# Patient Record
Sex: MALE | Race: WHITE | Employment: FULL TIME | URBAN - METROPOLITAN AREA
[De-identification: names, ages, dates, MRNs, and addresses within clinical notes are randomized per-mention and may not be internally consistent; named-entity substitution may affect disease eponyms.]

---

## 2018-02-26 ENCOUNTER — TRANSFERRED RECORDS (OUTPATIENT)
Dept: HEALTH INFORMATION MANAGEMENT | Facility: CLINIC | Age: 58
End: 2018-02-26

## 2018-02-27 ENCOUNTER — MEDICAL CORRESPONDENCE (OUTPATIENT)
Dept: HEALTH INFORMATION MANAGEMENT | Facility: CLINIC | Age: 58
End: 2018-02-27

## 2018-02-27 NOTE — TELEPHONE ENCOUNTER
Phone Call:    Who did you talk to? (or) Who did you call? [place] at [facility]   Call Detail/Action: CD put in the mail today - should deliver by Wed 2/28/18.    Tracking # [id number]

## 2018-02-27 NOTE — TELEPHONE ENCOUNTER
ACTION    What did you do? Faxed request to [facility] and [facility] for imaging

## 2018-02-27 NOTE — TELEPHONE ENCOUNTER
APPT INFO    Date /Time: 3/2/18 3PM   Reason for Appt: Suspicion of Neoplasm of Rt Femur   Ref Provider/Clinic: Dr. Isaiah Paulson    Are there internal records? Yes/No?  IF YES, list clinic names: no   Are there outside records? Yes/No? Yes - see below   Patient Contact (Y/N) & Call Details: No - pt is referred   Action: Faxed request for records     OUTSIDE RECORDS CHECKLIST     CLINIC NAME COMMENTS REC (x) IMG (x)   [facility]  x x

## 2018-02-28 NOTE — TELEPHONE ENCOUNTER
Received Imaging From: [facility]    Image Type (x): Disc:_x__  Pacs:___      Exam Date/Name: MR Rt Femur Ext 2/26/18 Comments: sent disc to [facility]

## 2018-02-28 NOTE — TELEPHONE ENCOUNTER
Received Imaging From: [facility]    Image Type (x): Disc:___  Pacs:__x_      Exam Date/Name: XR R Femur 2/26/18    US R Extremities 2/26/18 Comments: imaging in pacs

## 2018-03-02 ENCOUNTER — OFFICE VISIT (OUTPATIENT)
Dept: ORTHOPEDICS | Facility: CLINIC | Age: 58
End: 2018-03-02
Payer: COMMERCIAL

## 2018-03-02 ENCOUNTER — PRE VISIT (OUTPATIENT)
Dept: ORTHOPEDICS | Facility: CLINIC | Age: 58
End: 2018-03-02

## 2018-03-02 VITALS — BODY MASS INDEX: 34.2 KG/M2 | WEIGHT: 238.9 LBS | HEIGHT: 70 IN

## 2018-03-02 DIAGNOSIS — D21.9 BENIGN NEOPLASM OF SOFT TISSUES: Primary | ICD-10-CM

## 2018-03-02 NOTE — PROGRESS NOTES
Dear ,    Thank you for asking me to see Mr. Tapia.  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  I recommended an open biopsy with frozen section at the time of the surgery.  He understands this and would like to proceed.    I saw him today with .  I agree with his assessment and plan.  Risk and benefits of the surgery were reviewed with the patient.    Sincerely,

## 2018-03-02 NOTE — PROGRESS NOTES
Orthopaedic Surgery Clinic Consult  Date of Service: 03/02/18    Chief Complaint: R thigh mass    History of Present Illness:  Pt is a 57 year old male, healthy, presenting with R thigh pain for the past 5 months and swelling for the past 3 weeks.  This began when he strongly struck his lateral R thigh against a solid truck door.  Immediate pain but essentially no bruising/swelling at that time, was able to ambulate without major issue.  Continued to be stiff, however, only started swelling in past few weeks, without additional cause.  The mass itself is not painful to touch except at its distal lateral aspect.  It mainly is painful when he tries to flex the knee.  Weight bearing does not independently cause pain.  The entire leg does swell diffusely, which seems to improve with icing.  Also uses a heating pack which has caused some skin blisters.    Has undergone US, XR, and MRI.    No other systemic symptoms, never had previous malignancy/mass, no prior major issues with the R thigh.  Does not take any blood thinners.        Past Medical History:  No past medical history on file.    Past Surgical History:  No past surgical history on file.    Medications:  Current Outpatient Prescriptions   Medication Sig Dispense Refill     IBUPROFEN PO Take 800 mg by mouth as needed for moderate pain         Allergies:   No Known Allergies    Social History:  Nonsmoker.  Works at [employer].  Social History     Social History     Marital status:      Spouse name: N/A     Number of children: N/A     Years of education: N/A     Occupational History     Not on file.     Social History Main Topics     Smoking status: Former Smoker     Packs/day: 1.00     Years: 10.00     Types: Cigarettes     Start date: 1/1/1975     Quit date: 1/1/1990     Smokeless tobacco: Never Used     Alcohol use Yes     Drug use: No     Sexual activity: Not Currently     Partners: Female     Other Topics Concern     Not on file     Social History  "Narrative     No narrative on file         Family History:  No family history on file.  Negative for blood clots, bleeding disorders or anesthesia related complications.    Review of Systems:  10 point review of systems is reviewed and is available below.    Physical Exam:  Ht 1.778 m (5' 10\")  Wt 108.4 kg (238 lb 14.4 oz)  BMI 34.28 kg/m2  A&O, NAD  HEENT: NCAT, EOMI  Neuro: CN II-XII grossly intact  Neck: Full AROM without difficulty  Resp: equal and nonlabored  CV: RRR  Extremities:  RLE:   Large obvious mass along the proximal anterolateral thigh, skin with resolving 2nd degree burn marks (reports this is from hating pack overuse).  No full thickness skin compromise.  Nontender along most of mass.  At distal anterolateral end there is an area of tenderness.   Knee flexion limited to 0-75 r/t pain with flexion.  SLR intact but somewhat weak.   SILT dp/sp/t/s/s nerve distributions   DP 2+   Fires EHL/FHL/TA/Gsc 5/5 strength   No calf tenderness      Imaging:  MRI with large cystic mass, fluid filled, suspicious for sarcoma.    Assessment:  Pt is a 57 year old male, with R thigh mass.  Unclear if linked to previous trauma - could be some hematoma component but timecourse of injury doesn't totally fit.  Also not anti-coagulated.  Concern for sarcoma.    Plan:  -Schedule for incisional open biopsy, hopefully next week.  May need multiple biopsies given cystic nature - will schedule for extra time.  -No restrictions  -Preop per usual    Discussed with staff, Dr. Betts.    Mp Benson MD  PGY-4, Orthopaedic Surgery  [phone]          "

## 2018-03-02 NOTE — MR AVS SNAPSHOT
After Visit Summary   3/2/2018    Hiram Tapia    MRN: [id number]           Patient Information     Date Of Birth          1960        Visit Information        Provider Department      3/2/2018 3:00 PM Brad Betts MD [facility]        Today's Diagnoses     Benign neoplasm of soft tissues    -  1       Follow-ups after your visit        Your next 10 appointments already scheduled     Mar 08, 2018   Procedure with Brad Betts MD   [facility] ([facility])    [address] [place] [address]  5th [facility] [id number] [phone]           Located in the Clinics and Surgery Center at [address] [place] [address].   parking is very convenient and highly recommended.  is a $6 flat rate fee.  Both  and self parkers should enter the main arrival plaza from [facility]; parking attendants will direct you based on your parking preference.            Mar 23, 2018  1:45 PM CDT   (Arrive by 1:30 PM)   Return Visit with Brad Betts MD   [facility] ([facility])    [address] [place] [address]  4th [facility] [id number] [phone]              Who to contact     Please call your clinic at [phone] to:    Ask questions about your health    Make or cancel appointments    Discuss your medicines    Learn about your test results    Speak to your doctor            Additional Information About Your Visit        MyChart Information     [employer] is an electronic gateway that provides easy, online access to your medical records. With [employer], you can request a clinic appointment, read your test results, renew a prescription or communicate with your care team.     To sign up for [employer] visit the website at [email or web address]   You will be asked to enter the access code listed below, as well as some personal information. Please follow  "the directions to create your username and password.     Your access code is: [email or web address]  Expires: 2018  6:30 AM     Your access code will  in 90 days. If you need help or a new code, please contact your [facility] or call [phone] for assistance.        Care EveryWhere ID     This is your Care EveryWhere ID. This could be used by other organizations to access your [facility] medical records  [id number]        Your Vitals Were     Height BMI (Body Mass Index)                1.778 m (5' 10\") 34.28 kg/m2           Blood Pressure from Last 3 Encounters:   No data found for BP    Weight from Last 3 Encounters:   18 108.4 kg (238 lb 14.4 oz)              We Performed the Following     [facility]ela-Operative Worksheet        Primary Care Provider Office Phone # Fax #    Louisa Fry [phone]       [facility] [address]  [facility] [address]        Equal Access to Services     [facility] Hospital[facility] : Hadii aad ku hadasho Soomaali, waaxda luqadaha, qaybta kaalmada adeegyada, waxay idiin hayaan nanetteeg kharash mich . So [facility] [phone].    ATENCIÓN: Si habla español, tiene a sheridan disposición servicios gratuitos de asistencia lingüística. Llame al [phone].    We comply with applicable federal civil rights laws and [place] laws. We do not discriminate on the basis of race, color, national origin, age, disability, sex, sexual orientation, or gender identity.            Thank you!     Thank you for choosing [facility]TH[facility]  for your care. Our goal is always to provide you with excellent care. Hearing back from our patients is one way we can continue to improve our services. Please take a few minutes to complete the written survey that you may receive in the mail after your visit with us. Thank you!             Your Updated Medication List - Protect others around you: Learn how to safely use, store and throw away your medicines at " www.disposemymeds.org.          This list is accurate as of 3/2/18 11:59 PM.  Always use your most recent med list.                   Brand Name Dispense Instructions for use Diagnosis    ACETAMINOPHEN PO      Take 325 mg by mouth        IBUPROFEN PO      Take 800 mg by mouth as needed for moderate pain

## 2018-03-02 NOTE — LETTER
3/2/2018       RE: Hiram Tapia  [address]  [facility] [address]     Dear Colleague,    Thank you for referring your patient, Hiram Tapia, to the [facility] at [facility]. Please see a copy of my visit note below.    Orthopaedic Surgery Clinic Consult  Date of Service: 03/02/18    Chief Complaint: R thigh mass    History of Present Illness:  Pt is a 57 year old male, healthy, presenting with R thigh pain for the past 5 months and swelling for the past 3 weeks.  This began when he strongly struck his lateral R thigh against a solid truck door.  Immediate pain but essentially no bruising/swelling at that time, was able to ambulate without major issue.  Continued to be stiff, however, only started swelling in past few weeks, without additional cause.  The mass itself is not painful to touch except at its distal lateral aspect.  It mainly is painful when he tries to flex the knee.  Weight bearing does not independently cause pain.  The entire leg does swell diffusely, which seems to improve with icing.  Also uses a heating pack which has caused some skin blisters.    Has undergone US, XR, and MRI.    No other systemic symptoms, never had previous malignancy/mass, no prior major issues with the R thigh.  Does not take any blood thinners.        Past Medical History:  No past medical history on file.    Past Surgical History:  No past surgical history on file.    Medications:  Current Outpatient Prescriptions   Medication Sig Dispense Refill     IBUPROFEN PO Take 800 mg by mouth as needed for moderate pain         Allergies:   No Known Allergies    Social History:  Nonsmoker.  Works at [employer].  Social History     Social History     Marital status:      Spouse name: N/A     Number of children: N/A     Years of education: N/A     Occupational History     Not on file.     Social History Main Topics     Smoking status: Former Smoker      "Packs/day: 1.00     Years: 10.00     Types: Cigarettes     Start date: 1/1/1975     Quit date: 1/1/1990     Smokeless tobacco: Never Used     Alcohol use Yes     Drug use: No     Sexual activity: Not Currently     Partners: Female     Other Topics Concern     Not on file     Social History Narrative     No narrative on file         Family History:  No family history on file.  Negative for blood clots, bleeding disorders or anesthesia related complications.    Review of Systems:  10 point review of systems is reviewed and is available below.    Physical Exam:  Ht 1.778 m (5' 10\")  Wt 108.4 kg (238 lb 14.4 oz)  BMI 34.28 kg/m2  A&O, NAD  HEENT: NCAT, EOMI  Neuro: CN II-XII grossly intact  Neck: Full AROM without difficulty  Resp: equal and nonlabored  CV: RRR  Extremities:  RLE:   Large obvious mass along the proximal anterolateral thigh, skin with resolving 2nd degree burn marks (reports this is from hating pack overuse).  No full thickness skin compromise.  Nontender along most of mass.  At distal anterolateral end there is an area of tenderness.   Knee flexion limited to 0-75 r/t pain with flexion.  SLR intact but somewhat weak.   SILT dp/sp/t/s/s nerve distributions   DP 2+   Fires EHL/FHL/TA/Gsc 5/5 strength   No calf tenderness      Imaging:  MRI with large cystic mass, fluid filled, suspicious for sarcoma.    Assessment:  Pt is a 57 year old male, with R thigh mass.  Unclear if linked to previous trauma - could be some hematoma component but timecourse of injury doesn't totally fit.  Also not anti-coagulated.  Concern for sarcoma.    Plan:  -Schedule for incisional open biopsy, hopefully next week.  May need multiple biopsies given cystic nature - will schedule for extra time.  -No restrictions  -Preop per usual    Discussed with staff, Dr. Betts.    Mp Benson MD  PGY-4, Orthopaedic Surgery  [phone]  Dear ,    Thank you for asking me to see Mr. Tapia.  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  I recommended an open biopsy with frozen section at the time of the surgery.  He understands this and would like to proceed.    I saw him today with .  I agree with his assessment and plan.  Risk and benefits of the surgery were reviewed with the patient.    Again, thank you for allowing me to participate in the care of your patient.      Sincerely,    Brad Betts MD

## 2018-03-02 NOTE — NURSING NOTE
"Chief Complaint   Patient presents with     Consult     Pt states that he is here today for a Biopsy of his Right Femur. He states that he Bumped his Leg Oct. 2015 and has been experiencing increased pain and swelling since. Referring:  MYRA PETERSEN       57 year old  1960    Ht 1.778 m (5' 10\")  Wt 108.4 kg (238 lb 14.4 oz)  BMI 34.28 kg/m2              [facility] [address] [place] [address] - [facility] [address] AT [facility] OF  [address]    No Known Allergies  Current Outpatient Prescriptions   Medication     IBUPROFEN PO     No current facility-administered medications for this visit.              "

## 2018-03-08 ENCOUNTER — HOSPITAL ENCOUNTER (OUTPATIENT)
Facility: AMBULATORY SURGERY CENTER | Age: 58
End: 2018-03-08
Attending: ORTHOPAEDIC SURGERY
Payer: COMMERCIAL

## 2018-03-08 ENCOUNTER — SURGERY (OUTPATIENT)
Age: 58
End: 2018-03-08

## 2018-03-08 ENCOUNTER — ANESTHESIA EVENT (OUTPATIENT)
Dept: SURGERY | Facility: AMBULATORY SURGERY CENTER | Age: 58
End: 2018-03-08

## 2018-03-08 ENCOUNTER — TELEPHONE (OUTPATIENT)
Dept: ORTHOPEDICS | Facility: CLINIC | Age: 58
End: 2018-03-08

## 2018-03-08 ENCOUNTER — ANESTHESIA (OUTPATIENT)
Dept: SURGERY | Facility: AMBULATORY SURGERY CENTER | Age: 58
End: 2018-03-08

## 2018-03-08 VITALS
BODY MASS INDEX: 33.64 KG/M2 | TEMPERATURE: 98 F | DIASTOLIC BLOOD PRESSURE: 104 MMHG | OXYGEN SATURATION: 98 % | RESPIRATION RATE: 16 BRPM | HEIGHT: 70 IN | HEART RATE: 87 BPM | SYSTOLIC BLOOD PRESSURE: 131 MMHG | WEIGHT: 235 LBS

## 2018-03-08 DIAGNOSIS — R22.41 MASS OF RIGHT THIGH: Primary | ICD-10-CM

## 2018-03-08 RX ORDER — HYDROMORPHONE HYDROCHLORIDE 1 MG/ML
.3-.5 INJECTION, SOLUTION INTRAMUSCULAR; INTRAVENOUS; SUBCUTANEOUS EVERY 10 MIN PRN
Status: DISCONTINUED | OUTPATIENT
Start: 2018-03-08 | End: 2018-03-09 | Stop reason: HOSPADM

## 2018-03-08 RX ORDER — ONDANSETRON 4 MG/1
4 TABLET, ORALLY DISINTEGRATING ORAL EVERY 30 MIN PRN
Status: DISCONTINUED | OUTPATIENT
Start: 2018-03-08 | End: 2018-03-09 | Stop reason: HOSPADM

## 2018-03-08 RX ORDER — BUPIVACAINE HYDROCHLORIDE AND EPINEPHRINE 2.5; 5 MG/ML; UG/ML
INJECTION, SOLUTION INFILTRATION; PERINEURAL PRN
Status: DISCONTINUED | OUTPATIENT
Start: 2018-03-08 | End: 2018-03-08 | Stop reason: HOSPADM

## 2018-03-08 RX ORDER — OXYCODONE HYDROCHLORIDE 5 MG/1
5-10 TABLET ORAL EVERY 4 HOURS PRN
Qty: 20 TABLET | Refills: 0 | Status: SHIPPED | OUTPATIENT
Start: 2018-03-08 | End: 2018-03-27

## 2018-03-08 RX ORDER — OXYCODONE HYDROCHLORIDE 5 MG/1
5 TABLET ORAL
Status: COMPLETED | OUTPATIENT
Start: 2018-03-08 | End: 2018-03-08

## 2018-03-08 RX ORDER — SODIUM CHLORIDE, SODIUM LACTATE, POTASSIUM CHLORIDE, CALCIUM CHLORIDE 600; 310; 30; 20 MG/100ML; MG/100ML; MG/100ML; MG/100ML
INJECTION, SOLUTION INTRAVENOUS CONTINUOUS
Status: DISCONTINUED | OUTPATIENT
Start: 2018-03-08 | End: 2018-03-09 | Stop reason: HOSPADM

## 2018-03-08 RX ORDER — ONDANSETRON 2 MG/ML
INJECTION INTRAMUSCULAR; INTRAVENOUS PRN
Status: DISCONTINUED | OUTPATIENT
Start: 2018-03-08 | End: 2018-03-08

## 2018-03-08 RX ORDER — SODIUM CHLORIDE, SODIUM LACTATE, POTASSIUM CHLORIDE, CALCIUM CHLORIDE 600; 310; 30; 20 MG/100ML; MG/100ML; MG/100ML; MG/100ML
INJECTION, SOLUTION INTRAVENOUS CONTINUOUS
Status: DISCONTINUED | OUTPATIENT
Start: 2018-03-08 | End: 2018-03-08 | Stop reason: HOSPADM

## 2018-03-08 RX ORDER — ONDANSETRON 4 MG/1
4 TABLET, ORALLY DISINTEGRATING ORAL
Status: DISCONTINUED | OUTPATIENT
Start: 2018-03-08 | End: 2018-03-09 | Stop reason: HOSPADM

## 2018-03-08 RX ORDER — NALOXONE HYDROCHLORIDE 0.4 MG/ML
.1-.4 INJECTION, SOLUTION INTRAMUSCULAR; INTRAVENOUS; SUBCUTANEOUS
Status: DISCONTINUED | OUTPATIENT
Start: 2018-03-08 | End: 2018-03-09 | Stop reason: HOSPADM

## 2018-03-08 RX ORDER — MEPERIDINE HYDROCHLORIDE 25 MG/ML
12.5 INJECTION INTRAMUSCULAR; INTRAVENOUS; SUBCUTANEOUS
Status: DISCONTINUED | OUTPATIENT
Start: 2018-03-08 | End: 2018-03-09 | Stop reason: HOSPADM

## 2018-03-08 RX ORDER — ONDANSETRON 2 MG/ML
4 INJECTION INTRAMUSCULAR; INTRAVENOUS EVERY 30 MIN PRN
Status: DISCONTINUED | OUTPATIENT
Start: 2018-03-08 | End: 2018-03-09 | Stop reason: HOSPADM

## 2018-03-08 RX ORDER — DEXAMETHASONE SODIUM PHOSPHATE 4 MG/ML
INJECTION, SOLUTION INTRA-ARTICULAR; INTRALESIONAL; INTRAMUSCULAR; INTRAVENOUS; SOFT TISSUE PRN
Status: DISCONTINUED | OUTPATIENT
Start: 2018-03-08 | End: 2018-03-08

## 2018-03-08 RX ORDER — PROPOFOL 10 MG/ML
INJECTION, EMULSION INTRAVENOUS CONTINUOUS PRN
Status: DISCONTINUED | OUTPATIENT
Start: 2018-03-08 | End: 2018-03-08

## 2018-03-08 RX ORDER — GABAPENTIN 300 MG/1
300 CAPSULE ORAL ONCE
Status: COMPLETED | OUTPATIENT
Start: 2018-03-08 | End: 2018-03-08

## 2018-03-08 RX ORDER — FENTANYL CITRATE 50 UG/ML
INJECTION, SOLUTION INTRAMUSCULAR; INTRAVENOUS PRN
Status: DISCONTINUED | OUTPATIENT
Start: 2018-03-08 | End: 2018-03-08

## 2018-03-08 RX ORDER — PROPOFOL 10 MG/ML
INJECTION, EMULSION INTRAVENOUS PRN
Status: DISCONTINUED | OUTPATIENT
Start: 2018-03-08 | End: 2018-03-08

## 2018-03-08 RX ORDER — ACETAMINOPHEN 325 MG/1
650 TABLET ORAL
Status: DISCONTINUED | OUTPATIENT
Start: 2018-03-08 | End: 2018-03-09 | Stop reason: HOSPADM

## 2018-03-08 RX ORDER — ACETAMINOPHEN 325 MG/1
975 TABLET ORAL ONCE
Status: COMPLETED | OUTPATIENT
Start: 2018-03-08 | End: 2018-03-08

## 2018-03-08 RX ORDER — FENTANYL CITRATE 50 UG/ML
25-50 INJECTION, SOLUTION INTRAMUSCULAR; INTRAVENOUS
Status: DISCONTINUED | OUTPATIENT
Start: 2018-03-08 | End: 2018-03-08 | Stop reason: HOSPADM

## 2018-03-08 RX ORDER — KETOROLAC TROMETHAMINE 30 MG/ML
INJECTION, SOLUTION INTRAMUSCULAR; INTRAVENOUS PRN
Status: DISCONTINUED | OUTPATIENT
Start: 2018-03-08 | End: 2018-03-08

## 2018-03-08 RX ADMIN — PROPOFOL 270 MG: 10 INJECTION, EMULSION INTRAVENOUS at 12:22

## 2018-03-08 RX ADMIN — ONDANSETRON 4 MG: 2 INJECTION INTRAMUSCULAR; INTRAVENOUS at 13:10

## 2018-03-08 RX ADMIN — FENTANYL CITRATE 50 MCG: 50 INJECTION, SOLUTION INTRAMUSCULAR; INTRAVENOUS at 14:03

## 2018-03-08 RX ADMIN — FENTANYL CITRATE 50 MCG: 50 INJECTION, SOLUTION INTRAMUSCULAR; INTRAVENOUS at 12:22

## 2018-03-08 RX ADMIN — GABAPENTIN 300 MG: 300 CAPSULE ORAL at 10:23

## 2018-03-08 RX ADMIN — FENTANYL CITRATE 50 MCG: 50 INJECTION, SOLUTION INTRAMUSCULAR; INTRAVENOUS at 14:07

## 2018-03-08 RX ADMIN — FENTANYL CITRATE 50 MCG: 50 INJECTION, SOLUTION INTRAMUSCULAR; INTRAVENOUS at 12:31

## 2018-03-08 RX ADMIN — FENTANYL CITRATE 50 MCG: 50 INJECTION, SOLUTION INTRAMUSCULAR; INTRAVENOUS at 13:59

## 2018-03-08 RX ADMIN — DEXAMETHASONE SODIUM PHOSPHATE 4 MG: 4 INJECTION, SOLUTION INTRA-ARTICULAR; INTRALESIONAL; INTRAMUSCULAR; INTRAVENOUS; SOFT TISSUE at 12:26

## 2018-03-08 RX ADMIN — BUPIVACAINE HYDROCHLORIDE AND EPINEPHRINE 10 ML: 2.5; 5 INJECTION, SOLUTION INFILTRATION; PERINEURAL at 13:38

## 2018-03-08 RX ADMIN — KETOROLAC TROMETHAMINE 30 MG: 30 INJECTION, SOLUTION INTRAMUSCULAR; INTRAVENOUS at 13:15

## 2018-03-08 RX ADMIN — HYDROMORPHONE HYDROCHLORIDE 0.5 MG: 1 INJECTION, SOLUTION INTRAMUSCULAR; INTRAVENOUS; SUBCUTANEOUS at 14:08

## 2018-03-08 RX ADMIN — FENTANYL CITRATE 50 MCG: 50 INJECTION, SOLUTION INTRAMUSCULAR; INTRAVENOUS at 13:54

## 2018-03-08 RX ADMIN — ACETAMINOPHEN 975 MG: 325 TABLET ORAL at 10:23

## 2018-03-08 RX ADMIN — PROPOFOL 200 MCG/KG/MIN: 10 INJECTION, EMULSION INTRAVENOUS at 12:27

## 2018-03-08 RX ADMIN — OXYCODONE HYDROCHLORIDE 5 MG: 5 TABLET ORAL at 14:10

## 2018-03-08 RX ADMIN — SODIUM CHLORIDE, SODIUM LACTATE, POTASSIUM CHLORIDE, CALCIUM CHLORIDE: 600; 310; 30; 20 INJECTION, SOLUTION INTRAVENOUS at 10:25

## 2018-03-08 NOTE — OP NOTE
Preop diagnosis: Tumor right thigh, 30 cm, deep    Postoperative diagnosis: High-grade sarcoma right thigh.    Procedure performed: Biopsy of malignant tumor right thigh.    Surgeon: Brittany Atkinson.  Ms. Santos's assistance was required throughout the surgery for assistance in hemostasis and retraction.    Estimated blood loss: 5 cc    Pathology submitted: Tumor right thigh frozen section and final.    Patient was interviewed in the preoperative area risk and benefits have been reviewed the surgical site was marked with my initials and line of intended incision.  Consent was signed preoperative brief had been performed.  The patient was taken the operating room received a general anesthetic was placed in the lateral position with the right side up in the right thigh and hip were prepped and draped sterilely.  Surgical timeout was performed.    A 2 inch incision was made directly over the proximal lateral portion of the mass.  Sharp dissection was taken down through skin and subcutaneous tissue and tensor fascia fascia through the superficial fascia of the lateralis muscles.  Approximately thousand cc of old fluid was extracted from the wound and solid tumor was expressed from inside the cyst cavity.  Frozen section of the cell tumor showed evidence of a high-grade sarcoma.    Additional tissue was taken from the tumor region.  This was submitted for final histopathology.  The wound was closed with deep fascial subcutaneous and skin layers over a drain.    Postoperative plan: 1.  Return to clinic next Tuesday for removal of the drain.  2.  He will need a PET CT scan appointments with Dr. Solo or Dr. Gaines and Dr. Johnson.

## 2018-03-08 NOTE — DISCHARGE INSTRUCTIONS
[facility]  Home Care Following Anesthesia  For 24 hours after surgery:  1. Get plenty of rest.  A responsible adult must stay with you for at least 24 hours after you leave the surgery center.  2. Do not drive or use heavy equipment.  If you have weakness or tingling, don't drive or use heavy equipment until this feeling goes away.   3. Do not drink alcohol.   4. Avoid strenuous or risky activities.  Ask for help when climbing stairs.  5. You may feel lightheaded.  IF so, sit for a few minutes before standing.  Have someone help you get up.   6. If you have nausea (feel sick to your stomach): Drink only clear liquids such as apple juice, ginger ale, broth or 7-Up.  Rest may also help.  Be sure to drink enough fluids.  Move to a regular diet as you feel able.   7. You may have a slight fever.  Call the doctor if your fever is over 100 F (37.7 C) (taken under the tongue) or lasts longer than 24 hours.  8. You may have a dry mouth, a sore throat, muscle aches or trouble sleeping. These should go away after 24 hours.  9. Do not make important or legal decisions.               Tips for taking pain medications  To get the best pain relief possible, remember these points:    Take pain medications as directed, before pain becomes severe.    Pain medication can upset your stomach: taking it with food may help.    Constipation is a common side effect of pain medication. Drink plenty of  fluids.    Eat foods high in fiber. Take a stool softener if recommended by your doctor or pharmacist.    Do not drink alcohol, drive or operate machinery while taking pain medications.    Ask about other ways to control pain, such as with heat, ice or relaxation.    Tylenol/Acetaminophen Consumption  To help encourage the safe use of acetaminophen, the makers of TYLENOL  have lowered the maximum daily dose for single-ingredient Extra Strength TYLENOL  (acetaminophen) products sold in the [place] from 8  pills per day (4,000 mg) to 6 pills per day (3,000 mg). The dosing interval has also changed from 2 pills every 4-6 hours to 2 pills every 6 hours.    If you feel your pain relief is insufficient, you may take Tylenol/Acetaminophen in addition to your narcotic pain medication.     Be careful not to exceed 3,000 mg of Tylenol/Acetaminophen in a 24 hour period from all sources.    If you are taking extra strength Tylenol/acetaminophen (500 mg), the maximum dose is 6 tablets in 24 hours.    If you are taking regular strength acetaminophen (325 mg), the maximum dose is 9 tablets in 24 hours.    Call a doctor for any of the followin. Signs of infection (fever, growing tenderness at the surgery site, a large amount of drainage or bleeding, severe pain, foul-smelling drainage, redness, swelling).  2. It has been over 8 to 10 hours since surgery and you are still not able to urinate (pass water).  3. Headache for over 24 hours.  4. Numbness, tingling or weakness the day after surgery (if you had spinal anesthesia).  Your doctor is:  Dr. Brad Betts, Orthopaedics: [phone]                    Or dial [phone] and ask for the resident on call for:  Orthopaedics  For emergency care, call the:  [facility]:  [phone] (TTY for hearing impaired: [phone])      Discharge Instructions: Caring for Your Hemovac Drainage Tube  You have been discharged with a Hemovac drainage tube. The tube was placed in your incision to remove fluid and is attached to a drain or collection device. It will help healing and reduce the risk of infection. Expect to see fluid and blood in the drain. You may also feel some burning and pulling from the stitch that holds the tube in place. Your drain will be removed when the fluid leaking from it is less than 2 tablespoons each day. There is a bandage at the site where the tube is placed. This is to protect the open area from infection. Your stitches will be taken out  7 to 14 days after surgery. Here's what you need to do to care for your Hemovac drainage tube.  General guidelines    Don t sleep on the same side as the tube.    Secure the tube and bag inside your clothing. This will prevent the tube from being pulled out.    Take a sponge bath to avoid getting your bandage and tube site wet, unless your healthcare provider tells you otherwise.    Ask your provider when can you take a shower or bathe.    Ask your provider about the best way to keep the site dry when bathing or showering.  Empty the drain  Empty your drain at least twice a day. Empty it more often if needed.    Lift the stopper. The drain will expand.    Turn the drain upside down.    Drain the fluid into a measuring cup.    Record the amount of fluid each time you empty the drain. Total the amount daily. Share this information with your doctor on your next visit.    Place the empty drain on a hard surface and press down until it is flat.    Close the cork stopper device.  Change the dressing  Change the dressing around the tube every day.    Wash your hands.    Remove the old bandage.    Wash your hands again.    Wet a cotton swab and clean around the incision and tube site. Use normal saline solution (salt and water).    Put a new bandage on the incision and tube site. Make the bandage large enough to cover the whole incision area.    Tape the bandage in place.  Follow-up  Make a follow-up appointment as directed by our staff.     When to call your doctor  Call your doctor right away if you have any of the following:    Pain, swelling, or fluid around the tube    Redness or warmth around the incision or fluid draining from the incision    Nausea and vomiting    Fever above 100.4  F (38  C) or chills    An incision that does not heal; stitches that become infected or loose    A tube that falls out    A foul smell from the incision site    Drainage that changes from light pink to dark red    An increase in the  amount of drainage after an initial decrease   Date Last Reviewed: 8/16/2015 2000-2017 [employer]. [address] [place] [address], [place] [address]. All rights reserved. This information is not intended as a substitute for professional medical care. Always follow your healthcare professional's instructions.

## 2018-03-08 NOTE — IP AVS SNAPSHOT
[facility]    [address] [place] [address]    Phone:  [phone]    Fax:  [phone]                                       After Visit Summary   3/8/2018    Hiram Tapia    MRN: [id number]           After Visit Summary Signature Page     I have received my discharge instructions, and my questions have been answered. I have discussed any challenges I see with this plan with the nurse or doctor.    ..........................................................................................................................................  Patient/Patient Representative Signature      ..........................................................................................................................................  Patient Representative Print Name and Relationship to Patient    ..................................................               ................................................  Date                                            Time    ..........................................................................................................................................  Reviewed by Signature/Title    ...................................................              ..............................................  Date                                                            Time

## 2018-03-08 NOTE — ANESTHESIA POSTPROCEDURE EVALUATION
Patient: Hiram Tapia    Procedure(s):  Biopsy Right Thigh Tumor - Wound Class: I-Clean    Diagnosis:Tumor  Diagnosis Additional Information: No value filed.    Anesthesia Type:  General, LMA    Note:  Anesthesia Post Evaluation    Patient location during evaluation: Phase 2  Patient participation: Able to fully participate in evaluation  Level of consciousness: awake  Pain management: adequate  Airway patency: patent  Cardiovascular status: acceptable  Respiratory status: acceptable  Hydration status: acceptable  PONV: none     Anesthetic complications: None          Last vitals:  Vitals:    03/08/18 1337 03/08/18 1345 03/08/18 1400   BP: (!) 129/91 (!) 135/93 (!) 136/102   Pulse:      Resp: 17 9 12   Temp: 36  C (96.8  F) 36.7  C (98  F) 36.7  C (98  F)   SpO2: 98% 100% 95%         Electronically Signed By: Jayden Martinez MD  March 8, 2018  2:12 PM

## 2018-03-08 NOTE — BRIEF OP NOTE
[facility]    Orthopaedic Surgery  Brief Operative Note    Pre-operative diagnosis: Tumor   Post-operative diagnosis Tumor right thigh   Procedure: Procedure(s):  Biopsy Right Thigh Tumor - Wound Class: I-Clean   Surgeon: Brad Betts MD   Assistants(s): Brittany Santos PA-C   Anesthesia: General    Estimated blood loss: Less than 10 ml   Total IV fluids: (See anesthesia record)   Blood transfusion: (See anesthesia record)   Total urine output: (See anesthesia record)   Drains: Hemovac   Specimens:   ID Type Source Tests Collected by Time Destination   1 : culture right thigh mass Fluid Leg, Right ANAEROBIC BACTERIAL CULTURE Brad Betts MD 3/8/2018  1:14 PM    2 : culture right thigh mass Fluid Leg, Right FLUID CULTURE AEROBIC BACTERIAL Brad Betts MD 3/8/2018  1:16 PM    A : Tumor right thigh Tissue Leg, Right SURGICAL PATHOLOGY EXAM rBad Betts MD 3/8/2018 12:58 PM    B : Tumor right thigh Tissue Leg, Right SURGICAL PATHOLOGY EXAM Brad Betts MD 3/8/2018  1:11 PM       Findings: 1000mL of brown fluid with right thigh mass   Complications: None   Implants: None    Post-op Plan:  WB status:   FWB  Device: Drain, pt to return on Tuesday for possible drain removal  DVT Prophylaxis:  Not needed   Follow-up:  1 weeks with Dr. Betts/WANG for wound check

## 2018-03-08 NOTE — ANESTHESIA CARE TRANSFER NOTE
Patient: Hiram Tapia    Procedure(s):  Biopsy Right Thigh Tumor - Wound Class: I-Clean    Diagnosis: Tumor  Diagnosis Additional Information: No value filed.    Anesthesia Type:   General, LMA     Note:  Airway :Face Mask  Patient transferred to:PACU  Comments: 139/85 97% 36.1-77-16  Handoff Report: Identifed the Patient, Identified the Reponsible Provider, Reviewed the pertinent medical history, Discussed the surgical course, Reviewed Intra-OP anesthesia mangement and issues during anesthesia, Set expectations for post-procedure period and Allowed opportunity for questions and acknowledgement of understanding      Vitals: (Last set prior to Anesthesia Care Transfer)    CRNA VITALS  3/8/2018 1301 - 3/8/2018 1339      3/8/2018             Pulse: 83    SpO2: 98 %    Resp Rate (observed): 17    Resp Rate (set): 10                Electronically Signed By: WILLAM Echeverria CRNA  March 8, 2018  1:39 PM

## 2018-03-08 NOTE — TELEPHONE ENCOUNTER
Lea, patients wife called in concerned about 200 ml red drainage that has collected since being d/c today. Patient s/p Biopsy of malignant tumor right thigh DOS 3/8/18 with Dr. Betts. Discussed with Mariann CONNER and Dr. Coello who said to discard 200ml fluid and continue to monitor to see if it fills up again as fast and to call the on-call physicians, #'s given. They are agreeable with plan.

## 2018-03-08 NOTE — ANESTHESIA PREPROCEDURE EVALUATION
Anesthesia Evaluation     .             ROS/MED HX    ENT/Pulmonary:  - neg pulmonary ROS     Neurologic:  - neg neurologic ROS     Cardiovascular:  - neg cardiovascular ROS       METS/Exercise Tolerance:  >4 METS   Hematologic:  - neg hematologic  ROS       Musculoskeletal:  - neg musculoskeletal ROS       GI/Hepatic:  - neg GI/hepatic ROS       Renal/Genitourinary:  - ROS Renal section negative       Endo:  - neg endo ROS       Psychiatric:  - neg psychiatric ROS       Infectious Disease:  - neg infectious disease ROS       Malignancy:         Other:                     Physical Exam  Normal systems: dental    Airway   Mallampati: II  TM distance: >3 FB  Neck ROM: full    Dental     Cardiovascular   Rhythm and rate: regular      Pulmonary                     Anesthesia Plan      History & Physical Review  History and physical reviewed and following examination; no interval change.    ASA Status:  1 .    NPO Status:  > 8 hours    Plan for General and LMA with Intravenous induction. Maintenance will be TIVA.    PONV prophylaxis:  Ondansetron (or other 5HT-3) and Dexamethasone or Solumedrol       Postoperative Care  Postoperative pain management:  IV analgesics and Oral pain medications.      Consents  Anesthetic plan, risks, benefits and alternatives discussed with:  Patient..                          .

## 2018-03-08 NOTE — IP AVS SNAPSHOT
MRN:[id number]                      After Visit Summary   3/8/2018    Hiram Tapia    MRN: [id number]           Thank you!     Thank you for choosing [facility] for your care. Our goal is always to provide you with excellent care. Hearing back from our patients is one way we can continue to improve our services. Please take a few minutes to complete the written survey that you may receive in the mail after you visit with us. Thank you!        Patient Information     Date Of Birth          1960        About your hospital stay     You were admitted on:  March 8, 2018 [facility]    You were discharged on:  March 8, 2018       Who to Call     For medical emergencies, please call 911.  For non-urgent questions about your medical care, please call your primary care provider or clinic, [phone]  For questions related to your surgery, please call your surgery clinic        Attending Provider     Provider Brad Luque MD Orthopedics       Primary Care Provider Office Phone # Fax #    Louisa Fry [phone]      After Care Instructions      Diet as Tolerated       Return to diet before surgery, unless instructed otherwise.            Discharge Instructions       Review outpatient procedure discharge instructions with patient as directed by Provider            Dressing Change        IF leaking, remove and redress. Wash hands before and after and use gloves.            Ice to affected area       Ice pack to surgical site every 15 minutes per hour for 24 hours            No Alcohol       For 24 hours post procedure            No Dressing Change       No dressing change until follow up appointment.            No driving or operating machinery        until the day after procedure            Notify Provider       CALL YOUR PHYSICIAN IF:  1.  Your pain begins to worsen and is unable to be controlled  with your medications.  2.  Excessive redness or drainage of cloudy or bloody material from the wounds (Clear red tinted fluid and some mild drainage should be expected). Drainage of any kind 5 days after surgery should be reported to the doctor.  3.  You have a temperature elevation greater than 101.5    4.  You have pain, swelling or redness in your calf. You have numbness or weakness in your leg or foot.    You many call your physician at [phone] during business hours.    For after hours or on weekends, you may call the hospital at [phone] and ask for the orthopedic resident on call.            Return to clinic       Return to clinic in 5 days for drain removal            Shower        Cover dressing completely when showering.  Do not get drain site wet.            Weight bearing - As tolerated                 Your next 10 appointments already scheduled     Mar 23, 2018  1:45 PM CDT   (Arrive by 1:30 PM)   Return Visit with Brad Betts MD   [facility] ([facility])    [address] [place] [address]  [address] [facility] [address] [phone]              Further instructions from your care team       [facility]  Home Care Following Anesthesia  For 24 hours after surgery:  1. Get plenty of rest.  A responsible adult must stay with you for at least 24 hours after you leave the surgery center.  2. Do not drive or use heavy equipment.  If you have weakness or tingling, don't drive or use heavy equipment until this feeling goes away.   3. Do not drink alcohol.   4. Avoid strenuous or risky activities.  Ask for help when climbing stairs.  5. You may feel lightheaded.  IF so, sit for a few minutes before standing.  Have someone help you get up.   6. If you have nausea (feel sick to your stomach): Drink only clear liquids such as apple juice, ginger ale, broth or 7-Up.  Rest may also help.  Be sure to drink enough fluids.   Move to a regular diet as you feel able.   7. You may have a slight fever.  Call the doctor if your fever is over 100 F (37.7 C) (taken under the tongue) or lasts longer than 24 hours.  8. You may have a dry mouth, a sore throat, muscle aches or trouble sleeping. These should go away after 24 hours.  9. Do not make important or legal decisions.               Tips for taking pain medications  To get the best pain relief possible, remember these points:    Take pain medications as directed, before pain becomes severe.    Pain medication can upset your stomach: taking it with food may help.    Constipation is a common side effect of pain medication. Drink plenty of  fluids.    Eat foods high in fiber. Take a stool softener if recommended by your doctor or pharmacist.    Do not drink alcohol, drive or operate machinery while taking pain medications.    Ask about other ways to control pain, such as with heat, ice or relaxation.    Tylenol/Acetaminophen Consumption  To help encourage the safe use of acetaminophen, the makers of TYLENOL  have lowered the maximum daily dose for single-ingredient Extra Strength TYLENOL  (acetaminophen) products sold in the [place] from 8 pills per day (4,000 mg) to 6 pills per day (3,000 mg). The dosing interval has also changed from 2 pills every 4-6 hours to 2 pills every 6 hours.    If you feel your pain relief is insufficient, you may take Tylenol/Acetaminophen in addition to your narcotic pain medication.     Be careful not to exceed 3,000 mg of Tylenol/Acetaminophen in a 24 hour period from all sources.    If you are taking extra strength Tylenol/acetaminophen (500 mg), the maximum dose is 6 tablets in 24 hours.    If you are taking regular strength acetaminophen (325 mg), the maximum dose is 9 tablets in 24 hours.    Call a doctor for any of the followin. Signs of infection (fever, growing tenderness at the surgery site, a large amount of drainage or bleeding, severe pain,  foul-smelling drainage, redness, swelling).  2. It has been over 8 to 10 hours since surgery and you are still not able to urinate (pass water).  3. Headache for over 24 hours.  4. Numbness, tingling or weakness the day after surgery (if you had spinal anesthesia).  Your doctor is:  Dr. Brad Betts, Orthopaedics: [phone]                    Or dial [phone] and ask for the resident on call for:  Orthopaedics  For emergency care, call the:  [facility]:  [phone] (TTY for hearing impaired: [phone])      Discharge Instructions: Caring for Your Hemovac Drainage Tube  You have been discharged with a Hemovac drainage tube. The tube was placed in your incision to remove fluid and is attached to a drain or collection device. It will help healing and reduce the risk of infection. Expect to see fluid and blood in the drain. You may also feel some burning and pulling from the stitch that holds the tube in place. Your drain will be removed when the fluid leaking from it is less than 2 tablespoons each day. There is a bandage at the site where the tube is placed. This is to protect the open area from infection. Your stitches will be taken out 7 to 14 days after surgery. Here's what you need to do to care for your Hemovac drainage tube.  General guidelines    Don t sleep on the same side as the tube.    Secure the tube and bag inside your clothing. This will prevent the tube from being pulled out.    Take a sponge bath to avoid getting your bandage and tube site wet, unless your healthcare provider tells you otherwise.    Ask your provider when can you take a shower or bathe.    Ask your provider about the best way to keep the site dry when bathing or showering.  Empty the drain  Empty your drain at least twice a day. Empty it more often if needed.    Lift the stopper. The drain will expand.    Turn the drain upside down.    Drain the fluid into a measuring cup.    Record the amount of fluid  each time you empty the drain. Total the amount daily. Share this information with your doctor on your next visit.    Place the empty drain on a hard surface and press down until it is flat.    Close the cork stopper device.  Change the dressing  Change the dressing around the tube every day.    Wash your hands.    Remove the old bandage.    Wash your hands again.    Wet a cotton swab and clean around the incision and tube site. Use normal saline solution (salt and water).    Put a new bandage on the incision and tube site. Make the bandage large enough to cover the whole incision area.    Tape the bandage in place.  Follow-up  Make a follow-up appointment as directed by our staff.     When to call your doctor  Call your doctor right away if you have any of the following:    Pain, swelling, or fluid around the tube    Redness or warmth around the incision or fluid draining from the incision    Nausea and vomiting    Fever above 100.4  F (38  C) or chills    An incision that does not heal; stitches that become infected or loose    A tube that falls out    A foul smell from the incision site    Drainage that changes from light pink to dark red    An increase in the amount of drainage after an initial decrease   Date Last Reviewed: 8/16/2015 2000-2017 [employer]. [address] [place] [address]. All rights reserved. This information is not intended as a substitute for professional medical care. Always follow your healthcare professional's instructions.                      Pending Results     Date and Time Order Name Status Description    3/8/2018 1259 Surgical pathology exam Preliminary             Admission Information     Date & Time Provider Department Dept. Phone    3/8/2018 Brad Betts MD [facility] [phone]      Your Vitals Were     Blood Pressure Pulse Temperature Respirations Height Weight    135/72 87 98  F (36.7  C) (Temporal) 12 1.778 m  "(5' 10\") 106.6 kg (235 lb)    Pulse Oximetry BMI (Body Mass Index)                95% 33.72 kg/m2          MyChart Information     [employer] is an electronic gateway that provides easy, online access to your medical records. With [employer], you can request a clinic appointment, read your test results, renew a prescription or communicate with your care team.     To sign up for [employer] visit the website at [email or web address]   You will be asked to enter the access code listed below, as well as some personal information. Please follow the directions to create your username and password.     Your access code is: [email or web address]  Expires: 2018  6:30 AM     Your access code will  in 90 days. If you need help or a new code, please contact your [facility] or call [phone] for assistance.        Care EveryWhere ID     This is your Care EveryWhere ID. This could be used by other organizations to access your [facility] medical records  [id number]        Equal Access to Services     DORIAN TOLENTINO : Hadii troy hamilton Sonikita, waaxda luqadaha, qaybta kaalmashaye adelaurita, mendez epps . So [facility] [phone].    ATENCIÓN: Si habla español, tiene a sheridan disposición servicios gratuitos de asistencia lingüística. Llame al [phone].    We comply with applicable federal civil rights laws and [place] laws. We do not discriminate on the basis of race, color, national origin, age, disability, sex, sexual orientation, or gender identity.               Review of your medicines      START taking        Dose / Directions    oxyCODONE IR 5 MG tablet   Commonly known as:  ROXICODONE   Used for:  Mass of right thigh        Dose:  5-10 mg   Take 1-2 tablets (5-10 mg) by mouth every 4 hours as needed for pain or other (Moderate to Severe)   Quantity:  20 tablet   Refills:  0         CONTINUE these medicines which have NOT CHANGED        Dose / Directions    " ACETAMINOPHEN PO        Dose:  325 mg   Take 325 mg by mouth   Refills:  0       IBUPROFEN PO        Dose:  800 mg   Take 800 mg by mouth as needed for moderate pain   Refills:  0            Where to get your medicines      Some of these will need a paper prescription and others can be bought over the counter. Ask your nurse if you have questions.     Bring a paper prescription for each of these medications     oxyCODONE IR 5 MG tablet                Protect others around you: Learn how to safely use, store and throw away your medicines at www.disposemymeds.org.        Information about OPIOIDS     PRESCRIPTION OPIOIDS: WHAT YOU NEED TO KNOW    Prescription opioids can be used to help relieve moderate to severe pain and are often prescribed following a surgery or injury, or for certain health conditions. These medications can be an important part of treatment but also come with serious risks. It is important to work with your health care provider to make sure you are getting the safest, most effective care.    WHAT ARE THE RISKS AND SIDE EFFECTS OF OPIOID USE?  Prescription opioids carry serious risks of addiction and overdose, especially with prolonged use. An opioid overdose, often marked by slowed breathing can cause sudden death. The use of prescription opioids can have a number of side effects as well, even when taken as directed:      Tolerance - meaning you might need to take more of a medication for the same pain relief    Physical dependence - meaning you have symptoms of withdrawal when a medication is stopped    Increased sensitivity to pain    Constipation    Nausea, vomiting, and dry mouth    Sleepiness and dizziness    Confusion    Depression    Low levels of testosterone that can result in lower sex drive, energy, and strength    Itching and sweating    RISKS ARE GREATER WITH:    History of drug misuse, substance use disorder, or overdose    Mental health conditions (such as depression or  anxiety)    Sleep apnea    Older age (65 years or older)    Pregnancy    Avoid alcohol while taking prescription opioids.   Also, unless specifically advised by your health care provider, medications to avoid include:    Benzodiazepines (such as Xanax or Valium)    Muscle relaxants (such as Soma or Flexeril)    Hypnotics (such as Ambien or Lunesta)    Other prescription opioids    KNOW YOUR OPTIONS:  Talk to your health care provider about ways to manage your pain that do not involve prescription opioids. Some of these options may actually work better and have fewer risks and side effects:    Pain relievers such as acetaminophen, ibuprofen, and naproxen    Some medications that are also used for depression or seizures    Physical therapy and exercise    Cognitive behavioral therapy, a psychological, goal-directed approach, in which patients learn how to modify physical, behavioral, and emotional triggers of pain and stress    IF YOU ARE PRESCRIBED OPIOIDS FOR PAIN:    Never take opioids in greater amounts or more often than prescribed    Follow up with your primary health care provider and work together to create a plan on how to manage your pain.    Talk about ways to help manage your pain that do not involve prescription opioids    Talk about all concerns and side effects    Help prevent misuse and abuse    Never sell or share prescription opioids    Never use another person's prescription opioids    Store prescription opioids in a secure place and out of reach of others (this may include visitors, children, friends, and family)    Visit www.cdc.gov/drugoverdose to learn about risks of opioid abuse and overdose    If you believe you may be struggling with addiction, tell your health care provider and ask for guidance or call [facility]'s National Helpline at 4-562-280-HELP    LEARN MORE / www.cdc.gov/drugoverdose/prescribing/guideline.html    Safely dispose of unused prescription opioids: Find your local drug  take-back programs and more information about the importance of safe disposal at www.doseofreality.mn.gov             Medication List: This is a list of all your medications and when to take them. Check marks below indicate your daily home schedule. Keep this list as a reference.      Medications           Morning Afternoon Evening Bedtime As Needed    ACETAMINOPHEN PO   Take 325 mg by mouth   Last time this was given:  975 mg on 3/8/2018 10:23 AM                                IBUPROFEN PO   Take 800 mg by mouth as needed for moderate pain                                oxyCODONE IR 5 MG tablet   Commonly known as:  ROXICODONE   Take 1-2 tablets (5-10 mg) by mouth every 4 hours as needed for pain or other (Moderate to Severe)   Last time this was given:  5 mg on 3/8/2018  2:10 PM

## 2018-03-09 ENCOUNTER — TELEPHONE (OUTPATIENT)
Dept: ORTHOPEDICS | Facility: CLINIC | Age: 58
End: 2018-03-09

## 2018-03-09 NOTE — TELEPHONE ENCOUNTER
Called to check on pt and his drain, as he had called on-call staff yesterday due to high output drainage.  Pt states it is less today and they are managing the drain and recording output.  Pain is controlled on pain meds and he is otherwise doing well.  All questions answered. Pt has appt Tues for possible drain removal.

## 2018-03-13 ENCOUNTER — OFFICE VISIT (OUTPATIENT)
Dept: ORTHOPEDICS | Facility: CLINIC | Age: 58
End: 2018-03-13
Payer: COMMERCIAL

## 2018-03-13 DIAGNOSIS — C49.9 SARCOMA (H): Primary | ICD-10-CM

## 2018-03-13 DIAGNOSIS — C49.9 SARCOMA OF SOFT TISSUE (H): Primary | ICD-10-CM

## 2018-03-13 LAB
BACTERIA SPEC CULT: NO GROWTH
COPATH REPORT: NORMAL
Lab: NORMAL
SPECIMEN SOURCE: NORMAL

## 2018-03-13 NOTE — LETTER
3/13/2018       RE: Hiram Tapia  [address]  [facility] [address]     Dear Colleague,    Thank you for referring your patient, Hiram Tapia, to the [facility] at [facility]. Please see a copy of my visit note below.    Diagnosis: High-grade soft tissue sarcoma right thigh    Treatment: Evaluation in progress.    Mr. Tapia is here today for review of his drain output.  He and  his wife reported minimum of 75 cc per day.  They do describe leaking through the bandage at one point.  They have changed the bandage.  I inspected the drain is in excellent condition it has already drained 150 today.    Impression: Overall doing as well as can be expected.    Plan: 1.  Continue with drain  2.  Body PET CT, medical oncology and radiation therapy appointments within the next week.    3.  Presented multidisciplinary conference when imaging and consultations complete.      Sincerely,    Brad Betts MD    CC: Patient

## 2018-03-13 NOTE — PROGRESS NOTES
Diagnosis: High-grade soft tissue sarcoma right thigh    Treatment: Evaluation in progress.    Mr. Tapia is here today for review of his drain output.  He and  his wife reported minimum of 75 cc per day.  They do describe leaking through the bandage at one point.  They have changed the bandage.  I inspected the drain is in excellent condition it has already drained 150 today.    Impression: Overall doing as well as can be expected.    Plan: 1.  Continue with drain  2.  Body PET CT, medical oncology and radiation therapy appointments within the next week.    3.  Presented multidisciplinary conference when imaging and consultations complete.

## 2018-03-13 NOTE — NURSING NOTE
Chief Complaint   Patient presents with     Surgical Followup     1wk POP Wound Check w/poss. Drain removal s/p Biopsy Right Thigh Tumor DOS: 3/8/18. Pt states that he has emptied his drain about every other day. 270mL Output the night of Surgery. 150mL Output today.        57 year old  1960          [facility] [address] [place], [place] - [facility] [address] AT [facility] OF  [address]    No Known Allergies  Current Outpatient Prescriptions   Medication     oxyCODONE IR (ROXICODONE) 5 MG tablet     ACETAMINOPHEN PO     IBUPROFEN PO     No current facility-administered medications for this visit.

## 2018-03-13 NOTE — MR AVS SNAPSHOT
After Visit Summary   3/13/2018    Hiram Tapia    MRN: [id number]           Patient Information     Date Of Birth          1960        Visit Information        Provider Department      3/13/2018 3:45 PM Brad Betts MD [facility]        Today's Diagnoses     Sarcoma of soft tissue (H)    -  1       Follow-ups after your visit        Your next 10 appointments already scheduled     Mar 23, 2018  1:45 PM CDT   (Arrive by 1:30 PM)   Return Visit with Brad Betts MD   [facility] ([facility])    [address] [place] [id number] [phone]              Who to contact     Please call your clinic at [phone] to:    Ask questions about your health    Make or cancel appointments    Discuss your medicines    Learn about your test results    Speak to your doctor            Additional Information About Your Visit        MyChart Information     [employer] is an electronic gateway that provides easy, online access to your medical records. With [employer], you can request a clinic appointment, read your test results, renew a prescription or communicate with your care team.     To sign up for [employer] visit the website at [email or web address]   You will be asked to enter the access code listed below, as well as some personal information. Please follow the directions to create your username and password.     Your access code is: [email or web address]  Expires: 2018  7:30 AM     Your access code will  in 90 days. If you need help or a new code, please contact your [facility] or call [phone] for assistance.        Care EveryWhere ID     This is your Care EveryWhere ID. This could be used by other organizations to access your [place] [facility] medical records  [id number]         Blood Pressure from Last 3 Encounters:   18 (!) 131/104    Weight from Last 3 Encounters:    03/08/18 106.6 kg (235 lb)   03/02/18 108.4 kg (238 lb 14.4 oz)              Today, you had the following     No orders found for display       Primary Care Provider Office Phone # Fax #    Louisa Fry [phone]       [facility] [address]  [place] [address]        Equal Access to Services     [facility]: Hadii aad ku hadasho Soomaali, waaxda luqadaha, qaybta kaalmada adeegyada, waxay idiin hayaan adeeg kharash la'aan . So [facility] [phone].    ATENCIÓN: Si etiennela carmelo, tiene a sheridan disposición servicios gratuitos de asistencia lingüística. Markos al [phone].    We comply with applicable federal civil rights laws and [place] laws. We do not discriminate on the basis of race, color, national origin, age, disability, sex, sexual orientation, or gender identity.            Thank you!     Thank you for choosing [facility] [place]  for your care. Our goal is always to provide you with excellent care. Hearing back from our patients is one way we can continue to improve our services. Please take a few minutes to complete the written survey that you may receive in the mail after your visit with us. Thank you!             Your Updated Medication List - Protect others around you: Learn how to safely use, store and throw away your medicines at www.disposemymeds.org.          This list is accurate as of 3/13/18  4:07 PM.  Always use your most recent med list.                   Brand Name Dispense Instructions for use Diagnosis    ACETAMINOPHEN PO      Take 325 mg by mouth        IBUPROFEN PO      Take 800 mg by mouth as needed for moderate pain        oxyCODONE IR 5 MG tablet    ROXICODONE    20 tablet    Take 1-2 tablets (5-10 mg) by mouth every 4 hours as needed for pain or other (Moderate to Severe)    Mass of right thigh

## 2018-03-15 LAB
BACTERIA SPEC CULT: NORMAL
Lab: NORMAL
SPECIMEN SOURCE: NORMAL

## 2018-03-17 ENCOUNTER — HOSPITAL ENCOUNTER (OUTPATIENT)
Dept: PET IMAGING | Facility: CLINIC | Age: 58
Discharge: HOME | End: 2018-03-17
Attending: ORTHOPAEDIC SURGERY | Admitting: ORTHOPAEDIC SURGERY
Payer: COMMERCIAL

## 2018-03-17 DIAGNOSIS — C49.9 SARCOMA (H): ICD-10-CM

## 2018-03-17 LAB — GLUCOSE BLDC GLUCOMTR-MCNC: 111 MG/DL (ref 70–99)

## 2018-03-17 PROCEDURE — 34300033 ZZH RX 343: Performed by: ORTHOPAEDIC SURGERY

## 2018-03-17 PROCEDURE — 82962 GLUCOSE BLOOD TEST: CPT

## 2018-03-17 PROCEDURE — 25000128 H RX IP 250 OP 636: Performed by: ORTHOPAEDIC SURGERY

## 2018-03-17 PROCEDURE — A9552 F18 FDG: HCPCS | Performed by: ORTHOPAEDIC SURGERY

## 2018-03-17 PROCEDURE — 71260 CT THORAX DX C+: CPT

## 2018-03-17 RX ORDER — IOPAMIDOL 755 MG/ML
10-135 INJECTION, SOLUTION INTRAVASCULAR ONCE
Status: COMPLETED | OUTPATIENT
Start: 2018-03-17 | End: 2018-03-17

## 2018-03-17 RX ADMIN — IOPAMIDOL 135 ML: 755 INJECTION, SOLUTION INTRAVENOUS at 09:26

## 2018-03-17 RX ADMIN — FLUDEOXYGLUCOSE F-18 14.95 MCI.: 500 INJECTION, SOLUTION INTRAVENOUS at 08:24

## 2018-03-19 ENCOUNTER — PRE VISIT (OUTPATIENT)
Dept: ORTHOPEDICS | Facility: CLINIC | Age: 58
End: 2018-03-19

## 2018-03-19 NOTE — TELEPHONE ENCOUNTER
Patient is s/p biopsy right thigh tumor on 3/8/2018.    Patient was last seen on 3/13/2018 by Dr. Betts.    Patient is coming to clinic for follow-up after Body PET CT (completed on 3/17/2018), medical oncology and radiation therapy appointments.      No additional orders are needed this visit.     Patient visit type and questionnaires have been reviewed and are correct for this appointment? Yes    La Ching LPN

## 2018-03-23 ENCOUNTER — HOME INFUSION (PRE-WILLOW HOME INFUSION) (OUTPATIENT)
Dept: PHARMACY | Facility: CLINIC | Age: 58
End: 2018-03-23

## 2018-03-23 ENCOUNTER — TELEPHONE (OUTPATIENT)
Dept: SURGERY | Facility: CLINIC | Age: 58
End: 2018-03-23

## 2018-03-23 ENCOUNTER — OFFICE VISIT (OUTPATIENT)
Dept: ORTHOPEDICS | Facility: CLINIC | Age: 58
End: 2018-03-23
Payer: COMMERCIAL

## 2018-03-23 ENCOUNTER — OFFICE VISIT (OUTPATIENT)
Dept: RADIATION ONCOLOGY | Facility: CLINIC | Age: 58
End: 2018-03-23
Attending: RADIOLOGY
Payer: COMMERCIAL

## 2018-03-23 ENCOUNTER — RADIANT APPOINTMENT (OUTPATIENT)
Dept: CARDIOLOGY | Facility: CLINIC | Age: 58
End: 2018-03-23
Attending: INTERNAL MEDICINE
Payer: COMMERCIAL

## 2018-03-23 ENCOUNTER — ONCOLOGY VISIT (OUTPATIENT)
Dept: ONCOLOGY | Facility: CLINIC | Age: 58
End: 2018-03-23
Attending: INTERNAL MEDICINE
Payer: COMMERCIAL

## 2018-03-23 VITALS
BODY MASS INDEX: 30.56 KG/M2 | HEART RATE: 93 BPM | SYSTOLIC BLOOD PRESSURE: 130 MMHG | DIASTOLIC BLOOD PRESSURE: 94 MMHG | WEIGHT: 213 LBS

## 2018-03-23 VITALS
DIASTOLIC BLOOD PRESSURE: 87 MMHG | WEIGHT: 219.4 LBS | HEART RATE: 99 BPM | SYSTOLIC BLOOD PRESSURE: 140 MMHG | BODY MASS INDEX: 31.41 KG/M2 | RESPIRATION RATE: 16 BRPM | HEIGHT: 70 IN | TEMPERATURE: 98.3 F | OXYGEN SATURATION: 99 %

## 2018-03-23 VITALS — HEIGHT: 70 IN | BODY MASS INDEX: 31.35 KG/M2 | WEIGHT: 219 LBS

## 2018-03-23 DIAGNOSIS — M79.604 PAIN OF RIGHT LOWER EXTREMITY: ICD-10-CM

## 2018-03-23 DIAGNOSIS — C49.21 SOFT TISSUE SARCOMA OF RIGHT THIGH (H): ICD-10-CM

## 2018-03-23 DIAGNOSIS — T81.328D: ICD-10-CM

## 2018-03-23 DIAGNOSIS — C49.9 SARCOMA OF SOFT TISSUE (H): ICD-10-CM

## 2018-03-23 DIAGNOSIS — Z87.891 PERSONAL HISTORY OF TOBACCO USE, PRESENTING HAZARDS TO HEALTH: ICD-10-CM

## 2018-03-23 DIAGNOSIS — C49.9 SARCOMA OF SOFT TISSUE (H): Primary | ICD-10-CM

## 2018-03-23 DIAGNOSIS — C49.21 SOFT TISSUE SARCOMA OF RIGHT THIGH (H): Primary | ICD-10-CM

## 2018-03-23 LAB
ALBUMIN SERPL-MCNC: 3.4 G/DL (ref 3.4–5)
ALP SERPL-CCNC: 112 U/L (ref 40–150)
ALT SERPL W P-5'-P-CCNC: 19 U/L (ref 0–70)
ANION GAP SERPL CALCULATED.3IONS-SCNC: 7 MMOL/L (ref 3–14)
AST SERPL W P-5'-P-CCNC: 13 U/L (ref 0–45)
BASOPHILS # BLD AUTO: 0 10E9/L (ref 0–0.2)
BASOPHILS NFR BLD AUTO: 0.3 %
BILIRUB SERPL-MCNC: 0.3 MG/DL (ref 0.2–1.3)
BUN SERPL-MCNC: 13 MG/DL (ref 7–30)
CALCIUM SERPL-MCNC: 9.6 MG/DL (ref 8.5–10.1)
CHLORIDE SERPL-SCNC: 103 MMOL/L (ref 94–109)
CO2 SERPL-SCNC: 27 MMOL/L (ref 20–32)
CREAT SERPL-MCNC: 0.79 MG/DL (ref 0.66–1.25)
DIFFERENTIAL METHOD BLD: ABNORMAL
EOSINOPHIL # BLD AUTO: 0 10E9/L (ref 0–0.7)
EOSINOPHIL NFR BLD AUTO: 0.2 %
ERYTHROCYTE [DISTWIDTH] IN BLOOD BY AUTOMATED COUNT: 12.8 % (ref 10–15)
GFR SERPL CREATININE-BSD FRML MDRD: >90 ML/MIN/1.7M2
GLUCOSE SERPL-MCNC: 106 MG/DL (ref 70–99)
HCT VFR BLD AUTO: 40.3 % (ref 40–53)
HGB BLD-MCNC: 13.1 G/DL (ref 13.3–17.7)
IMM GRANULOCYTES # BLD: 0 10E9/L (ref 0–0.4)
IMM GRANULOCYTES NFR BLD: 0.3 %
LYMPHOCYTES # BLD AUTO: 1.7 10E9/L (ref 0.8–5.3)
LYMPHOCYTES NFR BLD AUTO: 19.5 %
MAGNESIUM SERPL-MCNC: 2.4 MG/DL (ref 1.6–2.3)
MCH RBC QN AUTO: 27.5 PG (ref 26.5–33)
MCHC RBC AUTO-ENTMCNC: 32.5 G/DL (ref 31.5–36.5)
MCV RBC AUTO: 85 FL (ref 78–100)
MONOCYTES # BLD AUTO: 0.6 10E9/L (ref 0–1.3)
MONOCYTES NFR BLD AUTO: 6.8 %
NEUTROPHILS # BLD AUTO: 6.5 10E9/L (ref 1.6–8.3)
NEUTROPHILS NFR BLD AUTO: 72.9 %
NRBC # BLD AUTO: 0 10*3/UL
NRBC BLD AUTO-RTO: 0 /100
PHOSPHATE SERPL-MCNC: 3.7 MG/DL (ref 2.5–4.5)
PLATELET # BLD AUTO: 388 10E9/L (ref 150–450)
POTASSIUM SERPL-SCNC: 4.5 MMOL/L (ref 3.4–5.3)
PROT SERPL-MCNC: 7.3 G/DL (ref 6.8–8.8)
RBC # BLD AUTO: 4.76 10E12/L (ref 4.4–5.9)
SODIUM SERPL-SCNC: 138 MMOL/L (ref 133–144)
WBC # BLD AUTO: 8.9 10E9/L (ref 4–11)

## 2018-03-23 PROCEDURE — 84100 ASSAY OF PHOSPHORUS: CPT | Performed by: INTERNAL MEDICINE

## 2018-03-23 PROCEDURE — 36415 COLL VENOUS BLD VENIPUNCTURE: CPT | Performed by: INTERNAL MEDICINE

## 2018-03-23 PROCEDURE — G0463 HOSPITAL OUTPT CLINIC VISIT: HCPCS | Performed by: RADIOLOGY

## 2018-03-23 PROCEDURE — 85025 COMPLETE CBC W/AUTO DIFF WBC: CPT | Performed by: INTERNAL MEDICINE

## 2018-03-23 PROCEDURE — G0463 HOSPITAL OUTPT CLINIC VISIT: HCPCS | Mod: ZF

## 2018-03-23 PROCEDURE — G0463 HOSPITAL OUTPT CLINIC VISIT: HCPCS | Mod: 25 | Performed by: RADIOLOGY

## 2018-03-23 PROCEDURE — 83735 ASSAY OF MAGNESIUM: CPT | Performed by: INTERNAL MEDICINE

## 2018-03-23 PROCEDURE — 99203 OFFICE O/P NEW LOW 30 MIN: CPT | Mod: ZP | Performed by: INTERNAL MEDICINE

## 2018-03-23 PROCEDURE — 80053 COMPREHEN METABOLIC PANEL: CPT | Performed by: INTERNAL MEDICINE

## 2018-03-23 RX ORDER — SODIUM CHLORIDE 9 MG/ML
1000 INJECTION, SOLUTION INTRAVENOUS CONTINUOUS PRN
Status: CANCELLED
Start: 2018-03-26

## 2018-03-23 RX ORDER — LORAZEPAM 2 MG/ML
0.5 INJECTION INTRAMUSCULAR EVERY 4 HOURS PRN
Status: CANCELLED
Start: 2018-03-26

## 2018-03-23 RX ORDER — EPINEPHRINE 0.3 MG/.3ML
0.3 INJECTION SUBCUTANEOUS EVERY 5 MIN PRN
Status: CANCELLED | OUTPATIENT
Start: 2018-03-26

## 2018-03-23 RX ORDER — METHYLPREDNISOLONE SODIUM SUCCINATE 125 MG/2ML
125 INJECTION, POWDER, LYOPHILIZED, FOR SOLUTION INTRAMUSCULAR; INTRAVENOUS
Status: CANCELLED
Start: 2018-03-26

## 2018-03-23 RX ORDER — PROCHLORPERAZINE MALEATE 10 MG
10 TABLET ORAL EVERY 6 HOURS PRN
Qty: 30 TABLET | Refills: 3 | Status: SHIPPED | OUTPATIENT
Start: 2018-03-23 | End: 2018-03-27

## 2018-03-23 RX ORDER — MEPERIDINE HYDROCHLORIDE 25 MG/ML
25 INJECTION INTRAMUSCULAR; INTRAVENOUS; SUBCUTANEOUS EVERY 30 MIN PRN
Status: CANCELLED | OUTPATIENT
Start: 2018-03-26

## 2018-03-23 RX ORDER — PALONOSETRON 0.05 MG/ML
0.25 INJECTION, SOLUTION INTRAVENOUS ONCE
Status: CANCELLED
Start: 2018-03-26

## 2018-03-23 RX ORDER — DIPHENHYDRAMINE HYDROCHLORIDE 50 MG/ML
50 INJECTION INTRAMUSCULAR; INTRAVENOUS
Status: CANCELLED
Start: 2018-03-26

## 2018-03-23 RX ORDER — ALBUTEROL SULFATE 0.83 MG/ML
2.5 SOLUTION RESPIRATORY (INHALATION)
Status: CANCELLED | OUTPATIENT
Start: 2018-03-26

## 2018-03-23 RX ORDER — GRANISETRON HYDROCHLORIDE 1 MG/1
TABLET, FILM COATED ORAL
Qty: 12 TABLET | Refills: 3 | Status: SHIPPED | OUTPATIENT
Start: 2018-03-25 | End: 2018-03-27

## 2018-03-23 RX ORDER — ALBUTEROL SULFATE 90 UG/1
1-2 AEROSOL, METERED RESPIRATORY (INHALATION)
Status: CANCELLED
Start: 2018-03-26

## 2018-03-23 RX ORDER — EPINEPHRINE 1 MG/ML
0.3 INJECTION, SOLUTION, CONCENTRATE INTRAVENOUS EVERY 5 MIN PRN
Status: CANCELLED | OUTPATIENT
Start: 2018-03-26

## 2018-03-23 RX ADMIN — Medication 6 ML: at 14:00

## 2018-03-23 ASSESSMENT — ENCOUNTER SYMPTOMS
DEPRESSION: 0
SENSORY CHANGE: 0
SHORTNESS OF BREATH: 0
BLURRED VISION: 0
DYSURIA: 0
COUGH: 0
HEADACHES: 0
FEVER: 0
BRUISES/BLEEDS EASILY: 0
NERVOUS/ANXIOUS: 0
BACK PAIN: 1
HEARTBURN: 0
DIARRHEA: 0
WEIGHT LOSS: 1
CHILLS: 0
DIZZINESS: 0

## 2018-03-23 ASSESSMENT — PAIN SCALES - GENERAL: PAINLEVEL: NO PAIN (0)

## 2018-03-23 NOTE — NURSING NOTE
"Oncology Rooming Note    March 23, 2018 9:59 AM   Hiram Tapia is a 57 year old male who presents for:    Chief Complaint   Patient presents with     Oncology Clinic Visit     new patient visit for consultation related to high grade sarcoma      Initial Vitals: /87  Pulse 99  Temp 98.3  F (36.8  C) (Oral)  Resp 16  Ht 1.778 m (5' 10\")  Wt 99.5 kg (219 lb 6.4 oz)  SpO2 99%  BMI 31.48 kg/m2 Estimated body mass index is 31.48 kg/(m^2) as calculated from the following:    Height as of this encounter: 1.778 m (5' 10\").    Weight as of this encounter: 99.5 kg (219 lb 6.4 oz). Body surface area is 2.22 meters squared.  No Pain (0) Comment: Data Unavailable   No LMP for male patient.  Allergies reviewed: Yes  Medications reviewed: Yes    Medications: Medication refills not needed today.  Pharmacy name entered into [employer]: [facility] [address] [place] [address] [address] AT [facility] OF  [address]    Clinical concerns: no concerns dr. scherer was notified.    6 minutes for nursing intake (face to face time)     Tete Lopez CMA              "

## 2018-03-23 NOTE — MR AVS SNAPSHOT
After Visit Summary   3/23/2018    Hiram Tapia    MRN: [id number]           Patient Information     Date Of Birth          1960        Visit Information        Provider Department      3/23/2018 1:45 PM Brad Betts MD [facility]        Today's Diagnoses     Sarcoma of soft tissue (H)    -  1       Follow-ups after your visit        Your next 10 appointments already scheduled     Mar 23, 2018 12:45 PM CDT   LAB with  LAB   [facility] ([facility])    [address] [facility] [address]  1st Floor  [facility] [id number] [phone]           Please do not eat 10-12 hours before your appointment if you are coming in fasting for labs on lipids, cholesterol, or glucose (sugar). This does not apply to pregnant women. Water, hot tea and black coffee (with nothing added) are okay. Do not drink other fluids, diet soda or chew gum.            Mar 23, 2018  1:00 PM CDT   Ech Complete with UCECHCR4   [facility] Echo ([facility])    [address]  3rd Floor  [facility] [id number] [phone]           1. Please bring or wear a comfortable two-piece outfit. 2. You may eat, drink and take your normal medicines. 3. For any questions that cannot be answered, please contact the ordering physician            Mar 23, 2018  1:45 PM CDT   (Arrive by 1:30 PM)   Return Visit with Brad Betts MD   [facility] ([facility])    [address]  4th Floor  [facility] [id number] [phone]            Mar 23, 2018  2:30 PM CDT   CONSULT with Stewart Eckert MD   Radiation Oncology Clinic ([facility])    [facility]  1st Floor  [address] [facility] [address] [phone]            Mar 26, 2018 10:30 AM CDT   Level 4 with  INFUSION CHAIR 4   [facility] ([facility])    [facility]  Ctr [facility]  [address]  [place] [address]   [phone]            Mar 28, 2018   Procedure with Sarah Bowie MD   [facility] Same Day Surgery (--)    [address] [facility] [address]   [phone]            Apr 21, 2018  8:45 AM CDT   (Arrive by 8:30 AM)   PET ONCOLOGY WHOLE BODY with UUPET1   [facility] PET CT ([facility])    [address] [facility] [address]   [phone]           Tell your doctor:   If there is any chance you may be pregnant or if you are breastfeeding.   If you have problems lying in small spaces (claustrophobia). If you do, your doctor may give you medicine to help you relax. If you have diabetes:   Have your exam early in the morning. Your blood glucose will go up as the day goes by.   Your glucose level must be 180 or less at the start of the exam. Please take any medicines you need to ensure this blood glucose level.   If you are taking insulin in the morning take with breakfast by 6 am and schedule procedure between 12 and 2:15 pm.   If you are taking insulin at night take nightly dose, fast overnight, schedule procedure before 10 am.   If you take insulin both morning and night take morning dose by 6am and schedule procedure between 12 and 2:15 pm.   24 hours before your scan: Don t do any heavy exercise. (No jogging, aerobics or other workouts.) Exercise will make your pictures less accurate.  At least 7 hours before your scan, or the evening before if you have an early appointment: Eat a low carb, high protein meal (Lean meats, seafood, beans, soy, low-fat dairy, eggs, nuts & seeds). 6 hours before your scan:   Stop all food and liquids (except water).   Do not chew gum or suck on mints.   If you need to take medicine with food, you may take it with a few crackers.  Please call your Imaging Department at your exam site with any questions.            Apr 24, 2018  8:30 AM CDT   [facility]  Lab Draw with  Knowrom LAB DRAW   [facility] Lab Draw ([facility])    [address]  [address]  [facility] [address] [phone]            Apr 24, 2018  9:00 AM CDT   (Arrive by 8:45 AM)   Return Visit with Gaurang Johnson MD   [facility] ([facility])    [address]  [address]  [facility] [address]   [phone]            Apr 24, 2018  9:30 AM CDT   Infusion 360 with  ONCOLOGY INFUSION   [facility] ([facility])    [address]  [address]  [facility] [address] [phone]              Future tests that were ordered for you today     Open Standing Orders        Priority Remaining Interval Expires Ordered    CBC with platelets differential Routine 99/99 when ordered by Dr Johnson 2/23/2019 3/23/2018    Comprehensive metabolic panel Routine 99/99 when ordered by Dr Johnson 2/23/2019 3/23/2018    Phosphorus Routine 99/99 when ordered by Dr Johnson 2/23/2019 3/23/2018    Magnesium Routine 99/99 when ordered by Dr Johnson 2/23/2019 3/23/2018          Open Future Orders        Priority Expected Expires Ordered    Echocardiogram Complete Routine 3/23/2018 3/23/2019 3/23/2018    PET Oncology Whole Body Routine 4/21/2018 12/23/2018 3/23/2018    CBC with platelets differential Routine 4/21/2018 12/23/2018 3/23/2018    Comprehensive metabolic panel Routine 4/21/2018 12/23/2018 3/23/2018    Magnesium Routine 4/21/2018 12/23/2018 3/23/2018    Phosphorus Routine 4/21/2018 12/23/2018 3/23/2018            Who to contact     Please call your clinic at [phone] to:    Ask questions about your health    Make or cancel appointments    Discuss your medicines    Learn about your test results    Speak to your doctor            Additional Information About Your Visit        [email or web address] Information     [employer] is an electronic gateway that provides easy, online  "access to your medical records. With [employer], you can request a clinic appointment, read your test results, renew a prescription or communicate with your care team.     To sign up for [employer] visit the website at [email or web address]   You will be asked to enter the access code listed below, as well as some personal information. Please follow the directions to create your username and password.     Your access code is: [email or web address]  Expires: 2018  7:30 AM     Your access code will  in 90 days. If you need help or a new code, please contact your [facility] or call [phone] for assistance.        Care EveryWhere ID     This is your Care EveryWhere ID. This could be used by other organizations to access your [place] medical records  [id number]        Your Vitals Were     Height BMI (Body Mass Index)                1.778 m (5' 10\") 31.42 kg/m2           Blood Pressure from Last 3 Encounters:   18 140/87   18 (!) 131/104    Weight from Last 3 Encounters:   18 99.3 kg (219 lb)   18 99.5 kg (219 lb 6.4 oz)   18 106.6 kg (235 lb)              Today, you had the following     No orders found for display         Today's Medication Changes          These changes are accurate as of 3/23/18 12:41 PM.  If you have any questions, ask your nurse or doctor.               Start taking these medicines.        Dose/Directions    granisetron 1 MG tablet   Commonly known as:  KYTRIL   Used for:  Sarcoma of soft tissue (H)   Started by:  Sarah Bowie MD        Start taking on:  3/25/2018   Take 2 tablets (2 mg) every morning on Days 3 through 8.   Quantity:  12 tablet   Refills:  3       prochlorperazine 10 MG tablet   Commonly known as:  COMPAZINE   Used for:  Sarcoma of soft tissue (H)   Started by:  Sarah Bowie MD        Dose:  10 mg   Take 1 tablet (10 mg) by mouth every 6 hours as needed (Nausea/Vomiting)   Quantity:  30 tablet   Refills:  3 "            Where to get your medicines      These medications were sent to [facility] [place], [place] - [address]  [address], [place] [address]     Phone:  [phone]     granisetron 1 MG tablet    prochlorperazine 10 MG tablet                Primary Care Provider Office Phone # Fax #    Louisa Fry [phone]       [facility] [address]  [place] [address]        Equal Access to Services     [facility] : Hadii aad ku hadasho Soomaali, waaxda luqadaha, qaybta kaalmada adeegyada, waxay idiin hayaan adeeg kharamela laashley . So [facility] [phone].    ATENCIÓN: Si habla español, tiene a sheridan disposición servicios gratuitos de asistencia lingüística. [facility] [phone].    We comply with applicable federal civil rights laws and [place] laws. We do not discriminate on the basis of race, color, national origin, age, disability, sex, sexual orientation, or gender identity.            Thank you!     Thank you for choosing [facility]  for your care. Our goal is always to provide you with excellent care. Hearing back from our patients is one way we can continue to improve our services. Please take a few minutes to complete the written survey that you may receive in the mail after your visit with us. Thank you!             Your Updated Medication List - Protect others around you: Learn how to safely use, store and throw away your medicines at www.disposemymeds.org.          This list is accurate as of 3/23/18 12:41 PM.  Always use your most recent med list.                   Brand Name Dispense Instructions for use Diagnosis    ACETAMINOPHEN PO      Take 325 mg by mouth        granisetron 1 MG tablet   Start taking on:  3/25/2018    KYTRIL    12 tablet    Take 2 tablets (2 mg) every morning on Days 3 through 8.    Sarcoma of soft tissue (H)       IBUPROFEN PO      Take 800 mg by mouth as needed for moderate pain        oxyCODONE IR 5 MG tablet     ROXICODONE    20 tablet    Take 1-2 tablets (5-10 mg) by mouth every 4 hours as needed for pain or other (Moderate to Severe)    Mass of right thigh       prochlorperazine 10 MG tablet    COMPAZINE    30 tablet    Take 1 tablet (10 mg) by mouth every 6 hours as needed (Nausea/Vomiting)    Sarcoma of soft tissue (H)

## 2018-03-23 NOTE — TELEPHONE ENCOUNTER
Left a message for the patient to return my call at [phone] to schedule surgery with Dr Bowie. David called me back and we got him scheduled on 3/28/18 at 12pm. Emailing him surgery info packet, was not able to get that email to go through, mailed letter but it likely won't get to patient before surgery. Called and left a voicemail to let patient know and see if I could get another email address from him. Patient called back and let me know he'd given me the wrong email, we corrected and email was sent.

## 2018-03-23 NOTE — LETTER
3/23/2018       RE: Hiram Tapia  [address] [facility] [address]     Dear Colleague,    Thank you for referring your patient, Hiram Tapia, to the [facility] at [facility]. Please see a copy of my visit note below.    Diagnosis: High-grade soft tissue sarcoma right thigh     Treatment: Neoadjuvant chemotherapy planned.  We will begin the March 26, 2018.  Sequencing of radiation and surgery to be determined after neoadjuvant treatment completed staging studies evaluated.    Hiram was seen today for assessment of his wound and wound drain management.  He reports his pain is better than it was preop.  He describes up to 50 cc of drainage per day.    I reviewed the notes from Dr. Johnson.  He and Mr. Tapia are in agreement to proceed with chemotherapy.    Impression: The drain was removed the wound is healed.  He can commence chemotherapy.    Again, thank you for allowing me to participate in the care of your patient.      Sincerely,    Brad Betts MD

## 2018-03-23 NOTE — LETTER
3/23/2018       RE: Hiram Tapia  [address]  [facility] [address]     Dear Colleague,    Thank you for referring your patient, Hiram Tapia, to the [facility]COL[facility]Y CLINIC. Please see a copy of my visit note below.    RADIATION ONCOLOGY CONSULT NOTE  Date of Visit: Mar 23, 2018    Hiram Tapia  MRN: [id number]  : 1960    Hiram Tapia is being seen today for initial consultation at the request of Dr. 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 All pertinent labs, imaging, and pathology findings have been reviewed.     HISTORY OF PRESENT ILLNESS: Mr. 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 In brief, the patient first presented to care on 18 with the CCs of swelling and discomfort in his right thigh. These symptoms were reported to have been persistent for 4 months, ever since 10/2018 when the patient accidentally slid into the tailgate of his truck and injured the lateral aspect of his right leg. After this incident, the patient initially noted some tenderness over the impacted area, but progressive swelling ensued and he  developed decreased active range of motion in his right leg. He underwent further workup with MRI of the RLE on 2018, which revealed a 17.5cm complex intramuscular fluid collection along the vastus intermedius suggestive of a cystic neoplasm. An element of intramuscular hematoma was also noted. Lifting of the periosteum on the ventral surface of the mid-femur over a distance of approximately 3cm was seen.  The patient was then referred to Dr. Betts here at [facility], who recommended that his lesion be further evaluated by open biopsy. This biopsy was completed on 3/8/2018, with an intraoperative frozen section suggesting high grade sarcoma. Final pathology confirmed the tumor to be sarcoma, likely UPS. The patient then  "underwent a staging PET/CT on 3/17/2018, which showed a hypermetabolic 12x9cm necrotic lesion in the patient s vastus lateralis, but no evidence of distant metastasis.  He presents today for consideration of pre-operative RT. The patient met with medical oncology services, Dr. Johnson, this morning and was seen in follow-up with Dr. Betts this afternoon. Dr. Johnson's office suggested that the patient may benefit from neoadjuvant chemotherapy with Doxil, and has scheduled the patient to begin chemotherapy this coming Monday (3/26/2018). Dr. Betts's office has also re-evaluated the patient, and have removed the patient's drain.  Today the patient states that he is generally well and feels \"much better since the drain was removed.\" He denies fever/chills, limitations in ROM, leg weakness or fatigue, but affirms to a feeling of \"fullness\" in his right thigh.  CHEMOTHERAPY HISTORY: None  PAST RADIATION THERAPY HISTORY: None    PAST MEDICAL HISTORY: Denies    PAST SURGICAL HISTORY: Tonsillectomy and strabismus surgery    ALLERGIES: NKDA    MEDICATIONS: Ibuprofen 800mg PRN for pain     FAMILY HISTORY: Significant for a father who  of leukemia at age 42.    SOCIAL HISTORY:  with one son. Former smoker of approximately 15 pack years. Denies EtOH or illicit drug use    REVIEW OF SYSTEMS: A 10 point review of systems was obtained. Pertinent findings are noted in the HPI and are otherwise unremarkable.     PHYSICAL EXAM:  Wt 96.6 kg (213 lb)  BMI 30.56 kg/m2  GEN: Appears well, alert, oriented, and in NAD  HEENT: EOMI, normal conjunctiva, MMM, no thrush  NECK: Supple, full ROM, no cervical or clavicular lymphadenopathy  CV: RRR, no murmurs/rubs/gallops, warm and well-perfused  RESP: CTAB, no wheezes/rales/rhonchi, breathing comfortably on room air  ABDOMEN: Soft, NT, ND, bowel sounds present  SKIN: Normal color and turgor  EXTREMITIES: Palpation of the patient's right thigh reveals a non-tender firmness. The " circumference of the right thigh is approximately 10% larger than the left thigh. A large bandage is present on the posterolateral surface of the patient's leg. No distal LE edema is appreciated, bilaterally.  NEURO: No focal deficits, CN 3-12 grossly intact, normal and symmetric motor and sensory exam in bilateral upper and lower extremities, normal gait  PSYCH: Appropriate mood and affect    ECOG PERFORMANCE STATUS: 1    PACEMAKER: None    PREGNANCY STATUS: N/A    IMPRESSION: Mr. Tapia is a 57 year old gentleman with high grade UPS of the lateral right thigh. He has recently been seen in consultation with Dr. Johnson, who has recommended that he be trailed on a course of neoadjuvant chemotherapy.    RECOMMENDATION: We agree with Dr. Johnson's recommendation and will plan to see the patient after the completion of neoadjuvant Doxil to re-discuss the role of pre-operative RT for the management of his sarcoma. We will defer ordering any further PET/CT imaging to Dr. Johnson's office, but would request that the patient be scheduled for interval MRI of the RLE (in radiation treatment position) after the completion of his chemotherapy course. This will allow our team to better define our radiation treatment volumes. We explained our rationale, as well as the logistics, risks, benefits, and alternatives to neoadjuvant chemotherapy, followed by pre-operative radiation therapy. We encouraged Mr. Tapia to ask questions, which we answered to the best of our ability. The verbalized understanding and agreed with our plan or care. We have provided the patient with our contact information, and look forward to seeing him again in anticipated follow-up in several month's time.    The patient was seen and discussed with staff, Dr. Eckert. Thank you for involving us in the care of this patient.  Please feel free to contact us with questions or concerns at any time.    Xander Mejia MD-PhD PGY-2  Radiation Oncology Resident,  [facility]    I saw and examined the patient with the resident.  I have reviewed and agree with the resident's note and plan of care.      Stewart Eckert MD    CC  Patient Care Team:  Louisa Fry MD as PCP - General (Emergency Medicine)  Isaiah Paulson MD as MD (Family Practice)  Brad Betts MD as MD (Orthopedics)

## 2018-03-23 NOTE — LETTER
"3/23/2018       RE: Hiram Tapia  [address]  [facility] [address]     Dear Colleague,    Thank you for referring your patient, Hiram Tapia, to the [facility]. Please see a copy of my visit note below.        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  In brief he has had a lot of problems with his right leg for many years including sciatica for 20 years.  More recently he has noted some more discomfort in the right thigh and in 2017 hit his lateral thigh against a truck tailgate causing a lot of pain.  Subsequently there was a fair amount of swelling and stiffness.  T his eventually led to some imaging showing a cystic mass.  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  He is referred here now for further treatment.  Other than the knee he is really doing pretty well and has been quite active.  Now however the leg is intermittently quite painful and limits his activity.  He is sometimes sleeping in a recliner.  At the time of the biopsy more than a liter of fluid was removed and that markedly improved his symptoms of stiffness and pain.  Aside from this problem his 10 point ROS unremarkable.  Past history is not particular remarkable other than for tonsillectomy and surgery for lazy eye.  His left eye is the dominant eye.  The family history is not particularly remarkable but his father  of leukemia at age 42.  He denies any drug allergies and is not on any medications other than as needed ibuprofen.  He is  and ha s a 25-year-old somewhat autistic boy who lives with them.  He smoked a pack a day for about 10 years, stopping about , and does not abuse alcohol or other drugs.  He works as a  at [employer].    On exam he appeared comfortable with normal affect.    /87  Pulse 99  Temp 98.3  F (36.8  C) (Oral)  Resp 16  Ht 1.778 m (5' 10\")  Wt 99.5 kg (219 lb 6.4 oz)  SpO2 99%  BMI 31.48 " kg/m2  There is no icterus, no thyromegaly or neck mass, the chest is clear, there is RRR with no significant murmur, no HSMT, no lymphadenopathy, 1+ edema of the right lower leg, there is a large mass in the right thigh with an indwelling drain, Romberg heel and toe walking were normal.    I reviewed his pathology showing the UPS.    I reviewed his imaging revealing a large mass in the right thigh on the MRI.  I also reviewed his PET/CT which shows marked PET activity in the periphery of this cystic thigh mass.  There is no definite metastatic disease but there is a calcified nodule in the left lung and a probable hemangioma in the liver and a probable small adenoma in the left adrenal gland.  There is there is also a small right pleural effusion with a bit of right lower lobe consolidation mild FDG uptake.    We discussed the nature of cancer in general and his sarcoma in particular.  We discussed the possibility of preoperative chemotherapy which would be reasonable in his case.  We discussed the fact that while logical there is not definitive evidence it is helpful.  We discussed how Doxil ifosfamide works and the typical toxicities and his wife will do the neuro checks.  He would like to proceed with preoperative chemotherapy and we will try to start that Monday.  He will be seeing radiation therapy later today and also Dr. Betts today in follow-up.  We would like Dr. Betts 's okay to start chemotherapy with the indwelling drain.  We will plan a repeat PET/CT on 4-21 to assess response before proceeding.  We discussed the approach to neutropenic fever and will use Neulasta.  All of his questions were addressed and he will call if he has others.  t>60 min most C&C    Gaurang Johnson M.D.  Professor  Hematology, Oncology and Transplantation

## 2018-03-23 NOTE — PROGRESS NOTES
Therapy: 5fu  Insurance: [employer]  Ded: $0  Co-Insurance:90%  Max Out of Pocket: $2000  Met: $599    In reference to referral made on 3/23/18 to check 5fu and home disconnect coverage    Please contact Intake with any questions, [phone] or In Basket pool, [facility] ([phone]).

## 2018-03-23 NOTE — MR AVS SNAPSHOT
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)    -  1       Follow-ups after your visit        Your next 10 appointments already scheduled     Mar 27, 2018  8:30 AM CDT   (Arrive by 8:15 AM)   PAC EVALUATION with  Pac Qamar 6   [facility] Preoperative Assessment [place] ([facility])    [address] [place] [address]  4th [facility] [address]   [phone]            Mar 27, 2018  9:30 AM CDT   (Arrive by 9:15 AM)   PAC RN ASSESSMENT with  Pac Rn   [facility] ([facility])    [address] [place] [address]  4th [facility] [address]   [phone]            Mar 27, 2018 10:50 AM CDT   (Arrive by 10:35 AM)   PAC Anesthesia Consult with  Pac Anesthesiologist   [facility] ([facility])    [address] [place] [address]  4th [facility] [address] [phone]            Mar 28, 2018   Procedure with Sarah Bowie MD   [facility], [place], Same Day Surgery (--)    [address] [facility] [address] [phone]            Mar 29, 2018  7:00 AM CDT   Infusion 180 with [facility],  23 ATC   [facility] ([facility])    [address] [place] [address]  [address]  [facility] [address]   [phone]            Mar 30, 2018  8:10 AM CDT   LAB with EA LAB   [facility] ([facility])    [address] [facility]  [address]  [facility] [id number] [phone]           Please do not eat 10-12 hours before your appointment if you are coming in fasting for labs on lipids, cholesterol, or glucose (sugar). This does not apply to  pregnant women. Water, hot tea and black coffee (with nothing added) are okay. Do not drink other fluids, diet soda or chew gum.            Mar 31, 2018  9:20 AM CDT   LAB with EA LAB   [facility] ([facility])    [address] [place] [address]  [address]  [facility] [address]   [phone]           Please do not eat 10-12 hours before your appointment if you are coming in fasting for labs on lipids, cholesterol, or glucose (sugar). This does not apply to pregnant women. Water, hot tea and black coffee (with nothing added) are okay. Do not drink other fluids, diet soda or chew gum.            Apr 01, 2018  8:30 AM CDT   [facility] Lab Draw with  [facility] LAB DRAW   [facility] Lab Draw ([facility])    [address] [place] [address]  [address]  [facility] [address]   [phone]            Apr 02, 2018  8:10 AM CDT   LAB with EA LAB   [facility] ([facility])    [address] [place] [address]  [address]  [facility] [address]   [phone]           Please do not eat 10-12 hours before your appointment if you are coming in fasting for labs on lipids, cholesterol, or glucose (sugar). This does not apply to pregnant women. Water, hot tea and black coffee (with nothing added) are okay. Do not drink other fluids, diet soda or chew gum.              Future tests that were ordered for you today     Open Standing Orders        Priority Remaining Interval Expires Ordered    CBC with platelets differential Routine 98/99 when ordered by Dr Johnson 2/23/2019 3/23/2018    Comprehensive metabolic panel Routine 98/99 when ordered by Dr Johnson 2/23/2019 3/23/2018    Phosphorus Routine 98/99 when ordered by Dr Johnson 2/23/2019 3/23/2018    Magnesium Routine 98/99 when ordered by Dr Johnson 2/23/2019 3/23/2018          Open Future Orders        Priority Expected Expires Ordered    PET Oncology Whole Body Routine 4/21/2018 12/23/2018 3/23/2018    CBC with  platelets differential Routine 2018 2018 3/23/2018    Comprehensive metabolic panel Routine 2018 2018 3/23/2018    Magnesium Routine 2018 2018 3/23/2018    Phosphorus Routine 2018 2018 3/23/2018            Who to contact     Please call your clinic at [phone] to:    Ask questions about your health    Make or cancel appointments    Discuss your medicines    Learn about your test results    Speak to your doctor            Additional Information About Your Visit        [employer] Information     [employer] is an electronic gateway that provides easy, online access to your medical records. With [employer], you can request a clinic appointment, read your test results, renew a prescription or communicate with your care team.     To sign up for Cabara visit the website at [email or web address]   You will be asked to enter the access code listed below, as well as some personal information. Please follow the directions to create your username and password.     Your access code is: [email or web address]  Expires: 2018  7:30 AM     Your access code will  in 90 days. If you need help or a new code, please contact your [facility] or call [phone] for assistance.        Care EveryWhere ID     This is your Care EveryWhere ID. This could be used by other organizations to access your [facility] medical records  [id number]        Your Vitals Were     Pulse BMI (Body Mass Index)                93 30.56 kg/m2           Blood Pressure from Last 3 Encounters:   18 (!) 130/94   18 140/87   18 (!) 131/104    Weight from Last 3 Encounters:   18 96.6 kg (213 lb)   18 99.3 kg (219 lb)   18 99.5 kg (219 lb 6.4 oz)              Today, you had the following     No orders found for display         Today's Medication Changes          These changes are accurate as of 3/23/18  8:42 PM.  If you have any questions, ask your nurse or  doctor.               Start taking these medicines.        Dose/Directions    granisetron 1 MG tablet   Commonly known as:  KYTRIL   Used for:  Sarcoma of soft tissue (H)   Started by:  Sarah Bowie MD        Start taking on:  3/25/2018   Take 2 tablets (2 mg) every morning on Days 3 through 8.   Quantity:  12 tablet   Refills:  3       prochlorperazine 10 MG tablet   Commonly known as:  COMPAZINE   Used for:  Sarcoma of soft tissue (H)   Started by:  Sarah Bowie MD        Dose:  10 mg   Take 1 tablet (10 mg) by mouth every 6 hours as needed (Nausea/Vomiting)   Quantity:  30 tablet   Refills:  3            Where to get your medicines      These medications were sent to [facility] - [address]  [address] [place], [place] [address]     Phone:  [phone]     granisetron 1 MG tablet    prochlorperazine 10 MG tablet                Primary Care Provider Office Phone # Fax #    Louisa Fry [phone]       [facility] [address]  [facility] [address]        Equal Access to Services     [facility]: Hadii troy ku hadasho Soomaali, waaxda luqadaha, qaybta kaalmada adeegyada, mendez epps . So [facility] [phone].    ATENCIÓN: Si habla español, tiene a sheridan disposición servicios gratuitos de asistencia lingüística. [facility] [phone].    We comply with applicable federal civil rights laws and [place] laws. We do not discriminate on the basis of race, color, national origin, age, disability, sex, sexual orientation, or gender identity.            Thank you!     Thank you for choosing [facility]COL[facility]Y CLINIC  for your care. Our goal is always to provide you with excellent care. Hearing back from our patients is one way we can continue to improve our services. Please take a few minutes to complete the written survey that you may receive in the mail after your visit with us. Thank you!             Your Updated Medication  List - Protect others around you: Learn how to safely use, store and throw away your medicines at www.disposemymeds.org.          This list is accurate as of 3/23/18  8:42 PM.  Always use your most recent med list.                   Brand Name Dispense Instructions for use Diagnosis    ACETAMINOPHEN PO      Take 325 mg by mouth        granisetron 1 MG tablet   Start taking on:  3/25/2018    KYTRIL    12 tablet    Take 2 tablets (2 mg) every morning on Days 3 through 8.    Sarcoma of soft tissue (H)       IBUPROFEN PO      Take 800 mg by mouth as needed for moderate pain        oxyCODONE IR 5 MG tablet    ROXICODONE    20 tablet    Take 1-2 tablets (5-10 mg) by mouth every 4 hours as needed for pain or other (Moderate to Severe)    Mass of right thigh       prochlorperazine 10 MG tablet    COMPAZINE    30 tablet    Take 1 tablet (10 mg) by mouth every 6 hours as needed (Nausea/Vomiting)    Sarcoma of soft tissue (H)

## 2018-03-23 NOTE — PROGRESS NOTES
Diagnosis: High-grade soft tissue sarcoma right thigh     Treatment: Neoadjuvant chemotherapy planned.  We will begin the March 26, 2018.  Sequencing of radiation and surgery to be determined after neoadjuvant treatment completed staging studies evaluated.    Hiram was seen today for assessment of his wound and wound drain management.  He reports his pain is better than it was preop.  He describes up to 50 cc of drainage per day.    I reviewed the notes from Dr. Johnson.  He and Mr. Tapia are in agreement to proceed with chemotherapy.    Impression: The drain was removed the wound is healed.  He can commence chemotherapy.

## 2018-03-23 NOTE — MR AVS SNAPSHOT
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)    -  1    Pain of right lower extremity        Disruption of tissue around surgical drain, subsequent encounter        Personal history of tobacco use, presenting hazards to health          Care Instructions          March 2018 Sunday Monday Tuesday Wednesday Thursday Friday Saturday                       1     2     UMP NEW TUMOR    2:45 PM   (30 min.)   Brad Betts MD   [facility] Orthopaedic [facility] 3       4     5     6     7     8     Outpatient Visit    9:30 AM   [facility] Surgery and Procedure Center     EXCISE SOFT TISSUE TUMOR THIGH   11:35 AM   Brad Betts MD    OR 9     10       11     12     13     UMP RETURN    3:30 PM   (15 min.)   Brad Betts MD   [facility] 14     15     16     17     PET ONCOLOGY WHOLE BODY    8:30 AM   (45 min.)   [id number]   [facility] [place] PET CT   18     19     20     21     22     23     UMP NEW    9:45 AM   (60 min.)   Gaurang Johnson MD   [facility]     LAB WITH  CLINIC   12:45 PM   (15 min.)    LAB   [facility] Lab     ECH COMPLETE    1:00 PM   (60 min.)   [id number]   [facility] Echo     UMP RETURN    1:30 PM   (15 min.)   Brad Betts MD   [facility]     UMP CONSULT    2:30 PM   (90 min.)   Stewart Eckert MD   [facility] 24       25     26     27     PAC EVAL    8:15 AM   (60 min.)   6,  Pac Qamar   [facility] Preoperative Assessment Center     PAC RN ASSESSMENT    9:15 AM   (30 min.)   Rn,  Pac   [facility] Preoperative Assessment Center     PAC ANESTHESIA CONSULT   10:35 AM   (10 min.)   Anesthesiologist,  Pac    [facility] Preoperative Assessment Center     LAB   11:00 AM   (15 min.)   UC LAB   [facility] Lab 28     INSERT PORT VASCULAR ACCESS   12:20 PM   Sarah Bowie MD   UU OR 29     [facility] LAB DRAW    6:30 AM   (15 min.)   [facility] DRAW   [facility]     [facility] ONC INFUSION 180    7:00 AM   (180 min.)    ONCOLOGY INFUSION   [facility] 30     LAB    8:10 AM   (10 min.)   EA LAB   [facility] 31     LAB    9:20 AM   (10 min.)   EA LAB   [facility]            April 2018 Sunday Monday Tuesday Wednesday Thursday Friday Saturday   1     [facility] LAB DRAW    8:30 AM   (15 min.)    [facility] LAB DRAW   [facility] Lab Draw 2     LAB    8:10 AM   (10 min.)   EA LAB   [facility] 3     LAB    8:10 AM   (10 min.)   EA LAB   [facility] [place] 4     5     6     7       8     9     10     11     12     13     14       15     16     17     18     19     20     21     PET ONCOLOGY WHOLE BODY    8:30 AM   (45 min.)   UUPET1   [facility] PET CT   22     23     24     [facility] LAB DRAW    8:30 AM   (15 min.)    [facility] LAB DRAW   [facility] Lab Draw     UMP RETURN    8:45 AM   (30 min.)   Gaurang Johnson MD   [facility] ONC INFUSION 180    9:30 AM   (180 min.)    ONCOLOGY INFUSION   [facility] 25     26     27     28       29     30                                         May 2018   [place]yler Monday Tuesday Wednesday Thursday Friday Saturday             1     2     3     4     5       6     7     8     9     10     11     12       13     14     15     16     17     18     19       20     21     22     [facility] LAB DRAW   10:30 AM   (15 min.)   [facility] LAB DRAW   [facility] Lab Draw     UMP RETURN   10:45 AM   (30 min.)   Gaurang Johnson MD   [facility] ONC INFUSION 180   12:30 PM   (180 min.)    ONCOLOGY INFUSION   [facility]  [facility] 23     24     25     26       27     28     29     30     31                              Follow-ups after your visit        Your next 10 appointments already scheduled     Mar 27, 2018 10:50 AM CDT   (Arrive by 10:35 AM)   PAC Anesthesia Consult with  Pac Anesthesiologist   [facility] Preoperative Assessment Center ([facility])    [address]  4th Floor  [place] [address] [phone]            Mar 27, 2018 11:00 AM CDT   LAB with UC LAB   [facility] Lab ([facility])    [address]  1st Floor  [place] [address] [phone]           Please do not eat 10-12 hours before your appointment if you are coming in fasting for labs on lipids, cholesterol, or glucose (sugar). This does not apply to pregnant women. Water, hot tea and black coffee (with nothing added) are okay. Do not drink other fluids, diet soda or chew gum.            Mar 28, 2018   Procedure with Sarah Bowie MD   [facility] [place], Same Day Surgery (--)    [address] [address] [phone]            Mar 29, 2018  6:30 AM CDT   [facility] Lab Draw with  [facility] LAB DRAW   [facility] ([facility])    [address]  [address]  [place] [id number] [phone]            Mar 29, 2018  7:00 AM CDT   Infusion 180 with  ONCOLOGY INFUSION, UC 23 ATC   [facility] ([facility])    [address]  [address]  [place] [id number] [phone]            Mar 30, 2018  8:10 AM CDT   LAB with EA LAB   [facility] ([facility] [place])    [address]  [address]  [place] [id number] [phone]           Please do not eat 10-12 hours before your appointment if you are coming in fasting for labs on lipids, cholesterol, or glucose (sugar). This does not apply to pregnant women. Water, hot tea and black  coffee (with nothing added) are okay. Do not drink other fluids, diet soda or chew gum.            Mar 31, 2018  9:20 AM CDT   LAB with EA LAB   [facility] [place] ([facility])    [address] [place] [address]  [address]  [place] [address]   [phone]           Please do not eat 10-12 hours before your appointment if you are coming in fasting for labs on lipids, cholesterol, or glucose (sugar). This does not apply to pregnant women. Water, hot tea and black coffee (with nothing added) are okay. Do not drink other fluids, diet soda or chew gum.            Apr 01, 2018  8:30 AM CDT   [facility] Lab Draw with  [facility] LAB DRAW   [facility])    [address]  [address]  [facility] [address]   [phone]            Apr 02, 2018  8:10 AM CDT   LAB with EA LAB   [facility] [place] ([facility])    [address] [place] [address]  [address]  [facility] [address] [phone]           Please do not eat 10-12 hours before your appointment if you are coming in fasting for labs on lipids, cholesterol, or glucose (sugar). This does not apply to pregnant women. Water, hot tea and black coffee (with nothing added) are okay. Do not drink other fluids, diet soda or chew gum.              Who to contact     If you have questions or need follow up information about today's clinic visit or your schedule please contact [facility] directly at [phone].  Normal or non-critical lab and imaging results will be communicated to you by ENEFprohart, letter or phone within 4 business days after the clinic has received the results. If you do not hear from us within 7 days, please contact the clinic through ENEFprohart or phone. If you have a critical or abnormal lab result, we will notify you by phone as soon as possible.  Submit refill requests through [email or web address] or call your pharmacy and they will forward the refill request to  "us. Please allow 3 business days for your refill to be completed.          Additional Information About Your Visit        MyChart Information     [employer] lets you send messages to your doctor, view your test results, renew your prescriptions, schedule appointments and more. To sign up, go to [email or web address] . Click on \"Log in\" on the left side of the screen, which will take you to the Welcome page. Then click on \"Sign up Now\" on the right side of the page.     You will be asked to enter the access code listed below, as well as some personal information. Please follow the directions to create your username and password.     Your access code is: [email or web address]  Expires: 2018  7:30 AM     Your access code will  in 90 days. If you need help or a new code, please call your [facility] clinic or [phone].        Care EveryWhere ID     This is your Care EveryWhere ID. This could be used by other organizations to access your [facility] medical records  [id number]        Your Vitals Were     Pulse Temperature Respirations Height Pulse Oximetry BMI (Body Mass Index)    99 98.3  F (36.8  C) (Oral) 16 1.778 m (5' 10\") 99% 31.48 kg/m2       Blood Pressure from Last 3 Encounters:   18 (!) 139/97   18 (!) 130/94   18 140/87    Weight from Last 3 Encounters:   18 96.8 kg (213 lb 4.8 oz)   18 96.6 kg (213 lb)   18 99.3 kg (219 lb)                 Today's Medication Changes          These changes are accurate as of 3/23/18 11:59 PM.  If you have any questions, ask your nurse or doctor.               Start taking these medicines.        Dose/Directions    granisetron 1 MG tablet   Commonly known as:  KYTRIL   Used for:  Sarcoma of soft tissue (H)   Started by:  Sarah Bowie MD        Take 2 tablets (2 mg) every morning on Days 3 through 8.   Quantity:  12 tablet   Refills:  3       prochlorperazine 10 MG tablet   Commonly known as:  COMPAZINE   Used for:  Sarcoma of soft tissue " (H)   Started by:  Sarah Bowie MD        Dose:  10 mg   Take 1 tablet (10 mg) by mouth every 6 hours as needed (Nausea/Vomiting)   Quantity:  30 tablet   Refills:  3            Where to get your medicines      These medications were sent to [facility] [place], [place] - [address]  [address] [place] [address] [address]     Phone:  [phone]     granisetron 1 MG tablet    prochlorperazine 10 MG tablet                Primary Care Provider Office Phone # Fax #    Louisa Fry [phone]       [facility] [address]  [place] [address]        Equal Access to Services     [facility]: Hadii troy martinez hadasho Sonikita, waaxda luqadaha, qaybta kaalmada adeegyada, mendez mcnair. [facility] [phone].    ATENCIÓN: Si habla español, tiene a sheridan disposición servicios gratuitos de asistencia lingüística. [facility] [phone].    We comply with applicable federal civil rights laws and [place] laws. We do not discriminate on the basis of race, color, national origin, age, disability, sex, sexual orientation, or gender identity.            Thank you!     Thank you for choosing [facility]  for your care. Our goal is always to provide you with excellent care. Hearing back from our patients is one way we can continue to improve our services. Please take a few minutes to complete the written survey that you may receive in the mail after your visit with us. Thank you!             Your Updated Medication List - Protect others around you: Learn how to safely use, store and throw away your medicines at www.disposemymeds.org.          This list is accurate as of 3/23/18 11:59 PM.  Always use your most recent med list.                   Brand Name Dispense Instructions for use Diagnosis    granisetron 1 MG tablet    KYTRIL    12 tablet    Take 2 tablets (2 mg) every morning on Days 3 through 8.    Sarcoma of soft tissue (H)        prochlorperazine 10 MG tablet    COMPAZINE    30 tablet    Take 1 tablet (10 mg) by mouth every 6 hours as needed (Nausea/Vomiting)    Sarcoma of soft tissue (H)

## 2018-03-23 NOTE — PROGRESS NOTES
"    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  In brief he has had a lot of problems with his right leg for many years including sciatica for 20 years.  More recently he has noted some more discomfort in the right thigh and in 2017 hit his lateral thigh against a truck tailgate causing a lot of pain.  Subsequently there was a fair amount of swelling and stiffness.  T his eventually led to some imaging showing a cystic mass.  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  He is referred here now for further treatment.  Other than the knee he is really doing pretty well and has been quite active.  Now however the leg is intermittently quite painful and limits his activity.  He is sometimes sleeping in a recliner.  At the time of the biopsy more than a liter of fluid was removed and that markedly improved his symptoms of stiffness and pain.  Aside from this problem his 10 point ROS unremarkable.  Past history is not particular remarkable other than for tonsillectomy and surgery for lazy eye.  His left eye is the dominant eye.  The family history is not particularly remarkable but his father  of leukemia at age 42.  He denies any drug allergies and is not on any medications other than as needed ibuprofen.  He is  and ha s a 25-year-old somewhat autistic boy who lives with them.  He smoked a pack a day for about 10 years, stopping about , and does not abuse alcohol or other drugs.  He works as a  at [employer].    On exam he appeared comfortable with normal affect.    /87  Pulse 99  Temp 98.3  F (36.8  C) (Oral)  Resp 16  Ht 1.778 m (5' 10\")  Wt 99.5 kg (219 lb 6.4 oz)  SpO2 99%  BMI 31.48 kg/m2  There is no icterus, no thyromegaly or neck mass, the chest is clear, there is RRR with no significant murmur, no HSMT, no lymphadenopathy, 1+ edema of the right lower leg, there is a large mass in the right thigh with an indwelling " drain, Romberg heel and toe walking were normal.    I reviewed his pathology showing the UPS.    I reviewed his imaging revealing a large mass in the right thigh on the MRI.  I also reviewed his PET/CT which shows marked PET activity in the periphery of this cystic thigh mass.  There is no definite metastatic disease but there is a calcified nodule in the left lung and a probable hemangioma in the liver and a probable small adenoma in the left adrenal gland.  There is there is also a small right pleural effusion with a bit of right lower lobe consolidation mild FDG uptake.    We discussed the nature of cancer in general and his sarcoma in particular.  We discussed the possibility of preoperative chemotherapy which would be reasonable in his case.  We discussed the fact that while logical there is not definitive evidence it is helpful.  We discussed how Doxil ifosfamide works and the typical toxicities and his wife will do the neuro checks.  He would like to proceed with preoperative chemotherapy and we will try to start that Monday.  He will be seeing radiation therapy later today and also Dr. Betts today in follow-up.  We would like Dr. Betts 's okay to start chemotherapy with the indwelling drain.  We will plan a repeat PET/CT on 4-21 to assess response before proceeding.  We discussed the approach to neutropenic fever and will use Neulasta.  All of his questions were addressed and he will call if he has others.  t>60 min most NINO&NINO Johnson M.D.  Professor  Hematology, Oncology and Transplantation

## 2018-03-23 NOTE — PROGRESS NOTES
RADIATION ONCOLOGY CONSULT NOTE  Date of Visit: Mar 23, 2018    Hiram Tapia  MRN: [id number]  : 1960    Hiram Tapia is being seen today for initial consultation at the request of Dr. 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 All pertinent labs, imaging, and pathology findings have been reviewed.     HISTORY OF PRESENT ILLNESS: Mr. 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 In brief, the patient first presented to care on 18 with the CCs of swelling and discomfort in his right thigh. These symptoms were reported to have been persistent for 4 months, ever since 10/2018 when the patient accidentally slid into the tailgate of his truck and injured the lateral aspect of his right leg. After this incident, the patient initially noted some tenderness over the impacted area, but progressive swelling ensued and he  developed decreased active range of motion in his right leg. He underwent further workup with MRI of the RLE on 2018, which revealed a 17.5cm complex intramuscular fluid collection along the vastus intermedius suggestive of a cystic neoplasm. An element of intramuscular hematoma was also noted. Lifting of the periosteum on the ventral surface of the mid-femur over a distance of approximately 3cm was seen.  The patient was then referred to Dr. Betts here at [facility], who recommended that his lesion be further evaluated by open biopsy. This biopsy was completed on 3/8/2018, with an intraoperative frozen section suggesting high grade sarcoma. Final pathology confirmed the tumor to be sarcoma, likely UPS. The patient then underwent a staging PET/CT on 3/17/2018, which showed a hypermetabolic 12x9cm necrotic lesion in the patient s vastus lateralis, but no evidence of distant metastasis.  He presents today for consideration of pre-operative RT. The  "patient met with medical oncology services, Dr. Johnson, this morning and was seen in follow-up with Dr. Betts this afternoon. Dr. Johnson's office suggested that the patient may benefit from neoadjuvant chemotherapy with Doxil, and has scheduled the patient to begin chemotherapy this coming Monday (3/26/2018). Dr. Betts's office has also re-evaluated the patient, and have removed the patient's drain.  Today the patient states that he is generally well and feels \"much better since the drain was removed.\" He denies fever/chills, limitations in ROM, leg weakness or fatigue, but affirms to a feeling of \"fullness\" in his right thigh.  CHEMOTHERAPY HISTORY: None  PAST RADIATION THERAPY HISTORY: None    PAST MEDICAL HISTORY: Denies    PAST SURGICAL HISTORY: Tonsillectomy and strabismus surgery    ALLERGIES: NKDA    MEDICATIONS: Ibuprofen 800mg PRN for pain     FAMILY HISTORY: Significant for a father who  of leukemia at age 42.    SOCIAL HISTORY:  with one son. Former smoker of approximately 15 pack years. Denies EtOH or illicit drug use    REVIEW OF SYSTEMS: A 10 point review of systems was obtained. Pertinent findings are noted in the HPI and are otherwise unremarkable.     PHYSICAL EXAM:  Wt 96.6 kg (213 lb)  BMI 30.56 kg/m2  GEN: Appears well, alert, oriented, and in NAD  HEENT: EOMI, normal conjunctiva, MMM, no thrush  NECK: Supple, full ROM, no cervical or clavicular lymphadenopathy  CV: RRR, no murmurs/rubs/gallops, warm and well-perfused  RESP: CTAB, no wheezes/rales/rhonchi, breathing comfortably on room air  ABDOMEN: Soft, NT, ND, bowel sounds present  SKIN: Normal color and turgor  EXTREMITIES: Palpation of the patient's right thigh reveals a non-tender firmness. The circumference of the right thigh is approximately 10% larger than the left thigh. A large bandage is present on the posterolateral surface of the patient's leg. No distal LE edema is appreciated, bilaterally.  NEURO: No focal " deficits, CN 3-12 grossly intact, normal and symmetric motor and sensory exam in bilateral upper and lower extremities, normal gait  PSYCH: Appropriate mood and affect    ECOG PERFORMANCE STATUS: 1    PACEMAKER: None    PREGNANCY STATUS: N/A    IMPRESSION: Mr. Tapia is a 57 year old gentleman with high grade UPS of the lateral right thigh. He has recently been seen in consultation with Dr. Johnson, who has recommended that he be trailed on a course of neoadjuvant chemotherapy.    RECOMMENDATION: We agree with Dr. Johnson's recommendation and will plan to see the patient after the completion of neoadjuvant Doxil to re-discuss the role of pre-operative RT for the management of his sarcoma. We will defer ordering any further PET/CT imaging to Dr. Johnson's office, but would request that the patient be scheduled for interval MRI of the RLE (in radiation treatment position) after the completion of his chemotherapy course. This will allow our team to better define our radiation treatment volumes. We explained our rationale, as well as the logistics, risks, benefits, and alternatives to neoadjuvant chemotherapy, followed by pre-operative radiation therapy. We encouraged Mr. Tapia to ask questions, which we answered to the best of our ability. The verbalized understanding and agreed with our plan or care. We have provided the patient with our contact information, and look forward to seeing him again in anticipated follow-up in several month's time.    The patient was seen and discussed with staff, Dr. Eckert. Thank you for involving us in the care of this patient.  Please feel free to contact us with questions or concerns at any time.    Xander Mejia MD-PhD PGY-2  Radiation Oncology Resident, [facility]    I saw and examined the patient with the resident.  I have reviewed and agree with the resident's note and plan of care.      Stewart Eckert MD    CC  Patient Care Team:  Louisa Fry MD as PCP -  General (Emergency Medicine)  Isaiah Paulson MD as MD (Family Practice)  Britton Betts MD as MD (Orthopedics)  BRITTON BETTS

## 2018-03-23 NOTE — NURSING NOTE
"Reason For Visit:   Chief Complaint   Patient presents with     Surgical Followup     DOS 3/8/18 S/P Biopsy right thigh tumor.          Ht 1.778 m (5' 10\")  Wt 99.3 kg (219 lb)  BMI 31.42 kg/m2        Pain Assessment  Patient Currently in Pain: Yes  0-10 Pain Scale: 2  Primary Pain Location: Leg  Pain Orientation: Right  Pain Descriptors: Discomfort          Lina Marrero LPN    "

## 2018-03-23 NOTE — PROGRESS NOTES
HPI    INITIAL PATIENT ASSESSMENT    Diagnosis: sarcoma right thigh    Prior radiation therapy: None    Prior chemotherapy: None    Prior hormonal therapy:No    Pain Eval:  Denies    Psychosocial  Living arrangements: wife and son,  for [employer],   Fall Risk: independent   referral needs: Not needed    Advanced Directive: No  Implantable Cardiac Device? No      Nurse face-to-face time: Level 3:  10 min face to face time  Review of Systems   Constitutional: Positive for weight loss (20 pounds past 3 weeks). Negative for chills and fever.   HENT: Negative for hearing loss.    Eyes: Negative for blurred vision.   Respiratory: Negative for cough and shortness of breath.    Cardiovascular: Negative for chest pain.   Gastrointestinal: Negative for diarrhea and heartburn.   Genitourinary: Negative for dysuria and urgency.   Musculoskeletal: Positive for back pain and joint pain (right sided pain h ip to shin- has had in the past, ).   Skin: Negative for rash.   Neurological: Negative for dizziness, sensory change and headaches.   Endo/Heme/Allergies: Does not bruise/bleed easily.   Psychiatric/Behavioral: Negative for depression. The patient is not nervous/anxious.

## 2018-03-26 ENCOUNTER — TELEPHONE (OUTPATIENT)
Dept: SURGERY | Facility: CLINIC | Age: 58
End: 2018-03-26

## 2018-03-26 NOTE — TELEPHONE ENCOUNTER
Patient called to verify when his surgery is b/c he'd gotten some conflicting information. Confirmed he's scheduled for surgery w/ Dr Bowie on Weds, 3/28/18 at 12pm, arrive at [facility] at 10am.

## 2018-03-26 NOTE — PROGRESS NOTES
This is a recent snapshot of the patient's [facility] medical record.  For current drug dose and complete information and questions, call [phone] or In Basket pool, fv home infusion ([id number])  CSN Number:  [id number]

## 2018-03-27 ENCOUNTER — OFFICE VISIT (OUTPATIENT)
Dept: SURGERY | Facility: CLINIC | Age: 58
End: 2018-03-27
Payer: COMMERCIAL

## 2018-03-27 ENCOUNTER — ALLIED HEALTH/NURSE VISIT (OUTPATIENT)
Dept: SURGERY | Facility: CLINIC | Age: 58
End: 2018-03-27
Payer: COMMERCIAL

## 2018-03-27 ENCOUNTER — APPOINTMENT (OUTPATIENT)
Dept: SURGERY | Facility: CLINIC | Age: 58
End: 2018-03-27
Payer: COMMERCIAL

## 2018-03-27 ENCOUNTER — ANESTHESIA EVENT (OUTPATIENT)
Dept: SURGERY | Facility: CLINIC | Age: 58
End: 2018-03-27
Payer: COMMERCIAL

## 2018-03-27 VITALS
DIASTOLIC BLOOD PRESSURE: 97 MMHG | SYSTOLIC BLOOD PRESSURE: 139 MMHG | RESPIRATION RATE: 18 BRPM | BODY MASS INDEX: 30.54 KG/M2 | WEIGHT: 213.3 LBS | OXYGEN SATURATION: 99 % | TEMPERATURE: 98.4 F | HEIGHT: 70 IN | HEART RATE: 115 BPM

## 2018-03-27 DIAGNOSIS — Z01.818 PREOPERATIVE GENERAL PHYSICAL EXAMINATION: Primary | ICD-10-CM

## 2018-03-27 DIAGNOSIS — C49.9 SARCOMA OF SOFT TISSUE (H): ICD-10-CM

## 2018-03-27 RX ORDER — FENTANYL CITRATE 50 UG/ML
25-50 INJECTION, SOLUTION INTRAMUSCULAR; INTRAVENOUS
Status: CANCELLED | OUTPATIENT
Start: 2018-03-27

## 2018-03-27 ASSESSMENT — LIFESTYLE VARIABLES: TOBACCO_USE: 1

## 2018-03-27 ASSESSMENT — PAIN SCALES - GENERAL: PAINLEVEL: MODERATE PAIN (4)

## 2018-03-27 NOTE — PATIENT INSTRUCTIONS
March 2018 Sunday Monday Tuesday Wednesday Thursday Friday Saturday                       1     2     UMP NEW TUMOR    2:45 PM   (30 min.)   Brad Betts MD   [facility] 3       4     5     6     7     8     Outpatient Visit    9:30 AM   [facility]     EXCISE SOFT TISSUE TUMOR THIGH   11:35 AM   Brad Betts MD    OR 9     10       11     12     13     UMP RETURN    3:30 PM   (15 min.)   Brad Betts MD   [facility] 14     15     16     17     PET ONCOLOGY WHOLE BODY    8:30 AM   (45 min.)   [id number]   [facility] [place] PET CT   18     19     20     21     22     23     UMP NEW    9:45 AM   (60 min.)   Gaurang Johnson MD   [facility]     LAB WITH  CLINIC   12:45 PM   (15 min.)   [facility] Lab     ECH COMPLETE    1:00 PM   (60 min.)   [id number]   [facility] Echo     UMP RETURN    1:30 PM   (15 min.)   Brad Betts MD   [facility]     UMP CONSULT    2:30 PM   (90 min.)   Stewart Eckert MD   [facility] 24       25     26     27     PAC EVAL    8:15 AM   (60 min.)   6,  Pac Qamar   [facility]operative [facility]     PAC RN ASSESSMENT    9:15 AM   (30 min.)   Rn,  Pac   [facility]     PAC ANESTHESIA CONSULT   10:35 AM   (10 min.)   Anesthesiologist,  Pac   [facility]     LAB   11:00 AM   (15 min.)    LAB   [facility] Lab 28     INSERT PORT VASCULAR ACCESS   12:20 PM   Sarah Bowie MD   [facility] OR 29     [facility] LAB DRAW    6:30 AM   (15 min.)    [facility] LAB DRAW   [facility] Lab Draw     [facility] ONC INFUSION 180    7:00 AM   (180 min.)   UC ONCOLOGY INFUSION   [facility] 30     LAB    8:10 AM   (10 min.)   EA LAB   [facility] 31     LAB    9:20 AM   (10 min.)   EA LAB   [facility] [place]            April 2018 Sunday Monday Tuesday  Wednesday Thursday Friday Saturday   1     [facility] LAB DRAW    8:30 AM   (15 min.)    [facility] LAB DRAW   [facility] Lab Draw 2     LAB    8:10 AM   (10 min.)   EA LAB   [facility] 3     LAB    8:10 AM   (10 min.)   EA LAB   [facility] 4     5     6     7       8     9     10     11     12     13     14       15     16     17     18     19     20     21     PET ONCOLOGY WHOLE BODY    8:30 AM   (45 min.)   UUPET1   [facility] [place] PET CT   22     23     24     [facility] LAB DRAW    8:30 AM   (15 min.)    [facility]   [facility]     [facility] RETURN    8:45 AM   (30 min.)   Gaurang Johnson MD   [facility] ONC INFUSION 180    9:30 AM   (180 min.)   UC ONCOLOGY INFUSION   [facility] 25     26 27     28       29     30                                         May 2018   [place] Monday Tuesday Wednesday Thursday Friday Saturday             1     2     3     4     5       6     7     8     9     10     11     12       13     14     15     16     17     18     19       20     21     22     [facility] LAB DRAW   10:30 AM   (15 min.)    [facility] LAB DRAW   [facility]     [facility] RETURN   10:45 AM   (30 min.)   Gaurang Johnson MD   [facility] ONC INFUSION 180   12:30 PM   (180 min.)   [facility] ONCOLOGY INFUSION   [facility] 23     24     25     26       27     28     29     30     31

## 2018-03-27 NOTE — MR AVS SNAPSHOT
After Visit Summary   3/27/2018    Hiram Tapia    MRN: [id number]           Patient Information     Date Of Birth          1960        Visit Information        Provider Department      3/27/2018 9:30 AM Rn, [facility] Preoperative Assessment Center        Care Instructions    Preparing for Your Surgery      Name:  Hiram Tapia   MRN:  [id number]   :  1960   Today's Date:  3/27/2018     Arriving for surgery:  Surgery date:  3/28/18  Surgery time: 12:20 pm  Arrival time:  10:20 am  Please come to:        [facility] [address]  [address] [place]  [address]    -   parking is available in front of the hospital from 5:15 am to 8:00 pm    -  Stop at the Information Desk in the lobby    -   Inform the information person that you are here for surgery. An escort to 3c will be provided. If you would not like an escort, please proceed to 3C on the 3rd floor. [phone]     What can I eat or drink?  -  You may have solid food or milk products until 8 hours prior to your surgery.  -  You may have water, apple juice or 7up/Sprite until 2 hours prior to your surgery.    Which medicines can I take? No Aspirin, anti-inflammatories like Ibuprofen, vitamins or supplements before surgery. Tylenol is okay.    How do I prepare myself?  -  Take two showers: one the night before surgery; and one the morning of surgery.         Use 1/2 bottle of  Hibiclens  to wash from neck down for each shower.  You may use your own shampoo and conditioner. No other hair products like gel.   -  Do NOT use lotion, powder, deodorant, or antiperspirant the day of your surgery.  -  Do NOT wear any makeup, fingernail polish or jewelry.  - -Do not bring your own medications to the hospital.  -  Bring your ID and insurance card.    Questions or Concerns:  If you have questions or concerns, please call the  Preoperative Assessment Center, Monday-Friday 7AM-7PM:   [phone]    AFTER YOUR SURGERY  Breathing exercises   Breathing exercises help you recover faster. Take deep breaths and let the air out slowly. This will:     Help you wake up after surgery.    Help prevent complications like pneumonia.  Preventing complications will help you go home sooner.   We may give you a breathing device (incentive spirometer) to encourage you to breathe deeply.   Nausea and vomiting   You may feel sick to your stomach after surgery; if so, let your nurse know.    Pain control:  After surgery, you may have pain. Our goal is to help you manage your pain. Pain medicine will help you feel comfortable enough to do activities that will help you heal.  These activities may include breathing exercises, walking and physical therapy.   To help your health care team treat your pain we will ask: 1) If you have pain  2) where it is located 3) describe your pain in your words  Methods of pain control include medications given by mouth, vein or by nerve block for some surgeries.  We may give you a pain control pump that will:  1) Deliver the medicine through a tube placed in your vein  2) Control the amount of medicine you receive  3) Allow you to push a button to deliver a dose of pain medicine  Sequential Compression Device (SCD) or Pneumo Boots:  You may need to wear SCD S on your legs or feet. These are wraps connected to a machine that pumps in air and releases it. The repeated pumping helps prevent blood clots from forming.                     Follow-ups after your visit        Your next 10 appointments already scheduled     Mar 27, 2018  9:30 AM CDT   (Arrive by 9:15 AM)   PAC RN ASSESSMENT with Gigi Pac Rn   [facility] Preoperative [facility] Center ([facility])    [address]  [address]th [facility] [id number] [phone]            Mar 27, 2018 10:50 AM CDT   (Arrive by 10:35 AM)   PAC Anesthesia Consult with Gigi Pac Anesthesiologist   [facility] Preoperative  Assessment Center ([facility])    [address]  4th Floor  [facility] [address]   [phone]            Mar 27, 2018 11:00 AM CDT   LAB with UC LAB   [facility] Lab ([facility])    [address]  1st Floor  [facility] [address]   [phone]           Please do not eat 10-12 hours before your appointment if you are coming in fasting for labs on lipids, cholesterol, or glucose (sugar). This does not apply to pregnant women. Water, hot tea and black coffee (with nothing added) are okay. Do not drink other fluids, diet soda or chew gum.            Mar 28, 2018   Procedure with Sarah Bowie MD   [facility] [place], Same Day Surgery (--)    [address] [address]   [phone]            Mar 29, 2018  6:30 AM CDT   [facility] Lab Draw with  [facility] LAB DRAW   [facility] Lab Draw ([facility])    [address]  [address]  [facility] [address]   [phone]            Mar 29, 2018  7:00 AM CDT   Infusion 180 with UC ONCOLOGY INFUSION, UC 23 ATC   [facility] ([facility])    [address]  [address]  [facility] [address] [phone]            Mar 30, 2018  8:10 AM CDT   LAB with EA LAB   [facility] [place] ([facility])    [address] [place] [address]  [address]  [place] [address] [phone]           Please do not eat 10-12 hours before your appointment if you are coming in fasting for labs on lipids, cholesterol, or glucose (sugar). This does not apply to pregnant women. Water, hot tea and black coffee (with nothing added) are okay. Do not drink other fluids, diet soda or chew gum.            Mar 31, 2018  9:20 AM CDT   LAB with EA LAB   [facility] [place] ([facility])    [address] [place] [address]  [address]  [facility] [id number] [phone]           Please do not eat 10-12 hours before  your appointment if you are coming in fasting for labs on lipids, cholesterol, or glucose (sugar). This does not apply to pregnant women. Water, hot tea and black coffee (with nothing added) are okay. Do not drink other fluids, diet soda or chew gum.            2018  8:30 AM CDT   [facility] Lab Draw with  MASONIC LAB DRAW   [facility] Lab Draw ([facility])    [address]  [address]  [facility] [address] [phone]              Who to contact     Please call your clinic at [phone] to:    Ask questions about your health    Make or cancel appointments    Discuss your medicines    Learn about your test results    Speak to your doctor            Additional Information About Your Visit        [employer] Information     [employer] is an electronic gateway that provides easy, online access to your medical records. With [employer], you can request a clinic appointment, read your test results, renew a prescription or communicate with your care team.     To sign up for [employer] visit the website at [email or web address]   You will be asked to enter the access code listed below, as well as some personal information. Please follow the directions to create your username and password.     Your access code is: [email or web address]  Expires: 2018  7:30 AM     Your access code will  in 90 days. If you need help or a new code, please contact your [facility] or call [phone] for assistance.        Care EveryWhere ID     This is your Care EveryWhere ID. This could be used by other organizations to access your [facility] medical records  [id number]         Blood Pressure from Last 3 Encounters:   18 (!) 139/97   18 (!) 130/94   18 140/87    Weight from Last 3 Encounters:   18 96.8 kg (213 lb 4.8 oz)   18 96.6 kg (213 lb)   18 99.3 kg (219 lb)              Today, you had the following     No orders found for  display       Primary Care Provider Office Phone # Fax #    Louisa Fry [phone]       [facility] [address]  [facility] [address]        Equal Access to Services     DORIAN TOLENTINO : Hadii aad ku hadirmao Sokvngali, waaxda luqadaha, qaybta kaalmada adeegyada, mendez komalin hayaan jaz shannonmela mcnair. So [facility] [phone].    ATENCIÓN: Si habla español, tiene a sheridan disposición servicios gratuitos de asistencia lingüística. Llame al [phone].    We comply with applicable federal civil rights laws and [place] laws. We do not discriminate on the basis of race, color, national origin, age, disability, sex, sexual orientation, or gender identity.            Thank you!     Thank you for choosing [facility] PREOPERATIVE ASSESSMENT [place]  for your care. Our goal is always to provide you with excellent care. Hearing back from our patients is one way we can continue to improve our services. Please take a few minutes to complete the written survey that you may receive in the mail after your visit with us. Thank you!             Your Updated Medication List - Protect others around you: Learn how to safely use, store and throw away your medicines at www.disposemymeds.org.      Notice  As of 3/27/2018  9:07 AM    You have not been prescribed any medications.

## 2018-03-27 NOTE — H&P
"  Pre-Operative H & P     CC:  Preoperative exam to assess for increased cardiopulmonary risk while undergoing surgery and anesthesia.    Date of Encounter: 3/27/2018  Primary Care Physician:  Louisa Fry Willie is a 57 year old male who presents for pre-operative H & P in preparation for port insertion, with Dr. Sarah Bowie, on 3/28/18, for chemotherapy, at [facility]. 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   He has had longstanding right LE pain and sciatica x years and a bump to the right thigh caused a significant increase in pain and led to the imaging. MRI showed 17 cm complex fluid collection in vastus intermedius and biopsy confirmed sarcoma. He had an H & P 3/5/18 for that procedure. This is a limited update of that note which is in chart. He states there has been no change in health since then.    He is still in pain, no pain meds, \"they don't help\". Drain removed from leg 2 days ago and bandage still in place.  History is obtained from the patient.     Past Medical History  Past Medical History:   Diagnosis Date    Heavy alcohol use     Ex-smoker      Sarcoma (H)        Past Surgical History  Past Surgical History:   Procedure Laterality Date     EXCISE SOFT TISSUE TUMOR THIGH Right 3/8/2018    Procedure: EXCISE SOFT TISSUE TUMOR THIGH;  Biopsy Right Thigh Tumor;  Surgeon: Brad Betts MD;  [facility]     STRABISMUS SURGERY      as a child       Hx of Blood transfusions/reactions: no     Hx of abnormal bleeding or anti-platelet use: no    Menstrual history: No LMP for male patient.    Steroid use in the last year: no    Personal or FH with difficulty with Anesthesia:  no    Prior to Admission Medications  None       Allergies  No Known Allergies    Occupational History     Works for [employer] as      Social History Main Topics     Smoking status: Former Smoker     Packs/day: 1.00     Years: 10.00     Types: " "Cigarettes     Start date: 1/1/1975     Quit date: 1/1/1990     Smokeless tobacco: Never Used     Alcohol use 8.4 oz/week     21 Cans of beer per week     Drug use: No     Sexual activity: Not Currently     Partners: Female       ROS/MED HX    ENT/Pulmonary:     (+)tobacco use, Past use 15 pack years   JAMES risks: male > age 50   Neurologic:  - neg neurologic ROS     Cardiovascular:     (+)revious cardiac testing  EKG 3/5/18 NSR  ECHO 3/23/18 Normal     METS/Exercise Tolerance:  1 - Eating, dressing   Hematologic:  - neg hematologic  ROS       Musculoskeletal:   (+) , , other musculoskeletal- sciatica.       GI/Hepatic:  - neg GI/hepatic ROS       Renal/Genitourinary:  - ROS Renal section negative       Endo:  - neg endo ROS       Psychiatric:  - neg psychiatric ROS       Infectious Disease:  - neg infectious disease ROS       Malignancy:   (+) Malignancy History of Other  Other CA sarcoma Active status post         Other:    (+) No chance of pregnancy H/O Chronic Pain,no other significant disability        The complete review of systems is negative other than noted in the HPI or here.   Temp: 98.4  F (36.9  C) Temp src: Oral BP: (!) 139/97 Pulse: 115   Resp: 18 SpO2: 99 %         213 lbs 4.8 oz  5' 10\"   Body mass index is 30.61 kg/(m^2).       Physical Exam  Constitutional: Awake, alert, cooperative, no apparent distress, and appears stated age.  Eyes: Pupils equal, round and reactive to light, sclera clear, conjunctiva normal.  HENT: Normocephalic, oral pharynx with moist mucus membranes, good dentition. No goiter appreciated. Spider angioma on nose and face  Respiratory: Clear to auscultation bilaterally, no crackles or wheezing.  Cardiovascular: Regular rhythm, normal S1 and S2, rate increased 120.  no murmur noted.  Carotids +2, no bruits. No LE edema. Palpable pulses to radial arteries.   GI: Normal bowel sounds, soft, non-distended, non-tender, no masses palpated.  Lymph/Hematologic: No cervical " lymphadenopathy and no supraclavicular lymphadenopathy.  Genitourinary:  deferred  Skin: Warm and dry.  No rashes at anticipated surgical site.   Musculoskeletal: Full ROM of neck. Gross motor strength is normal.  Bandage on right thigh  Neurologic: Awake, alert, oriented to name, place and time. Cranial nerves II-XII are grossly intact. Gait is not normal, staggered with protection of RLE by swinging around rather than full weight.   Neuropsychiatric: Calm, cooperative. Normal affect.     Labs: updated labs  CBC RESULTS:   Recent Labs   Lab Test  03/23/18   1250   WBC  8.9   RBC  4.76   HGB  13.1*   HCT  40.3   MCV  85   MCH  27.5   MCHC  32.5   RDW  12.8   PLT  388     Last Basic Metabolic Panel:  Lab Results   Component Value Date     03/23/2018      Lab Results   Component Value Date    POTASSIUM 4.5 03/23/2018     Lab Results   Component Value Date    CHLORIDE 103 03/23/2018     Lab Results   Component Value Date    JAYESH 9.6 03/23/2018     Lab Results   Component Value Date    CO2 27 03/23/2018     Lab Results   Component Value Date    BUN 13 03/23/2018     Lab Results   Component Value Date    CR 0.79 03/23/2018     Lab Results   Component Value Date     03/23/2018         EKG:  normal ECG reported on 3/5/18 H & P (in EPIC) from [facility]. Image not in this record.  Cardiac echo: reviewed written report in this EMR:  3/23/18 normal LV function, EF 60-65%. No significant valvar dysfunction.      ASSESSMENT and PLAN  Hiram Tapia is a 57 year old male scheduled to undergo insertion of port, with Dr. Sarah Bowie, on 3/28/18,  for chemotherapy. 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     Pre-operative considerations include:  1.) Cardiac: Functional status independent. Exercise tolerance < 4 METS due to chronic low back pain x years. 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 Cardiac risks: Ex-smoker. Heavy ETOH use 21 - 30 beers /week-liver enzymes and platelets  normal at [facility] 3/5/18.   ECHO 3/23/18 EF 60-65% and no valvar dysfunction. Exam with tachycardia @ 120. Reg rhythm. EKG normal at pre-op 3/8/18. Asymptomatic. Significant caffeine this AM, anxious, likely etiology.  RCRI = 0.4% risk of major adverse cardiac event.  No further cardiac evaluation indicated    2.) Pulmonary:  Smoking hx x 15 years.  PET- no metastases. Obesity BMI 31.  JAMES risks: male > age 50 = 2/8 -low risk    3.) Heme: HGB 13/1. Platelets 388,000.   VTE risk elevated due to cancer.    4.) GI: PONV score = 1 ( 2 or > antiemetic prophylaxis recommended).       Patient was discussed with Dr Ramachandran. Pt on no meds and no changes since last H & P 3/5/18. Patient is optimized and is acceptable candidate for the proposed procedure.  No further diagnostic evaluation is needed.     SENAIT Weeks  Preoperative Assessment Center  [facility]  Clinic and Surgery Center  Phone: [phone]  Fax: [phone]

## 2018-03-27 NOTE — PATIENT INSTRUCTIONS
Preparing for Your Surgery      Name:  Hiram Tapia   MRN:  [id number]   :  1960   Today's Date:  3/27/2018     Arriving for surgery:  Surgery date:  3/28/18  Surgery time: 12:20 pm  Arrival time:  10:20 am  Please come to:        [facility] [address]  [address] [place] [address]  [id number]    -   parking is available in front of the hospital from 5:15 am to 8:00 pm    -  Stop at the Information Desk in the lobby    -   Inform the information person that you are here for surgery. An escort to 3c will be provided. If you would not like an escort, please proceed to 3C on the 3rd floor. [phone]     What can I eat or drink?  -  You may have solid food or milk products until 8 hours prior to your surgery.  -  You may have water, apple juice or 7up/Sprite until 2 hours prior to your surgery.    Which medicines can I take? No Aspirin, anti-inflammatories like Ibuprofen, vitamins or supplements before surgery. Tylenol is okay.    How do I prepare myself?  -  Take two showers: one the night before surgery; and one the morning of surgery.         Use 1/2 bottle of  Hibiclens  to wash from neck down for each shower.  You may use your own shampoo and conditioner. No other hair products like gel.   -  Do NOT use lotion, powder, deodorant, or antiperspirant the day of your surgery.  -  Do NOT wear any makeup, fingernail polish or jewelry.  - -Do not bring your own medications to the hospital.  -  Bring your ID and insurance card.    Questions or Concerns:  If you have questions or concerns, please call the  Preoperative Assessment Center, Monday-Friday 7AM-7PM:  [phone]    AFTER YOUR SURGERY  Breathing exercises   Breathing exercises help you recover faster. Take deep breaths and let the air out slowly. This will:     Help you wake up after surgery.    Help prevent complications like pneumonia.  Preventing complications will help you go home sooner.   We may give you a  breathing device (incentive spirometer) to encourage you to breathe deeply.   Nausea and vomiting   You may feel sick to your stomach after surgery; if so, let your nurse know.    Pain control:  After surgery, you may have pain. Our goal is to help you manage your pain. Pain medicine will help you feel comfortable enough to do activities that will help you heal.  These activities may include breathing exercises, walking and physical therapy.   To help your health care team treat your pain we will ask: 1) If you have pain  2) where it is located 3) describe your pain in your words  Methods of pain control include medications given by mouth, vein or by nerve block for some surgeries.  We may give you a pain control pump that will:  1) Deliver the medicine through a tube placed in your vein  2) Control the amount of medicine you receive  3) Allow you to push a button to deliver a dose of pain medicine  Sequential Compression Device (SCD) or Pneumo Boots:  You may need to wear SCD S on your legs or feet. These are wraps connected to a machine that pumps in air and releases it. The repeated pumping helps prevent blood clots from forming.

## 2018-03-27 NOTE — ANESTHESIA PREPROCEDURE EVALUATION
Anesthesia Evaluation     . Pt has had prior anesthetic. Type: General and MAC           ROS/MED HX    ENT/Pulmonary:     (+)tobacco use, Past use 15 pack years packs/day  , . .    Neurologic:  - neg neurologic ROS     Cardiovascular:     (+) ----. : . . . :. . Previous cardiac testing       METS/Exercise Tolerance:  1 - Eating, dressing   Hematologic:  - neg hematologic  ROS       Musculoskeletal:   (+) , , other musculoskeletal- sciatica.       GI/Hepatic:  - neg GI/hepatic ROS       Renal/Genitourinary:  - ROS Renal section negative       Endo:  - neg endo ROS       Psychiatric:  - neg psychiatric ROS       Infectious Disease:  - neg infectious disease ROS       Malignancy:   (+) Malignancy History of Other  Other CA sarcoma Active status post         Other:    (+) No chance of pregnancy H/O Chronic Pain,no other significant disability                    Physical Exam  Normal systems: dental    Airway   Mallampati: II  TM distance: >3 FB  Neck ROM: full    Dental     Cardiovascular   Rhythm and rate: regular and abnormal    PE comment: Increased rate to 119    Pulmonary    breath sounds clear to auscultation    Other findings: LABS:  CBC RESULTS: Lab Test        03/23/18                       1250          WBC          8.9           RBC          4.76          HGB          13.1*         HCT          40.3          MCV          85            MCH          27.5          MCHC         32.5          RDW          12.8          PLT          388           Last Basic Metabolic Panel:  Lab Results      Component                Value               Date                      NA                       138                 03/23/2018             Lab Results      Component                Value               Date                      POTASSIUM                4.5                 03/23/2018            Lab Results      Component                Value               Date                      CHLORIDE                 103                  03/23/2018            Lab Results      Component                Value               Date                      JAYESH                      9.6                 03/23/2018            Lab Results      Component                Value               Date                      CO2                      27                  03/23/2018            Lab Results      Component                Value               Date                      BUN                      13                  03/23/2018            Lab Results      Component                Value               Date                      CR                       0.79                03/23/2018            Lab Results      Component                Value               Date                      GLC                      106                 03/23/2018                     PAC Discussion and Assessment    ASA Classification: 3  Case is suitable for:   Anesthetic techniques and relevant risks discussed: MAC with GA as backup  Invasive monitoring and risk discussed: No  Types:   Possibility and Risk of blood transfusion discussed: No  NPO instructions given:   Additional anesthetic preparation and risks discussed:   Needs early admission to pre-op area:   Other:     PAC Resident/NP Anesthesia Assessment:  57 year old male for insertion of port access, with Dr. Sarah Bowie, on 3/28/18,  for chemotherapy. PAC referral for risk assessment and optimization for anesthesia with comorbid conditions of: sarcoma, smoking history.   Pre-operative considerations include:  1.) Cardiac: Functional status independent. Exercise tolerance < 4 METS due to chronic low back pain x years. 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 Cardiac risks: Ex-smoker. Heavy ETOH use 21 - 30 beers /week.   ECHO 3/23/18 EF 60-65% and no valvar dysfunction. Exam with tachycardia @ 120. Reg rhythm. EKG normal at pre-op 3/8/18. Asymptomatic. Significant caffeine this AM, anxious, likely etiology.  RCRI = 0.4% risk  of major adverse cardiac event.  No further cardiac evaluation indicated  2.) Pulmonary:  Smoking hx x 15 years.  PET- no metastases. Obesity BMI 31.  JAMES risks: male > age 50 = 2/8 -low risk  3.) Heme: HGB 13/1. Platelets 388,000.   VTE risk elevated due to cancer.  4.) GI: PONV score = 1 ( 2 or > antiemetic prophylaxis recommended).   Anesthesia: 3/8/18 LMA TIVA, no problems. Record in EPIC.   Pt discussed with Dr. Ramachandran.      Reviewed and Signed by PAC Mid-Level Provider/Resident  Mid-Level Provider/Resident: Louisa Ruff PA-C  Date: 3/27/18  Time: 9:28 AM    Attending Anesthesiologist Anesthesia Assessment:  Mr. Tapia is 57 year old male who will have port insertion with Dr. Bowie on 3/28/18.   *Patient had extreme pain in R. knee after last surgery, requests careful positioning with pillow under both legs.  Dicussed what monitored anesthesia care entails. Mallampati II, good neck extension, >3cm thyromental distance   I saw the patient and discussed with the KIKE as above.  Patient medically optimized for above procedure.   Final anesthetic plan and recommendations to be made by the attending anesthesiologist on the day of surgery.   Discussed with Dr. Ramachandran.     Kati Kuhn MD CA-1      Anesthesiologist:   Date:   Time:   Pass/Fail:   Disposition:     PAC Pharmacist Assessment:        Pharmacist:   Date:   Time:      Anesthesia Plan      History & Physical Review  History and physical reviewed and following examination; no interval change.    ASA Status:  3 .    NPO Status:  > 8 hours    Plan for MAC with Intravenous and Propofol induction. Maintenance will be TIVA.  Reason for MAC:  Deep or markedly invasive procedure (G8)  PONV prophylaxis:  Ondansetron (or other 5HT-3) and Dexamethasone or Solumedrol        56 yo M presenting for port placement in preparation for chemo for R thigh sarcoma.  METS 1 2/2 chronic pain.  Drinks 20-30 beers/week, anticipate high anesthetic requirement therefore will do  GA with ETT.  TTE WNL however EKG in PAC notable for sinus tachycardia.  Will repeat prior to procedure.        Postoperative Care  Postoperative pain management:  Multi-modal analgesia.      Consents  Anesthetic plan, risks, benefits and alternatives discussed with:  Patient.  Use of blood products discussed: Yes.   Use of blood products discussed with Patient.  Consented to blood products.  .                          .

## 2018-03-28 ENCOUNTER — HOSPITAL ENCOUNTER (OUTPATIENT)
Facility: CLINIC | Age: 58
Discharge: HOME | End: 2018-03-28
Attending: STUDENT IN AN ORGANIZED HEALTH CARE EDUCATION/TRAINING PROGRAM | Admitting: STUDENT IN AN ORGANIZED HEALTH CARE EDUCATION/TRAINING PROGRAM
Payer: COMMERCIAL

## 2018-03-28 ENCOUNTER — SURGERY (OUTPATIENT)
Age: 58
End: 2018-03-28

## 2018-03-28 ENCOUNTER — APPOINTMENT (OUTPATIENT)
Dept: GENERAL RADIOLOGY | Facility: CLINIC | Age: 58
End: 2018-03-28
Attending: STUDENT IN AN ORGANIZED HEALTH CARE EDUCATION/TRAINING PROGRAM
Payer: COMMERCIAL

## 2018-03-28 ENCOUNTER — ANESTHESIA (OUTPATIENT)
Dept: SURGERY | Facility: CLINIC | Age: 58
End: 2018-03-28
Payer: COMMERCIAL

## 2018-03-28 VITALS
RESPIRATION RATE: 16 BRPM | DIASTOLIC BLOOD PRESSURE: 81 MMHG | WEIGHT: 210.76 LBS | OXYGEN SATURATION: 97 % | TEMPERATURE: 98.6 F | BODY MASS INDEX: 30.17 KG/M2 | SYSTOLIC BLOOD PRESSURE: 112 MMHG | HEIGHT: 70 IN

## 2018-03-28 LAB
ABO + RH BLD: NORMAL
ABO + RH BLD: NORMAL
BLD GP AB SCN SERPL QL: NORMAL
BLOOD BANK CMNT PATIENT-IMP: NORMAL
GLUCOSE BLDC GLUCOMTR-MCNC: 105 MG/DL (ref 70–99)
SPECIMEN EXP DATE BLD: NORMAL

## 2018-03-28 PROCEDURE — 82962 GLUCOSE BLOOD TEST: CPT

## 2018-03-28 PROCEDURE — 37000009 ZZH ANESTHESIA TECHNICAL FEE, EACH ADDTL 15 MIN: Performed by: STUDENT IN AN ORGANIZED HEALTH CARE EDUCATION/TRAINING PROGRAM

## 2018-03-28 PROCEDURE — 93010 ELECTROCARDIOGRAM REPORT: CPT | Performed by: INTERNAL MEDICINE

## 2018-03-28 PROCEDURE — 25000128 H RX IP 250 OP 636: Performed by: CLINICAL NURSE SPECIALIST

## 2018-03-28 PROCEDURE — 86850 RBC ANTIBODY SCREEN: CPT | Performed by: CLINICAL NURSE SPECIALIST

## 2018-03-28 PROCEDURE — 37000008 ZZH ANESTHESIA TECHNICAL FEE, 1ST 30 MIN: Performed by: STUDENT IN AN ORGANIZED HEALTH CARE EDUCATION/TRAINING PROGRAM

## 2018-03-28 PROCEDURE — 40000065 ZZH STATISTIC EKG NON-CHARGEABLE

## 2018-03-28 PROCEDURE — 36000053 ZZH SURGERY LEVEL 2 EA 15 ADDTL MIN - UMMC: Performed by: STUDENT IN AN ORGANIZED HEALTH CARE EDUCATION/TRAINING PROGRAM

## 2018-03-28 PROCEDURE — 86901 BLOOD TYPING SEROLOGIC RH(D): CPT | Performed by: CLINICAL NURSE SPECIALIST

## 2018-03-28 PROCEDURE — 27210794 ZZH OR GENERAL SUPPLY STERILE: Performed by: STUDENT IN AN ORGANIZED HEALTH CARE EDUCATION/TRAINING PROGRAM

## 2018-03-28 PROCEDURE — 25000128 H RX IP 250 OP 636: Performed by: STUDENT IN AN ORGANIZED HEALTH CARE EDUCATION/TRAINING PROGRAM

## 2018-03-28 PROCEDURE — C1788 PORT, INDWELLING, IMP: HCPCS | Performed by: STUDENT IN AN ORGANIZED HEALTH CARE EDUCATION/TRAINING PROGRAM

## 2018-03-28 PROCEDURE — 40000277 XR SURGERY CARM FLUORO LESS THAN 5 MIN W STILLS: Mod: TC

## 2018-03-28 PROCEDURE — 71000027 ZZH RECOVERY PHASE 2 EACH 15 MINS: Performed by: STUDENT IN AN ORGANIZED HEALTH CARE EDUCATION/TRAINING PROGRAM

## 2018-03-28 PROCEDURE — 27210995 ZZH RX 272: Performed by: STUDENT IN AN ORGANIZED HEALTH CARE EDUCATION/TRAINING PROGRAM

## 2018-03-28 PROCEDURE — 86900 BLOOD TYPING SEROLOGIC ABO: CPT | Performed by: CLINICAL NURSE SPECIALIST

## 2018-03-28 PROCEDURE — 93005 ELECTROCARDIOGRAM TRACING: CPT

## 2018-03-28 PROCEDURE — 40000170 ZZH STATISTIC PRE-PROCEDURE ASSESSMENT II: Performed by: STUDENT IN AN ORGANIZED HEALTH CARE EDUCATION/TRAINING PROGRAM

## 2018-03-28 PROCEDURE — 36000055 ZZH SURGERY LEVEL 2 W FLUORO 1ST 30 MIN - UMMC: Performed by: STUDENT IN AN ORGANIZED HEALTH CARE EDUCATION/TRAINING PROGRAM

## 2018-03-28 PROCEDURE — 25000125 ZZHC RX 250: Performed by: STUDENT IN AN ORGANIZED HEALTH CARE EDUCATION/TRAINING PROGRAM

## 2018-03-28 PROCEDURE — 36415 COLL VENOUS BLD VENIPUNCTURE: CPT | Performed by: CLINICAL NURSE SPECIALIST

## 2018-03-28 DEVICE — CATH PORT MRI POWERPORT W/MICRO INTRO 8FR SL 1808070
Type: IMPLANTABLE DEVICE | Site: CHEST  WALL | Status: NON-FUNCTIONAL
Removed: 2019-07-17

## 2018-03-28 RX ORDER — PROPOFOL 10 MG/ML
INJECTION, EMULSION INTRAVENOUS CONTINUOUS PRN
Status: DISCONTINUED | OUTPATIENT
Start: 2018-03-28 | End: 2018-03-28

## 2018-03-28 RX ORDER — HYDROCODONE BITARTRATE AND ACETAMINOPHEN 5; 325 MG/1; MG/1
1 TABLET ORAL
Status: DISCONTINUED | OUTPATIENT
Start: 2018-03-28 | End: 2018-03-28 | Stop reason: HOSPADM

## 2018-03-28 RX ORDER — FENTANYL CITRATE 50 UG/ML
25-50 INJECTION, SOLUTION INTRAMUSCULAR; INTRAVENOUS
Status: DISCONTINUED | OUTPATIENT
Start: 2018-03-28 | End: 2018-03-28 | Stop reason: HOSPADM

## 2018-03-28 RX ORDER — MEPERIDINE HYDROCHLORIDE 25 MG/ML
12.5 INJECTION INTRAMUSCULAR; INTRAVENOUS; SUBCUTANEOUS
Status: DISCONTINUED | OUTPATIENT
Start: 2018-03-28 | End: 2018-03-28 | Stop reason: HOSPADM

## 2018-03-28 RX ORDER — ONDANSETRON 4 MG/1
4 TABLET, ORALLY DISINTEGRATING ORAL EVERY 30 MIN PRN
Status: DISCONTINUED | OUTPATIENT
Start: 2018-03-28 | End: 2018-03-28 | Stop reason: HOSPADM

## 2018-03-28 RX ORDER — HYDROMORPHONE HYDROCHLORIDE 1 MG/ML
.3-.5 INJECTION, SOLUTION INTRAMUSCULAR; INTRAVENOUS; SUBCUTANEOUS EVERY 10 MIN PRN
Status: DISCONTINUED | OUTPATIENT
Start: 2018-03-28 | End: 2018-03-28 | Stop reason: HOSPADM

## 2018-03-28 RX ORDER — SODIUM CHLORIDE, SODIUM LACTATE, POTASSIUM CHLORIDE, CALCIUM CHLORIDE 600; 310; 30; 20 MG/100ML; MG/100ML; MG/100ML; MG/100ML
INJECTION, SOLUTION INTRAVENOUS CONTINUOUS
Status: DISCONTINUED | OUTPATIENT
Start: 2018-03-28 | End: 2018-03-28 | Stop reason: HOSPADM

## 2018-03-28 RX ORDER — SODIUM CHLORIDE, SODIUM LACTATE, POTASSIUM CHLORIDE, CALCIUM CHLORIDE 600; 310; 30; 20 MG/100ML; MG/100ML; MG/100ML; MG/100ML
INJECTION, SOLUTION INTRAVENOUS CONTINUOUS PRN
Status: DISCONTINUED | OUTPATIENT
Start: 2018-03-28 | End: 2018-03-28

## 2018-03-28 RX ORDER — HEPARIN SODIUM (PORCINE) LOCK FLUSH IV SOLN 100 UNIT/ML 100 UNIT/ML
SOLUTION INTRAVENOUS PRN
Status: DISCONTINUED | OUTPATIENT
Start: 2018-03-28 | End: 2018-03-28 | Stop reason: HOSPADM

## 2018-03-28 RX ORDER — FENTANYL CITRATE 50 UG/ML
INJECTION, SOLUTION INTRAMUSCULAR; INTRAVENOUS PRN
Status: DISCONTINUED | OUTPATIENT
Start: 2018-03-28 | End: 2018-03-28

## 2018-03-28 RX ORDER — NALOXONE HYDROCHLORIDE 0.4 MG/ML
.1-.4 INJECTION, SOLUTION INTRAMUSCULAR; INTRAVENOUS; SUBCUTANEOUS
Status: DISCONTINUED | OUTPATIENT
Start: 2018-03-28 | End: 2018-03-28 | Stop reason: HOSPADM

## 2018-03-28 RX ORDER — ONDANSETRON 2 MG/ML
4 INJECTION INTRAMUSCULAR; INTRAVENOUS EVERY 30 MIN PRN
Status: DISCONTINUED | OUTPATIENT
Start: 2018-03-28 | End: 2018-03-28 | Stop reason: HOSPADM

## 2018-03-28 RX ORDER — CEFAZOLIN SODIUM 1 G/3ML
1 INJECTION, POWDER, FOR SOLUTION INTRAMUSCULAR; INTRAVENOUS SEE ADMIN INSTRUCTIONS
Status: DISCONTINUED | OUTPATIENT
Start: 2018-03-28 | End: 2018-03-28 | Stop reason: HOSPADM

## 2018-03-28 RX ORDER — CEFAZOLIN SODIUM 2 G/100ML
2 INJECTION, SOLUTION INTRAVENOUS
Status: COMPLETED | OUTPATIENT
Start: 2018-03-28 | End: 2018-03-28

## 2018-03-28 RX ORDER — ONDANSETRON 2 MG/ML
INJECTION INTRAMUSCULAR; INTRAVENOUS PRN
Status: DISCONTINUED | OUTPATIENT
Start: 2018-03-28 | End: 2018-03-28

## 2018-03-28 RX ADMIN — HEPARIN SODIUM (PORCINE) LOCK FLUSH IV SOLN 100 UNIT/ML 5 ML: 100 SOLUTION at 12:27

## 2018-03-28 RX ADMIN — SODIUM CHLORIDE, POTASSIUM CHLORIDE, SODIUM LACTATE AND CALCIUM CHLORIDE: 600; 310; 30; 20 INJECTION, SOLUTION INTRAVENOUS at 11:25

## 2018-03-28 RX ADMIN — MIDAZOLAM 2 MG: 1 INJECTION INTRAMUSCULAR; INTRAVENOUS at 11:30

## 2018-03-28 RX ADMIN — CEFAZOLIN 40 ML GIVEN: 1 INJECTION, POWDER, FOR SOLUTION INTRAMUSCULAR; INTRAVENOUS at 12:27

## 2018-03-28 RX ADMIN — ONDANSETRON 4 MG: 2 INJECTION INTRAMUSCULAR; INTRAVENOUS at 12:22

## 2018-03-28 RX ADMIN — FENTANYL CITRATE 25 MCG: 50 INJECTION, SOLUTION INTRAMUSCULAR; INTRAVENOUS at 12:37

## 2018-03-28 RX ADMIN — FENTANYL CITRATE 25 MCG: 50 INJECTION, SOLUTION INTRAMUSCULAR; INTRAVENOUS at 12:34

## 2018-03-28 RX ADMIN — PROPOFOL 100 MCG/KG/MIN: 10 INJECTION, EMULSION INTRAVENOUS at 11:32

## 2018-03-28 RX ADMIN — ENOXAPARIN SODIUM 40 MG: 40 INJECTION SUBCUTANEOUS at 11:19

## 2018-03-28 RX ADMIN — FENTANYL CITRATE 25 MCG: 50 INJECTION, SOLUTION INTRAMUSCULAR; INTRAVENOUS at 12:45

## 2018-03-28 RX ADMIN — BUPIVACAINE HYDROCHLORIDE AND EPINEPHRINE BITARTRATE 14 ML: 5; .005 INJECTION, SOLUTION EPIDURAL; INTRACAUDAL; PERINEURAL at 12:26

## 2018-03-28 RX ADMIN — FENTANYL CITRATE 25 MCG: 50 INJECTION, SOLUTION INTRAMUSCULAR; INTRAVENOUS at 11:52

## 2018-03-28 RX ADMIN — CEFAZOLIN SODIUM 2 G: 2 INJECTION, SOLUTION INTRAVENOUS at 11:30

## 2018-03-28 NOTE — ANESTHESIA CARE TRANSFER NOTE
Patient: Hiram Tapia    Procedure(s):  Vascular Access Port Insertion with C-arm - Wound Class: I-Clean    Diagnosis: Soft Tissue Sarcoma Right Thigh   Diagnosis Additional Information: No value filed.    Anesthesia Type:   General, ETT     Note:  Airway :Face Mask  Patient transferred to:Phase II  Comments: Transferred to 3C, report given to RN.  VSS.  Complains of sciatic pain with R leg.  Otherwise doing well.  Handoff Report: Identifed the Patient, Identified the Reponsible Provider, Reviewed the pertinent medical history, Discussed the surgical course, Reviewed Intra-OP anesthesia mangement and issues during anesthesia, Set expectations for post-procedure period and Allowed opportunity for questions and acknowledgement of understanding      Vitals: (Last set prior to Anesthesia Care Transfer)    CRNA VITALS  3/28/2018 1219 - 3/28/2018 1249      3/28/2018             Resp Rate (observed): (!)  1                Electronically Signed By: Chris Mccoy MD  March 28, 2018  12:49 PM

## 2018-03-28 NOTE — IP AVS SNAPSHOT
Same Day Surgery [address] [address]    Phone:  [phone]                                       After Visit Summary   3/28/2018    Hiram Tapia    MRN: [id number]           After Visit Summary Signature Page     I have received my discharge instructions, and my questions have been answered. I have discussed any challenges I see with this plan with the nurse or doctor.    ..........................................................................................................................................  Patient/Patient Representative Signature      ..........................................................................................................................................  Patient Representative Print Name and Relationship to Patient    ..................................................               ................................................  Date                                            Time    ..........................................................................................................................................  Reviewed by Signature/Title    ...................................................              ..............................................  Date                                                            Time

## 2018-03-28 NOTE — IP AVS SNAPSHOT
MRN:[id number]                      After Visit Summary   3/28/2018    Hiram Tapia    MRN: [id number]           Thank you!     Thank you for choosing [facility] for your care. Our goal is always to provide you with excellent care. Hearing back from our patients is one way we can continue to improve our services. Please take a few minutes to complete the written survey that you may receive in the mail after you visit with us. Thank you!        Patient Information     Date Of Birth          1960        About your hospital stay     You were admitted on:  March 28, 2018 You last received care in the:  Same Day Surgery [facility]    You were discharged on:  March 28, 2018       Who to Call     For medical emergencies, please call 911.  For non-urgent questions about your medical care, please call your primary care provider or clinic, [phone]  For questions related to your surgery, please call your surgery clinic        Attending Provider     Provider Specialty    Sarah Bowie MD Thoracic Diseases       Primary Care Provider Office Phone # Fax #    Louisa Fry [phone]      After Care Instructions     Diet Instructions       Resume pre-procedure diet            Discharge Instructions       Patient to follow up with oncology as previously scheduled            No driving or operating machinery        until the day after procedure            Shower       No shower for48 hours post procedure. May shower Postoperative Day (POD)  2                  Your next 10 appointments already scheduled     Mar 29, 2018  6:30 AM CDT   Masonic Lab Draw with  MASONIC LAB DRAW   [facility] Lab Draw ([facility])    [address]  [address]  [facility] [place] [facility] [address] [phone]            Mar 29, 2018  7:00 AM CDT   Infusion 180 with  ONCOLOGY INFUSION,  23 ATC   [facility] ([facility])     [address]  [address]  [place] [address]   [phone]            Apr 01, 2018  8:30 AM CDT   Masonic Lab Draw with  MASONIC LAB DRAW   [facility] Masonic Lab Draw ([facility])    [address]  [address]  [place] [address]   [phone]            Apr 21, 2018  8:45 AM CDT   (Arrive by 8:30 AM)   PET ONCOLOGY WHOLE BODY with UUPET1   [facility] [place] PET CT ([facility])    [address] [facility] [address]   [phone]           Tell your doctor:   If there is any chance you may be pregnant or if you are breastfeeding.   If you have problems lying in small spaces (claustrophobia). If you do, your doctor may give you medicine to help you relax. If you have diabetes:   Have your exam early in the morning. Your blood glucose will go up as the day goes by.   Your glucose level must be 180 or less at the start of the exam. Please take any medicines you need to ensure this blood glucose level.   If you are taking insulin in the morning take with breakfast by 6 am and schedule procedure between 12 and 2:15 pm.   If you are taking insulin at night take nightly dose, fast overnight, schedule procedure before 10 am.   If you take insulin both morning and night take morning dose by 6am and schedule procedure between 12 and 2:15 pm.   24 hours before your scan: Don t do any heavy exercise. (No jogging, aerobics or other workouts.) Exercise will make your pictures less accurate.  At least 7 hours before your scan, or the evening before if you have an early appointment: Eat a low carb, high protein meal (Lean meats, seafood, beans, soy, low-fat dairy, eggs, nuts & seeds). 6 hours before your scan:   Stop all food and liquids (except water).   Do not chew gum or suck on mints.   If you need to take medicine with food, you may take it with a few crackers.  Please call your Imaging Department at your exam site with  any questions.            2018  8:30 AM CDT   Masonic Lab Draw with UC MASONIC LAB DRAW   [facility] Masonic Lab Draw ([facility])    [address]  [address]  [place] [address] [phone]            2018  9:00 AM CDT   (Arrive by 8:45 AM)   Return Visit with Gaurang Johnson MD   [facility] ([facility])    [address]  [address]  [place] [facility] [address]   [phone]            2018  9:30 AM CDT   Infusion 180 with UC ONCOLOGY INFUSION, UC 16 ATC   [facility] ([facility])    [address] [place] [address]  [address]  [facility] [address]   [phone]            May 22, 2018 10:30 AM CDT   Masonic Lab Draw with UC MASONIC LAB DRAW   [facility] Masonic Lab Draw ([facility])    [address]  [address]  [place] [facility] [address] [phone]              Further instructions from your care team       [facility]  Same-Day Surgery   Adult Discharge Orders & Instructions     Call your doctor for any of the followin.  Signs of infection (fever, growing tenderness at the surgery site, a large amount of drainage or bleeding, severe pain, foul-smelling drainage, redness, swelling).    2. It has been over 8 to 10 hours since surgery and you are still not able to urinate (pass water).    3.  Headache for over 24 hours.    4.  Numbness, tingling or weakness the day after surgery (if you had spinal anesthesia).  To contact a doctor, call Dr. Sarah Bowie @ [phone] (clinic) or [phone] (office) or:        [phone] and ask for the resident on call for THORACIC SURGERY (answered 24 hours a day)      Emergency Department:    [facility]: [phone]       (TTY for hearing impaired: [phone])    [facility]: [phone]       (TTY for hearing impaired:  "[phone])    Pending Results     Date and Time Order Name Status Description    3/28/2018 1043 EKG 12-lead, tracing only Preliminary             Admission Information     Date & Time Provider Department Dept. Phone    3/28/2018 Sarah Bowie MD Same Day Surgery [facility] [phone]      Your Vitals Were     Blood Pressure Temperature Respirations Height Weight Pulse Oximetry    125/79 98.6  F (37  C) (Oral) 16 1.778 m (5' 10\") 95.6 kg (210 lb 12.2 oz) 99%    BMI (Body Mass Index)                   30.24 kg/m2           MyChart Information     [employer] lets you send messages to your doctor, view your test results, renew your prescriptions, schedule appointments and more. To sign up, go to [email or web address] . Click on \"Log in\" on the left side of the screen, which will take you to the Welcome page. Then click on \"Sign up Now\" on the right side of the page.     You will be asked to enter the access code listed below, as well as some personal information. Please follow the directions to create your username and password.     Your access code is: [email or web address]  Expires: 2018  7:30 AM     Your access code will  in 90 days. If you need help or a new code, please call your [facility] clinic or [phone].        Care EveryWhere ID     This is your Care EveryWhere ID. This could be used by other organizations to access your [facility] medical records  [id number]        Equal Access to Services     [facility] USA[facility] CenterSAWYER : Hadii troy martinez hadasho Sokvngali, waaxda luqadaha, qaybta kaalmada adeegyada, mendez mcnair. So [facility] [phone].    ATENCIÓN: Si habla español, tiene a sheridan disposición servicios gratuitos de asistencia lingüística. Llame al [phone].    We comply with applicable federal civil rights laws and [place] laws. We do not discriminate on the basis of race, color, national origin, age, disability, sex, sexual orientation, or gender identity.               Review of " your medicines      Notice     You have not been prescribed any medications.             Protect others around you: Learn how to safely use, store and throw away your medicines at www.disposemymeds.org.             Medication List: This is a list of all your medications and when to take them. Check marks below indicate your daily home schedule. Keep this list as a reference.      Notice     You have not been prescribed any medications.

## 2018-03-28 NOTE — DISCHARGE INSTRUCTIONS
[facility]  Same-Day Surgery   Adult Discharge Orders & Instructions     Call your doctor for any of the followin.  Signs of infection (fever, growing tenderness at the surgery site, a large amount of drainage or bleeding, severe pain, foul-smelling drainage, redness, swelling).    2. It has been over 8 to 10 hours since surgery and you are still not able to urinate (pass water).    3.  Headache for over 24 hours.    4.  Numbness, tingling or weakness the day after surgery (if you had spinal anesthesia).  To contact a doctor, call Dr. Sarah Bowie @ [phone] (clinic) or [phone] (office) or:        [phone] and ask for the resident on call for THORACIC SURGERY (answered 24 hours a day)      Emergency Department:    [facility]: [phone]       (TTY for hearing impaired: [phone])    [facility]: [phone]       (TTY for hearing impaired: [phone])

## 2018-03-28 NOTE — ANESTHESIA POSTPROCEDURE EVALUATION
Patient: Hiram Tapia    Procedure(s):  Vascular Access Port Insertion with C-arm - Wound Class: I-Clean    Diagnosis:Soft Tissue Sarcoma Right Thigh   Diagnosis Additional Information: No value filed.    Anesthesia Type:  General, ETT    Note:  Anesthesia Post Evaluation    Patient location during evaluation: Phase 2  Patient participation: Able to fully participate in evaluation  Level of consciousness: awake and alert  Pain management: satisfactory to patient  Airway patency: patent  Cardiovascular status: blood pressure returned to baseline and hemodynamically stable  Respiratory status: room air  Hydration status: euvolemic  PONV: none     Anesthetic complications: None          Last vitals:  Vitals:    03/28/18 1021 03/28/18 1245   BP: (!) 129/96 125/79   Resp: 18 16   Temp: 36.8  C (98.2  F) 37  C (98.6  F)   SpO2: 99% 99%         Electronically Signed By: Adin Rubio MD  March 28, 2018  1:07 PM

## 2018-03-28 NOTE — OP NOTE
DATE OF PROCEDURE:   3/28/2018      PREOPERATIVE DIAGNOSIS:   need for vascular access for chemotherapy    POSTOPERATIVE DIAGNOSIS: need for vascular access for chemotherapy and enteral access for nutrition     PROCEDURE PERFORMED:    1. Vascular access port insertion with ultrasound guidance  2. Fluoroscopic imaging and interpretation.   3. Esophagogastroscopy  4. Percutaneous Endoscopic Gastrostomy tube insertion    SURGEON:  Sarah Bowie MD          ANESTHESIA:  General      ESTIMATED BLOOD LOSS:  Minimal.       DESCRIPTION OF PROCEDURE:  After obtaining informed consent, the patient was brought to the operating room and laid supine on the operating table.  After induction of anesthesia and intubation, the neck and chest were prepped and draped in a sterile manner. We covered the entire operative field with ioban.  With ultrasound guidance, a right IJ puncture was made and a wire was passed through the IJ into the SVC.  Its position was confirmed with fluoroscopic imaging.  After this, using a dilator it was changed over to a thicker wire.  We then proceeded to make a subcutaneous pocket in the rightpectoral region and the port that had been flushed with heparinized antibiotic solution was placed in this subcutaneous pocket.  The catheter was tunneled in the subcutaneous plane to emerge at the right IJ puncture site.  An adequate length of it was trimmed and was then tunneled into the SVC using a peel-away sheath.  The final position of the catheter in the SVC/RA junction was confirmed using fluoroscopy.  Easy pull back and flush through was confirmed with heparinized antibiotic solution, and the port was finally flushed with hep-lock.  The port was secured with a few 2-0 Prolene sutures.  The port site was irrigated copiously and closed in layers.  Dry dressing was applied.

## 2018-03-29 ENCOUNTER — HOME INFUSION (PRE-WILLOW HOME INFUSION) (OUTPATIENT)
Dept: PHARMACY | Facility: CLINIC | Age: 58
End: 2018-03-29

## 2018-03-29 ENCOUNTER — APPOINTMENT (OUTPATIENT)
Dept: LAB | Facility: CLINIC | Age: 58
End: 2018-03-29
Attending: INTERNAL MEDICINE
Payer: COMMERCIAL

## 2018-03-29 ENCOUNTER — INFUSION THERAPY VISIT (OUTPATIENT)
Dept: ONCOLOGY | Facility: CLINIC | Age: 58
End: 2018-03-29
Attending: INTERNAL MEDICINE
Payer: COMMERCIAL

## 2018-03-29 VITALS
OXYGEN SATURATION: 98 % | SYSTOLIC BLOOD PRESSURE: 129 MMHG | HEART RATE: 106 BPM | TEMPERATURE: 99.2 F | WEIGHT: 214.6 LBS | RESPIRATION RATE: 16 BRPM | DIASTOLIC BLOOD PRESSURE: 78 MMHG | BODY MASS INDEX: 30.79 KG/M2

## 2018-03-29 DIAGNOSIS — C49.9 SARCOMA OF SOFT TISSUE (H): Primary | ICD-10-CM

## 2018-03-29 LAB
ALBUMIN SERPL-MCNC: 2.9 G/DL (ref 3.4–5)
ALP SERPL-CCNC: 105 U/L (ref 40–150)
ALT SERPL W P-5'-P-CCNC: 14 U/L (ref 0–70)
ANION GAP SERPL CALCULATED.3IONS-SCNC: 9 MMOL/L (ref 3–14)
AST SERPL W P-5'-P-CCNC: 10 U/L (ref 0–45)
BASOPHILS # BLD AUTO: 0 10E9/L (ref 0–0.2)
BASOPHILS NFR BLD AUTO: 0.3 %
BILIRUB SERPL-MCNC: 0.4 MG/DL (ref 0.2–1.3)
BUN SERPL-MCNC: 11 MG/DL (ref 7–30)
CALCIUM SERPL-MCNC: 9.3 MG/DL (ref 8.5–10.1)
CHLORIDE SERPL-SCNC: 101 MMOL/L (ref 94–109)
CO2 SERPL-SCNC: 25 MMOL/L (ref 20–32)
CREAT SERPL-MCNC: 0.79 MG/DL (ref 0.66–1.25)
DIFFERENTIAL METHOD BLD: ABNORMAL
EOSINOPHIL # BLD AUTO: 0 10E9/L (ref 0–0.7)
EOSINOPHIL NFR BLD AUTO: 0.3 %
ERYTHROCYTE [DISTWIDTH] IN BLOOD BY AUTOMATED COUNT: 13.2 % (ref 10–15)
GFR SERPL CREATININE-BSD FRML MDRD: >90 ML/MIN/1.7M2
GLUCOSE SERPL-MCNC: 116 MG/DL (ref 70–99)
HCT VFR BLD AUTO: 38.8 % (ref 40–53)
HGB BLD-MCNC: 12.3 G/DL (ref 13.3–17.7)
IMM GRANULOCYTES # BLD: 0 10E9/L (ref 0–0.4)
IMM GRANULOCYTES NFR BLD: 0.2 %
INTERPRETATION ECG - MUSE: NORMAL
LDH SERPL L TO P-CCNC: 146 U/L (ref 85–227)
LYMPHOCYTES # BLD AUTO: 1.2 10E9/L (ref 0.8–5.3)
LYMPHOCYTES NFR BLD AUTO: 12.7 %
MAGNESIUM SERPL-MCNC: 2.1 MG/DL (ref 1.6–2.3)
MCH RBC QN AUTO: 26.8 PG (ref 26.5–33)
MCHC RBC AUTO-ENTMCNC: 31.7 G/DL (ref 31.5–36.5)
MCV RBC AUTO: 85 FL (ref 78–100)
MONOCYTES # BLD AUTO: 0.8 10E9/L (ref 0–1.3)
MONOCYTES NFR BLD AUTO: 8.1 %
NEUTROPHILS # BLD AUTO: 7.5 10E9/L (ref 1.6–8.3)
NEUTROPHILS NFR BLD AUTO: 78.4 %
NRBC # BLD AUTO: 0 10*3/UL
NRBC BLD AUTO-RTO: 0 /100
PHOSPHATE SERPL-MCNC: 2.6 MG/DL (ref 2.5–4.5)
PLATELET # BLD AUTO: 361 10E9/L (ref 150–450)
POTASSIUM SERPL-SCNC: 4.2 MMOL/L (ref 3.4–5.3)
PROT SERPL-MCNC: 7 G/DL (ref 6.8–8.8)
RBC # BLD AUTO: 4.59 10E12/L (ref 4.4–5.9)
SODIUM SERPL-SCNC: 134 MMOL/L (ref 133–144)
WBC # BLD AUTO: 9.6 10E9/L (ref 4–11)

## 2018-03-29 PROCEDURE — 83615 LACTATE (LD) (LDH) ENZYME: CPT | Performed by: INTERNAL MEDICINE

## 2018-03-29 PROCEDURE — 96375 TX/PRO/DX INJ NEW DRUG ADDON: CPT

## 2018-03-29 PROCEDURE — 25000128 H RX IP 250 OP 636: Mod: ZF | Performed by: INTERNAL MEDICINE

## 2018-03-29 PROCEDURE — 25000128 H RX IP 250 OP 636: Mod: JW,ZF

## 2018-03-29 PROCEDURE — 96413 CHEMO IV INFUSION 1 HR: CPT

## 2018-03-29 PROCEDURE — 84100 ASSAY OF PHOSPHORUS: CPT | Performed by: INTERNAL MEDICINE

## 2018-03-29 PROCEDURE — 85025 COMPLETE CBC W/AUTO DIFF WBC: CPT | Performed by: INTERNAL MEDICINE

## 2018-03-29 PROCEDURE — G0498 CHEMO EXTEND IV INFUS W/PUMP: HCPCS

## 2018-03-29 PROCEDURE — 83735 ASSAY OF MAGNESIUM: CPT | Performed by: INTERNAL MEDICINE

## 2018-03-29 PROCEDURE — 96415 CHEMO IV INFUSION ADDL HR: CPT

## 2018-03-29 PROCEDURE — 80053 COMPREHEN METABOLIC PANEL: CPT | Performed by: INTERNAL MEDICINE

## 2018-03-29 RX ORDER — EPINEPHRINE 0.3 MG/.3ML
INJECTION SUBCUTANEOUS
Status: DISCONTINUED
Start: 2018-03-29 | End: 2018-03-29 | Stop reason: WASHOUT

## 2018-03-29 RX ORDER — PALONOSETRON 0.05 MG/ML
0.25 INJECTION, SOLUTION INTRAVENOUS ONCE
Status: COMPLETED | OUTPATIENT
Start: 2018-03-29 | End: 2018-03-29

## 2018-03-29 RX ORDER — HEPARIN SODIUM (PORCINE) LOCK FLUSH IV SOLN 100 UNIT/ML 100 UNIT/ML
5 SOLUTION INTRAVENOUS ONCE
Status: COMPLETED | OUTPATIENT
Start: 2018-03-29 | End: 2018-03-29

## 2018-03-29 RX ADMIN — PALONOSETRON HYDROCHLORIDE 0.25 MG: 0.25 INJECTION INTRAVENOUS at 07:46

## 2018-03-29 RX ADMIN — DOXORUBICIN HYDROCHLORIDE 100 MG: 2 INJECTABLE, LIPOSOMAL INTRAVENOUS at 08:08

## 2018-03-29 RX ADMIN — SODIUM CHLORIDE, PRESERVATIVE FREE 5 ML: 5 INJECTION INTRAVENOUS at 06:39

## 2018-03-29 RX ADMIN — DEXTROSE 250 ML: 5 SOLUTION INTRAVENOUS at 07:44

## 2018-03-29 ASSESSMENT — PAIN SCALES - GENERAL: PAINLEVEL: WORST PAIN (10)

## 2018-03-29 NOTE — NURSING NOTE
"Chief Complaint   Patient presents with     Port Draw     Labs drawn from port by RN. Line flushed with saline and heparin. Vs taken and pt checked in for appt     Port accessed with 20g 3/4\" flat needle by RN, labs collected, line flushed with saline and heparin.  Vitals taken. Pt checked in for appointment(s).    Aylin Ojeda RN  "

## 2018-03-29 NOTE — MR AVS SNAPSHOT
After Visit Summary   3/29/2018    Hiram Tapia    MRN: [id number]           Patient Information     Date Of Birth          1960        Visit Information        Provider Department      3/29/2018 7:00 AM  23 ATC;  ONCOLOGY INFUSION [facility]        Today's Diagnoses     Sarcoma of soft tissue (H)    -  1      Care Instructions    Contact Numbers  [facility] Nurse Triage: [phone]  After Hours Nurse Line:  [phone]    Please call the [facility] Nurse Triage line or after hours number if you experience a temperature greater than or equal to 100.5, shaking chills, have uncontrolled nausea, vomiting and/or diarrhea, dizziness, shortness of breath, chest pain, bleeding, unexplained bruising, or if you have any other new/concerning symptoms, questions or concerns.     If you are having any concerning symptoms or wish to speak to a provider before your next infusion visit, please call your care coordinator or triage to notify them so we can adequately serve you.     If you need a refill on a narcotic prescription or other medication, please call triage before your infusion appointment.           March 2018 Sunday Monday Tuesday Wednesday Thursday Friday Saturday                       1     2     [facility] NEW TUMOR    2:45 PM   (30 min.)   Brad Betts MD   [facility] 3       4     5     6     7     8     Outpatient Visit    9:30 AM   [facility]     EXCISE SOFT TISSUE TUMOR THIGH   11:35 AM   Brad Betts MD    OR 9     10       11     12     13     UMP RETURN    3:30 PM   (15 min.)   Brad Betts MD   [facility] 14     15     16     17     PET ONCOLOGY WHOLE BODY    8:30 AM   (45 min.)   UUPET1   [facility] [place] PET CT   18     19     20     21     22     23     UMP NEW    9:45 AM   (60 min.)   Gaurang Johnson MD   [facility]     LAB WITH JERMAINE  CLINIC   12:45 PM   (15 min.)   [facility] Lab     ECH COMPLETE    1:00 PM   (60 min.)   UCECHCR4   [facility] Echo     UMP RETURN    1:30 PM   (15 min.)   Brad Betts MD   [facility] Orthopaedic [facility]     UMP CONSULT    2:30 PM   (90 min.)   Stewart Eckert MD   [facility] 24       25     26     27     PAC EVAL    8:15 AM   (60 min.)   6,  Pac Qamar   [facility] Preoperative Assessment Center     PAC RN ASSESSMENT    9:15 AM   (30 min.)   Rn,  Pac   [facility]     PAC ANESTHESIA CONSULT   10:35 AM   (10 min.)   Anesthesiologist, [facility] Preoperative Assessment Center     LAB   11:00 AM   (15 min.)    LAB   [facility] Lab 28     Admission   10:02 AM   Sarah Bowie MD   Same Day Surgery [facility]   (Discharge: 3/28/2018)     XR SURG RAPHAEL <5 MIN FL W STILL   11:50 AM   (30 min.)   [id number]   [facility],  Radiology     INSERT PORT VASCULAR ACCESS   12:20 PM   Sarah Bowie MD   [facility] OR 29     [facility] LAB DRAW    6:30 AM   (15 min.)    [facility] LAB DRAW   [facility] Lab Draw     P ONC INFUSION 180    7:00 AM   (180 min.)   UC ONCOLOGY INFUSION   [facility] 30 31 April 2018 Sunday Monday Tuesday Wednesday Thursday Friday Saturday   1     2     3     4     UMP ONC INFUSION 60   10:30 AM   (60 min.)    ONCOLOGY INFUSION   [facility] 5     UMP ONC INFUSION 60   10:30 AM   (60 min.)   UC ONCOLOGY INFUSION   [facility] 6     7       8     9     10     11     12     13     14       15     16     17     18     19     20     21     PET ONCOLOGY WHOLE BODY    8:30 AM   (45 min.)   UUPET1   [facility] PET CT   22     23     24     P [facility] LAB DRAW    8:30 AM   (15 min.)    [facility] LAB DRAW   [facility] Lab Draw     UMP RETURN    8:45 AM   (30 min.)   Gaurang Johnson MD   [facility] 180     9:30 AM   (180 min.)    ONCOLOGY [facility] 25     26     27     28       29     30                                          Recent Results (from the past 24 hour(s))   Glucose by meter    Collection Time: 03/28/18 10:26 AM   Result Value Ref Range    Glucose 105 (H) 70 - 99 mg/dL   EKG 12-lead, tracing only    Collection Time: 03/28/18 11:03 AM   Result Value Ref Range    Interpretation ECG Click View Image link to view waveform and result    ABO/Rh type and screen    Collection Time: 03/28/18 11:23 AM   Result Value Ref Range    ABO A     RH(D) Neg     Antibody Screen Neg     Test Valid Only At          [facility]    Specimen Expires 03/31/2018    CBC with platelets differential    Collection Time: 03/29/18  6:50 AM   Result Value Ref Range    WBC 9.6 4.0 - 11.0 10e9/L    RBC Count 4.59 4.4 - 5.9 10e12/L    Hemoglobin 12.3 (L) 13.3 - 17.7 g/dL    Hematocrit 38.8 (L) 40.0 - 53.0 %    MCV 85 78 - 100 fl    MCH 26.8 26.5 - 33.0 pg    MCHC 31.7 31.5 - 36.5 g/dL    RDW 13.2 10.0 - 15.0 %    Platelet Count 361 150 - 450 10e9/L    Diff Method Automated Method     % Neutrophils 78.4 %    % Lymphocytes 12.7 %    % Monocytes 8.1 %    % Eosinophils 0.3 %    % Basophils 0.3 %    % Immature Granulocytes 0.2 %    Nucleated RBCs 0 0 /100    Absolute Neutrophil 7.5 1.6 - 8.3 10e9/L    Absolute Lymphocytes 1.2 0.8 - 5.3 10e9/L    Absolute Monocytes 0.8 0.0 - 1.3 10e9/L    Absolute Eosinophils 0.0 0.0 - 0.7 10e9/L    Absolute Basophils 0.0 0.0 - 0.2 10e9/L    Abs Immature Granulocytes 0.0 0 - 0.4 10e9/L    Absolute Nucleated RBC 0.0    Comprehensive metabolic panel    Collection Time: 03/29/18  6:50 AM   Result Value Ref Range    Sodium 134 133 - 144 mmol/L    Potassium 4.2 3.4 - 5.3 mmol/L    Chloride 101 94 - 109 mmol/L    Carbon Dioxide 25 20 - 32 mmol/L    Anion Gap 9 3 - 14 mmol/L    Glucose 116 (H) 70 - 99 mg/dL    Urea Nitrogen 11 7 - 30 mg/dL     Creatinine 0.79 0.66 - 1.25 mg/dL    GFR Estimate >90 >60 mL/min/1.7m2    GFR Estimate If Black >90 >60 mL/min/1.7m2    Calcium 9.3 8.5 - 10.1 mg/dL    Bilirubin Total 0.4 0.2 - 1.3 mg/dL    Albumin 2.9 (L) 3.4 - 5.0 g/dL    Protein Total 7.0 6.8 - 8.8 g/dL    Alkaline Phosphatase 105 40 - 150 U/L    ALT 14 0 - 70 U/L    AST 10 0 - 45 U/L   Phosphorus    Collection Time: 03/29/18  6:50 AM   Result Value Ref Range    Phosphorus 2.6 2.5 - 4.5 mg/dL   Magnesium    Collection Time: 03/29/18  6:50 AM   Result Value Ref Range    Magnesium 2.1 1.6 - 2.3 mg/dL   Lactate Dehydrogenase    Collection Time: 03/29/18  6:50 AM   Result Value Ref Range    Lactate Dehydrogenase 146 85 - 227 U/L                 Follow-ups after your visit        Your next 10 appointments already scheduled     Apr 04, 2018 10:30 AM CDT   Infusion 60 with UC ONCOLOGY INFUSION, UC 31 ATC   [facility] Cancer [facility] ([facility])    [address]  [address]  [place] [place] [address] [phone]            Apr 05, 2018 10:30 AM CDT   Infusion 60 with UC ONCOLOGY INFUSION, UC 21 ATC   [facility] ([facility])    [address]  [address]  [place] [address]   [phone]            Apr 21, 2018  8:45 AM CDT   (Arrive by 8:30 AM)   PET ONCOLOGY WHOLE BODY with UUPET1   [facility] [place] PET CT ([facility])    [address] [address] [phone]           Tell your doctor:   If there is any chance you may be pregnant or if you are breastfeeding.   If you have problems lying in small spaces (claustrophobia). If you do, your doctor may give you medicine to help you relax. If you have diabetes:   Have your exam early in the morning. Your blood glucose will go up as the day goes by.   Your glucose level must be 180 or less at the start of the exam. Please take any medicines you need to ensure this  blood glucose level.   If you are taking insulin in the morning take with breakfast by 6 am and schedule procedure between 12 and 2:15 pm.   If you are taking insulin at night take nightly dose, fast overnight, schedule procedure before 10 am.   If you take insulin both morning and night take morning dose by 6am and schedule procedure between 12 and 2:15 pm.   24 hours before your scan: Don t do any heavy exercise. (No jogging, aerobics or other workouts.) Exercise will make your pictures less accurate.  At least 7 hours before your scan, or the evening before if you have an early appointment: Eat a low carb, high protein meal (Lean meats, seafood, beans, soy, low-fat dairy, eggs, nuts & seeds). 6 hours before your scan:   Stop all food and liquids (except water).   Do not chew gum or suck on mints.   If you need to take medicine with food, you may take it with a few crackers.  Please call your Imaging Department at your exam site with any questions.            Apr 24, 2018  8:30 AM CDT   [facility] Lab Draw with  [facility] LAB DRAW   [facility] Draw ([facility])    [address] [place] [address]  [address]  [facility] [address]   [phone]            Apr 24, 2018  9:00 AM CDT   (Arrive by 8:45 AM)   Return Visit with Gaurang Johnson MD   [facility])    [address] [place] [address]  [address]  [facility] [address]   [phone]            Apr 24, 2018  9:30 AM CDT   Infusion 180 with  ONCOLOGY INFUSION, UC 16 ATC   [facility] ([facility])    [address] [place] [address]  [address]  [facility] [address]   [phone]            May 22, 2018 10:30 AM CDT   [facility] Lab Draw with  [facility]IC LAB DRAW   [facility] Lab Draw ([facility])    [address] [place] [address]  [address]  [facility] [address]   [phone]              Who to contact     If you have  "questions or need follow up information about today's clinic visit or your schedule please contact [facility] directly at [phone].  Normal or non-critical lab and imaging results will be communicated to you by MyWebGrocerhart, letter or phone within 4 business days after the clinic has received the results. If you do not hear from us within 7 days, please contact the clinic through MyWebGrocerhart or phone. If you have a critical or abnormal lab result, we will notify you by phone as soon as possible.  Submit refill requests through AdECN or call your pharmacy and they will forward the refill request to us. Please allow 3 business days for your refill to be completed.          Additional Information About Your Visit        MyWebGrocerharHot Potato Information     AdECN lets you send messages to your doctor, view your test results, renew your prescriptions, schedule appointments and more. To sign up, go to [email or web address] . Click on \"Log in\" on the left side of the screen, which will take you to the Welcome page. Then click on \"Sign up Now\" on the right side of the page.     You will be asked to enter the access code listed below, as well as some personal information. Please follow the directions to create your username and password.     Your access code is: [email or web address]  Expires: 2018  7:30 AM     Your access code will  in 90 days. If you need help or a new code, please call your [facility] clinic or [phone].        Care EveryWhere ID     This is your Care EveryWhere ID. This could be used by other organizations to access your [facility] medical records  [id number]        Your Vitals Were     Pulse Temperature Respirations Pulse Oximetry BMI (Body Mass Index)       106 99.2  F (37.3  C) (Oral) 16 98% 30.79 kg/m2        Blood Pressure from Last 3 Encounters:   18 129/78   18 112/81   18 (!) 139/97    Weight from Last 3 Encounters:   18 97.3 kg (214 lb 9.6 oz)   18 95.6 " kg (210 lb 12.2 oz)   03/27/18 96.8 kg (213 lb 4.8 oz)              We Performed the Following     CBC with platelets differential     Comprehensive metabolic panel     Lactate Dehydrogenase     Magnesium     Phosphorus        Primary Care Provider Office Phone # Fax #    Louisa Fry [phone]       [facility] [address]  [place] [address]        Equal Access to Services     [facility]: Hadii aad ku hadasho Soomaali, waaxda luqadaha, qaybta kaalmada adeegyada, waxay idiin hayaan adeeg kharash la'aan . So [facility] [phone].    ATENCIÓN: Si habla español, tiene a sheridan disposición servicios gratuitos de asistencia lingüística. Llame al [phone].    We comply with applicable federal civil rights laws and [place] laws. We do not discriminate on the basis of race, color, national origin, age, disability, sex, sexual orientation, or gender identity.            Thank you!     Thank you for choosing [facility] CANCER [facility]  for your care. Our goal is always to provide you with excellent care. Hearing back from our patients is one way we can continue to improve our services. Please take a few minutes to complete the written survey that you may receive in the mail after your visit with us. Thank you!             Your Updated Medication List - Protect others around you: Learn how to safely use, store and throw away your medicines at www.disposemymeds.org.      Notice  As of 3/29/2018 10:25 AM    You have not been prescribed any medications.

## 2018-03-29 NOTE — PATIENT INSTRUCTIONS
Contact Numbers  [facility] Nurse Triage: [phone]  After Hours Nurse Line:  [phone]    Please call the [facility] Nurse Triage line or after hours number if you experience a temperature greater than or equal to 100.5, shaking chills, have uncontrolled nausea, vomiting and/or diarrhea, dizziness, shortness of breath, chest pain, bleeding, unexplained bruising, or if you have any other new/concerning symptoms, questions or concerns.     If you are having any concerning symptoms or wish to speak to a provider before your next infusion visit, please call your care coordinator or triage to notify them so we can adequately serve you.     If you need a refill on a narcotic prescription or other medication, please call triage before your infusion appointment.           March 2018 Sunday Monday Tuesday Wednesday Thursday Friday Saturday                       1     2     P NEW TUMOR    2:45 PM   (30 min.)   Brad Betts MD   [facility] Orthopaedic [facility] 3       4     5     6     7     8     Outpatient Visit    9:30 AM   [facility] Surgery and Procedure Center     EXCISE SOFT TISSUE TUMOR THIGH   11:35 AM   Brad Betts MD    OR 9     10       11     12     13     UMP RETURN    3:30 PM   (15 min.)   Brad Betts MD   [facility] 14     15     16     17     PET ONCOLOGY WHOLE BODY    8:30 AM   (45 min.)   UUPET1   [facility] [place] PET CT   18     19     20     21     22     23     P NEW    9:45 AM   (60 min.)   Gaurang Johnson MD   [facility]     LAB WITH  CLINIC   12:45 PM   (15 min.)    LAB   [facility] Lab     ECH COMPLETE    1:00 PM   (60 min.)   [id number]   [facility] Echo     UMP RETURN    1:30 PM   (15 min.)   [facility]    2:30 PM   (90 min.)   Stewart Eckert MD   [facility] 24       25     26     27     PAC EVAL    8:15 AM   (60 min.)   6,  Pac Qamar     [facility] Preoperative Assessment [place]     PAC RN ASSESSMENT    9:15 AM   (30 min.)   Rn,  Pac   [facility] [place]     PAC ANESTHESIA CONSULT   10:35 AM   (10 min.)   Anesthesiologist,  Pac   [facility] Preoperative Assessment Center     LAB   11:00 AM   (15 min.)    LAB   [facility] Lab 28     Admission   10:02 AM   Sarah Bowie MD   Same Day Surgery [facility]   (Discharge: 3/28/2018)     XR SURG RAPHAEL <5 MIN FL W STILL   11:50 AM   (30 min.)   [id number]   [facility],  Radiology     INSERT PORT VASCULAR ACCESS   12:20 PM   Sarah Bowie MD   UU OR 29     [facility] LAB DRAW    6:30 AM   (15 min.)   UC [facility] LAB DRAW   [facility] Lab Draw     [facility] ONC INFUSION 180    7:00 AM   (180 min.)    ONCOLOGY INFUSION   [facility] 30 31 April 2018 Sunday Monday Tuesday Wednesday Thursday Friday Saturday   1     2     3     4     UMP ONC INFUSION 60   10:30 AM   (60 min.)    ONCOLOGY INFUSION   [facility] 5     UMP ONC INFUSION 60   10:30 AM   (60 min.)    ONCOLOGY INFUSION   [facility] 6     7       8     9     10     11     12     13     14       15     16     17     18     19     20     21     PET ONCOLOGY WHOLE BODY    8:30 AM   (45 min.)   UUPET1   [facility] PET CT   22     23     24     [facility] LAB DRAW    8:30 AM   (15 min.)    [facility] LAB DRAW   [facility] Lab Draw     UMP RETURN    8:45 AM   (30 min.)   Gaurang Johnson MD   [facility] ONC INFUSION 180    9:30 AM   (180 min.)    ONCOLOGY INFUSION   [facility] 25     26     27     28       29     30                                          Recent Results (from the past 24 hour(s))   Glucose by meter    Collection Time: 03/28/18 10:26 AM   Result Value Ref Range    Glucose 105 (H) 70 - 99 mg/dL   EKG 12-lead, tracing only    Collection Time: 03/28/18 11:03 AM   Result  Value Ref Range    Interpretation ECG Click View Image link to view waveform and result    ABO/Rh type and screen    Collection Time: 03/28/18 11:23 AM   Result Value Ref Range    ABO A     RH(D) Neg     Antibody Screen Neg     Test Valid Only At          [facility]    Specimen Expires 03/31/2018    CBC with platelets differential    Collection Time: 03/29/18  6:50 AM   Result Value Ref Range    WBC 9.6 4.0 - 11.0 10e9/L    RBC Count 4.59 4.4 - 5.9 10e12/L    Hemoglobin 12.3 (L) 13.3 - 17.7 g/dL    Hematocrit 38.8 (L) 40.0 - 53.0 %    MCV 85 78 - 100 fl    MCH 26.8 26.5 - 33.0 pg    MCHC 31.7 31.5 - 36.5 g/dL    RDW 13.2 10.0 - 15.0 %    Platelet Count 361 150 - 450 10e9/L    Diff Method Automated Method     % Neutrophils 78.4 %    % Lymphocytes 12.7 %    % Monocytes 8.1 %    % Eosinophils 0.3 %    % Basophils 0.3 %    % Immature Granulocytes 0.2 %    Nucleated RBCs 0 0 /100    Absolute Neutrophil 7.5 1.6 - 8.3 10e9/L    Absolute Lymphocytes 1.2 0.8 - 5.3 10e9/L    Absolute Monocytes 0.8 0.0 - 1.3 10e9/L    Absolute Eosinophils 0.0 0.0 - 0.7 10e9/L    Absolute Basophils 0.0 0.0 - 0.2 10e9/L    Abs Immature Granulocytes 0.0 0 - 0.4 10e9/L    Absolute Nucleated RBC 0.0    Comprehensive metabolic panel    Collection Time: 03/29/18  6:50 AM   Result Value Ref Range    Sodium 134 133 - 144 mmol/L    Potassium 4.2 3.4 - 5.3 mmol/L    Chloride 101 94 - 109 mmol/L    Carbon Dioxide 25 20 - 32 mmol/L    Anion Gap 9 3 - 14 mmol/L    Glucose 116 (H) 70 - 99 mg/dL    Urea Nitrogen 11 7 - 30 mg/dL    Creatinine 0.79 0.66 - 1.25 mg/dL    GFR Estimate >90 >60 mL/min/1.7m2    GFR Estimate If Black >90 >60 mL/min/1.7m2    Calcium 9.3 8.5 - 10.1 mg/dL    Bilirubin Total 0.4 0.2 - 1.3 mg/dL    Albumin 2.9 (L) 3.4 - 5.0 g/dL    Protein Total 7.0 6.8 - 8.8 g/dL    Alkaline Phosphatase 105 40 - 150 U/L    ALT 14 0 - 70 U/L    AST 10 0 - 45 U/L   Phosphorus    Collection Time: 03/29/18  6:50  AM   Result Value Ref Range    Phosphorus 2.6 2.5 - 4.5 mg/dL   Magnesium    Collection Time: 03/29/18  6:50 AM   Result Value Ref Range    Magnesium 2.1 1.6 - 2.3 mg/dL   Lactate Dehydrogenase    Collection Time: 03/29/18  6:50 AM   Result Value Ref Range    Lactate Dehydrogenase 146 85 - 227 U/L

## 2018-03-29 NOTE — PROGRESS NOTES
Infusion Nursing Note:  Hiram Tapia presents today for Cycle 1 Day 1 Doxil and Ifosfamide/mesna pump connect..    Patient seen by provider today: No    Intravenous Access:  Implanted Port.    Treatment Conditions:  Lab Results   Component Value Date    HGB 12.3 03/29/2018     Lab Results   Component Value Date    WBC 9.6 03/29/2018      Lab Results   Component Value Date    ANEU 7.5 03/29/2018     Lab Results   Component Value Date     03/29/2018      Lab Results   Component Value Date     03/29/2018                   Lab Results   Component Value Date    POTASSIUM 4.2 03/29/2018           Lab Results   Component Value Date    MAG 2.1 03/29/2018            Lab Results   Component Value Date    CR 0.79 03/29/2018                   Lab Results   Component Value Date    JAYESH 9.3 03/29/2018                Lab Results   Component Value Date    BILITOTAL 0.4 03/29/2018           Lab Results   Component Value Date    ALBUMIN 2.9 03/29/2018                    Lab Results   Component Value Date    ALT 14 03/29/2018           Lab Results   Component Value Date    AST 10 03/29/2018       Results reviewed, labs MET treatment parameters, ok to proceed with treatment.  ECHO/MUGA completed 3/23/18  EF 60-65%.    Note:   Patient is new to the infusion room today and is receiving Doxil and Ifosfamide/Mesna for the first time.  Patient oriented to infusion room, location of bathrooms and nutrition stations, and call light.  Verified that patient recieved written chemotherapy information previously.  Verbally reviewed chemotherapy teaching, side effects, take-home medications, and follow-up schedule with patient. Patient instructed to call triage with any questions or if he experiences a temperature >100.5, shaking chills, uncontrolled nausea/vomiting/diarrhea/constipation, dizziness, disorientation, confusion, changes in balance, shortness of breath, bleeding not relieved with pressure, or with any other concerns.   Instructed patient to call triage or the after hours nurse line on weekends/holidays/nights.    Post Infusion Assessment:  Patient tolerated infusion without incident.  Blood return noted pre and post infusion.  Site patent and intact, free from redness, edema or discomfort.  No evidence of extravasations.    Ifosfamide/Mesna CADD pump connected at 1030.  Positive blood return from port at time of pump hook up.  Y-site connector connected to port for lab draws days 4-8.  Ifosfamide/Mesna to infuse over 6 days at 5 cc/hour.  [facility] will return to [facility] on Wednesday, 4/4/18 for Mesna bag change and labs.  Patient will again return to [facility] on Thursday, 4/5/18, for Mesna pump disconnect, labs, and Neulasta injection.  Patient aware of appointment dates and times.  Patient also needs labs drawn on days 4-6.  Patient prefers to have these labs drawn at [facility].  Per Rubens Mckeon RN Care Coordinator, she will set up these appointments for patient and call him to notify him of the appointment times.      Discharge Plan:   Prescription refills given for Compazine and Kytril.  Discharge instructions reviewed with: Patient.  Patient and/or family verbalized understanding of discharge instructions and all questions answered.  Copy of AVS reviewed with patient and/or family.  Patient will return 4/24/18 for next appointment.  Patient discharged in stable condition accompanied by: self.  Departure Mode: Ambulatory.    GENEVA PINEDO RN

## 2018-04-01 ENCOUNTER — TRANSFERRED RECORDS (OUTPATIENT)
Dept: HEALTH INFORMATION MANAGEMENT | Facility: CLINIC | Age: 58
End: 2018-04-01

## 2018-04-01 LAB
ALBUMIN SERPL-MCNC: 2.5 G/DL
ALP SERPL-CCNC: 110 U/L
ALT SERPL-CCNC: 20 U/L
ANION GAP SERPL CALCULATED.3IONS-SCNC: ABNORMAL MMOL/L
AST SERPL-CCNC: 15 U/L
BILIRUB SERPL-MCNC: 0.4 MG/DL
BUN SERPL-MCNC: 15 MG/DL
CALCIUM SERPL-MCNC: 9.6 MG/DL
CHLORIDE SERPLBLD-SCNC: 99 MMOL/L
CO2 SERPL-SCNC: ABNORMAL MMOL/L
CREAT SERPL-MCNC: 0.74 MG/DL
GFR SERPL CREATININE-BSD FRML MDRD: >60 ML/MIN/1.73M2
GLOBULIN: 4.6 G/DL
GLUCOSE SERPL-MCNC: 133 MG/DL (ref 70–99)
HCO3 BLDV-SCNC: 26 MMOL/L
MAGNESIUM SERPL-MCNC: 2.3 MG/DL
PHOSPHATE SERPL-MCNC: 2.9 MG/DL
POTASSIUM SERPL-SCNC: 4.2 MMOL/L
PROT SERPL-MCNC: 7.1 G/DL
SODIUM SERPL-SCNC: 135 MMOL/L

## 2018-04-02 ENCOUNTER — APPOINTMENT (OUTPATIENT)
Dept: LAB | Facility: CLINIC | Age: 58
End: 2018-04-02
Attending: INTERNAL MEDICINE
Payer: COMMERCIAL

## 2018-04-02 ENCOUNTER — CARE COORDINATION (OUTPATIENT)
Dept: ONCOLOGY | Facility: CLINIC | Age: 58
End: 2018-04-02

## 2018-04-02 ENCOUNTER — TRANSFERRED RECORDS (OUTPATIENT)
Dept: HEALTH INFORMATION MANAGEMENT | Facility: CLINIC | Age: 58
End: 2018-04-02

## 2018-04-02 LAB
ALBUMIN SERPL-MCNC: 2.4 G/DL
ALP SERPL-CCNC: 102 U/L
ALT SERPL-CCNC: 28 U/L
ANION GAP SERPL CALCULATED.3IONS-SCNC: ABNORMAL MMOL/L
AST SERPL-CCNC: 14 U/L
BILIRUB SERPL-MCNC: 0.2 MG/DL
BUN SERPL-MCNC: 15 MG/DL
CALCIUM SERPL-MCNC: 9.3 MG/DL
CHLORIDE SERPLBLD-SCNC: 99 MMOL/L
CO2 SERPL-SCNC: ABNORMAL MMOL/L
CREAT SERPL-MCNC: 0.7 MG/DL
GFR SERPL CREATININE-BSD FRML MDRD: >60 ML/MIN/1.73M2
GLOBULIN: 4.4 G/DL
GLUCOSE SERPL-MCNC: 106 MG/DL (ref 70–99)
HCO3 BLDV-SCNC: 29 MMOL/L
MAGNESIUM SERPL-MCNC: 2.6 MG/DL
PHOSPHATE SERPL-MCNC: 2.7 MG/DL
POTASSIUM SERPL-SCNC: 4.1 MMOL/L
PROT SERPL-MCNC: 6.6 G/DL
SODIUM SERPL-SCNC: 135 MMOL/L

## 2018-04-04 ENCOUNTER — INFUSION THERAPY VISIT (OUTPATIENT)
Dept: ONCOLOGY | Facility: CLINIC | Age: 58
End: 2018-04-04
Attending: INTERNAL MEDICINE
Payer: COMMERCIAL

## 2018-04-04 ENCOUNTER — TELEPHONE (OUTPATIENT)
Dept: ORTHOPEDICS | Facility: CLINIC | Age: 58
End: 2018-04-04

## 2018-04-04 VITALS
TEMPERATURE: 98.1 F | OXYGEN SATURATION: 98 % | DIASTOLIC BLOOD PRESSURE: 83 MMHG | HEART RATE: 98 BPM | SYSTOLIC BLOOD PRESSURE: 127 MMHG | RESPIRATION RATE: 18 BRPM

## 2018-04-04 DIAGNOSIS — T81.328D: ICD-10-CM

## 2018-04-04 DIAGNOSIS — C49.21 SOFT TISSUE SARCOMA OF RIGHT THIGH (H): ICD-10-CM

## 2018-04-04 DIAGNOSIS — C49.9 SARCOMA OF SOFT TISSUE (H): Primary | ICD-10-CM

## 2018-04-04 DIAGNOSIS — M79.604 PAIN OF RIGHT LOWER EXTREMITY: ICD-10-CM

## 2018-04-04 DIAGNOSIS — Z87.891 PERSONAL HISTORY OF TOBACCO USE, PRESENTING HAZARDS TO HEALTH: ICD-10-CM

## 2018-04-04 LAB
ALBUMIN SERPL-MCNC: 2.5 G/DL (ref 3.4–5)
ALP SERPL-CCNC: 116 U/L (ref 40–150)
ALT SERPL W P-5'-P-CCNC: 22 U/L (ref 0–70)
ANION GAP SERPL CALCULATED.3IONS-SCNC: 6 MMOL/L (ref 3–14)
ANISOCYTOSIS BLD QL SMEAR: SLIGHT
AST SERPL W P-5'-P-CCNC: 18 U/L (ref 0–45)
BASOPHILS # BLD AUTO: 0 10E9/L (ref 0–0.2)
BASOPHILS NFR BLD AUTO: 0.9 %
BILIRUB SERPL-MCNC: 0.3 MG/DL (ref 0.2–1.3)
BUN SERPL-MCNC: 18 MG/DL (ref 7–30)
BURR CELLS BLD QL SMEAR: SLIGHT
CALCIUM SERPL-MCNC: 9.2 MG/DL (ref 8.5–10.1)
CHLORIDE SERPL-SCNC: 100 MMOL/L (ref 94–109)
CO2 SERPL-SCNC: 25 MMOL/L (ref 20–32)
CREAT SERPL-MCNC: 0.67 MG/DL (ref 0.66–1.25)
DIFFERENTIAL METHOD BLD: ABNORMAL
EOSINOPHIL # BLD AUTO: 0 10E9/L (ref 0–0.7)
EOSINOPHIL NFR BLD AUTO: 0 %
ERYTHROCYTE [DISTWIDTH] IN BLOOD BY AUTOMATED COUNT: 13.4 % (ref 10–15)
GFR SERPL CREATININE-BSD FRML MDRD: >90 ML/MIN/1.7M2
GLUCOSE SERPL-MCNC: 115 MG/DL (ref 70–99)
HCT VFR BLD AUTO: 34.6 % (ref 40–53)
HGB BLD-MCNC: 11.5 G/DL (ref 13.3–17.7)
LYMPHOCYTES # BLD AUTO: 0.8 10E9/L (ref 0.8–5.3)
LYMPHOCYTES NFR BLD AUTO: 16.7 %
MAGNESIUM SERPL-MCNC: 2.7 MG/DL (ref 1.6–2.3)
MCH RBC QN AUTO: 26.7 PG (ref 26.5–33)
MCHC RBC AUTO-ENTMCNC: 33.2 G/DL (ref 31.5–36.5)
MCV RBC AUTO: 80 FL (ref 78–100)
MONOCYTES # BLD AUTO: 0 10E9/L (ref 0–1.3)
MONOCYTES NFR BLD AUTO: 0 %
NEUTROPHILS # BLD AUTO: 4 10E9/L (ref 1.6–8.3)
NEUTROPHILS NFR BLD AUTO: 82.4 %
PHOSPHATE SERPL-MCNC: 2.6 MG/DL (ref 2.5–4.5)
PLATELET # BLD AUTO: 331 10E9/L (ref 150–450)
PLATELET # BLD EST: ABNORMAL 10*3/UL
POIKILOCYTOSIS BLD QL SMEAR: SLIGHT
POTASSIUM SERPL-SCNC: 3.7 MMOL/L (ref 3.4–5.3)
PROT SERPL-MCNC: 6.6 G/DL (ref 6.8–8.8)
RBC # BLD AUTO: 4.31 10E12/L (ref 4.4–5.9)
SODIUM SERPL-SCNC: 131 MMOL/L (ref 133–144)
WBC # BLD AUTO: 4.8 10E9/L (ref 4–11)

## 2018-04-04 PROCEDURE — 96521 REFILL/MAINT PORTABLE PUMP: CPT

## 2018-04-04 PROCEDURE — 85025 COMPLETE CBC W/AUTO DIFF WBC: CPT | Performed by: INTERNAL MEDICINE

## 2018-04-04 PROCEDURE — 84100 ASSAY OF PHOSPHORUS: CPT | Performed by: INTERNAL MEDICINE

## 2018-04-04 PROCEDURE — 80053 COMPREHEN METABOLIC PANEL: CPT | Performed by: INTERNAL MEDICINE

## 2018-04-04 PROCEDURE — 83735 ASSAY OF MAGNESIUM: CPT | Performed by: INTERNAL MEDICINE

## 2018-04-04 RX ORDER — GRANISETRON HYDROCHLORIDE 1 MG/1
1 TABLET, FILM COATED ORAL EVERY 12 HOURS PRN
Qty: 30 TABLET | Refills: 3 | Status: SHIPPED | OUTPATIENT
Start: 2018-04-04 | End: 2018-07-10

## 2018-04-04 NOTE — PATIENT INSTRUCTIONS
Contact Numbers    [facility] Main Line: [phone]  [facility] Triage:  [phone]    Call triage with chills and/or temperature greater than or equal to 100.5, uncontrolled nausea/vomiting, diarrhea, constipation, dizziness, shortness of breath, chest pain, bleeding, unexplained bruising, or any new/concerning symptoms, questions/concerns.     If you are having any concerning symptoms or wish to speak to a provider before your next infusion visit, please call your care coordinator or triage to notify them so we can adequately serve you.       After Hours: [phone]    If after hours, weekends, or holidays, call main hospital  and ask for Oncology doctor on call.           April 2018 Sunday Monday Tuesday Wednesday Thursday Friday Saturday   1     2     LAB   10:45 AM   (15 min.)    LAB HOME INFUSION   [facility] Lab 3     4     [facility] ONC INFUSION 60   10:30 AM   (60 min.)    ONCOLOGY INFUSION   [facility] 5     P ONC INFUSION 60   10:30 AM   (60 min.)    ONCOLOGY INFUSION   [facility] 6     7       8     9     10     11     12     13     14       15     16     17     18     19     20     21     PET ONCOLOGY WHOLE BODY    8:30 AM   (45 min.)   UUPET1   [facility] PET CT   22     23     24     [facility]    8:30 AM   (15 min.)   [facility]   [facility] Lab Draw     [facility] RETURN    8:45 AM   (30 min.)   [facility] Johnson [facility]   [facility]    9:30 AM   (180 min.)    ONCOLOGY INFUSION   [facility] 25     26     27     28       29     30                                         May 2018   [place] Monday Tuesday Wednesday Thursday Friday Saturday             1     2     3     4     5       6     7     8     9     10     11     12       13     14     15     16     17     18     19       20     21     22     [facility]   10:30 AM   (15 min.)   [facility] LAB  DRAW   [facility] Lab Draw     [facility] RETURN   10:45 AM   (30 min.)   Gaurang Johnson MD   [facility] ONC INFUSION 180   12:30 PM   (180 min.)    ONCOLOGY INFUSION   [facility] 23     24     25     26       27     28     29     30     31                            Lab Results:  No results found for this or any previous visit (from the past 12 hour(s)).

## 2018-04-04 NOTE — TELEPHONE ENCOUNTER
[facility] Call Center    Phone Message    May a detailed message be left on voicemail: yes    Reason for Call: Question regarding specialist protocol  Please follow protocols- only utilize this documentation for questions or concerns that are not clear in the protocol.  Contact clinic directly to clarify question(s) via phone or Better Weekdays message.   Was Clinic Available: yes  Question regarding protocol:  Ann calling from [facility] Infusion clinic would like to know if pt can be seen sooner with another provider in regards to his drainage site.  Pt is scheduled to see Dr. Betts on 04/06/18.  Drain removed a week ago, pt has I having a lot of pain, every 4 days the site fills up with with reddish tinge and drains for 2/3 days.  Is there a referral for the requested specialist/specialty? no  Name of referring provider: n/a  Location of referring provider: n/a      Action Taken: Message routed to:  [facility] Orthopedics       Patient added on to Dr. Schaefer's clinic today for wound check.

## 2018-04-04 NOTE — MR AVS SNAPSHOT
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)        Pain of right lower extremity        Disruption of tissue around surgical drain, subsequent encounter        Personal history of tobacco use, presenting hazards to health          Care Instructions    Contact Numbers    [facility] Main Line: [phone]  [facility] Triage:  [phone]    Call triage with chills and/or temperature greater than or equal to 100.5, uncontrolled nausea/vomiting, diarrhea, constipation, dizziness, shortness of breath, chest pain, bleeding, unexplained bruising, or any new/concerning symptoms, questions/concerns.     If you are having any concerning symptoms or wish to speak to a provider before your next infusion visit, please call your care coordinator or triage to notify them so we can adequately serve you.       After Hours: [phone]    If after hours, weekends, or holidays, call main hospital  and ask for Oncology doctor on call.           April 2018 Sunday Monday Tuesday Wednesday Thursday Friday Saturday   1     2     LAB   10:45 AM   (15 min.)    LAB HOME INFUSION   [facility] Lab 3     4     P ONC INFUSION 60   10:30 AM   (60 min.)    ONCOLOGY INFUSION   [facility] 5     [facility] ONC INFUSION 60   10:30 AM   (60 min.)    ONCOLOGY INFUSION   [facility] 6     7       8     9     10     11     12     13     14       15     16     17     18     19     20     21     PET ONCOLOGY WHOLE BODY    8:30 AM   (45 min.)   UUPET1   [facility] PET CT   22     23     24     [facility] LAB DRAW    8:30 AM   (15 min.)   [facility] LAB DRAW   M  [facility] Lab Draw     [facility] RETURN    8:45 AM   (30 min.)   Gaurang Johnson MD   [facility] ONC INFUSION 180    9:30 AM   (180 min.)   UC ONCOLOGY INFUSION   [facility] 25     26     27     28       29     30                                         May 2018   [place] Monday Tuesday Wednesday Thursday Friday Saturday             1     2     3     4     5       6     7     8     9     10     11     12       13     14     15     16     17     18     19       20     21     22     [facility]   10:30 AM   (15 min.)    [facility] DRAW   [facility] Lab Draw     [facility] RETURN   10:45 AM   (30 min.)   Gaurang Johnson MD   [facility] ONC INFUSION 180   12:30 PM   (180 min.)    ONCOLOGY INFUSION   [facility] 23     24     25     26       27     28     29     30     31                            Lab Results:  No results found for this or any previous visit (from the past 12 hour(s)).            Follow-ups after your visit        Your next 10 appointments already scheduled     Apr 05, 2018 10:30 AM CDT   Infusion 60 with UC ONCOLOGY INFUSION, UC 21 ATC   [facility] ([facility])    [address] [facility]  [address] [place] [address] [phone]            Apr 21, 2018  8:45 AM CDT   (Arrive by 8:30 AM)   PET ONCOLOGY WHOLE BODY with UUPET1   [facility] PET CT ([facility])    [address] [facility] [id number] [phone]           Tell your doctor:   If there is any chance you may be pregnant or if you are breastfeeding.   If you have problems lying in small spaces (claustrophobia). If you do, your doctor may give you medicine to help you relax. If you have diabetes:   Have your exam early in the morning. Your blood glucose will go up as the day goes by.   Your glucose level must  be 180 or less at the start of the exam. Please take any medicines you need to ensure this blood glucose level.   If you are taking insulin in the morning take with breakfast by 6 am and schedule procedure between 12 and 2:15 pm.   If you are taking insulin at night take nightly dose, fast overnight, schedule procedure before 10 am.   If you take insulin both morning and night take morning dose by 6am and schedule procedure between 12 and 2:15 pm.   24 hours before your scan: Don t do any heavy exercise. (No jogging, aerobics or other workouts.) Exercise will make your pictures less accurate.  At least 7 hours before your scan, or the evening before if you have an early appointment: Eat a low carb, high protein meal (Lean meats, seafood, beans, soy, low-fat dairy, eggs, nuts & seeds). 6 hours before your scan:   Stop all food and liquids (except water).   Do not chew gum or suck on mints.   If you need to take medicine with food, you may take it with a few crackers.  Please call your Imaging Department at your exam site with any questions.            Apr 24, 2018  8:30 AM CDT   [facility] with  [facility] LAB DRAW   [facility] ([facility])    [address] [place] [address]  [address]  [facility] [address]   [phone]            Apr 24, 2018  9:00 AM CDT   (Arrive by 8:45 AM)   Return Visit with Gaurang Johnson MD   [facility] ([facility])    [address] [place] [address]  [address]  [facility] [address]   [phone]            Apr 24, 2018  9:30 AM CDT   Infusion 180 with UC ONCOLOGY INFUSION, UC 16 ATC   [facility] ([facility])    [address] [place] [address]  [address]  [facility] [address]   [phone]            May 22, 2018 10:30 AM CDT   [facility] with [facility] LAB DRAW   [facility] ([facility])    [address] [place] [address]  [address]  [facility] [address]   [phone]            May 22, 2018 11:00 AM CDT   (Arrive by 10:45 AM)   Return Visit with Gaurang Johnson MD   [facility] ([facility])    [address] [place] [address] Se  [address]  [facility] [address]   [phone]            May 22, 2018 12:30 PM CDT   Infusion 180 with UC ONCOLOGY INFUSION   [facility] ([facility])    [address] [place] [address]  [address]  [facility] [address] [phone]              Who to contact     If you have questions or need follow up information about today's clinic visit or your schedule please contact [facility] directly at [phone].  Normal or non-critical lab and imaging results will be communicated to you by [employer], letter or phone within 4 business days after the clinic has received the results. If you do not hear from us within 7 days, please contact the clinic through [employer] or phone. If you have a critical or abnormal lab result, we will notify you by phone as soon as possible.  Submit refill requests through [employer] or call your pharmacy and they will forward the refill request to us. Please allow 3 business days for your refill to be completed.          Additional Information About Your Visit        [employer] Information     Wish lets you send messages to your doctor, view your test results, renew your prescriptions, schedule appointments and more. To sign up, go to [email or web address] . Click on \"Log in\" on the left side of the screen, which will take you to the Welcome page. Then click on \"Sign up Now\" on the right side of the page.     You will be asked to enter the access code listed below, as well as some personal information. Please follow the directions to create your username and password.     Your access code is: [email or web address]  Expires: 2018  7:30 AM     Your access code will  in 90 days. If you " need help or a new code, please call your [facility] or [phone].        Care EveryWhere ID     This is your Care EveryWhere ID. This could be used by other organizations to access your [facility] medical records  [id number]         Blood Pressure from Last 3 Encounters:   03/29/18 129/78   03/28/18 112/81   03/27/18 (!) 139/97    Weight from Last 3 Encounters:   03/29/18 97.3 kg (214 lb 9.6 oz)   03/28/18 95.6 kg (210 lb 12.2 oz)   03/27/18 96.8 kg (213 lb 4.8 oz)              We Performed the Following     CBC with platelets differential     Comprehensive metabolic panel     Magnesium     Phosphorus        Primary Care Provider Office Phone # Fax #    Louisa Fry [phone]       [facility] [address]  [facility] [address]        Equal Access to Services     YVONNE TOLENTINO : Hadii aad ku hadasho Sokvngali, waaxda luqadaha, qaybta kaalmada adeegyada, waxay komalin hayoren epps . So [facility] [phone].    ATENCIÓN: Si habla español, tiene a sheridan disposición servicios gratuitos de asistencia lingüística. Markos al [phone].    We comply with applicable federal civil rights laws and [place] laws. We do not discriminate on the basis of race, color, national origin, age, disability, sex, sexual orientation, or gender identity.            Thank you!     Thank you for choosing [facility] [place]  for your care. Our goal is always to provide you with excellent care. Hearing back from our patients is one way we can continue to improve our services. Please take a few minutes to complete the written survey that you may receive in the mail after your visit with us. Thank you!             Your Updated Medication List - Protect others around you: Learn how to safely use, store and throw away your medicines at www.disposemymeds.org.      Notice  As of 4/4/2018 11:51 AM    You have not been prescribed any medications.

## 2018-04-04 NOTE — PROGRESS NOTES
"Infusion Nursing Note:  Hiram Tapia presents today for (Ifosfamide pump disconnect) Mesna bag change and labs    Patient seen by provider today: No   present during visit today: Not Applicable.    Note: Patient reports having a lot of nausea today and reports being very sensitive to the smell of the Ifosfamide. The patient also reports that since his right hip drain removal, his hip has been \"filling with fluid\" to the point where it is firm and painful, and then it oozes a large amount of what sounds like serosanguinous fluid (per the patient's description). The patient states that it soaks through large towels and that usually leaks for about 4 days. The patient states that when the pocket of fluid is at its largest point, his pain is at a 10/10.  Ignacio SAPP notified of the above. The patient feels that the drain may need to be put back in. The patient also reports that compazine usually provides him with relief for about 4 hours, but then he is nauseated afterwards. The patient is out of his Kytril after tomorrow and he currently takes 2mg po every am.    TORB per Ignacio SAPP/Ann Albrecht RN 4/4/2018 @ 1130:  - Kytril 1mg po BID PRN  -Please have patient scheduled with Dr. Betts's office to evaluate R hip.    Intravenous Access:  Implanted Port.    Treatment Conditions:  Lab Results   Component Value Date    HGB 11.5 04/04/2018     Lab Results   Component Value Date    WBC 4.8 04/04/2018      Lab Results   Component Value Date    ANEU 4.0 04/04/2018     Lab Results   Component Value Date     04/04/2018      Lab Results   Component Value Date     04/04/2018                   Lab Results   Component Value Date    POTASSIUM 3.7 04/04/2018           Lab Results   Component Value Date    MAG 2.7 04/04/2018            Lab Results   Component Value Date    CR 0.67 04/04/2018                   Lab Results   Component Value Date    JAYESH 9.2 04/04/2018                Lab " Results   Component Value Date    BILITOTAL 0.3 04/04/2018           Lab Results   Component Value Date    ALBUMIN 2.5 04/04/2018                    Lab Results   Component Value Date    ALT 22 04/04/2018           Lab Results   Component Value Date    AST 18 04/04/2018       Not Applicable.    TORB per Ignacio Ruiz/Ann Albrecht RN 4/4/2018 @ 1300:  -Okay for patient to be d/c home and have labs rechecked tomorrow with Sodium of 131. No further intervention at this time.    Post Infusion Assessment:  Patient Ifosfamide pump disconnect and Mesna bag connect without incident.  Blood return noted pre and post infusion.  Site patent and intact, free from redness, edema or discomfort.  No evidence of extravasations.    Discharge Plan:   Prescription refills given for Kytril.  Discharge instructions reviewed with: Patient and Family.   Patient and/or family verbalized understanding of discharge instructions and all questions answered.  Copy of AVS reviewed with patient and/or family.  Patient will return tomorrow 4/5/18 for lab, mesna bag disconnect and neulasta.  Patient discharged in stable condition accompanied by: self and wife.  Departure Mode: Wheelchair.  Face to Face time: 20 min.    Note: The patient was scheduled per writer for Dr. Betts on Friday (soonest appointment available), however Rubens Mckeon RN is to contact Dr. Betts's RN to see if the patient can be seen sooner. Rubens Mckeon RN also to contact the patient on Monday 4/9/18 to check in.    Ann Albrecht RN

## 2018-04-05 ENCOUNTER — TELEPHONE (OUTPATIENT)
Dept: ORTHOPEDICS | Facility: CLINIC | Age: 58
End: 2018-04-05

## 2018-04-05 ENCOUNTER — INFUSION THERAPY VISIT (OUTPATIENT)
Dept: ONCOLOGY | Facility: CLINIC | Age: 58
DRG: 857 | End: 2018-04-05
Attending: INTERNAL MEDICINE
Payer: COMMERCIAL

## 2018-04-05 ENCOUNTER — ANESTHESIA EVENT (OUTPATIENT)
Dept: SURGERY | Facility: CLINIC | Age: 58
DRG: 857 | End: 2018-04-05
Payer: COMMERCIAL

## 2018-04-05 ENCOUNTER — HOSPITAL ENCOUNTER (INPATIENT)
Facility: CLINIC | Age: 58
LOS: 5 days | Discharge: HOME HEALTH | DRG: 857 | End: 2018-04-10
Attending: ORTHOPAEDIC SURGERY | Admitting: ORTHOPAEDIC SURGERY
Payer: COMMERCIAL

## 2018-04-05 ENCOUNTER — OFFICE VISIT (OUTPATIENT)
Dept: ORTHOPEDICS | Facility: CLINIC | Age: 58
End: 2018-04-05
Payer: COMMERCIAL

## 2018-04-05 VITALS
HEART RATE: 97 BPM | DIASTOLIC BLOOD PRESSURE: 85 MMHG | SYSTOLIC BLOOD PRESSURE: 119 MMHG | OXYGEN SATURATION: 97 % | TEMPERATURE: 98.3 F | RESPIRATION RATE: 18 BRPM

## 2018-04-05 DIAGNOSIS — L08.9 WOUND INFECTION: Primary | ICD-10-CM

## 2018-04-05 DIAGNOSIS — C49.21 SOFT TISSUE SARCOMA OF RIGHT THIGH (H): ICD-10-CM

## 2018-04-05 DIAGNOSIS — T14.8XXA WOUND INFECTION: Primary | ICD-10-CM

## 2018-04-05 DIAGNOSIS — C49.9 SARCOMA OF SOFT TISSUE (H): Primary | ICD-10-CM

## 2018-04-05 DIAGNOSIS — C49.9 SARCOMA OF SOFT TISSUE (H): ICD-10-CM

## 2018-04-05 DIAGNOSIS — Z87.891 PERSONAL HISTORY OF TOBACCO USE, PRESENTING HAZARDS TO HEALTH: ICD-10-CM

## 2018-04-05 DIAGNOSIS — D21.9 BENIGN NEOPLASM OF SOFT TISSUES: ICD-10-CM

## 2018-04-05 DIAGNOSIS — T81.328D: ICD-10-CM

## 2018-04-05 DIAGNOSIS — M79.604 PAIN OF RIGHT LOWER EXTREMITY: ICD-10-CM

## 2018-04-05 PROBLEM — T81.49XA WOUND INFECTION AFTER SURGERY: Status: ACTIVE | Noted: 2018-04-05

## 2018-04-05 LAB
ABO + RH BLD: NORMAL
ABO + RH BLD: NORMAL
ALBUMIN SERPL-MCNC: 2.6 G/DL (ref 3.4–5)
ALP SERPL-CCNC: 128 U/L (ref 40–150)
ALT SERPL W P-5'-P-CCNC: 29 U/L (ref 0–70)
ANION GAP SERPL CALCULATED.3IONS-SCNC: 8 MMOL/L (ref 3–14)
APPEARANCE FLD: NORMAL
AST SERPL W P-5'-P-CCNC: 25 U/L (ref 0–45)
BASOPHILS # BLD AUTO: 0 10E9/L (ref 0–0.2)
BASOPHILS NFR BLD AUTO: 0.9 %
BILIRUB SERPL-MCNC: 0.2 MG/DL (ref 0.2–1.3)
BLD GP AB SCN SERPL QL: NORMAL
BLOOD BANK CMNT PATIENT-IMP: NORMAL
BUN SERPL-MCNC: 16 MG/DL (ref 7–30)
CALCIUM SERPL-MCNC: 9 MG/DL (ref 8.5–10.1)
CHLORIDE SERPL-SCNC: 101 MMOL/L (ref 94–109)
CO2 SERPL-SCNC: 22 MMOL/L (ref 20–32)
COLOR FLD: NORMAL
CREAT SERPL-MCNC: 0.65 MG/DL (ref 0.66–1.25)
DIFFERENTIAL METHOD BLD: ABNORMAL
EOSINOPHIL # BLD AUTO: 0 10E9/L (ref 0–0.7)
EOSINOPHIL NFR BLD AUTO: 0.9 %
ERYTHROCYTE [DISTWIDTH] IN BLOOD BY AUTOMATED COUNT: 13.5 % (ref 10–15)
GFR SERPL CREATININE-BSD FRML MDRD: >90 ML/MIN/1.7M2
GLUCOSE SERPL-MCNC: 109 MG/DL (ref 70–99)
GRAM STN SPEC: NORMAL
HCT VFR BLD AUTO: 35.5 % (ref 40–53)
HGB BLD-MCNC: 11.8 G/DL (ref 13.3–17.7)
LACTATE BLD-SCNC: 1.2 MMOL/L (ref 0.4–1.9)
LYMPHOCYTES # BLD AUTO: 0.7 10E9/L (ref 0.8–5.3)
LYMPHOCYTES NFR BLD AUTO: 25.9 %
Lab: NORMAL
MAGNESIUM SERPL-MCNC: 2.7 MG/DL (ref 1.6–2.3)
MCH RBC QN AUTO: 26.6 PG (ref 26.5–33)
MCHC RBC AUTO-ENTMCNC: 33.2 G/DL (ref 31.5–36.5)
MCV RBC AUTO: 80 FL (ref 78–100)
MONOCYTES # BLD AUTO: 0.1 10E9/L (ref 0–1.3)
MONOCYTES NFR BLD AUTO: 3.4 %
MONOS+MACROS NFR FLD MANUAL: 8 %
NEUTROPHILS # BLD AUTO: 1.9 10E9/L (ref 1.6–8.3)
NEUTROPHILS NFR BLD AUTO: 68.9 %
NEUTS BAND NFR FLD MANUAL: 92 %
PHOSPHATE SERPL-MCNC: 2.6 MG/DL (ref 2.5–4.5)
PLATELET # BLD AUTO: 364 10E9/L (ref 150–450)
PLATELET # BLD EST: ABNORMAL 10*3/UL
POTASSIUM SERPL-SCNC: 3.6 MMOL/L (ref 3.4–5.3)
PROT SERPL-MCNC: 6.8 G/DL (ref 6.8–8.8)
RBC # BLD AUTO: 4.43 10E12/L (ref 4.4–5.9)
RBC MORPH BLD: ABNORMAL
SODIUM SERPL-SCNC: 131 MMOL/L (ref 133–144)
SPECIMEN EXP DATE BLD: NORMAL
SPECIMEN SOURCE FLD: NORMAL
SPECIMEN SOURCE: NORMAL
WBC # BLD AUTO: 2.7 10E9/L (ref 4–11)
WBC # FLD AUTO: NORMAL /UL

## 2018-04-05 PROCEDURE — 84100 ASSAY OF PHOSPHORUS: CPT | Performed by: INTERNAL MEDICINE

## 2018-04-05 PROCEDURE — 80053 COMPREHEN METABOLIC PANEL: CPT | Performed by: INTERNAL MEDICINE

## 2018-04-05 PROCEDURE — 25000128 H RX IP 250 OP 636

## 2018-04-05 PROCEDURE — 86850 RBC ANTIBODY SCREEN: CPT | Performed by: ORTHOPAEDIC SURGERY

## 2018-04-05 PROCEDURE — 96372 THER/PROPH/DIAG INJ SC/IM: CPT

## 2018-04-05 PROCEDURE — 83735 ASSAY OF MAGNESIUM: CPT | Performed by: INTERNAL MEDICINE

## 2018-04-05 PROCEDURE — 25000132 ZZH RX MED GY IP 250 OP 250 PS 637: Performed by: STUDENT IN AN ORGANIZED HEALTH CARE EDUCATION/TRAINING PROGRAM

## 2018-04-05 PROCEDURE — 25000128 H RX IP 250 OP 636: Performed by: ORTHOPAEDIC SURGERY

## 2018-04-05 PROCEDURE — 86901 BLOOD TYPING SEROLOGIC RH(D): CPT | Performed by: ORTHOPAEDIC SURGERY

## 2018-04-05 PROCEDURE — 83605 ASSAY OF LACTIC ACID: CPT | Performed by: ORTHOPAEDIC SURGERY

## 2018-04-05 PROCEDURE — 36591 DRAW BLOOD OFF VENOUS DEVICE: CPT

## 2018-04-05 PROCEDURE — 12000001 ZZH R&B MED SURG/OB UMMC

## 2018-04-05 PROCEDURE — 85025 COMPLETE CBC W/AUTO DIFF WBC: CPT | Performed by: INTERNAL MEDICINE

## 2018-04-05 PROCEDURE — 36415 COLL VENOUS BLD VENIPUNCTURE: CPT | Performed by: ORTHOPAEDIC SURGERY

## 2018-04-05 PROCEDURE — 86900 BLOOD TYPING SEROLOGIC ABO: CPT | Performed by: ORTHOPAEDIC SURGERY

## 2018-04-05 PROCEDURE — 25000128 H RX IP 250 OP 636: Mod: ZF | Performed by: INTERNAL MEDICINE

## 2018-04-05 PROCEDURE — G0463 HOSPITAL OUTPT CLINIC VISIT: HCPCS | Mod: 25

## 2018-04-05 RX ORDER — HEPARIN SODIUM (PORCINE) LOCK FLUSH IV SOLN 100 UNIT/ML 100 UNIT/ML
5 SOLUTION INTRAVENOUS
Status: DISCONTINUED | OUTPATIENT
Start: 2018-04-05 | End: 2018-04-10 | Stop reason: HOSPADM

## 2018-04-05 RX ORDER — NALOXONE HYDROCHLORIDE 0.4 MG/ML
.1-.4 INJECTION, SOLUTION INTRAMUSCULAR; INTRAVENOUS; SUBCUTANEOUS
Status: DISCONTINUED | OUTPATIENT
Start: 2018-04-05 | End: 2018-04-10 | Stop reason: HOSPADM

## 2018-04-05 RX ORDER — PIPERACILLIN SODIUM, TAZOBACTAM SODIUM 3; .375 G/15ML; G/15ML
3.38 INJECTION, POWDER, LYOPHILIZED, FOR SOLUTION INTRAVENOUS EVERY 8 HOURS SCHEDULED
Status: DISCONTINUED | OUTPATIENT
Start: 2018-04-05 | End: 2018-04-05

## 2018-04-05 RX ORDER — HEPARIN SODIUM (PORCINE) LOCK FLUSH IV SOLN 100 UNIT/ML 100 UNIT/ML
500 SOLUTION INTRAVENOUS ONCE
Status: COMPLETED | OUTPATIENT
Start: 2018-04-05 | End: 2018-04-05

## 2018-04-05 RX ORDER — PIPERACILLIN SODIUM, TAZOBACTAM SODIUM 3; .375 G/15ML; G/15ML
3.38 INJECTION, POWDER, LYOPHILIZED, FOR SOLUTION INTRAVENOUS EVERY 8 HOURS
Status: DISCONTINUED | OUTPATIENT
Start: 2018-04-05 | End: 2018-04-09 | Stop reason: ALTCHOICE

## 2018-04-05 RX ORDER — HEPARIN SODIUM,PORCINE 10 UNIT/ML
5-10 VIAL (ML) INTRAVENOUS EVERY 24 HOURS
Status: DISCONTINUED | OUTPATIENT
Start: 2018-04-05 | End: 2018-04-10 | Stop reason: HOSPADM

## 2018-04-05 RX ORDER — ACETAMINOPHEN 325 MG/1
650 TABLET ORAL EVERY 4 HOURS PRN
Status: DISCONTINUED | OUTPATIENT
Start: 2018-04-05 | End: 2018-04-06

## 2018-04-05 RX ORDER — SODIUM CHLORIDE 9 MG/ML
INJECTION, SOLUTION INTRAVENOUS
Status: COMPLETED
Start: 2018-04-05 | End: 2018-04-05

## 2018-04-05 RX ORDER — SODIUM CHLORIDE, SODIUM LACTATE, POTASSIUM CHLORIDE, CALCIUM CHLORIDE 600; 310; 30; 20 MG/100ML; MG/100ML; MG/100ML; MG/100ML
1000 INJECTION, SOLUTION INTRAVENOUS CONTINUOUS
Status: DISCONTINUED | OUTPATIENT
Start: 2018-04-05 | End: 2018-04-10 | Stop reason: HOSPADM

## 2018-04-05 RX ORDER — HEPARIN SODIUM,PORCINE 10 UNIT/ML
5 VIAL (ML) INTRAVENOUS
Status: DISCONTINUED | OUTPATIENT
Start: 2018-04-05 | End: 2018-04-05

## 2018-04-05 RX ORDER — HEPARIN SODIUM,PORCINE 10 UNIT/ML
5-10 VIAL (ML) INTRAVENOUS
Status: DISCONTINUED | OUTPATIENT
Start: 2018-04-05 | End: 2018-04-10 | Stop reason: HOSPADM

## 2018-04-05 RX ORDER — GRANISETRON HYDROCHLORIDE 1 MG/1
1 TABLET, FILM COATED ORAL EVERY 12 HOURS PRN
Status: DISCONTINUED | OUTPATIENT
Start: 2018-04-05 | End: 2018-04-10 | Stop reason: HOSPADM

## 2018-04-05 RX ADMIN — PEGFILGRASTIM 6 MG: 6 INJECTION SUBCUTANEOUS at 11:16

## 2018-04-05 RX ADMIN — VANCOMYCIN HYDROCHLORIDE 1500 MG: 500 INJECTION, POWDER, LYOPHILIZED, FOR SOLUTION INTRAVENOUS at 21:08

## 2018-04-05 RX ADMIN — SODIUM CHLORIDE 1000 ML: 9 INJECTION, SOLUTION INTRAVENOUS at 19:39

## 2018-04-05 RX ADMIN — PIPERACILLIN SODIUM AND TAZOBACTAM SODIUM 3.38 G: 3; .375 INJECTION, POWDER, LYOPHILIZED, FOR SOLUTION INTRAVENOUS at 19:38

## 2018-04-05 RX ADMIN — ACETAMINOPHEN 650 MG: 325 TABLET, FILM COATED ORAL at 18:38

## 2018-04-05 RX ADMIN — SODIUM CHLORIDE, PRESERVATIVE FREE 500 UNITS: 5 INJECTION INTRAVENOUS at 11:17

## 2018-04-05 ASSESSMENT — ACTIVITIES OF DAILY LIVING (ADL)
COGNITION: 0 - NO COGNITION ISSUES REPORTED
BATHING: 0-->INDEPENDENT
SWALLOWING: 0-->SWALLOWS FOODS/LIQUIDS WITHOUT DIFFICULTY
AMBULATION: 0-->INDEPENDENT
RETIRED_EATING: 0-->INDEPENDENT
TRANSFERRING: 0-->INDEPENDENT
TOILETING: 0-->INDEPENDENT
FALL_HISTORY_WITHIN_LAST_SIX_MONTHS: NO
RETIRED_COMMUNICATION: 0-->UNDERSTANDS/COMMUNICATES WITHOUT DIFFICULTY
DRESS: 0-->INDEPENDENT

## 2018-04-05 ASSESSMENT — PAIN SCALES - GENERAL: PAINLEVEL: MILD PAIN (3)

## 2018-04-05 ASSESSMENT — PAIN DESCRIPTION - DESCRIPTORS: DESCRIPTORS: ACHING

## 2018-04-05 NOTE — PROGRESS NOTES
Diagnosis: High-grade soft tissue sarcoma right thigh      Treatment: Neoadjuvant chemotherapy planned. Deferred dosing today due to wound erythema and drainage noted at infusion center     Hiram was seen today for assessment of his wound and wound drainage.  He has serosanguinous drainage from the distal aspect of his wound, and erythema concerning for underlying infection.      Aspiration reveals small 5 cc fluid collection at the distal aspect of the wound, with cloudy sanguinous aspirate returned    Impression:   Post operative infection of the right thigh.  Would recommend surgical debridement based on current appearance.  Patient will be admitted to the hospital and scheduled for surgical debridement with Dr Betts tomorrow.     PROCEDURE DATE: 4/5/2018    PROCEDURE NOTE:    After obtaining informed consent, under sterile precautions, the right thigh wound was aspirated. There were no complications.  5 ml of cloudy, sanguinous fluid was obtained and sent for analysis.      Signed:   Emerson Medina MD  Adult Reconstructive Surgery Fellow  Dept Orthopaedic Surgery, [facility]    Answers for HPI/ROS submitted by the patient on 4/5/2018   General Symptoms: No  Skin Symptoms: No  HENT Symptoms: No  EYE SYMPTOMS: No  HEART SYMPTOMS: No  LUNG SYMPTOMS: No  INTESTINAL SYMPTOMS: No  URINARY SYMPTOMS: No  REPRODUCTIVE SYMPTOMS: No  SKELETAL SYMPTOMS: No  BLOOD SYMPTOMS: No  NERVOUS SYSTEM SYMPTOMS: No  MENTAL HEALTH SYMPTOMS: No    Attending MD (Dr. Cuco Schaefer) Attestation :  This patient was seen and evaluated by me including a history, exam, and interpretation of all imaging and/or lab data.  Either a training physician (resident/fellow), who also saw the patient, or scribe has documented the clinic visit in the attached note.    MD Dajuan Sarmiento Family Professor  Oncology and Adult Reconstructive Surgery  Dept Orthopaedic Surgery, [facility]  [phone] office, [phone]  pager  [email or web address]

## 2018-04-05 NOTE — IP AVS SNAPSHOT
[facility] 8[facility]    [address]    [facility] [address]    Phone:  [phone]                                       After Visit Summary   4/5/2018    Hiram Tapia    MRN: [id number]           After Visit Summary Signature Page     I have received my discharge instructions, and my questions have been answered. I have discussed any challenges I see with this plan with the nurse or doctor.    ..........................................................................................................................................  Patient/Patient Representative Signature      ..........................................................................................................................................  Patient Representative Print Name and Relationship to Patient    ..................................................               ................................................  Date                                            Time    ..........................................................................................................................................  Reviewed by Signature/Title    ...................................................              ..............................................  Date                                                            Time

## 2018-04-05 NOTE — NURSING NOTE
Chief Complaint   Patient presents with     Wound Check     Clohisy Patient being seen for Poss. infections of Right Thigh per Infusion Clinic (Same day apt.) Pt states that he has been experiencing drainage from the Top of his incision along with redness around his Biopsy Site. He states that there is no odar, and besides his Chemo, he hasn't felt any different.        57 year old  1960        [facility] [address] [place] [address] - [address] AT [facility]  [address]  [facility] [address]    No Known Allergies  Current Outpatient Prescriptions   Medication     PROCHLORPERAZINE MALEATE PO     granisetron (KYTRIL) 1 MG tablet     No current facility-administered medications for this visit.

## 2018-04-05 NOTE — PHARMACY-VANCOMYCIN DOSING SERVICE
Pharmacy Vancomycin Initial Note  Date of Service 2018  Patient's  1960  57 year old, male    Indication: Bone and Joint Infection and Skin and Soft Tissue Infection    Current estimated CrCl = Estimated Creatinine Clearance: 143.1 mL/min (based on Cr of 0.65).    Creatinine for last 3 days  2018:  6:25 PM Creatinine 0.70 mg/dL  2018: 11:25 AM Creatinine 0.67 mg/dL  2018: 10:30 AM Creatinine 0.65 mg/dL    Recent Vancomycin Level(s) for last 3 days  No results found for requested labs within last 72 hours.      Vancomycin IV Administrations (past 72 hours)      No vancomycin orders with administrations in past 72 hours.                Nephrotoxins and other renal medications (Future)    Start     Dose/Rate Route Frequency Ordered Stop    18 1815  piperacillin-tazobactam (ZOSYN) 3.375 g vial to attach to  mL bag      3.375 g  over 30 Minutes Intravenous EVERY 8 HOURS 18 1807      18 1800  vancomycin (VANCOCIN) 1,500 mg in sodium chloride 0.9 % 250 mL intermittent infusion      1,500 mg  over 90 Minutes Intravenous EVERY 12 HOURS 18 1758            Contrast Orders - past 72 hours     None                Plan:  1.  Start vancomycin  1500  mg IV q12h.   2.  Goal Trough Level: 10-15 mg/L   3.  Pharmacy will check trough levels as appropriate in 1-3 Days.    4. Serum creatinine levels will be ordered a minimum of twice weekly.    5. [place] method utilized to dose vancomycin therapy: Method 1    Laura Huertas, PharmD

## 2018-04-05 NOTE — IP AVS SNAPSHOT
MRN:[id number]                      After Visit Summary   4/5/2018    Hiram Tapia    MRN: [id number]           Thank you!     Thank you for choosing [facility] for your care. Our goal is always to provide you with excellent care. Hearing back from our patients is one way we can continue to improve our services. Please take a few minutes to complete the written survey that you may receive in the mail after you visit with us. Thank you!        Patient Information     Date Of Birth          1960        Designated Caregiver       Most Recent Value    Caregiver    Will someone help with your care after discharge? yes    Name of designated caregiver rose mary    Phone number of caregiver lives with pt    Caregiver address lives with pt      About your hospital stay     You were admitted on:  April 5, 2018 You last received care in the:  [facility] 8[facility]    You were discharged on:  April 10, 2018        Reason for your hospital stay       Right thigh wound infection                  Who to Call     For medical emergencies, please call 911.  For non-urgent questions about your medical care, please call your primary care provider or clinic, [phone]  For questions related to your surgery, please call your surgery clinic        Attending Provider     Provider Specialty    Brad Betts MD Orthopedics       Primary Care Provider Office Phone # Fax #    Louisa Fry [phone]       When to contact your care team       Fever >101.5, change in fluid to a purulent fluid, spreading redness around the wound.                  After Care Instructions     Activity       Your activity upon discharge: Weightbearing as tolerated            Diet       Follow this diet upon discharge: Regular            Monitor and record       Vac fluid output            Tubes and drains       You are going home with the following tubes or drains: Wound vac            Wound care and dressings       Instructions  to care for your wound at home: Vac changes by home nursing every 3-5 days. -125 pressure on vac.                  Your next 10 appointments already scheduled     Apr 21, 2018  8:45 AM CDT   (Arrive by 8:30 AM)   PET ONCOLOGY WHOLE BODY with UUPET1   [facility] PET CT ([facility])    [address] [address] [phone]           Tell your doctor:   If there is any chance you may be pregnant or if you are breastfeeding.   If you have problems lying in small spaces (claustrophobia). If you do, your doctor may give you medicine to help you relax. If you have diabetes:   Have your exam early in the morning. Your blood glucose will go up as the day goes by.   Your glucose level must be 180 or less at the start of the exam. Please take any medicines you need to ensure this blood glucose level.   If you are taking insulin in the morning take with breakfast by 6 am and schedule procedure between 12 and 2:15 pm.   If you are taking insulin at night take nightly dose, fast overnight, schedule procedure before 10 am.   If you take insulin both morning and night take morning dose by 6am and schedule procedure between 12 and 2:15 pm.   24 hours before your scan: Don t do any heavy exercise. (No jogging, aerobics or other workouts.) Exercise will make your pictures less accurate.  At least 7 hours before your scan, or the evening before if you have an early appointment: Eat a low carb, high protein meal (Lean meats, seafood, beans, soy, low-fat dairy, eggs, nuts & seeds). 6 hours before your scan:   Stop all food and liquids (except water).   Do not chew gum or suck on mints.   If you need to take medicine with food, you may take it with a few crackers.  Please call your Imaging Department at your exam site with any questions.            Apr 24, 2018  8:30 AM CDT   Masonic Lab Draw with  MASONIC LAB DRAW   [facility] Masonic Lab Draw ([facility] [place])    [address]  [address]  [facility] [address]   [phone]            Apr 24, 2018  9:00 AM CDT   (Arrive by 8:45 AM)   Return Visit with Gaurang Johnson MD   [facility] ([facility])    [address]  [address]  [facility] [address]   [phone]            Apr 24, 2018  9:30 AM CDT   Infusion 180 with UC ONCOLOGY INFUSION, UC 16 ATC   [facility] ([facility])    [address]  [address]  [facility] [address]   [phone]            Apr 24, 2018  4:00 PM CDT   (Arrive by 3:45 PM)   Return Visit with Brad Betts MD   [facility] ([facility])    [address] [place] [address]  4th Floor  [facility] [address]   [phone]            May 22, 2018 10:30 AM CDT   Masonic Lab Draw with UC MASONIC LAB DRAW   [facility] Lab Draw ([facility])    [address]  [address]  [facility] [address]   [phone]            May 22, 2018 11:00 AM CDT   (Arrive by 10:45 AM)   Return Visit with Gaurang Johnson MD   [facility] ([facility])    [address]  [address]  [facility] [address]   [phone]            May 22, 2018 12:30 PM CDT   Infusion 180 with UC ONCOLOGY INFUSION, UC 30 ATC   [facility] ([facility])    [address] [place] [address]  [address]  [facility] [address]   [phone]              Additional Services     Home care nursing referral       [facility]  Phone  [phone]  Fax  [phone]    RN skilled nursing visit. RN to assess vital signs and weight, respiratory and cardiac status, pain level and activity tolerance, incision for signs/symptoms of infection, hydration, nutrition and bowel status and home safety.  RN to teach medication management.  RN to  "provide site care and management.  Wound vac dressing changes every 4 days starting Friday, April 13, 2018.  Large cyst cavity, only place wound vac sponge in 3cm's. Do not fill the entire cavity as ordered by Dr. Womack.    Your provider has ordered home care nursing services. If you have not been contacted within 2 days of your discharge please call the inpatient department phone number at [phone] .                  Pending Results     Date and Time Order Name Status Description    4/6/2018 0738 Fungus Culture, non-blood Preliminary     4/6/2018 0738 Fungus Culture, non-blood Preliminary     4/6/2018 0738 Anaerobic bacterial culture Preliminary     4/6/2018 0738 Anaerobic bacterial culture Preliminary     4/5/2018 1327 FUNGUS CULTURE Preliminary     4/5/2018 1327 ANAEROBIC BACTERIAL CULTURE Preliminary             Statement of Approval     Ordered          04/10/18 1323  I have reviewed and agree with all the recommendations and orders detailed in this document.  EFFECTIVE NOW     Approved and electronically signed by:  Norma Austin APRN CNP             Admission Information     Date & Time Provider Department Dept. Phone    4/5/2018 Brad Betts MD [facility] 8A [phone]      Your Vitals Were     Blood Pressure Pulse Temperature Respirations Pulse Oximetry       112/67 80 98  F (36.7  C) 16 97%       MyChart Information     [facility] lets you send messages to your doctor, view your test results, renew your prescriptions, schedule appointments and more. To sign up, go to [email or web address] . Click on \"Log in\" on the left side of the screen, which will take you to the Welcome page. Then click on \"Sign up Now\" on the right side of the page.     You will be asked to enter the access code listed below, as well as some personal information. Please follow the directions to create your username and password.     Your access code is: [email or web address]  Expires: 5/29/2018  7:30 AM     Your access code will "  in 90 days. If you need help or a new code, please call your [place] clinic or [phone].        Care EveryWhere ID     This is your Care EveryWhere ID. This could be used by other organizations to access your [place] medical records  [id number]        Equal Access to Services     DORIAN TOLENTINO : Hadii aad ku hadirmabrock Soomaali, waaxda luqadaha, qaybta kaalmada adeegyada, mendez rochaquinnmela mcnair. So [facility] [phone].    ATENCIÓN: Si habla español, tiene a sheridan disposición servicios gratuitos de asistencia lingüística. Markos al [phone].    We comply with applicable federal civil rights laws and [place] laws. We do not discriminate on the basis of race, color, national origin, age, disability, sex, sexual orientation, or gender identity.               Review of your medicines      START taking        Dose / Directions    amoxicillin-clavulanate 500-125 MG per tablet   Commonly known as:  AUGMENTIN   Indication:  Infection of the Skin and/or Related Soft Tissue        Dose:  1 tablet   Take 1 tablet by mouth 3 times daily for 14 days   Quantity:  42 tablet   Refills:  0       oxyCODONE IR 5 MG tablet   Commonly known as:  ROXICODONE        Dose:  5-10 mg   Take 1-2 tablets (5-10 mg) by mouth every 3 hours as needed for other (pain control or improvement in physical function. 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 [address]     Phone:  [phone]     amoxicillin-clavulanate 500-125 MG per tablet    sulfamethoxazole-trimethoprim 800-160 MG per tablet         Some of these will need a paper prescription and others can be bought over the counter. Ask your nurse if you have questions.     Bring a paper prescription for each of these medications     oxyCODONE IR 5 MG tablet                Protect others around you: Learn how to safely use, store and throw away your medicines at www.disposemymeds.org.        ANTIBIOTIC INSTRUCTION     You've Been Prescribed an Antibiotic - Now What?  Your healthcare team thinks that you or your loved one might have an infection. Some infections can be treated with antibiotics, which are powerful, life-saving drugs. Like all medications, antibiotics have side effects and should only be used when necessary. There are some important things you should know about your antibiotic treatment.      Your healthcare team may run tests before you start taking an antibiotic.    Your team may take samples (e.g., from your blood, urine or other areas) to run tests to look for bacteria. These test can be important to determine if you need an antibiotic at all and, if you do, which antibiotic will work best.      Within a few days, your healthcare team might change or even stop your antibiotic.    Your team may start you on an antibiotic while they are working to find out what is making you sick.    Your team might change your antibiotic because test results show that a different antibiotic would be better to treat your infection.    In some cases, once your team has more information, they learn that you do not need  an antibiotic at all. They may find out that you don't have an infection, or that the antibiotic you're taking won't work against your infection. For example, an infection caused by a virus can't be treated with antibiotics. Staying on an antibiotic when you don't need it is more likely to be harmful than helpful.      You may experience side effects from your antibiotic.    Like all medications, antibiotics have side effects. Some of these can be serious.    Let you healthcare team know if you have any known allergies when you are admitted to the hospital.    One significant side effect of nearly all antibiotics is the risk of severe and sometimes deadly diarrhea caused by Clostridium difficile (C. Difficile). This occurs when a person takes antibiotics because some good germs are destroyed. Antibiotic use allows C. diificile to take over, putting patients at high risk for this serious infection.    As a patient or caregiver, it is important to understand your or your loved one's antibiotic treatment. It is especially important for caregivers to speak up when patients can't speak for themselves. Here are some important questions to ask your healthcare team.    What infection is this antibiotic treating and how do you know I have that infection?    What side effects might occur from this antibiotic?    How long will I need to take this antibiotic?    Is it safe to take this antibiotic with other medications or supplements (e.g., vitamins) that I am taking?     Are there any special directions I need to know about taking this antibiotic? For example, should I take it with food?    How will I be monitored to know whether my infection is responding to the antibiotic?    What tests may help to make sure the right antibiotic is prescribed for me?      Information provided by:  www.cdc.gov/getsmart  U.S. Department of Health and Human Services  Centers for disease Control and Prevention  National Center for Emerging and  Zoonotic Infectious Diseases  Division of Healthcare Quality Promotion        Information about OPIOIDS     PRESCRIPTION OPIOIDS: WHAT YOU NEED TO KNOW    Prescription opioids can be used to help relieve moderate to severe pain and are often prescribed following a surgery or injury, or for certain health conditions. These medications can be an important part of treatment but also come with serious risks. It is important to work with your health care provider to make sure you are getting the safest, most effective care.    WHAT ARE THE RISKS AND SIDE EFFECTS OF OPIOID USE?  Prescription opioids carry serious risks of addiction and overdose, especially with prolonged use. An opioid overdose, often marked by slowed breathing can cause sudden death. The use of prescription opioids can have a number of side effects as well, even when taken as directed:      Tolerance - meaning you might need to take more of a medication for the same pain relief    Physical dependence - meaning you have symptoms of withdrawal when a medication is stopped    Increased sensitivity to pain    Constipation    Nausea, vomiting, and dry mouth    Sleepiness and dizziness    Confusion    Depression    Low levels of testosterone that can result in lower sex drive, energy, and strength    Itching and sweating    RISKS ARE GREATER WITH:    History of drug misuse, substance use disorder, or overdose    Mental health conditions (such as depression or anxiety)    Sleep apnea    Older age (65 years or older)    Pregnancy    Avoid alcohol while taking prescription opioids.   Also, unless specifically advised by your health care provider, medications to avoid include:    Benzodiazepines (such as Xanax or Valium)    Muscle relaxants (such as Soma or Flexeril)    Hypnotics (such as Ambien or Lunesta)    Other prescription opioids    KNOW YOUR OPTIONS:  Talk to your health care provider about ways to manage your pain that do not involve prescription opioids.  Some of these options may actually work better and have fewer risks and side effects:    Pain relievers such as acetaminophen, ibuprofen, and naproxen    Some medications that are also used for depression or seizures    Physical therapy and exercise    Cognitive behavioral therapy, a psychological, goal-directed approach, in which patients learn how to modify physical, behavioral, and emotional triggers of pain and stress    IF YOU ARE PRESCRIBED OPIOIDS FOR PAIN:    Never take opioids in greater amounts or more often than prescribed    Follow up with your primary health care provider and work together to create a plan on how to manage your pain.    Talk about ways to help manage your pain that do not involve prescription opioids    Talk about all concerns and side effects    Help prevent misuse and abuse    Never sell or share prescription opioids    Never use another person's prescription opioids    Store prescription opioids in a secure place and out of reach of others (this may include visitors, children, friends, and family)    Visit www.cdc.gov/drugoverdose to learn about risks of opioid abuse and overdose    If you believe you may be struggling with addiction, tell your health care provider and ask for guidance or call [facility]'s National Helpline at 8-750-087-HELP    LEARN MORE / www.cdc.gov/drugoverdose/prescribing/guideline.html    Safely dispose of unused prescription opioids: Find your local drug take-back programs and more information about the importance of safe disposal at www.doseofreality.[place].gov             Medication List: This is a list of all your medications and when to take them. Check marks below indicate your daily home schedule. Keep this list as a reference.      Medications           Morning Afternoon Evening Bedtime As Needed    amoxicillin-clavulanate 500-125 MG per tablet   Commonly known as:  AUGMENTIN   Take 1 tablet by mouth 3 times daily for 14 days   Last time this was given:  1  tablet on 4/10/2018 12:01 PM                                granisetron 1 MG tablet   Commonly known as:  KYTRIL   Take 1 tablet (1 mg) by mouth every 12 hours as needed for nausea                                oxyCODONE IR 5 MG tablet   Commonly known as:  ROXICODONE   Take 1-2 tablets (5-10 mg) by mouth every 3 hours as needed for other (pain control or improvement in physical function. Hold dose for analgesic side effects.)                                PROCHLORPERAZINE MALEATE PO   Take 10 mg by mouth                                sulfamethoxazole-trimethoprim 800-160 MG per tablet   Commonly known as:  BACTRIM DS/SEPTRA DS   Take 1 tablet by mouth 2 times daily for 14 days   Last time this was given:  1 tablet on 4/10/2018 12:01 PM

## 2018-04-05 NOTE — NURSING NOTE
FERN Plascenciaclarice Tapia is a 57 year old year old male with a chief complaint of Wound Check (Alpesh Patient being seen for Poss. infections of Right Thigh per Infusion Clinic (Same day apt.) Pt states that he has been experiencing drainage from the Top of his incision along with redness around his Biopsy Site. He states that there is no odar, and besides his Chemo, he hasn't felt any different. )        S = Situation:       What is going on with the patient? S/p biopsy right thigh    What is happening now, e.g. Chief complaints, acute change? Erythema and drainage from right thigh wound      B  = Background:      What is the clinical background or context?  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  Wound culture sent to lab   What factors led to this event?     Vital Signs: There were no vitals taken for this visit.    Pertinent history:  Patient has started treatment for Sarcoma.  Seen in Infusion Center today - chemotherapy was not given due to redness and drainage from right thigh wound    Abnormal findings: Redness and swelling right thigh      A  =  Assessment:        What do you think is going on? Wound infection right thigh          R =   Request or Recommendation:      What do you think should be done? Admit to 8A today   What action do you propose?  Needs I & D right thigh -  Pateint added to Dr. Betts's OR schedule tomorrow am for I&D

## 2018-04-05 NOTE — PROGRESS NOTES
Infusion Nursing Note:  Hiram Tapia presents today for Mesna Pump disconnect, Neulasta, labs.    Patient seen by provider today: No    Note: Pt arrived with reports that the past night did not go well, but that overall he is feeling better than yesterday. Denies any fevers/chills, SOB or chest pain. States his hip started drain last night, but that it was draining from the bottom of the incision. Writer examined incision site on right hip, which appeared red around the site and swollen as well. Pt and spouse stated they thought the redness was new. Writer voiced concern re: hip getting infected and that incision and area around it is showing potential infection. Called orthopedic triage in attempt for pt to see ortho today instead of tomorrow. Pt able to be seen by Dr. Schaefer today following infusion appointment.     Information given to pt and spouse on first time Neulasta injection. Instructed pt to start taking Claritin today.    Intravenous Access:  Implanted Port.    Treatment Conditions:  Lab Results   Component Value Date    HGB 11.8 04/05/2018     Lab Results   Component Value Date    WBC 2.7 04/05/2018      Lab Results   Component Value Date    ANEU 1.9 04/05/2018     Lab Results   Component Value Date     04/05/2018      Lab Results   Component Value Date     04/05/2018                   Lab Results   Component Value Date    POTASSIUM 3.6 04/05/2018           Lab Results   Component Value Date    MAG 2.7 04/05/2018            Lab Results   Component Value Date    CR 0.65 04/05/2018                   Lab Results   Component Value Date    JAYESH 9.0 04/05/2018                Lab Results   Component Value Date    BILITOTAL 0.2 04/05/2018           Lab Results   Component Value Date    ALBUMIN 2.6 04/05/2018                    Lab Results   Component Value Date    ALT 29 04/05/2018           Lab Results   Component Value Date    AST 25 04/05/2018       Post Infusion Assessment:  Mesna CADD pump  completely infused at 1140.   Patient tolerated injection without incident to left abdomen.  Blood return noted pre and post infusion.  Site patent and intact, free from redness, edema or discomfort.  No evidence of extravasations. Access discontinued per protocol.    Discharge Plan:   Prescription refills given for Compazine, Kytril (two pills that weren't delivered yesterday) and Claritin.  Copy of AVS reviewed with patient and/or family.  Patient will return 4/24 for next appointment. DALILA ArtCC to do close follow up with pt due to his ongoing concerns.  Patient discharged in stable condition accompanied by: wife.  Departure Mode: Wheelchair.  Face to Face time: 6 minutes.    Cleopatra Guzmna RN

## 2018-04-05 NOTE — TELEPHONE ENCOUNTER
Infusion nurse called ortho triage today about concerns of possible infection at drainage site of right thigh. He is DOS 3/8/18 S/P Biopsy right thigh tumor with Clohisy. Nurse reports increased drainage since yesterday, redness, and swelling. Discussed with Elise CONNER with [facility] who OK'd sooner appt with Silvano today for patient to be evaluated and treated. Patient will come for appt after infusion.

## 2018-04-05 NOTE — MR AVS SNAPSHOT
After Visit Summary   4/5/2018    Hiram Tapia    MRN: [id number]           Patient Information     Date Of Birth          1960        Visit Information        Provider Department      4/5/2018 12:00 PM Cuco Schaefer MD [facility]        Today's Diagnoses     Wound infection    -  1       Follow-ups after your visit        Your next 10 appointments already scheduled     Apr 06, 2018   Procedure with Brad Betts MD   [facility] [place], Same Day Surgery (--)    [address]  [facility] [place] [address] [phone]            Apr 21, 2018  8:45 AM CDT   (Arrive by 8:30 AM)   PET ONCOLOGY WHOLE BODY with UUPET1   [facility] PET CT ([facility])    [address] [address] [phone]           Tell your doctor:   If there is any chance you may be pregnant or if you are breastfeeding.   If you have problems lying in small spaces (claustrophobia). If you do, your doctor may give you medicine to help you relax. If you have diabetes:   Have your exam early in the morning. Your blood glucose will go up as the day goes by.   Your glucose level must be 180 or less at the start of the exam. Please take any medicines you need to ensure this blood glucose level.   If you are taking insulin in the morning take with breakfast by 6 am and schedule procedure between 12 and 2:15 pm.   If you are taking insulin at night take nightly dose, fast overnight, schedule procedure before 10 am.   If you take insulin both morning and night take morning dose by 6am and schedule procedure between 12 and 2:15 pm.   24 hours before your scan: Don t do any heavy exercise. (No jogging, aerobics or other workouts.) Exercise will make your pictures less accurate.  At least 7 hours before your scan, or the evening before if you have an early appointment: Eat a low carb, high protein meal (Lean meats, seafood, beans, soy, low-fat  dairy, eggs, nuts & seeds). 6 hours before your scan:   Stop all food and liquids (except water).   Do not chew gum or suck on mints.   If you need to take medicine with food, you may take it with a few crackers.  Please call your Imaging Department at your exam site with any questions.            Apr 24, 2018  8:30 AM CDT   Masonic Lab Draw with UC MASONIC LAB DRAW   [facility] Masonic Lab Draw ([facility])    [address] [place] [address]  [address]  [facility] [address]   [phone]            Apr 24, 2018  9:00 AM CDT   (Arrive by 8:45 AM)   Return Visit with Gaurang Johnson MD   [facility] ([facility])    [address] [place] [address]  [address]  [facility] [address]   [phone]            Apr 24, 2018  9:30 AM CDT   Infusion 180 with UC ONCOLOGY INFUSION, UC 16 ATC   [facility] ([facility])    [address] [place] [address]  [address]  [facility] [address]   [phone]            May 22, 2018 10:30 AM CDT   Masonic Lab Draw with UC MASONIC LAB DRAW   [facility] Masonic Lab Draw ([facility])    [address] [place] [address]  [address]  [facility] [address]   [phone]            May 22, 2018 11:00 AM CDT   (Arrive by 10:45 AM)   Return Visit with Gaurang Johnson MD   [facility] ([facility])    [address] [place] [address]  [address]  [facility] [address]   [phone]            May 22, 2018 12:30 PM CDT   Infusion 180 with UC ONCOLOGY INFUSION, UC 30 ATC   [facility] ([facility])    [address] [place] [address]  [address]  [facility] [address]   [phone]              Future tests that were ordered for you today     Open Standing Orders        Priority Remaining Interval Expires Ordered    NPO per Anesthesia Guidelines for Procedure/Surgery Except for: Meds Routine 1/1 DIET  EFFECTIVE MIDNIGHT  2018            Who to contact     Please call your clinic at [phone] to:    Ask questions about your health    Make or cancel appointments    Discuss your medicines    Learn about your test results    Speak to your doctor            Additional Information About Your Visit        MyChart Information     [employer] is an electronic gateway that provides easy, online access to your medical records. With [employer]izer, you can request a clinic appointment, read your test results, renew a prescription or communicate with your care team.     To sign up for [employer] visit the website at [email or web address]   You will be asked to enter the access code listed below, as well as some personal information. Please follow the directions to create your username and password.     Your access code is: [email or web address]  Expires: 2018  7:30 AM     Your access code will  in 90 days. If you need help or a new code, please contact your [facility] or call [phone] for assistance.        Care EveryWhere ID     This is your Care EveryWhere ID. This could be used by other organizations to access your [place] medical records  [id number]         Blood Pressure from Last 3 Encounters:   18 119/85   18 127/83   18 129/78    Weight from Last 3 Encounters:   18 97.3 kg (214 lb 9.6 oz)   18 95.6 kg (210 lb 12.2 oz)   18 96.8 kg (213 lb 4.8 oz)              We Performed the Following     Anaerobic bacterial culture [Aco665]     Cell count with differential fluid [Ngr743]     Fluid Culture Aerobic Bacterial     Fungus Culture, non-blood [Vie533]     Gram stain [Raz230]     Large Joint/Bursa injection and/or drainage - Unilateral (Shoulder, Knee) []     Mela-Operative Worksheet (Tumor/Adult Reconstruction: Knee/Hip/Fracture )        Primary Care Provider Office Phone # Fax #    Louisa Fry [phone]       [place]  [facility] [address]  [facility] [address]        Equal Access to Services     GISELYVONNE RANDA : Hadii troy ku alfonso Sonikita, wacorettada luqadaha, qaybta kaamandada nanettekonstantinshaye, mendez hicks gurwinderbecky rochaquinnmela mcnair. So [facility] [phone].    ATENCIÓN: Si habla español, tiene a sheridan disposición servicios gratuitos de asistencia lingüística. Llame al [phone].    We comply with applicable federal civil rights laws and [place] laws. We do not discriminate on the basis of race, color, national origin, age, disability, sex, sexual orientation, or gender identity.            Thank you!     Thank you for choosing [facility]THOP[facility]IC [facility]IC  for your care. Our goal is always to provide you with excellent care. Hearing back from our patients is one way we can continue to improve our services. Please take a few minutes to complete the written survey that you may receive in the mail after your visit with us. Thank you!             Your Updated Medication List - Protect others around you: Learn how to safely use, store and throw away your medicines at www.disposemymeds.org.          This list is accurate as of 4/5/18  5:00 PM.  Always use your most recent med list.                   Brand Name Dispense Instructions for use Diagnosis    granisetron 1 MG tablet    KYTRIL    30 tablet    Take 1 tablet (1 mg) by mouth every 12 hours as needed for nausea    Sarcoma of soft tissue (H)       PROCHLORPERAZINE MALEATE PO      Take 10 mg by mouth

## 2018-04-05 NOTE — MR AVS SNAPSHOT
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)        Pain of right lower extremity        Disruption of tissue around surgical drain, subsequent encounter        Personal history of tobacco use, presenting hazards to health           Follow-ups after your visit        Your next 10 appointments already scheduled     Apr 06, 2018 12:45 PM CDT   (Arrive by 12:30 PM)   Return Visit with Brad Betts MD   [facility] ([facility])    [address]  4th Floor  [facility] [id number] [phone]            Apr 21, 2018  8:45 AM CDT   (Arrive by 8:30 AM)   PET ONCOLOGY WHOLE BODY with UUPET1   [facility] [place] PET [facility] ([facility])    [address] [address] [phone]           Tell your doctor:   If there is any chance you may be pregnant or if you are breastfeeding.   If you have problems lying in small spaces (claustrophobia). If you do, your doctor may give you medicine to help you relax. If you have diabetes:   Have your exam early in the morning. Your blood glucose will go up as the day goes by.   Your glucose level must be 180 or less at the start of the exam. Please take any medicines you need to ensure this blood glucose level.   If you are taking insulin in the morning take with breakfast by 6 am and schedule procedure between 12 and 2:15 pm.   If you are taking insulin at night take nightly dose, fast overnight, schedule procedure before 10 am.   If you take insulin both morning and night take morning dose by 6am and schedule procedure between  12 and 2:15 pm.   24 hours before your scan: Don t do any heavy exercise. (No jogging, aerobics or other workouts.) Exercise will make your pictures less accurate.  At least 7 hours before your scan, or the evening before if you have an early appointment: Eat a low carb, high protein meal (Lean meats, seafood, beans, soy, low-fat dairy, eggs, nuts & seeds). 6 hours before your scan:   Stop all food and liquids (except water).   Do not chew gum or suck on mints.   If you need to take medicine with food, you may take it with a few crackers.  Please call your Imaging Department at your exam site with any questions.            Apr 24, 2018  8:30 AM CDT   Masonic Lab Draw with  MASONIC LAB DRAW   [facility])    [address] [place] [address]  [address]  [facility] [place] [address]   [phone]            Apr 24, 2018  9:00 AM CDT   (Arrive by 8:45 AM)   Return Visit with Gaurang Johnson MD   [facility] ([facility])    [address] [place] [address]  [address]  [facility] [place] [address]   [phone]            Apr 24, 2018  9:30 AM CDT   Infusion 180 with UC ONCOLOGY INFUSION, UC 16 ATC   [facility])    [address] [place] [address]  [address]  [facility] [place] [address]   [phone]            May 22, 2018 10:30 AM CDT   Masonic Lab Draw with UC MASONIC LAB DRAW   [facility] Masonic Lab Draw ([facility])    [address] [place] [address]  [address]  [facility] [place] [address]   [phone]            May 22, 2018 11:00 AM CDT   (Arrive by 10:45 AM)   Return Visit with Gaurang Johnson MD   [facility] ([facility])    [address] [place] [address]  [address]  [facility] [place] [address]   [phone]            May 22, 2018 12:30 PM CDT   Infusion 180 with UC ONCOLOGY INFUSION, UC 30 ATC   [facility] ([facility]  "Clinics and Surgery Center)    [address]  [address]  [facility] [address] [phone]              Who to contact     If you have questions or need follow up information about today's clinic visit or your schedule please contact [facility] directly at [phone].  Normal or non-critical lab and imaging results will be communicated to you by MyChart, letter or phone within 4 business days after the clinic has received the results. If you do not hear from us within 7 days, please contact the clinic through MyChart or phone. If you have a critical or abnormal lab result, we will notify you by phone as soon as possible.  Submit refill requests through [employer] or call your pharmacy and they will forward the refill request to us. Please allow 3 business days for your refill to be completed.          Additional Information About Your Visit        MyChart Information     [employer] lets you send messages to your doctor, view your test results, renew your prescriptions, schedule appointments and more. To sign up, go to [email or web address] . Click on \"Log in\" on the left side of the screen, which will take you to the Welcome page. Then click on \"Sign up Now\" on the right side of the page.     You will be asked to enter the access code listed below, as well as some personal information. Please follow the directions to create your username and password.     Your access code is: [email or web address]  Expires: 2018  7:30 AM     Your access code will  in 90 days. If you need help or a new code, please call your [place] clinic or [phone].        Care EveryWhere ID     This is your Care EveryWhere ID. This could be used by other organizations to access your [place] medical records  [id number]        Your Vitals Were     Pulse Temperature Respirations Pulse Oximetry          97 98.3  F (36.8  C) (Oral) 18 97%         Blood Pressure from Last 3 Encounters:   18 119/85 "   04/04/18 127/83   03/29/18 129/78    Weight from Last 3 Encounters:   03/29/18 97.3 kg (214 lb 9.6 oz)   03/28/18 95.6 kg (210 lb 12.2 oz)   03/27/18 96.8 kg (213 lb 4.8 oz)              We Performed the Following     CBC with platelets differential     Comprehensive metabolic panel     Magnesium     Phosphorus        Primary Care Provider Office Phone # Fax #    Louisa Fyr [phone]       [facility] [address]  [facility] [address]        Equal Access to Services     [facility]: Hadii troy martinez hadasho Soomaali, waaxda luqadaha, qaybta kaalmada cydney, mendez epps . So [facility] [phone].    ATENCIÓN: Si habla español, tiene a sheridan disposición servicios gratuitos de asistencia lingüística. [facility] [phone].    We comply with applicable federal civil rights laws and [place] laws. We do not discriminate on the basis of race, color, national origin, age, disability, sex, sexual orientation, or gender identity.            Thank you!     Thank you for choosing [facility]  for your care. Our goal is always to provide you with excellent care. Hearing back from our patients is one way we can continue to improve our services. Please take a few minutes to complete the written survey that you may receive in the mail after your visit with us. Thank you!             Your Updated Medication List - Protect others around you: Learn how to safely use, store and throw away your medicines at www.disposemymeds.org.          This list is accurate as of 4/5/18 12:00 PM.  Always use your most recent med list.                   Brand Name Dispense Instructions for use Diagnosis    granisetron 1 MG tablet    KYTRIL    30 tablet    Take 1 tablet (1 mg) by mouth every 12 hours as needed for nausea    Sarcoma of soft tissue (H)

## 2018-04-05 NOTE — LETTER
4/5/2018       RE: Hiram Tapia  [address]  [facility] [address]     Dear Colleague,    Thank you for referring your patient, Hiram Tapia, to the [facility]THOP[facility] at [facility]. Please see a copy of my visit note below.    Diagnosis: High-grade soft tissue sarcoma right thigh      Treatment: Neoadjuvant chemotherapy planned. Deferred dosing today due to wound erythema and drainage noted at infusion center     Hiram was seen today for assessment of his wound and wound drainage.  He has serosanguinous drainage from the distal aspect of his wound, and erythema concerning for underlying infection.      Aspiration reveals small 5 cc fluid collection at the distal aspect of the wound, with cloudy sanguinous aspirate returned    Impression:   Post operative infection of the right thigh.  Would recommend surgical debridement based on current appearance.  Patient will be admitted to the hospital and scheduled for surgical debridement with Dr Betts tomorrow.     PROCEDURE DATE: 4/5/2018    PROCEDURE NOTE:    After obtaining informed consent, under sterile precautions, the right thigh wound was aspirated. There were no complications.  5 ml of cloudy, sanguinous fluid was obtained and sent for analysis.      Signed:   Emerson Medina MD  Adult Reconstructive Surgery Fellow  Dept Orthopaedic Surgery, [facility]        Again, thank you for allowing me to participate in the care of your patient.      Sincerely,    Cuco Schaefer MD

## 2018-04-06 ENCOUNTER — HOME INFUSION (PRE-WILLOW HOME INFUSION) (OUTPATIENT)
Dept: PHARMACY | Facility: CLINIC | Age: 58
End: 2018-04-06

## 2018-04-06 ENCOUNTER — TELEPHONE (OUTPATIENT)
Dept: ORTHOPEDICS | Facility: CLINIC | Age: 58
End: 2018-04-06

## 2018-04-06 ENCOUNTER — ANESTHESIA (OUTPATIENT)
Dept: SURGERY | Facility: CLINIC | Age: 58
DRG: 857 | End: 2018-04-06
Payer: COMMERCIAL

## 2018-04-06 ENCOUNTER — MEDICAL CORRESPONDENCE (OUTPATIENT)
Dept: HEALTH INFORMATION MANAGEMENT | Facility: CLINIC | Age: 58
End: 2018-04-06

## 2018-04-06 ENCOUNTER — SURGERY (OUTPATIENT)
Age: 58
End: 2018-04-06

## 2018-04-06 DIAGNOSIS — T14.8XXA WOUND INFECTION: Primary | ICD-10-CM

## 2018-04-06 DIAGNOSIS — L08.9 WOUND INFECTION: Primary | ICD-10-CM

## 2018-04-06 PROBLEM — C49.9 SARCOMA (H): Status: ACTIVE | Noted: 2018-04-06

## 2018-04-06 LAB
ANION GAP SERPL CALCULATED.3IONS-SCNC: 8 MMOL/L (ref 3–14)
ANISOCYTOSIS BLD QL SMEAR: SLIGHT
BASOPHILS # BLD AUTO: 0 10E9/L (ref 0–0.2)
BASOPHILS NFR BLD AUTO: 0 %
BUN SERPL-MCNC: 15 MG/DL (ref 7–30)
BURR CELLS BLD QL SMEAR: SLIGHT
CALCIUM SERPL-MCNC: 8.4 MG/DL (ref 8.5–10.1)
CHLORIDE SERPL-SCNC: 104 MMOL/L (ref 94–109)
CO2 SERPL-SCNC: 25 MMOL/L (ref 20–32)
CREAT SERPL-MCNC: 0.74 MG/DL (ref 0.66–1.25)
DIFFERENTIAL METHOD BLD: ABNORMAL
EOSINOPHIL # BLD AUTO: 0.1 10E9/L (ref 0–0.7)
EOSINOPHIL NFR BLD AUTO: 3.5 %
ERYTHROCYTE [DISTWIDTH] IN BLOOD BY AUTOMATED COUNT: 13.7 % (ref 10–15)
GFR SERPL CREATININE-BSD FRML MDRD: >90 ML/MIN/1.7M2
GLUCOSE SERPL-MCNC: 98 MG/DL (ref 70–99)
HCT VFR BLD AUTO: 33.3 % (ref 40–53)
HGB BLD-MCNC: 10.6 G/DL (ref 13.3–17.7)
LYMPHOCYTES # BLD AUTO: 0.8 10E9/L (ref 0.8–5.3)
LYMPHOCYTES NFR BLD AUTO: 39.5 %
MCH RBC QN AUTO: 25.8 PG (ref 26.5–33)
MCHC RBC AUTO-ENTMCNC: 31.8 G/DL (ref 31.5–36.5)
MCV RBC AUTO: 81 FL (ref 78–100)
MICROCYTES BLD QL SMEAR: PRESENT
MONOCYTES # BLD AUTO: 0 10E9/L (ref 0–1.3)
MONOCYTES NFR BLD AUTO: 0 %
NEUTROPHILS # BLD AUTO: 1.1 10E9/L (ref 1.6–8.3)
NEUTROPHILS NFR BLD AUTO: 57 %
PLATELET # BLD AUTO: 307 10E9/L (ref 150–450)
PLATELET # BLD EST: NORMAL 10*3/UL
POIKILOCYTOSIS BLD QL SMEAR: SLIGHT
POTASSIUM SERPL-SCNC: 3.8 MMOL/L (ref 3.4–5.3)
RBC # BLD AUTO: 4.11 10E12/L (ref 4.4–5.9)
SODIUM SERPL-SCNC: 137 MMOL/L (ref 133–144)
WBC # BLD AUTO: 1.9 10E9/L (ref 4–11)

## 2018-04-06 PROCEDURE — 40000170 ZZH STATISTIC PRE-PROCEDURE ASSESSMENT II: Performed by: ORTHOPAEDIC SURGERY

## 2018-04-06 PROCEDURE — 27210794 ZZH OR GENERAL SUPPLY STERILE: Performed by: ORTHOPAEDIC SURGERY

## 2018-04-06 PROCEDURE — 71000015 ZZH RECOVERY PHASE 1 LEVEL 2 EA ADDTL HR: Performed by: ORTHOPAEDIC SURGERY

## 2018-04-06 PROCEDURE — 25000128 H RX IP 250 OP 636: Performed by: ANESTHESIOLOGY

## 2018-04-06 PROCEDURE — 36000055 ZZH SURGERY LEVEL 2 W FLUORO 1ST 30 MIN - UMMC: Performed by: ORTHOPAEDIC SURGERY

## 2018-04-06 PROCEDURE — C9399 UNCLASSIFIED DRUGS OR BIOLOG: HCPCS | Performed by: NURSE ANESTHETIST, CERTIFIED REGISTERED

## 2018-04-06 PROCEDURE — 25000128 H RX IP 250 OP 636: Performed by: NURSE ANESTHETIST, CERTIFIED REGISTERED

## 2018-04-06 PROCEDURE — 25000125 ZZHC RX 250: Performed by: NURSE ANESTHETIST, CERTIFIED REGISTERED

## 2018-04-06 PROCEDURE — 25000128 H RX IP 250 OP 636: Performed by: ORTHOPAEDIC SURGERY

## 2018-04-06 PROCEDURE — 71000014 ZZH RECOVERY PHASE 1 LEVEL 2 FIRST HR: Performed by: ORTHOPAEDIC SURGERY

## 2018-04-06 PROCEDURE — 36000053 ZZH SURGERY LEVEL 2 EA 15 ADDTL MIN - UMMC: Performed by: ORTHOPAEDIC SURGERY

## 2018-04-06 PROCEDURE — 37000009 ZZH ANESTHESIA TECHNICAL FEE, EACH ADDTL 15 MIN: Performed by: ORTHOPAEDIC SURGERY

## 2018-04-06 PROCEDURE — 85025 COMPLETE CBC W/AUTO DIFF WBC: CPT | Performed by: SPECIALIST

## 2018-04-06 PROCEDURE — 12000008 ZZH R&B INTERMEDIATE UMMC

## 2018-04-06 PROCEDURE — 27210995 ZZH RX 272: Performed by: ORTHOPAEDIC SURGERY

## 2018-04-06 PROCEDURE — E2402 NEG PRESS WOUND THERAPY PUMP: HCPCS

## 2018-04-06 PROCEDURE — 87102 FUNGUS ISOLATION CULTURE: CPT | Performed by: ORTHOPAEDIC SURGERY

## 2018-04-06 PROCEDURE — 25000128 H RX IP 250 OP 636: Performed by: SPECIALIST

## 2018-04-06 PROCEDURE — 87077 CULTURE AEROBIC IDENTIFY: CPT | Performed by: ORTHOPAEDIC SURGERY

## 2018-04-06 PROCEDURE — 36415 COLL VENOUS BLD VENIPUNCTURE: CPT | Performed by: SPECIALIST

## 2018-04-06 PROCEDURE — 37000008 ZZH ANESTHESIA TECHNICAL FEE, 1ST 30 MIN: Performed by: ORTHOPAEDIC SURGERY

## 2018-04-06 PROCEDURE — 0KBQ0ZZ EXCISION OF RIGHT UPPER LEG MUSCLE, OPEN APPROACH: ICD-10-PCS | Performed by: ORTHOPAEDIC SURGERY

## 2018-04-06 PROCEDURE — 25000132 ZZH RX MED GY IP 250 OP 250 PS 637: Performed by: PHYSICIAN ASSISTANT

## 2018-04-06 PROCEDURE — 87070 CULTURE OTHR SPECIMN AEROBIC: CPT | Performed by: ORTHOPAEDIC SURGERY

## 2018-04-06 PROCEDURE — 99232 SBSQ HOSP IP/OBS MODERATE 35: CPT | Performed by: INTERNAL MEDICINE

## 2018-04-06 PROCEDURE — 80048 BASIC METABOLIC PNL TOTAL CA: CPT | Performed by: SPECIALIST

## 2018-04-06 PROCEDURE — 25000128 H RX IP 250 OP 636

## 2018-04-06 PROCEDURE — 25000128 H RX IP 250 OP 636: Performed by: STUDENT IN AN ORGANIZED HEALTH CARE EDUCATION/TRAINING PROGRAM

## 2018-04-06 PROCEDURE — 87075 CULTR BACTERIA EXCEPT BLOOD: CPT | Performed by: ORTHOPAEDIC SURGERY

## 2018-04-06 RX ORDER — NALOXONE HYDROCHLORIDE 0.4 MG/ML
.1-.4 INJECTION, SOLUTION INTRAMUSCULAR; INTRAVENOUS; SUBCUTANEOUS
Status: DISCONTINUED | OUTPATIENT
Start: 2018-04-06 | End: 2018-04-06

## 2018-04-06 RX ORDER — ONDANSETRON 2 MG/ML
4 INJECTION INTRAMUSCULAR; INTRAVENOUS EVERY 30 MIN PRN
Status: DISCONTINUED | OUTPATIENT
Start: 2018-04-06 | End: 2018-04-06 | Stop reason: HOSPADM

## 2018-04-06 RX ORDER — ONDANSETRON 4 MG/1
4 TABLET, ORALLY DISINTEGRATING ORAL EVERY 30 MIN PRN
Status: DISCONTINUED | OUTPATIENT
Start: 2018-04-06 | End: 2018-04-06 | Stop reason: HOSPADM

## 2018-04-06 RX ORDER — ONDANSETRON 2 MG/ML
4 INJECTION INTRAMUSCULAR; INTRAVENOUS EVERY 6 HOURS PRN
Status: DISCONTINUED | OUTPATIENT
Start: 2018-04-06 | End: 2018-04-10 | Stop reason: HOSPADM

## 2018-04-06 RX ORDER — AMOXICILLIN 250 MG
2 CAPSULE ORAL 2 TIMES DAILY
Status: DISCONTINUED | OUTPATIENT
Start: 2018-04-06 | End: 2018-04-10 | Stop reason: HOSPADM

## 2018-04-06 RX ORDER — LIDOCAINE 40 MG/G
CREAM TOPICAL
Status: DISCONTINUED | OUTPATIENT
Start: 2018-04-06 | End: 2018-04-10 | Stop reason: HOSPADM

## 2018-04-06 RX ORDER — AMOXICILLIN 250 MG
1 CAPSULE ORAL 2 TIMES DAILY
Status: DISCONTINUED | OUTPATIENT
Start: 2018-04-06 | End: 2018-04-10 | Stop reason: HOSPADM

## 2018-04-06 RX ORDER — FENTANYL CITRATE 50 UG/ML
25-50 INJECTION, SOLUTION INTRAMUSCULAR; INTRAVENOUS
Status: DISCONTINUED | OUTPATIENT
Start: 2018-04-06 | End: 2018-04-06 | Stop reason: HOSPADM

## 2018-04-06 RX ORDER — OXYCODONE HYDROCHLORIDE 5 MG/1
5 TABLET ORAL EVERY 4 HOURS PRN
Status: DISCONTINUED | OUTPATIENT
Start: 2018-04-06 | End: 2018-04-06

## 2018-04-06 RX ORDER — PROPOFOL 10 MG/ML
INJECTION, EMULSION INTRAVENOUS PRN
Status: DISCONTINUED | OUTPATIENT
Start: 2018-04-06 | End: 2018-04-06

## 2018-04-06 RX ORDER — HYDROMORPHONE HYDROCHLORIDE 1 MG/ML
.3-.5 INJECTION, SOLUTION INTRAMUSCULAR; INTRAVENOUS; SUBCUTANEOUS
Status: DISCONTINUED | OUTPATIENT
Start: 2018-04-06 | End: 2018-04-10 | Stop reason: HOSPADM

## 2018-04-06 RX ORDER — OXYCODONE HYDROCHLORIDE 5 MG/1
5-10 TABLET ORAL
Status: DISCONTINUED | OUTPATIENT
Start: 2018-04-06 | End: 2018-04-10 | Stop reason: HOSPADM

## 2018-04-06 RX ORDER — ACETAMINOPHEN 325 MG/1
650 TABLET ORAL EVERY 4 HOURS PRN
Status: DISCONTINUED | OUTPATIENT
Start: 2018-04-09 | End: 2018-04-10 | Stop reason: HOSPADM

## 2018-04-06 RX ORDER — HYDROMORPHONE HYDROCHLORIDE 1 MG/ML
.3-.5 INJECTION, SOLUTION INTRAMUSCULAR; INTRAVENOUS; SUBCUTANEOUS EVERY 5 MIN PRN
Status: DISCONTINUED | OUTPATIENT
Start: 2018-04-06 | End: 2018-04-06 | Stop reason: HOSPADM

## 2018-04-06 RX ORDER — ONDANSETRON 4 MG/1
4 TABLET, ORALLY DISINTEGRATING ORAL EVERY 6 HOURS PRN
Status: DISCONTINUED | OUTPATIENT
Start: 2018-04-06 | End: 2018-04-06

## 2018-04-06 RX ORDER — SODIUM CHLORIDE, SODIUM LACTATE, POTASSIUM CHLORIDE, CALCIUM CHLORIDE 600; 310; 30; 20 MG/100ML; MG/100ML; MG/100ML; MG/100ML
INJECTION, SOLUTION INTRAVENOUS CONTINUOUS PRN
Status: DISCONTINUED | OUTPATIENT
Start: 2018-04-06 | End: 2018-04-06

## 2018-04-06 RX ORDER — OXYCODONE HYDROCHLORIDE 5 MG/1
5-10 TABLET ORAL EVERY 4 HOURS PRN
Status: DISCONTINUED | OUTPATIENT
Start: 2018-04-06 | End: 2018-04-06

## 2018-04-06 RX ORDER — LIDOCAINE HYDROCHLORIDE 20 MG/ML
INJECTION, SOLUTION INFILTRATION; PERINEURAL PRN
Status: DISCONTINUED | OUTPATIENT
Start: 2018-04-06 | End: 2018-04-06

## 2018-04-06 RX ORDER — SODIUM CHLORIDE, SODIUM LACTATE, POTASSIUM CHLORIDE, CALCIUM CHLORIDE 600; 310; 30; 20 MG/100ML; MG/100ML; MG/100ML; MG/100ML
INJECTION, SOLUTION INTRAVENOUS CONTINUOUS
Status: DISCONTINUED | OUTPATIENT
Start: 2018-04-06 | End: 2018-04-06 | Stop reason: HOSPADM

## 2018-04-06 RX ORDER — FENTANYL CITRATE 50 UG/ML
INJECTION, SOLUTION INTRAMUSCULAR; INTRAVENOUS PRN
Status: DISCONTINUED | OUTPATIENT
Start: 2018-04-06 | End: 2018-04-06

## 2018-04-06 RX ORDER — ACETAMINOPHEN 325 MG/1
975 TABLET ORAL EVERY 8 HOURS
Status: DISPENSED | OUTPATIENT
Start: 2018-04-06 | End: 2018-04-09

## 2018-04-06 RX ORDER — ONDANSETRON 2 MG/ML
INJECTION INTRAMUSCULAR; INTRAVENOUS PRN
Status: DISCONTINUED | OUTPATIENT
Start: 2018-04-06 | End: 2018-04-06

## 2018-04-06 RX ORDER — MAGNESIUM HYDROXIDE 1200 MG/15ML
LIQUID ORAL PRN
Status: DISCONTINUED | OUTPATIENT
Start: 2018-04-06 | End: 2018-04-06 | Stop reason: HOSPADM

## 2018-04-06 RX ORDER — GABAPENTIN 100 MG/1
300 CAPSULE ORAL 2 TIMES DAILY
Status: DISPENSED | OUTPATIENT
Start: 2018-04-06 | End: 2018-04-09

## 2018-04-06 RX ADMIN — SODIUM CHLORIDE, POTASSIUM CHLORIDE, SODIUM LACTATE AND CALCIUM CHLORIDE 1000 ML: 600; 310; 30; 20 INJECTION, SOLUTION INTRAVENOUS at 17:45

## 2018-04-06 RX ADMIN — SUGAMMADEX 190 MG: 100 INJECTION, SOLUTION INTRAVENOUS at 07:47

## 2018-04-06 RX ADMIN — VANCOMYCIN HYDROCHLORIDE 1500 MG: 500 INJECTION, POWDER, LYOPHILIZED, FOR SOLUTION INTRAVENOUS at 20:55

## 2018-04-06 RX ADMIN — LIDOCAINE HYDROCHLORIDE 80 MG: 20 INJECTION, SOLUTION INFILTRATION; PERINEURAL at 07:03

## 2018-04-06 RX ADMIN — SENNOSIDES AND DOCUSATE SODIUM 2 TABLET: 8.6; 5 TABLET ORAL at 20:12

## 2018-04-06 RX ADMIN — GABAPENTIN 300 MG: 100 CAPSULE ORAL at 09:42

## 2018-04-06 RX ADMIN — SODIUM CHLORIDE 1000 ML: 900 IRRIGANT IRRIGATION at 08:25

## 2018-04-06 RX ADMIN — FENTANYL CITRATE 25 MCG: 50 INJECTION, SOLUTION INTRAMUSCULAR; INTRAVENOUS at 08:18

## 2018-04-06 RX ADMIN — PHENYLEPHRINE HYDROCHLORIDE 100 MCG: 10 INJECTION, SOLUTION INTRAMUSCULAR; INTRAVENOUS; SUBCUTANEOUS at 07:26

## 2018-04-06 RX ADMIN — FENTANYL CITRATE 50 MCG: 50 INJECTION, SOLUTION INTRAMUSCULAR; INTRAVENOUS at 08:27

## 2018-04-06 RX ADMIN — PROPOFOL 200 MG: 10 INJECTION, EMULSION INTRAVENOUS at 07:03

## 2018-04-06 RX ADMIN — PIPERACILLIN SODIUM AND TAZOBACTAM SODIUM 3.38 G: 3; .375 INJECTION, POWDER, LYOPHILIZED, FOR SOLUTION INTRAVENOUS at 03:45

## 2018-04-06 RX ADMIN — PHENYLEPHRINE HYDROCHLORIDE 100 MCG: 10 INJECTION, SOLUTION INTRAMUSCULAR; INTRAVENOUS; SUBCUTANEOUS at 07:51

## 2018-04-06 RX ADMIN — HYDROMORPHONE HYDROCHLORIDE 0.5 MG: 1 INJECTION, SOLUTION INTRAMUSCULAR; INTRAVENOUS; SUBCUTANEOUS at 09:05

## 2018-04-06 RX ADMIN — PHENYLEPHRINE HYDROCHLORIDE 100 MCG: 10 INJECTION, SOLUTION INTRAMUSCULAR; INTRAVENOUS; SUBCUTANEOUS at 07:19

## 2018-04-06 RX ADMIN — VANCOMYCIN HYDROCHLORIDE 1500 MG: 500 INJECTION, POWDER, LYOPHILIZED, FOR SOLUTION INTRAVENOUS at 08:42

## 2018-04-06 RX ADMIN — FENTANYL CITRATE 50 MCG: 50 INJECTION, SOLUTION INTRAMUSCULAR; INTRAVENOUS at 07:00

## 2018-04-06 RX ADMIN — ONDANSETRON 4 MG: 2 INJECTION INTRAMUSCULAR; INTRAVENOUS at 07:45

## 2018-04-06 RX ADMIN — SENNOSIDES AND DOCUSATE SODIUM 1 TABLET: 8.6; 5 TABLET ORAL at 11:51

## 2018-04-06 RX ADMIN — PHENYLEPHRINE HYDROCHLORIDE 100 MCG: 10 INJECTION, SOLUTION INTRAMUSCULAR; INTRAVENOUS; SUBCUTANEOUS at 07:45

## 2018-04-06 RX ADMIN — SODIUM CHLORIDE, POTASSIUM CHLORIDE, SODIUM LACTATE AND CALCIUM CHLORIDE 1000 ML: 600; 310; 30; 20 INJECTION, SOLUTION INTRAVENOUS at 05:35

## 2018-04-06 RX ADMIN — FENTANYL CITRATE 25 MCG: 50 INJECTION, SOLUTION INTRAMUSCULAR; INTRAVENOUS at 08:36

## 2018-04-06 RX ADMIN — PHENYLEPHRINE HYDROCHLORIDE 100 MCG: 10 INJECTION, SOLUTION INTRAMUSCULAR; INTRAVENOUS; SUBCUTANEOUS at 07:39

## 2018-04-06 RX ADMIN — HYDROMORPHONE HYDROCHLORIDE 0.5 MG: 1 INJECTION, SOLUTION INTRAMUSCULAR; INTRAVENOUS; SUBCUTANEOUS at 08:48

## 2018-04-06 RX ADMIN — SODIUM CHLORIDE, PRESERVATIVE FREE 5 ML: 5 INJECTION INTRAVENOUS at 03:04

## 2018-04-06 RX ADMIN — PIPERACILLIN SODIUM AND TAZOBACTAM SODIUM 3.38 G: 3; .375 INJECTION, POWDER, LYOPHILIZED, FOR SOLUTION INTRAVENOUS at 20:08

## 2018-04-06 RX ADMIN — PIPERACILLIN SODIUM AND TAZOBACTAM SODIUM 3.38 G: 3; .375 INJECTION, POWDER, LYOPHILIZED, FOR SOLUTION INTRAVENOUS at 11:52

## 2018-04-06 RX ADMIN — PHENYLEPHRINE HYDROCHLORIDE 100 MCG: 10 INJECTION, SOLUTION INTRAMUSCULAR; INTRAVENOUS; SUBCUTANEOUS at 07:29

## 2018-04-06 RX ADMIN — ROCURONIUM BROMIDE 50 MG: 10 INJECTION INTRAVENOUS at 07:03

## 2018-04-06 RX ADMIN — MIDAZOLAM 2 MG: 1 INJECTION INTRAMUSCULAR; INTRAVENOUS at 06:54

## 2018-04-06 RX ADMIN — FENTANYL CITRATE 50 MCG: 50 INJECTION, SOLUTION INTRAMUSCULAR; INTRAVENOUS at 07:30

## 2018-04-06 RX ADMIN — FILGRASTIM 480 MCG: 480 INJECTION, SOLUTION INTRAVENOUS; SUBCUTANEOUS at 20:10

## 2018-04-06 NOTE — BRIEF OP NOTE
[facility] Brief Operative Note    Pre-operative diagnosis: Wound Infection, Sarcoma Right Thigh   Post-operative diagnosis Same   Procedure: Procedure(s):  Irrigation And Debridement Right Thigh  - Wound Class: II-Clean Contaminated   Surgeon(s): Surgeon(s) and Role:     * Brad Betts MD - Primary  Brittany Santos PA-C, Assisting   Estimated blood loss: 5 mL    Specimens:   ID Type Source Tests Collected by Time Destination   1 :  Fluid Leg, Right ANAEROBIC BACTERIAL CULTURE, FLUID CULTURE AEROBIC BACTERIAL, FUNGUS CULTURE Brad Betts MD 4/6/2018  7:35 AM    2 : 1 and 2 Culture Right Thigh Wound Fluid Leg, Right ANAEROBIC BACTERIAL CULTURE, FLUID CULTURE AEROBIC BACTERIAL, FUNGUS CULTURE Brad Betts MD 4/6/2018  7:38 AM       Findings: Large amount of fluid and necrotic tissue, minimal pus, large empty space in previous sarcoma bed    Post-op Plan: Admit for abx, pt high risk for continued infection d/t just finishing chemo - continue IV Vanco and Zosyn  WB status:  FWBAT  Device:  Wound Vac Drain - continuous suction 100mmHg  DVT Prophylaxis:  Not needed   Follow-up:  Surgery Monday for repeat I&D of right thigh wound

## 2018-04-06 NOTE — CONSULTS
[facility] Internal Medicine Consultation    Hiram Tapia MRN# [id number]   Age: 57 year old YOB: 1960   Date of Admission: 4/5/2018     Reason for consult: Medical management        Requesting physician        Level of consult: Consult, follow and place orders           Assessment and Plan:     High-grade soft tissue sarcoma right thigh  Planned for surgical debridement with Dr Betts tomorrow  Ok to proceed for surgery   Continue zosyn and vancomycin   No fever and leukopenia noted   Awaiting on right thigh aspirate culture before finalizing antibiotics    Bi cytopenia with leucopenia and anemia  Suspect from ongoing chemo with ifosfamide  Watch for neutropenia     Code status: full code     DVT prophylaxis: Pneumatic compression device             Chief Complaint:     History is obtained from the patient          History of Present Illness:   This patient is a 57 year old male with a significant past medical history of high grade sarcome of the right thigh currently on Ifosfamide along with mesna for chemotherapy who presents with serosanguinous drainage from the distal aspect of his wound, and erythema concerning for underlying infection  pt denies any chest pain, nausea, SOB, dizziness, headache, palpations.  Issues with accessing his chest port during exam noted             Past Medical History:     Past Medical History:   Diagnosis Date     Sarcoma (H)              Past Surgical History:     Past Surgical History:   Procedure Laterality Date     EXCISE SOFT TISSUE TUMOR THIGH Right 3/8/2018    Procedure: EXCISE SOFT TISSUE TUMOR THIGH;  Biopsy Right Thigh Tumor;  Surgeon: Brad Betts MD;  Location:  OR     INSERT PORT VASCULAR ACCESS N/A 3/28/2018    Procedure: INSERT PORT VASCULAR ACCESS;  Vascular Access Port Insertion with C-arm;  Surgeon: Sarah Bowie MD;  Location: [facility] OR     STRABISMUS SURGERY      as a child             Social History:     Social History      Social History     Marital status:      Spouse name: N/A     Number of children: N/A     Years of education: N/A     Occupational History     Not on file.     Social History Main Topics     Smoking status: Former Smoker     Packs/day: 1.00     Years: 10.00     Types: Cigarettes     Start date: 1/1/1975     Quit date: 1/1/1990     Smokeless tobacco: Never Used     Alcohol use 8.4 oz/week     14 Cans of beer per week     Drug use: No     Sexual activity: Not Currently     Partners: Female     Other Topics Concern     Not on file     Social History Narrative             Family History:     Family History   Problem Relation Age of Onset     Leukemia Father      Family history reviewed and updated in [facility]          Immunizations:   Immunizations are up to date          Allergies:   No Known Allergies          Medications:     Prescriptions Prior to Admission   Medication Sig Dispense Refill Last Dose     PROCHLORPERAZINE MALEATE PO Take 10 mg by mouth   Taking     granisetron (KYTRIL) 1 MG tablet Take 1 tablet (1 mg) by mouth every 12 hours as needed for nausea 30 tablet 3 Taking             Review of Systems:   The Review of Systems is negative other than noted in the HPI    B/P: 142/86, T: 98, P: 107, R: 16    General: Alert, interactive, NAD.   HEENT: AT/NC. PERRLA. Anicteric.Moist MM.    Neck: Supple. No JVD. No Lymphadenopathy.  Heart/CVS: Normal S1 and S2. Regular. No m/r/g .   Chest/Respi: Non labored breathing. CTA BL.   Abdomen/GI: Soft, non distended, non tender. No g/r/r.   Extremities/MSK: Distal pulses 2+, well perfused. right thigh with swelling and drainage and surrounding erythema noted   Neuro: Alert and oriented x4. Cranial nerves II-XII are grossly intact.    Skin:  No new rash over exposed areas.             Data:   All laboratory data reviewed  All cardiac studies reviewed by me.  All imaging studies reviewed by me.     Attestation:  I have reviewed today's vital signs, notes, medications,  labs and imaging.    Herb Zaman MD

## 2018-04-06 NOTE — PLAN OF CARE
"Problem: Patient Care Overview  Goal: Individualization & Mutuality  Pt. admitted from PACU at 10:02AM and arrived with personal belongings. Report was taken from DALILA Richardson in PACU. Pt. is A&Ox4. Pt declined CAPNOGRAPHY. VSS. 02 sats= 95% on RA. Lung sounds= Clear bilaterally with both anterior and posterior. Bowel sounds are Hyperactive in all 4 quadrants. CMS and Neuro's are intact. Denies numbness and tingling in all extremities. Has occasional pain in the right leg and pt declined narcotic pain medication when offered. Pt. denied nausea, CP, SOB, lightheadedness, and dizziness. Is on a Regular diet and pt does not have an appetite. Right upper leg Wound-Vac dressing is C/D/I with a moderate amount of drainage and set to 125mmHg Continuous. Pt has small bruises on the right upper thigh area that are C/D/I, pt stated, \"I had blisters on my right leg, so that is what that bruising is.\" Pt had a urine occurrence in the bathroom and was bladder scanned with 167ml's PVR. PIV is patent and infusing in right upper Port-A-Cath. PCD's are in place to BLE's. Pt. educated on use and purpose of the Incentive Spirometer. Pt. is oriented to the room and call light system and the call light is within reach. Continue to monitor.            "

## 2018-04-06 NOTE — PROGRESS NOTES
Discussed with Dr Sims.    Patient just finished chemotherapy, so expect white count and ANC to drop over next 8 days to very low levels.    We will start neupogen 480mcg daily per Heme/Onc + pharmacy recs to hopefully stop it from dropping quite as low, though don't expect to prevent a significant decline.     Will need to monitor patient closely for signs of disseminated infection, as will be significantly immunocompromised during this period.       Maurice Womack MD  Orthopaedic Surgery PGY-4  Pager:  [phone]

## 2018-04-06 NOTE — ANESTHESIA PREPROCEDURE EVALUATION
Anesthesia Evaluation     .             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    ASA Status:  2 .    NPO Status:  > 8 hours    Plan for General and ETT with Intravenous induction. Maintenance will be TIVA.    PONV prophylaxis:  Ondansetron (or other 5HT-3) and Dexamethasone or Solumedrol       Postoperative Care  Postoperative pain management:  IV analgesics, Oral pain medications and Multi-modal analgesia.      Consents  Anesthetic plan, risks, benefits and alternatives discussed with:  Patient..                          .

## 2018-04-06 NOTE — OR NURSING
PACU to Inpatient Nursing Handoff    Patient Hiram Tapia is a 57 year old male who speaks English.   Procedure Procedure(s):  Irrigation And Debridement Right Thigh  - Wound Class: II-Clean Contaminated   Surgeon(s) Primary: Brad Betts MD  Assisting: Rashmi Santos PA-C     No Known Allergies    Isolation  [unfilled]     Past Medical History   has a past medical history of Sarcoma (H).    Anesthesia General   Dermatome Level     Preop Meds Not applicable   Nerve block Not applicable   Intraop Meds fentanyl (Sublimaze): 100 mcg total  ondansetron (Zofran): last given at 0745   Local Meds No   Antibiotics vancomycin (Vancocin) - last given at 0840     Pain Patient Currently in Pain: yes  Comfort: comfortably manageable  Pain Control: partially effective   PACU meds  fentanyl (Sublimaze): 100 mcg (total dose) last given at 0845   hydromorphone (Dilaudid): 1 mg (total dose) last given at 0905    PCA / epidural No   Capnography  yes   Telemetry ECG Rhythm: Normal sinus rhythm   Inpatient Telemetry Monitor Ordered? No        Labs Glucose Lab Results   Component Value Date    GLC 98 04/06/2018       Hgb Lab Results   Component Value Date    HGB 10.6 04/06/2018       INR No results found for: INR   PACU Imaging Not applicable     Wound/Incision Negative Pressure Wound Therapy Leg Right;Upper (Active)   Dressing Type Silver impregnated foam 4/6/2018  8:03 AM   Cycle Continuous 4/6/2018  8:03 AM   Target Pressure (mmHg) 125 4/6/2018  8:03 AM   Cannister changed? Yes 4/6/2018  9:00 AM   Dressing Status Clean, dry, intact 4/6/2018  9:00 AM   Drainage Amount None 4/6/2018  9:00 AM   Number of days:0       Incision/Surgical Site 03/08/18 Right Leg (Active)   Incision Assessment WDL except;Drainage;Edema;Erythema 4/6/2018  6:27 AM   Mela-Incision Assessment Edema;Erythema;Induration 4/6/2018  6:27 AM   Closure Sutures;Adhesive strip(s) 3/8/2018  1:39 PM   Incision Drainage Amount Moderate 4/6/2018   6:27 AM   Drainage Description Serosanguinous;Yellow 4/6/2018  6:27 AM   Incision Care Wound cleanser 4/5/2018  7:00 PM   Dressing Intervention Moist drainage;New dressing applied 4/6/2018  5:43 AM   Number of days:29       Incision/Surgical Site 03/28/18 Right Chest (Active)   Incision Assessment WDL 4/6/2018  8:10 AM   Closure Liquid bandage 3/28/2018  1:45 PM   Dressing Intervention Clean, dry, intact 4/6/2018  8:10 AM   Number of days:9       Incision/Surgical Site 03/28/18 Right Neck (Active)   Incision Assessment WDL 4/6/2018  8:10 AM   Closure Liquid bandage 3/28/2018  1:45 PM   Dressing Intervention Open to air / No Dressing 4/6/2018  8:10 AM   Number of days:9       Incision/Surgical Site 04/06/18 Right Thigh (Active)   Incision Assessment WDL except;Erythema 4/6/2018  8:10 AM   Closure Sutures 4/6/2018  8:28 AM   Dressing Intervention New dressing applied 4/6/2018  8:28 AM   Number of days:0      CMS Peripheral Neurovascular WDL: WDL (04/06/18 0900)  All Extremities Temperature: warm (04/06/18 0803)  All Extremities Color: no discoloration (04/06/18 0803)  All Extremities Sensation: no tingling;no numbness (04/06/18 0803)  RLE Temperature: warm (04/06/18 0900)  RLE Color: no discoloration (04/06/18 0900)  RLE Sensation: no tingling;no numbness (04/06/18 0900)   Equipment Not applicable   Other LDA       IV Access Port A Cath Single 03/28/18 Right Chest wall (Active)   Access Date 03/29/18 3/29/2018  6:00 AM   Access Attempts 1 3/29/2018  6:00 AM   Gauge/Length Power noncoring 90 degree bend;20 gauge;3/4 inch 3/29/2018  6:00 AM   Site Assessment WDL 4/6/2018  9:00 AM   Line Status Infusing 4/6/2018  9:00 AM   Dressing Intervention Transparent 4/6/2018  8:25 AM   Dressing change due 04/13/18 4/6/2018  5:06 AM   Needle Change Due 04/05/18 3/29/2018  6:00 AM   De-Access Date 04/05/18 4/5/2018 10:00 AM   Number of days:9      Blood Products Not applicable EBL 5 mL   Intake/Output Date 04/06/18 0700 - 04/07/18  0659   Shift [id number] 24 Hour Total   I  N  T  A  K  E   Shift Total       O  U  T  P  U  T   Blood 5   5    Shift Total 5   5   Weight (kg)            Drains / Luz Negative Pressure Wound Therapy Leg Right;Upper (Active)   Dressing Type Silver impregnated foam 4/6/2018  8:03 AM   Cycle Continuous 4/6/2018  8:03 AM   Target Pressure (mmHg) 125 4/6/2018  8:03 AM   Cannister changed? Yes 4/6/2018  9:00 AM   Dressing Status Clean, dry, intact 4/6/2018  9:00 AM   Drainage Amount None 4/6/2018  9:00 AM   Number of days:0      Time of void PreOp Void Prior to Procedure: 0530 (04/06/18 0543)    PostOp Urine Occurrence: 1 (04/05/18 2339)    Diapered? No   Bladder Scan     PO    tolerating sips and ice chips     Vitals    B/P: 98/74  T: 98.2  F (36.8  C)    Temp src: Oral  P:  Pulse: 91 (04/06/18 0532)    Heart Rate: 94 (04/06/18 0900)     R: 9  O2:  SpO2: 96 %    O2 Device: None (Room air) (04/06/18 0845)    Oxygen Delivery: 7 LPM (04/06/18 0803)         Family/support present none at this time   Patient belongings Patient Belongings: cell phone/electronics;clothing;glasses;ring;shoes  Disposition of Belongings: Kept with patient   Patient transported on cart   DC meds/scripts (obs/outpt) Not applicable   Inpatient Pain Meds Released? Yes         Special needs/considerations None   Tasks needing completion None       Debra Magdaleno, DALILA  ASCOM [id number]

## 2018-04-06 NOTE — PROGRESS NOTES
"CLINICAL NUTRITION SERVICES - ASSESSMENT NOTE     Nutrition Prescription    RECOMMENDATIONS FOR MDs/PROVIDERS TO ORDER:  None today     Malnutrition Status:    Severe malnutrition in the context of acute illness.     Recommendations already ordered by Registered Dietitian (RD):  Ordered Boost Plus (vanilla) with breakfast daily    Future/Additional Recommendations:  1. Monitor PO intakes and document % in charts.   2. Monitor weights at least 2x/week.   3. If weight or PO intakes continue to decline, consider scheduled snacks and/or additional supplements to optimize nutritional intakes.      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 He reports over the past few weeks he has not had much to eat d/t decreased appetite related to new diagnosis. He drank 2 Ensure Plus daily, juice, and Propel water, sometimes had small amount of solids for dinner. During chemotherapy treatment, pt reports he didn't have a taste for anything. Suspect meeting <75% of nutrition needs.     CURRENT NUTRITION ORDERS  Diet: Regular  Intake/Tolerance: Pt recently arrived to unit from OR, states he would like a Boost and some gingerale. Pt states appetite is improving now that he is no longer on chemo.     LABS  Labs reviewed    MEDICATIONS  Medications reviewed    ANTHROPOMETRICS  Height: 0 cm (Data Unavailable)  Ht Readings from Last 1 Encounters:   03/28/18 1.778 m (5' 10\")   Most Recent Weight:    Wt Readings from Last 1 Encounters:   03/29/18 97.3 kg (214 lb 9.6 oz)     IBW: 75 kg  BMI: Obesity Grade I BMI 30-34.9  Weight History: Patient reports UBW around 235 lb prior to diagnosis. He states he wanted to lose some weight. Per chart, weight loss of 9.3 kg (9%) over the past month.   Wt " Readings from Last 10 Encounters:   03/29/18 97.3 kg (214 lb 9.6 oz)   03/28/18 95.6 kg (210 lb 12.2 oz)   03/27/18 96.8 kg (213 lb 4.8 oz)   03/23/18 96.6 kg (213 lb)   03/23/18 99.3 kg (219 lb)   03/23/18 99.5 kg (219 lb 6.4 oz)   03/08/18 106.6 kg (235 lb)   03/02/18 108.4 kg (238 lb 14.4 oz)     Dosing Weight: 81 kg (adjusted for obesity)     ASSESSED NUTRITION NEEDS  Estimated Energy Needs: 1300-6883 kcals/day (25 - 30 kcals/kg)  Justification: Maintenance  Estimated Protein Needs:  grams protein/day (1.2 - 1.5 grams of pro/kg)  Justification: Post-op, Repletion and Wound healing  Estimated Fluid Needs: 1 mL/kcal  Justification: Maintenance    PHYSICAL FINDINGS  See malnutrition section below.  Wound vac drain in place    MALNUTRITION  % Intake: </=75% for >/= 1 month (severe)  % Weight Loss: > 5% in 1 month (severe)  Subcutaneous Fat Loss: None observed  Muscle Loss: None observed  Fluid Accumulation/Edema: None noted  Malnutrition Diagnosis: Severe malnutrition in the context of acute illness.     NUTRITION DIAGNOSIS  Inadequate oral intake related to decreased appetite as evidenced by pt likely meeting <75% of nutrition needs over the past month PTA, weight loss of 9% x 1 month.       INTERVENTIONS  Implementation  Nutrition Education: Provided education on importance of small, frequent meals or supplements in addition to meals to meet nutrition needs and prevent further rapid weight loss. Emphasized importance of protein intake for wound healing post-op. Provided handout on tips for increasing protein in the diet and suggested pt aim for 2 protein servings per meal, TID. Discussed supplement options however pt declined all suggestions except Boost Plus. Declined snacks at this time. Pt would like to try to increase protein intake only with foods per report.   Medical food supplement therapy - ordered Boost Plus (vanilla) with breakfast    Goals  Patient to consume % of nutritionally adequate  meal trays TID, or the equivalent with supplements/snacks.     Monitoring/Evaluation  Progress toward goals will be monitored and evaluated per protocol.    Tara Mota RD, LD  Unit Pager: [phone]

## 2018-04-06 NOTE — PROGRESS NOTES
[facility] Internal Medicine Progress Note            Interval History:   Record reviewed including IM consult.  Seen with RN.  S/P Irrigation and Debridement Right Thigh  - Wound Class: II-Clean Contaminated this AM.  Indication Wound Infection, Sarcoma Right Thigh.  Chronic right thigh pain.  Cystic mass on imaging.  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  Chemo Doxil ifosfamide last week completed on 4/4.  WBC 2.7 with Neulasta 6 mg 4/5.  Wound drainage with concern regarding wound infection.  I/D this AM as above.  No fever.  Cultures pending.  On vanc and zosyn.  Clinically doing well.    Adequate  pain control.  No nausea.   No CP, SOB, cough, reflux, abd pain or distention.   Last BM 2 days ago.  Voiding Ok.  No focal neuro c/o related to chemo.            Medications:   All medications reviewed today          Physical Exam:   Blood pressure 117/71, pulse 91, temperature 96.1  F (35.6  C), temperature source Oral, resp. rate 14, SpO2 95 %.    Intake/Output Summary (Last 24 hours) at 04/06/18 1432  Last data filed at 04/06/18 0900   Gross per 24 hour   Intake              340 ml   Output                5 ml   Net              335 ml       General:  Alert.  Appropriate.  No distress.  No O2.     Heent:      Neck:    Skin:    Chest:  clear    Cardiac:  Reg without gallop, murmur.  No JVD.     Abdomen:  Non distended, soft, non-tender.  BS normal.     Extremities:  Perfused.  No edema, calf, posterior thigh tenderness to suggest DVT.     Neuro:            Data:     Results for orders placed or performed during the hospital encounter of 04/05/18 (from the past 24 hour(s))   Internal Medicine Adult IP Consult for [facility]: Patient to be seen: ASAP within 4 hrs; Call back #: [phone]; pre op evaluation for planned surgery I&D of right thigh infection 4/6/18; Consultant may enter orders: Yes    Narrative    Herb Zaman MD     4/5/2018 11:54 PM  [facility] Internal Medicine  Consultation    Hiram Tapia MRN# [id number]   Age: 57 year old YOB: 1960   Date of Admission: 4/5/2018     Reason for consult: Medical management        Requesting physician        Level of consult: Consult, follow and place orders           Assessment and Plan:     High-grade soft tissue sarcoma right thigh  Planned for surgical debridement with Dr Betts tomorrow  Ok to proceed for surgery   Continue zosyn and vancomycin   No fever and leukopenia noted   Awaiting on right thigh aspirate culture before finalizing   antibiotics    Bi cytopenia with leucopenia and anemia  Suspect from ongoing chemo with ifosfamide  Watch for neutropenia     Code status: full code     DVT prophylaxis: Pneumatic compression device             Chief Complaint:     History is obtained from the patient          History of Present Illness:   This patient is a 57 year old male with a significant past   medical history of high grade sarcome of the right thigh   currently on Ifosfamide along with mesna for chemotherapy who   presents with serosanguinous drainage from the distal aspect of   his wound, and erythema concerning for underlying infection  pt denies any chest pain, nausea, SOB, dizziness, headache,   palpations.  Issues with accessing his chest port during exam noted             Past Medical History:     Past Medical History:   Diagnosis Date     Sarcoma (H)              Past Surgical History:     Past Surgical History:   Procedure Laterality Date     EXCISE SOFT TISSUE TUMOR THIGH Right 3/8/2018    Procedure: EXCISE SOFT TISSUE TUMOR THIGH;  Biopsy Right Thigh   Tumor;  Surgeon: Brad Betts MD;  Location:  OR     INSERT PORT VASCULAR ACCESS N/A 3/28/2018    Procedure: INSERT PORT VASCULAR ACCESS;  Vascular Access Port   Insertion with C-arm;  Surgeon: Sarah Bowie MD;  Location: [facility]   OR     STRABISMUS SURGERY      as a child             Social History:     Social History     Social  History     Marital status:      Spouse name: N/A     Number of children: N/A     Years of education: N/A     Occupational History     Not on file.     Social History Main Topics     Smoking status: Former Smoker     Packs/day: 1.00     Years: 10.00     Types: Cigarettes     Start date: 1/1/1975     Quit date: 1/1/1990     Smokeless tobacco: Never Used     Alcohol use 8.4 oz/week     14 Cans of beer per week     Drug use: No     Sexual activity: Not Currently     Partners: Female     Other Topics Concern     Not on file     Social History Narrative             Family History:     Family History   Problem Relation Age of Onset     Leukemia Father      Family history reviewed and updated in [facility]          Immunizations:   Immunizations are up to date          Allergies:   No Known Allergies          Medications:     Prescriptions Prior to Admission   Medication Sig Dispense Refill Last Dose     PROCHLORPERAZINE MALEATE PO Take 10 mg by mouth   Taking     granisetron (KYTRIL) 1 MG tablet Take 1 tablet (1 mg) by mouth   every 12 hours as needed for nausea 30 tablet 3 Taking             Review of Systems:   The Review of Systems is negative other than noted in the HPI    B/P: 142/86, T: 98, P: 107, R: 16    General: Alert, interactive, NAD.   HEENT: AT/NC. PERRLA. Anicteric.Moist MM.    Neck: Supple. No JVD. No Lymphadenopathy.  Heart/CVS: Normal S1 and S2. Regular. No m/r/g .   Chest/Respi: Non labored breathing. CTA BL.   Abdomen/GI: Soft, non distended, non tender. No g/r/r.   Extremities/MSK: Distal pulses 2+, well perfused. right thigh   with swelling and drainage and surrounding erythema noted   Neuro: Alert and oriented x4. Cranial nerves II-XII are grossly   intact.    Skin:  No new rash over exposed areas.             Data:   All laboratory data reviewed  All cardiac studies reviewed by me.  All imaging studies reviewed by me.     Attestation:  I have reviewed today's vital signs, notes, medications, labs  and   imaging.    Herb Zaman MD        Lactic acid level STAT for sepsis protocol   Result Value Ref Range    Lactate for Sepsis Protocol 1.2 0.4 - 1.9 mmol/L   CBC with platelets differential   Result Value Ref Range    WBC 1.9 (L) 4.0 - 11.0 10e9/L    RBC Count 4.11 (L) 4.4 - 5.9 10e12/L    Hemoglobin 10.6 (L) 13.3 - 17.7 g/dL    Hematocrit 33.3 (L) 40.0 - 53.0 %    MCV 81 78 - 100 fl    MCH 25.8 (L) 26.5 - 33.0 pg    MCHC 31.8 31.5 - 36.5 g/dL    RDW 13.7 10.0 - 15.0 %    Platelet Count 307 150 - 450 10e9/L    Diff Method Manual Differential     % Neutrophils 57.0 %    % Lymphocytes 39.5 %    % Monocytes 0.0 %    % Eosinophils 3.5 %    % Basophils 0.0 %    Absolute Neutrophil 1.1 (L) 1.6 - 8.3 10e9/L    Absolute Lymphocytes 0.8 0.8 - 5.3 10e9/L    Absolute Monocytes 0.0 0.0 - 1.3 10e9/L    Absolute Eosinophils 0.1 0.0 - 0.7 10e9/L    Absolute Basophils 0.0 0.0 - 0.2 10e9/L    Anisocytosis Slight     Poikilocytosis Slight     Castillo Cells Slight     Microcytes Present     Platelet Estimate Normal    Basic metabolic panel   Result Value Ref Range    Sodium 137 133 - 144 mmol/L    Potassium 3.8 3.4 - 5.3 mmol/L    Chloride 104 94 - 109 mmol/L    Carbon Dioxide 25 20 - 32 mmol/L    Anion Gap 8 3 - 14 mmol/L    Glucose 98 70 - 99 mg/dL    Urea Nitrogen 15 7 - 30 mg/dL    Creatinine 0.74 0.66 - 1.25 mg/dL    GFR Estimate >90 >60 mL/min/1.7m2    GFR Estimate If Black >90 >60 mL/min/1.7m2    Calcium 8.4 (L) 8.5 - 10.1 mg/dL   Anaerobic bacterial culture   Result Value Ref Range    Specimen Description Right Leg Fluid Swab     Special Requests       This specimen was received on a swab. Results may not be optimal. For maximum sensitivity   of detection, submit tissue, fluid, or needle aspirate.  Received in anaerobic tubes.      Culture Micro PENDING    Fluid Culture Aerobic Bacterial   Result Value Ref Range    Specimen Description Right Leg Fluid Swab     Special Requests       This specimen was received on a swab.  Results may not be optimal. For maximum sensitivity   of detection, submit tissue, fluid, or needle aspirate.      Culture Micro PENDING    Fungus Culture, non-blood   Result Value Ref Range    Specimen Description Right Leg Fluid Swab     Special Requests       This specimen was received on a swab. Results may not be optimal. For maximum sensitivity   of detection, submit tissue, fluid, or needle aspirate.      Culture Micro PENDING    Anaerobic bacterial culture   Result Value Ref Range    Specimen Description Right Thigh Wound Fluid specimen 2     Special Requests       This specimen was received on a swab. Results may not be optimal. For maximum sensitivity   of detection, submit tissue, fluid, or needle aspirate.  Received in anaerobic tubes.      Culture Micro PENDING    Fluid Culture Aerobic Bacterial   Result Value Ref Range    Specimen Description Right Thigh Wound Fluid specimen 2     Special Requests       This specimen was received on a swab. Results may not be optimal. For maximum sensitivity   of detection, submit tissue, fluid, or needle aspirate.      Culture Micro PENDING    Fungus Culture, non-blood   Result Value Ref Range    Specimen Description Right Thigh Wound Fluid specimen 2     Culture Micro PENDING               Assessment and Plan:   1)  Sarcoma right thigh.  S/P biopsy 3/8.  Wound infection.  S/P I/D.  Adequate pain control.   2)  Recent chemo ifosfamide.  3)  Leukopenia 2nd to #2.  1100 neutrophils. Afebrile.  Potential progressive neutropenia.    4)  Anemia 2nd to #2.  Adequate.     PLAN:  1)  Review meds, orders.  2)  SL IV.  3)  IS, same opiates, bowel meds, no DVT prophylaxis ordered per ortho, mobilize.  4)  Neupogen 480 mcg daily per ortho discussion with Heme/onc.  5)  F/u culture data.  Continue present ABs.  6)  Anticipate ID consult.  7)  Trend labs.  Monitor clinically.   Disposition Plan   Expected discharge unclear pending cultures, AB plan, counts.      Entered: Macario GAYLE  [place] 04/06/2018, 2:32 PM              Attestation:  I have reviewed today's vital signs, notes, medications, labs and imaging.     Macario Varner MD

## 2018-04-06 NOTE — ANESTHESIA POSTPROCEDURE EVALUATION
Patient: Hiram Tapia    Procedure(s):  Irrigation And Debridement Right Thigh  - Wound Class: II-Clean Contaminated    Diagnosis:Wound Infection, Sarcoma Right Thigh  Diagnosis Additional Information: No value filed.    Anesthesia Type:  General, ETT    Note:  Anesthesia Post Evaluation    Patient location during evaluation: PACU  Patient participation: Able to fully participate in evaluation  Level of consciousness: awake  Pain management: adequate  Airway patency: patent  Cardiovascular status: acceptable  Respiratory status: acceptable  Hydration status: balanced  PONV: none     Anesthetic complications: None          Last vitals:  Vitals:    04/06/18 0830 04/06/18 0845 04/06/18 0900   BP: 91/60 91/57 98/74   Pulse:      Resp: 20 15 9   Temp: 36.8  C (98.2  F)     SpO2: 100% 100% 96%         Electronically Signed By: Reilly Forbse MD  April 6, 2018  9:19 AM

## 2018-04-06 NOTE — ANESTHESIA CARE TRANSFER NOTE
Patient: Hiram Tapia    Procedure(s):  Irrigation And Debridement Right Thigh  - Wound Class: II-Clean Contaminated    Diagnosis: Wound Infection, Sarcoma Right Thigh  Diagnosis Additional Information: No value filed.    Anesthesia Type:   General, ETT     Note:  Airway :Face Mask  Patient transferred to:PACU  Comments: To PACU with 02, Spont RR. Monitors applied, VSS, PIV/airway patent, Report to RN all questions/concerns answered.     Handoff Report: Identifed the Patient, Identified the Reponsible Provider, Reviewed the pertinent medical history, Discussed the surgical course, Reviewed Intra-OP anesthesia mangement and issues during anesthesia, Set expectations for post-procedure period and Allowed opportunity for questions and acknowledgement of understanding      Vitals: (Last set prior to Anesthesia Care Transfer)    CRNA VITALS  4/6/2018 0731 - 4/6/2018 0808      4/6/2018             NIBP: 115/74    Pulse: 95    NIBP Mean: 91    Temp: 36.7  C (98.1  F)    SpO2: 100 %    Resp Rate (observed): 12    EKG: NSR                Electronically Signed By: WILLAM Waddell CRNA  April 6, 2018  8:08 AM

## 2018-04-06 NOTE — PROGRESS NOTES
Discussed circumstance with Dr Schaefer yesterday PM. Diagnosed with wound infection.  Admitted overnight as planned. Pain reduced with increased drainage. Broad spectrum antibiotics initiated.  Wound draingae present this AM. Cellulitis present around wound.  WBC reduced to 1.9 today down from 2.7 yesterday.    Plan: 1.  I and D today. Possible incisional vac and drain placement.  2. Infectious disease consult.  3. Follow WBC and ANC daily. Remain in hospital until stable and prepared for discharge.

## 2018-04-06 NOTE — PROGRESS NOTES
Care Coordinator- Discharge Planning     Admission Date/Time:  4/5/2018  Attending MD:  Brad Betts MD     Data  Date of initial CC assessment:  4/6/2018  Chart reviewed, discussed with interdisciplinary team.   Patient was admitted for: No diagnosis found.     Assessment  Concerns with insurance coverage for discharge needs: None.  Current Living Situation: Patient lives with spouse.  Support System: Involved  Services Involved: Home Infusion  Transportation: Family or Friend will provide  Barriers to Discharge: None      Coordination of Care and Referrals: Patient will require home IV antibiotic therapy. A benefit check was requested with [facility]. Anticipate patient to be in the hospital through the weekend. CC will continue to follow patient progress.    [facility]  Phone # [phone]  Fax # [phone]         Plan  Anticipated Discharge Date:  TBD  Anticipated Discharge Plan:  Home with home infusion    CTS Handoff completed:  JACOBY Dewey RN, BSN  Care Coordinator, 8A  Phone [phone]  Pager [phone]

## 2018-04-06 NOTE — PHARMACY
Prescriber Notification Note    The pharmacist has communicated with this patient's provider regarding a concern or therapy recommendation.    Notified Person: Maurice Cortes  Date/Time of Notification: 4/6/18 @ 1200  Interaction: phone  Concern/Recommendation:  Neupogen 300 mcg daily ordered, usually dosed 5 mcg/kg-based on this patient's weight dose will be 480 mcg daily. Is this dose recommended by heme/Onc     Comments/Additional Details:  TORB to change to Neupogen 480 mcg daily. I scheduled dose to be given every 2000 per pharmacy policy.       Kendell Joaquin, PharmD, BCPS

## 2018-04-06 NOTE — NURSING NOTE
[facility]  [address] [place] [id number]  Dept: [phone]  ______________________________________________________________________________    Patient: Hiram Tapia   : 1960   MRN: [id number]   2018    INVASIVE PROCEDURE SAFETY CHECKLIST    Date: 2018   Procedure: Right Thigh Aspiration   Patient Name: Hiram Tapia  MRN: [id number]  YOB: 1960    Action: Complete sections as appropriate. Any discrepancy results in a HARD COPY until resolved.     PRE PROCEDURE:  Patient ID verified with 2 identifiers (name and  or MRN): Yes  Procedure and site verified with patient/designee (when able): Yes  Accurate consent documentation in medical record: Yes  H&P (or appropriate assessment) documented in medical record: Yes  H&P must be up to 20 days prior to procedure and updates within 24 hours of procedure as applicable: Yes  Relevant diagnostic and radiology test results appropriately labeled and displayed as applicable: Yes  Procedure site(s) marked with provider initials: Yes    TIMEOUT:  Time-Out performed immediately prior to starting procedure, including verbal and active participation of all team members addressing the following:Yes  * Correct patient identify  * Confirmed that the correct side and site are marked  * An accurate procedure consent form  * Agreement on the procedure to be done  * Correct patient position  * Relevant images and results are properly labeled and appropriately displayed  * The need to administer antibiotics or fluids for irrigation purposes during the procedure as applicable   * Safety precautions based on patient history or medication use    DURING PROCEDURE: Verification of correct person, site, and procedures any time the responsibility for care of the patient is transferred to another member of the care team.

## 2018-04-06 NOTE — PLAN OF CARE
Problem: Infection, Risk/Actual (Adult)  Goal: Identify Related Risk Factors and Signs and Symptoms  Related risk factors and signs and symptoms are identified upon initiation of Human Response Clinical Practice Guideline (CPG).   Outcome: No Change  Pt arrived from clinic and was settled in room. Dressing was saturated and wound cleansed and new dressing applied. Tylenol given for tolerable pain. Wife at bedside. Surgery planned for 7am tomorrow. Preop checklist started.

## 2018-04-06 NOTE — PROGRESS NOTES
Ortho Progress Note     Subjective:  No fevers overnight, no significant RLE pain.  Ready for OR today.    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  Undergoing chemotherapy.  Found in a clinic visit on 4/5 to have an infection of his sarcoma biopsy site, aspiration showed purulence at that time.  Admitted for Abx, I&D.      OR today with Dr Betts for I&D R thigh.    -Weight Bearing Status: WBAT RLE  -Diet: NPO until after OR    -Disposition: Pending hospital course.    Maurice Womack MD  Orthopaedic Surgery PGY-4  Pager:  [phone]

## 2018-04-07 LAB
ANION GAP SERPL CALCULATED.3IONS-SCNC: 5 MMOL/L (ref 3–14)
BASOPHILS # BLD AUTO: 0 10E9/L (ref 0–0.2)
BASOPHILS NFR BLD AUTO: 1.1 %
BUN SERPL-MCNC: 10 MG/DL (ref 7–30)
BURR CELLS BLD QL SMEAR: SLIGHT
CALCIUM SERPL-MCNC: 8.3 MG/DL (ref 8.5–10.1)
CHLORIDE SERPL-SCNC: 107 MMOL/L (ref 94–109)
CO2 SERPL-SCNC: 26 MMOL/L (ref 20–32)
CREAT SERPL-MCNC: 0.65 MG/DL (ref 0.66–1.25)
DIFFERENTIAL METHOD BLD: ABNORMAL
EOSINOPHIL # BLD AUTO: 0 10E9/L (ref 0–0.7)
EOSINOPHIL NFR BLD AUTO: 1.1 %
ERYTHROCYTE [DISTWIDTH] IN BLOOD BY AUTOMATED COUNT: 13.7 % (ref 10–15)
GFR SERPL CREATININE-BSD FRML MDRD: >90 ML/MIN/1.7M2
GLUCOSE SERPL-MCNC: 98 MG/DL (ref 70–99)
HCT VFR BLD AUTO: 29.7 % (ref 40–53)
HGB BLD-MCNC: 9.6 G/DL (ref 13.3–17.7)
IMM GRANULOCYTES # BLD: 0 10E9/L (ref 0–0.4)
IMM GRANULOCYTES NFR BLD: 0 %
LYMPHOCYTES # BLD AUTO: 0.5 10E9/L (ref 0.8–5.3)
LYMPHOCYTES NFR BLD AUTO: 56 %
MAGNESIUM SERPL-MCNC: 2.3 MG/DL (ref 1.6–2.3)
MCH RBC QN AUTO: 26 PG (ref 26.5–33)
MCHC RBC AUTO-ENTMCNC: 32.3 G/DL (ref 31.5–36.5)
MCV RBC AUTO: 81 FL (ref 78–100)
MONOCYTES # BLD AUTO: 0 10E9/L (ref 0–1.3)
MONOCYTES NFR BLD AUTO: 3.3 %
NEUTROPHILS # BLD AUTO: 0.4 10E9/L (ref 1.6–8.3)
NEUTROPHILS NFR BLD AUTO: 38.5 %
NRBC # BLD AUTO: 0 10*3/UL
NRBC BLD AUTO-RTO: 0 /100
PLATELET # BLD AUTO: 272 10E9/L (ref 150–450)
PLATELET # BLD EST: NORMAL 10*3/UL
POIKILOCYTOSIS BLD QL SMEAR: SLIGHT
POTASSIUM SERPL-SCNC: 3.6 MMOL/L (ref 3.4–5.3)
RBC # BLD AUTO: 3.69 10E12/L (ref 4.4–5.9)
SODIUM SERPL-SCNC: 138 MMOL/L (ref 133–144)
VANCOMYCIN SERPL-MCNC: 9.7 MG/L
WBC # BLD AUTO: 0.9 10E9/L (ref 4–11)

## 2018-04-07 PROCEDURE — 36592 COLLECT BLOOD FROM PICC: CPT | Performed by: ORTHOPAEDIC SURGERY

## 2018-04-07 PROCEDURE — 25000128 H RX IP 250 OP 636: Performed by: ORTHOPAEDIC SURGERY

## 2018-04-07 PROCEDURE — 25000132 ZZH RX MED GY IP 250 OP 250 PS 637: Performed by: PHYSICIAN ASSISTANT

## 2018-04-07 PROCEDURE — 80048 BASIC METABOLIC PNL TOTAL CA: CPT | Performed by: ORTHOPAEDIC SURGERY

## 2018-04-07 PROCEDURE — 85025 COMPLETE CBC W/AUTO DIFF WBC: CPT | Performed by: ORTHOPAEDIC SURGERY

## 2018-04-07 PROCEDURE — 12000008 ZZH R&B INTERMEDIATE UMMC

## 2018-04-07 PROCEDURE — 99232 SBSQ HOSP IP/OBS MODERATE 35: CPT | Performed by: INTERNAL MEDICINE

## 2018-04-07 PROCEDURE — 83735 ASSAY OF MAGNESIUM: CPT | Performed by: ORTHOPAEDIC SURGERY

## 2018-04-07 PROCEDURE — 25000128 H RX IP 250 OP 636: Performed by: STUDENT IN AN ORGANIZED HEALTH CARE EDUCATION/TRAINING PROGRAM

## 2018-04-07 PROCEDURE — 80202 ASSAY OF VANCOMYCIN: CPT | Performed by: ORTHOPAEDIC SURGERY

## 2018-04-07 RX ADMIN — SODIUM CHLORIDE, POTASSIUM CHLORIDE, SODIUM LACTATE AND CALCIUM CHLORIDE 1000 ML: 600; 310; 30; 20 INJECTION, SOLUTION INTRAVENOUS at 17:01

## 2018-04-07 RX ADMIN — SENNOSIDES AND DOCUSATE SODIUM 2 TABLET: 8.6; 5 TABLET ORAL at 20:19

## 2018-04-07 RX ADMIN — SENNOSIDES AND DOCUSATE SODIUM 1 TABLET: 8.6; 5 TABLET ORAL at 08:52

## 2018-04-07 RX ADMIN — SODIUM CHLORIDE, POTASSIUM CHLORIDE, SODIUM LACTATE AND CALCIUM CHLORIDE 1000 ML: 600; 310; 30; 20 INJECTION, SOLUTION INTRAVENOUS at 05:10

## 2018-04-07 RX ADMIN — PIPERACILLIN SODIUM AND TAZOBACTAM SODIUM 3.38 G: 3; .375 INJECTION, POWDER, LYOPHILIZED, FOR SOLUTION INTRAVENOUS at 03:54

## 2018-04-07 RX ADMIN — VANCOMYCIN HYDROCHLORIDE 1750 MG: 10 INJECTION, POWDER, LYOPHILIZED, FOR SOLUTION INTRAVENOUS at 21:47

## 2018-04-07 RX ADMIN — PIPERACILLIN SODIUM AND TAZOBACTAM SODIUM 3.38 G: 3; .375 INJECTION, POWDER, LYOPHILIZED, FOR SOLUTION INTRAVENOUS at 20:19

## 2018-04-07 RX ADMIN — VANCOMYCIN HYDROCHLORIDE 1500 MG: 500 INJECTION, POWDER, LYOPHILIZED, FOR SOLUTION INTRAVENOUS at 08:53

## 2018-04-07 RX ADMIN — PIPERACILLIN SODIUM AND TAZOBACTAM SODIUM 3.38 G: 3; .375 INJECTION, POWDER, LYOPHILIZED, FOR SOLUTION INTRAVENOUS at 12:08

## 2018-04-07 NOTE — CONSULTS
[facility] ID SERVICE: NEW CONSULTATION     Patient:  Hiram Tapia, Date of birth 1960, Medical record number [id number]  Date of Visit:  April 6, 2018         Assessment and Recommendations:   Problem List:  1. Postoperative wound infection after sarcoma resection  2. Neutropenia due to chemotherapy    Recommendations:  1.  Agree with pip/tazo and vancomycin pending intraoperative cultures.    Discussion:  Mr. Tapia reports that he never really had discontinuation of drainage after surgery with ongoing leakage of fluid from his previous drain site.  He then had more obvious development of erythema around the site in the last few days which also corresponded with development of leukopenia and impending neutropenia due to chemotherapy. Agree with broad-spectrum antibiotics pending culture results in this immunocompromised gentleman.    Thank you for this consult. [facility] ID team will continue to follow this patient. Please feel free to call with any questions.     Arlene Castaneda MD  Infectious Diseases  [phone]         History of Present Illness:   Hiram Tapia is a 57-year-old gentleman who was previously relatively healthy and underwent biopsy of a right thigh complex mass on 3/8/2018 which was later identified as a high-grade sarcoma.  A drain was left in place as there is a significant cystic component.  He had ongoing drainage through and around the drain until it was removed and then had ongoing drainage from the previous drain site with only a few days here and there that it did not drain.  He then had a port placed and was started on chemotherapy.  He then developed erythema and swelling around his incision site and had a I&D earlier today on 4/6/2018 with culture sent.  He was started on Zosyn and vancomycin postoperatively.  Currently he reports that he is tired but otherwise doing well.  He has a wound VAC on the site.  His port is not causing him any problems.            Review of  Systems:   A complete 10 point review of systems is negative other than as noted in HPI.       Past Medical History:     Past Medical History:   Diagnosis Date     Sarcoma (H)          Allergies:    No Known Allergies        Recent Antimicrobials::   Vancomycin and Zosyn both started 4/6/2018         Family History:     Family History   Problem Relation Age of Onset     Leukemia Father             Social History:     Social History     Social History     Marital status:      Spouse name: N/A     Number of children: N/A     Years of education: N/A     Occupational History     Not on file.     Social History Main Topics     Smoking status: Former Smoker     Packs/day: 1.00     Years: 10.00     Types: Cigarettes     Start date: 1/1/1975     Quit date: 1/1/1990     Smokeless tobacco: Never Used     Alcohol use 8.4 oz/week     14 Cans of beer per week     Drug use: No     Sexual activity: Not Currently     Partners: Female     Other Topics Concern     Not on file     Social History Narrative              Physical Exam:   /72  Pulse 91  Temp 97.9  F (36.6  C) (Oral)  Resp 16  SpO2 96%   Exam:  GENERAL:  well-developed, well-nourished, sitting in bed in no acute distress.   ENT:  Head is normocephalic, atraumatic. Oropharynx is moist without exudates or ulcers.  EYES:  Eyes have anicteric sclerae.    NECK:  Supple.  LUNGS:  Clear to auscultation.  CARDIOVASCULAR:  Regular rate and rhythm with no murmurs, gallops or rubs.  ABDOMEN:  Normal bowel sounds, soft, nontender.  EXT: Extremities warm and without edema.  SKIN:  No acute rashes.  Line is in place without any surrounding erythema.  Surgical site with wound VAC in place.  Surrounding skin moderately erythematous.  Newly placed port accessed.  No erythema or fluctuance.  NEUROLOGIC:  Grossly nonfocal.         Laboratory Data:     Creatinine   Date Value Ref Range Status   04/06/2018 0.74 0.66 - 1.25 mg/dL Final   04/05/2018 0.65 (L) 0.66 - 1.25 mg/dL  Final   04/04/2018 0.67 0.66 - 1.25 mg/dL Final   04/02/2018 0.70 mg/dL Final   03/29/2018 0.79 0.66 - 1.25 mg/dL Final     WBC   Date Value Ref Range Status   04/06/2018 1.9 (L) 4.0 - 11.0 10e9/L Final   04/05/2018 2.7 (L) 4.0 - 11.0 10e9/L Final   04/04/2018 4.8 4.0 - 11.0 10e9/L Final   03/29/2018 9.6 4.0 - 11.0 10e9/L Final   03/23/2018 8.9 4.0 - 11.0 10e9/L Final     Hemoglobin   Date Value Ref Range Status   04/06/2018 10.6 (L) 13.3 - 17.7 g/dL Final     Platelet Count   Date Value Ref Range Status   04/06/2018 307 150 - 450 10e9/L Final     Lab Results   Component Value Date     04/06/2018    BUN 15 04/06/2018    CO2 25 04/06/2018     No results found for: CRP        Pertinent Recent Microbiology Data:     Recent Labs  Lab 04/06/18  0738 04/06/18  0735 04/05/18  1330   CULT PENDING  PENDING  PENDING PENDING  PENDING  PENDING Culture negative monitoring continues  Culture negative after 19 hours  Culture in progress   SDES Right Thigh Wound Fluid specimen 2  Right Thigh Wound Fluid specimen 2  Right Thigh Wound Fluid specimen 2 Right Leg Fluid Swab  Right Leg Fluid Swab  Right Leg Fluid Swab Right Thigh Aspirate  Right Thigh Aspirate  Right Thigh Aspirate  Right Thigh Aspirate            Imaging:   No new imaging this admission.

## 2018-04-07 NOTE — PLAN OF CARE
Problem: Infection, Risk/Actual (Adult)  Goal: Identify Related Risk Factors and Signs and Symptoms  Related risk factors and signs and symptoms are identified upon initiation of Human Response Clinical Practice Guideline (CPG).   Outcome: Improving    VS: Stable.   O2: RA.   Output: Frequent voiding in bathroom, instructed to call before getting out of bed.   Last BM: 4/6/18, passing flatus.   Activity: SBA with walker.   Skin: Wound vac to right leg, healing blisters on right upper thigh, incision to right neck approximated and SAMI.   Pain: Denies pain, declined all pain meds including scheduled tylenol.   CMS: Intact.   Dressing: Wound vac CDI.   Diet: Regular.   LDA: Port a cath right chest wall infusing LR at 100 ml/hr. Wound vac continuous 125 mmHg with small output overnight.   Equipment: Wound vac, walker, pt declined capno, refused PCDs overnight, IV pole, pillows, call light within reach.   Plan: Continue to monitor, possible surgery Monday.   Additional Info:

## 2018-04-07 NOTE — PROGRESS NOTES
[facility] Internal Medicine Progress Note            Interval History:   Record reviewed.  Seen with RN.  S/P Irrigation and Debridement Right Thigh  - Wound Class: II-Clean Contaminated this AM.  Indication Wound Infection, Sarcoma Right Thigh.  Chronic right thigh pain.  Cystic mass on imaging.  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  Chemo Doxil ifosfamide last week completed on 4/4.  WBC 2.7 with Neulasta 6 mg 4/5.  Wound drainage with concern regarding wound infection.  I/D 4/6.    No fever.  Cultures pending.  Leukopenia following chemo.  Neupogen last night.  On vanc and zosyn.  Clinically doing well.   Adequate  pain control.  No opiate requirement.  No nausea.  Ambulated to BR without LH.   No CP, SOB, cough, reflux, abd pain or distention.  BM this AM.   Voiding Ok.  No focal neuro c/o related to chemo.            Medications:   All medications reviewed today          Physical Exam:   Blood pressure 111/69, pulse 91, temperature 97.4  F (36.3  C), temperature source Oral, resp. rate 14, SpO2 98 %.    Intake/Output Summary (Last 24 hours) at 04/06/18 1432  Last data filed at 04/06/18 0900   Gross per 24 hour   Intake              340 ml   Output                5 ml   Net              335 ml       General:  Alert.  Appropriate.  No distress.  No O2.     Heent:      Neck:    Skin:    Chest:  clear    Cardiac:  Reg without gallop, murmur.  No JVD.     Abdomen:  Non distended, soft, non-tender.  BS normal.     Extremities:  Perfused.  No edema, calf, posterior thigh tenderness to suggest DVT.     Neuro:            Data:     Results for orders placed or performed during the hospital encounter of 04/05/18 (from the past 24 hour(s))   CBC with platelets differential   Result Value Ref Range    WBC 0.9 (LL) 4.0 - 11.0 10e9/L    RBC Count 3.69 (L) 4.4 - 5.9 10e12/L    Hemoglobin 9.6 (L) 13.3 - 17.7 g/dL    Hematocrit 29.7 (L) 40.0 - 53.0 %    MCV 81 78 - 100 fl    MCH 26.0 (L) 26.5 - 33.0 pg    MCHC  32.3 31.5 - 36.5 g/dL    RDW 13.7 10.0 - 15.0 %    Platelet Count 272 150 - 450 10e9/L    Diff Method Automated Method     % Neutrophils 38.5 %    % Lymphocytes 56.0 %    % Monocytes 3.3 %    % Eosinophils 1.1 %    % Basophils 1.1 %    % Immature Granulocytes 0.0 %    Nucleated RBCs 0 0 /100    Absolute Neutrophil 0.4 (LL) 1.6 - 8.3 10e9/L    Absolute Lymphocytes 0.5 (L) 0.8 - 5.3 10e9/L    Absolute Monocytes 0.0 0.0 - 1.3 10e9/L    Absolute Eosinophils 0.0 0.0 - 0.7 10e9/L    Absolute Basophils 0.0 0.0 - 0.2 10e9/L    Abs Immature Granulocytes 0.0 0 - 0.4 10e9/L    Absolute Nucleated RBC 0.0     Poikilocytosis Slight     Castillo Cells Slight     Platelet Estimate Normal    Basic metabolic panel   Result Value Ref Range    Sodium 138 133 - 144 mmol/L    Potassium 3.6 3.4 - 5.3 mmol/L    Chloride 107 94 - 109 mmol/L    Carbon Dioxide 26 20 - 32 mmol/L    Anion Gap 5 3 - 14 mmol/L    Glucose 98 70 - 99 mg/dL    Urea Nitrogen 10 7 - 30 mg/dL    Creatinine 0.65 (L) 0.66 - 1.25 mg/dL    GFR Estimate >90 >60 mL/min/1.7m2    GFR Estimate If Black >90 >60 mL/min/1.7m2    Calcium 8.3 (L) 8.5 - 10.1 mg/dL   Magnesium   Result Value Ref Range    Magnesium 2.3 1.6 - 2.3 mg/dL   Culture - staph epidermidis.             Assessment and Plan:   1)  Sarcoma right thigh.  S/P biopsy 3/8.  Wound infection with  I/D.  Staph epi on culture.  Adequate pain control off opiates. .   2)  Recent chemo ifosfamide.  3)  Progressive neutropenia 2nd to #2. Expected course with fartun after chemo approx 5 days.  Not septic or ill appearing.   4)  Anemia 2nd to #2.  Adequate.     PLAN:  1)  Same meds, orders.  2)   IS, same pain regimen, bowel meds, no DVT prophylaxis ordered per ortho, mobilize.  3)  F/u culture data.  Continue present ABs per Id.  4)  Reviewed with heme.  DC neupogen as neulasta lasts 14 days.  Pt to wear mask outside of room.  Good hand washing.    5)  Trend labs.  Monitor clinically.   Disposition Plan   Expected discharge unclear  pending cultures, AB plan, counts.      Entered: Macario Varner 04/07/2018, 1:59 PM              Attestation:  I have reviewed today's vital signs, notes, medications, labs and imaging.     Macario Varner MD

## 2018-04-07 NOTE — PROVIDER NOTIFICATION
Notified Dr. Arlene Castaneda from infectious disease about lab calling regarding light growth from R thigh aspirate, showing staph epidermis. Pt currently receiving vancomycin and zosyn, awaiting to see if MD wants to make any changes.

## 2018-04-07 NOTE — PLAN OF CARE
Problem: Infection, Risk/Actual (Adult)  Goal: Identify Related Risk Factors and Signs and Symptoms  Related risk factors and signs and symptoms are identified upon initiation of Human Response Clinical Practice Guideline (CPG).   Outcome: No Change    VS: VSS, afebrile.    O2: >90% on RA   Output: Pt voiding in bathroom without difficulty; voiding frequently d/t fluids infusing    Last BM: LBM 4/7 per pt report, passing flatus and BS active.    Activity: Pt up with SBA, pt has been getting up on his own- gait is very steady. Did encourage pt to call for help when getting up for assistance.    Skin: Skin is intact except for incision and wound vac. Skin is warm and dry. Bruising noted around wound vac site, edema and erythema.    Pain: Pt denies pain, refused all narcotic and non-narcotic pain medications. States only pain when sitting on EOB and pressure placed on wound vac.   CMS: CMS intact, denies N/T. Able to wiggle toes, DP +2 bilaterally.   Dressing: Wound Vac dressing CDI   Diet: Pt tolerating regular diet without N/V, adequate intake of PO fluids.   LDA: Port-a-cath in R chest wall infusing LR at 100 mL/hr. Wound Vac continuous 125 mmhg.    Equipment: IV pole, wound vac, PCD's, personal belongings at bedside   Plan: Continue to monitor patient and manage infection progression.   Additional Info: Light growth growing from R thigh aspirate- staph epidermis. Dr. Varner and Dr. Arlene Castaneda notified.   Critical lab value of WBC 0.9 and ANC 0.4- please wash hands and wear gloves when entering room. If pt leaves room, has to wear a mask. Sign placed on door.

## 2018-04-07 NOTE — PROVIDER NOTIFICATION
Notified Dr. Varner of critical lab values: WBC 0.9 and ANC 0.4. Dr. Varner requested writer to contact orthopedics, contacted ortho on call Dr. Sandoval, verified in person that Dr. Sandoval received page. No further interventions needed.

## 2018-04-07 NOTE — PLAN OF CARE
Problem: Infection, Risk/Actual (Adult)  Goal: Identify Related Risk Factors and Signs and Symptoms  Related risk factors and signs and symptoms are identified upon initiation of Human Response Clinical Practice Guideline (CPG).           VS:       Pt A/O X 4. Afebrile. VSS. Lungs- clear bilaterally with both       anterior and posterior. IS encouraged. Denies nausea, shortness of breath, and chest pain.     Output:       Bowels- active in all four quadrants. Last BM 4/4 per pt report. Voids spontaneously without difficulty in the bathroom.      Activity:       Pt up in room and to bathroom with assist of 1 and walker.     Skin:   Incision with wound vac in place to right thigh. Port a cath in place in right chest wall. Otherwise intact.     Pain:       Pt had negligible pain this shift and declined pain interventions.      CMS:       CMS and Neuro's are intact. Denies numbness and tingling in all extremities.      Dressing:       Right thigh incisional site has wound vac dressing in place that is patent and running continuous at 125 mmHg per hour.      Diet:       Pt is on a regular diet and appetite was good this shift.       LDA:       Port a cath is patent in the right chest wall and infusing.      Equipment:       Bilateral heels are elevated off the bed.     Plan:       Pt is able to make needs known and the call light is within the pt's reach. Continue to monitor.       Additional Info:

## 2018-04-07 NOTE — PROGRESS NOTES
Ortho Progress Note     Subjective:  No acute events overnight. Vac intact. AFVSS.     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  Undergoing chemotherapy.  Found in a clinic visit on 4/5 to have an infection of his sarcoma biopsy site, aspiration showed purulence at that time.  Admitted for Abx, I&D. S/p I&D 4/6.       -Weight Bearing Status: WBAT RLE  -Diet: Regular, NPO midnight Sunday night prior to repeat I&D Monday  -Dressing: wound vac, continuous suction 100mmHg  -start neupogen 480mcg daily per Heme/Onc + pharmacy   -Disposition: Pending hospital course.    NINI Sandoval MD  PGY-4 Orthopaedic Surgery  [phone]

## 2018-04-08 ENCOUNTER — ANESTHESIA EVENT (OUTPATIENT)
Dept: SURGERY | Facility: CLINIC | Age: 58
DRG: 857 | End: 2018-04-08
Payer: COMMERCIAL

## 2018-04-08 LAB
BACTERIA SPEC CULT: ABNORMAL
BASOPHILS # BLD AUTO: 0 10E9/L (ref 0–0.2)
BASOPHILS NFR BLD AUTO: 1.2 %
DIFFERENTIAL METHOD BLD: ABNORMAL
EOSINOPHIL # BLD AUTO: 0 10E9/L (ref 0–0.7)
EOSINOPHIL NFR BLD AUTO: 1.2 %
ERYTHROCYTE [DISTWIDTH] IN BLOOD BY AUTOMATED COUNT: 13.6 % (ref 10–15)
HCT VFR BLD AUTO: 29.4 % (ref 40–53)
HGB BLD-MCNC: 9.5 G/DL (ref 13.3–17.7)
IMM GRANULOCYTES # BLD: 0 10E9/L (ref 0–0.4)
IMM GRANULOCYTES NFR BLD: 0 %
LYMPHOCYTES # BLD AUTO: 0.6 10E9/L (ref 0.8–5.3)
LYMPHOCYTES NFR BLD AUTO: 72.3 %
Lab: ABNORMAL
MCH RBC QN AUTO: 25.9 PG (ref 26.5–33)
MCHC RBC AUTO-ENTMCNC: 32.3 G/DL (ref 31.5–36.5)
MCV RBC AUTO: 80 FL (ref 78–100)
MONOCYTES # BLD AUTO: 0.1 10E9/L (ref 0–1.3)
MONOCYTES NFR BLD AUTO: 6 %
NEUTROPHILS # BLD AUTO: 0.2 10E9/L (ref 1.6–8.3)
NEUTROPHILS NFR BLD AUTO: 19.3 %
NRBC # BLD AUTO: 0 10*3/UL
NRBC BLD AUTO-RTO: 0 /100
PLATELET # BLD AUTO: 277 10E9/L (ref 150–450)
PLATELET # BLD EST: ABNORMAL 10*3/UL
RBC # BLD AUTO: 3.67 10E12/L (ref 4.4–5.9)
SPECIMEN SOURCE: ABNORMAL
WBC # BLD AUTO: 0.9 10E9/L (ref 4–11)

## 2018-04-08 PROCEDURE — 99232 SBSQ HOSP IP/OBS MODERATE 35: CPT | Performed by: INTERNAL MEDICINE

## 2018-04-08 PROCEDURE — 85004 AUTOMATED DIFF WBC COUNT: CPT | Performed by: INTERNAL MEDICINE

## 2018-04-08 PROCEDURE — 25000128 H RX IP 250 OP 636: Performed by: STUDENT IN AN ORGANIZED HEALTH CARE EDUCATION/TRAINING PROGRAM

## 2018-04-08 PROCEDURE — 25000128 H RX IP 250 OP 636: Performed by: ORTHOPAEDIC SURGERY

## 2018-04-08 PROCEDURE — 12000008 ZZH R&B INTERMEDIATE UMMC

## 2018-04-08 PROCEDURE — 36592 COLLECT BLOOD FROM PICC: CPT | Performed by: INTERNAL MEDICINE

## 2018-04-08 PROCEDURE — 99207 ZZC CDG-MDM COMPONENT: MEETS MODERATE - UP CODED: CPT | Performed by: INTERNAL MEDICINE

## 2018-04-08 PROCEDURE — 85027 COMPLETE CBC AUTOMATED: CPT | Performed by: INTERNAL MEDICINE

## 2018-04-08 RX ADMIN — PIPERACILLIN SODIUM AND TAZOBACTAM SODIUM 3.38 G: 3; .375 INJECTION, POWDER, LYOPHILIZED, FOR SOLUTION INTRAVENOUS at 20:07

## 2018-04-08 RX ADMIN — VANCOMYCIN HYDROCHLORIDE 1750 MG: 10 INJECTION, POWDER, LYOPHILIZED, FOR SOLUTION INTRAVENOUS at 20:59

## 2018-04-08 RX ADMIN — SODIUM CHLORIDE, POTASSIUM CHLORIDE, SODIUM LACTATE AND CALCIUM CHLORIDE 1000 ML: 600; 310; 30; 20 INJECTION, SOLUTION INTRAVENOUS at 06:12

## 2018-04-08 RX ADMIN — SODIUM CHLORIDE, POTASSIUM CHLORIDE, SODIUM LACTATE AND CALCIUM CHLORIDE 1000 ML: 600; 310; 30; 20 INJECTION, SOLUTION INTRAVENOUS at 18:18

## 2018-04-08 RX ADMIN — PIPERACILLIN SODIUM AND TAZOBACTAM SODIUM 3.38 G: 3; .375 INJECTION, POWDER, LYOPHILIZED, FOR SOLUTION INTRAVENOUS at 03:32

## 2018-04-08 RX ADMIN — PIPERACILLIN SODIUM AND TAZOBACTAM SODIUM 3.38 G: 3; .375 INJECTION, POWDER, LYOPHILIZED, FOR SOLUTION INTRAVENOUS at 12:01

## 2018-04-08 RX ADMIN — VANCOMYCIN HYDROCHLORIDE 1750 MG: 10 INJECTION, POWDER, LYOPHILIZED, FOR SOLUTION INTRAVENOUS at 09:09

## 2018-04-08 NOTE — PLAN OF CARE
Problem: Infection, Risk/Actual (Adult)  Goal: Identify Related Risk Factors and Signs and Symptoms  Related risk factors and signs and symptoms are identified upon initiation of Human Response Clinical Practice Guideline (CPG).   Outcome: No Change    VS: VSS, afebrile.    O2: >90% on RA   Output: Pt voiding in bathroom without difficulty   Last BM: LBM 4/7 per pt report, passing flatus and BS active.   Activity: Pt up with SBA, gait very steady   Skin: Skin is intact except for R thigh wound, skin is warm and dry. Redness/bruising noted near wound.   Pain: Pt denies pain, only when ambulating. Refused all non-narcotic and narcotic pain medications.   CMS: CMS intact, denies N/T in extremities. DP +2 bilaterally. Able to wiggle toes.   Dressing: Dressing CDI   Diet: Pt tolerating regular diet without N/V, adequate intake of PO fluids   LDA: Port-a-cath infusing LR at 100 mL/hr cont between abx. Wound vac at 100 mmhg, 50 mL output from woundvac during dayshift. (ended at 200 ML total)   Equipment: IV pole, PCD's, cane, personal belongings at bedside   Plan: Continue to monitor patient and manage pain. Surgery tomorrow for repeat I/D with Dr. Betts.   NPO at MN, please start surgery checklist/scrub tonight.    Additional Info: Encourage pt to call for assistance

## 2018-04-08 NOTE — PROGRESS NOTES
[facility] Internal Medicine Progress Note            Interval History:   Record reviewed.  Seen with RN.  S/P Irrigation and Debridement Right Thigh  - Wound Class: II-Clean Contaminated this AM.  Indication Wound Infection, Sarcoma Right Thigh.  Chronic right thigh pain.  Cystic mass on imaging.  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  Chemo Doxil ifosfamide last week completed on 4/4.  WBC 2.7 with Neulasta 6 mg 4/5.  Wound drainage with concern regarding wound infection.  I/D 4/6.    No fever.  Cultures light growth staph epi.    Leukopenia following chemo.  Neulasta 4/4.  Neupogen 4/6.   On vanc and zosyn.  Clinically doing well.   Adequate  pain control.  No opiate requirement.  No nausea.  Ambulated to BR without LH.   No CP, SOB, cough, reflux, abd pain or distention.  BM 4/7.   Voiding Ok.  No focal neuro c/o related to chemo.            Medications:   All medications reviewed today          Physical Exam:   Blood pressure 120/78, pulse 84, temperature 97.5  F (36.4  C), temperature source Oral, resp. rate 17, SpO2 97 %.    Intake/Output Summary (Last 24 hours) at 04/06/18 1432  Last data filed at 04/06/18 0900   Gross per 24 hour   Intake              340 ml   Output                5 ml   Net              335 ml          General:  Alert.  Appropriate.  No distress.  No O2.     Heent:      Neck:    Skin:    Chest:  clear    Cardiac:  Reg without gallop, murmur.  No JVD.     Abdomen:  Non distended, soft, non-tender.  BS normal.     Extremities:  Perfused.  No edema, calf, posterior thigh tenderness to suggest DVT.     Neuro:            Data:     Results for orders placed or performed during the hospital encounter of 04/05/18 (from the past 24 hour(s))   Vancomycin level   Result Value Ref Range    Vancomycin Level 9.7 mg/L   CBC with platelets   Result Value Ref Range    WBC 0.9 (LL) 4.0 - 11.0 10e9/L    RBC Count 3.67 (L) 4.4 - 5.9 10e12/L    Hemoglobin 9.5 (L) 13.3 - 17.7 g/dL    Hematocrit  29.4 (L) 40.0 - 53.0 %    MCV 80 78 - 100 fl    MCH 25.9 (L) 26.5 - 33.0 pg    MCHC 32.3 31.5 - 36.5 g/dL    RDW 13.6 10.0 - 15.0 %    Platelet Count 277 150 - 450 10e9/L   WBC Differential   Result Value Ref Range    Diff Method Automated Method     % Neutrophils 19.3 %    % Lymphocytes 72.3 %    % Monocytes 6.0 %    % Eosinophils 1.2 %    % Basophils 1.2 %    % Immature Granulocytes 0.0 %    Nucleated RBCs 0 0 /100    Absolute Neutrophil 0.2 (LL) 1.6 - 8.3 10e9/L    Absolute Lymphocytes 0.6 (L) 0.8 - 5.3 10e9/L    Absolute Monocytes 0.1 0.0 - 1.3 10e9/L    Absolute Eosinophils 0.0 0.0 - 0.7 10e9/L    Absolute Basophils 0.0 0.0 - 0.2 10e9/L    Abs Immature Granulocytes 0.0 0 - 0.4 10e9/L    Absolute Nucleated RBC 0.0     Platelet Estimate Confirming automated cell count    Culture - staph epidermidis.             Assessment and Plan:   1)  Sarcoma right thigh.  S/P biopsy 3/8.  Wound infection with  I/D.  Staph epi on culture. Vanc and zosyn.  Adequate pain control off opiates. .   2)  Recent chemo ifosfamide.  3)  Progressive neutropenia 2nd to #2. Expected course with fartun after chemo approx 5 days.  Not septic or ill appearing.  One time neulasta dosing sufficient.   4)  Anemia 2nd to #2.  Adequate.     PLAN:  1)  Same meds, orders.  2)   IS, same pain regimen, bowel meds, no DVT prophylaxis ordered per ortho, mobilize.  3)  F/u culture data.  Continue present ABs per Id.  4)  Reviewed with heme.   Pt to wear mask outside of room.  Good hand washing.    5)  Trend labs.  Monitor clinically.   Disposition Plan   Expected discharge unclear pending cultures, AB plan, counts.      Entered: Macario Varner 04/08/2018, 1:05 PM              Attestation:  I have reviewed today's vital signs, notes, medications, labs and imaging.     Macario Varner MD

## 2018-04-08 NOTE — PHARMACY-VANCOMYCIN DOSING SERVICE
Pharmacy Vancomycin Note  Date of Service 2018  Patient's  1960   57 year old, male    Indication: Skin and Soft Tissue Infection  Goal Trough Level: 10-15 mg/L  Day of Therapy: Started   Current Vancomycin regimen:  1500 mg IV q12h    Current estimated CrCl = Estimated Creatinine Clearance: 143.1 mL/min (based on Cr of 0.65).    Creatinine for last 3 days  2018: 10:30 AM Creatinine 0.65 mg/dL  2018:  5:53 AM Creatinine 0.74 mg/dL  2018:  6:48 AM Creatinine 0.65 mg/dL    Recent Vancomycin Levels (past 3 days)  2018:  8:07 PM Vancomycin Level 9.7 mg/L    Vancomycin IV Administrations (past 72 hours)                   vancomycin (VANCOCIN) 1,500 mg in sodium chloride 0.9 % 250 mL intermittent infusion (mg) 1,500 mg New Bag 18 0853     1,500 mg New Bag 18     1,500 mg New Bag  0842     1,500 mg New Bag 18 210                Nephrotoxins and other renal medications (Future)    Start     Dose/Rate Route Frequency Ordered Stop    18  vancomycin (VANCOCIN) 1,750 mg in sodium chloride 0.9 % 500 mL intermittent infusion      1,750 mg  over 2 Hours Intravenous EVERY 12 HOURS 18 181  piperacillin-tazobactam (ZOSYN) 3.375 g vial to attach to  mL bag      3.375 g  over 30 Minutes Intravenous EVERY 8 HOURS 18 1807               Contrast Orders - past 72 hours     None          Interpretation of levels and current regimen:  Trough level is  Subtherapeutic    Has serum creatinine changed > 50% in last 72 hours: No    Urine output:  unable to determine    Renal Function: Stable    Plan:  1.  Increase Dose to 1750mg q12hrs  2.  Pharmacy will check trough levels as appropriate in 1-3 Days.    3. Serum creatinine levels will be ordered daily for the first week of therapy and at least twice weekly for subsequent weeks.      Frank Colletti, Pharm.D.        .

## 2018-04-08 NOTE — PLAN OF CARE
Pt Alert and Oriented X 4. Afebrile. VSS. Lungs- Clear bilaterally with both anterior and posterior. IS encouraged. Bowels- Hyperactive in all four quadrants. PP+ DP+. CMS and Neuro's are intact to baseline. Denies numbness and tingling in all extremities. Denies nausea, shortness of breath, and chest pain. Pain tolerable and declined scheduled tylenol for this shift. Voids spontaneously without difficulty in BR, up with SBA. Is on a regular diet and appetite was Good this shift. Pt to be NPO at midnight, 4/9/18 and pt already aware. Prep bath/chlorhexidine done X1. Right thigh wound vac is C/D/I, pressure at 100 mmHg. Right leg is slightly swollen and elevated on pillows. Port-a-Cath is patent in the right chest. PCD on bilateral calf and bilateral heels are elevated off the bed. Pt is able to make needs known and the call light is within the pt's reach. Continue to monitor.

## 2018-04-08 NOTE — PLAN OF CARE
Pt is alert and oriented X4. Afebrile, VSS. Denies shortness of breath, lungs clear to auscultate. Voiding without difficulties in BR. Up with stand by assist and calls for help to ambulate to BR. Bowels active, LBM today, per pt report. Denies nausea, chest pain, and pain. Wound vac in place, changed canister this shift, minimal drainage. Denies numbness or tingling in all extremities. Continuous IVF infusing at 100 cc/hr with intermittent antibiotic infusing via port a Cath to right chest. Appetite was good this shift. No pain or new concerns reported upon frequent rounding. Pt is able to make needs known. Continue to monitor.

## 2018-04-08 NOTE — PLAN OF CARE
Problem: Infection, Risk/Actual (Adult)  Goal: Identify Related Risk Factors and Signs and Symptoms  Related risk factors and signs and symptoms are identified upon initiation of Human Response Clinical Practice Guideline (CPG).     VS: VS stable, afebrile, denies CP   O2: 97% on RA, denies SOB   Output: Voiding adequately in bathroom    Last BM: 4/7/2018, passing gas   Activity: SBA, ambulated to bathroom    Skin: Intact except for wound vac R thigh   Pain: Denied pain; refused any pain medications and scheduled Tylenol   CMS: Intact; denies numbness/tingling in all extemities   Dressing: CDI   Diet: Regular   LDA: Port-a-cath infusing LR @ 100 ml/hr b/w abx   Equipment: Wound vac, port-a-cath   Plan: Continue to monitor.    Additional Info:

## 2018-04-08 NOTE — PROGRESS NOTES
Ortho Progress Note     Subjective:  No events overnight. Pain well controlled. Tolerating PO. Voiding.    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  Undergoing chemotherapy.  Found in a clinic visit on 4/5 to have an infection of his sarcoma biopsy site, aspiration showed purulence at that time.  Admitted for Abx, I&D. S/p I&D 4/6.       -Weight Bearing Status: WBAT RLE  -Diet: Regular, NPO midnight for repeat I&D Monday  -Dressing: wound vac, continuous suction 100mmHg  -neupogen 480mcg daily per Heme/Onc + pharmacy   -Disposition: Pending hospital course.    NINI Sandoval MD  PGY-4 Orthopaedic Surgery  [phone]

## 2018-04-09 ENCOUNTER — ANESTHESIA (OUTPATIENT)
Dept: SURGERY | Facility: CLINIC | Age: 58
DRG: 857 | End: 2018-04-09
Payer: COMMERCIAL

## 2018-04-09 ENCOUNTER — SURGERY (OUTPATIENT)
Age: 58
End: 2018-04-09

## 2018-04-09 LAB
BASOPHILS # BLD AUTO: 0 10E9/L (ref 0–0.2)
BASOPHILS NFR BLD AUTO: 1.8 %
DIFFERENTIAL METHOD BLD: ABNORMAL
EOSINOPHIL # BLD AUTO: 0 10E9/L (ref 0–0.7)
EOSINOPHIL NFR BLD AUTO: 0 %
ERYTHROCYTE [DISTWIDTH] IN BLOOD BY AUTOMATED COUNT: 13.8 % (ref 10–15)
HCT VFR BLD AUTO: 30 % (ref 40–53)
HGB BLD-MCNC: 9.9 G/DL (ref 13.3–17.7)
LYMPHOCYTES # BLD AUTO: 0.9 10E9/L (ref 0.8–5.3)
LYMPHOCYTES NFR BLD AUTO: 72.1 %
MCH RBC QN AUTO: 26.5 PG (ref 26.5–33)
MCHC RBC AUTO-ENTMCNC: 33 G/DL (ref 31.5–36.5)
MCV RBC AUTO: 80 FL (ref 78–100)
MONOCYTES # BLD AUTO: 0.1 10E9/L (ref 0–1.3)
MONOCYTES NFR BLD AUTO: 9.9 %
NEUTROPHILS # BLD AUTO: 0.2 10E9/L (ref 1.6–8.3)
NEUTROPHILS NFR BLD AUTO: 16.2 %
PLATELET # BLD AUTO: 279 10E9/L (ref 150–450)
PLATELET # BLD EST: ABNORMAL 10*3/UL
RBC # BLD AUTO: 3.74 10E12/L (ref 4.4–5.9)
RBC MORPH BLD: NORMAL
VANCOMYCIN SERPL-MCNC: 13.3 MG/L
WBC # BLD AUTO: 1.3 10E9/L (ref 4–11)

## 2018-04-09 PROCEDURE — 25000132 ZZH RX MED GY IP 250 OP 250 PS 637: Performed by: ANESTHESIOLOGY

## 2018-04-09 PROCEDURE — 3E10X8Z IRRIGATION OF SKIN AND MUCOUS MEMBRANES USING IRRIGATING SUBSTANCE: ICD-10-PCS | Performed by: ORTHOPAEDIC SURGERY

## 2018-04-09 PROCEDURE — 99232 SBSQ HOSP IP/OBS MODERATE 35: CPT | Performed by: INTERNAL MEDICINE

## 2018-04-09 PROCEDURE — 25000128 H RX IP 250 OP 636: Performed by: ORTHOPAEDIC SURGERY

## 2018-04-09 PROCEDURE — 80202 ASSAY OF VANCOMYCIN: CPT | Performed by: ORTHOPAEDIC SURGERY

## 2018-04-09 PROCEDURE — 25000128 H RX IP 250 OP 636: Performed by: STUDENT IN AN ORGANIZED HEALTH CARE EDUCATION/TRAINING PROGRAM

## 2018-04-09 PROCEDURE — 27210794 ZZH OR GENERAL SUPPLY STERILE: Performed by: ORTHOPAEDIC SURGERY

## 2018-04-09 PROCEDURE — C9399 UNCLASSIFIED DRUGS OR BIOLOG: HCPCS | Performed by: NURSE ANESTHETIST, CERTIFIED REGISTERED

## 2018-04-09 PROCEDURE — 25000125 ZZHC RX 250: Performed by: NURSE ANESTHETIST, CERTIFIED REGISTERED

## 2018-04-09 PROCEDURE — 25000128 H RX IP 250 OP 636: Performed by: NURSE ANESTHETIST, CERTIFIED REGISTERED

## 2018-04-09 PROCEDURE — 36000051 ZZH SURGERY LEVEL 2 1ST 30 MIN - UMMC: Performed by: ORTHOPAEDIC SURGERY

## 2018-04-09 PROCEDURE — 40000170 ZZH STATISTIC PRE-PROCEDURE ASSESSMENT II: Performed by: ORTHOPAEDIC SURGERY

## 2018-04-09 PROCEDURE — 37000008 ZZH ANESTHESIA TECHNICAL FEE, 1ST 30 MIN: Performed by: ORTHOPAEDIC SURGERY

## 2018-04-09 PROCEDURE — 99207 ZZC CDG-MDM COMPONENT: MEETS MODERATE - UP CODED: CPT | Performed by: INTERNAL MEDICINE

## 2018-04-09 PROCEDURE — 25000128 H RX IP 250 OP 636: Performed by: ANESTHESIOLOGY

## 2018-04-09 PROCEDURE — 71000014 ZZH RECOVERY PHASE 1 LEVEL 2 FIRST HR: Performed by: ORTHOPAEDIC SURGERY

## 2018-04-09 PROCEDURE — 37000009 ZZH ANESTHESIA TECHNICAL FEE, EACH ADDTL 15 MIN: Performed by: ORTHOPAEDIC SURGERY

## 2018-04-09 PROCEDURE — 12000008 ZZH R&B INTERMEDIATE UMMC

## 2018-04-09 PROCEDURE — 85025 COMPLETE CBC W/AUTO DIFF WBC: CPT | Performed by: INTERNAL MEDICINE

## 2018-04-09 PROCEDURE — 36592 COLLECT BLOOD FROM PICC: CPT | Performed by: INTERNAL MEDICINE

## 2018-04-09 PROCEDURE — 25000128 H RX IP 250 OP 636: Performed by: INTERNAL MEDICINE

## 2018-04-09 PROCEDURE — 36000053 ZZH SURGERY LEVEL 2 EA 15 ADDTL MIN - UMMC: Performed by: ORTHOPAEDIC SURGERY

## 2018-04-09 PROCEDURE — 25000565 ZZH ISOFLURANE, EA 15 MIN: Performed by: ORTHOPAEDIC SURGERY

## 2018-04-09 PROCEDURE — 25800025 ZZH RX 258: Performed by: ORTHOPAEDIC SURGERY

## 2018-04-09 RX ORDER — ONDANSETRON 2 MG/ML
INJECTION INTRAMUSCULAR; INTRAVENOUS PRN
Status: DISCONTINUED | OUTPATIENT
Start: 2018-04-09 | End: 2018-04-09

## 2018-04-09 RX ORDER — SODIUM CHLORIDE, SODIUM LACTATE, POTASSIUM CHLORIDE, CALCIUM CHLORIDE 600; 310; 30; 20 MG/100ML; MG/100ML; MG/100ML; MG/100ML
INJECTION, SOLUTION INTRAVENOUS CONTINUOUS PRN
Status: DISCONTINUED | OUTPATIENT
Start: 2018-04-09 | End: 2018-04-09

## 2018-04-09 RX ORDER — FENTANYL CITRATE 50 UG/ML
INJECTION, SOLUTION INTRAMUSCULAR; INTRAVENOUS PRN
Status: DISCONTINUED | OUTPATIENT
Start: 2018-04-09 | End: 2018-04-09

## 2018-04-09 RX ORDER — PROPOFOL 10 MG/ML
INJECTION, EMULSION INTRAVENOUS PRN
Status: DISCONTINUED | OUTPATIENT
Start: 2018-04-09 | End: 2018-04-09

## 2018-04-09 RX ORDER — ACETAMINOPHEN 325 MG/1
975 TABLET ORAL ONCE
Status: COMPLETED | OUTPATIENT
Start: 2018-04-09 | End: 2018-04-09

## 2018-04-09 RX ORDER — LIDOCAINE HYDROCHLORIDE 20 MG/ML
INJECTION, SOLUTION INFILTRATION; PERINEURAL PRN
Status: DISCONTINUED | OUTPATIENT
Start: 2018-04-09 | End: 2018-04-09

## 2018-04-09 RX ORDER — AMPICILLIN AND SULBACTAM 2; 1 G/1; G/1
3 INJECTION, POWDER, FOR SOLUTION INTRAMUSCULAR; INTRAVENOUS EVERY 6 HOURS
Status: DISCONTINUED | OUTPATIENT
Start: 2018-04-09 | End: 2018-04-10 | Stop reason: ALTCHOICE

## 2018-04-09 RX ADMIN — MIDAZOLAM 2 MG: 1 INJECTION INTRAMUSCULAR; INTRAVENOUS at 14:49

## 2018-04-09 RX ADMIN — FENTANYL CITRATE 25 MCG: 50 INJECTION, SOLUTION INTRAMUSCULAR; INTRAVENOUS at 15:17

## 2018-04-09 RX ADMIN — PIPERACILLIN SODIUM AND TAZOBACTAM SODIUM 3.38 G: 3; .375 INJECTION, POWDER, LYOPHILIZED, FOR SOLUTION INTRAVENOUS at 12:13

## 2018-04-09 RX ADMIN — PROPOFOL 150 MG: 10 INJECTION, EMULSION INTRAVENOUS at 14:49

## 2018-04-09 RX ADMIN — HYDROMORPHONE HYDROCHLORIDE 0.5 MG: 1 INJECTION, SOLUTION INTRAMUSCULAR; INTRAVENOUS; SUBCUTANEOUS at 16:05

## 2018-04-09 RX ADMIN — VANCOMYCIN HYDROCHLORIDE 1750 MG: 10 INJECTION, POWDER, LYOPHILIZED, FOR SOLUTION INTRAVENOUS at 22:16

## 2018-04-09 RX ADMIN — SODIUM CHLORIDE 3000 ML: 900 IRRIGANT IRRIGATION at 15:24

## 2018-04-09 RX ADMIN — SODIUM CHLORIDE, POTASSIUM CHLORIDE, SODIUM LACTATE AND CALCIUM CHLORIDE: 600; 310; 30; 20 INJECTION, SOLUTION INTRAVENOUS at 14:44

## 2018-04-09 RX ADMIN — VANCOMYCIN HYDROCHLORIDE 1750 MG: 10 INJECTION, POWDER, LYOPHILIZED, FOR SOLUTION INTRAVENOUS at 08:54

## 2018-04-09 RX ADMIN — AMPICILLIN SODIUM AND SULBACTAM SODIUM 3 G: 2; 1 INJECTION, POWDER, FOR SOLUTION INTRAMUSCULAR; INTRAVENOUS at 20:29

## 2018-04-09 RX ADMIN — PIPERACILLIN SODIUM AND TAZOBACTAM SODIUM 3.38 G: 3; .375 INJECTION, POWDER, LYOPHILIZED, FOR SOLUTION INTRAVENOUS at 03:45

## 2018-04-09 RX ADMIN — FENTANYL CITRATE 100 MCG: 50 INJECTION, SOLUTION INTRAMUSCULAR; INTRAVENOUS at 14:49

## 2018-04-09 RX ADMIN — HYDROMORPHONE HYDROCHLORIDE 0.5 MG: 1 INJECTION, SOLUTION INTRAMUSCULAR; INTRAVENOUS; SUBCUTANEOUS at 16:15

## 2018-04-09 RX ADMIN — ACETAMINOPHEN 975 MG: 325 TABLET, FILM COATED ORAL at 14:38

## 2018-04-09 RX ADMIN — SODIUM CHLORIDE, POTASSIUM CHLORIDE, SODIUM LACTATE AND CALCIUM CHLORIDE 1000 ML: 600; 310; 30; 20 INJECTION, SOLUTION INTRAVENOUS at 07:32

## 2018-04-09 RX ADMIN — ROCURONIUM BROMIDE 50 MG: 10 INJECTION INTRAVENOUS at 14:49

## 2018-04-09 RX ADMIN — SUGAMMADEX 350 MG: 100 INJECTION, SOLUTION INTRAVENOUS at 15:33

## 2018-04-09 RX ADMIN — LIDOCAINE HYDROCHLORIDE 100 MG: 20 INJECTION, SOLUTION INFILTRATION; PERINEURAL at 14:49

## 2018-04-09 RX ADMIN — ONDANSETRON 4 MG: 2 INJECTION INTRAMUSCULAR; INTRAVENOUS at 15:21

## 2018-04-09 NOTE — PLAN OF CARE
Report given to surgery nurse, prior to him going down for his I & D, pt had his surgical scrub and was informed that he would be going down to the surgical area around 2 p.m.

## 2018-04-09 NOTE — ANESTHESIA CARE TRANSFER NOTE
Patient: Hiram Tapia    Procedure(s):  Irrigation And Debridement Right Thigh Wound. with Wound VAC Exchange - Wound Class: II-Clean Contaminated    Diagnosis: Wound Infection, Sarcoma Right Thigh  Diagnosis Additional Information: No value filed.    Anesthesia Type:   General, LMA     Note:  Airway :Face Mask  Patient transferred to:PACU  Comments: Report to RN, vss, IV and airway patent awake, exchanging well on O2, states comfortHandoff Report: Identifed the Patient, Identified the Reponsible Provider, Reviewed the pertinent medical history, Discussed the surgical course, Reviewed Intra-OP anesthesia mangement and issues during anesthesia, Set expectations for post-procedure period and Allowed opportunity for questions and acknowledgement of understanding      Vitals: (Last set prior to Anesthesia Care Transfer)    CRNA VITALS  4/9/2018 1509 - 4/9/2018 1545      4/9/2018             Pulse: 106    SpO2: 99 %                Electronically Signed By: WILLAM Mckeon CRNA  April 9, 2018  3:45 PM

## 2018-04-09 NOTE — PLAN OF CARE
Problem: Infection, Risk/Actual (Adult)  Goal: Identify Related Risk Factors and Signs and Symptoms  Related risk factors and signs and symptoms are identified upon initiation of Human Response Clinical Practice Guideline (CPG).     VS: VS stable, afebrile   O2: > 92% on RA, denies SOB    Output: Voiding adequately in bathroom    Last BM: 4/8/2018, passing gas   Activity: SBA, ambulated to bathroom    Skin: Intact except for R thigh    Pain: Denied pain throughout shift; refused scheduled Tylenol    CMS: Intact; denies numbness/tingling in all extremities   Dressing: CDI   Diet: NPO due to surgery today (4/9)   LDA: Port-a-cath in R upper chest; infusing LR @ 100 ml/hr, wound vac running at 100 Output: 60. (ended just over 300 lenard on cannister)    Equipment: Wound vac   Plan: Continue to monitor. Repeat I&D scheduled for today at 3 PM.   Additional Info:  Had 1st pre-surgery shower

## 2018-04-09 NOTE — PROGRESS NOTES
Care Coordinator- Discharge Planning     Admission Date/Time:  4/5/2018  Attending MD:  Brad Betts MD     Data  Date of initial CC assessment: 4/6/2018  Chart reviewed, discussed with interdisciplinary team.   Patient was admitted for: No diagnosis found.     Assessment  Full assessment completed in previous note    Coordination of Care and Referrals: Patient planned for OR today for I&D and possible wound closure. RNCC will continue to follow patient progress and help with home discharge planning.      Plan  Anticipated Discharge Date:  TBD  Anticipated Discharge Plan:  TBD    CTS Handoff completed:  NO    Deirdre Dewey RN, BSN  Care Coordinator, 8A  Phone [phone]  Pager [phone]

## 2018-04-09 NOTE — OP NOTE
Preop diagnosis: Wound infection right thigh    Postoperative diagnosis: Same    Procedure performed: 1.  Irrigation of right thigh wound.  2.  placement of VAC dressing.    Surgeons: Dwain Betts, Brittany SAPP, Maurice Womack    Estimated blood loss: Less than 2 cc    Pathology submitted: None        Mr. Tapia is interviewed in the preoperative area consent was signed risks and benefits have been reviewed and understood.  The surgical site was marked with my initials and line of intended incision.  He was taken to the operating room preoperative brief had been performed he was placed in the lateral position with the right side up in the thigh was prepped and draped sterilely.  Surgical timeout was performed..  Vacuum dressing had been removed prior to the prep.    The wound was explored.  It had good granulation tissue.  It did continue to track deep.  The wound was irrigated with 3 L of saline and a VAC dressing was applied to an area which is approximately 4 x 2 x 3 cm.    Postoperative plan: 1.  VAC dressings at home at least until his absolute neutrophil count is significant increased compared to today's count.  2.  We will work with the oncology team with wound management is not disruptive in the form of chemotherapy.

## 2018-04-09 NOTE — OP NOTE
Preop diagnosis: Right thigh deep wound infection with cellulitis    Postoperative diagnosis: Same    Procedure performed: 1.  Incision, drainage and debridement right thigh wound.  Debridement included skin subcutaneous tissue fascia muscle.  2.  Placement of VAC dressing 3 x 2 x 20 cm    Surgeon: Brittany Atkinson.  Ms. Santos's assistant was required throughout the case for hemostasis, retraction and management.    Estimated blood loss: Less than 10 cc.    Specimen submitted cultures for tumor right thigh.    Mr. Tapia was interviewed in the preoperative area risk and benefits were reviewed.  Consent was signed the surgical site was marked with my initials and line of intended incision.  Preoperative brief had been performed patient was taken the operating room received a general anesthetic and placed in the lateral position with the right side up.  Surgical site was prepped and draped sterilely surgical timeout was performed.    The previous 2 inch incision was opened sharply.  The skin and subcutaneous tissue which was not healthy next the incision was debrided sharply with a knife.  A portion of the fascia was likewise debrided as was some adjacent muscle.  This allowed entry into the large vacuous sarcoma cavity.  This was filled with fluid which was drained.  The entire wound was then irrigated with 3 L of saline.  Because of the excessive drainage over time it was decided to place a vacuum dressing.  As described above this was 20 cm in length and was placed deep into the fluid-filled sarcoma cavity.  Vacuum dressing was applied in the standard fashion.    Patient will return the operating room on Monday.  At that time the dressing change repeat I&D will be performed and a decision will be made as to whether the wound could be closed over drain or whether vacuum dressing should continue.

## 2018-04-09 NOTE — OR NURSING
PACU to Inpatient Nursing Handoff    Patient Hiram Tapia is a 57 year old male who speaks English.   Procedure Procedure(s):  Irrigation And Debridement Right Thigh Wound. with Wound VAC Exchange - Wound Class: II-Clean Contaminated   Surgeon(s) Primary: Brad Betts MD  Assisting: Rashmi Santos PA-C  Resident - Assisting: Maurice Womack MD     No Known Allergies    Isolation  @ISOLATION Protective    Past Medical History   has a past medical history of Sarcoma (H).    Anesthesia General   Dermatome Level     Preop Meds acetaminophen (Tylenol) - time given: 1440   Nerve block Not applicable   Intraop Meds fentanyl (Sublimaze): 125 mcg total  ondansetron (Zofran): last given at 1520   Local Meds No   Antibiotics Not applicable     Pain Patient Currently in Pain: yes  Comfort: tolerable with discomfort  Pain Control: partially effective   PACU meds  hydromorphone (Dilaudid): 1 mg (total dose) last given at 1615    PCA / epidural No   Capnography Respiratory Monitoring (EtCO2):  (Pt declined CAPNOGRAPHY)   Telemetry ECG Rhythm: Normal sinus rhythm   Inpatient Telemetry Monitor Ordered? No        Labs Glucose Lab Results   Component Value Date    GLC 98 04/07/2018       Hgb Lab Results   Component Value Date    HGB 9.9 04/09/2018       INR No results found for: INR   PACU Imaging Not applicable     Wound/Incision Negative Pressure Wound Therapy Leg Right;Upper (Active)   Wound Type Surgical 4/9/2018  3:40 PM   Dressing Type Silver impregnated foam 4/9/2018  3:40 PM   Cycle Continuous 4/9/2018  3:40 PM   Target Pressure (mmHg) 100 4/9/2018  3:40 PM   Cannister changed? Yes 4/9/2018  3:40 PM   Dressing Status Clean, dry, intact 4/9/2018  2:00 AM   Drainage Amount Small 4/9/2018  2:00 AM   Drainage Color/Charcteristics Serosanguinous 4/9/2018  2:00 AM   Output (ml) 60 ml 4/9/2018  6:14 AM   Number of days:3       Incision/Surgical Site 03/08/18 Right Leg (Active)   Incision Assessment Other  (Comment) 4/9/2018  8:00 AM   Mela-Incision Assessment Erythema 4/9/2018  8:00 AM   Closure RENETTA 4/9/2018  8:00 AM   Incision Drainage Amount Small 4/9/2018  8:00 AM   Drainage Description Serosanguinous 4/9/2018  8:00 AM   Incision Care Wound cleanser 4/5/2018  7:00 PM   Dressing Intervention Clean, dry, intact 4/9/2018  8:00 AM   Number of days:32       Incision/Surgical Site 03/28/18 Right Chest (Active)   Incision Assessment WDL 4/9/2018  3:40 PM   Closure Liquid bandage 3/28/2018  1:45 PM   Incision Drainage Amount None 4/9/2018  8:00 AM   Dressing Intervention Clean, dry, intact 4/9/2018  3:40 PM   Number of days:12       Incision/Surgical Site 03/28/18 Right Neck (Active)   Incision Assessment WDL 4/9/2018  3:40 PM   Closure Liquid bandage 3/28/2018  1:45 PM   Incision Drainage Amount None 4/9/2018  8:00 AM   Dressing Intervention Open to air / No Dressing 4/9/2018  3:40 PM   Number of days:12       Incision/Surgical Site 04/06/18 Right;Lateral Thigh (Active)   Incision Assessment UTV 4/9/2018  3:40 PM   Mela-Incision Assessment UTV 4/9/2018  3:40 PM   Closure RENETTA 4/9/2018  8:00 AM   Incision Drainage Amount None 4/9/2018  3:40 PM   Dressing Intervention Clean, dry, intact 4/9/2018  3:40 PM   Number of days:3      CMS Peripheral Neurovascular WDL:  WDL except (04/09/18 1540)  All Extremities Temperature: warm (04/09/18 0800)  All Extremities Color: no discoloration (04/09/18 0800)  All Extremities Sensation: no numbness (04/09/18 0800)  RLE Temperature: warm (04/09/18 1600)  RLE Color: pale (04/09/18 1600)  RLE Sensation: no tingling;no numbness (04/09/18 1600)   Equipment Not applicable   Other LDA       IV Access Port A Cath Single 03/28/18 Right Chest wall (Active)   Access Date 04/05/18 4/7/2018  8:45 AM   Access Attempts 1 3/29/2018  6:00 AM   Gauge/Length Power noncoring 90 degree bend;20 gauge;3/4 inch 3/29/2018  6:00 AM   Site Assessment WDL 4/9/2018  3:40 PM   Line Status Infusing 4/9/2018  3:40 PM    Extravasation? No 4/9/2018  3:40 PM   Dressing Intervention Transparent 4/9/2018  3:40 PM   Dressing change due 04/08/18 4/7/2018  8:00 PM   Needle Change Due 04/05/18 3/29/2018  6:00 AM   De-Access Date 04/05/18 4/5/2018 10:00 AM   Number of days:12      Blood Products Not applicable EBL Min   mL   Intake/Output Date 04/09/18 0700 - 04/10/18 0659   Shift 9803-0531 7092-2262 0275-4179 24 Hour Total   I  N  T  A  K  E   I.V.  600  600    Shift Total  600  600   O  U  T  P  U  T   Shift Total       Weight (kg)            Drains / Luz Negative Pressure Wound Therapy Leg Right;Upper (Active)   Wound Type Surgical 4/9/2018  3:40 PM   Dressing Type Silver impregnated foam 4/9/2018  3:40 PM   Cycle Continuous 4/9/2018  3:40 PM   Target Pressure (mmHg) 100 4/9/2018  3:40 PM   Cannister changed? Yes 4/9/2018  3:40 PM   Dressing Status Clean, dry, intact 4/9/2018  2:00 AM   Drainage Amount Small 4/9/2018  2:00 AM   Drainage Color/Charcteristics Serosanguinous 4/9/2018  2:00 AM   Output (ml) 60 ml 4/9/2018  6:14 AM   Number of days:3      Time of void PreOp Void Prior to Procedure: 1330 (04/09/18 1300)    PostOp Urine Occurrence: 1 (04/07/18 1701)    Diapered? No   Bladder Scan Bladder Scan Volume (mL): 167 ml (04/06/18 1219)    mL (04/08/18 1818)  ice chips     Vitals    B/P: 125/84  T: 97.3  F (36.3  C)    Temp src: Axillary  P:  Pulse: 85 (04/09/18 0700)    Heart Rate: 91 (04/09/18 1615)     R: 10  O2:  SpO2: 97 %    O2 Device: Nasal cannula (04/09/18 1615)    Oxygen Delivery: 2 LPM (04/09/18 1615)         Family/support present none at thhis time   Patient belongings Patient Belongings: cell phone/electronics;clothing;glasses;ring;shoes  Disposition of Belongings: Kept with patient   Patient transported on cart   DC meds/scripts (obs/outpt) Not applicable   Inpatient Pain Meds Released? No       Special needs/considerations None   Tasks needing completion None       Debra Magdaleno RN  ASCOM [id number]

## 2018-04-09 NOTE — PLAN OF CARE
Did notify Dr. Varner of the critical value of his absolute neutrophil of 0.2, same as yesterday, he has been receiving chemo for his leg tumor.

## 2018-04-09 NOTE — PROGRESS NOTES
Ortho Progress Note     Subjective:  No pain. No fevers/chills.  No concerns at present.  Ready for OR today.    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  Undergoing chemotherapy.  Found in a clinic visit on 4/5 to have an infection of his sarcoma biopsy site, aspiration showed purulence at that time.  Admitted for Abx, I&D. S/p I&D 4/6. Cultures show MRSE.      -Weight Bearing Status: WBAT RLE  -Diet: NPO for repeat I&D today with possible closure  -Dressing: wound vac, continuous suction 100mmHg  -neupogen 480mcg daily per Heme/Onc + pharmacy   -Disposition: Pending hospital course.    Maurice Womack MD  Orthopaedic Surgery PGY-4  Pager:  [phone]

## 2018-04-09 NOTE — ANESTHESIA POSTPROCEDURE EVALUATION
Patient: Hiram Tapia    Procedure(s):  Irrigation And Debridement Right Thigh Wound. - Wound Class: II-Clean Contaminated    Diagnosis:Wound Infection, Sarcoma Right Thigh  Diagnosis Additional Information: No value filed.    Anesthesia Type:  General, LMA    Note:  Anesthesia Post Evaluation    Patient location during evaluation: PACU  Patient participation: Able to fully participate in evaluation  Level of consciousness: awake and alert  Pain management: adequate  Airway patency: patent  Cardiovascular status: hypotensive and hemodynamically stable  Respiratory status: acceptable  Hydration status: acceptable  PONV: none     Anesthetic complications: None          Last vitals:  Vitals:    04/08/18 1522 04/08/18 2223 04/09/18 0700   BP: 121/74 117/70 119/77   Pulse:   85   Resp: 16 16 16   Temp: 36.2  C (97.1  F) 36.7  C (98.1  F) 36.6  C (97.9  F)   SpO2: 98% 98% 96%         Electronically Signed By: Andrei Mc MD, MD  April 9, 2018  3:22 PM

## 2018-04-09 NOTE — BRIEF OP NOTE
[facility] Brief Operative Note    Pre-operative diagnosis: Wound Infection, Sarcoma Right Thigh   Post-operative diagnosis same   Procedure: Procedure(s):  Irrigation And Debridement Right Thigh Wound. with Wound VAC Exchange - Wound Class: II-Clean Contaminated   Surgeon(s): Surgeon(s) and Role:     * Brad Betts MD - Primary     * Rashmi Santos PA-C - Assisting     * Maurice Womack MD - Resident - Assisting   Estimated blood loss: 2mL   Specimens: * No specimens in log *   Findings: Wound tunneling but with good granulation tissue and no erythema    Post-op Plan: patient can d/c home in next 1-2 days, will go home on Abx as previous  WB status:  FWB  Device:  Wound Vac 125mmHg continuous suction with vac changes every 2-3 days thru home nursing - current wound 4.5 cm long, 1.5 cm wide, and 2 cm deep  DVT Prophylaxis:  Continue as previous  Follow-up:  2 weeks with Dr. Betts/WANG for wound check

## 2018-04-09 NOTE — PROGRESS NOTES
This is a recent snapshot of the patient's [facility] medical record.  For current drug dose and complete information and questions, call [phone] or In Basket pool, [facility] home infusion ([id number])  CSN Number:  [id number]

## 2018-04-09 NOTE — PHARMACY-VANCOMYCIN DOSING SERVICE
Pharmacy Vancomycin Note  Date of Service 2018  Patient's  1960   57 year old, male    Indication: Skin and Soft Tissue Infection   Cx: Staph epi sens to Vanco   Goal Trough Level: 10-15 mg/L  Day of Therapy: since   Current Vancomycin regimen:  1750 mg IV q12h  Also on Zosyn    Current estimated CrCl = Estimated Creatinine Clearance: 143.1 mL/min (based on Cr of 0.65).    Creatinine for last 3 days  2018:  6:48 AM Creatinine 0.65 mg/dL    Recent Vancomycin Levels (past 3 days)  2018:  8:07 PM Vancomycin Level 9.7 mg/L  2018:  8:19 AM Vancomycin Level 13.3 mg/L    Vancomycin IV Administrations (past 72 hours)                   vancomycin (VANCOCIN) 1,750 mg in sodium chloride 0.9 % 500 mL intermittent infusion (mg) 1,750 mg New Bag 18 0854     1,750 mg New Bag 18     1,750 mg New Bag  0909     1,750 mg New Bag 18    vancomycin (VANCOCIN) 1,500 mg in sodium chloride 0.9 % 250 mL intermittent infusion (mg) 1,500 mg New Bag 18 0853     1,500 mg New Bag 18                Nephrotoxins and other renal medications (Future)    Start     Dose/Rate Route Frequency Ordered Stop    18  vancomycin (VANCOCIN) 1,750 mg in sodium chloride 0.9 % 500 mL intermittent infusion      1,750 mg  over 2 Hours Intravenous EVERY 12 HOURS 18 181  piperacillin-tazobactam (ZOSYN) 3.375 g vial to attach to  mL bag      3.375 g  over 30 Minutes Intravenous EVERY 8 HOURS 18 1807               Contrast Orders - past 72 hours     None          Interpretation of levels and current regimen:  Trough level is  Therapeutic    Has serum creatinine changed > 50% in last 72 hours: No    Urine output:  unable to determine    Renal Function: Stable    Plan:  1.  Continue Current Dose  2.  Pharmacy will check trough levels as appropriate in 1-3 Days.    3. Serum creatinine levels will be ordered a minimum of twice weekly.      Chito SIFUENTES  Michael        .

## 2018-04-09 NOTE — PROGRESS NOTES
[facility] Internal Medicine Progress Note            Interval History:   Record reviewed.  Seen with RN.  S/P Irrigation and Debridement Right Thigh  - Wound Class: II-Clean Contaminated this AM.  Indication Wound Infection, Sarcoma Right Thigh.  Chronic right thigh pain.  Cystic mass on imaging.  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  Chemo Doxil ifosfamide completed on 4/4.  WBC 2.7 with Neulasta 6 mg 4/5.  Wound drainage with concern regarding wound infection.  I/D 4/6.    No fever.  Cultures light growth staph epi.    Leukopenia following chemo.  Neulasta 4/4.  Neupogen 4/6.   On vanc and zosyn. Plan to return to OR today for repeat I/D and possible wound closure.  Clinically doing well.   Adequate  pain control.  No opiate requirement.  No nausea.  Ambulating to BR without LH.   No CP, SOB, cough, reflux, abd pain or distention.  BM this AM.   Voiding Ok.  No focal neuro c/o related to chemo.            Medications:   All medications reviewed today          Physical Exam:   Blood pressure 119/77, pulse 85, temperature 97.9  F (36.6  C), resp. rate 16, SpO2 96 %.    Intake/Output Summary (Last 24 hours) at 04/06/18 1432  Last data filed at 04/06/18 0900   Gross per 24 hour   Intake              340 ml   Output                5 ml   Net              335 ml          General:  Alert.  Appropriate.  No distress.  No O2.     Heent:      Neck:    Skin:    Chest:  clear    Cardiac:  Reg without gallop, murmur.  No JVD.     Abdomen:  Non distended, soft, non-tender.  BS normal.     Extremities:  Perfused.  No edema, calf, posterior thigh tenderness to suggest DVT.     Neuro:            Data:     Results for orders placed or performed during the hospital encounter of 04/05/18 (from the past 24 hour(s))   CBC with platelets differential   Result Value Ref Range    WBC 1.3 (L) 4.0 - 11.0 10e9/L    RBC Count 3.74 (L) 4.4 - 5.9 10e12/L    Hemoglobin 9.9 (L) 13.3 - 17.7 g/dL    Hematocrit 30.0 (L) 40.0 - 53.0  %    MCV 80 78 - 100 fl    MCH 26.5 26.5 - 33.0 pg    MCHC 33.0 31.5 - 36.5 g/dL    RDW 13.8 10.0 - 15.0 %    Platelet Count 279 150 - 450 10e9/L    Diff Method Manual Differential     % Neutrophils 16.2 %    % Lymphocytes 72.1 %    % Monocytes 9.9 %    % Eosinophils 0.0 %    % Basophils 1.8 %    Absolute Neutrophil 0.2 (LL) 1.6 - 8.3 10e9/L    Absolute Lymphocytes 0.9 0.8 - 5.3 10e9/L    Absolute Monocytes 0.1 0.0 - 1.3 10e9/L    Absolute Eosinophils 0.0 0.0 - 0.7 10e9/L    Absolute Basophils 0.0 0.0 - 0.2 10e9/L    RBC Morphology Normal     Platelet Estimate Confirming automated cell count    Vancomycin level   Result Value Ref Range    Vancomycin Level 13.3 mg/L   Culture - staph epidermidis.             Assessment and Plan:   1)  Sarcoma right thigh.  S/P biopsy 3/8.  Wound infection with  I/D.  Staph epi on culture. Vanc and zosyn.  Adequate pain control off opiates. .   2)  Recent chemo ifosfamide.  3)  Progressive neutropenia 2nd to #2. Expected course with fartun after chemo approx 5 days.  Not septic or ill appearing.  One time neulasta dosing sufficient.  WBC starting to come up.   4)  Anemia 2nd to #2.  Adequate.     PLAN:  1)  Same meds, orders.  2)   IS, same pain regimen, bowel meds, no DVT prophylaxis ordered per ortho, mobilize.  3)  F/u culture data.  Continue present ABs per Id.  4)  Reviewed with heme.   Pt to wear mask outside of room.  Good hand washing.    5)  OR today - review with ortho.  6)  Trend labs.  Monitor clinically.   Disposition Plan   Expected discharge unclear pending cultures, AB plan, counts.      Entered: Macario Varner 04/09/2018, 11:51 AM              Attestation:  I have reviewed today's vital signs, notes, medications, labs and imaging.     Macario Varner MD

## 2018-04-09 NOTE — PROGRESS NOTES
[facility] ID SERVICE: ONGOING CONSULTATION   Hiram Tapia : 1960 Sex: male:   Medical record number [id number] Attending Physician: Brad Betts MD  Date of Service: 2018  PROBLEM LIST:   1. Postop wound infection following high-grade sarcoma biopsy on 3/8/18, s/p I/D on  with op findings of a large vacuous, fluid-filled sarcoma cavity, cx showing MRSE; re-explored on  with findings of deep tracking with good granulation tissue. Wound vac has been in place since .  2. High-grade R leg sarcoma  3. Indwelling R chest port    RECOMMENDATIONS:   1. Continue vancomycin. I have de-escalated pip/tazo to amp/sulbactam to cover possible anaerobes which may be difficult to culture.  2. Ultimately he may be eligible to de-escalate to oral therapy. This will to some degree hinge on his chemo schedule and anticipated count recovery. With low counts he will be more vulnerable to ongoing infection despite source control.    ID will continue to follow.    Gianna Box MD   of Medicine, Division of Infectious Diseases  [facility] [phone]       CHIEF INFECTIOUS DISEASES COMPLAINT:  Draining R upper leg wound    INTERVAL HISTORY: (Extended HPI requires four HPI elements or the status of three chronic problems)  To OR today, feeling well. Wound vac in place. Good appetite, no diarrhea or nausea. No skin rash.  ROS: (Recommend ? 2systems)   A five-point review of systems was obtained and was negative with the exception of that which is described above.  No Known Allergies  Allergies were reviewed.  No current outpatient prescriptions on file.     CURRENT ANTI-INFECTIVES:   Vanco, pip/tazo  EXAMINATION: (Recommend at least 12 bullets from any organ systems)   Vital Signs: /78 (BP Location: Left arm)  Pulse 85  Temp 96.9  F (36.1  C) (Oral)  Resp 16  SpO2 99%   Awake, alert  Breathing normal  R upper leg examined - wound vac is in place. There is some mild erythema  surrounding the sponge.   No skin rash  Mood/affect normal  NEW DATA/RESULTS:   All interval basic labs, microbiology results and imaging were personally reviewed.  Reviewed medicine test (PFTs, EKG, cardiac echo or cath): NO    Culture Micro   Date Value Ref Range Status   04/06/2018 Culture negative monitoring continues  Preliminary   04/06/2018 Light growth  Staphylococcus epidermidis   (A)  Final   04/06/2018 Susceptibility testing done on previous specimen  Final   04/06/2018 Culture negative after 3 days  Preliminary   04/06/2018 Culture negative monitoring continues  Preliminary   04/06/2018 Light growth  Staphylococcus epidermidis   (A)  Final   04/06/2018 Susceptibility testing done on previous specimen  Final   04/06/2018 Culture negative after 3 days  Preliminary       No lab results found.  Recent Labs   Lab Test  04/09/18 0819 04/08/18 0617 04/07/18   0648  04/06/18   0553  04/05/18   1030  04/04/18   1125   WBC  1.3*  0.9*  0.9*  1.9*  2.7*  4.8     Recent Labs   Lab Test  04/07/18 0648  04/06/18   0553  04/05/18   1030  04/04/18   1125   CR  0.65*  0.74  0.65*  0.67   GFRESTIMATED  >90  >90  >90  >90       Hematology Studies  Recent Labs   Lab Test  04/09/18 0819 04/08/18 0617 04/07/18   0648  04/06/18   0553  04/05/18   1030  04/04/18   1125   WBC  1.3*  0.9*  0.9*  1.9*  2.7*  4.8   ANEU  0.2*  0.2*  0.4*  1.1*  1.9  4.0   AEOS  0.0  0.0  0.0  0.1  0.0  0.0   HCT  30.0*  29.4*  29.7*  33.3*  35.5*  34.6*   PLT  279  277  272  307  364  331       Metabolic  Recent Labs   Lab Test  04/07/18 0648  04/06/18   0553  04/05/18   1030   NA  138  137  131*   BUN  10  15  16   CO2  26  25  22   CR  0.65*  0.74  0.65*   GFRESTIMATED  >90  >90  >90       Hepatic Studies  Recent Labs   Lab Test  04/05/18   1030  04/04/18   1125  04/02/18   1825   BILITOTAL  0.2  0.3  0.2   ALKPHOS  128  116  102   ALBUMIN  2.6*  2.5*  2.4*   AST  25  18  14*   ALT  29  22  28       ImmunologlobulinsNo lab results  found.

## 2018-04-09 NOTE — ANESTHESIA PREPROCEDURE EVALUATION
Anesthesia Evaluation     . Pt has had prior anesthetic. Type: General    No history of anesthetic complications          ROS/MED HX    ENT/Pulmonary:  - neg pulmonary ROS     Neurologic:  - neg neurologic ROS     Cardiovascular:  - neg cardiovascular ROS   (+) ----. : . . . :. . Previous cardiac testing Echodate:3/23/18results:Procedure  Echocardiogram with two-dimensional, color and spectral Doppler performed.  Contrast Optison. Optison (NDC #8682-9339-10) given intravenously. Patient was  given 6 ml mixture of 3 ml Optison and 6 ml saline. 3 ml wasted.  _____________________________________________________________________________  __        Interpretation Summary  Global and regional left ventricular function is normal with an EF of 60-65%.  Left ventricular diastolic function is normal.  Global right ventricular function is normal.  The inferior vena cava was normal in size.  No significant valvular dysfunction noted.  No pericardial effusion is present.  _____________________________________________________________________________  __        Left Ventricle  Left ventricular wall thickness is normal. Left ventricular size is normal.  Global and regional left ventricular function is normal with an EF of 60-65%.  Biplane LVEF measured at 66%. Left ventricular diastolic function is normal.     Right Ventricle  Global right ventricular function is normal. The right ventricle is normal  size.     Atria  The right atria appears normal. The left atrium appears normal. The atrial  septum is intact as assessed by color Doppler .     Mitral Valve  Trace to mild mitral insufficiency is present.        Aortic Valve  Aortic valve is normal in structure and function. The aortic valve is  tricuspid. No aortic regurgitation is present.     Tricuspid Valve  Trace tricuspid insufficiency is present. The peak velocity of the tricuspid  regurgitant jet is not obtainable. Pulmonary artery systolic pressure cannot  be  assessed.     Pulmonic Valve  The pulmonic valve is normal. Trace pulmonic insufficiency is present.     Vessels  The aorta root is normal. The inferior vena cava was normal in size with  preserved respiratory variability. Estimated mean right atrial pressure is 3  mmHg.     Pericardium  No pericardial effusion is present.        Compared to Previous Study  Previous study not available for comparison.     Attestation  I have personally viewed the imaging and agree with the interpretation and  report as documented by the fellow, Wallace Lee, and/or edited by me.date: results:ECG reviewed date:3/28/18 results:Sinus rhythm  Cannot rule out Inferior infarct , age undetermined  Abnormal ECG  No previous ECGs available date: results:          METS/Exercise Tolerance:     Hematologic:     (+) Anemia, Other Hematologic Disorder-leukopenia      Musculoskeletal:         GI/Hepatic:  - neg GI/hepatic ROS       Renal/Genitourinary:  - ROS Renal section negative       Endo:  - neg endo ROS       Psychiatric:  - neg psychiatric ROS       Infectious Disease:  - neg infectious disease ROS       Malignancy:   (+) Malignancy History of Other  Other CA soft tissue sarcoma, post op wound infection status post Surgery         Other:                     Physical Exam  Normal systems: dental    Airway   Mallampati: II  TM distance: >3 FB  Neck ROM: full    Dental     Cardiovascular   Rhythm and rate: regular and normal      Pulmonary    breath sounds clear to auscultation        Procedure: Procedure(s):  Irrigation And Debridement Right Thigh Wound. - Wound Class: II-Clean Contaminated    HPI: Hiram Tapia is a 57 year old male who is presenting for above stated procedure.    PMHx/PSHx/ROS:  Past Medical History:   Diagnosis Date     Sarcoma (H)        Past Surgical History:   Procedure Laterality Date     EXCISE SOFT TISSUE TUMOR THIGH Right 3/8/2018    Procedure: EXCISE SOFT TISSUE TUMOR THIGH;  Biopsy Right Thigh Tumor;  Surgeon:  Brad Betts MD;  Location: [facility] OR     INSERT PORT VASCULAR ACCESS N/A 3/28/2018    Procedure: INSERT PORT VASCULAR ACCESS;  Vascular Access Port Insertion with C-arm;  Surgeon: Sarah Bowie MD;  Location: [facility] OR     IRRIGATION AND DEBRIDEMENT LOWER EXTREMITY, COMBINED Right 4/6/2018    Procedure: COMBINED IRRIGATION AND DEBRIDEMENT LOWER EXTREMITY;  Irrigation And Debridement Right Thigh ;  Surgeon: Brad Betts MD;  Location: [facility] OR     STRABISMUS SURGERY      as a child       ROS as stated above    Soc Hx:   Social History   Substance Use Topics     Smoking status: Former Smoker     Packs/day: 1.00     Years: 10.00     Types: Cigarettes     Start date: 1/1/1975     Quit date: 1/1/1990     Smokeless tobacco: Never Used     Alcohol use 8.4 oz/week     14 Cans of beer per week       Allergies: No Known Allergies    Meds:   Prescriptions Prior to Admission   Medication Sig Dispense Refill Last Dose     PROCHLORPERAZINE MALEATE PO Take 10 mg by mouth   Taking     granisetron (KYTRIL) 1 MG tablet Take 1 tablet (1 mg) by mouth every 12 hours as needed for nausea 30 tablet 3 Taking       No current outpatient prescriptions on file.       Physical Exam:  VS: Temp:  [36.2  C (97.1  F)-36.7  C (98.1  F)] 36.6  C (97.9  F)  Pulse:  [85] 85  Heart Rate:  [86-87] 87  Resp:  [16] 16  BP: (117-121)/(70-77) 119/77  SpO2:  [96 %-98 %] 96 %   96%, Weight   Wt Readings from Last 2 Encounters:   03/29/18 97.3 kg (214 lb 9.6 oz)   03/28/18 95.6 kg (210 lb 12.2 oz)       Labs:    BMP:  Recent Labs   Lab Test  04/07/18   0648   NA  138   POTASSIUM  3.6   CHLORIDE  107   CO2  26   BUN  10   CR  0.65*   GLC  98   JAYESH  8.3*     LFTs:   Recent Labs   Lab Test  04/05/18   1030   PROTTOTAL  6.8   ALBUMIN  2.6*   BILITOTAL  0.2   ALKPHOS  128   AST  25   ALT  29     CBC:   Recent Labs   Lab Test  04/09/18   0819   WBC  1.3*   RBC  3.74*   HGB  9.9*   HCT  30.0*   MCV  80   MCH  26.5   MCHC  33.0   RDW  13.8   PLT  279      Coags:  No results for input(s): INR, PTT, FIBR in the last 50068 hours.                  Anesthesia Plan      History & Physical Review  History and physical reviewed and following examination; no interval change.    ASA Status:  3 .    NPO Status:  > 8 hours    Plan for General and LMA with Intravenous and Propofol induction.   PONV prophylaxis:  Ondansetron (or other 5HT-3)  -preop tylenol 975 mg      Postoperative Care  Postoperative pain management:  Oral pain medications and Multi-modal analgesia.      Consents  Anesthetic plan, risks, benefits and alternatives discussed with:  Patient..        I have personally discussed the risks and benefits of anesthesia with the patient and patient agrees to proceed.    Zacarias Orona MD  Anesthesiology

## 2018-04-10 VITALS
OXYGEN SATURATION: 97 % | TEMPERATURE: 98 F | RESPIRATION RATE: 16 BRPM | HEART RATE: 80 BPM | SYSTOLIC BLOOD PRESSURE: 112 MMHG | DIASTOLIC BLOOD PRESSURE: 67 MMHG

## 2018-04-10 LAB
ANION GAP SERPL CALCULATED.3IONS-SCNC: 9 MMOL/L (ref 3–14)
BASOPHILS # BLD AUTO: 0 10E9/L (ref 0–0.2)
BASOPHILS NFR BLD AUTO: 0 %
BUN SERPL-MCNC: 3 MG/DL (ref 7–30)
CALCIUM SERPL-MCNC: 8.5 MG/DL (ref 8.5–10.1)
CHLORIDE SERPL-SCNC: 109 MMOL/L (ref 94–109)
CO2 SERPL-SCNC: 26 MMOL/L (ref 20–32)
CREAT SERPL-MCNC: 0.57 MG/DL (ref 0.66–1.25)
DIFFERENTIAL METHOD BLD: ABNORMAL
EOSINOPHIL # BLD AUTO: 0 10E9/L (ref 0–0.7)
EOSINOPHIL NFR BLD AUTO: 0.9 %
ERYTHROCYTE [DISTWIDTH] IN BLOOD BY AUTOMATED COUNT: 14 % (ref 10–15)
GFR SERPL CREATININE-BSD FRML MDRD: >90 ML/MIN/1.7M2
GLUCOSE SERPL-MCNC: 96 MG/DL (ref 70–99)
HCT VFR BLD AUTO: 30.6 % (ref 40–53)
HGB BLD-MCNC: 9.9 G/DL (ref 13.3–17.7)
LYMPHOCYTES # BLD AUTO: 1 10E9/L (ref 0.8–5.3)
LYMPHOCYTES NFR BLD AUTO: 29.8 %
MAGNESIUM SERPL-MCNC: 2.1 MG/DL (ref 1.6–2.3)
MCH RBC QN AUTO: 26.2 PG (ref 26.5–33)
MCHC RBC AUTO-ENTMCNC: 32.4 G/DL (ref 31.5–36.5)
MCV RBC AUTO: 81 FL (ref 78–100)
METAMYELOCYTES # BLD: 0.1 10E9/L
METAMYELOCYTES NFR BLD MANUAL: 2.6 %
MONOCYTES # BLD AUTO: 0.3 10E9/L (ref 0–1.3)
MONOCYTES NFR BLD AUTO: 8.8 %
MYELOCYTES # BLD: 0 10E9/L
MYELOCYTES NFR BLD MANUAL: 0.9 %
NEUTROPHILS # BLD AUTO: 2 10E9/L (ref 1.6–8.3)
NEUTROPHILS NFR BLD AUTO: 57 %
PLATELET # BLD AUTO: 255 10E9/L (ref 150–450)
PLATELET # BLD EST: NORMAL 10*3/UL
POTASSIUM SERPL-SCNC: 3.6 MMOL/L (ref 3.4–5.3)
RBC # BLD AUTO: 3.78 10E12/L (ref 4.4–5.9)
RBC MORPH BLD: NORMAL
SODIUM SERPL-SCNC: 144 MMOL/L (ref 133–144)
WBC # BLD AUTO: 3.5 10E9/L (ref 4–11)

## 2018-04-10 PROCEDURE — 80048 BASIC METABOLIC PNL TOTAL CA: CPT | Performed by: ORTHOPAEDIC SURGERY

## 2018-04-10 PROCEDURE — 25000132 ZZH RX MED GY IP 250 OP 250 PS 637: Performed by: INTERNAL MEDICINE

## 2018-04-10 PROCEDURE — 99231 SBSQ HOSP IP/OBS SF/LOW 25: CPT | Performed by: INTERNAL MEDICINE

## 2018-04-10 PROCEDURE — 25000128 H RX IP 250 OP 636: Performed by: INTERNAL MEDICINE

## 2018-04-10 PROCEDURE — 25000128 H RX IP 250 OP 636: Performed by: SPECIALIST

## 2018-04-10 PROCEDURE — 85025 COMPLETE CBC W/AUTO DIFF WBC: CPT | Performed by: ORTHOPAEDIC SURGERY

## 2018-04-10 PROCEDURE — 25000128 H RX IP 250 OP 636: Performed by: ORTHOPAEDIC SURGERY

## 2018-04-10 PROCEDURE — 40000893 ZZH STATISTIC PT IP EVAL DEFER: Performed by: PHYSICAL THERAPIST

## 2018-04-10 PROCEDURE — 83735 ASSAY OF MAGNESIUM: CPT | Performed by: ORTHOPAEDIC SURGERY

## 2018-04-10 PROCEDURE — 36592 COLLECT BLOOD FROM PICC: CPT | Performed by: ORTHOPAEDIC SURGERY

## 2018-04-10 RX ORDER — AMOXICILLIN AND CLAVULANATE POTASSIUM 500; 125 MG/1; MG/1
1 TABLET, FILM COATED ORAL 3 TIMES DAILY
Status: DISCONTINUED | OUTPATIENT
Start: 2018-04-10 | End: 2018-04-10 | Stop reason: HOSPADM

## 2018-04-10 RX ORDER — SULFAMETHOXAZOLE/TRIMETHOPRIM 800-160 MG
1 TABLET ORAL 2 TIMES DAILY
Qty: 28 TABLET | Refills: 0 | Status: SHIPPED | OUTPATIENT
Start: 2018-04-10 | End: 2018-04-24

## 2018-04-10 RX ORDER — AMOXICILLIN AND CLAVULANATE POTASSIUM 500; 125 MG/1; MG/1
1 TABLET, FILM COATED ORAL 3 TIMES DAILY
Qty: 42 TABLET | Refills: 0 | Status: SHIPPED | OUTPATIENT
Start: 2018-04-10 | End: 2018-04-24

## 2018-04-10 RX ORDER — SULFAMETHOXAZOLE/TRIMETHOPRIM 800-160 MG
1 TABLET ORAL 2 TIMES DAILY
Status: DISCONTINUED | OUTPATIENT
Start: 2018-04-10 | End: 2018-04-10 | Stop reason: HOSPADM

## 2018-04-10 RX ORDER — OXYCODONE HYDROCHLORIDE 5 MG/1
5-10 TABLET ORAL
Qty: 30 TABLET | Refills: 0 | Status: ON HOLD | OUTPATIENT
Start: 2018-04-10 | End: 2018-06-27

## 2018-04-10 RX ORDER — OXYCODONE HYDROCHLORIDE 5 MG/1
5-10 TABLET ORAL
Qty: 30 TABLET | Refills: 0 | Status: SHIPPED | OUTPATIENT
Start: 2018-04-10 | End: 2018-04-10

## 2018-04-10 RX ADMIN — AMPICILLIN SODIUM AND SULBACTAM SODIUM 3 G: 2; 1 INJECTION, POWDER, FOR SOLUTION INTRAMUSCULAR; INTRAVENOUS at 02:33

## 2018-04-10 RX ADMIN — AMOXICILLIN AND CLAVULANATE POTASSIUM 1 TABLET: 500; 125 TABLET, FILM COATED ORAL at 12:01

## 2018-04-10 RX ADMIN — SODIUM CHLORIDE, PRESERVATIVE FREE 5 ML: 5 INJECTION INTRAVENOUS at 14:12

## 2018-04-10 RX ADMIN — AMPICILLIN SODIUM AND SULBACTAM SODIUM 3 G: 2; 1 INJECTION, POWDER, FOR SOLUTION INTRAMUSCULAR; INTRAVENOUS at 07:52

## 2018-04-10 RX ADMIN — SULFAMETHOXAZOLE AND TRIMETHOPRIM 1 TABLET: 800; 160 TABLET ORAL at 12:01

## 2018-04-10 RX ADMIN — VANCOMYCIN HYDROCHLORIDE 1750 MG: 10 INJECTION, POWDER, LYOPHILIZED, FOR SOLUTION INTRAVENOUS at 09:13

## 2018-04-10 NOTE — PROGRESS NOTES
Pt seen, case reviewed with team    Pt feels well, hopes to d/c to home today  Pain is controlled  No chest pain, SOB, abd pain or diarrhea      VSS  BP 110s/    Alert, fully oriented  Lungs clear  CV rrr  Abd soft  No LE edema      Results for RUT CHAO (MRN [id number]) as of 4/10/2018 10:03   Ref. Range 4/9/2018 08:19   WBC Latest Ref Range: 4.0 - 11.0 10e9/L 1.3 (L)   Hemoglobin Latest Ref Range: 13.3 - 17.7 g/dL 9.9 (L)   Hematocrit Latest Ref Range: 40.0 - 53.0 % 30.0 (L)   Platelet Count Latest Ref Range: 150 - 450 10e9/L 279   RBC Count Latest Ref Range: 4.4 - 5.9 10e12/L 3.74 (L)   MCV Latest Ref Range: 78 - 100 fl 80   MCH Latest Ref Range: 26.5 - 33.0 pg 26.5   MCHC Latest Ref Range: 31.5 - 36.5 g/dL 33.0   RDW Latest Ref Range: 10.0 - 15.0 % 13.8   Diff Method Unknown Manual Differential   % Neutrophils Latest Units: % 16.2   % Lymphocytes Latest Units: % 72.1   % Monocytes Latest Units: % 9.9   % Eosinophils Latest Units: % 0.0   % Basophils Latest Units: % 1.8   Absolute Neutrophil Latest Ref Range: 1.6 - 8.3 10e9/L 0.2 (LL)   Absolute Lymphocytes Latest Ref Range: 0.8 - 5.3 10e9/L 0.9   Absolute Monocytes Latest Ref Range: 0.0 - 1.3 10e9/L 0.1   Absolute Eosinophils Latest Ref Range: 0.0 - 0.7 10e9/L 0.0   Absolute Basophils Latest Ref Range: 0.0 - 0.2 10e9/L 0.0   RBC Morphology Unknown Normal       Assessment    1)  Sarcoma right thigh.  S/P biopsy 3/8.  Wound infection with  I/D.  Staph epi on culture. Vanc and zosyn.  Adequate pain control off opiates.  ID will be recommending oral antibiotics for d/c .   2)  Recent chemo ifosfamide.  3)  Progressive neutropenia 2nd to #2  WBC now starting to come up, as anticipated  4)  Anemia 2nd to #2, stable    Plan  D.c today to home  Antibiotics per ID

## 2018-04-10 NOTE — PLAN OF CARE
Problem: Patient Care Overview  Goal: Plan of Care/Patient Progress Review  Outcome: Improving    VS: stable   O2: RA   Output: adequate   Last BM: 4/10/18   Activity: Up to the BR independently   Skin: Intact except drain, incision   Pain: Denies pain   CMS: intact   Dressing: CDI   Diet: regular   LDA: Nic cath   Equipment: IV pole   Plan: D/C today   Additional Info: Pt up independently to the BR. Tolerating diet.Pt reports has a formed stool .  Denies pain.CMS intact. Dressing -wound vac CDI,wound vac at 125 mmHg continuous. Augmentin and Bactrim po given. IV infusing via Nic cath. Able to make needs known. Will continue POC.

## 2018-04-10 NOTE — PROGRESS NOTES
Care Coordinator- Discharge Planning     Admission Date/Time:  4/5/2018  Attending MD:  Brad Betts MD     Data  Date of initial CC assessment:  4/9/2018  Chart reviewed, discussed with interdisciplinary team.   Patient was admitted for:   1. Postoperative wound infection, subsequent encounter    2. Benign neoplasm of soft tissues    3. Sarcoma of soft tissue (H)    4. 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 Bedside RN notified. Proof of Delivery form signed by patient and faxed to [facility]. Order #[id number]. Fax [phone].      Plan  Anticipated Discharge Date:  4/10/2018  Anticipated Discharge Plan:  Home with Home Care    CTS Handoff completed:  YES    Deirdre Dewey RN, BSN  Care Coordinator, 8A  Phone [phone]  Pager [phone]

## 2018-04-10 NOTE — PLAN OF CARE
Problem: Patient Care Overview  Goal: Interdisciplinary Rounds/Family Conf  Outcome: No Change    VS: VSS, he is able to make his needs known   O2: On room air, he is able to make his needs    Output: He is putting out good amounts of urine using the urinal   Last BM: 4/9/2018   Activity: Call for assist to get up after surgery, he was informed to call before he falls   Skin: Intact except for the right thigh, has a wound vac   Pain: He has minimal amounts of pain does not want anything for pain   CMS: intact   Dressing: Wound vac   Diet: Regular tolerating it wll   LDA: Nic-Cath right upper chest   Equipment: IV pole, urinal   Plan: For IV anti-bioticx   Additional Info:

## 2018-04-10 NOTE — PROGRESS NOTES
[facility] ID SERVICE: ONGOING CONSULTATION   Hiram Tapia : 1960 Sex: male:   Medical record number [id number] Attending Physician: Brad Betts MD  Date of Service: April 10, 2018  PROBLEM LIST:   1. Postop wound infection following high-grade sarcoma biopsy on 3/8/18, s/p I/D on  with op findings of a large vacuous, fluid-filled sarcoma cavity, cx showing MRSE; re-explored on  with findings of deep tracking with good granulation tissue. Wound vac has been in place since .  Suspect his RF for infection were primarily his neutropenia following cycle 1 of ifos/doxo on 3/23, given the relative non-virulence of his cultured pathogen, MRSE (though anaerobes that are uncultivatable may also be playing a role). Now his counts have recovered. While his wound tracks deeply, there is no indwelling hardware and good wound healing. For this reason, I do not feel that parenteral therapy is necessary. I did  him that there is a small chance of relapsed infection either on therapy or when chemo resumes on . Alternatively, he will likely have ongoing serous drainage from his R leg and could be at risk for a new infection in the future.  2. High-grade R leg sarcoma  3. Indwelling R chest port    RECOMMENDATIONS:   1. I have changed from vanco and unasyn to bactrim 1 DS BID and augmentin 500/125 TID. Total duration of therapy will be 2 weeks from , which translates to a stop date of . I provided my contact info and instructed him to call if any signs of unresolved infection appear or s/e from meds emerge so that his therapy can be changed if needed.   F/u with ortho (Alpesh) and onc (Elizabeth) should be sufficient, but I would be happy to also see him in clinic if needs/questions arise.  No objection to d/c today when vac logistics are sorted out.  ID will follow  ID will continue to follow.   Gianna Box MD   of Medicine, Division of Infectious Diseases  pgr  [phone]       CHIEF INFECTIOUS DISEASES COMPLAINT:  Draining R upper leg wound    INTERVAL HISTORY: (Extended HPI requires four HPI elements or the status of three chronic problems)  Seen this am. Vaguely recalls my visiting him post-op last evening. Feels well. No GI symptoms, skin rash, h/a. Mild pain in R leg is expected given his post-op state.  ROS: (Recommend ? 2systems)   A five-point review of systems was obtained and was negative with the exception of that which is described above.  No Known Allergies  Allergies were reviewed.  Current Outpatient Prescriptions   Medication Sig Dispense Refill     oxyCODONE IR (ROXICODONE) 5 MG tablet Take 1-2 tablets (5-10 mg) by mouth every 3 hours as needed for other (pain control or improvement in physical function. Hold dose for analgesic side effects.) 30 tablet 0     CURRENT ANTI-INFECTIVES:   Vanco, pip/tazo  EXAMINATION: (Recommend at least 12 bullets from any organ systems)   Vital Signs: /67  Pulse 80  Temp 98  F (36.7  C)  Resp 16  SpO2 97%   Awake, alert  Breathing normal  R upper leg examined - wound vac is in place. There is some mild erythema surrounding the sponge.   No skin rash  Mood/affect normal  NEW DATA/RESULTS:   All interval basic labs, microbiology results and imaging were personally reviewed.  Reviewed medicine test (PFTs, EKG, cardiac echo or cath): NO    Culture Micro   Date Value Ref Range Status   04/06/2018 Culture negative monitoring continues  Preliminary   04/06/2018 Light growth  Staphylococcus epidermidis   (A)  Final   04/06/2018 Susceptibility testing done on previous specimen  Final   04/06/2018 Culture negative after 3 days  Preliminary   04/06/2018 Culture negative monitoring continues  Preliminary   04/06/2018 Light growth  Staphylococcus epidermidis   (A)  Final   04/06/2018 Susceptibility testing done on previous specimen  Final   04/06/2018 Culture negative after 3 days  Preliminary       No lab results  found.  Recent Labs   Lab Test  04/10/18   0542  04/09/18   0819  04/08/18   0617  04/07/18   0648  04/06/18   0553  04/05/18   1030   WBC  3.5*  1.3*  0.9*  0.9*  1.9*  2.7*     Recent Labs   Lab Test  04/10/18   0542  04/07/18   0648  04/06/18   0553  04/05/18   1030   CR  0.57*  0.65*  0.74  0.65*   GFRESTIMATED  >90  >90  >90  >90       Hematology Studies  Recent Labs   Lab Test  04/10/18   0542  04/09/18   0819  04/08/18   0617  04/07/18   0648  04/06/18   0553  04/05/18   1030   WBC  3.5*  1.3*  0.9*  0.9*  1.9*  2.7*   ANEU  2.0  0.2*  0.2*  0.4*  1.1*  1.9   AEOS  0.0  0.0  0.0  0.0  0.1  0.0   HCT  30.6*  30.0*  29.4*  29.7*  33.3*  35.5*   PLT  255  279  277  272  307  364       Metabolic  Recent Labs   Lab Test  04/10/18   0542  04/07/18   0648  04/06/18   0553   NA  144  138  137   BUN  3*  10  15   CO2  26  26  25   CR  0.57*  0.65*  0.74   GFRESTIMATED  >90  >90  >90       Hepatic Studies  Recent Labs   Lab Test  04/05/18   1030  04/04/18   1125  04/02/18   1825   BILITOTAL  0.2  0.3  0.2   ALKPHOS  128  116  102   ALBUMIN  2.6*  2.5*  2.4*   AST  25  18  14*   ALT  29  22  28       ImmunologlobulinsNo lab results found.

## 2018-04-10 NOTE — PROGRESS NOTES
Ortho Progress Note     Subjective:  Minimal pain. No fevers/chills.  No concerns at present.  Offered  / counselor visit, patient declined.  Wants to go home if possible.    Objective:  /63  Pulse 85  Temp 97.9  F (36.6  C) (Oral)  Resp 16  SpO2 96%  Gen: A&Ox3, no acute distress  CV: 2+ dp/pt pulses, capillary refill < 2sec  Resp: breathing equal and non-labored, no wheezing  MSK:     RLE   Dressings in place to thigh, c/d/i - vac holding suction    Hemoglobin   Date Value Ref Range Status   04/10/2018 9.9 (L) 13.3 - 17.7 g/dL Final   04/09/2018 9.9 (L) 13.3 - 17.7 g/dL Final       Assessment/Plan:  57M with right thigh high grade sarcoma, significant fluid production from this tumor.  Undergoing chemotherapy.  Found in a clinic visit on 4/5 to have an infection of his sarcoma biopsy site, aspiration showed purulence at that time.  Admitted for Abx, I&D. S/p I&Ds 4/6, 4/9. Cultures show MRSE.  WBC counts rising at this time.      -Weight Bearing Status: WBAT RLE  -Diet: Regular  -Dressing: wound vac, continuous suction 125mmHg  -neupogen discontinued per Heme/Onc  -Disposition: Possibly home today if can acquire home wound vac, arrange home vac changes, and have final Abx plan in place    Maurice Womack MD  Orthopaedic Surgery PGY-4  Pager:  [phone]

## 2018-04-10 NOTE — PLAN OF CARE
Problem: Patient Care Overview  Goal: Plan of Care/Patient Progress Review  Outcome: No Change  Patient A/Ox4, pleasant. VSS. Denies CP, SOB, dizziness/LH. LSCTA. +fl/BS. Voiding well in bathroom. CMS intact. Dressing to hip area CDI. Tolerating regular diet without NV, pt had a bunch of fruit for dinner. Activity level is good, pt ambulated to bathroom a few times thus far. IV rate slowed per pt request, is infusing KVO between antibiotics. Pain rated as well managed throughout shift, pt not requesting PRNs. Patient has demonstrated ability to call appropriately. Patient is resting with call light within reach. Will continue to monitor.

## 2018-04-10 NOTE — PROGRESS NOTES
[facility] Care   Met with pt and spouse to discuss plans for HC.  Pt to be discharged home 04 10 18  and has agreed to have [facility] follow with services of . Patient care support center processing referral.  Pt and spouse verbalized understanding that initial visit is scheduled for Friday 04 13 18.   Pt has 24 hour phone number for [facility] for any questions or concerns.

## 2018-04-10 NOTE — PLAN OF CARE
Problem: Patient Care Overview  Goal: Plan of Care/Patient Progress Review  Outcome: Adequate for Discharge Date Met: 04/10/18  Pt. states ready for discharge and is requesting discharge. Went over discharge instructions with patient and spouse-Lea. Pt. states that he understands his therapeutic discharge plan and his medication regimen. Pt. states that he will follow his medication regimen and her therapeutic discharge plan. All question were addressed. all valuables, belongings, medications and copy of his after visit summary given .Just waiting for portable Wound vac. [facility] infusion to follow.

## 2018-04-10 NOTE — PLAN OF CARE
Problem: Patient Care Overview  Goal: Plan of Care/Patient Progress Review  PT:  Received PT eval and treat.  Pt states that he has been up in room and feels steady on his feet.  He states he feels comfortable with the thought of going up/down steps one step at a time.  Pt reports he does not feel he needs physical therapy services in the hospital.  Will discontinue PT services at this time per patient request.

## 2018-04-11 ENCOUNTER — HOME INFUSION (PRE-WILLOW HOME INFUSION) (OUTPATIENT)
Dept: PHARMACY | Facility: CLINIC | Age: 58
End: 2018-04-11

## 2018-04-11 NOTE — DISCHARGE SUMMARY
ORTHOPAEDIC DISCHARGE SUMMARY     Date of Admission: 4/5/2018  Date of Discharge: 4/10/2018  4:09 PM  Disposition: Home  Staff Physician: Brad Betts MD  Primary Care Provider: Louisa Fry    DISCHARGE DIAGNOSIS:  Wound Infection, Sarcoma Right Thigh    PROCEDURES: Procedure(s):  COMBINED IRRIGATION AND DEBRIDEMENT right thigh on 4/6/2018 and 4/9/18    BRIEF HISTORY:  The patient carries a diagnosis of a right thigh sarcoma which is cystic and fluid producing.  The patient previously underwent a biopsy of the site to confirm the diagnosis.  Since that time he had been undergoing chemotherapy.  The patient unfortunately presented to clinic on 4/5/2018 with significant concerns for infection.  An aspiration was performed at that time the patient was admitted with plans for an irrigation debridement the following day.    HOSPITAL COURSE:    He was admitted and started on IV vancomycin and Zosyn.  Infectious disease was on board.  The patient underwent an irrigation debridement of the right thigh on 4/6/2018.  At that time a wound VAC was put into place.  We monitored his white blood cell count which was downtrending due to his chemotherapy he received a meal daily prior to the development of this infection.  His white count fartun occurred on 4/9/2018, and then uptrended after that, increasing to a white blood cell count of 3.5 with 57% neutrophils on 4/10/2018.  At no point during the hospitalization to the patient developed a neutropenic fever.    Patient returned the operating room on 4/9/2018 for repeat irrigation debridement.  No purulence was identified at this time.  A wound VAC was put back into place, with plans for outpatient wound VAC therapy to continue.      The patient received routine nursing cares and is medically stable. Vital signs are stable. The patient is tolerating a regular diet without GI distress/nausea or vomiting. Voiding spontaneously. All PT/OT goals have been met for safe mobility.  Pain is now controlled on oral medications which will be available on discharge. Stool softeners have been used while taking pain medications to help prevent constipation. Hiram Tapia is deemed medically safe to discharge.       Antibiotics:  Discharged on Bactrim and Augmentin per infectious disease recommendations  PT Progress:  Has met PT/OT goals for safe mobility.   Pain Meds:  Weaned off all IV pain meds by discharge.  Inpatient Events: No significant complications.     Discharge orders and instructions as below.    FOLLOWUP:    Future Appointments  Date Time Provider Department Center   4/21/2018 8:45 AM [address]   4/24/2018 8:30 AM  MASONIC LAB DRAW [facility]   4/24/2018 9:00 AM Gaurang Johnson MD [facility]   4/24/2018 9:30 AM  ONCOLOGY INFUSION [facility]   4/24/2018 4:00 PM Brad Betts MD [facility]   5/22/2018 10:30 AM  MASONIC LAB DRAW [facility]   5/22/2018 11:00 AM Gaurang Johnson MD [facility]   5/22/2018 12:30 PM  ONCOLOGY INFUSION [facility]   6/19/2018 9:00 AM  MASONIC LAB DRAW [facility]   6/19/2018 9:30 AM Gaurang Johnson MD [facility]   6/19/2018 10:00 AM  ONCOLOGY INFUSION [facility]   7/17/2018 8:00 AM  MASONIC LAB DRAW [facility]   7/17/2018 8:30 AM Gaurang Johnson MD [facility]       Appointments on [facility]. Call [phone] if you haven't heard regarding these appointments within 7 days of discharge.    PLANNED DISCHARGE ORDERS:     DVT Prophylaxis: No chemical prophylaxis        Discharge Medication List as of 4/10/2018  2:50 PM      START taking these medications    Details   sulfamethoxazole-trimethoprim (BACTRIM DS/SEPTRA DS) 800-160 MG per tablet Take 1 tablet by mouth 2 times daily for 14 days, Disp-28 tablet, R-0, E-Prescribe      amoxicillin-clavulanate (AUGMENTIN) 500-125 MG per tablet Take 1 tablet by mouth 3 times daily for 14 days,  Disp-42 tablet, R-0, E-Prescribe         CONTINUE these medications which have CHANGED    Details   oxyCODONE IR (ROXICODONE) 5 MG tablet Take 1-2 tablets (5-10 mg) by mouth every 3 hours as needed for other (pain control or improvement in physical function. Hold dose for analgesic side effects.), Disp-30 tablet, R-0, Local Print         CONTINUE these medications which have NOT CHANGED    Details   PROCHLORPERAZINE MALEATE PO Take 10 mg by mouth, Historical      granisetron (KYTRIL) 1 MG tablet Take 1 tablet (1 mg) by mouth every 12 hours as needed for nausea, Disp-30 tablet, R-3, E-Prescribe               Discharge Procedure Orders  Home care nursing referral   Referral Type: Home Health Therapies & Aides     Reason for your hospital stay   Order Comments: Right thigh wound infection     Activity   Order Comments: Your activity upon discharge: Weightbearing as tolerated   Order Specific Question Answer Comments   Is discharge order? Yes      Monitor and record   Order Comments: Vac fluid output     Tubes and drains   Order Comments: You are going home with the following tubes or drains: Wound vac     When to contact your care team   Order Comments: Fever >101.5, change in fluid to a purulent fluid, spreading redness around the wound.     Wound care and dressings   Order Comments: Instructions to care for your wound at home: Vac changes by home nursing every 3-5 days. -125 pressure on vac.     Diet   Order Comments: Follow this diet upon discharge: Regular   Order Specific Question Answer Comments   Is discharge order? Yes            Maurice Womack MD  Orthopaedic Surgery PGY-4  Pager:  [phone]

## 2018-04-12 LAB
BACTERIA SPEC CULT: NORMAL
Lab: NORMAL
SPECIMEN SOURCE: NORMAL

## 2018-04-12 NOTE — PROGRESS NOTES
This is a recent snapshot of the patient's [facility] medical record.  For current drug dose and complete information and questions, call [phone] or In Basket pool, fv home infusion ([id number])  CSN Number:  [id number]

## 2018-04-13 LAB
BACTERIA SPEC CULT: NORMAL
BACTERIA SPEC CULT: NORMAL
Lab: NORMAL
Lab: NORMAL
SPECIMEN SOURCE: NORMAL
SPECIMEN SOURCE: NORMAL

## 2018-04-18 NOTE — PROGRESS NOTES
This is a recent snapshot of the patient's [facility] medical record.  For current drug dose and complete information and questions, call [phone] or In Basket pool, fv home infusion ([id number])  CSN Number:  [id number]

## 2018-04-20 ENCOUNTER — TELEPHONE (OUTPATIENT)
Dept: ORTHOPEDICS | Facility: CLINIC | Age: 58
End: 2018-04-20

## 2018-04-20 NOTE — TELEPHONE ENCOUNTER
Home care nurse called to see if Dr. Betts is going to change patietns wound vac for his wound check appointment or not.     Elise RN was consulted and she stated that sometimes they do a Wet to dry dressing and sometimes they change the vac.     This was relayed on to Home care RN    [facility] Rn can be contacted at [phone]    Encounter being routed to Elise CONNER and will be closed after patients appointment.    Lina Marrero LPN    4/26/18    VAC dressing left in place.  Patient will continue the VAC for another month.  Sent message to clinic coordinators to schedule a return appt. With Dr. Betts at that time.

## 2018-04-21 ENCOUNTER — HOSPITAL ENCOUNTER (OUTPATIENT)
Dept: PET IMAGING | Facility: CLINIC | Age: 58
Discharge: HOME | End: 2018-04-21
Attending: INTERNAL MEDICINE | Admitting: INTERNAL MEDICINE
Payer: COMMERCIAL

## 2018-04-21 VITALS — WEIGHT: 208 LBS | BODY MASS INDEX: 29.84 KG/M2

## 2018-04-21 DIAGNOSIS — T81.328D: ICD-10-CM

## 2018-04-21 DIAGNOSIS — M79.604 PAIN OF RIGHT LOWER EXTREMITY: ICD-10-CM

## 2018-04-21 DIAGNOSIS — Z87.891 PERSONAL HISTORY OF TOBACCO USE, PRESENTING HAZARDS TO HEALTH: ICD-10-CM

## 2018-04-21 DIAGNOSIS — C49.21 SOFT TISSUE SARCOMA OF RIGHT THIGH (H): ICD-10-CM

## 2018-04-21 LAB — GLUCOSE BLDC GLUCOMTR-MCNC: 114 MG/DL (ref 70–99)

## 2018-04-21 PROCEDURE — 25000128 H RX IP 250 OP 636: Performed by: INTERNAL MEDICINE

## 2018-04-21 PROCEDURE — A9552 F18 FDG: HCPCS | Performed by: INTERNAL MEDICINE

## 2018-04-21 PROCEDURE — 34300033 ZZH RX 343: Performed by: INTERNAL MEDICINE

## 2018-04-21 PROCEDURE — 82962 GLUCOSE BLOOD TEST: CPT

## 2018-04-21 PROCEDURE — 74177 CT ABD & PELVIS W/CONTRAST: CPT

## 2018-04-21 RX ORDER — IOPAMIDOL 755 MG/ML
100 INJECTION, SOLUTION INTRAVASCULAR ONCE
Status: COMPLETED | OUTPATIENT
Start: 2018-04-21 | End: 2018-04-21

## 2018-04-21 RX ADMIN — IOPAMIDOL 127 ML: 755 INJECTION, SOLUTION INTRAVENOUS at 09:52

## 2018-04-21 RX ADMIN — FLUDEOXYGLUCOSE F-18 13.18 MCI.: 500 INJECTION, SOLUTION INTRAVENOUS at 08:59

## 2018-04-22 ENCOUNTER — TELEPHONE (OUTPATIENT)
Dept: GENERAL RADIOLOGY | Facility: CLINIC | Age: 58
End: 2018-04-22

## 2018-04-23 ENCOUNTER — CARE COORDINATION (OUTPATIENT)
Dept: ONCOLOGY | Facility: CLINIC | Age: 58
End: 2018-04-23

## 2018-04-23 NOTE — TELEPHONE ENCOUNTER
Incidental pulmonary embolism seen on PET/CT. Discussed finding directly with patient by telephone. He was not having any symptoms of chest pain or shortness of breath. I told the patient based on the small size and lack of symptoms that this was not something he needed to seek immediate care for, unless he develops symptoms. I advised him to follow up with his primary cancer doctor tomorrow.

## 2018-04-23 NOTE — PROGRESS NOTES
4-24-18    I saw Hiram Tapia for f/u of a sarcoma of the right thigh.      Background  In brief he has had a lot of problems with his right leg for many years including sciatica for 20 years.  He developed more discomfort in the right thigh and in October 2017 hit his lateral thigh against a truck tailgate causing a lot of pain.  Subsequently there was a fair amount of swelling and stiffness.  This eventually led to some imaging showing a cystic mass.  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  At the time of the biopsy more than a liter of fluid was removed and that markedly improved his symptoms of stiffness and pain.  -    Interval history    A lot has happened since I saw him last.  He began doxil/ifos 3-23-18.    He had a difficult time with fatigue and some nausea at about 2 in the morning.  The plan is to do kytril bid this time.  No clear GERD.    He is sleeping in a chair due to the leg pain.  He had fluid recurrence and had a wound vac put on April 9.  The pressure caused a second hole and he got an infection winding up in the hospital a week. He got d/c 4-10.    He is finishing antibiotics today and has loose stools w that.    No mouth or skin sx this cycle.    He has not been very active and cant bend the knee so is doing minimal walking. He has gone up 2 floors a couple times OK but has some pain with that.    He had a gout flare w the R foot and took ibuprofen for that.      Other than the knee he is really doing pretty well and has been quite active.  Now however the leg is intermittently quite painful and limits his activity.    He is sometimes sleeping in a recliner.     Mood is OK.  He sees Dr Betts later today.    Aside from this problem his 10 point ROS unremarkable.        Background PMH, FH, SH  Past history is not particular remarkable other than for tonsillectomy and surgery for lazy eye.  His left eye is the dominant eye.    The family history is not particularly remarkable but  "his father  of leukemia at age 42.    He denies any drug allergies.  He is  and has an adult somewhat autistic boy who lives with them.  He smoked a pack a day for about 10 years, stopping about , and does not abuse alcohol or other drugs.  He works as a  at [email or web address].  -    On exam he appeared comfortable with normal affect.    /74 (BP Location: Left arm, Patient Position: Sitting, Cuff Size: Adult Large)  Pulse 122  Temp 98.8  F (37.1  C) (Oral)  Resp 16  Ht 1.778 m (5' 10\")  Wt 89.8 kg (198 lb)  SpO2 96%  BMI 28.41 kg/m2  HEENT There is no icterus, Lazy eye (L dominant). Some mild thrush on tongue.  NECK: no thyromegaly or neck mass  CHEST:  the chest is clear  CV: there is tachycardia with no significant murmur  ABD:  no HSMT  LYMPH: no lymphadenopathy  EXT: tr-1+ edema of the right lower leg  MS: there is a large mass in the right thigh with an indwelling drain, not very tender  NEURO: Normal Romberg, good heel walking  SKIN: no doxil effect  PSYCH: mood fairly good  JOINTS: no gout now active (R heel was sore last weekend)    -  CBC OK for Rx w other labs pending.    -  Baseline PET/CT which shows marked PET activity in the periphery of this cystic thigh mass.  There was no definite metastatic disease but there is a calcified nodule in the left lung and a probable hemangioma in the liver and a probable small adenoma in the left adrenal gland.  There was also a small right pleural effusion with a bit of right lower lobe consolidation mild FDG uptake.      I reviewed the new images.  I talked w radiology; despite the report there does appear to be a segmental PE on the R.  There is also a small R lung nodule not present on the last image, but this is nonspecific.  There is a response by SUV of the primary tumor.    -    We discussed the situation.  Though asymptomatic we will Rx the PE w rivaroxaban.  We will give the same doses as last time for cycle 2.  He will see " Dr Betts later today.    We will give a trial of nystatin or the thrush.    He will take kytril bid this time.    All of his questions were addressed and he will call if he has others.    Gaurang Johnson M.D.  Professor  Hematology, Oncology and Transplantation

## 2018-04-24 ENCOUNTER — INFUSION THERAPY VISIT (OUTPATIENT)
Dept: ONCOLOGY | Facility: CLINIC | Age: 58
End: 2018-04-24
Attending: INTERNAL MEDICINE
Payer: COMMERCIAL

## 2018-04-24 ENCOUNTER — OFFICE VISIT (OUTPATIENT)
Dept: ORTHOPEDICS | Facility: CLINIC | Age: 58
End: 2018-04-24
Payer: COMMERCIAL

## 2018-04-24 ENCOUNTER — HOME INFUSION (PRE-WILLOW HOME INFUSION) (OUTPATIENT)
Dept: PHARMACY | Facility: CLINIC | Age: 58
End: 2018-04-24

## 2018-04-24 ENCOUNTER — APPOINTMENT (OUTPATIENT)
Dept: LAB | Facility: CLINIC | Age: 58
End: 2018-04-24
Attending: INTERNAL MEDICINE
Payer: COMMERCIAL

## 2018-04-24 VITALS
RESPIRATION RATE: 16 BRPM | BODY MASS INDEX: 28.35 KG/M2 | HEART RATE: 122 BPM | HEIGHT: 70 IN | WEIGHT: 198 LBS | DIASTOLIC BLOOD PRESSURE: 74 MMHG | TEMPERATURE: 98.8 F | SYSTOLIC BLOOD PRESSURE: 108 MMHG | OXYGEN SATURATION: 96 %

## 2018-04-24 VITALS — HEART RATE: 116 BPM

## 2018-04-24 DIAGNOSIS — I26.99 OTHER PULMONARY EMBOLISM WITHOUT ACUTE COR PULMONALE, UNSPECIFIED CHRONICITY (H): ICD-10-CM

## 2018-04-24 DIAGNOSIS — C49.9 SARCOMA OF SOFT TISSUE (H): ICD-10-CM

## 2018-04-24 DIAGNOSIS — B37.0 THRUSH: ICD-10-CM

## 2018-04-24 DIAGNOSIS — M79.604 PAIN OF RIGHT LOWER EXTREMITY: ICD-10-CM

## 2018-04-24 DIAGNOSIS — C49.21 SOFT TISSUE SARCOMA OF RIGHT THIGH (H): ICD-10-CM

## 2018-04-24 DIAGNOSIS — C49.9 SARCOMA OF SOFT TISSUE (H): Primary | ICD-10-CM

## 2018-04-24 DIAGNOSIS — C49.9 SARCOMA (H): Primary | ICD-10-CM

## 2018-04-24 LAB
ALBUMIN SERPL-MCNC: 3 G/DL (ref 3.4–5)
ALP SERPL-CCNC: 156 U/L (ref 40–150)
ALT SERPL W P-5'-P-CCNC: 20 U/L (ref 0–70)
ANION GAP SERPL CALCULATED.3IONS-SCNC: 9 MMOL/L (ref 3–14)
AST SERPL W P-5'-P-CCNC: 17 U/L (ref 0–45)
BASOPHILS # BLD AUTO: 0.2 10E9/L (ref 0–0.2)
BASOPHILS NFR BLD AUTO: 1.3 %
BILIRUB SERPL-MCNC: 0.3 MG/DL (ref 0.2–1.3)
BUN SERPL-MCNC: 10 MG/DL (ref 7–30)
CALCIUM SERPL-MCNC: 10 MG/DL (ref 8.5–10.1)
CHLORIDE SERPL-SCNC: 100 MMOL/L (ref 94–109)
CO2 SERPL-SCNC: 22 MMOL/L (ref 20–32)
CREAT SERPL-MCNC: 0.85 MG/DL (ref 0.66–1.25)
DIFFERENTIAL METHOD BLD: ABNORMAL
EOSINOPHIL # BLD AUTO: 0 10E9/L (ref 0–0.7)
EOSINOPHIL NFR BLD AUTO: 0.3 %
ERYTHROCYTE [DISTWIDTH] IN BLOOD BY AUTOMATED COUNT: 16 % (ref 10–15)
GFR SERPL CREATININE-BSD FRML MDRD: >90 ML/MIN/1.7M2
GLUCOSE SERPL-MCNC: 114 MG/DL (ref 70–99)
HCT VFR BLD AUTO: 34.7 % (ref 40–53)
HGB BLD-MCNC: 11.5 G/DL (ref 13.3–17.7)
IMM GRANULOCYTES # BLD: 0 10E9/L (ref 0–0.4)
IMM GRANULOCYTES NFR BLD: 0.3 %
LDH SERPL L TO P-CCNC: 144 U/L (ref 85–227)
LYMPHOCYTES # BLD AUTO: 0.9 10E9/L (ref 0.8–5.3)
LYMPHOCYTES NFR BLD AUTO: 7.7 %
MAGNESIUM SERPL-MCNC: 2.1 MG/DL (ref 1.6–2.3)
MCH RBC QN AUTO: 26.4 PG (ref 26.5–33)
MCHC RBC AUTO-ENTMCNC: 33.1 G/DL (ref 31.5–36.5)
MCV RBC AUTO: 80 FL (ref 78–100)
MONOCYTES # BLD AUTO: 0.9 10E9/L (ref 0–1.3)
MONOCYTES NFR BLD AUTO: 7.7 %
NEUTROPHILS # BLD AUTO: 9.9 10E9/L (ref 1.6–8.3)
NEUTROPHILS NFR BLD AUTO: 82.7 %
NRBC # BLD AUTO: 0 10*3/UL
NRBC BLD AUTO-RTO: 0 /100
PHOSPHATE SERPL-MCNC: 2.5 MG/DL (ref 2.5–4.5)
PLATELET # BLD AUTO: 482 10E9/L (ref 150–450)
POTASSIUM SERPL-SCNC: 4.1 MMOL/L (ref 3.4–5.3)
PROT SERPL-MCNC: 7.6 G/DL (ref 6.8–8.8)
RBC # BLD AUTO: 4.36 10E12/L (ref 4.4–5.9)
SODIUM SERPL-SCNC: 132 MMOL/L (ref 133–144)
WBC # BLD AUTO: 12 10E9/L (ref 4–11)

## 2018-04-24 PROCEDURE — 96361 HYDRATE IV INFUSION ADD-ON: CPT

## 2018-04-24 PROCEDURE — 96413 CHEMO IV INFUSION 1 HR: CPT

## 2018-04-24 PROCEDURE — 83735 ASSAY OF MAGNESIUM: CPT | Performed by: INTERNAL MEDICINE

## 2018-04-24 PROCEDURE — 25000128 H RX IP 250 OP 636: Mod: ZF | Performed by: INTERNAL MEDICINE

## 2018-04-24 PROCEDURE — 80053 COMPREHEN METABOLIC PANEL: CPT | Performed by: INTERNAL MEDICINE

## 2018-04-24 PROCEDURE — 96375 TX/PRO/DX INJ NEW DRUG ADDON: CPT

## 2018-04-24 PROCEDURE — 85025 COMPLETE CBC W/AUTO DIFF WBC: CPT | Performed by: INTERNAL MEDICINE

## 2018-04-24 PROCEDURE — G0498 CHEMO EXTEND IV INFUS W/PUMP: HCPCS

## 2018-04-24 PROCEDURE — 99215 OFFICE O/P EST HI 40 MIN: CPT | Mod: ZP | Performed by: INTERNAL MEDICINE

## 2018-04-24 PROCEDURE — 36415 COLL VENOUS BLD VENIPUNCTURE: CPT

## 2018-04-24 PROCEDURE — 96367 TX/PROPH/DG ADDL SEQ IV INF: CPT

## 2018-04-24 PROCEDURE — 96415 CHEMO IV INFUSION ADDL HR: CPT

## 2018-04-24 PROCEDURE — 84100 ASSAY OF PHOSPHORUS: CPT | Performed by: INTERNAL MEDICINE

## 2018-04-24 PROCEDURE — G0463 HOSPITAL OUTPT CLINIC VISIT: HCPCS | Mod: 25

## 2018-04-24 PROCEDURE — 83615 LACTATE (LD) (LDH) ENZYME: CPT | Performed by: INTERNAL MEDICINE

## 2018-04-24 PROCEDURE — G0463 HOSPITAL OUTPT CLINIC VISIT: HCPCS | Mod: ZF

## 2018-04-24 RX ORDER — ALBUTEROL SULFATE 0.83 MG/ML
2.5 SOLUTION RESPIRATORY (INHALATION)
Status: CANCELLED | OUTPATIENT
Start: 2018-04-26

## 2018-04-24 RX ORDER — MEPERIDINE HYDROCHLORIDE 25 MG/ML
25 INJECTION INTRAMUSCULAR; INTRAVENOUS; SUBCUTANEOUS EVERY 30 MIN PRN
Status: CANCELLED | OUTPATIENT
Start: 2018-04-26

## 2018-04-24 RX ORDER — SODIUM CHLORIDE 9 MG/ML
1000 INJECTION, SOLUTION INTRAVENOUS CONTINUOUS PRN
Status: CANCELLED
Start: 2018-04-26

## 2018-04-24 RX ORDER — LORAZEPAM 2 MG/ML
0.5 INJECTION INTRAMUSCULAR EVERY 4 HOURS PRN
Status: CANCELLED
Start: 2018-04-26

## 2018-04-24 RX ORDER — EPINEPHRINE 0.3 MG/.3ML
0.3 INJECTION SUBCUTANEOUS EVERY 5 MIN PRN
Status: CANCELLED | OUTPATIENT
Start: 2018-04-26

## 2018-04-24 RX ORDER — DIPHENHYDRAMINE HYDROCHLORIDE 50 MG/ML
50 INJECTION INTRAMUSCULAR; INTRAVENOUS
Status: CANCELLED
Start: 2018-04-26

## 2018-04-24 RX ORDER — ALBUTEROL SULFATE 90 UG/1
1-2 AEROSOL, METERED RESPIRATORY (INHALATION)
Status: CANCELLED
Start: 2018-04-26

## 2018-04-24 RX ORDER — PALONOSETRON 0.05 MG/ML
0.25 INJECTION, SOLUTION INTRAVENOUS ONCE
Status: COMPLETED | OUTPATIENT
Start: 2018-04-24 | End: 2018-04-24

## 2018-04-24 RX ORDER — METHYLPREDNISOLONE SODIUM SUCCINATE 125 MG/2ML
125 INJECTION, POWDER, LYOPHILIZED, FOR SOLUTION INTRAMUSCULAR; INTRAVENOUS
Status: CANCELLED
Start: 2018-04-26

## 2018-04-24 RX ORDER — EPINEPHRINE 1 MG/ML
0.3 INJECTION, SOLUTION, CONCENTRATE INTRAVENOUS EVERY 5 MIN PRN
Status: CANCELLED | OUTPATIENT
Start: 2018-04-26

## 2018-04-24 RX ORDER — NYSTATIN 100000/ML
500000 SUSPENSION, ORAL (FINAL DOSE FORM) ORAL 4 TIMES DAILY
Qty: 473 ML | Refills: 3 | Status: SHIPPED | OUTPATIENT
Start: 2018-04-24 | End: 2018-06-26

## 2018-04-24 RX ORDER — PALONOSETRON 0.05 MG/ML
0.25 INJECTION, SOLUTION INTRAVENOUS ONCE
Status: CANCELLED
Start: 2018-04-26

## 2018-04-24 RX ORDER — HEPARIN SODIUM (PORCINE) LOCK FLUSH IV SOLN 100 UNIT/ML 100 UNIT/ML
5 SOLUTION INTRAVENOUS
Status: DISCONTINUED | OUTPATIENT
Start: 2018-04-24 | End: 2018-04-24 | Stop reason: HOSPADM

## 2018-04-24 RX ADMIN — DOXORUBICIN HYDROCHLORIDE 100 MG: 2 INJECTABLE, LIPOSOMAL INTRAVENOUS at 11:18

## 2018-04-24 RX ADMIN — SODIUM CHLORIDE 1000 ML: 9 INJECTION, SOLUTION INTRAVENOUS at 13:03

## 2018-04-24 RX ADMIN — DEXTROSE MONOHYDRATE 250 ML: 50 INJECTION, SOLUTION INTRAVENOUS at 10:49

## 2018-04-24 RX ADMIN — PALONOSETRON HYDROCHLORIDE 0.25 MG: 0.25 INJECTION INTRAVENOUS at 10:49

## 2018-04-24 RX ADMIN — SODIUM CHLORIDE 150 MG: 9 INJECTION, SOLUTION INTRAVENOUS at 10:49

## 2018-04-24 ASSESSMENT — ENCOUNTER SYMPTOMS
TASTE DISTURBANCE: 1
DECREASED APPETITE: 1
WEIGHT LOSS: 1
VOMITING: 1

## 2018-04-24 ASSESSMENT — PAIN SCALES - GENERAL: PAINLEVEL: NO PAIN (0)

## 2018-04-24 NOTE — NURSING NOTE
"Oncology Rooming Note    April 24, 2018 9:15 AM   Hiram Tapia is a 57 year old male who presents for:    Chief Complaint   Patient presents with     Blood Draw     Labs drawn by RN via Right Arm VPT.      Oncology Clinic Visit     Return visit related to High Grade Sarcoma     Initial Vitals: /74 (BP Location: Left arm, Patient Position: Sitting, Cuff Size: Adult Large)  Pulse 122  Temp 98.8  F (37.1  C) (Oral)  Resp 16  Ht 1.778 m (5' 10\")  Wt 89.8 kg (198 lb)  SpO2 96%  BMI 28.41 kg/m2 Estimated body mass index is 28.41 kg/(m^2) as calculated from the following:    Height as of this encounter: 1.778 m (5' 10\").    Weight as of this encounter: 89.8 kg (198 lb). Body surface area is 2.11 meters squared.  No Pain (0) Comment: Data Unavailable   No LMP for male patient.  Allergies reviewed: Yes  Medications reviewed: Yes    Medications: Medication refills not needed today.  Pharmacy name entered into [facility]:    [facility] [address] [place] [address] - [address] AT [facility]  [address]  [facility] [address]    Clinical concerns: No new concerns. Provider was notified.    10 minutes for nursing intake (face to face time)     Christelle Ray LPN            "

## 2018-04-24 NOTE — PROGRESS NOTES
Diagnosis: 1. High-grade soft tissue sarcoma right thigh  2. Delayed post op wound infection      Treatment: 1. Neoadjuvant chemotherapy planned.  We will begin the March 26, 2018.  Sequencing of radiation and surgery to be determined after neoadjuvant treatment completed staging studies evaluated.  2. I and D, antibiotics    Patient was seen today with Dr. Mcmahan.  I agree with his assessment and plan.  In some we will see him back in a month to inspect his wound and he will have the wound VAC nurse text me a photograph of his wound and dressing.  Answers for HPI/ROS submitted by the patient on 4/24/2018   General Symptoms: Yes  Skin Symptoms: Yes  HENT Symptoms: Yes  EYE SYMPTOMS: No  HEART SYMPTOMS: No  LUNG SYMPTOMS: No  INTESTINAL SYMPTOMS: Yes  URINARY SYMPTOMS: No  REPRODUCTIVE SYMPTOMS: No  SKELETAL SYMPTOMS: No  BLOOD SYMPTOMS: No  NERVOUS SYSTEM SYMPTOMS: No  MENTAL HEALTH SYMPTOMS: No  Loss of appetite: Yes  Weight loss: Yes  Changes in hair: Yes  Change in taste: Yes  Vomiting: Yes

## 2018-04-24 NOTE — PROGRESS NOTES
Infusion Nursing Note:  Hiram Tapia presents today for Day 1 Cycle 2 Doxil and Ifosfamide/[place] pump connect .    Patient seen by provider today: Yes: Dr. Johnson   present during visit today: Not Applicable.    Note: The patient states that he physically feels well overall. He reports feeling anxiety regarding this next cycle of chemotherapy. The patient's HR was 122 in clinic prior to arriving to infusion and was checked by the writer (RN) upon arrival to infusion and was 116 --    -TORB 4/24/18 @1045 Dr. Johnson/Gentry Oh RN: 1 L NS bolus for tachycardia    No other complaints were brought to the attention of the writer.    Patient verbalized that he felt markedly better after receiving the 1L NS bolus.    Intravenous Access:  Implanted Port.    Treatment Conditions:  Lab Results   Component Value Date    HGB 11.5 04/24/2018     Lab Results   Component Value Date    WBC 12.0 04/24/2018      Lab Results   Component Value Date    ANEU 9.9 04/24/2018     Lab Results   Component Value Date     04/24/2018      Lab Results   Component Value Date     04/24/2018                   Lab Results   Component Value Date    POTASSIUM 4.1 04/24/2018           Lab Results   Component Value Date    MAG 2.1 04/24/2018            Lab Results   Component Value Date    CR 0.85 04/24/2018                   Lab Results   Component Value Date    JAYESH 10.0 04/24/2018                Lab Results   Component Value Date    BILITOTAL 0.3 04/24/2018           Lab Results   Component Value Date    ALBUMIN 3.0 04/24/2018                    Lab Results   Component Value Date    ALT 20 04/24/2018           Lab Results   Component Value Date    AST 17 04/24/2018       Results reviewed, labs MET treatment parameters, ok to proceed with treatment.  ECHO/MUGA completed 3/23/18  EF 60-65%.      Post Infusion Assessment:  Patient tolerated infusion without incident.  Blood return noted pre and post infusion.  Site patent and  "intact, free from redness, edema or discomfort.  No evidence of extravasations.    Prior to discharge: Port is secured in place with tegaderm and flushed with 10cc NS with positive blood return noted.  Continuous home infusion CADD pump connected.    All connectors secured in place and clamps taped open.    Pump started, \"running\" noted on display (CADD): YES.  Patient instructed to call our clinic or [facility] with any questions or concerns at home.  Patient verbalized understanding.    Patient set up for pump disconnect at home with [facility] on 4/30 for Ifosfamide pump disconnect with Mesna bag change and complete pump disconnect on 5/1 with Neulasta injection. Labs scheduled for days 4,6, and 8 of cycle which is 4/27, 4/29, and 5/1, respectively - confirmed with Elyssa of [facility], asked to follow up with RN[facility] if there are questions regarding patient's insurance coverage.          Discharge Plan:   Prescription refills given for Xarelto and Nystatin.  Discharge instructions reviewed with: Patient and Family.  Patient and/or family verbalized understanding of discharge instructions and all questions answered.  Copy of AVS reviewed with patient and/or family.  Patient will return 5/22/18 for next appointment.  Patient discharged in stable condition accompanied by: self and wife.  Departure Mode: Wheelchair.  Face to Face time: 0 minutes.    José Oh RN                        "

## 2018-04-24 NOTE — MR AVS SNAPSHOT
After Visit Summary   4/24/2018    Hiram Tapia    MRN: [id number]           Patient Information     Date Of Birth          1960        Visit Information        Provider Department      4/24/2018 9:30 AM  16 ATC; UC ONCOLOGY INFUSION [facility]        Today's Diagnoses     Sarcoma of soft tissue (H)    -  1      Care Instructions    Contact Numbers    [facility] Main Line: [phone]  [facility] Triage:  [phone]    Call triage with chills and/or temperature greater than or equal to 100.5, uncontrolled nausea/vomiting, diarrhea, constipation, dizziness, shortness of breath, chest pain, bleeding, unexplained bruising, or any new/concerning symptoms, questions/concerns.     If you are having any concerning symptoms or wish to speak to a provider before your next infusion visit, please call your care coordinator or triage to notify them so we can adequately serve you.       After Hours: [phone]    If after hours, weekends, or holidays, call main hospital  and ask for Oncology doctor on call.         April 2018 Sunday Monday Tuesday Wednesday Thursday Friday Saturday   1     2     LAB   10:45 AM   (15 min.)    LAB HOME INFUSION   [facility] Lab 3     4     [facility] ONC INFUSION 60   10:30 AM   (60 min.)   UC ONCOLOGY INFUSION   [facility] 5     [facility] ONC INFUSION 60   10:30 AM   (60 min.)    ONCOLOGY INFUSION   [facility]     UMP NEW TUMOR   11:45 AM   (30 min.)   Cuco Schaefer MD   [facility]     Admission    5:00 PM   Brad Betts MD   UR 8A   (Discharge: 4/10/2018) 6     COMBINED IRRIGATION AND DEBRIDEMENT LOWER EXTREMITY    7:00 AM   Brad Betts MD   UR OR 7       8     9     COMBINED IRRIGATION AND DEBRIDEMENT LOWER EXTREMITY    3:00 PM   Brad Betts MD   UR OR 10     11     12     13     14       15     16     17     18     19     20     21     PET ONCOLOGY WHOLE BODY     8:30 AM   (45 min.)   UUPET1   [facility] PET CT   22     23     24     [facility] LAB DRAW    8:30 AM   (15 min.)    [facility]ONIC LAB DRAW   [facility] Lab Draw     [facility] RETURN    8:45 AM   (30 min.)   Gaurang Johnson MD   [facility] ONC INFUSION 180    9:30 AM   (180 min.)   UC ONCOLOGY INFUSION   [facility]     UMP RETURN    2:30 PM   (15 min.)   Brad Betts MD   [facility] 25     26     27     28       29     30                                         May 2018   [place] Monday Tuesday Wednesday Thursday Friday Saturday             1     2     3     4     5       6     7     8     9     10     11     12       13     14     15     16     17     18     19       20     21     22     [facility] LAB DRAW   10:30 AM   (15 min.)    M[facility]ONIC LAB DRAW   [facility] Lab Draw     [facility] RETURN   10:45 AM   (30 min.)   Gaurang Johnson MD   [facility] ONC INFUSION 180   12:30 PM   (180 min.)    ONCOLOGY INFUSION   [facility] 23     24     25     26       27     28     29     30     31                            Lab Results:  Recent Results (from the past 12 hour(s))   CBC with platelets differential    Collection Time: 04/24/18  8:58 AM   Result Value Ref Range    WBC 12.0 (H) 4.0 - 11.0 10e9/L    RBC Count 4.36 (L) 4.4 - 5.9 10e12/L    Hemoglobin 11.5 (L) 13.3 - 17.7 g/dL    Hematocrit 34.7 (L) 40.0 - 53.0 %    MCV 80 78 - 100 fl    MCH 26.4 (L) 26.5 - 33.0 pg    MCHC 33.1 31.5 - 36.5 g/dL    RDW 16.0 (H) 10.0 - 15.0 %    Platelet Count 482 (H) 150 - 450 10e9/L    Diff Method Automated Method     % Neutrophils 82.7 %    % Lymphocytes 7.7 %    % Monocytes 7.7 %    % Eosinophils 0.3 %    % Basophils 1.3 %    % Immature Granulocytes 0.3 %    Nucleated RBCs 0 0 /100    Absolute Neutrophil 9.9 (H) 1.6 - 8.3 10e9/L    Absolute Lymphocytes 0.9 0.8 - 5.3 10e9/L    Absolute Monocytes  0.9 0.0 - 1.3 10e9/L    Absolute Eosinophils 0.0 0.0 - 0.7 10e9/L    Absolute Basophils 0.2 0.0 - 0.2 10e9/L    Abs Immature Granulocytes 0.0 0 - 0.4 10e9/L    Absolute Nucleated RBC 0.0    Comprehensive metabolic panel    Collection Time: 04/24/18  8:58 AM   Result Value Ref Range    Sodium 132 (L) 133 - 144 mmol/L    Potassium 4.1 3.4 - 5.3 mmol/L    Chloride 100 94 - 109 mmol/L    Carbon Dioxide 22 20 - 32 mmol/L    Anion Gap 9 3 - 14 mmol/L    Glucose 114 (H) 70 - 99 mg/dL    Urea Nitrogen 10 7 - 30 mg/dL    Creatinine 0.85 0.66 - 1.25 mg/dL    GFR Estimate >90 >60 mL/min/1.7m2    GFR Estimate If Black >90 >60 mL/min/1.7m2    Calcium 10.0 8.5 - 10.1 mg/dL    Bilirubin Total 0.3 0.2 - 1.3 mg/dL    Albumin 3.0 (L) 3.4 - 5.0 g/dL    Protein Total 7.6 6.8 - 8.8 g/dL    Alkaline Phosphatase 156 (H) 40 - 150 U/L    ALT 20 0 - 70 U/L    AST 17 0 - 45 U/L   Phosphorus    Collection Time: 04/24/18  8:58 AM   Result Value Ref Range    Phosphorus 2.5 2.5 - 4.5 mg/dL   Magnesium    Collection Time: 04/24/18  8:58 AM   Result Value Ref Range    Magnesium 2.1 1.6 - 2.3 mg/dL   Lactate Dehydrogenase    Collection Time: 04/24/18  8:58 AM   Result Value Ref Range    Lactate Dehydrogenase 144 85 - 227 U/L               Follow-ups after your visit        Your next 10 appointments already scheduled     Apr 24, 2018  2:45 PM CDT   (Arrive by 2:30 PM)   Return Visit with Brad Betts MD   [facility] ([facility])    [address] [place] [address]  4th Floor  [facility] [address] [phone]            May 22, 2018 10:30 AM CDT   Masonic Lab Draw with  MASONIC LAB DRAW   [facility] Lab Draw ([facility])    [address] [place] [address]  [address]  [facility] [address]   [phone]            May 22, 2018 11:00 AM CDT   (Arrive by 10:45 AM)   Return Visit with Gaurang Johnson MD   [facility] ([facility])     [address]  [address]  [facility] [address]   [phone]            May 22, 2018 12:30 PM CDT   Infusion 180 with UC ONCOLOGY INFUSION, UC 30 ATC   [facility] ([facility])    [address]  [address]  [facility] [address]   [phone]            Jun 19, 2018  9:00 AM CDT   Masonic Lab Draw with UC MASONIC LAB DRAW   [facility] ([facility])    [address] [place] [address]  [address]  [facility] [address]   [phone]            Jun 19, 2018  9:30 AM CDT   (Arrive by 9:15 AM)   Return Visit with Gaurang Johnson MD   [facility] ([facility])    [address] [place] [address]  [address]  [facility] [address]   [phone]            Jun 19, 2018 10:00 AM CDT   Infusion 180 with UC ONCOLOGY INFUSION, UC 17 ATC   [facility] ([facility])    [address] [place] [address]  [address]  [facility] [address]   [phone]            Jul 17, 2018  8:00 AM CDT   Masonic Lab Draw with UC MASONIC LAB DRAW   [facility])    [address] [place] [address]  [address]  [facility] [address]   [phone]              Who to contact     If you have questions or need follow up information about today's clinic visit or your schedule please contact [facility] directly at [phone].  Normal or non-critical lab and imaging results will be communicated to you by MyChart, letter or phone within 4 business days after the clinic has received the results. If you do not hear from us within 7 days, please contact the clinic through MyChart or phone. If you have a critical or abnormal lab result, we will notify you by phone as soon as possible.  Submit refill requests through Hatsize or call your pharmacy and they will forward the refill request to us. Please allow 3 business days  "for your refill to be completed.          Additional Information About Your Visit        MyChart Information     NeXplore lets you send messages to your doctor, view your test results, renew your prescriptions, schedule appointments and more. To sign up, go to [email or web address] . Click on \"Log in\" on the left side of the screen, which will take you to the Welcome page. Then click on \"Sign up Now\" on the right side of the page.     You will be asked to enter the access code listed below, as well as some personal information. Please follow the directions to create your username and password.     Your access code is: [email or web address]  Expires: 2018  7:30 AM     Your access code will  in 90 days. If you need help or a new code, please call your [facility] clinic or [phone].        Care EveryWhere ID     This is your Care EveryWhere ID. This could be used by other organizations to access your [facility] medical records  [id number]        Your Vitals Were     Pulse                   116            Blood Pressure from Last 3 Encounters:   18 108/74   04/10/18 112/67   18 119/85    Weight from Last 3 Encounters:   18 89.8 kg (198 lb)   18 94.3 kg (208 lb)   18 97.3 kg (214 lb 9.6 oz)              We Performed the Following     CBC with platelets differential     Comprehensive metabolic panel     Lactate Dehydrogenase     Magnesium     Phosphorus          Today's Medication Changes          These changes are accurate as of 18  2:16 PM.  If you have any questions, ask your nurse or doctor.               Start taking these medicines.        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), Pain of right lower extremity, Sarcoma of soft " tissue (H)   Started by:  Gaurang Johnson MD        Dose:  660477 Units   Take 5 mLs (500,000 Units) by mouth 4 times daily   Quantity:  473 mL   Refills:  3       Rivaroxaban 15 & 20 MG Tbpk   Used for:  Sarcoma (H), Thrush, Other pulmonary embolism without acute cor pulmonale, unspecified chronicity (H), Postoperative wound infection, subsequent encounter, 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), Pain of right lower extremity, Sarcoma of soft tissue (H)   Started by:  Gaurang Johnson MD        Dose:  15 mg   Take 15 mg by mouth 2 times daily Take 15 mg twice a day for 2 weeks then 20 mg once a day   Quantity:  51 each   Refills:  1            Where to get your medicines      These medications were sent to [facility] [address] [facility] [address]  [address] [place] [address] [address] [place] [address]    Hours:  TRANSPLANT PHONE NUMBER [phone] Phone:  [phone]     nystatin 795725 UNIT/ML suspension    Rivaroxaban 15 & 20 MG Tbpk                Primary Care Provider Office Phone # Fax #    Louisa Fry [phone]       [facility] [address]  [facility] [address]        Equal Access to Services     DORIAN TOLENTINO AH: Hadii troy ku hadasho Soomaali, waaxda luqadaha, qaybta kaalmada adeegyada, waxay idiin haykevinn jaz mcnair. So [facility] [phone].    ATENCIÓN: Si habla español, tiene a sheridan disposición servicios gratmaios de asistencia lingüística. Llame al [phone].    We comply with applicable federal civil rights laws and [place] laws. We do not discriminate on the basis of race, color, national origin, age, disability, sex, sexual orientation, or gender identity.            Thank you!     Thank you for choosing [facility]  for your care. Our goal is always to provide you with excellent care. Hearing back from our patients is one way we can continue to improve our services. Please take a few minutes to  complete the written survey that you may receive in the mail after your visit with us. Thank you!             Your Updated Medication List - Protect others around you: Learn how to safely use, store and throw away your medicines at www.disposemymeds.org.          This list is accurate as of 4/24/18  2:16 PM.  Always use your most recent med list.                   Brand Name Dispense Instructions for use Diagnosis    amoxicillin-clavulanate 500-125 MG per tablet    AUGMENTIN    42 tablet    Take 1 tablet by mouth 3 times daily for 14 days    Postoperative wound infection, subsequent encounter       granisetron 1 MG tablet    KYTRIL    30 tablet    Take 1 tablet (1 mg) by mouth every 12 hours as needed for nausea    Sarcoma of soft tissue (H)       nystatin 482688 UNIT/ML suspension    MYCOSTATIN    473 mL    Take 5 mLs (500,000 Units) by mouth 4 times daily    Sarcoma (H), Thrush, Other pulmonary embolism without acute cor pulmonale, unspecified chronicity (H), Postoperative wound infection, subsequent encounter, 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

## 2018-04-24 NOTE — MR AVS SNAPSHOT
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)        Pain of right lower extremity        Sarcoma of soft tissue (H)           Follow-ups after your visit        Your next 10 appointments already scheduled     Apr 24, 2018  2:45 PM CDT   (Arrive by 2:30 PM)   Return Visit with Brad Betts MD   [facility] ([facility])    [address] [place] [address]  4th Floor  [facility] [address]   [phone]            May 22, 2018 10:30 AM CDT   Masonic Lab Draw with UC MASONIC LAB DRAW   [facility])    [address] [place] [address]  [address]  [facility] [address]   [phone]            May 22, 2018 11:00 AM CDT   (Arrive by 10:45 AM)   Return Visit with Gaurang Johnson MD   [employer] [facility] ([facility])    [address] [place] [address]  [address]  [facility] [address]   [phone]            May 22, 2018 12:30 PM CDT   Infusion 180 with UC ONCOLOGY INFUSION, UC 30 ATC   [employer] [facility] ([facility])    [address] [place] [address]  [address]  [facility] [address]   [phone]            Jun 19, 2018  9:00 AM CDT   Masonic Lab Draw with UC MASONIC LAB DRAW   [facility] Masonic Lab Draw ([facility])    [address] [place] [address]  [address]  [facility] [address]   [phone]            Jun 19, 2018  9:30 AM CDT   (Arrive by 9:15 AM)   Return  "Visit with Gaurang Johnson MD   [facility] ([facility])    [address]  [address]  [facility] [address] [phone]            Jun 19, 2018 10:00 AM CDT   Infusion 180 with UC ONCOLOGY INFUSION, UC 17 ATC   [facility] ([facility])    [address]  [address]  [facility] [address] [phone]            Jul 17, 2018  8:00 AM CDT   Masonic Lab Draw with  MASONIC LAB DRAW   [facility] Lab Draw ([facility])    [address]  [address]  [facility] [address] [phone]              Who to contact     If you have questions or need follow up information about today's clinic visit or your schedule please contact [facility] directly at [phone].  Normal or non-critical lab and imaging results will be communicated to you by Tenantrexhart, letter or phone within 4 business days after the clinic has received the results. If you do not hear from us within 7 days, please contact the clinic through GameFlyt or phone. If you have a critical or abnormal lab result, we will notify you by phone as soon as possible.  Submit refill requests through ChaCha or call your pharmacy and they will forward the refill request to us. Please allow 3 business days for your refill to be completed.          Additional Information About Your Visit        TenantrexharZINK Imaging Information     ChaCha lets you send messages to your doctor, view your test results, renew your prescriptions, schedule appointments and more. To sign up, go to [email or web address] . Click on \"Log in\" on the left side of the screen, which will take you to the Welcome page. Then click on \"Sign up Now\" on the right side of the page.     You will be asked to enter the access code listed below, as well as some personal information. Please follow the directions to create your username and " "password.     Your access code is: [email or web address]  Expires: 2018  7:30 AM     Your access code will  in 90 days. If you need help or a new code, please call your [facility] clinic or [phone].        Care EveryWhere ID     This is your Care EveryWhere ID. This could be used by other organizations to access your [facility] medical records  [id number]        Your Vitals Were     Pulse Temperature Respirations Height Pulse Oximetry BMI (Body Mass Index)    122 98.8  F (37.1  C) (Oral) 16 1.778 m (5' 10\") 96% 28.41 kg/m2       Blood Pressure from Last 3 Encounters:   18 108/74   04/10/18 112/67   18 119/85    Weight from Last 3 Encounters:   18 89.8 kg (198 lb)   18 94.3 kg (208 lb)   18 97.3 kg (214 lb 9.6 oz)              Today, you had the following     No orders found for display         Today's Medication Changes          These changes are accurate as of 18 10:47 AM.  If you have any questions, ask your nurse or doctor.               Start taking these medicines.        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), Pain of right lower extremity, Sarcoma of soft tissue (H)   Started by:  Gaurang Johnson MD        Dose:  635483 Units   Take 5 mLs (500,000 Units) by mouth 4 times daily   Quantity:  473 mL   Refills:  3       Rivaroxaban 15 & 20 MG Tbpk   Used for:  Sarcoma (H), Thrush, Other pulmonary embolism without acute cor pulmonale, unspecified chronicity (H), Postoperative wound infection, subsequent encounter, 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), Pain of right lower extremity, Sarcoma of soft tissue (H)   Started by:  Gaurang Johnson MD        Dose:  15 mg   Take 15 mg by mouth 2 times daily Take 15 " mg twice a day for 2 weeks then 20 mg once a day   Quantity:  51 each   Refills:  1            Where to get your medicines      These medications were sent to [facility] [place] [place] - [address]  [address], [facility] [address]    Hours:  TRANSPLANT PHONE NUMBER [phone] Phone:  [phone]     nystatin [id number] UNIT/ML suspension    Rivaroxaban 15 & 20 MG Tbpk                Primary Care Provider Office Phone # Fax #    Louisa Fry [phone]       [facility] [address]  [facility] [address]        Equal Access to Services     DORIAN TOLENTINO : Hadii troy martinez hadirmao Sonikita, waaxda luqadaha, qaybta kaalmada adeegyada, mendez mcnair. So [facility] [phone].    ATENCIÓN: Si habla español, tiene a sheridan disposición servicios gratuitos de asistencia lingüística. Llame al [phone].    We comply with applicable federal civil rights laws and [place] laws. We do not discriminate on the basis of race, color, national origin, age, disability, sex, sexual orientation, or gender identity.            Thank you!     Thank you for choosing [facility]  for your care. Our goal is always to provide you with excellent care. Hearing back from our patients is one way we can continue to improve our services. Please take a few minutes to complete the written survey that you may receive in the mail after your visit with us. Thank you!             Your Updated Medication List - Protect others around you: Learn how to safely use, store and throw away your medicines at www.disposemymeds.org.          This list is accurate as of 4/24/18 10:47 AM.  Always use your most recent med list.                   Brand Name Dispense Instructions for use Diagnosis    amoxicillin-clavulanate 500-125 MG per tablet    AUGMENTIN    42 tablet    Take 1 tablet by mouth 3 times daily for 14 days    Postoperative wound  infection, subsequent encounter       granisetron 1 MG tablet    KYTRIL    30 tablet    Take 1 tablet (1 mg) by mouth every 12 hours as needed for nausea    Sarcoma of soft tissue (H)       nystatin 767724 UNIT/ML suspension    MYCOSTATIN    473 mL    Take 5 mLs (500,000 Units) by mouth 4 times daily    Sarcoma (H), Thrush, Other pulmonary embolism without acute cor pulmonale, unspecified chronicity (H), Postoperative wound infection, subsequent encounter, 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

## 2018-04-24 NOTE — PATIENT INSTRUCTIONS
Contact Numbers    [facility] Main Line: [phone]  [facility] Triage:  [phone]    Call triage with chills and/or temperature greater than or equal to 100.5, uncontrolled nausea/vomiting, diarrhea, constipation, dizziness, shortness of breath, chest pain, bleeding, unexplained bruising, or any new/concerning symptoms, questions/concerns.     If you are having any concerning symptoms or wish to speak to a provider before your next infusion visit, please call your care coordinator or triage to notify them so we can adequately serve you.       After Hours: [phone]    If after hours, weekends, or holidays, call main hospital  and ask for Oncology doctor on call.         April 2018 Sunday Monday Tuesday Wednesday Thursday Friday Saturday   1     2     LAB   10:45 AM   (15 min.)    LAB HOME INFUSION   [facility] Lab 3     4     [facility] ONC INFUSION 60   10:30 AM   (60 min.)    ONCOLOGY INFUSION   [facility] 5     [facility] ONC INFUSION 60   10:30 AM   (60 min.)    ONCOLOGY INFUSION   [facility] NEW TUMOR   11:45 AM   (30 min.)   Cuco Schaefer MD   [facility] Orthopaedic [facility]     Admission    5:00 PM   Brad Betts MD   UR 8A   (Discharge: 4/10/2018) 6     COMBINED IRRIGATION AND DEBRIDEMENT LOWER EXTREMITY    7:00 AM   Brad Betts MD   UR OR 7       8     9     COMBINED IRRIGATION AND DEBRIDEMENT LOWER EXTREMITY    3:00 PM   Brad Betts MD   UR OR 10     11     12     13     14       15     16     17     18     19     20     21     PET ONCOLOGY WHOLE BODY    8:30 AM   (45 min.)   UUPET1   [facility] PET CT   22     23     24     [facility]    8[facility]30 AM   (15 min.)   [facility] LAB DRAW   [facility] Lab Draw     [facility] RETURN    8:45 AM   (30 min.)   Gaurang Johnson MD   [facility] ONC INFUSION 180    9:30 AM   (180 min.)    ONCOLOGY INFUSION   [facility]      [facility] RETURN    2:30 PM   (15 min.)   Brad Betts MD   [facility] Orthopaedic [facility] 25     26     27     28       29     30                                         May 2018   [place] Monday Tuesday Wednesday Thursday Friday Saturday             1     2     3     4     5       6     7     8     9     10     11     12       13     14     15     16     17     18     19       20     21     22     [facility] LAB DRAW   10:30 AM   (15 min.)    [facility] LAB DRAW   [facility] Lab Draw     P RETURN   10:45 AM   (30 min.)   Gaurang Johnson MD   [facility] ONC INFUSION 180   12:30 PM   (180 min.)    ONCOLOGY INFUSION   [facility] 23     24     25     26       27     28     29     30     31                            Lab Results:  Recent Results (from the past 12 hour(s))   CBC with platelets differential    Collection Time: 04/24/18  8:58 AM   Result Value Ref Range    WBC 12.0 (H) 4.0 - 11.0 10e9/L    RBC Count 4.36 (L) 4.4 - 5.9 10e12/L    Hemoglobin 11.5 (L) 13.3 - 17.7 g/dL    Hematocrit 34.7 (L) 40.0 - 53.0 %    MCV 80 78 - 100 fl    MCH 26.4 (L) 26.5 - 33.0 pg    MCHC 33.1 31.5 - 36.5 g/dL    RDW 16.0 (H) 10.0 - 15.0 %    Platelet Count 482 (H) 150 - 450 10e9/L    Diff Method Automated Method     % Neutrophils 82.7 %    % Lymphocytes 7.7 %    % Monocytes 7.7 %    % Eosinophils 0.3 %    % Basophils 1.3 %    % Immature Granulocytes 0.3 %    Nucleated RBCs 0 0 /100    Absolute Neutrophil 9.9 (H) 1.6 - 8.3 10e9/L    Absolute Lymphocytes 0.9 0.8 - 5.3 10e9/L    Absolute Monocytes 0.9 0.0 - 1.3 10e9/L    Absolute Eosinophils 0.0 0.0 - 0.7 10e9/L    Absolute Basophils 0.2 0.0 - 0.2 10e9/L    Abs Immature Granulocytes 0.0 0 - 0.4 10e9/L    Absolute Nucleated RBC 0.0    Comprehensive metabolic panel    Collection Time: 04/24/18  8:58 AM   Result Value Ref Range    Sodium 132 (L) 133 - 144 mmol/L    Potassium 4.1 3.4 - 5.3 mmol/L    Chloride 100 94 - 109  mmol/L    Carbon Dioxide 22 20 - 32 mmol/L    Anion Gap 9 3 - 14 mmol/L    Glucose 114 (H) 70 - 99 mg/dL    Urea Nitrogen 10 7 - 30 mg/dL    Creatinine 0.85 0.66 - 1.25 mg/dL    GFR Estimate >90 >60 mL/min/1.7m2    GFR Estimate If Black >90 >60 mL/min/1.7m2    Calcium 10.0 8.5 - 10.1 mg/dL    Bilirubin Total 0.3 0.2 - 1.3 mg/dL    Albumin 3.0 (L) 3.4 - 5.0 g/dL    Protein Total 7.6 6.8 - 8.8 g/dL    Alkaline Phosphatase 156 (H) 40 - 150 U/L    ALT 20 0 - 70 U/L    AST 17 0 - 45 U/L   Phosphorus    Collection Time: 04/24/18  8:58 AM   Result Value Ref Range    Phosphorus 2.5 2.5 - 4.5 mg/dL   Magnesium    Collection Time: 04/24/18  8:58 AM   Result Value Ref Range    Magnesium 2.1 1.6 - 2.3 mg/dL   Lactate Dehydrogenase    Collection Time: 04/24/18  8:58 AM   Result Value Ref Range    Lactate Dehydrogenase 144 85 - 227 U/L

## 2018-04-24 NOTE — LETTER
4/24/2018       RE: Hiram Tapia  [address]  [facility] [address]     Dear Colleague,    Thank you for referring your patient, Hiram Tapia, to the [facility]. Please see a copy of my visit note below.    4-24-18    I saw Hiram Tapia for f/u of a sarcoma of the right thigh.      Background  In brief he has had a lot of problems with his right leg for many years including sciatica for 20 years.   He developed more discomfort in the right thigh and in October 2017 hit his lateral thigh against a truck tailgate causing a lot of pain.  Subsequently there was a fair amount of swelling and stiffness.  This eventually led to some imaging showing a cystic mass.  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  At the time of the biopsy more than a liter of fluid was removed and that markedly improved his symptoms of stiffness and pain.  -    Interval history    A lot has happened since I saw him last.  He began doxil/ifos 3-23-18.    He had a difficult time with fatigue and some nausea at about 2 in the morning.  The plan is to do kytril bid this time.  No clear GERD.    He is sleeping in a chair due to the leg pain.  He had fluid recurrence and had a wound vac put on April 9.  The pressure caused a second hole and he got an infection winding up in the hospital a week. He got d/c 4-10.    He is finishing antibiotics today and has loose stools w that.    No mouth or skin sx this cycle.    He has not been very active and cant bend the knee so is doing minimal walking. He has gone up 2 floors a couple times OK but has some pain with that.    He had a gout flare w the R foot and took ibuprofen for that.      Other than the knee he is really doing pretty well and has been quite active.  Now however the leg is intermittently quite painful and limits his activity.    He is sometimes sleeping in a recliner.     Mood is OK.  He sees Dr Betts later today.    Aside from this problem his 10 point  "ROS unremarkable.        Background PMH, FH, SH  Past history is not particular remarkable other than for tonsillectomy and surgery for lazy eye.  His left eye is the dominant eye.    The family history is not particularly remarkable but his father  of leukemia at age 42.    He denies any drug allergies.  He is  and has an adult somewhat autistic boy who lives with them.  He smoked a pack a day for about 10 years, stopping about , and does not abuse alcohol or other drugs.  He works as a  at [employer].  -    On exam he appeared comfortable with normal affect.    /74 (BP Location: Left arm, Patient Position: Sitting, Cuff Size: Adult Large)  Pulse 122  Temp 98.8  F (37.1  C) (Oral)  Resp 16  Ht 1.778 m (5' 10\")  Wt 89.8 kg (198 lb)  SpO2 96%  BMI 28.41 kg/m2  HEENT There is no icterus, Lazy eye (L dominant). Some mild thrush on tongue.  NECK: no thyromegaly or neck mass  CHEST:  the chest is clear  CV: there is tachycardia with no significant murmur  ABD:  no HSMT  LYMPH: no lymphadenopathy  EXT: tr-1+ edema of the right lower leg  MS: there is a large mass in the right thigh with an indwelling drain, not very tender  NEURO: Normal Romberg, good heel walking  SKIN: no doxil effect  PSYCH: mood fairly good  JOINTS: no gout now active (R heel was sore last weekend)    -  CBC OK for Rx w other labs pending.    -  Baseline PET/CT which shows marked PET activity in the periphery of this cystic thigh mass.  There  was no definite metastatic disease but there is a calcified nodule in the left lung and a probable hemangioma in the liver and a probable small adenoma in the left adrenal gland.  There  was also a small right pleural effusion with a bit of right lower lobe consolidation mild FDG uptake.      I reviewed the new images.  I talked w radiology; despite the report there does appear to be a segmental PE on the R.  There is also a small R lung nodule not present on the last " image, but this is nonspecific.  There is a response by SUV of the primary tumor.    -    We discussed the situation.  Though asymptomatic we will Rx the PE w rivaroxaban.  We will give the same doses as last time for cycle 2.  He will see Dr Betts later today.    We will give a trial of nystatin or the thrush.    He will take kytril bid this time.    All of his questions were addressed and he will call if he has others.    Gaurang Johnson M.D.  Professor  Hematology, Oncology and Transplantation

## 2018-04-24 NOTE — LETTER
2018       RE: Hiram Tapia  [address]  [facility] [address]     Dear Colleague,    Thank you for referring your patient, Hiram Tapia, to the [facility] at [facility]. Please see a copy of my visit note below.    Diagnosis: 1. High-grade soft tissue sarcoma right thigh  2. Delayed post op wound infection      Treatment: 1. Neoadjuvant chemotherapy planned.  We will begin the 2018.  Sequencing of radiation and surgery to be determined after neoadjuvant treatment completed staging studies evaluated.  2. I and D, antibiotics    Patient was seen today with Dr. Mcmahan.  I agree with his assessment and plan.  In some we will see him back in a month to inspect his wound and he will have the wound VAC nurse text me a photograph of his wound and dressing.    Diagnosis: High-grade soft tissue sarcoma right thigh      Treatment:   1. Neoadjuvant chemotherapy planned.   2.  18 Irrigation and debridement right thigh sarcoma for infection        Subjective:  Returns today on the final day of his antibiotic dosing.  Has been taking Bactrim as well as Augmentin with good resolution of his erythema.  Pain is minimal at this point.  Having 2x/wk wound VAC changes, he reports the nurse says the wound appears healthy.      Physical Examination:    Gen:  Alert, no acute distress, well nourished, appears stated age    Psych:  Normal affect, nonpressured speech    Musculoskeletal:  Right thigh with no erythema surrounding the wound VAC site.  1×1 cm wound VAC sponge in place.  Approximately 3 cc of serosanguineous output in the past 24 hours and the wound VAC canister.      Assessment:   57 year old male with the followin. Right thigh high-grade soft tissue sarcoma, now 2.5 weeks status post irrigation debridement of infection while neutropenic from chemotherapy.      Recommendations:  1. We are very happy that his infection appears to be  controlled/resolved at this point.  As his pain is minimal and it sounds like his wound is healing, we will continue to plan on wound VAC changes.  2. Patient will follow up with us in 1 month's time    Seen with Dr. Betts, documentation by:    Maurice Womack MD  Orthopaedic Surgery PGY-4

## 2018-04-24 NOTE — PROGRESS NOTES
Diagnosis: High-grade soft tissue sarcoma right thigh      Treatment:   1. Neoadjuvant chemotherapy planned.   2.  18 Irrigation and debridement right thigh sarcoma for infection        Subjective:  Returns today on the final day of his antibiotic dosing.  Has been taking Bactrim as well as Augmentin with good resolution of his erythema.  Pain is minimal at this point.  Having 2x/wk wound VAC changes, he reports the nurse says the wound appears healthy.      Physical Examination:    Gen:  Alert, no acute distress, well nourished, appears stated age    Psych:  Normal affect, nonpressured speech    Musculoskeletal:  Right thigh with no erythema surrounding the wound VAC site.  1×1 cm wound VAC sponge in place.  Approximately 3 cc of serosanguineous output in the past 24 hours and the wound VAC canister.      Assessment:   57 year old male with the followin. Right thigh high-grade soft tissue sarcoma, now 2.5 weeks status post irrigation debridement of infection while neutropenic from chemotherapy.      Recommendations:  1. We are very happy that his infection appears to be controlled/resolved at this point.  As his pain is minimal and it sounds like his wound is healing, we will continue to plan on wound VAC changes.  2. Patient will follow up with us in 1 month's time    Seen with Dr. Betts, documentation by:    Maurice Womack MD  Orthopaedic Surgery PGY-4      Answers for HPI/ROS submitted by the patient on 2018   General Symptoms: Yes  Skin Symptoms: Yes  HENT Symptoms: Yes  EYE SYMPTOMS: No  HEART SYMPTOMS: No  LUNG SYMPTOMS: No  INTESTINAL SYMPTOMS: Yes  URINARY SYMPTOMS: No  REPRODUCTIVE SYMPTOMS: No  SKELETAL SYMPTOMS: No  BLOOD SYMPTOMS: No  NERVOUS SYSTEM SYMPTOMS: No  MENTAL HEALTH SYMPTOMS: No  Loss of appetite: Yes  Weight loss: Yes  Changes in hair: Yes  Change in taste: Yes  Vomiting: Yes

## 2018-04-24 NOTE — NURSING NOTE
Chief Complaint   Patient presents with     Wound Check     POP F/u Irrigation And Debridement Right Thigh Wound. with Wound VAC Exchange DOS: 4/9/18       57 year old  1960                 [facility] [address] [place] [address] - [address] AT [facility] OF  [address]  [facility] [place], [place] - [address]    No Known Allergies  Current Outpatient Prescriptions   Medication     nystatin (MYCOSTATIN) 226820 UNIT/ML suspension     oxyCODONE IR (ROXICODONE) 5 MG tablet     PROCHLORPERAZINE MALEATE PO     Rivaroxaban 15 & 20 MG TBPK     sulfamethoxazole-trimethoprim (BACTRIM DS/SEPTRA DS) 800-160 MG per tablet     amoxicillin-clavulanate (AUGMENTIN) 500-125 MG per tablet     granisetron (KYTRIL) 1 MG tablet     No current facility-administered medications for this visit.      Facility-Administered Medications Ordered in Other Visits   Medication     Ifosfamide (IFEX) 3.33 g, mesna (MESNEX) 3.33 g in sodium chloride 0.9 % 720 mL Home Infusion Chemotherapy

## 2018-04-24 NOTE — NURSING NOTE
Pt requesting VPT after this RN was unable to access Port-a-Cath.     Chief Complaint   Patient presents with     Blood Draw     Labs drawn by RN via Right Arm VPT.      Rashmi Adair RN

## 2018-04-24 NOTE — MR AVS SNAPSHOT
After Visit Summary   4/24/2018    Hiram Tapia    MRN: [id number]           Patient Information     Date Of Birth          1960        Visit Information        Provider Department      4/24/2018 2:45 PM Brad Betts MD [facility] Orthopaedic [facility]        Today's Diagnoses     Postoperative wound infection, subsequent encounter    -  1       Follow-ups after your visit        Your next 10 appointments already scheduled     May 22, 2018 10:30 AM CDT   [facility] Lab Draw with [facility] LAB DRAW   [facility] Lab Draw ([facility])    [address] [place] [address]  [address]  [facility] [address]   [phone]            May 22, 2018 11:00 AM CDT   (Arrive by 10:45 AM)   Return Visit with Gaurang Johnson MD   [facility] [place] ([facility])    [address] [place] [address]  [address]  [facility] [address]   [phone]            May 22, 2018 12:30 PM CDT   Infusion 180 with UC ONCOLOGY INFUSION, UC 30 ATC   [facility])    [address] [place] [address]  [address]  [facility] [address]   [phone]            Jun 19, 2018  9:00 AM CDT   [facility] Lab Draw with [facility] LAB DRAW   [facility] Draw ([facility])    [address] [place] [address]  [address]  [facility] [address]   [phone]            Jun 19, 2018  9:30 AM CDT   (Arrive by 9:15 AM)   Return Visit with Gaurang Johnson MD   [facility] ([facility])    [address] [place] [address]  [address]  [facility] [address]   [phone]            Jun 19, 2018 10:00 AM CDT   Infusion 180 with UC ONCOLOGY INFUSION, UC 17 ATC   [facility] ([facility])    [address] [place] [address]  [address]  [facility] [address]   [phone]            Jul 17, 2018  8:00 AM CDT   [facility] Lab Draw with [facility]  LAB DRAW   [facility] Lab Draw ([facility])    [address]  [address]  [facility] [id number] [phone]            2018  8:30 AM CDT   (Arrive by 8:15 AM)   Return Visit with Gaurang Johnson MD   [facility] ([facility])    [address]  [address]  [facility] [id number] [phone]              Who to contact     Please call your clinic at [phone] to:    Ask questions about your health    Make or cancel appointments    Discuss your medicines    Learn about your test results    Speak to your doctor            Additional Information About Your Visit        MyChart Information     [employer] is an electronic gateway that provides easy, online access to your medical records. With [employer], you can request a clinic appointment, read your test results, renew a prescription or communicate with your care team.     To sign up for [employer] visit the website at [email or web address]   You will be asked to enter the access code listed below, as well as some personal information. Please follow the directions to create your username and password.     Your access code is: [email or web address]  Expires: 2018  7:30 AM     Your access code will  in 90 days. If you need help or a new code, please contact your [facility] or call [phone] for assistance.        Care EveryWhere ID     This is your Care EveryWhere ID. This could be used by other organizations to access your [place] medical records  [id number]         Blood Pressure from Last 3 Encounters:   18 108/74   04/10/18 112/67   18 119/85    Weight from Last 3 Encounters:   18 89.8 kg (198 lb)   18 94.3 kg (208 lb)   18 97.3 kg (214 lb 9.6 oz)              Today, you had the following     No orders found for display         Today's Medication Changes          These changes are  accurate as of 4/24/18  3:35 PM.  If you have any questions, ask your nurse or doctor.               Start taking these medicines.        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), Pain of right lower extremity, Sarcoma of soft tissue (H)   Started by:  Gaurang Johnson MD        Dose:  675885 Units   Take 5 mLs (500,000 Units) by mouth 4 times daily   Quantity:  473 mL   Refills:  3       Rivaroxaban 15 & 20 MG Tbpk   Used for:  Sarcoma (H), Thrush, Other pulmonary embolism without acute cor pulmonale, unspecified chronicity (H), Postoperative wound infection, subsequent encounter, 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), Pain of right lower extremity, Sarcoma of soft tissue (H)   Started by:  Gaurang Johnson MD        Dose:  15 mg   Take 15 mg by mouth 2 times daily Take 15 mg twice a day for 2 weeks then 20 mg once a day   Quantity:  51 each   Refills:  1            Where to get your medicines      These medications were sent to [place] [address] [facility] [address] [place] [address] [address] [address]    Hours:  TRANSPLANT PHONE NUMBER [phone] Phone:  [phone]     nystatin 243060 UNIT/ML suspension    Rivaroxaban 15 & 20 MG Tbpk                Primary Care Provider Office Phone # Fax #    Louisa Fry [phone]       [address]  [facility] [address]        Equal Access to Services     [facility] AH: Hadii troy martinez hadmary Sonikita, waaxda luqadaha, qaybta kaalmada adeegyada, mendez mcnair. So [facility] [phone].    ATENCIÓN: Si habla español, tiene a sheridan disposición servicios gratuitos de asistencia lingüística. Llame al  [phone].    We comply with applicable federal civil rights laws and [place] laws. We do not discriminate on the basis of race, color, national origin, age, disability, sex, sexual orientation, or gender identity.            Thank you!     Thank you for choosing [facility]TH[facility]  for your care. Our goal is always to provide you with excellent care. Hearing back from our patients is one way we can continue to improve our services. Please take a few minutes to complete the written survey that you may receive in the mail after your visit with us. Thank you!             Your Updated Medication List - Protect others around you: Learn how to safely use, store and throw away your medicines at www.disposemymeds.org.          This list is accurate as of 4/24/18  3:35 PM.  Always use your most recent med list.                   Brand Name Dispense Instructions for use Diagnosis    amoxicillin-clavulanate 500-125 MG per tablet    AUGMENTIN    42 tablet    Take 1 tablet by mouth 3 times daily for 14 days    Postoperative wound infection, subsequent encounter       granisetron 1 MG tablet    KYTRIL    30 tablet    Take 1 tablet (1 mg) by mouth every 12 hours as needed for nausea    Sarcoma of soft tissue (H)       nystatin 771560 UNIT/ML suspension    MYCOSTATIN    473 mL    Take 5 mLs (500,000 Units) by mouth 4 times daily    Sarcoma (H), Thrush, Other pulmonary embolism without acute cor pulmonale, unspecified chronicity (H), Postoperative wound infection, subsequent encounter, 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

## 2018-04-25 ENCOUNTER — HOME INFUSION (PRE-WILLOW HOME INFUSION) (OUTPATIENT)
Dept: PHARMACY | Facility: CLINIC | Age: 58
End: 2018-04-25

## 2018-04-25 NOTE — PROGRESS NOTES
This is a recent snapshot of the patient's [facility] medical record.  For current drug dose and complete information and questions, call [phone] or In Basket pool, [facility] home infusion ([id number])  CSN Number:  [id number]

## 2018-04-25 NOTE — PROGRESS NOTES
[facility] contacted infusion center to inform us Mesna pump change and disconnect will need to be done at infusion center.  Notified Rubens Mckeon, DALILACC, who will investigate barrier to home infusion services and get pt scheduled at infusion center if appropriate.  She will also arrange for labs as ordered on day 4, 6 and 8 at local hospital if unable to obtain home infusion services.  She will update pt with plan.    Mini Harris RN

## 2018-04-26 NOTE — PROGRESS NOTES
This is a recent snapshot of the patient's [facility] medical record.  For current drug dose and complete information and questions, call [phone] or In [facility] pool, fv home infusion ([id number])  CSN Number:  [id number]

## 2018-04-27 ENCOUNTER — TRANSFERRED RECORDS (OUTPATIENT)
Dept: HEALTH INFORMATION MANAGEMENT | Facility: CLINIC | Age: 58
End: 2018-04-27

## 2018-04-27 ENCOUNTER — CARE COORDINATION (OUTPATIENT)
Dept: ONCOLOGY | Facility: CLINIC | Age: 58
End: 2018-04-27

## 2018-04-27 ENCOUNTER — HOME INFUSION (PRE-WILLOW HOME INFUSION) (OUTPATIENT)
Dept: PHARMACY | Facility: CLINIC | Age: 58
End: 2018-04-27

## 2018-04-27 DIAGNOSIS — C49.21 SOFT TISSUE SARCOMA OF RIGHT THIGH (H): Primary | ICD-10-CM

## 2018-04-27 LAB
ALBUMIN SERPL-MCNC: 2.4 G/DL
ALP SERPL-CCNC: 133 U/L
ALT SERPL-CCNC: 16 U/L
ANION GAP SERPL CALCULATED.3IONS-SCNC: ABNORMAL MMOL/L
AST SERPL-CCNC: 13 U/L
BILIRUB SERPL-MCNC: 0.3 MG/DL
BUN SERPL-MCNC: 11 MG/DL
CALCIUM SERPL-MCNC: 9.9 MG/DL
CHLORIDE SERPLBLD-SCNC: 96 MMOL/L
CO2 SERPL-SCNC: ABNORMAL MMOL/L
CREAT SERPL-MCNC: 0.71 MG/DL
GFR SERPL CREATININE-BSD FRML MDRD: >60 ML/MIN/1.73M2
GLOBULIN: 4.7 G/DL
GLUCOSE SERPL-MCNC: 122 MG/DL (ref 70–99)
HCO3 BLD-SCNC: 29 MMOL/L
MAGNESIUM SERPL-MCNC: 2.3 MG/DL
PHOSPHATE SERPL-MCNC: 3.6 MG/DL
POTASSIUM SERPL-SCNC: 4.1 MMOL/L
PROT SERPL-MCNC: ABNORMAL G/DL
SODIUM SERPL-SCNC: 133 MMOL/L

## 2018-04-27 NOTE — PROGRESS NOTES
Spoke to Roslyn regarding the appointments for the ifos/ mesna exchanges as well as labs.  Let her know that David will need labs checked for this cycle on 4/27, 4/29, and 5/1.  Orders for those labs have been faxed to [facility] at [phone].  They will need to come here on 4/30 for ifos disconnect/mesna exchange as well as 5/1 for labs, pump disconnect and neulasta.  They have coverage through [facility], but David is currently getting homecare for his wound vac and they would have to find another agency that covers both.  They are happy to get labs in [place] and come here for the disconnects.      David so far has been doing great this cycle.  He is taking his scheduled antiemetics as ordered and so far no nausea.  Advised them to continue and if he has a rough weekend, then we can look at getting some IV fluids.  Roslyn verbalized understanding of the plan of care.

## 2018-04-29 ENCOUNTER — TRANSFERRED RECORDS (OUTPATIENT)
Dept: HEALTH INFORMATION MANAGEMENT | Facility: CLINIC | Age: 58
End: 2018-04-29

## 2018-04-29 LAB
ALBUMIN SERPL-MCNC: 2.6 G/DL
ALP SERPL-CCNC: 133 U/L
ALT SERPL-CCNC: 18 U/L
ANION GAP SERPL CALCULATED.3IONS-SCNC: ABNORMAL MMOL/L
AST SERPL-CCNC: 19 U/L
BILIRUB SERPL-MCNC: 0.3 MG/DL
BUN SERPL-MCNC: 17 MG/DL
CALCIUM SERPL-MCNC: 10 MG/DL
CHLORIDE SERPLBLD-SCNC: 97 MMOL/L
CO2 SERPL-SCNC: ABNORMAL MMOL/L
CREAT SERPL-MCNC: 0.8 MG/DL
GFR SERPL CREATININE-BSD FRML MDRD: >60 ML/MIN/1.73M2
GLOBULIN: 4.6 G/DL
GLUCOSE SERPL-MCNC: 161 MG/DL (ref 70–99)
HCO3 BLD-SCNC: 27 MMOL/L
MAGNESIUM SERPL-MCNC: 2.4 MG/DL
PHOSPHATE SERPL-MCNC: 3.5 MG/DL
POTASSIUM SERPL-SCNC: 4.2 MMOL/L
PROT SERPL-MCNC: 7.2 G/DL
SODIUM SERPL-SCNC: 133 MMOL/L

## 2018-04-30 ENCOUNTER — INFUSION THERAPY VISIT (OUTPATIENT)
Dept: ONCOLOGY | Facility: CLINIC | Age: 58
End: 2018-04-30
Attending: INTERNAL MEDICINE
Payer: COMMERCIAL

## 2018-04-30 VITALS
TEMPERATURE: 98.4 F | OXYGEN SATURATION: 97 % | HEART RATE: 90 BPM | DIASTOLIC BLOOD PRESSURE: 81 MMHG | SYSTOLIC BLOOD PRESSURE: 128 MMHG

## 2018-04-30 DIAGNOSIS — C49.21 SOFT TISSUE SARCOMA OF RIGHT THIGH (H): ICD-10-CM

## 2018-04-30 DIAGNOSIS — C49.9 SARCOMA OF SOFT TISSUE (H): Primary | ICD-10-CM

## 2018-04-30 LAB
ANION GAP SERPL CALCULATED.3IONS-SCNC: 9 MMOL/L (ref 3–14)
BUN SERPL-MCNC: 17 MG/DL (ref 7–30)
CALCIUM SERPL-MCNC: 9.3 MG/DL (ref 8.5–10.1)
CHLORIDE SERPL-SCNC: 101 MMOL/L (ref 94–109)
CO2 SERPL-SCNC: 25 MMOL/L (ref 20–32)
CREAT SERPL-MCNC: 0.7 MG/DL (ref 0.66–1.25)
GFR SERPL CREATININE-BSD FRML MDRD: >90 ML/MIN/1.7M2
GLUCOSE SERPL-MCNC: 110 MG/DL (ref 70–99)
POTASSIUM SERPL-SCNC: 3.5 MMOL/L (ref 3.4–5.3)
SODIUM SERPL-SCNC: 135 MMOL/L (ref 133–144)

## 2018-04-30 PROCEDURE — 96361 HYDRATE IV INFUSION ADD-ON: CPT

## 2018-04-30 PROCEDURE — 80048 BASIC METABOLIC PNL TOTAL CA: CPT | Performed by: INTERNAL MEDICINE

## 2018-04-30 PROCEDURE — G0498 CHEMO EXTEND IV INFUS W/PUMP: HCPCS

## 2018-04-30 PROCEDURE — 96375 TX/PRO/DX INJ NEW DRUG ADDON: CPT

## 2018-04-30 PROCEDURE — 25000128 H RX IP 250 OP 636: Mod: ZF | Performed by: INTERNAL MEDICINE

## 2018-04-30 PROCEDURE — 96365 THER/PROPH/DIAG IV INF INIT: CPT

## 2018-04-30 PROCEDURE — G0463 HOSPITAL OUTPT CLINIC VISIT: HCPCS | Mod: 25

## 2018-04-30 RX ORDER — PALONOSETRON 0.05 MG/ML
0.25 INJECTION, SOLUTION INTRAVENOUS ONCE
Status: COMPLETED | OUTPATIENT
Start: 2018-04-30 | End: 2018-04-30

## 2018-04-30 RX ADMIN — SODIUM CHLORIDE 1500 ML: 9 INJECTION, SOLUTION INTRAVENOUS at 14:52

## 2018-04-30 RX ADMIN — DEXAMETHASONE SODIUM PHOSPHATE 10 MG: 4 INJECTION, SOLUTION INTRA-ARTICULAR; INTRALESIONAL; INTRAMUSCULAR; INTRAVENOUS; SOFT TISSUE at 15:09

## 2018-04-30 RX ADMIN — PALONOSETRON HYDROCHLORIDE 0.25 MG: 0.25 INJECTION INTRAVENOUS at 14:56

## 2018-04-30 NOTE — MR AVS SNAPSHOT
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   If you are having any concerning symptoms or wish to speak to a provider before your next infusion visit, please call your care coordinator or triage to notify them so we can adequately serve you.   Triage Nurse Line: [phone]    If after hours, weekends, or holidays, call main hospital  and ask for Oncology doctor on call @ [phone]         Do not take Ibuprofen and Indomethacin together. Do not take Kytril for 3 days.        April 2018 Sunday Monday Tuesday Wednesday Thursday Friday Saturday   1     2     LAB   10:45 AM   (15 min.)    LAB HOME INFUSION   [facility] Lab 3     4     P ONC INFUSION 60   10:30 AM   (60 min.)    ONCOLOGY INFUSION   [facility] 5     [facility] ONC INFUSION 60   10:30 AM   (60 min.)    [facility]   11:45 AM   (30 min.)   Cuco Schaefer MD   [facility]     Admission    5:00 PM   Brad Betts MD   UR 8A   (Discharge: 4/10/2018) 6     COMBINED IRRIGATION AND DEBRIDEMENT LOWER EXTREMITY    7:00 AM   Brad Betts MD   UR OR 7       8     9     COMBINED IRRIGATION AND DEBRIDEMENT LOWER EXTREMITY    3:00 PM   Brad Betts  MD Fabien   UR OR 10     11     12     13     14       15     16     17     18     19     20     21     PET ONCOLOGY WHOLE BODY    8:30 AM   (45 min.)   UUPET1   [facility] [place] PET CT   22     23     24     [facility] DRAW    8:30 AM   (15 min.)   [facility]     [facility] RETURN    8:45 AM   (30 min.)   Gaurang Johnson MD   [facility]     UMP ONC INFUSION 180    9:30 AM   (180 min.)   UC ONCOLOGY INFUSION   [facility]     UMP RETURN    2:30 PM   (15 min.)   Brad Betts MD   [facility] 25     26     27     28       29     30     [facility] ONC INFUSION 60    2:00 PM   (60 min.)   UC ONCOLOGY INFUSION   [facility]                                     May 2018   Tyler Monday Tuesday Wednesday Thursday Friday Saturday             1     [facility] ONC INFUSION 60    2:00 PM   (60 min.)   UC ONCOLOGY INFUSION   [facility] 2     3     4     5       6     7     8     9     10     11     12       13     14     15     16     17     18     19       20     21     22     [facility] DRAW   10:30 AM   (15 min.)    [facility] LAB DRAW   [facility] Lab Draw     UMP RETURN   10:45 AM   (30 min.)   Gaurang Johnson MD   [facility]     [facility] ONC INFUSION 180   12:30 PM   (180 min.)   UC ONCOLOGY INFUSION   [facility] 23     24     25     26       27     28     29     30     31                            Lab Results:  Recent Results (from the past 12 hour(s))   Basic metabolic panel    Collection Time: 04/30/18  2:45 PM   Result Value Ref Range    Sodium 135 133 - 144 mmol/L    Potassium 3.5 3.4 - 5.3 mmol/L    Chloride 101 94 - 109 mmol/L    Carbon Dioxide 25 20 - 32 mmol/L    Anion Gap 9 3 - 14 mmol/L    Glucose 110 (H) 70 - 99 mg/dL    Urea Nitrogen 17 7 - 30 mg/dL    Creatinine 0.70 0.66 - 1.25 mg/dL    GFR Estimate >90 >60 mL/min/1.7m2     GFR Estimate If Black >90 >60 mL/min/1.7m2    Calcium 9.3 8.5 - 10.1 mg/dL                Follow-ups after your visit        Your next 10 appointments already scheduled     May 01, 2018  2:00 PM CDT   Infusion 60 with UC ONCOLOGY INFUSION, UC 24 ATC   [facility] ([facility])    [address]  [address]  [facility] [address]   [phone]            May 22, 2018 10:30 AM CDT   [facility] Lab Draw with [facility] LAB DRAW   [facility] Lab Draw ([facility])    [address]  [address]  [place] [address]   [phone]            May 22, 2018 11:00 AM CDT   (Arrive by 10:45 AM)   Return Visit with Gaurang Johnson MD   [facility] ([facility])    [address] [place] [address]  [address]  [facility] [address]   [phone]            May 22, 2018 12:30 PM CDT   Infusion 180 with UC ONCOLOGY INFUSION, UC 30 ATC   [facility] ([facility])    [address]  [address]  [place] [address]   [phone]            Jun 19, 2018  9:00 AM CDT   [facility] Lab Draw with [facility] LAB DRAW   [facility] Lab Draw ([facility])    [address]  [address]  [facility] [address]   [phone]            Jun 19, 2018  9:30 AM CDT   (Arrive by 9:15 AM)   Return Visit with Gaurang Johnson MD   [facility] ([facility])    [address] [place] [address]  [address]  [facility] [address]   [phone]            Jun 19, 2018 10:00 AM CDT   Infusion 180 with UC ONCOLOGY INFUSION, UC 17 ATC   [facility] ([facility])    [address] [place] [address]  [address]  [facility] [address]   [phone]              Who to contact     If you have questions or need follow up information about today's clinic visit or your  "schedule please contact [facility] directly at [phone].  Normal or non-critical lab and imaging results will be communicated to you by MyChart, letter or phone within 4 business days after the clinic has received the results. If you do not hear from us within 7 days, please contact the clinic through MyChart or phone. If you have a critical or abnormal lab result, we will notify you by phone as soon as possible.  Submit refill requests through [employer] or call your pharmacy and they will forward the refill request to us. Please allow 3 business days for your refill to be completed.          Additional Information About Your Visit        [employer] Information     [employer] lets you send messages to your doctor, view your test results, renew your prescriptions, schedule appointments and more. To sign up, go to [email or web address] . Click on \"Log in\" on the left side of the screen, which will take you to the Welcome page. Then click on \"Sign up Now\" on the right side of the page.     You will be asked to enter the access code listed below, as well as some personal information. Please follow the directions to create your username and password.     Your access code is: [email or web address]  Expires: 2018  7:30 AM     Your access code will  in 90 days. If you need help or a new code, please call your [place] clinic or [phone].        Care EveryWhere ID     This is your Care EveryWhere ID. This could be used by other organizations to access your [place] medical records  [id number]        Your Vitals Were     Pulse Temperature Pulse Oximetry             90 98.4  F (36.9  C) 97%          Blood Pressure from Last 3 Encounters:   18 128/81   18 108/74   04/10/18 112/67    Weight from Last 3 Encounters:   18 89.8 kg (198 lb)   18 94.3 kg (208 lb)   18 97.3 kg (214 lb 9.6 oz)              We Performed the Following     Basic metabolic panel        Primary Care " Provider Office Phone # Fax #    Louisa Fry [phone]       [facility] [address]  [place] [address]        Equal Access to Services     DORIAN TOLENTINO : Juarez troy martinez kiannao Sokvngali, wacorettada luqadaha, qaybta kaalmada cydney, mendez no nanettepippa may laRiccooren mcnair. So [facility] [phone].    ATENCIÓN: Si habla español, tiene a sheridan disposición servicios gratuitos de asistencia lingüística. Llame al [phone].    We comply with applicable federal civil rights laws and [place] laws. We do not discriminate on the basis of race, color, national origin, age, disability, sex, sexual orientation, or gender identity.            Thank you!     Thank you for choosing [facility]  for your care. Our goal is always to provide you with excellent care. Hearing back from our patients is one way we can continue to improve our services. Please take a few minutes to complete the written survey that you may receive in the mail after your visit with us. Thank you!             Your Updated Medication List - Protect others around you: Learn how to safely use, store and throw away your medicines at www.disposemymeds.org.          This list is accurate as of 4/30/18  3:58 PM.  Always use your most recent med list.                   Brand Name Dispense Instructions for use Diagnosis    granisetron 1 MG tablet    KYTRIL    30 tablet    Take 1 tablet (1 mg) by mouth every 12 hours as needed for nausea    Sarcoma of soft tissue (H)       IBUPROFEN PO      Take 800 mg by mouth every 8 hours as needed for moderate pain        INDOMETHACIN PO      Take 50 mg by mouth 3 times daily as needed for moderate pain (2-3 times a day with food)        nystatin [id number] UNIT/ML suspension    MYCOSTATIN    473 mL    Take 5 mLs (500,000 Units) by mouth 4 times daily    Sarcoma (H), Thrush, Other pulmonary embolism without acute cor pulmonale, unspecified chronicity (H), Postoperative wound infection,  subsequent encounter, 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

## 2018-04-30 NOTE — PATIENT INSTRUCTIONS
[facility] Main Line: [phone]    Call triage nurse with chills and/or temperature greater than or equal to 100.4, uncontrolled nausea/vomiting, diarrhea, constipation, dizziness, shortness of breath, chest pain, bleeding, unexplained bruising, or any new/concerning symptoms, questions/concerns.   If you are having any concerning symptoms or wish to speak to a provider before your next infusion visit, please call your care coordinator or triage to notify them so we can adequately serve you.   Triage Nurse Line: [phone]    If after hours, weekends, or holidays, call main hospital  and ask for Oncology doctor on call @ [phone]         Do not take Ibuprofen and Indomethacin together. Do not take Kytril for 3 days.        April 2018 Sunday Monday Tuesday Wednesday Thursday Friday Saturday   1     2     LAB   10:45 AM   (15 min.)    LAB HOME INFUSION   [facility] Lab 3     4     [facility] ONC INFUSION 60   10:30 AM   (60 min.)    ONCOLOGY INFUSION   [facility] 5     [facility] ONC INFUSION 60   10:30 AM   (60 min.)    ONCOLOGY INFUSION   [facility] NEW TUMOR   11:45 AM   (30 min.)   Cuco Schaefer MD   [facility]     Admission    5:00 PM   Brad Betts MD   UR 8A   (Discharge: 4/10/2018) 6     COMBINED IRRIGATION AND DEBRIDEMENT LOWER EXTREMITY    7:00 AM   Brad Betts MD   UR OR 7       8     9     COMBINED IRRIGATION AND DEBRIDEMENT LOWER EXTREMITY    3:00 PM   Brad Betts MD   UR OR 10     11     12     13     14       15     16     17     18     19     20     21     PET ONCOLOGY WHOLE BODY    8:30 AM   (45 min.)   UUPET1   [facility] [place] PET CT   22     23     24     [facility] LAB DRAW    8:30 AM   (15 min.)   [facility] LAB DRAW   [facility] Lab Draw     [facility] RETURN    8:45 AM   (30 min.)   Gaurang Johnson MD   [facility] ONC INFUSION 180     9:30 AM   (180 min.)   UC ONCOLOGY INFUSION   [facility]     UMP RETURN    2:30 PM   (15 min.)   Brad Betts MD   [facility] 25     26     27     28       29     30     [facility] ONC INFUSION 60    2:00 PM   (60 min.)   UC ONCOLOGY INFUSION   [facility]                                     May 2018   [place]yler Monday Tuesday Wednesday Thursday Friday Saturday             1     [facility] ONC INFUSION 60    2:00 PM   (60 min.)   UC ONCOLOGY INFUSION   [facility] 2     3     4     5       6     7     8     9     10     11     12       13     14     15     16     17     18     19       20     21     22     [facility] LAB DRAW   10:30 AM   (15 min.)   [facility] LAB DRAW   [facility] Lab Draw     UMP RETURN   10:45 AM   (30 min.)   Gaurang Johnson MD   [facility]     [facility] ONC INFUSION 180   12:30 PM   (180 min.)    ONCOLOGY INFUSION   [facility] 23     24     25     26       27     28     29     30     31                            Lab Results:  Recent Results (from the past 12 hour(s))   Basic metabolic panel    Collection Time: 04/30/18  2:45 PM   Result Value Ref Range    Sodium 135 133 - 144 mmol/L    Potassium 3.5 3.4 - 5.3 mmol/L    Chloride 101 94 - 109 mmol/L    Carbon Dioxide 25 20 - 32 mmol/L    Anion Gap 9 3 - 14 mmol/L    Glucose 110 (H) 70 - 99 mg/dL    Urea Nitrogen 17 7 - 30 mg/dL    Creatinine 0.70 0.66 - 1.25 mg/dL    GFR Estimate >90 >60 mL/min/1.7m2    GFR Estimate If Black >90 >60 mL/min/1.7m2    Calcium 9.3 8.5 - 10.1 mg/dL

## 2018-04-30 NOTE — PROGRESS NOTES
Infusion Nursing Note:  Hiram Tapia presents today for Mesna bag change/IV fluids/Antiemetics.    Patient seen by provider today: No   present during visit today: Not Applicable.    Note: Patient presented to clinic with complaint of lots of nausea and vomiting for 2 days. Increased pain from gout site right top of foot. Patient wouldn't verbalize pain rating but appeared to be in distress, tearful at times. Patient has been taking ibuprofen for pain but it is not taking the pain away. He was reluctant to take indamethacin. Reviewed with patient and his wife to not take ibuprofen and indamethacin together. Ice applied to foot. Patient stated that helped. Feeling better after icing, IV fluids and Antiemetics.     TORB: Dr Johnson/Lee Varghese RN  -Give Aloxi 5ml=0.25mg IV today  -Give Dexamethasone 10mg IV today  -Give 1.5 Liters 0.9% NS IV today  -Ok to take Indamethacin as previously prescribed by General Practice Physician.   -Draw BMP today.    Intravenous Access:  Implanted Port.    Treatment Conditions:  Lab Results   Component Value Date     04/30/2018                   Lab Results   Component Value Date    POTASSIUM 3.5 04/30/2018           Lab Results   Component Value Date    MAG 2.4 04/29/2018            Lab Results   Component Value Date    CR 0.70 04/30/2018                   Lab Results   Component Value Date    JAYESH 9.3 04/30/2018                Lab Results   Component Value Date    BILITOTAL 0.3 04/29/2018           Lab Results   Component Value Date    ALBUMIN 2.6 04/29/2018                    Lab Results   Component Value Date    ALT 18 04/29/2018           Lab Results   Component Value Date    AST 19 04/29/2018       Results reviewed.    Post Infusion Assessment:  Patient tolerated infusion without incident.  Blood return noted pre and post infusion.  Site patent and intact, free from redness, edema or discomfort.  No evidence of extravasations.      Discharge Plan:   Patient  "declined prescription refills.  Discharge instructions reviewed with: Patient and wife.  Patient and/or family verbalized understanding of discharge instructions and all questions answered.  Copy of AVS reviewed with patient and/or family.  Patient will return 5/2/18 for next appointment.  Patient discharged in stable condition accompanied by: self and wife.  Departure Mode: Wheelchair.  Face to Face time: 10.      Prior to discharge: Port is secured in place with tegaderm and flushed with 10cc NS with positive blood return noted.  Continuous home infusion CADD pump connected.    All connectors secured in place and clamps taped open, checked by Cleopatra Harp RN.    Pump started, \"running\" noted on display (CADD).  Patient instructed to call our clinic or [facility] with any questions or concerns at home.  Patient verbalized understanding.    Patient set up for pump disconnect at our clinic on 5/1/18.     Lee Varghese RN                        "

## 2018-05-01 ENCOUNTER — APPOINTMENT (OUTPATIENT)
Dept: LAB | Facility: CLINIC | Age: 58
End: 2018-05-01
Attending: INTERNAL MEDICINE
Payer: COMMERCIAL

## 2018-05-01 ENCOUNTER — INFUSION THERAPY VISIT (OUTPATIENT)
Dept: ONCOLOGY | Facility: CLINIC | Age: 58
End: 2018-05-01
Attending: INTERNAL MEDICINE
Payer: COMMERCIAL

## 2018-05-01 ENCOUNTER — CARE COORDINATION (OUTPATIENT)
Dept: ONCOLOGY | Facility: CLINIC | Age: 58
End: 2018-05-01

## 2018-05-01 VITALS
TEMPERATURE: 97.9 F | BODY MASS INDEX: 29.06 KG/M2 | HEART RATE: 102 BPM | SYSTOLIC BLOOD PRESSURE: 116 MMHG | RESPIRATION RATE: 18 BRPM | WEIGHT: 202.5 LBS | OXYGEN SATURATION: 98 % | DIASTOLIC BLOOD PRESSURE: 82 MMHG

## 2018-05-01 DIAGNOSIS — C49.9 SARCOMA OF SOFT TISSUE (H): ICD-10-CM

## 2018-05-01 DIAGNOSIS — C49.21 SOFT TISSUE SARCOMA OF RIGHT THIGH (H): Primary | ICD-10-CM

## 2018-05-01 LAB
ANION GAP SERPL CALCULATED.3IONS-SCNC: 9 MMOL/L (ref 3–14)
BUN SERPL-MCNC: 14 MG/DL (ref 7–30)
CALCIUM SERPL-MCNC: 9.2 MG/DL (ref 8.5–10.1)
CHLORIDE SERPL-SCNC: 102 MMOL/L (ref 94–109)
CO2 SERPL-SCNC: 23 MMOL/L (ref 20–32)
CREAT SERPL-MCNC: 0.69 MG/DL (ref 0.66–1.25)
GFR SERPL CREATININE-BSD FRML MDRD: >90 ML/MIN/1.7M2
GLUCOSE SERPL-MCNC: 105 MG/DL (ref 70–99)
MAGNESIUM SERPL-MCNC: 2.4 MG/DL (ref 1.6–2.3)
PHOSPHATE SERPL-MCNC: 2.1 MG/DL (ref 2.5–4.5)
POTASSIUM SERPL-SCNC: 3.3 MMOL/L (ref 3.4–5.3)
SODIUM SERPL-SCNC: 135 MMOL/L (ref 133–144)

## 2018-05-01 PROCEDURE — 96361 HYDRATE IV INFUSION ADD-ON: CPT

## 2018-05-01 PROCEDURE — 25000128 H RX IP 250 OP 636: Mod: ZF | Performed by: INTERNAL MEDICINE

## 2018-05-01 PROCEDURE — G0463 HOSPITAL OUTPT CLINIC VISIT: HCPCS | Mod: 25

## 2018-05-01 PROCEDURE — 96372 THER/PROPH/DIAG INJ SC/IM: CPT | Mod: 59

## 2018-05-01 PROCEDURE — 25000125 ZZHC RX 250: Mod: ZF | Performed by: INTERNAL MEDICINE

## 2018-05-01 PROCEDURE — 96374 THER/PROPH/DIAG INJ IV PUSH: CPT

## 2018-05-01 PROCEDURE — 83735 ASSAY OF MAGNESIUM: CPT | Performed by: INTERNAL MEDICINE

## 2018-05-01 PROCEDURE — 84100 ASSAY OF PHOSPHORUS: CPT | Performed by: INTERNAL MEDICINE

## 2018-05-01 PROCEDURE — 80048 BASIC METABOLIC PNL TOTAL CA: CPT | Performed by: INTERNAL MEDICINE

## 2018-05-01 RX ORDER — HEPARIN SODIUM (PORCINE) LOCK FLUSH IV SOLN 100 UNIT/ML 100 UNIT/ML
500 SOLUTION INTRAVENOUS ONCE
Status: COMPLETED | OUTPATIENT
Start: 2018-05-01 | End: 2018-05-01

## 2018-05-01 RX ORDER — POTASSIUM CHLORIDE 1500 MG/1
20 TABLET, EXTENDED RELEASE ORAL DAILY
Qty: 5 TABLET | Refills: 0 | Status: SHIPPED | OUTPATIENT
Start: 2018-05-01 | End: 2018-06-20

## 2018-05-01 RX ADMIN — PEGFILGRASTIM 6 MG: 6 INJECTION SUBCUTANEOUS at 15:07

## 2018-05-01 RX ADMIN — SODIUM CHLORIDE 1500 ML: 9 INJECTION, SOLUTION INTRAVENOUS at 15:00

## 2018-05-01 RX ADMIN — Medication 500 UNITS: at 16:53

## 2018-05-01 RX ADMIN — FAMOTIDINE 20 MG: 20 INJECTION, SOLUTION INTRAVENOUS at 15:03

## 2018-05-01 ASSESSMENT — PAIN SCALES - GENERAL: PAINLEVEL: MILD PAIN (3)

## 2018-05-01 NOTE — PROGRESS NOTES
Infusion Nursing Note:  Hiram Tapia presents today for Mesna pump disconnect, Neulasta injection, IVF.    Patient seen by provider today: No    Note: Patient states he had emesis x3 this morning starting at 0300. Did not take any prn compazine. States that he has been able to eat a fruit cup and an ensure since then. Denies fevers, chills, lightheadedness/dizzness. States that pain in right foot is currently controlled with Indomethacin. States he took a stool softener this morning for constipation. Dr. Johnson paged with patient update and lab results.     Mesna pump disconnected. Patient denies any problems with the pump. Bag appeared empty, pump returned to drawer. Neulasta given as ordered.    TORB 5/1/18 1450 Dr. Johnson/Farzaneh Rodriguez RN: Give 1.5 L NS over 1.5 hours. Give 20 mg IV Pepcid once. Instruct patient to take OTC PPI for next four days.     TORB 5/1/18 1515 Dr. Johnson/Farzaneh Rodriguez RN: 20 mEq KCL po tablet daily for 5 days starting today for K of 3.3. Patient to have labs rechecked on Monday 5/7/18.    Genius message sent to Rubens Mckeon RNCC to coordinate labs for Monday 5/7/18 at [facility]. Patient and wife instructed on above information. Educated to take compazine as needed, drink 64 oz non-caffeinated, non-alcoholic fluid daily, take PPI and KCL as directed. Patient aware that he can take Kytril on Thursday after 1500.  Instructed to call triage for temp > 100.4, poor po intake, uncontrolled nausea/symptoms. Verbalized understanding. No other concerns at this time.     Intravenous Access:  Implanted Port.    Treatment Conditions:  Lab Results   Component Value Date     05/01/2018                   Lab Results   Component Value Date    POTASSIUM 3.3 05/01/2018           Lab Results   Component Value Date    MAG 2.4 05/01/2018            Lab Results   Component Value Date    CR 0.69 05/01/2018                   Lab Results   Component Value Date    JAYESH 9.2 05/01/2018                 Lab Results   Component Value Date    BILITOTAL 0.3 04/29/2018           Lab Results   Component Value Date    ALBUMIN 2.6 04/29/2018                    Lab Results   Component Value Date    ALT 18 04/29/2018           Lab Results   Component Value Date    AST 19 04/29/2018       Post Infusion Assessment:  Patient tolerated infusion without incident.  Patient tolerated Neulasta injection to Left lower Abdomen without incident.  Blood return noted pre and post infusion.  Access discontinued per protocol.    Discharge Plan:   Patient declined prescription refills.  Discharge instructions reviewed with: Patient and Family.  Patient and family verbalized understanding of discharge instructions and all questions answered.  Copy of AVS reviewed with patient and family.  Patient will return 5/22/18 for next infusion appointment.  Patient discharged in stable condition accompanied by: wife.  Departure Mode: Wheelchair.  Face to Face time: 5.    JOSE RICO RN

## 2018-05-01 NOTE — PATIENT INSTRUCTIONS
You can take Granisetron (Kytril) on Thursday 5/3/18 at 3 pm    Take Prochlorperazine (Compazine) every 6 hours as needed for nausea     Drink 64 oz non-caffeinated, non-alcoholic fluids daily.    Per Dr. Johnson- take an OTC proton pump inhibitor (like famotidine) for the next four days.    Take Potassium Chloride pills for 5 days starting today. You will get your labs rechecked on Monday 5/7/18.    Contact Numbers    [facility] [place] [place] Main Line: [phone]  [facility] [place] [place] Triage:  [phone]    Call triage with chills and/or temperature greater than or equal to 100.5, uncontrolled nausea/vomiting, diarrhea, constipation, dizziness, shortness of breath, chest pain, bleeding, unexplained bruising, or any new/concerning symptoms, questions/concerns.     If you are having any concerning symptoms or wish to speak to a provider before your next infusion visit, please call your care coordinator or triage to notify them so we can adequately serve you.       After Hours: [phone]    If after hours, weekends, or holidays, call main hospital  and ask for Oncology doctor on call.         May 2018   Tyler Monday Tuesday Wednesday Thursday Friday Saturday             1     [facility] ONC INFUSION 60    2:00 PM   (60 min.)    ONCOLOGY INFUSION   [facility]     LAB    5:15 PM   (15 min.)    LAB HOME INFUSION   [facility] Lab 2     3     4     5       6     7     8     9     10     11     12       13     14     15     16     17     18     19       20     21     22     [facility] LAB DRAW   10:30 AM   (15 min.)   [facility] LAB DRAW   [facility] Lab Draw     [facility] RETURN   10:45 AM   (30 min.)   Gaurang Johnson MD   [facility] ONC INFUSION 180   12:30 PM   (180 min.)    ONCOLOGY INFUSION   [facility] 23     24     25     26       27     28     29     30     31 June 2018 Sunday Monday Tuesday Wednesday Thursday Friday Saturday                             1     2       3     4     5     6     7     8     9       10     11     12     13     14     15     16       17     18     19     [facility]    9:00 AM   (15 min.)   [facility] LAB DRAW   [facility] Lab Draw     [facility] RETURN    9:15 AM   (30 min.)   Gaurang Johnson MD   [facility] ONC INFUSION 180   10:00 AM   (180 min.)    ONCOLOGY INFUSION   [facility] 20     21     22     23       24     25     26     27     28     29     30                 Recent Results (from the past 24 hour(s))   Basic metabolic panel    Collection Time: 05/01/18  2:15 PM   Result Value Ref Range    Sodium 135 133 - 144 mmol/L    Potassium 3.3 (L) 3.4 - 5.3 mmol/L    Chloride 102 94 - 109 mmol/L    Carbon Dioxide 23 20 - 32 mmol/L    Anion Gap 9 3 - 14 mmol/L    Glucose 105 (H) 70 - 99 mg/dL    Urea Nitrogen 14 7 - 30 mg/dL    Creatinine 0.69 0.66 - 1.25 mg/dL    GFR Estimate >90 >60 mL/min/1.7m2    GFR Estimate If Black >90 >60 mL/min/1.7m2    Calcium 9.2 8.5 - 10.1 mg/dL   Magnesium    Collection Time: 05/01/18  2:15 PM   Result Value Ref Range    Magnesium 2.4 (H) 1.6 - 2.3 mg/dL   Phosphorus    Collection Time: 05/01/18  2:15 PM   Result Value Ref Range    Phosphorus 2.1 (L) 2.5 - 4.5 mg/dL

## 2018-05-01 NOTE — MR AVS SNAPSHOT
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    Per Dr. Johnson- take an OTC proton pump inhibitor (like famotidine) for the next four days.    Take Potassium Chloride pills for 5 days starting today. You will get your labs rechecked on Monday 5/7/18.    Contact Numbers    [facility] [place] Main Line: [phone]  [facility] [place] Triage:  [phone]    Call triage with chills and/or temperature greater than or equal to 100.5, uncontrolled nausea/vomiting, diarrhea, constipation, dizziness, shortness of breath, chest pain, bleeding, unexplained bruising, or any new/concerning symptoms, questions/concerns.     If you are having any concerning symptoms or wish to speak to a provider before your next infusion visit, please call your care coordinator or triage to notify them so we can adequately serve you.       After Hours: [phone]    If after hours, weekends, or holidays, call main hospital  and ask for Oncology doctor on call.         May 2018   Tyler Monday Tuesday Wednesday Thursday Friday Saturday             1     [facility] 60    2:00 PM   (60 min.)    ONCOLOGY INFUSION   [facility]     LAB    5:15 PM   (15 min.)    LAB HOME INFUSION   [facility] Lab 2     3     4     5       6     7     8     9     10     11     12       13     14     15     16     17     18     19       20     21     22     [facility] LAB DRAW   10:30  AM   (15 min.)    [facility]RAW   [facility]     [facility] RETURN   10:45 AM   (30 min.)   Gaurang Johnson MD   [facility]     [facility] ONC INFUSION 180   12:30 PM   (180 min.)   [facility] ONCOLOGY INFUSION   [facility] 23     24     25     26       27     28     29     30     31 June 2018 Sunday Monday Tuesday Wednesday Thursday Friday Saturday                            1     2       3     4     5     6     7     8     9       10     11     12     13     14     15     16       17     18     19     [facility] LAB DRAW    9:00 AM   (15 min.)    [facility] LAB DRAW   [facility] Lab Draw     [facility] RETURN    9:15 AM   (30 min.)   Gaurang Johnson MD   [facility]   10:00 AM   (180 min.)    ONCOLOGY INFUSION   [facility] 20     21     22     23       24     25     26     27     28     29     30                 Recent Results (from the past 24 hour(s))   Basic metabolic panel    Collection Time: 05/01/18  2:15 PM   Result Value Ref Range    Sodium 135 133 - 144 mmol/L    Potassium 3.3 (L) 3.4 - 5.3 mmol/L    Chloride 102 94 - 109 mmol/L    Carbon Dioxide 23 20 - 32 mmol/L    Anion Gap 9 3 - 14 mmol/L    Glucose 105 (H) 70 - 99 mg/dL    Urea Nitrogen 14 7 - 30 mg/dL    Creatinine 0.69 0.66 - 1.25 mg/dL    GFR Estimate >90 >60 mL/min/1.7m2    GFR Estimate If Black >90 >60 mL/min/1.7m2    Calcium 9.2 8.5 - 10.1 mg/dL   Magnesium    Collection Time: 05/01/18  2:15 PM   Result Value Ref Range    Magnesium 2.4 (H) 1.6 - 2.3 mg/dL   Phosphorus    Collection Time: 05/01/18  2:15 PM   Result Value Ref Range    Phosphorus 2.1 (L) 2.5 - 4.5 mg/dL               Follow-ups after your visit        Your next 10 appointments already scheduled     May 01, 2018  5:15 PM CDT   LAB with  LAB HOME INFUSION   [facility] ([facility])    [address]  [place] [address]   [phone]           Please do not eat 10-12 hours before your appointment if you are coming in fasting for labs on lipids, cholesterol, or glucose (sugar). This does not apply to pregnant women. Water, hot tea and black coffee (with nothing added) are okay. Do not drink other fluids, diet soda or chew gum.            May 22, 2018 10:30 AM CDT   [facility] Lab Draw with [facility] DRAW   [facility] ([facility]    [address] [place] [address]  [address]  [facility] [address]   [phone]            May 22, 2018 11:00 AM CDT   (Arrive by 10:45 AM)   Return Visit with Gaurang Johnson MD   [facility] ([facility])    [address] [place] [address]  [address]  [facility] [address]   [phone]            May 22, 2018 12:30 PM CDT   Infusion 180 with UC ONCOLOGY INFUSION, UC 30 ATC   [facility] ([facility])    [address] [place] [address]  [address]  [facility] [address]   [phone]            May 22, 2018  3:45 PM CDT   (Arrive by 3:30 PM)   Return Visit with Brad Betts MD   [facility] ([facility])    [address] [place] [address]  4th Floor  [facility] [address]   [phone]            Jun 19, 2018  9:00 AM CDT   [facility] Lab Draw with [facility] LAB DRAW   [facility] ([facility])    [address] [place] [address]  [address]  [facility] [address]   [phone]            Jun 19, 2018  9:30 AM CDT   (Arrive by 9:15 AM)   Return Visit with Gaurang Johnson MD   [facility] ([facility])    [address] [place] [address]  [address]  [facility] [address]   [phone]            Jun 19, 2018 10:00 AM CDT   Infusion 180 with UC ONCOLOGY INFUSION, UC 17 ATC   [facility] ([facility])    90  "[facility]  [address] [place] [id number] [phone]              Who to contact     If you have questions or need follow up information about today's clinic visit or your schedule please contact [facility] directly at [phone].  Normal or non-critical lab and imaging results will be communicated to you by MyChart, letter or phone within 4 business days after the clinic has received the results. If you do not hear from us within 7 days, please contact the clinic through MyChart or phone. If you have a critical or abnormal lab result, we will notify you by phone as soon as possible.  Submit refill requests through [employer] or call your pharmacy and they will forward the refill request to us. Please allow 3 business days for your refill to be completed.          Additional Information About Your Visit        MyChart Information     [employer] lets you send messages to your doctor, view your test results, renew your prescriptions, schedule appointments and more. To sign up, go to [email or web address] . Click on \"Log in\" on the left side of the screen, which will take you to the Welcome page. Then click on \"Sign up Now\" on the right side of the page.     You will be asked to enter the access code listed below, as well as some personal information. Please follow the directions to create your username and password.     Your access code is: [email or web address]  Expires: 2018  7:30 AM     Your access code will  in 90 days. If you need help or a new code, please call your [facility] clinic or [phone].        Care EveryWhere ID     This is your Care EveryWhere ID. This could be used by other organizations to access your [facility] medical records  [id number]        Your Vitals Were     Pulse Temperature Respirations Pulse Oximetry BMI (Body Mass Index)       102 97.9  F (36.6  C) (Oral) 18 98% 29.06 kg/m2        Blood Pressure from Last 3 Encounters:   18 116/82   18 " 128/81   04/24/18 108/74    Weight from Last 3 Encounters:   05/01/18 91.9 kg (202 lb 8 oz)   04/24/18 89.8 kg (198 lb)   04/21/18 94.3 kg (208 lb)              We Performed the Following     Basic metabolic panel     Magnesium     Phosphorus          Today's Medication Changes          These changes are accurate as of 5/1/18  3:58 PM.  If you have any questions, ask your nurse or doctor.               Start taking these medicines.        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)        Dose:  20 mEq   Take 1 tablet (20 mEq) by mouth daily   Quantity:  5 tablet   Refills:  0            Where to get your medicines      These medications were sent to [facility] [address] [facility] [address]  [address] [place] [address] [address] [place] [address]    Hours:  TRANSPLANT PHONE NUMBER [phone] Phone:  [phone]     potassium chloride SA 20 MEQ CR tablet                Primary Care Provider Office Phone # Fax #    Louisa Fry [phone]       [facility] [address]  [facility] [address]        Equal Access to Services     DORIAN TOLENTINO AH: Juarez martinez hadasho Soomaali, waaxda luqadaha, qaybta kaalmada adeegyada, mendez mcnair. So [facility] [phone].    ATENCIÓN: Si habla español, tiene a sheridan disposición servicios gratuitos de asistencia lingüística. Llame al [phone].    We comply with applicable federal civil rights laws and [place] laws. We do not discriminate on the basis of race, color, national origin, age, disability, sex, sexual orientation, or gender identity.            Thank you!     Thank you for choosing [facility]  for your care. Our goal is always to provide you with excellent care. Hearing back from our patients is one way we can continue to improve our services. Please take a few minutes to  complete the written survey that you may receive in the mail after your visit with us. Thank you!             Your Updated Medication List - Protect others around you: Learn how to safely use, store and throw away your medicines at www.disposemymeds.org.          This list is accurate as of 5/1/18  3:58 PM.  Always use your most recent med list.                   Brand Name Dispense Instructions for use Diagnosis    granisetron 1 MG tablet    KYTRIL    30 tablet    Take 1 tablet (1 mg) by mouth every 12 hours as needed for nausea    Sarcoma of soft tissue (H)       IBUPROFEN PO      Take 800 mg by mouth every 8 hours as needed for moderate pain        INDOMETHACIN PO      Take 50 mg by mouth 3 times daily as needed for moderate pain (2-3 times a day with food)        nystatin 132283 UNIT/ML suspension    MYCOSTATIN    473 mL    Take 5 mLs (500,000 Units) by mouth 4 times daily    Sarcoma (H), Thrush, Other pulmonary embolism without acute cor pulmonale, unspecified chronicity (H), Postoperative wound infection, subsequent encounter, 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

## 2018-05-02 NOTE — PROGRESS NOTES
Lab orders for BMP and nursing communication orders for labs to be drawn on 5/7/18 have been faxed to [facility] at [phone].

## 2018-05-02 NOTE — PROGRESS NOTES
Called and [facility] for David letting him know that  would like to see him tomorrow.  An appointment has been made for 9:00 am.  Asked for a callback with any questions.

## 2018-05-03 ENCOUNTER — TELEPHONE (OUTPATIENT)
Dept: ONCOLOGY | Facility: CLINIC | Age: 58
End: 2018-05-03

## 2018-05-03 LAB
FUNGUS SPEC CULT: NORMAL
Lab: NORMAL
SPECIMEN SOURCE: NORMAL

## 2018-05-03 NOTE — PROGRESS NOTES
This is a recent snapshot of the patient's [facility] medical record.  For current drug dose and complete information and questions, call [phone] or In [facility] pool, fv home infusion ([id number])  CSN Number:  [id number]

## 2018-05-03 NOTE — TELEPHONE ENCOUNTER
[facility] Telephone Triage Note    Assessment: Spouse/Partner Lea called in to triage reporting the following symptoms: constipation, and blood on the toilet paper.  He has been taking colace for the past few days.  Small bowel movement yesterday.    Recommendations:   Continue Colace.  Try Mirilax today, if no success, magnesium citrate tomorrow.  Watch closely for blood in the stool and call back immediately if found.  OK to use Tucks pads to area..    Follow-Up: Instructed patient to seek care immediately for worsening symptoms, including: fever, chest pain, shortness of breath, dizziness. Patient voiced understanding of advice and/or instructions given.

## 2018-05-04 LAB
FUNGUS SPEC CULT: NORMAL
FUNGUS SPEC CULT: NORMAL
Lab: NORMAL
SPECIMEN SOURCE: NORMAL
SPECIMEN SOURCE: NORMAL

## 2018-05-07 ENCOUNTER — TRANSFERRED RECORDS (OUTPATIENT)
Dept: HEALTH INFORMATION MANAGEMENT | Facility: CLINIC | Age: 58
End: 2018-05-07

## 2018-05-07 LAB
ALBUMIN SERPL-MCNC: 2.7 G/DL
ALP SERPL-CCNC: 123 U/L
ALT SERPL-CCNC: 30 U/L
ANION GAP SERPL CALCULATED.3IONS-SCNC: ABNORMAL MMOL/L
AST SERPL-CCNC: 20 U/L
BILIRUB SERPL-MCNC: 0.2 MG/DL
BUN SERPL-MCNC: 5 MG/DL
CALCIUM SERPL-MCNC: 9.7 MG/DL
CHLORIDE SERPLBLD-SCNC: 102 MMOL/L
CO2 SERPL-SCNC: 28 MMOL/L
CREAT SERPL-MCNC: 0.69 MG/DL
GFR SERPL CREATININE-BSD FRML MDRD: >60 ML/MIN/1.73M2
GLOBULIN: 3.9 G/DL
GLUCOSE SERPL-MCNC: 117 MG/DL (ref 70–99)
MAGNESIUM SERPL-MCNC: 2.3 MG/DL
PHOSPHATE SERPL-MCNC: 4.1 MG/DL
POTASSIUM SERPL-SCNC: 4.2 MMOL/L
PROT SERPL-MCNC: 6.6 G/DL
SODIUM SERPL-SCNC: 137 MMOL/L

## 2018-05-08 ENCOUNTER — TELEPHONE (OUTPATIENT)
Dept: ORTHOPEDICS | Facility: CLINIC | Age: 58
End: 2018-05-08

## 2018-05-08 NOTE — TELEPHONE ENCOUNTER
Health Call Center    Phone Message    May a detailed message be left on voicemail: yes    Reason for Call: Order(s): Home Care Orders: Skilled Nursing: Wound now has 70% slough.  Recommending two week break from the vac.  Begin using iodoflex 2-3 times a week, as needed.  Verbal Orders.  Action Taken: Message routed to:  [facility] ump ortho     5/9/18 -  Verbal orders given from Dr. Schaefer for above procedures to Home Care nurse.  Script for 4% liquid lidocaine sent to pharmacy to help with debriding perimeter of wound where patient has had a reaction to VAC lorena

## 2018-05-17 ENCOUNTER — TELEPHONE (OUTPATIENT)
Dept: ONCOLOGY | Facility: CLINIC | Age: 58
End: 2018-05-17

## 2018-05-17 NOTE — TELEPHONE ENCOUNTER
[facility] Telephone Triage Note    Assessment: Spouse/Partner Lea called in to triage reporting the following symptoms: nosebleeds daily starting four days ago.  Yesterday was to be the day that he decreased his Xarelto dose.  He did not take it yesterday due to the nosebleed.  It takes 5-10 minutes to stop.  No abnormal bruising, bleeding gums.    Recommendations: Discussed with Dr. Johnson.  Due to total dose decreasing to 20 mg per day, ok to continue with Xarelto and monitor nosebleeds..    Follow-Up: Instructed patient to seek care immediately for worsening symptoms, including: fever, chest pain, shortness of breath, dizziness. Patient voiced understanding of advice and/or instructions given.

## 2018-05-22 ENCOUNTER — APPOINTMENT (OUTPATIENT)
Dept: LAB | Facility: CLINIC | Age: 58
End: 2018-05-22
Attending: INTERNAL MEDICINE
Payer: COMMERCIAL

## 2018-05-22 ENCOUNTER — INFUSION THERAPY VISIT (OUTPATIENT)
Dept: ONCOLOGY | Facility: CLINIC | Age: 58
End: 2018-05-22
Attending: INTERNAL MEDICINE
Payer: COMMERCIAL

## 2018-05-22 ENCOUNTER — CARE COORDINATION (OUTPATIENT)
Dept: ONCOLOGY | Facility: CLINIC | Age: 58
End: 2018-05-22

## 2018-05-22 ENCOUNTER — OFFICE VISIT (OUTPATIENT)
Dept: ORTHOPEDICS | Facility: CLINIC | Age: 58
End: 2018-05-22
Payer: COMMERCIAL

## 2018-05-22 VITALS
HEIGHT: 70 IN | SYSTOLIC BLOOD PRESSURE: 92 MMHG | TEMPERATURE: 98.7 F | WEIGHT: 190.5 LBS | DIASTOLIC BLOOD PRESSURE: 70 MMHG | OXYGEN SATURATION: 98 % | RESPIRATION RATE: 16 BRPM | HEART RATE: 110 BPM | BODY MASS INDEX: 27.27 KG/M2

## 2018-05-22 DIAGNOSIS — E86.0 DEHYDRATION: ICD-10-CM

## 2018-05-22 DIAGNOSIS — M10.00 IDIOPATHIC GOUT, UNSPECIFIED CHRONICITY, UNSPECIFIED SITE: ICD-10-CM

## 2018-05-22 DIAGNOSIS — R11.2 CHEMOTHERAPY-INDUCED NAUSEA AND VOMITING: ICD-10-CM

## 2018-05-22 DIAGNOSIS — C49.9 SARCOMA (H): ICD-10-CM

## 2018-05-22 DIAGNOSIS — Z87.891 PERSONAL HISTORY OF TOBACCO USE, PRESENTING HAZARDS TO HEALTH: ICD-10-CM

## 2018-05-22 DIAGNOSIS — C49.9 SARCOMA OF SOFT TISSUE (H): Primary | ICD-10-CM

## 2018-05-22 DIAGNOSIS — T81.328D: ICD-10-CM

## 2018-05-22 DIAGNOSIS — C49.9 SARCOMA OF SOFT TISSUE (H): ICD-10-CM

## 2018-05-22 DIAGNOSIS — T45.1X5A CHEMOTHERAPY-INDUCED NAUSEA AND VOMITING: ICD-10-CM

## 2018-05-22 DIAGNOSIS — B37.0 THRUSH: ICD-10-CM

## 2018-05-22 DIAGNOSIS — R91.8 PULMONARY NODULES: ICD-10-CM

## 2018-05-22 DIAGNOSIS — M79.604 PAIN OF RIGHT LOWER EXTREMITY: ICD-10-CM

## 2018-05-22 DIAGNOSIS — C49.21 SOFT TISSUE SARCOMA OF RIGHT THIGH (H): ICD-10-CM

## 2018-05-22 DIAGNOSIS — Z86.711 HISTORY OF PULMONARY EMBOLISM: Primary | ICD-10-CM

## 2018-05-22 DIAGNOSIS — I26.99 OTHER PULMONARY EMBOLISM WITHOUT ACUTE COR PULMONALE, UNSPECIFIED CHRONICITY (H): ICD-10-CM

## 2018-05-22 LAB
ALBUMIN SERPL-MCNC: 2.9 G/DL (ref 3.4–5)
ALP SERPL-CCNC: 139 U/L (ref 40–150)
ALT SERPL W P-5'-P-CCNC: 24 U/L (ref 0–70)
ANION GAP SERPL CALCULATED.3IONS-SCNC: 11 MMOL/L (ref 3–14)
AST SERPL W P-5'-P-CCNC: 21 U/L (ref 0–45)
BASOPHILS # BLD AUTO: 0.1 10E9/L (ref 0–0.2)
BASOPHILS NFR BLD AUTO: 0.7 %
BILIRUB SERPL-MCNC: 0.3 MG/DL (ref 0.2–1.3)
BUN SERPL-MCNC: 8 MG/DL (ref 7–30)
CALCIUM SERPL-MCNC: 9.7 MG/DL (ref 8.5–10.1)
CHLORIDE SERPL-SCNC: 102 MMOL/L (ref 94–109)
CO2 SERPL-SCNC: 25 MMOL/L (ref 20–32)
CREAT SERPL-MCNC: 0.79 MG/DL (ref 0.66–1.25)
DIFFERENTIAL METHOD BLD: ABNORMAL
EOSINOPHIL # BLD AUTO: 0 10E9/L (ref 0–0.7)
EOSINOPHIL NFR BLD AUTO: 0 %
ERYTHROCYTE [DISTWIDTH] IN BLOOD BY AUTOMATED COUNT: 17.8 % (ref 10–15)
GFR SERPL CREATININE-BSD FRML MDRD: >90 ML/MIN/1.7M2
GLUCOSE SERPL-MCNC: 99 MG/DL (ref 70–99)
HCT VFR BLD AUTO: 31.4 % (ref 40–53)
HGB BLD-MCNC: 10 G/DL (ref 13.3–17.7)
IMM GRANULOCYTES # BLD: 0 10E9/L (ref 0–0.4)
IMM GRANULOCYTES NFR BLD: 0.2 %
LDH SERPL L TO P-CCNC: 176 U/L (ref 85–227)
LYMPHOCYTES # BLD AUTO: 1.1 10E9/L (ref 0.8–5.3)
LYMPHOCYTES NFR BLD AUTO: 13 %
MAGNESIUM SERPL-MCNC: 2.3 MG/DL (ref 1.6–2.3)
MCH RBC QN AUTO: 25.3 PG (ref 26.5–33)
MCHC RBC AUTO-ENTMCNC: 31.8 G/DL (ref 31.5–36.5)
MCV RBC AUTO: 80 FL (ref 78–100)
MONOCYTES # BLD AUTO: 0.9 10E9/L (ref 0–1.3)
MONOCYTES NFR BLD AUTO: 11.5 %
NEUTROPHILS # BLD AUTO: 6.1 10E9/L (ref 1.6–8.3)
NEUTROPHILS NFR BLD AUTO: 74.6 %
NRBC # BLD AUTO: 0 10*3/UL
NRBC BLD AUTO-RTO: 0 /100
PHOSPHATE SERPL-MCNC: 3.3 MG/DL (ref 2.5–4.5)
PLATELET # BLD AUTO: 502 10E9/L (ref 150–450)
POTASSIUM SERPL-SCNC: 3.9 MMOL/L (ref 3.4–5.3)
PROT SERPL-MCNC: 7.3 G/DL (ref 6.8–8.8)
RBC # BLD AUTO: 3.95 10E12/L (ref 4.4–5.9)
SODIUM SERPL-SCNC: 137 MMOL/L (ref 133–144)
WBC # BLD AUTO: 8.2 10E9/L (ref 4–11)

## 2018-05-22 PROCEDURE — 25000128 H RX IP 250 OP 636: Mod: ZF | Performed by: INTERNAL MEDICINE

## 2018-05-22 PROCEDURE — 85025 COMPLETE CBC W/AUTO DIFF WBC: CPT | Performed by: INTERNAL MEDICINE

## 2018-05-22 PROCEDURE — 36415 COLL VENOUS BLD VENIPUNCTURE: CPT

## 2018-05-22 PROCEDURE — 96375 TX/PRO/DX INJ NEW DRUG ADDON: CPT

## 2018-05-22 PROCEDURE — 84100 ASSAY OF PHOSPHORUS: CPT | Performed by: INTERNAL MEDICINE

## 2018-05-22 PROCEDURE — 83615 LACTATE (LD) (LDH) ENZYME: CPT | Performed by: INTERNAL MEDICINE

## 2018-05-22 PROCEDURE — 80053 COMPREHEN METABOLIC PANEL: CPT | Performed by: INTERNAL MEDICINE

## 2018-05-22 PROCEDURE — 96415 CHEMO IV INFUSION ADDL HR: CPT

## 2018-05-22 PROCEDURE — 99215 OFFICE O/P EST HI 40 MIN: CPT | Mod: ZP | Performed by: INTERNAL MEDICINE

## 2018-05-22 PROCEDURE — G0498 CHEMO EXTEND IV INFUS W/PUMP: HCPCS

## 2018-05-22 PROCEDURE — 96367 TX/PROPH/DG ADDL SEQ IV INF: CPT

## 2018-05-22 PROCEDURE — 83735 ASSAY OF MAGNESIUM: CPT | Performed by: INTERNAL MEDICINE

## 2018-05-22 PROCEDURE — 96413 CHEMO IV INFUSION 1 HR: CPT

## 2018-05-22 PROCEDURE — G0463 HOSPITAL OUTPT CLINIC VISIT: HCPCS | Mod: ZF

## 2018-05-22 RX ORDER — EPINEPHRINE 1 MG/ML
0.3 INJECTION, SOLUTION INTRAMUSCULAR; SUBCUTANEOUS EVERY 5 MIN PRN
Status: CANCELLED | OUTPATIENT
Start: 2018-05-22

## 2018-05-22 RX ORDER — PALONOSETRON 0.05 MG/ML
0.25 INJECTION, SOLUTION INTRAVENOUS ONCE
Status: CANCELLED
Start: 2018-05-22

## 2018-05-22 RX ORDER — METHYLPREDNISOLONE SODIUM SUCCINATE 125 MG/2ML
125 INJECTION, POWDER, LYOPHILIZED, FOR SOLUTION INTRAMUSCULAR; INTRAVENOUS
Status: CANCELLED
Start: 2018-05-22

## 2018-05-22 RX ORDER — DIPHENHYDRAMINE HYDROCHLORIDE 50 MG/ML
50 INJECTION INTRAMUSCULAR; INTRAVENOUS
Status: CANCELLED
Start: 2018-05-22

## 2018-05-22 RX ORDER — LORAZEPAM 2 MG/ML
0.5 INJECTION INTRAMUSCULAR EVERY 4 HOURS PRN
Status: CANCELLED
Start: 2018-05-22

## 2018-05-22 RX ORDER — SODIUM CHLORIDE 9 MG/ML
1000 INJECTION, SOLUTION INTRAVENOUS CONTINUOUS PRN
Status: CANCELLED
Start: 2018-05-22

## 2018-05-22 RX ORDER — PALONOSETRON 0.05 MG/ML
0.25 INJECTION, SOLUTION INTRAVENOUS ONCE
Status: COMPLETED | OUTPATIENT
Start: 2018-05-22 | End: 2018-05-22

## 2018-05-22 RX ORDER — ALBUTEROL SULFATE 90 UG/1
1-2 AEROSOL, METERED RESPIRATORY (INHALATION)
Status: CANCELLED
Start: 2018-05-22

## 2018-05-22 RX ORDER — ALBUTEROL SULFATE 0.83 MG/ML
2.5 SOLUTION RESPIRATORY (INHALATION)
Status: CANCELLED | OUTPATIENT
Start: 2018-05-22

## 2018-05-22 RX ORDER — EPINEPHRINE 0.3 MG/.3ML
0.3 INJECTION SUBCUTANEOUS EVERY 5 MIN PRN
Status: CANCELLED | OUTPATIENT
Start: 2018-05-22

## 2018-05-22 RX ORDER — MEPERIDINE HYDROCHLORIDE 25 MG/ML
25 INJECTION INTRAMUSCULAR; INTRAVENOUS; SUBCUTANEOUS EVERY 30 MIN PRN
Status: CANCELLED | OUTPATIENT
Start: 2018-05-22

## 2018-05-22 RX ADMIN — PALONOSETRON HYDROCHLORIDE 0.25 MG: 0.25 INJECTION INTRAVENOUS at 12:57

## 2018-05-22 RX ADMIN — DOXORUBICIN HYDROCHLORIDE 100 MG: 2 INJECTABLE, LIPOSOMAL INTRAVENOUS at 13:37

## 2018-05-22 RX ADMIN — SODIUM CHLORIDE 150 MG: 9 INJECTION, SOLUTION INTRAVENOUS at 12:59

## 2018-05-22 RX ADMIN — DEXTROSE MONOHYDRATE 250 ML: 50 INJECTION, SOLUTION INTRAVENOUS at 12:57

## 2018-05-22 ASSESSMENT — ENCOUNTER SYMPTOMS
TASTE DISTURBANCE: 1
TROUBLE SWALLOWING: 0
JAUNDICE: 0
SINUS PAIN: 0
VOMITING: 1
HEARTBURN: 0
BLOOD IN STOOL: 0
BLOATING: 0
POOR WOUND HEALING: 0
SORE THROAT: 0
NECK MASS: 0
ABDOMINAL PAIN: 0
SMELL DISTURBANCE: 0
BOWEL INCONTINENCE: 0
NAIL CHANGES: 0
SINUS CONGESTION: 0
NAUSEA: 0
HOARSE VOICE: 0
RECTAL PAIN: 0
SKIN CHANGES: 0
CONSTIPATION: 1
DIARRHEA: 0

## 2018-05-22 ASSESSMENT — PAIN SCALES - GENERAL: PAINLEVEL: MODERATE PAIN (4)

## 2018-05-22 NOTE — PROGRESS NOTES
Infusion Nursing Note:  Hiram Tapia presents today for Cycle 3, day 1 Doxil and Ifosfamide/Mesna pump connection.    Patient seen by provider today: Yes: Dr. Johnson    Note: Patient presents to clinic today feeling well with no questions.      Intravenous Access:  Implanted Port.    Treatment Conditions:  Lab Results   Component Value Date    HGB 10.0 05/22/2018     Lab Results   Component Value Date    WBC 8.2 05/22/2018      Lab Results   Component Value Date    ANEU 6.1 05/22/2018     Lab Results   Component Value Date     05/22/2018      Lab Results   Component Value Date     05/22/2018                   Lab Results   Component Value Date    POTASSIUM 3.9 05/22/2018           Lab Results   Component Value Date    MAG 2.3 05/22/2018            Lab Results   Component Value Date    CR 0.79 05/22/2018                   Lab Results   Component Value Date    JAYESH 9.7 05/22/2018                Lab Results   Component Value Date    BILITOTAL 0.3 05/22/2018           Lab Results   Component Value Date    ALBUMIN 2.9 05/22/2018                    Lab Results   Component Value Date    ALT 24 05/22/2018           Lab Results   Component Value Date    AST 21 05/22/2018     Results reviewed, labs MET treatment parameters, ok to proceed with treatment.    Post Infusion Assessment:  Patient tolerated infusion without incident.  Blood return noted pre and post infusion.  Site patent and intact, free from redness, edema or discomfort.  No evidence of extravasations.     Ifosfamide/Mesna connected per protocol.  Connections taped and checked by Farzaneh Rodriguez RN.  Pump to infuse at 5ml/hr for 144 hours.  Disconnect time of 1530 on 5/28/2018 in clinic.  Labs for days 4-6 coordinated at OSH as with previous cycles by TIA Owen.  Message send to MD and RNJULIETTE to clarify lab orders; days needed and BNP v BMP.    Discharge Plan:   Patient declined prescription refills.  Discharge instructions reviewed with:  Patient.  Patient and/or family verbalized understanding of discharge instructions and all questions answered.  Copy of AVS reviewed with patient and/or family.  Patient will return 5/28/2018 for next appointment.  Departure Mode: Wheelchair.  Face to Face time: 5 minutes.    Mini Harris RN

## 2018-05-22 NOTE — NURSING NOTE
Chief Complaint   Patient presents with     Wound Check     F/u Right Thigh Wound S/p most recent surgery: Irrigation And Debridement Right Thigh Wound. with Wound VAC Exchange DOS: 4/9/2018       57 year old  1960                [facility] [place], [place] - [address] AT [facility] [address]  [address]      No Known Allergies  Current Outpatient Prescriptions   Medication     granisetron (KYTRIL) 1 MG tablet     IBUPROFEN PO     INDOMETHACIN PO     nystatin (MYCOSTATIN) 407934 UNIT/ML suspension     PROCHLORPERAZINE MALEATE PO     rivaroxaban ANTICOAGULANT (XARELTO) 20 MG TABS tablet     oxyCODONE IR (ROXICODONE) 5 MG tablet     potassium chloride SA (KLOR-CON) 20 MEQ CR tablet     Rivaroxaban 15 & 20 MG TBPK     No current facility-administered medications for this visit.      Facility-Administered Medications Ordered in Other Visits   Medication     Ifosfamide (IFEX) 3.33 g, mesna (MESNEX) 3.33 g in sodium chloride 0.9 % 720 mL Home Infusion Chemotherapy

## 2018-05-22 NOTE — MR AVS SNAPSHOT
After Visit Summary   5/22/2018    Hiram Tapia    MRN: [id number]           Patient Information     Date Of Birth          1960        Visit Information        Provider Department      5/22/2018 12:30 PM  30 ATC;  ONCOLOGY INFUSION [facility]        Today's Diagnoses     Sarcoma of soft tissue (H)    -  1      Care Instructions    Contact Numbers    [facility] Main Line: [phone]  [facility] Triage:  [phone]    Call triage with chills and/or temperature greater than or equal to 100.5, uncontrolled nausea/vomiting, diarrhea, constipation, dizziness, shortness of breath, chest pain, bleeding, unexplained bruising, or any new/concerning symptoms, questions/concerns.     If you are having any concerning symptoms or wish to speak to a provider before your next infusion visit, please call your care coordinator or triage to notify them so we can adequately serve you.       After Hours: [phone]    If after hours, weekends, or holidays, call main hospital  and ask for Oncology doctor on call.         May 2018   Tyler Monday Tuesday Wednesday Thursday Friday Saturday             1     P ONC INFUSION 60    2:00 PM   (60 min.)    ONCOLOGY INFUSION   [facility]     LAB    5:15 PM   (15 min.)    LAB HOME INFUSION   [facility] 2     3     4     5       6     7     8     9     10     11     12       13     14     15     16     17     18     19       20     21     22     [facility]   10:30 AM   (15 min.)   [facility] LAB DRAW   [facility] Lab Draw     [facility] RETURN   10:45 AM   (30 min.)   Gaurang Johnson MD   [facility] ED ONC INFUSION 180   12:30 PM   (180 min.)    ONCOLOGY INFUSION   [facility]     UMP RETURN    3:30 PM   (15 min.)   Brad Betts MD   [facility] Orthopaedic [facility] 23     24     25     26       27     28     29     30     31                           June 2018 Sunday Monday Tuesday Wednesday Thursday Friday Saturday                            1     2       3     4     5     6     7     8     9       10     11     12     13     14     15     16       17     18     PET ONCOLOGY WHOLE BODY    8:15 AM   (45 min.)   UUPET1   [facility] [place] PET CT 19     20     [facility] LAB DRAW   12:15 PM   (15 min.)    [facility] LAB DRAW   [facility] Lab Draw     [facility] RETURN   12:45 PM   (30 min.)   Gaurang Johnson MD   [facility] ONC INFUSION 180    2:00 PM   (180 min.)    ONCOLOGY INFUSION   [facility] 21     22     23       24     25     26     27     28     29     30                  Lab Results:  Recent Results (from the past 12 hour(s))   CBC with platelets differential    Collection Time: 05/22/18 11:00 AM   Result Value Ref Range    WBC 8.2 4.0 - 11.0 10e9/L    RBC Count 3.95 (L) 4.4 - 5.9 10e12/L    Hemoglobin 10.0 (L) 13.3 - 17.7 g/dL    Hematocrit 31.4 (L) 40.0 - 53.0 %    MCV 80 78 - 100 fl    MCH 25.3 (L) 26.5 - 33.0 pg    MCHC 31.8 31.5 - 36.5 g/dL    RDW 17.8 (H) 10.0 - 15.0 %    Platelet Count 502 (H) 150 - 450 10e9/L    Diff Method Automated Method     % Neutrophils 74.6 %    % Lymphocytes 13.0 %    % Monocytes 11.5 %    % Eosinophils 0.0 %    % Basophils 0.7 %    % Immature Granulocytes 0.2 %    Nucleated RBCs 0 0 /100    Absolute Neutrophil 6.1 1.6 - 8.3 10e9/L    Absolute Lymphocytes 1.1 0.8 - 5.3 10e9/L    Absolute Monocytes 0.9 0.0 - 1.3 10e9/L    Absolute Eosinophils 0.0 0.0 - 0.7 10e9/L    Absolute Basophils 0.1 0.0 - 0.2 10e9/L    Abs Immature Granulocytes 0.0 0 - 0.4 10e9/L    Absolute Nucleated RBC 0.0    Comprehensive metabolic panel    Collection Time: 05/22/18 11:00 AM   Result Value Ref Range    Sodium 137 133 - 144 mmol/L    Potassium 3.9 3.4 - 5.3 mmol/L    Chloride 102 94 - 109 mmol/L    Carbon Dioxide 25 20 - 32 mmol/L    Anion Gap 11 3 - 14 mmol/L    Glucose 99 70 - 99  mg/dL    Urea Nitrogen 8 7 - 30 mg/dL    Creatinine 0.79 0.66 - 1.25 mg/dL    GFR Estimate >90 >60 mL/min/1.7m2    GFR Estimate If Black >90 >60 mL/min/1.7m2    Calcium 9.7 8.5 - 10.1 mg/dL    Bilirubin Total 0.3 0.2 - 1.3 mg/dL    Albumin 2.9 (L) 3.4 - 5.0 g/dL    Protein Total 7.3 6.8 - 8.8 g/dL    Alkaline Phosphatase 139 40 - 150 U/L    ALT 24 0 - 70 U/L    AST 21 0 - 45 U/L   Phosphorus    Collection Time: 05/22/18 11:00 AM   Result Value Ref Range    Phosphorus 3.3 2.5 - 4.5 mg/dL   Magnesium    Collection Time: 05/22/18 11:00 AM   Result Value Ref Range    Magnesium 2.3 1.6 - 2.3 mg/dL   Lactate Dehydrogenase    Collection Time: 05/22/18 11:00 AM   Result Value Ref Range    Lactate Dehydrogenase 176 85 - 227 U/L               Follow-ups after your visit        Your next 10 appointments already scheduled     May 22, 2018  3:45 PM CDT   (Arrive by 3:30 PM)   Return Visit with Brad Betts MD   [facility] ([facility])    [address] [address] [phone]            Jun 18, 2018  8:15 AM CDT   PET ONCOLOGY WHOLE BODY with UUPET1   [facility] [place] PET CT ([facility])    [address] [facility] [id number] [phone]           Tell your doctor:   If there is any chance you may be pregnant or if you are breastfeeding.   If you have problems lying in small spaces (claustrophobia). If you do, your doctor may give you medicine to help you relax. If you have diabetes:   Have your exam early in the morning. Your blood glucose will go up as the day goes by.   Your glucose level must be 180 or less at the start of the exam. Please take any medicines you need to ensure this blood glucose level.   If you are taking insulin in the morning take with breakfast by 6 am and schedule procedure between 12 and 2:15 pm.   If you are taking insulin at night take nightly dose, fast  overnight, schedule procedure before 10 am.   If you take insulin both morning and night take morning dose by 6am and schedule procedure between 12 and 2:15 pm.   24 hours before your scan: Don t do any heavy exercise. (No jogging, aerobics or other workouts.) Exercise will make your pictures less accurate.  At least 7 hours before your scan, or the evening before if you have an early appointment: Eat a low carb, high protein meal (Lean meats, seafood, beans, soy, low-fat dairy, eggs, nuts & seeds). 6 hours before your scan:   Stop all food and liquids (except water).   Do not chew gum or suck on mints.   If you need to take medicine with food, you may take it with a few crackers.  Please call your Imaging Department at your exam site with any questions.            Jun 20, 2018 12:15 PM CDT   [facility] Lab Draw with  [facility] LAB DRAW   [facility] Lab Draw ([facility])    [address] [place] [address]  [address]  [facility] [address]   [phone]            Jun 20, 2018  1:00 PM CDT   (Arrive by 12:45 PM)   Return Visit with Gaurang Johnson MD   [facility] ([facility])    [address] [place] [address]  [address]  [facility] [address]   [phone]            Jun 20, 2018  2:00 PM CDT   Infusion 180 with  ONCOLOGY INFUSION, UC 11 ATC   [facility] ([facility])    [address] [facility]  [address]  [facility] [address]   [phone]            Jul 17, 2018  8:00 AM CDT   [facility] Lab Draw with  [facility] LAB DRAW   [facility] Lab Draw ([facility])    [address] [facility]  [address]  [facility] [address]   [phone]            Jul 17, 2018  8:30 AM CDT   (Arrive by 8:15 AM)   Return Visit with Gaurang Johnson MD   [facility] ([facility])    [address] [place] [address]  [address]  [facility] [address]  "  [phone]              Future tests that were ordered for you today     Open Future Orders        Priority Expected Expires Ordered    PET Oncology Whole Body Routine 2018 3/22/2019 2018    CBC with platelets differential Routine 2018 3/22/2019 2018    Comprehensive metabolic panel Routine 2018 3/22/2019 2018    Magnesium Routine 2018 3/22/2019 2018    Phosphorus Routine 2018 3/22/2019 2018            Who to contact     If you have questions or need follow up information about today's clinic visit or your schedule please contact [facility] directly at [phone].  Normal or non-critical lab and imaging results will be communicated to you by [employer], letter or phone within 4 business days after the clinic has received the results. If you do not hear from us within 7 days, please contact the clinic through [employer] or phone. If you have a critical or abnormal lab result, we will notify you by phone as soon as possible.  Submit refill requests through [facility] or call your pharmacy and they will forward the refill request to us. Please allow 3 business days for your refill to be completed.          Additional Information About Your Visit        [employer] Information     [facility] lets you send messages to your doctor, view your test results, renew your prescriptions, schedule appointments and more. To sign up, go to [email or web address] . Click on \"Log in\" on the left side of the screen, which will take you to the Welcome page. Then click on \"Sign up Now\" on the right side of the page.     You will be asked to enter the access code listed below, as well as some personal information. Please follow the directions to create your username and password.     Your access code is: [email or web address]  Expires: 2018  7:30 AM     Your access code will  in 90 days. If you need help or a new code, please call your [place] clinic or [phone].      "   Care EveryWhere ID     This is your Care EveryWhere ID. This could be used by other organizations to access your [place] medical records  [id number]         Blood Pressure from Last 3 Encounters:   05/22/18 92/70   05/01/18 116/82   04/30/18 128/81    Weight from Last 3 Encounters:   05/22/18 86.4 kg (190 lb 8 oz)   05/01/18 91.9 kg (202 lb 8 oz)   04/24/18 89.8 kg (198 lb)              We Performed the Following     CBC with platelets differential     Comprehensive metabolic panel     Lactate Dehydrogenase     Magnesium     Phosphorus          Today's Medication Changes          These changes are accurate as of 5/22/18  3:23 PM.  If you have any questions, ask your nurse or doctor.               These medicines have changed or have updated prescriptions.        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 Make sure you understand how and when to take each.   Used for:  Other pulmonary embolism without acute cor pulmonale, unspecified chronicity (H)   Changed by:  Gaurang Johnson MD        Dose:  20 mg   Take 1 tablet (20 mg) by mouth daily (with dinner)   Quantity:  30 tablet   Refills:  3       * Notice:  This list has 2 medication(s) that are the same as other medications prescribed for you. Read the directions  carefully, and ask your doctor or other care provider to review them with you.         Where to get your medicines      These medications were sent to [place], [place] - [address]  [address], [facility] [address]    Hours:  TRANSPLANT PHONE NUMBER [phone] Phone:  [phone]     rivaroxaban ANTICOAGULANT 20 MG Tabs tablet                Primary Care Provider Office Phone # Fax #    Louisa Fry [phone]       [facility] [address]  [facility] [address]        Equal Access to Services     [facility] : Hadii aad ku hadasho Soomaali, waaxda luqadaha, qaybta kaalmada adeegyada, waxay idiin hayaan adeeg yadira epps . So [facility] [phone].    ATENCIÓN: Si habla español, tiene a sheridan disposición servicios gratuitos de asistencia lingüística. Llame al [phone].    We comply with applicable federal civil rights laws and [place] laws. We do not discriminate on the basis of race, color, national origin, age, disability, sex, sexual orientation, or gender identity.            Thank you!     Thank you for choosing [facility]  for your care. Our goal is always to provide you with excellent care. Hearing back from our patients is one way we can continue to improve our services. Please take a few minutes to complete the written survey that you may receive in the mail after your visit with us. Thank you!             Your Updated Medication List - Protect others around you: Learn how to safely use, store and throw away your medicines at www.disposemymeds.org.          This list is accurate as of 5/22/18  3:23 PM.  Always use your most recent med list.                   Brand Name Dispense Instructions for use Diagnosis    granisetron 1 MG tablet    KYTRIL    30 tablet    Take 1 tablet (1 mg) by mouth every 12 hours as needed for nausea    Sarcoma of soft tissue (H)       IBUPROFEN PO      Take 800 mg by  mouth every 8 hours as needed for moderate pain        INDOMETHACIN PO      Take 50 mg by mouth 3 times daily as needed for moderate pain (2-3 times a day with food)        nystatin 627119 UNIT/ML suspension    MYCOSTATIN    473 mL    Take 5 mLs (500,000 Units) by mouth 4 times daily    Sarcoma (H), Thrush, Other pulmonary embolism without acute cor pulmonale, unspecified chronicity (H), Postoperative wound infection, subsequent encounter, 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)       PROCHLORPERAZINE MALEATE PO      Take 10 mg by mouth        * Rivaroxaban 15 & 20 MG Tbpk     51 each    Take 15 mg by mouth 2 times daily Take 15 mg twice a day for 2 weeks then 20 mg once a day    Sarcoma (H), Thrush, Other pulmonary embolism without acute cor pulmonale, unspecified chronicity (H), Postoperative wound infection, subsequent encounter, 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 Read the directions carefully, and ask your doctor or other care provider to review them with you.

## 2018-05-22 NOTE — PROGRESS NOTES
5-22-18     I saw Hiram Tapia for f/u of a sarcoma of the right thigh.      Background  In brief he has had a lot of problems with his right leg for many years including sciatica for 20 years.  He developed more discomfort in the right thigh and in October 2017 hit his lateral thigh against a truck tailgate causing a lot of pain.  Subsequently there was a fair amount of swelling and stiffness.  This eventually led to some imaging showing a cystic mass.  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  At the time of the biopsy more than a liter of fluid was removed and that markedly improved his symptoms of stiffness and pain.  -       Interval history       He began doxil/ifos 3-23-18.  He has had 2 cycles with suggestion of a response after 1 cycle.  A new PE asymptomatic was noted and he started rivaroxaban in April.    He tolerated cycle 2 a bit better.  He gets fatigue going up 2 floors.  Its hard for him to walk due to limitation of ROM of the R leg and discomfort.  He is not very active due to this.  He spends a lot of time in a recliner and sleeps in that.    The wound is being debreided 3x/wk now; hes off the pump now but will see Dr Betts later today.    The plan is to do kytril bid this time.  No clear GERD.  He has some constipation and takes some laxatives to control that.    He used some nystatin but found it gagging during the Rx itself. Taste seems better now.  No mouth or skin sx this cycle.    He had another gout flare w the R foot and took ibuprofen for that.     Mood is OK.    His wife broke her hip and is now using a walker so they are getting home visits frequently between them.    He sees Dr Betts later today.     Aside from this problem his 10 point ROS unremarkable.          Background PMH, FH, SH  Past history is not particular remarkable other than for tonsillectomy and surgery for lazy eye.  His left eye is the dominant eye.    The family history is not particularly remarkable but  "his father  of leukemia at age 42.    He denies any drug allergies.  He is  and has an adult somewhat autistic boy who lives with them.  He smoked a pack a day for about 10 years, stopping about , and does not abuse alcohol or other drugs.  He works as a  at [employer].  -     On exam he appeared comfortable with normal affect.    BP 92/70 (BP Location: Right arm, Patient Position: Sitting, Cuff Size: Adult Regular)  Pulse 110  Temp 98.7  F (37.1  C) (Oral)  Resp 16  Ht 1.778 m (5' 10\")  Wt 86.4 kg (190 lb 8 oz)  SpO2 98%  BMI 27.33 kg/m2  HEENT There is no icterus, Lazy eye (L dominant). Some mild thrush on tongue.  NECK: no thyromegaly or neck mass  CHEST:  the chest is clear  LYMPH: no lymphadenopathy  CV: there is tachycardia with no significant murmur  ABD:  no HSMT  EXT: 1+ edema of the right lower leg  MS: there is a large mass in the right thigh with an open wound, not very tender; difficult to appreciate much size change. Cant fully extend or flex R knee.  NEURO: Nabila mentation; got on table OK  SKIN: no doxil effect  PSYCH: mood fairly good  JOINTS: no gout now active      -  CBC OK w other labs pending.     -  Baseline PET/CT showed marked PET activity in the periphery of the cystic thigh mass.  There was no definite metastatic disease but there is a calcified nodule in the left lung and a probable hemangioma in the liver and a probable small adenoma in the left adrenal gland.  There was also a small right pleural effusion with a bit of right lower lobe consolidation mild FDG uptake.     The last images showed what appeared to be a segmental PE on the R.  There was also a small R lung nodule not present on the last image, but this is nonspecific.  There is a response by SUV of the primary tumor.     -     We discussed the situation; its quite complicated.  Most of the tumor was cystic on the last scan.  I think it might be reasonable to restage next time to be sure " things are all going as desired, and possibly even do surgery after 3 cycles and consider giving one more after surgery depending on pathology.  I will discuss this w Dr Betts and we can see what he thinks later today.      Though asymptomatic we will Rx the PE w rivaroxaban.      We will give the same doses as last time for cycle 3.  He will see Dr Betts later today.    We will continue prn nystatin or the thrush.     He will take kytril bid this time.      All of his questions were addressed and he will call if he has others.     Gaurang Johnson M.D.  Professor  Hematology, Oncology and Transplantation

## 2018-05-22 NOTE — MR AVS SNAPSHOT
After Visit Summary   5/22/2018    Hiram Tapia    MRN: [id number]           Patient Information     Date Of Birth          1960        Visit Information        Provider Department      5/22/2018 3:45 PM Brad Betts MD [facility]        Today's Diagnoses     Sarcoma of soft tissue (H)    -  1       Follow-ups after your visit        Your next 10 appointments already scheduled     May 28, 2018  3:00 PM CDT   Infusion 60 with UC ONCOLOGY INFUSION, UC 12 ATC   [facility] ([facility])    [address] [place] [address] [address]  [address]  [facility] [address]   [phone]            May 29, 2018  3:00 PM CDT   Infusion 60 with UC ONCOLOGY INFUSION, UC 13 ATC   [facility] ([facility])    [address] [place] [address]  [address]  [facility] [address]   [phone]            Jun 18, 2018  8:15 AM CDT   PET ONCOLOGY WHOLE BODY with UUPET1   [facility] [place] PET CT ([facility])    [address] [facility] [address] [phone]           Tell your doctor:   If there is any chance you may be pregnant or if you are breastfeeding.   If you have problems lying in small spaces (claustrophobia). If you do, your doctor may give you medicine to help you relax. If you have diabetes:   Have your exam early in the morning. Your blood glucose will go up as the day goes by.   Your glucose level must be 180 or less at the start of the exam. Please take any medicines you need to ensure this blood glucose level.   If you are taking insulin in the morning take with breakfast by 6 am and schedule procedure between 12 and 2:15 pm.   If you are taking insulin at night take nightly dose, fast overnight, schedule procedure before 10 am.   If you take insulin both morning and night take morning dose by 6am and schedule procedure between 12 and 2:15 pm.   24 hours  before your scan: Don t do any heavy exercise. (No jogging, aerobics or other workouts.) Exercise will make your pictures less accurate.  At least 7 hours before your scan, or the evening before if you have an early appointment: Eat a low carb, high protein meal (Lean meats, seafood, beans, soy, low-fat dairy, eggs, nuts & seeds). 6 hours before your scan:   Stop all food and liquids (except water).   Do not chew gum or suck on mints.   If you need to take medicine with food, you may take it with a few crackers.  Please call your Imaging Department at your exam site with any questions.            Jun 20, 2018 12:15 PM CDT   [facility] Lab Draw with  [facility] LAB DRAW   [facility] ([facility]    [address] [place] [address]  [address]  [facility] [address]   [phone]            Jun 20, 2018  1:00 PM CDT   (Arrive by 12:45 PM)   Return Visit with Gaurang Johnson MD   [facility] ([facility])    [address] [place] [address]  [address]  [facility] [address]   [phone]            Jun 20, 2018  2:00 PM CDT   Infusion 180 with  ONCOLOGY INFUSION, UC 11 ATC   [facility] ([facility])    [address] [place] [address]  [address]  [facility] [address]   [phone]            Jul 17, 2018  8:00 AM CDT   Masonic Lab Draw with  MASONIC LAB DRAW   [facility] Mason[facility] Lab Draw ([facility])    [address] [place] [address]  [address]  [facility] [address]   [phone]              Future tests that were ordered for you today     Open Future Orders        Priority Expected Expires Ordered    PET Oncology Whole Body Routine 6/18/2018 3/22/2019 5/22/2018    CBC with platelets differential Routine 6/18/2018 3/22/2019 5/22/2018    Comprehensive metabolic panel Routine 6/18/2018 3/22/2019 5/22/2018    Magnesium Routine 6/18/2018 3/22/2019 5/22/2018    Phosphorus Routine 6/18/2018  3/22/2019 2018            Who to contact     Please call your clinic at [phone] to:    Ask questions about your health    Make or cancel appointments    Discuss your medicines    Learn about your test results    Speak to your doctor            Additional Information About Your Visit        MyChart Information     [employer] is an electronic gateway that provides easy, online access to your medical records. With [employer], you can request a clinic appointment, read your test results, renew a prescription or communicate with your care team.     To sign up for [employer] visit the website at [email or web address]   You will be asked to enter the access code listed below, as well as some personal information. Please follow the directions to create your username and password.     Your access code is: [email or web address]  Expires: 2018  7:30 AM     Your access code will  in 90 days. If you need help or a new code, please contact your [facility] or call [phone] for assistance.        Care EveryWhere ID     This is your Care EveryWhere ID. This could be used by other organizations to access your [place] medical records  [id number]         Blood Pressure from Last 3 Encounters:   18 92/70   18 116/82   18 128/81    Weight from Last 3 Encounters:   18 86.4 kg (190 lb 8 oz)   18 91.9 kg (202 lb 8 oz)   18 89.8 kg (198 lb)              Today, you had the following     No orders found for display         Today's Medication Changes          These changes are accurate as of 18  5:03 PM.  If you have any questions, ask your nurse or doctor.               These medicines have changed or have updated prescriptions.        Dose/Directions    * Rivaroxaban 15 & 20 MG Tbpk   This may have changed:  Another medication with the same name was added. Make sure you understand how and when to take each.   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 Make sure you understand how and when to take each.   Used for:  Other pulmonary embolism without acute cor pulmonale, unspecified chronicity (H)   Changed by:  Gaurang Johnson MD        Dose:  20 mg   Take 1 tablet (20 mg) by mouth daily (with dinner)   Quantity:  30 tablet   Refills:  3       * Notice:  This list has 2 medication(s) that are the same as other medications prescribed for you. Read the directions carefully, and ask your doctor or other care provider to review them with you.         Where to get your medicines      These medications were sent to [place] [id number] [facility] [address]  [address] [place] [address] [address] [place] [address]    Hours:  TRANSPLANT PHONE NUMBER [phone] Phone:  [phone]     rivaroxaban ANTICOAGULANT 20 MG Tabs tablet                Primary Care Provider Office Phone # Fax #    Louisa Fry [phone]       [address]  [facility] [address]        Equal Access to Services     [facility] AH: Hadii aad ku hadasho Soomaali, waaxda luqadaha, qaybta kaalmada adeegyada, mendez mcnair. So [facility] [phone].    ATENCIÓN: Si habla español, tiene a sheridan disposición servicios gratuitos de asistencia lingüística. Llame al [phone].    We comply with applicable federal civil  rights laws and [place] laws. We do not discriminate on the basis of race, color, national origin, age, disability, sex, sexual orientation, or gender identity.            Thank you!     Thank you for choosing [facility]  for your care. Our goal is always to provide you with excellent care. Hearing back from our patients is one way we can continue to improve our services. Please take a few minutes to complete the written survey that you may receive in the mail after your visit with us. Thank you!             Your Updated Medication List - Protect others around you: Learn how to safely use, store and throw away your medicines at www.disposemymeds.org.          This list is accurate as of 5/22/18  5:03 PM.  Always use your most recent med list.                   Brand Name Dispense Instructions for use Diagnosis    granisetron 1 MG tablet    KYTRIL    30 tablet    Take 1 tablet (1 mg) by mouth every 12 hours as needed for nausea    Sarcoma of soft tissue (H)       IBUPROFEN PO      Take 800 mg by mouth every 8 hours as needed for moderate pain        INDOMETHACIN PO      Take 50 mg by mouth 3 times daily as needed for moderate pain (2-3 times a day with food)        nystatin [id number] UNIT/ML suspension    MYCOSTATIN    473 mL    Take 5 mLs (500,000 Units) by mouth 4 times daily    Sarcoma (H), Thrush, Other pulmonary embolism without acute cor pulmonale, unspecified chronicity (H), Postoperative wound infection, subsequent encounter, 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)       PROCHLORPERAZINE MALEATE PO      Take 10 mg by mouth        * Rivaroxaban 15 & 20 MG Tbpk     51 each    Take 15 mg by mouth 2 times daily Take 15 mg twice a day for 2 weeks then 20 mg once a day    Sarcoma (H), Thrush, Other pulmonary embolism without acute cor pulmonale, unspecified chronicity (H), Postoperative wound infection, subsequent encounter, 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 Read the directions carefully, and ask your doctor or other care provider to review them with you.

## 2018-05-22 NOTE — PATIENT INSTRUCTIONS
Contact Numbers    [facility] Main Line: [phone]  [facility] Triage:  [phone]    Call triage with chills and/or temperature greater than or equal to 100.5, uncontrolled nausea/vomiting, diarrhea, constipation, dizziness, shortness of breath, chest pain, bleeding, unexplained bruising, or any new/concerning symptoms, questions/concerns.     If you are having any concerning symptoms or wish to speak to a provider before your next infusion visit, please call your care coordinator or triage to notify them so we can adequately serve you.       After Hours: [phone]    If after hours, weekends, or holidays, call main hospital  and ask for Oncology doctor on call.         May 2018   Tyler Monday Tuesday Wednesday Thursday Friday Saturday             1     P ONC INFUSION 60    2:00 PM   (60 min.)   UC ONCOLOGY INFUSION   [facility]     LAB    5:15 PM   (15 min.)    LAB HOME INFUSION   [facility] Lab 2     3     4     5       6     7     8     9     10     11     12       13     14     15     16     17     18     19       20     21     22     [facility] LAB DRAW   10:30 AM   (15 min.)   [facility] LAB DRAW   [facility] Lab Draw     [facility] RETURN   10:45 AM   (30 min.)   Gaurang Johnson MD   [facility] ONC INFUSION 180   12:30 PM   (180 min.)   [facility]    3:30 PM   (15 min.)   Brad Betts MD   [facility] 23     24     25     26       27     28     29     30     31 June 2018 Sunday Monday Tuesday Wednesday Thursday Friday Saturday                            1     2       3     4     5     6     7     8     9       10     11     12     13     14     15     16       17     18     PET ONCOLOGY WHOLE BODY    8:15 AM   (45 min.)   UUPET1   [facility] PET CT 19     20     [facility] LAB DRAW   12:15 PM   (15 min.)   [facility] LAB  DRAW   [facility] Lab Draw     [facility] RETURN   12:45 PM   (30 min.)   Gaurang Johnson MD   [facility]     UM ONC INFUSION 180    2:00 PM   (180 min.)   UC ONCOLOGY INFUSION   [facility] 21     22     23       24     25     26     27     28     29     30                  Lab Results:  Recent Results (from the past 12 hour(s))   CBC with platelets differential    Collection Time: 05/22/18 11:00 AM   Result Value Ref Range    WBC 8.2 4.0 - 11.0 10e9/L    RBC Count 3.95 (L) 4.4 - 5.9 10e12/L    Hemoglobin 10.0 (L) 13.3 - 17.7 g/dL    Hematocrit 31.4 (L) 40.0 - 53.0 %    MCV 80 78 - 100 fl    MCH 25.3 (L) 26.5 - 33.0 pg    MCHC 31.8 31.5 - 36.5 g/dL    RDW 17.8 (H) 10.0 - 15.0 %    Platelet Count 502 (H) 150 - 450 10e9/L    Diff Method Automated Method     % Neutrophils 74.6 %    % Lymphocytes 13.0 %    % Monocytes 11.5 %    % Eosinophils 0.0 %    % Basophils 0.7 %    % Immature Granulocytes 0.2 %    Nucleated RBCs 0 0 /100    Absolute Neutrophil 6.1 1.6 - 8.3 10e9/L    Absolute Lymphocytes 1.1 0.8 - 5.3 10e9/L    Absolute Monocytes 0.9 0.0 - 1.3 10e9/L    Absolute Eosinophils 0.0 0.0 - 0.7 10e9/L    Absolute Basophils 0.1 0.0 - 0.2 10e9/L    Abs Immature Granulocytes 0.0 0 - 0.4 10e9/L    Absolute Nucleated RBC 0.0    Comprehensive metabolic panel    Collection Time: 05/22/18 11:00 AM   Result Value Ref Range    Sodium 137 133 - 144 mmol/L    Potassium 3.9 3.4 - 5.3 mmol/L    Chloride 102 94 - 109 mmol/L    Carbon Dioxide 25 20 - 32 mmol/L    Anion Gap 11 3 - 14 mmol/L    Glucose 99 70 - 99 mg/dL    Urea Nitrogen 8 7 - 30 mg/dL    Creatinine 0.79 0.66 - 1.25 mg/dL    GFR Estimate >90 >60 mL/min/1.7m2    GFR Estimate If Black >90 >60 mL/min/1.7m2    Calcium 9.7 8.5 - 10.1 mg/dL    Bilirubin Total 0.3 0.2 - 1.3 mg/dL    Albumin 2.9 (L) 3.4 - 5.0 g/dL    Protein Total 7.3 6.8 - 8.8 g/dL    Alkaline Phosphatase 139 40 - 150 U/L    ALT 24 0 - 70 U/L    AST 21 0 - 45 U/L    Phosphorus    Collection Time: 05/22/18 11:00 AM   Result Value Ref Range    Phosphorus 3.3 2.5 - 4.5 mg/dL   Magnesium    Collection Time: 05/22/18 11:00 AM   Result Value Ref Range    Magnesium 2.3 1.6 - 2.3 mg/dL   Lactate Dehydrogenase    Collection Time: 05/22/18 11:00 AM   Result Value Ref Range    Lactate Dehydrogenase 176 85 - 227 U/L

## 2018-05-22 NOTE — NURSING NOTE
"Oncology Rooming Note    May 22, 2018 11:10 AM   Hiram Tapia is a 57 year old male who presents for:    Chief Complaint   Patient presents with     Oncology Clinic Visit     F/U High Grade Sarcoma     Blood Draw     labs drawn with vpt by rn.  vs taken     Initial Vitals: BP 92/70 (BP Location: Right arm, Patient Position: Sitting, Cuff Size: Adult Regular)  Pulse 110  Temp 98.7  F (37.1  C) (Oral)  Resp 16  Ht 1.778 m (5' 10\")  Wt 86.4 kg (190 lb 8 oz)  SpO2 98%  BMI 27.33 kg/m2 Estimated body mass index is 27.33 kg/(m^2) as calculated from the following:    Height as of this encounter: 1.778 m (5' 10\").    Weight as of this encounter: 86.4 kg (190 lb 8 oz). Body surface area is 2.07 meters squared.  Moderate Pain (4) Comment: Data Unavailable   No LMP for male patient.  Allergies reviewed: Yes  Medications reviewed: Yes    Medications: MEDICATION REFILLS NEEDED TODAY. Provider was notified.  Pharmacy name entered into [employer]:    [facility] [address] [place] [address] [address] AT [facility] [place] [facility]  [address]  [facility] [address]    Clinical concerns: Yes , Patient complains of right thigh pain. Dr Johnson was notified.    10 minutes for nursing intake (face to face time)     KHRIS GUEVARA LPN            "

## 2018-05-22 NOTE — LETTER
5/22/2018       RE: Hiram Tapia  [address]  [place] [facility] [place] [facility] [address]     Dear Colleague,    Thank you for referring your patient, Hiram Tapia, to the [facility]. Please see a copy of my visit note below.    5-22-18     I saw Hiram Tapia for f/u of a sarcoma of the right thigh.      Background  In brief he has had a lot of problems with his right leg for many years including sciatica for 20 years.  He developed more discomfort in the right thigh and in October 2017 hit his lateral thigh against a truck tailgate causing a lot of pain.  Subsequently there was a fair amount of swelling and stiffness.  This eventually led to some imaging showing a cystic mass.  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  At the time of the biopsy more than a liter of fluid was removed and that markedly improved his symptoms of stiffness and pain.  -       Interval history       He began doxil/ifos 3-23-18.  He has had 2 cycles with suggestion of a response after 1 cycle.  A new PE asymptomatic was noted and he started rivaroxaban in April.    He tolerated cycle 2 a bit better.  He gets fatigue going up 2 floors.  Its hard for him to walk due to limitation of ROM of the R leg and discomfort.  He is not very active due to this.  He spends a lot of time in a recliner and sleeps in that.    The wound is being debreided 3x/wk now; hes off the pump now but will see Dr Betts later today.    The plan is to do kytril bid this time.  No clear GERD.  He has some constipation and takes some laxatives to control that.    He used some nystatin but found it gagging during the Rx itself. Taste seems better now.  No mouth or skin sx this cycle.    He had another gout flare w the R foot and took ibuprofen for that.     Mood is OK.    His wife broke her hip and is now using a walker so they are getting home visits frequently between them.    He sees Dr Betts later today.     Aside from this problem his 10 point  "ROS unremarkable.          Background PMH, FH, SH  Past history is not particular remarkable other than for tonsillectomy and surgery for lazy eye.  His left eye is the dominant eye.    The family history is not particularly remarkable but his father  of leukemia at age 42.    He denies any drug allergies.  He is  and has an adult somewhat autistic boy who lives with them.  He smoked a pack a day for about 10 years, stopping about , and does not abuse alcohol or other drugs.  He works as a  at [employer].  -     On exam he appeared comfortable with normal affect.    BP 92/70 (BP Location: Right arm, Patient Position: Sitting, Cuff Size: Adult Regular)  Pulse 110  Temp 98.7  F (37.1  C) (Oral)  Resp 16  Ht 1.778 m (5' 10\")  Wt 86.4 kg (190 lb 8 oz)  SpO2 98%  BMI 27.33 kg/m2  HEENT There is no icterus, Lazy eye (L dominant). Some mild thrush on tongue.  NECK: no thyromegaly or neck mass  CHEST:  the chest is clear  LYMPH: no lymphadenopathy  CV: there is tachycardia with no significant murmur  ABD:  no HSMT  EXT: 1+ edema of the right lower leg  MS: there is a large mass in the right thigh with an open wound, not very tender; difficult to appreciate much size change. Cant fully extend or flex R knee.  NEURO: Nabila mentation; got on table OK  SKIN: no doxil effect  PSYCH: mood fairly good  JOINTS: no gout now active      -  CBC OK w other labs pending.     -  Baseline PET/CT showed marked PET activity in the periphery of the cystic thigh mass.  There was no definite metastatic disease but there is a calcified nodule in the left lung and a probable hemangioma in the liver and a probable small adenoma in the left adrenal gland.  There was also a small right pleural effusion with a bit of right lower lobe consolidation mild FDG uptake.     The last images showed what appeared to be a segmental PE on the R.  There  was also a small R lung nodule not present on the last image, but this " is nonspecific.  There is a response by SUV of the primary tumor.     -     We discussed the situation; its quite complicated.  Most of the tumor was cystic on the last scan.  I think it might be reasonable to restage next time to be sure things are all going as desired, and possibly even do surgery after 3 cycles and consider giving one more after surgery depending on pathology.  I will discuss this w Dr Betts and we can see what he thinks later today.      Though asymptomatic we will Rx the PE w rivaroxaban.      We will give the same doses as last time for cycle 3.  He will see Dr Betts later today.    We will continue prn nystatin or the thrush.     He will take kytril bid this time.      All of his questions were addressed and he will call if he has others.     Gaurang Johnson M.D.  Professor  Hematology, Oncology and Transplantation

## 2018-05-22 NOTE — PROGRESS NOTES
Called and [facility] for David to let him know that he will need labs for this cycle of doxil/ifos on days 4,6,8.  Let him know that day 6 labs will be done here at the clinic when he gets his ifos to [place] bag changed.  Asked for a call back to see where he would like the other labs done.

## 2018-05-22 NOTE — MR AVS SNAPSHOT
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)        Pain of right lower extremity        Disruption of tissue around surgical drain, subsequent encounter        Personal history of tobacco use, presenting hazards to health        Postoperative wound infection, subsequent encounter        Idiopathic gout, unspecified chronicity, unspecified site        Pulmonary nodules        Sarcoma (H)        Thrush        Other pulmonary embolism without acute cor pulmonale, unspecified chronicity (H)           Follow-ups after your visit        Your next 10 appointments already scheduled     May 22, 2018 12:30 PM CDT   Infusion 180 with UC ONCOLOGY INFUSION, UC 30 ATC   [facility] ([facility])    [address]  [address]  [place] [facility] [address]   [phone]            May 22, 2018  3:45 PM CDT   (Arrive by 3:30 PM)   Return Visit with Brad Betts MD   [facility] ([facility])    [address]  4th [address]  [place] [address]   [phone]            Jun 19, 2018  9:00 AM CDT   [facility] Lab Draw with  [facility] LAB DRAW   [facility] Draw ([facility])    [address]  [address]  [place] [address]   [phone]            Jun 19, 2018  9:30 AM CDT   (Arrive by 9:15 AM)   Return Visit with Gaurang Johnson MD   [facility] ([facility])    [address]  [address]  [place] [address]   [phone]            Jun 19, 2018  10:00 AM CDT   Infusion 180 with  ONCOLOGY INFUSION, UC 17 ATC   [facility] ([facility])    [address]  [address]  [place] [address] [phone]            Jul 17, 2018  8:00 AM CDT   [facility] Lab Draw with  [facility] LAB DRAW   [facility] Draw ([facility])    [address]  [address]  [place] [facility] [id number] [phone]            Jul 17, 2018  8:30 AM CDT   (Arrive by 8:15 AM)   Return Visit with Gaurang Johnson MD   [facility] ([facility])    [address]  [address]  [place] [id number] [phone]              Future tests that were ordered for you today     Open Future Orders        Priority Expected Expires Ordered    PET Oncology Whole Body Routine 6/18/2018 3/22/2019 5/22/2018    CBC with platelets differential Routine 6/18/2018 3/22/2019 5/22/2018    Comprehensive metabolic panel Routine 6/18/2018 3/22/2019 5/22/2018    Magnesium Routine 6/18/2018 3/22/2019 5/22/2018    Phosphorus Routine 6/18/2018 3/22/2019 5/22/2018            Who to contact     If you have questions or need follow up information about today's clinic visit or your schedule please contact [facility] directly at [phone].  Normal or non-critical lab and imaging results will be communicated to you by MyChart, letter or phone within 4 business days after the clinic has received the results. If you do not hear from us within 7 days, please contact the clinic through [employer] or phone. If you have a critical or abnormal lab result, we will notify you by phone as soon as possible.  Submit refill requests through [employer] or call your pharmacy and they will forward the refill request to us. Please allow 3 business days for your refill to be completed.          Additional Information About Your Visit        MyChart Information     [employer] lets  "you send messages to your doctor, view your test results, renew your prescriptions, schedule appointments and more. To sign up, go to [email or web address] . Click on \"Log in\" on the left side of the screen, which will take you to the Welcome page. Then click on \"Sign up Now\" on the right side of the page.     You will be asked to enter the access code listed below, as well as some personal information. Please follow the directions to create your username and password.     Your access code is: [email or web address]  Expires: 2018  7:30 AM     Your access code will  in 90 days. If you need help or a new code, please call your [facility] clinic or [phone].        Care EveryWhere ID     This is your Care EveryWhere ID. This could be used by other organizations to access your [facility] medical records  [id number]        Your Vitals Were     Pulse Temperature Respirations Height Pulse Oximetry BMI (Body Mass Index)    110 98.7  F (37.1  C) (Oral) 16 1.778 m (5' 10\") 98% 27.33 kg/m2       Blood Pressure from Last 3 Encounters:   18 92/70   18 116/82   18 128/81    Weight from Last 3 Encounters:   18 86.4 kg (190 lb 8 oz)   18 91.9 kg (202 lb 8 oz)   18 89.8 kg (198 lb)                 Today's Medication Changes          These changes are accurate as of 18 11:50 AM.  If you have any questions, ask your nurse or doctor.               These medicines have changed or have updated prescriptions.        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 Make sure you understand how and when to take each.   Used for:  Other pulmonary embolism without acute cor pulmonale, unspecified chronicity (H)   Changed by:  Gaurang Johnson MD        Dose:  20 mg   Take 1 tablet (20 mg) by mouth daily (with dinner)   Quantity:  30 tablet   Refills:  3       * Notice:  This list has 2 medication(s) that are the same as other medications prescribed for you. Read the directions carefully, and ask your doctor or other care provider to review them with you.         Where to get your medicines      These medications were sent to [facility] [address]  [address] [place] [address] [address] [place] [address]    Hours:  TRANSPLANT PHONE NUMBER [phone] Phone:  [phone]     rivaroxaban ANTICOAGULANT 20 MG Tabs tablet                Primary Care Provider Office Phone # Fax #    Louisa Fry [phone]       [facility] [address]  [facility] [address]        Equal Access to Services     DORIAN TOLENTINO AH: Hadii troy ku hadasho Soomaali, waaxda luqadaha, qaybta kaalmada adeegyada, waxay komalin haykevinn jaz mcnair. So [facility] [phone].    ATENCIÓN: Si habla español, tiene a sheridan disposición servicios gratuitos de asistencia lingüística. Llame al [phone].    We comply with applicable federal civil rights laws and [place] laws. We do not discriminate on the basis of race, color, national origin, age, disability, sex, sexual orientation, or gender identity.            Thank you!     Thank you for choosing [facility]  for your care. Our goal is always to provide you with excellent care. Hearing back from our patients is one  way we can continue to improve our services. Please take a few minutes to complete the written survey that you may receive in the mail after your visit with us. Thank you!             Your Updated Medication List - Protect others around you: Learn how to safely use, store and throw away your medicines at www.disposemymeds.org.          This list is accurate as of 5/22/18 11:50 AM.  Always use your most recent med list.                   Brand Name Dispense Instructions for use Diagnosis    granisetron 1 MG tablet    KYTRIL    30 tablet    Take 1 tablet (1 mg) by mouth every 12 hours as needed for nausea    Sarcoma of soft tissue (H)       IBUPROFEN PO      Take 800 mg by mouth every 8 hours as needed for moderate pain        INDOMETHACIN PO      Take 50 mg by mouth 3 times daily as needed for moderate pain (2-3 times a day with food)        nystatin 187641 UNIT/ML suspension    MYCOSTATIN    473 mL    Take 5 mLs (500,000 Units) by mouth 4 times daily    Sarcoma (H), Thrush, Other pulmonary embolism without acute cor pulmonale, unspecified chronicity (H), Postoperative wound infection, subsequent encounter, 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)       PROCHLORPERAZINE MALEATE PO      Take 10 mg by mouth        * Rivaroxaban 15 & 20 MG Tbpk     51 each    Take 15 mg by mouth 2 times daily Take 15 mg twice a day for 2 weeks then 20 mg once a day    Sarcoma (H), Thrush, Other pulmonary embolism without acute cor pulmonale, unspecified  chronicity (H), Postoperative wound infection, subsequent encounter, 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 Read the directions carefully, and ask your doctor or other care provider to review them with you.

## 2018-05-22 NOTE — PROGRESS NOTES
Diagnosis: 1. High-grade soft tissue sarcoma right thigh  2. Delayed post op wound infection      Treatment: 1. Neoadjuvant chemotherapy planned.  We will begin the March 26, 2018.  Sequencing of radiation and surgery to be determined after neoadjuvant treatment completed staging studies evaluated.  2. I and D, antibiotics    Question in patient mind of increased firmness in proximal thighWound reported to be doing well. Inspection shows no infection.     Elizabeth scheduled next PET scan for June 18.    Imp - doing well.    Plan - 1. need radiatio and surgical sequencing plan  2. I recommend as much chemo up front as possible as wound complication with delay in additional treatments a significant possibility.  
Diagnosis: 1. High-grade soft tissue sarcoma right thigh  2. Delayed post op wound infection  3. Pulmonary embolis      Treatment: 1. Neoadjuvant chemotherapy planned.  We will begin the March 26, 2018.  Sequencing of radiation and surgery to be determined after neoadjuvant treatment completed staging studies evaluated.  2. I and D, antibiotics  3. Anticoagulation    Patient is seen today for wound inspection.  The wound care nurse has stopped his VAC dressing changes and applying healing gel.  Inspected the gel today and his wound does not look infected though it is still open as expected.    Overall he is tolerating his chemo reasonably well though medical history out to be quite complex.    I did review Dr. Johnson's notes he is planning a PET CT scan in June.  It is uncertain as to the number of cycles he would like to administer pre-local control.  Because of a high risk of a major wound complications therefore delaying his chemotherapy.  Is my recommendation that he receive all of his chemotherapy upfront.    Impression: Overall doing as well as can be expected with complex issues including open wound, pulmonary embolus and of course of sarcoma.    Plan: We will present him at our multidisciplinary conference as we need to unified approach towards local control of these patients whether we will start with radiation or surgery needs to be determined.  I would recommend as stated above he received all his chemotherapy up front.    Patient seen as an established patient for 15 minutes.  10 minutes spent answering questions coordinating his care      
10

## 2018-05-22 NOTE — LETTER
5/22/2018       RE: Hiram Tapia  [address] [facility] [address]     Dear Colleague,    Thank you for referring your patient, Hriam Tapia, to the [facility] at [facility]. Please see a copy of my visit note below.    Diagnosis: 1. High-grade soft tissue sarcoma right thigh  2. Delayed post op wound infection  3. Pulmonary embolis      Treatment: 1. Neoadjuvant chemotherapy planned.  We will begin the March 26, 2018.  Sequencing of radiation and surgery to be determined after neoadjuvant treatment completed staging studies evaluated.  2. I and D, antibiotics  3. Anticoagulation    Patient is seen today for wound inspection.  The wound care nurse has stopped his VAC dressing changes and applying healing gel.  Inspected the gel today and his wound does not look infected though it is still open as expected.    Overall he is tolerating his chemo reasonably well though medical history out to be quite complex.    I did review Dr. Johnson's notes he is planning a PET CT scan in June.  It is uncertain as to the number of cycles he would like to administer pre-local control.  Because of a high risk of a major wound complications therefore delaying his chemotherapy.  Is my recommendation that he receive all of his chemotherapy upfront.    Impression: Overall doing as well as can be expected with complex issues including open wound, pulmonary embolus and of course of sarcoma.    Plan: We will present him at our multidisciplinary conference as we need to unified approach towards local control of these patients whether we will start with radiation or surgery needs to be determined.  I would recommend as stated above he received all his chemotherapy up front.    Patient seen as an established patient for 15 minutes.  10 minutes spent answering questions coordinating his care        Diagnosis: 1. High-grade soft tissue sarcoma right thigh  2. Delayed post op wound  infection      Treatment: 1. Neoadjuvant chemotherapy planned.  We will begin the March 26, 2018.  Sequencing of radiation and surgery to be determined after neoadjuvant treatment completed staging studies evaluated.  2. I and D, antibiotics    Question in patient mind of increased firmness in proximal thighWound reported to be doing well. Inspection shows no infection.     Elizabeth scheduled next PET scan for June 18.    Imp - doing well.    Plan - 1. need radiatio and surgical sequencing plan  2. I recommend as much chemo up front as possible as wound complication with delay in additional treatments a significant possibility.    Again, thank you for allowing me to participate in the care of your patient.      Sincerely,    Brad Betts MD

## 2018-05-22 NOTE — NURSING NOTE
Chief Complaint   Patient presents with     Oncology Clinic Visit     F/U High Grade Sarcoma     Blood Draw     labs drawn with vpt by rn.  vs taken     Labs drawn with vpt by rn.  Pt tolerated well.  VS taken.  Pt checked in for next appt.    Elise Ridley RN

## 2018-05-25 ENCOUNTER — NURSE TRIAGE (OUTPATIENT)
Dept: NURSING | Facility: CLINIC | Age: 58
End: 2018-05-25

## 2018-05-25 ENCOUNTER — CARE COORDINATION (OUTPATIENT)
Dept: ONCOLOGY | Facility: CLINIC | Age: 58
End: 2018-05-25

## 2018-05-25 PROBLEM — T45.1X5A CHEMOTHERAPY-INDUCED NAUSEA AND VOMITING: Status: ACTIVE | Noted: 2018-05-25

## 2018-05-25 PROBLEM — R11.2 CHEMOTHERAPY-INDUCED NAUSEA AND VOMITING: Status: ACTIVE | Noted: 2018-05-25

## 2018-05-25 PROBLEM — E86.0 DEHYDRATION: Status: ACTIVE | Noted: 2018-05-25

## 2018-05-25 NOTE — PROGRESS NOTES
Called and [facility] to let Lea (wife) know that David's appointment on Monday 5/28/18 has been changed to 10:00am.  Let her know that he will be getting some fluids and antiemetics as historically he hasn't done well on day 7.  Asked for a callback with questions.

## 2018-05-26 ENCOUNTER — TRANSFERRED RECORDS (OUTPATIENT)
Dept: HEALTH INFORMATION MANAGEMENT | Facility: CLINIC | Age: 58
End: 2018-05-26

## 2018-05-26 LAB
ALBUMIN SERPL-MCNC: 2.6 G/DL
ALP SERPL-CCNC: 138 U/L
ALT SERPL-CCNC: 19 U/L
ANION GAP SERPL CALCULATED.3IONS-SCNC: ABNORMAL MMOL/L
AST SERPL-CCNC: 17 U/L
BILIRUB SERPL-MCNC: 0.4 MG/DL
BUN SERPL-MCNC: 14 MG/DL
CALCIUM SERPL-MCNC: 10.1 MG/DL
CHLORIDE SERPLBLD-SCNC: 99 MMOL/L
CO2 SERPL-SCNC: ABNORMAL MMOL/L
CREAT SERPL-MCNC: 0.92 MG/DL
GFR SERPL CREATININE-BSD FRML MDRD: >60 ML/MIN/1.73M2
GLOBULIN: 4.6 G/DL
GLUCOSE SERPL-MCNC: 125 MG/DL (ref 70–99)
HCO3 BLD-SCNC: 25 MMOL/L
MAGNESIUM SERPL-MCNC: 2.4 MG/DL
PHOSPHATE SERPL-MCNC: 3.7 MG/DL
POTASSIUM SERPL-SCNC: 3.5 MMOL/L
PROT SERPL-MCNC: 7.2 G/DL
SODIUM SERPL-SCNC: 134 MMOL/L

## 2018-05-26 NOTE — TELEPHONE ENCOUNTER
Patient and wife calling with questions about infusion appointment on Monday. They are wondering about the time scheduled and if they will have the chemo stopped to run IV fluids and anti nausea medicine. I explained that I am only able to see times on the appointment schedule and what is planned as far as chemo bag change and possible IVF. Able to answer question about time.  Shayna German RN  [facility]

## 2018-05-26 NOTE — TELEPHONE ENCOUNTER
Additional Information    Negative: [1] Caller is not with the adult (patient) AND [2] reporting urgent symptoms    Negative: Lab result questions    Negative: Medication questions    Negative: Caller cannot be reached by phone    Negative: Caller has already spoken to PCP or another triager    Negative: RN needs further essential information from caller in order to complete triage    Negative: Requesting regular office appointment    Negative: [1] Caller requesting NON-URGENT health information AND [2] PCP's office is the best resource    Negative: Health Information question, no triage required and triager able to answer question    Negative: General information question, no triage required and triager able to answer question    Negative: Question about upcoming scheduled test, no triage required and triager able to answer question    Negative: [1] Caller is not with the adult (patient) AND [2] probable NON-URGENT symptoms    [1] Follow-up call to recent contact AND [2] information only call, no triage required    Protocols used: INFORMATION ONLY CALL-ADULT-MORE Tate (wife) calls and says that her  is currently getting chemotherapy and that his current bag will run out on Monday, at 3 am. Lea says that she does not know if an IV should be started, to keep her  hydrated, until he sees his , on Monday, at 10 AM. Pt. Sees a  At the [facility]. Dr. Chandra-[facility]-Oncologist-was then paged, to call Lea, at: [phone], at: 2050, via page .

## 2018-05-26 NOTE — TELEPHONE ENCOUNTER
----- Message from Alvarado Delatorre sent at 5/25/2018  7:46 PM CDT -----  Reason for Call:  Other call back    Detailed comments: Patient's wife, Lea calling. Wanting to clarify some things on patient's appointments scheduled on Monday with infusion.     Phone Number Patient can be reached at: [phone]    Best Time: Anytime    Can we leave a detailed message on this number? YES    Call taken on 5/25/2018 at 7:46 PM by Alvarado Delatorre

## 2018-05-28 ENCOUNTER — INFUSION THERAPY VISIT (OUTPATIENT)
Dept: ONCOLOGY | Facility: CLINIC | Age: 58
End: 2018-05-28
Attending: INTERNAL MEDICINE
Payer: COMMERCIAL

## 2018-05-28 VITALS
TEMPERATURE: 98 F | SYSTOLIC BLOOD PRESSURE: 114 MMHG | HEART RATE: 88 BPM | RESPIRATION RATE: 18 BRPM | OXYGEN SATURATION: 98 % | DIASTOLIC BLOOD PRESSURE: 71 MMHG

## 2018-05-28 DIAGNOSIS — E86.0 DEHYDRATION: ICD-10-CM

## 2018-05-28 DIAGNOSIS — R11.2 CHEMOTHERAPY-INDUCED NAUSEA AND VOMITING: Primary | ICD-10-CM

## 2018-05-28 DIAGNOSIS — C49.9 SARCOMA (H): ICD-10-CM

## 2018-05-28 DIAGNOSIS — C49.21 SOFT TISSUE SARCOMA OF RIGHT THIGH (H): ICD-10-CM

## 2018-05-28 DIAGNOSIS — C49.9 SARCOMA OF SOFT TISSUE (H): ICD-10-CM

## 2018-05-28 DIAGNOSIS — T45.1X5A CHEMOTHERAPY-INDUCED NAUSEA AND VOMITING: Primary | ICD-10-CM

## 2018-05-28 LAB
ANION GAP SERPL CALCULATED.3IONS-SCNC: 12 MMOL/L (ref 3–14)
BUN SERPL-MCNC: 16 MG/DL (ref 7–30)
CALCIUM SERPL-MCNC: 10.1 MG/DL (ref 8.5–10.1)
CHLORIDE SERPL-SCNC: 104 MMOL/L (ref 94–109)
CO2 SERPL-SCNC: 19 MMOL/L (ref 20–32)
CREAT SERPL-MCNC: 1.01 MG/DL (ref 0.66–1.25)
GFR SERPL CREATININE-BSD FRML MDRD: 75 ML/MIN/1.7M2
GLUCOSE SERPL-MCNC: 119 MG/DL (ref 70–99)
MAGNESIUM SERPL-MCNC: 2.6 MG/DL (ref 1.6–2.3)
PHOSPHATE SERPL-MCNC: 2.5 MG/DL (ref 2.5–4.5)
POTASSIUM SERPL-SCNC: 2.8 MMOL/L (ref 3.4–5.3)
SODIUM SERPL-SCNC: 135 MMOL/L (ref 133–144)

## 2018-05-28 PROCEDURE — 84100 ASSAY OF PHOSPHORUS: CPT | Performed by: INTERNAL MEDICINE

## 2018-05-28 PROCEDURE — 83735 ASSAY OF MAGNESIUM: CPT | Performed by: INTERNAL MEDICINE

## 2018-05-28 PROCEDURE — G0498 CHEMO EXTEND IV INFUS W/PUMP: HCPCS

## 2018-05-28 PROCEDURE — 96375 TX/PRO/DX INJ NEW DRUG ADDON: CPT

## 2018-05-28 PROCEDURE — 96367 TX/PROPH/DG ADDL SEQ IV INF: CPT

## 2018-05-28 PROCEDURE — 80048 BASIC METABOLIC PNL TOTAL CA: CPT | Performed by: INTERNAL MEDICINE

## 2018-05-28 PROCEDURE — 25000128 H RX IP 250 OP 636: Mod: ZF | Performed by: INTERNAL MEDICINE

## 2018-05-28 PROCEDURE — G0463 HOSPITAL OUTPT CLINIC VISIT: HCPCS | Mod: 25

## 2018-05-28 PROCEDURE — 96366 THER/PROPH/DIAG IV INF ADDON: CPT

## 2018-05-28 PROCEDURE — 96365 THER/PROPH/DIAG IV INF INIT: CPT

## 2018-05-28 RX ORDER — PALONOSETRON 0.05 MG/ML
0.25 INJECTION, SOLUTION INTRAVENOUS ONCE
Status: CANCELLED
Start: 2018-05-28 | End: 2018-05-28

## 2018-05-28 RX ORDER — PALONOSETRON 0.05 MG/ML
0.25 INJECTION, SOLUTION INTRAVENOUS ONCE
Status: DISCONTINUED | OUTPATIENT
Start: 2018-05-28 | End: 2018-05-28 | Stop reason: HOSPADM

## 2018-05-28 RX ORDER — LORAZEPAM 2 MG/ML
0.5 INJECTION INTRAMUSCULAR ONCE
Status: COMPLETED | OUTPATIENT
Start: 2018-05-28 | End: 2018-05-28

## 2018-05-28 RX ADMIN — POTASSIUM CHLORIDE 60 MEQ: 149 INJECTION, SOLUTION, CONCENTRATE INTRAVENOUS at 10:54

## 2018-05-28 RX ADMIN — DEXAMETHASONE SODIUM PHOSPHATE 10 MG: 10 INJECTION, SOLUTION INTRAMUSCULAR; INTRAVENOUS at 14:07

## 2018-05-28 RX ADMIN — SODIUM CHLORIDE 1000 ML: 9 INJECTION, SOLUTION INTRAVENOUS at 09:57

## 2018-05-28 RX ADMIN — SODIUM CHLORIDE 150 MG: 9 INJECTION, SOLUTION INTRAVENOUS at 10:19

## 2018-05-28 RX ADMIN — LORAZEPAM 0.5 MG: 2 INJECTION INTRAMUSCULAR; INTRAVENOUS at 13:24

## 2018-05-28 ASSESSMENT — PAIN SCALES - GENERAL: PAINLEVEL: MILD PAIN (2)

## 2018-05-28 NOTE — PATIENT INSTRUCTIONS
Contact Numbers    [facility] Main Line: [phone]  [facility] Triage and After Hours Nurse Line:  [phone]    Please call the [facility] nurse triage or the after hours nurse line if you experience a temperature greater than or equal to 100.5, shaking chills, have uncontrolled nausea, vomiting and/or diarrhea, dizziness, lightheadedness, shortness of breath, chest pain, bleeding, unexplained bruising, or if you have any other new/concerning symptoms, questions or concerns.     If you are having any concerning symptoms or wish to speak to a provider before your next infusion visit, please call your care coordinator or triage to notify them so we can adequately serve you.     If you need a refill on a narcotic prescription or other medication, please call triage before your infusion appointment.

## 2018-05-28 NOTE — PROGRESS NOTES
Infusion Nursing Note:  Hiram Tapia presents today for Ifosfamide Pump disconnect, Mesna bag change, labs, IVF's, antiemetics.    Patient seen by provider today: No    Note: Pt arrived in wheelchair not feeling well. Reports ongoing nausea, just taking Kytril BID. Compazine makes him sick. The only food he has kept down since Friday is half of a brat. States he is trying to drink fluids though and estimates about a liter of fluid daily. Denies any fevers/chills, SOB, swelling, rash/bleeding. Home care nurse coming to his home tomorrow to change wound dressing on right leg/hip; pt denies changes in wound over the weekend. K 2.8 today. Discussed with on-call MD, Dr. Escobar, OK to replace his potassium per standing protocol orders. RN on Tuesday 5/29 to discuss with provider if pt needs home potassium supplement.     Pt received IV Emend for nausea, but was not given Aloxi due to pt currently taking Kytril BID. Per Jonny in pharmacy, pt should receive Aloxi tomorrow. Therapy plan updated accordingly.    After IVF's and Emend, pt still nauseated and unable to eat.   TORB Dr. Escobar/Cleopatra Guzman, RN 5/28/18 @ 1315: Give Ativan 0.5 mg IV once.    Pt still nauseated after Ativan and reported still feeling extremely weak. He received IV Decadron with last cycle, but not ordered to give today due to potential infection. However, pt still unable to eat anything today. Discussed with Dr. Escobar.  TORB Dr. Escobar/Cleopatra Guzman, DALILA 5/28/18: Give Decadron 10 mg IV today.    Pt feeling slightly better after Decadron and was able to drink half a bottle of ginger ale and one bottle of Ensure. He will take Kytril tonight and try to push fluids. Returning tomorrow for labs, IVF's, antiemetics and Neulasta.    Intravenous Access:  Implanted Port.    Treatment Conditions:  Lab Results   Component Value Date     05/28/2018                   Lab Results   Component Value Date    POTASSIUM 2.8 05/28/2018           Lab Results   Component Value  "Date    MAG 2.6 05/28/2018            Lab Results   Component Value Date    CR 1.01 05/28/2018                   Lab Results   Component Value Date    JAYESH 10.1 05/28/2018                Lab Results   Component Value Date    BILITOTAL 0.3 05/22/2018           Lab Results   Component Value Date    ALBUMIN 2.9 05/22/2018                    Lab Results   Component Value Date    ALT 24 05/22/2018           Lab Results   Component Value Date    AST 21 05/22/2018       Post Infusion Assessment:  Blood return noted pre and post infusion and prior to Mesna pump connect.  Site patent and intact, free from redness, edema or discomfort.  Ifosfamide/Mesna pump stopped at 1300 (OK per Dr. Johnson to stop early).  Mesna pump connected at 1300 set to infuse over the next 24 hours.  All connections open, taped and CADD pump \"running\" upon time of discharge.    Discharge Plan:   Copy of AVS reviewed with patient and/or friend.  Patient will return tomorrow for next appointment.  Patient discharged in stable condition accompanied by: friend.  Departure Mode: Wheelchair.  Face to Face time: 12 minutes.    Cleopatra Guzman RN    "

## 2018-05-28 NOTE — MR AVS SNAPSHOT
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     If you are having any concerning symptoms or wish to speak to a provider before your next infusion visit, please call your care coordinator or triage to notify them so we can adequately serve you.     If you need a refill on a narcotic prescription or other medication, please call triage before your infusion appointment.             Follow-ups after your visit        Your next 10 appointments already scheduled     May 29, 2018  3:00 PM CDT   Infusion 60 with UC ONCOLOGY INFUSION, UC 13 ATC   [facility] ([facility] [place])    [address]  [address]  [facility] [place] [facility] [id number] [phone]            Jun 18, 2018  8:15 AM CDT   PET ONCOLOGY WHOLE BODY with UUPET1   [facility] [place] PET CT ([facility])    [address] [id number] [phone]           Tell your doctor:   If there is any chance you may be pregnant or if you are  breastfeeding.   If you have problems lying in small spaces (claustrophobia). If you do, your doctor may give you medicine to help you relax. If you have diabetes:   Have your exam early in the morning. Your blood glucose will go up as the day goes by.   Your glucose level must be 180 or less at the start of the exam. Please take any medicines you need to ensure this blood glucose level.   If you are taking insulin in the morning take with breakfast by 6 am and schedule procedure between 12 and 2:15 pm.   If you are taking insulin at night take nightly dose, fast overnight, schedule procedure before 10 am.   If you take insulin both morning and night take morning dose by 6am and schedule procedure between 12 and 2:15 pm.   24 hours before your scan: Don t do any heavy exercise. (No jogging, aerobics or other workouts.) Exercise will make your pictures less accurate.  At least 7 hours before your scan, or the evening before if you have an early appointment: Eat a low carb, high protein meal (Lean meats, seafood, beans, soy, low-fat dairy, eggs, nuts & seeds). 6 hours before your scan:   Stop all food and liquids (except water).   Do not chew gum or suck on mints.   If you need to take medicine with food, you may take it with a few crackers.  Please call your Imaging Department at your exam site with any questions.            Jun 20, 2018 12:15 PM CDT   [facility] Lab Draw with [facility] LAB DRAW   [facility] Lab Draw ([facility])    [address] [place] [address]  [address]  [facility] [address] [phone]            Jun 20, 2018  1:00 PM CDT   (Arrive by 12:45 PM)   Return Visit with Gaurang Johnson MD   [facility] ([facility])    [address]  [address]  [facility] [address]   [phone]            Jun 20, 2018  2:00 PM CDT   Infusion 180 with  ONCOLOGY INFUSION,  11 ATC   [facility] ([facility]  "[facility])    [address] [place] [address]  [address]  [facility] [address]   [phone]            Jul 17, 2018  8:00 AM CDT   [facility] Lab Draw with [facility] LAB DRAW   [facility] Lab Draw ([facility])    [address] [place] [address]  [address]  [facility] [address]   [phone]            Jul 17, 2018  8:30 AM CDT   (Arrive by 8:15 AM)   Return Visit with Gaurang Johnson MD   [facility] ([facility])    [address] [place] [address]  [address]  [facility] [address] [phone]              Who to contact     If you have questions or need follow up information about today's clinic visit or your schedule please contact [facility] directly at [phone].  Normal or non-critical lab and imaging results will be communicated to you by MyChart, letter or phone within 4 business days after the clinic has received the results. If you do not hear from us within 7 days, please contact the clinic through [employer] or phone. If you have a critical or abnormal lab result, we will notify you by phone as soon as possible.  Submit refill requests through [facility] or call your pharmacy and they will forward the refill request to us. Please allow 3 business days for your refill to be completed.          Additional Information About Your Visit        [employer] Information     [facility] lets you send messages to your doctor, view your test results, renew your prescriptions, schedule appointments and more. To sign up, go to [email or web address] . Click on \"Log in\" on the left side of the screen, which will take you to the Welcome page. Then click on \"Sign up Now\" on the right side of the page.     You will be asked to enter the access code listed below, as well as some personal information. Please follow the directions to create your username and password.     Your access code is: [email or web address]  Expires: 5/29/2018  7:30 " AM     Your access code will  in 90 days. If you need help or a new code, please call your [facility] or [phone].        Care EveryWhere ID     This is your Care EveryWhere ID. This could be used by other organizations to access your [place] medical records  [id number]        Your Vitals Were     Pulse Temperature Respirations Pulse Oximetry          88 98  F (36.7  C) (Oral) 18 98%         Blood Pressure from Last 3 Encounters:   18 114/71   18 92/70   18 116/82    Weight from Last 3 Encounters:   18 86.4 kg (190 lb 8 oz)   18 91.9 kg (202 lb 8 oz)   18 89.8 kg (198 lb)              We Performed the Following     Basic metabolic panel     Magnesium     Phosphorus        Primary Care Provider Office Phone # Fax #    Louisa Fry [phone]       [facility] [address]  [place] [address]        Equal Access to Services     [facility] Hospital[facility]WYER : Hadii aad ku hadasho Soomaali, waaxda luqadaha, qaybta kaalmada adeegyada, waxay idiin haykevinn jaz epps . So [facility] [phone].    ATENCIÓN: Si habla español, tiene a sheridan disposición servicios gratuitos de asistencia lingüística. [facility] [phone].    We comply with applicable federal civil rights laws and [place] laws. We do not discriminate on the basis of race, color, national origin, age, disability, sex, sexual orientation, or gender identity.            Thank you!     Thank you for choosing [facility]  for your care. Our goal is always to provide you with excellent care. Hearing back from our patients is one way we can continue to improve our services. Please take a few minutes to complete the written survey that you may receive in the mail after your visit with us. Thank you!             Your Updated Medication List - Protect others around you: Learn how to safely use, store and throw away your medicines at www.disposemymeds.org.          This list is accurate  as of 5/28/18  2:43 PM.  Always use your most recent med list.                   Brand Name Dispense Instructions for use Diagnosis    granisetron 1 MG tablet    KYTRIL    30 tablet    Take 1 tablet (1 mg) by mouth every 12 hours as needed for nausea    Sarcoma of soft tissue (H)       IBUPROFEN PO      Take 800 mg by mouth every 8 hours as needed for moderate pain        INDOMETHACIN PO      Take 50 mg by mouth 3 times daily as needed for moderate pain (2-3 times a day with food)        nystatin 395477 UNIT/ML suspension    MYCOSTATIN    473 mL    Take 5 mLs (500,000 Units) by mouth 4 times daily    Sarcoma (H), Thrush, Other pulmonary embolism without acute cor pulmonale, unspecified chronicity (H), Postoperative wound infection, subsequent encounter, 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)       PROCHLORPERAZINE MALEATE PO      Take 10 mg by mouth        * Rivaroxaban 15 & 20 MG Tbpk     51 each    Take 15 mg by mouth 2 times daily Take 15 mg twice a day for 2 weeks then 20 mg once a day    Sarcoma (H), Thrush, Other pulmonary embolism without acute cor pulmonale, unspecified chronicity (H), Postoperative wound infection, subsequent encounter, 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 Read the directions carefully, and ask your doctor or other care provider to review them with you.

## 2018-05-29 ENCOUNTER — INFUSION THERAPY VISIT (OUTPATIENT)
Dept: ONCOLOGY | Facility: CLINIC | Age: 58
End: 2018-05-29
Attending: INTERNAL MEDICINE
Payer: COMMERCIAL

## 2018-05-29 VITALS
SYSTOLIC BLOOD PRESSURE: 107 MMHG | DIASTOLIC BLOOD PRESSURE: 70 MMHG | OXYGEN SATURATION: 96 % | TEMPERATURE: 97.9 F | HEART RATE: 79 BPM | RESPIRATION RATE: 18 BRPM

## 2018-05-29 DIAGNOSIS — E86.0 DEHYDRATION: ICD-10-CM

## 2018-05-29 DIAGNOSIS — C49.9 SARCOMA (H): ICD-10-CM

## 2018-05-29 DIAGNOSIS — C49.21 SOFT TISSUE SARCOMA OF RIGHT THIGH (H): Primary | ICD-10-CM

## 2018-05-29 DIAGNOSIS — C49.9 SARCOMA OF SOFT TISSUE (H): ICD-10-CM

## 2018-05-29 DIAGNOSIS — T45.1X5A CHEMOTHERAPY-INDUCED NAUSEA AND VOMITING: ICD-10-CM

## 2018-05-29 DIAGNOSIS — R11.2 CHEMOTHERAPY-INDUCED NAUSEA AND VOMITING: ICD-10-CM

## 2018-05-29 LAB
ANION GAP SERPL CALCULATED.3IONS-SCNC: 10 MMOL/L (ref 3–14)
BUN SERPL-MCNC: 18 MG/DL (ref 7–30)
CALCIUM SERPL-MCNC: 9.6 MG/DL (ref 8.5–10.1)
CHLORIDE SERPL-SCNC: 109 MMOL/L (ref 94–109)
CO2 SERPL-SCNC: 19 MMOL/L (ref 20–32)
CREAT SERPL-MCNC: 0.94 MG/DL (ref 0.66–1.25)
GFR SERPL CREATININE-BSD FRML MDRD: 82 ML/MIN/1.7M2
GLUCOSE SERPL-MCNC: 128 MG/DL (ref 70–99)
MAGNESIUM SERPL-MCNC: 2.6 MG/DL (ref 1.6–2.3)
PHOSPHATE SERPL-MCNC: 1.9 MG/DL (ref 2.5–4.5)
POTASSIUM SERPL-SCNC: 2.7 MMOL/L (ref 3.4–5.3)
SODIUM SERPL-SCNC: 137 MMOL/L (ref 133–144)

## 2018-05-29 PROCEDURE — 96365 THER/PROPH/DIAG IV INF INIT: CPT

## 2018-05-29 PROCEDURE — 25000128 H RX IP 250 OP 636: Mod: ZF | Performed by: INTERNAL MEDICINE

## 2018-05-29 PROCEDURE — 96372 THER/PROPH/DIAG INJ SC/IM: CPT | Mod: 59

## 2018-05-29 PROCEDURE — 96375 TX/PRO/DX INJ NEW DRUG ADDON: CPT

## 2018-05-29 PROCEDURE — 83735 ASSAY OF MAGNESIUM: CPT | Performed by: INTERNAL MEDICINE

## 2018-05-29 PROCEDURE — 96361 HYDRATE IV INFUSION ADD-ON: CPT

## 2018-05-29 PROCEDURE — 96366 THER/PROPH/DIAG IV INF ADDON: CPT

## 2018-05-29 PROCEDURE — 80048 BASIC METABOLIC PNL TOTAL CA: CPT | Performed by: INTERNAL MEDICINE

## 2018-05-29 PROCEDURE — 84100 ASSAY OF PHOSPHORUS: CPT | Performed by: INTERNAL MEDICINE

## 2018-05-29 RX ORDER — HEPARIN SODIUM (PORCINE) LOCK FLUSH IV SOLN 100 UNIT/ML 100 UNIT/ML
500 SOLUTION INTRAVENOUS EVERY 8 HOURS
Status: DISCONTINUED | OUTPATIENT
Start: 2018-05-29 | End: 2018-05-29 | Stop reason: HOSPADM

## 2018-05-29 RX ORDER — POTASSIUM CHLORIDE 1500 MG/1
20 TABLET, EXTENDED RELEASE ORAL 3 TIMES DAILY
Qty: 42 TABLET | Refills: 0 | Status: SHIPPED | OUTPATIENT
Start: 2018-05-29 | End: 2018-06-12

## 2018-05-29 RX ORDER — PALONOSETRON 0.05 MG/ML
0.25 INJECTION, SOLUTION INTRAVENOUS ONCE
Status: CANCELLED
Start: 2018-05-29 | End: 2018-05-29

## 2018-05-29 RX ORDER — POLYETHYLENE GLYCOL 3350 17 G/17G
1 POWDER, FOR SOLUTION ORAL DAILY
Qty: 7 PACKET | Refills: 1 | Status: SHIPPED | OUTPATIENT
Start: 2018-05-29 | End: 2018-11-29

## 2018-05-29 RX ORDER — PALONOSETRON 0.05 MG/ML
0.25 INJECTION, SOLUTION INTRAVENOUS ONCE
Status: COMPLETED | OUTPATIENT
Start: 2018-05-29 | End: 2018-05-29

## 2018-05-29 RX ADMIN — SODIUM CHLORIDE, PRESERVATIVE FREE 500 UNITS: 5 INJECTION INTRAVENOUS at 17:24

## 2018-05-29 RX ADMIN — SODIUM CHLORIDE 1000 ML: 9 INJECTION, SOLUTION INTRAVENOUS at 13:18

## 2018-05-29 RX ADMIN — PALONOSETRON HYDROCHLORIDE 0.25 MG: 0.25 INJECTION INTRAVENOUS at 13:53

## 2018-05-29 RX ADMIN — POTASSIUM CHLORIDE 60 MEQ: 149 INJECTION, SOLUTION, CONCENTRATE INTRAVENOUS at 14:30

## 2018-05-29 RX ADMIN — PEGFILGRASTIM 6 MG: 6 INJECTION SUBCUTANEOUS at 17:23

## 2018-05-29 NOTE — MR AVS SNAPSHOT
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)    -  1    Chemotherapy-induced nausea and vomiting        Dehydration        Sarcoma of soft tissue (H)        Sarcoma (H)          Care Instructions    Contact Numbers    [facility] Main Line: [phone]  [facility] Triage and after hours / weekends / holidays:  [phone]      Please call the triage or after hours line if you experience a temperature greater than or equal to 100.5, shaking chills, have uncontrolled nausea, vomiting and/or diarrhea, dizziness, shortness of breath, chest pain, bleeding, unexplained bruising, or if you have any other new/concerning symptoms, questions or concerns.      If you are having any concerning symptoms or wish to speak to a provider before your next infusion visit, please call your care coordinator or triage to notify them so we can adequately serve you.     If you need a refill on a narcotic prescription or other medication, please call before your infusion appointment.       Get labs checked locally Wednesday 5/30 and Friday 6/1  (BMP, Mg, Phos)  Start taking Neutraphos and Potassium this evening.   Call with worsening nausea, vomiting or constipation.          May 2018   Tyler Monday Tuesday Wednesday Thursday Friday Saturday             1     [facility] ONC INFUSION 60    2:00 PM   (60 min.)    ONCOLOGY INFUSION   [facility]     LAB    5:15 PM   (15 min.)    LAB HOME INFUSION   [facility] Lab 2     3     4     5       6     7     8     9     10     11     12       13     14     15     16     17     18     19       20     21     22     [facility]   10[facility]30 [facility]   (15 min.)   [facility] LAB DRAW   M  Critical access [facility]onic Lab Draw     [facility] RETURN   10:45 AM   (30 min.)   Gaurang Johnson MD   [facility] EDP ONC INFUSION 180   12:30 PM   (180 min.)   UC ONCOLOGY INFUSION   [facility]     UMP RETURN    3:30 PM   (15 min.)   Brad Betts MD   [facility] 23     24     25     26       27     28     [facility] ONC INFUSION 120   10:00 AM   (120 min.)   UC ONCOLOGY INFUSION   [facility] 29     UMP ONC INFUSION 120    1:00 PM   (120 min.)   UC ONCOLOGY INFUSION   [facility] 30     31 June 2018 Sunday Monday Tuesday Wednesday Thursday Friday Saturday                            1     2       3     4     5     6     7     8     9       10     11     12     13     14     15     16       17     18     PET ONCOLOGY WHOLE BODY    8:15 AM   (45 min.)   UUPET1   [facility] [place] [facility] PET CT 19     20     [facility] LAB DRAW   12:15 PM   (15 min.)   [facility] LAB DRAW   [facility] Lab Draw     [facility] RETURN   12:45 PM   (30 min.)   Gaurang Johnson MD   [facility] EDP ONC INFUSION 180    2:00 PM   (180 min.)   UC ONCOLOGY INFUSION   [facility] 21     22     23       24     25     26     27     28     29     30                 Recent Results (from the past 24 hour(s))   Basic metabolic panel    Collection Time: 05/29/18  1:15 PM   Result Value Ref Range    Sodium 137 133 - 144 mmol/L    Potassium 2.7 (L) 3.4 - 5.3 mmol/L    Chloride 109 94 - 109 mmol/L    Carbon Dioxide 19 (L) 20 - 32 mmol/L    Anion Gap 10 3 - 14 mmol/L    Glucose 128 (H) 70 - 99 mg/dL    Urea Nitrogen 18 7 - 30 mg/dL    Creatinine 0.94 0.66 - 1.25 mg/dL    GFR Estimate 82 >60 mL/min/1.7m2    GFR Estimate If Black >90 >60 mL/min/1.7m2    Calcium 9.6 8.5 - 10.1 mg/dL   Magnesium    Collection Time: 05/29/18  1:15 PM   Result Value Ref Range    Magnesium 2.6 (H) 1.6 - 2.3 mg/dL    Phosphorus    Collection Time: 05/29/18  1:15 PM   Result Value Ref Range    Phosphorus 1.9 (L) 2.5 - 4.5 mg/dL               Follow-ups after your visit        Your next 10 appointments already scheduled     Jun 18, 2018  8:15 AM CDT   PET ONCOLOGY WHOLE BODY with UUPET1   [facility] PET CT ([facility])    [address] [address] [phone]           Tell your doctor:   If there is any chance you may be pregnant or if you are breastfeeding.   If you have problems lying in small spaces (claustrophobia). If you do, your doctor may give you medicine to help you relax. If you have diabetes:   Have your exam early in the morning. Your blood glucose will go up as the day goes by.   Your glucose level must be 180 or less at the start of the exam. Please take any medicines you need to ensure this blood glucose level.   If you are taking insulin in the morning take with breakfast by 6 am and schedule procedure between 12 and 2:15 pm.   If you are taking insulin at night take nightly dose, fast overnight, schedule procedure before 10 am.   If you take insulin both morning and night take morning dose by 6am and schedule procedure between 12 and 2:15 pm.   24 hours before your scan: Don t do any heavy exercise. (No jogging, aerobics or other workouts.) Exercise will make your pictures less accurate.  At least 7 hours before your scan, or the evening before if you have an early appointment: Eat a low carb, high protein meal (Lean meats, seafood, beans, soy, low-fat dairy, eggs, nuts & seeds). 6 hours before your scan:   Stop all food and liquids (except water).   Do not chew gum or suck on mints.   If you need to take medicine with food, you may take it with a few crackers.  Please call your Imaging Department at your exam site with any questions.            Jun 20, 2018 12:15 PM T   [facility] Lab Draw with  [facility] LAB DRAW   [facility]  Draw ([facility])    [address]  [address]  [place] [address]   [phone]            Jun 20, 2018  1:00 PM CDT   (Arrive by 12:45 PM)   Return Visit with Gaurang Johnson MD   [facility] ([facility])    [address]  [address]  [place] [address]   [phone]            Jun 20, 2018  2:00 PM CDT   Infusion 180 with UC ONCOLOGY INFUSION, UC 11 ATC   [facility] [place] ([facility])    [address]  [address]  [place] [address]   [phone]            Jul 17, 2018  8:00 AM CDT   [facility] Lab Draw with  [facility] LAB DRAW   [facility] Lab Draw ([facility])    [address]  [address]  [place] [address]   [phone]            Jul 17, 2018  8:30 AM CDT   (Arrive by 8:15 AM)   Return Visit with Gaurang Johnson MD   [facility] ([facility])    [address]  [address]  [place] [address]   [phone]              Future tests that were ordered for you today     Open Future Orders        Priority Expected Expires Ordered    Basic metabolic panel Routine 6/1/2018 5/29/2019 5/29/2018    Magnesium Routine 6/1/2018 5/29/2019 5/29/2018    Phosphorus Routine 6/1/2018 5/29/2019 5/29/2018    Basic metabolic panel Routine 5/30/2018 5/29/2019 5/29/2018    Magnesium Routine 5/30/2018 5/29/2019 5/29/2018    Phosphorus Routine 5/30/2018 5/29/2019 5/29/2018            Who to contact     If you have questions or need follow up information about today's clinic visit or your schedule please contact [facility] directly at [phone].  Normal or non-critical lab and imaging results will be communicated to you by MyChart, letter or phone within 4 business days after the clinic has received the results. If you do not hear from us within 7  days, please contact the clinic through [facility] or phone. If you have a critical or abnormal lab result, we will notify you by phone as soon as possible.  Submit refill requests through [facility] or call your pharmacy and they will forward the refill request to us. Please allow 3 business days for your refill to be completed.          Additional Information About Your Visit        Care EveryWhere ID     This is your Care EveryWhere ID. This could be used by other organizations to access your [place] medical records  [id number]        Your Vitals Were     Pulse Temperature Respirations Pulse Oximetry          79 97.9  F (36.6  C) (Oral) 18 96%         Blood Pressure from Last 3 Encounters:   05/29/18 107/70   05/28/18 114/71   05/22/18 92/70    Weight from Last 3 Encounters:   05/22/18 86.4 kg (190 lb 8 oz)   05/01/18 91.9 kg (202 lb 8 oz)   04/24/18 89.8 kg (198 lb)              We Performed the Following     Basic metabolic panel     Magnesium     Phosphorus     Road Map Alert          Today's Medication Changes          These changes are accurate as of 5/29/18  5:31 PM.  If you have any questions, ask your nurse or doctor.               Start taking these medicines.        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        Dose/Directions    * potassium chloride SA 20 MEQ CR tablet   Commonly known as:  KLOR-CON   This may have changed:  Another medication with the same name was added. Make sure you understand how and  when to take each.   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 Make sure you understand how and when to take each.   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 Read the directions carefully, and ask your doctor or other care provider to review them with you.         Where to get your medicines      These medications were sent to [address] [place] [address] [address] [address]    Hours:  TRANSPLANT PHONE NUMBER [phone] Phone:  [phone]     polyethylene glycol Packet    potassium & sodium phosphates 280-160-250 MG Packet    potassium chloride SA 20 MEQ CR tablet                Primary Care Provider Office Phone # Fax #    Louisa Fry [phone]       [facility] [address]  [facility] [address]        Equal Access to Services     DORIAN TOLENTINO AH: Hadii troy ku hadasho Soomaali, waaxda luqadaha, qaybta kaalmada adeegyada, mendez hicks haykevinn jaz mcnair. So [facility] [phone].    ATENCIÓN: Si habla español, tiene a sheridan disposición servicios gratuitos de asistencia lingüística. Llame al [phone].    We comply with applicable federal civil rights laws and [place] laws. We do not discriminate on the basis of race, color, national origin, age, disability, sex, sexual orientation, or gender identity.            Thank you!     Thank you for choosing [facility]  CLINIC  for your care. Our goal is always to provide you with excellent care. Hearing back from our patients is one way we can continue to improve our services. Please take a few minutes to complete the written survey that you may receive in the mail after your visit with us. Thank you!             Your Updated Medication List - Protect others around you: Learn how to safely use, store and throw away your medicines at www.disposemymeds.org.          This list is accurate as of 5/29/18  5:31 PM.  Always use your most recent med list.                   Brand Name Dispense Instructions for use Diagnosis    granisetron 1 MG tablet    KYTRIL    30 tablet    Take 1 tablet (1 mg) by mouth every 12 hours as needed for nausea    Sarcoma of soft tissue (H)       IBUPROFEN PO      Take 800 mg by mouth every 8 hours as needed for moderate pain        INDOMETHACIN PO      Take 50 mg by mouth 3 times daily as needed for moderate pain (2-3 times a day with food)        nystatin 418341 UNIT/ML suspension    MYCOSTATIN    473 mL    Take 5 mLs (500,000 Units) by mouth 4 times daily    Sarcoma (H), Thrush, Other pulmonary embolism without acute cor pulmonale, unspecified chronicity (H), Postoperative wound infection, subsequent encounter, 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)       * potassium chloride SA 20 MEQ CR tablet    KLOR-CON    42 tablet    Take 1 tablet (20 mEq) by mouth 3 times daily for 14 days    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)       PROCHLORPERAZINE MALEATE PO      Take 10 mg by mouth        * Rivaroxaban 15 & 20 MG Tbpk     51 each    Take 15 mg by mouth 2 times daily Take 15 mg twice a day for 2 weeks then 20 mg once a day    Sarcoma (H), Thrush, Other pulmonary embolism without acute cor pulmonale, unspecified chronicity (H), Postoperative wound infection, subsequent encounter, 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 Read the directions carefully, and ask your doctor or other care provider to review them with you.

## 2018-05-29 NOTE — PATIENT INSTRUCTIONS
Contact Numbers    [facility] Main Line: [phone]  [facility] Triage and after hours / weekends / holidays:  [phone]      Please call the triage or after hours line if you experience a temperature greater than or equal to 100.5, shaking chills, have uncontrolled nausea, vomiting and/or diarrhea, dizziness, shortness of breath, chest pain, bleeding, unexplained bruising, or if you have any other new/concerning symptoms, questions or concerns.      If you are having any concerning symptoms or wish to speak to a provider before your next infusion visit, please call your care coordinator or triage to notify them so we can adequately serve you.     If you need a refill on a narcotic prescription or other medication, please call before your infusion appointment.       Get labs checked locally Wednesday 5/30 and Friday 6/1  (BMP, Mg, Phos)  Start taking Neutraphos and Potassium this evening.   Call with worsening nausea, vomiting or constipation.          May 2018   Tyler Monday Tuesday Wednesday Thursday Friday Saturday             1     P ONC INFUSION 60    2:00 PM   (60 min.)    ONCOLOGY INFUSION   [facility]     LAB    5:15 PM   (15 min.)    LAB HOME INFUSION   [facility] Lab 2     3     4     5       6     7     8     9     10     11     12       13     14     15     16     17     18     19       20     21     22     [facility] LAB DRAW   10:30 AM   (15 min.)   [facility] LAB DRAW   [facility] Lab Draw     [facility] RETURN   10:45 AM   (30 min.)   Gaurang Johnson MD   [facility] ONC INFUSION 180   12:30 PM   (180 min.)    ONCOLOGY INFUSION   [facility] RETURN    3:30 PM   (15 min.)   Brad Betts MD   [facility] 23     24     25     26       27     28     [facility] ONC INFUSION 120   10:00 AM   (120 min.)    ONCOLOGY INFUSION   [facility] 29     UMP ONC INFUSION 120    1:00 PM    (120 min.)   UC ONCOLOGY INFUSION   [facility] 30 31 June 2018 Sunday Monday Tuesday Wednesday Thursday Friday Saturday                            1     2       3     4     5     6     7     8     9       10     11     12     13     14     15     16       17     18     PET ONCOLOGY WHOLE BODY    8:15 AM   (45 min.)   UUPET1   [facility] PET CT 19     20     [facility] DRAW   12:15 PM   (15 min.)   [facility] LAB DRAW   [facility] Lab Draw     [facility] RET[facility]   12:45 PM   (30 min.)   Gaurang Johnson MD   [facility] ONC INFUSION 180    2:00 PM   (180 min.)    ONCOLOGY INFUSION   [facility] 21     22     23       24     25     26     27     28     29     30                 Recent Results (from the past 24 hour(s))   Basic metabolic panel    Collection Time: 05/29/18  1:15 PM   Result Value Ref Range    Sodium 137 133 - 144 mmol/L    Potassium 2.7 (L) 3.4 - 5.3 mmol/L    Chloride 109 94 - 109 mmol/L    Carbon Dioxide 19 (L) 20 - 32 mmol/L    Anion Gap 10 3 - 14 mmol/L    Glucose 128 (H) 70 - 99 mg/dL    Urea Nitrogen 18 7 - 30 mg/dL    Creatinine 0.94 0.66 - 1.25 mg/dL    GFR Estimate 82 >60 mL/min/1.7m2    GFR Estimate If Black >90 >60 mL/min/1.7m2    Calcium 9.6 8.5 - 10.1 mg/dL   Magnesium    Collection Time: 05/29/18  1:15 PM   Result Value Ref Range    Magnesium 2.6 (H) 1.6 - 2.3 mg/dL   Phosphorus    Collection Time: 05/29/18  1:15 PM   Result Value Ref Range    Phosphorus 1.9 (L) 2.5 - 4.5 mg/dL

## 2018-05-30 ENCOUNTER — TRANSFERRED RECORDS (OUTPATIENT)
Dept: HEALTH INFORMATION MANAGEMENT | Facility: CLINIC | Age: 58
End: 2018-05-30

## 2018-05-30 ENCOUNTER — TELEPHONE (OUTPATIENT)
Dept: ORTHOPEDICS | Facility: CLINIC | Age: 58
End: 2018-05-30

## 2018-05-30 ENCOUNTER — CARE COORDINATION (OUTPATIENT)
Dept: ONCOLOGY | Facility: CLINIC | Age: 58
End: 2018-05-30

## 2018-05-30 LAB
ALBUMIN SERPL-MCNC: 2.9 G/DL
ALP SERPL-CCNC: 148 U/L
ALT SERPL-CCNC: 34 U/L
ANION GAP SERPL CALCULATED.3IONS-SCNC: ABNORMAL MMOL/L
AST SERPL-CCNC: 37 U/L
BILIRUB SERPL-MCNC: 0.5 MG/DL
BUN SERPL-MCNC: 15 MG/DL
CALCIUM SERPL-MCNC: 9.8 MG/DL
CHLORIDE SERPLBLD-SCNC: 106 MMOL/L
CO2 SERPL-SCNC: ABNORMAL MMOL/L
CREAT SERPL-MCNC: 1 MG/DL
GFR SERPL CREATININE-BSD FRML MDRD: >60 ML/MIN/1.73M2
GLOBULIN: 4.2 G/DL
GLUCOSE SERPL-MCNC: 113 MG/DL (ref 70–99)
HCO3 BLD-SCNC: 21 MMOL/L
MAGNESIUM SERPL-MCNC: 2.3 MG/DL
PHOSPHATE SERPL-MCNC: 2.2 MG/DL
POTASSIUM SERPL-SCNC: 3 MMOL/L
PROT SERPL-MCNC: 7.1 G/DL
SODIUM SERPL-SCNC: 137 MMOL/L

## 2018-05-30 NOTE — TELEPHONE ENCOUNTER
Health Call Center    Phone Message    May a detailed message be left on voicemail: yes    Reason for Call: Other: Brittany would like advise for Pts wound.  Brittany explained that the wound is not responding, and recommends to continure debridement.      Action Taken: Message routed to:  [facility] Ortho     5/30/18 -  Spoke with Brittany, Home Care Nurse.  She will continue with gentle debriding solution in an attempt to decrease amount of [place] from David's wound.  He has one more round of chemotherapy - then we need to determine plan for surgery/radiation.

## 2018-05-30 NOTE — PROGRESS NOTES
Called and [facility] for Roslyn and David to remind them about getting his labs done today and Friday at [facility].  Asked if he can go earlier today so the labs can be resulted and see if he needs to go in tomorrow.  Asked for a callback.

## 2018-06-01 ENCOUNTER — TRANSFERRED RECORDS (OUTPATIENT)
Dept: HEALTH INFORMATION MANAGEMENT | Facility: CLINIC | Age: 58
End: 2018-06-01

## 2018-06-01 ENCOUNTER — APPOINTMENT (OUTPATIENT)
Dept: LAB | Facility: CLINIC | Age: 58
End: 2018-06-01
Attending: INTERNAL MEDICINE
Payer: COMMERCIAL

## 2018-06-01 LAB
ALBUMIN SERPL-MCNC: 2.8 G/DL
ALP SERPL-CCNC: 155 U/L
ALT SERPL-CCNC: 38 U/L
ANION GAP SERPL CALCULATED.3IONS-SCNC: ABNORMAL MMOL/L
AST SERPL-CCNC: 26 U/L
BILIRUB SERPL-MCNC: 0.5 MG/DL
BUN SERPL-MCNC: 15 MG/DL
CALCIUM SERPL-MCNC: 9.2 MG/DL
CHLORIDE SERPLBLD-SCNC: 102 MMOL/L
CO2 SERPL-SCNC: 23 MMOL/L
CREAT SERPL-MCNC: 0.82 MG/DL
GFR SERPL CREATININE-BSD FRML MDRD: >60 ML/MIN/1.73M2
GLOBULIN: 4.1 G/DL
GLUCOSE SERPL-MCNC: 107 MG/DL (ref 70–99)
MAGNESIUM SERPL-MCNC: 2.2 MG/DL
PHOSPHATE SERPL-MCNC: 2 MG/DL
POTASSIUM SERPL-SCNC: 3.2 MMOL/L
PROT SERPL-MCNC: 6.9 G/DL
SODIUM SERPL-SCNC: 135 MMOL/L

## 2018-06-08 ENCOUNTER — TRANSFERRED RECORDS (OUTPATIENT)
Dept: HEALTH INFORMATION MANAGEMENT | Facility: CLINIC | Age: 58
End: 2018-06-08

## 2018-06-08 ENCOUNTER — CARE COORDINATION (OUTPATIENT)
Dept: ONCOLOGY | Facility: CLINIC | Age: 58
End: 2018-06-08

## 2018-06-08 LAB
ALBUMIN SERPL-MCNC: 2.9 G/DL
ALP SERPL-CCNC: 139 U/L
ALT SERPL-CCNC: 43 U/L
ANION GAP SERPL CALCULATED.3IONS-SCNC: ABNORMAL MMOL/L
AST SERPL-CCNC: 29 U/L
BILIRUB SERPL-MCNC: 0.3 MG/DL
BUN SERPL-MCNC: 11 MG/DL
CALCIUM SERPL-MCNC: 9.8 MG/DL
CHLORIDE SERPLBLD-SCNC: 101 MMOL/L
CO2 SERPL-SCNC: ABNORMAL MMOL/L
CREAT SERPL-MCNC: 0.7 MG/DL
GFR SERPL CREATININE-BSD FRML MDRD: >60 ML/MIN/1.73M2
GLOBULIN: 4.2 G/DL
GLUCOSE SERPL-MCNC: 111 MG/DL (ref 70–99)
HCO3 BLD-SCNC: 26 MMOL/L
MAGNESIUM SERPL-MCNC: 2 MG/DL
PHOSPHATE SERPL-MCNC: 5.2 MG/DL
POTASSIUM SERPL-SCNC: 4.5 MMOL/L
PROT SERPL-MCNC: 7.1 G/DL
SODIUM SERPL-SCNC: 134 MMOL/L

## 2018-06-08 NOTE — PROGRESS NOTES
Called and [facility] for Lea to ask how much potassium David is taking.  His last labs from 6/1/18 show a K+ of 3.2.  Also let her know that  would like for him to get his labs rechecked this week.  Asked for a callback.

## 2018-06-18 ENCOUNTER — HOSPITAL ENCOUNTER (OUTPATIENT)
Dept: PET IMAGING | Facility: CLINIC | Age: 58
Discharge: HOME | End: 2018-06-18
Attending: INTERNAL MEDICINE | Admitting: INTERNAL MEDICINE
Payer: COMMERCIAL

## 2018-06-18 DIAGNOSIS — Z87.891 PERSONAL HISTORY OF TOBACCO USE, PRESENTING HAZARDS TO HEALTH: ICD-10-CM

## 2018-06-18 DIAGNOSIS — M10.00 IDIOPATHIC GOUT, UNSPECIFIED CHRONICITY, UNSPECIFIED SITE: ICD-10-CM

## 2018-06-18 DIAGNOSIS — M79.604 PAIN OF RIGHT LOWER EXTREMITY: ICD-10-CM

## 2018-06-18 DIAGNOSIS — R91.8 PULMONARY NODULES: ICD-10-CM

## 2018-06-18 DIAGNOSIS — T81.328D: ICD-10-CM

## 2018-06-18 DIAGNOSIS — C49.21 SOFT TISSUE SARCOMA OF RIGHT THIGH (H): ICD-10-CM

## 2018-06-18 DIAGNOSIS — C49.9 SARCOMA OF SOFT TISSUE (H): ICD-10-CM

## 2018-06-18 DIAGNOSIS — Z86.711 HISTORY OF PULMONARY EMBOLISM: ICD-10-CM

## 2018-06-18 LAB
GLUCOSE BLDC GLUCOMTR-MCNC: 103 MG/DL (ref 70–99)
RADIOLOGIST FLAGS: NORMAL

## 2018-06-18 PROCEDURE — 74177 CT ABD & PELVIS W/CONTRAST: CPT | Mod: PS

## 2018-06-18 PROCEDURE — 34300033 ZZH RX 343: Performed by: INTERNAL MEDICINE

## 2018-06-18 PROCEDURE — 25000128 H RX IP 250 OP 636: Performed by: INTERNAL MEDICINE

## 2018-06-18 PROCEDURE — 82962 GLUCOSE BLOOD TEST: CPT

## 2018-06-18 PROCEDURE — A9552 F18 FDG: HCPCS | Performed by: INTERNAL MEDICINE

## 2018-06-18 RX ORDER — IOPAMIDOL 755 MG/ML
45-150 INJECTION, SOLUTION INTRAVASCULAR ONCE
Status: COMPLETED | OUTPATIENT
Start: 2018-06-18 | End: 2018-06-18

## 2018-06-18 RX ORDER — HEPARIN SODIUM (PORCINE) LOCK FLUSH IV SOLN 100 UNIT/ML 100 UNIT/ML
500 SOLUTION INTRAVENOUS ONCE
Status: DISCONTINUED | OUTPATIENT
Start: 2018-06-18 | End: 2018-06-18 | Stop reason: CLARIF

## 2018-06-18 RX ADMIN — IOPAMIDOL 116 ML: 755 INJECTION, SOLUTION INTRAVENOUS at 09:25

## 2018-06-18 RX ADMIN — FLUDEOXYGLUCOSE F-18 12.06 MCI.: 500 INJECTION, SOLUTION INTRAVENOUS at 09:26

## 2018-06-20 ENCOUNTER — INFUSION THERAPY VISIT (OUTPATIENT)
Dept: ONCOLOGY | Facility: CLINIC | Age: 58
End: 2018-06-20
Attending: INTERNAL MEDICINE
Payer: COMMERCIAL

## 2018-06-20 ENCOUNTER — APPOINTMENT (OUTPATIENT)
Dept: LAB | Facility: CLINIC | Age: 58
End: 2018-06-20
Attending: INTERNAL MEDICINE
Payer: COMMERCIAL

## 2018-06-20 VITALS
DIASTOLIC BLOOD PRESSURE: 60 MMHG | BODY MASS INDEX: 25.62 KG/M2 | HEART RATE: 131 BPM | TEMPERATURE: 97.8 F | SYSTOLIC BLOOD PRESSURE: 97 MMHG | RESPIRATION RATE: 16 BRPM | HEIGHT: 70 IN | WEIGHT: 179 LBS | OXYGEN SATURATION: 98 %

## 2018-06-20 DIAGNOSIS — Z86.711 HISTORY OF PULMONARY EMBOLISM: ICD-10-CM

## 2018-06-20 DIAGNOSIS — C49.21 SOFT TISSUE SARCOMA OF RIGHT THIGH (H): Primary | ICD-10-CM

## 2018-06-20 DIAGNOSIS — C49.21 SOFT TISSUE SARCOMA OF RIGHT THIGH (H): ICD-10-CM

## 2018-06-20 DIAGNOSIS — B37.0 THRUSH: ICD-10-CM

## 2018-06-20 DIAGNOSIS — R91.8 PULMONARY NODULES: ICD-10-CM

## 2018-06-20 DIAGNOSIS — I26.99 OTHER PULMONARY EMBOLISM WITHOUT ACUTE COR PULMONALE, UNSPECIFIED CHRONICITY (H): ICD-10-CM

## 2018-06-20 DIAGNOSIS — Z87.891 PERSONAL HISTORY OF TOBACCO USE, PRESENTING HAZARDS TO HEALTH: ICD-10-CM

## 2018-06-20 DIAGNOSIS — K63.9 LESION OF COLON: Primary | ICD-10-CM

## 2018-06-20 DIAGNOSIS — M10.00 IDIOPATHIC GOUT, UNSPECIFIED CHRONICITY, UNSPECIFIED SITE: ICD-10-CM

## 2018-06-20 DIAGNOSIS — M79.604 PAIN OF RIGHT LOWER EXTREMITY: ICD-10-CM

## 2018-06-20 DIAGNOSIS — R11.2 CHEMOTHERAPY-INDUCED NAUSEA AND VOMITING: Primary | ICD-10-CM

## 2018-06-20 DIAGNOSIS — C49.9 SARCOMA OF SOFT TISSUE (H): ICD-10-CM

## 2018-06-20 DIAGNOSIS — E86.0 DEHYDRATION: ICD-10-CM

## 2018-06-20 DIAGNOSIS — T45.1X5A CHEMOTHERAPY-INDUCED NAUSEA AND VOMITING: Primary | ICD-10-CM

## 2018-06-20 LAB
ALBUMIN SERPL-MCNC: 3.1 G/DL (ref 3.4–5)
ALP SERPL-CCNC: 133 U/L (ref 40–150)
ALT SERPL W P-5'-P-CCNC: 24 U/L (ref 0–70)
ANION GAP SERPL CALCULATED.3IONS-SCNC: 11 MMOL/L (ref 3–14)
AST SERPL W P-5'-P-CCNC: 17 U/L (ref 0–45)
BASOPHILS # BLD AUTO: 0.1 10E9/L (ref 0–0.2)
BASOPHILS NFR BLD AUTO: 1.8 %
BILIRUB SERPL-MCNC: 0.3 MG/DL (ref 0.2–1.3)
BUN SERPL-MCNC: 6 MG/DL (ref 7–30)
CALCIUM SERPL-MCNC: 9.6 MG/DL (ref 8.5–10.1)
CHLORIDE SERPL-SCNC: 105 MMOL/L (ref 94–109)
CO2 SERPL-SCNC: 23 MMOL/L (ref 20–32)
CREAT SERPL-MCNC: 0.74 MG/DL (ref 0.66–1.25)
DIFFERENTIAL METHOD BLD: ABNORMAL
EOSINOPHIL # BLD AUTO: 0 10E9/L (ref 0–0.7)
EOSINOPHIL NFR BLD AUTO: 0.5 %
ERYTHROCYTE [DISTWIDTH] IN BLOOD BY AUTOMATED COUNT: 21.2 % (ref 10–15)
GFR SERPL CREATININE-BSD FRML MDRD: >90 ML/MIN/1.7M2
GLUCOSE SERPL-MCNC: 117 MG/DL (ref 70–99)
HCT VFR BLD AUTO: 31.2 % (ref 40–53)
HGB BLD-MCNC: 9.6 G/DL (ref 13.3–17.7)
IMM GRANULOCYTES # BLD: 0 10E9/L (ref 0–0.4)
IMM GRANULOCYTES NFR BLD: 0.7 %
LDH SERPL L TO P-CCNC: 135 U/L (ref 85–227)
LYMPHOCYTES # BLD AUTO: 1.1 10E9/L (ref 0.8–5.3)
LYMPHOCYTES NFR BLD AUTO: 19 %
MAGNESIUM SERPL-MCNC: 2.1 MG/DL (ref 1.6–2.3)
MCH RBC QN AUTO: 25.9 PG (ref 26.5–33)
MCHC RBC AUTO-ENTMCNC: 30.8 G/DL (ref 31.5–36.5)
MCV RBC AUTO: 84 FL (ref 78–100)
MONOCYTES # BLD AUTO: 0.8 10E9/L (ref 0–1.3)
MONOCYTES NFR BLD AUTO: 13.2 %
NEUTROPHILS # BLD AUTO: 3.7 10E9/L (ref 1.6–8.3)
NEUTROPHILS NFR BLD AUTO: 64.8 %
NRBC # BLD AUTO: 0 10*3/UL
NRBC BLD AUTO-RTO: 0 /100
PHOSPHATE SERPL-MCNC: 3.4 MG/DL (ref 2.5–4.5)
PLATELET # BLD AUTO: 451 10E9/L (ref 150–450)
POTASSIUM SERPL-SCNC: 3.8 MMOL/L (ref 3.4–5.3)
PROT SERPL-MCNC: 7.5 G/DL (ref 6.8–8.8)
RBC # BLD AUTO: 3.71 10E12/L (ref 4.4–5.9)
SODIUM SERPL-SCNC: 139 MMOL/L (ref 133–144)
WBC # BLD AUTO: 5.7 10E9/L (ref 4–11)

## 2018-06-20 PROCEDURE — 84100 ASSAY OF PHOSPHORUS: CPT | Performed by: INTERNAL MEDICINE

## 2018-06-20 PROCEDURE — 83735 ASSAY OF MAGNESIUM: CPT | Performed by: INTERNAL MEDICINE

## 2018-06-20 PROCEDURE — 83615 LACTATE (LD) (LDH) ENZYME: CPT | Performed by: INTERNAL MEDICINE

## 2018-06-20 PROCEDURE — 80053 COMPREHEN METABOLIC PANEL: CPT | Performed by: INTERNAL MEDICINE

## 2018-06-20 PROCEDURE — 85025 COMPLETE CBC W/AUTO DIFF WBC: CPT | Performed by: INTERNAL MEDICINE

## 2018-06-20 PROCEDURE — 25000128 H RX IP 250 OP 636: Mod: ZF | Performed by: INTERNAL MEDICINE

## 2018-06-20 PROCEDURE — 99215 OFFICE O/P EST HI 40 MIN: CPT | Mod: ZP | Performed by: INTERNAL MEDICINE

## 2018-06-20 PROCEDURE — 96375 TX/PRO/DX INJ NEW DRUG ADDON: CPT

## 2018-06-20 PROCEDURE — G0463 HOSPITAL OUTPT CLINIC VISIT: HCPCS | Mod: ZF

## 2018-06-20 PROCEDURE — 96413 CHEMO IV INFUSION 1 HR: CPT

## 2018-06-20 PROCEDURE — G0498 CHEMO EXTEND IV INFUS W/PUMP: HCPCS

## 2018-06-20 PROCEDURE — 96415 CHEMO IV INFUSION ADDL HR: CPT

## 2018-06-20 PROCEDURE — 96367 TX/PROPH/DG ADDL SEQ IV INF: CPT

## 2018-06-20 RX ORDER — MEPERIDINE HYDROCHLORIDE 25 MG/ML
25 INJECTION INTRAMUSCULAR; INTRAVENOUS; SUBCUTANEOUS EVERY 30 MIN PRN
Status: CANCELLED | OUTPATIENT
Start: 2018-06-20

## 2018-06-20 RX ORDER — SODIUM CHLORIDE 9 MG/ML
1000 INJECTION, SOLUTION INTRAVENOUS CONTINUOUS PRN
Status: CANCELLED
Start: 2018-06-20

## 2018-06-20 RX ORDER — PALONOSETRON 0.05 MG/ML
0.25 INJECTION, SOLUTION INTRAVENOUS ONCE
Status: COMPLETED | OUTPATIENT
Start: 2018-06-20 | End: 2018-06-20

## 2018-06-20 RX ORDER — LIDOCAINE HYDROCHLORIDE 40 MG/ML
SOLUTION TOPICAL
COMMUNITY
Start: 2018-05-09 | End: 2018-06-26

## 2018-06-20 RX ORDER — METHYLPREDNISOLONE SODIUM SUCCINATE 125 MG/2ML
125 INJECTION, POWDER, LYOPHILIZED, FOR SOLUTION INTRAMUSCULAR; INTRAVENOUS
Status: CANCELLED
Start: 2018-06-20

## 2018-06-20 RX ORDER — ALBUTEROL SULFATE 0.83 MG/ML
2.5 SOLUTION RESPIRATORY (INHALATION)
Status: CANCELLED | OUTPATIENT
Start: 2018-06-20

## 2018-06-20 RX ORDER — PALONOSETRON 0.05 MG/ML
0.25 INJECTION, SOLUTION INTRAVENOUS ONCE
Status: CANCELLED
Start: 2018-06-20

## 2018-06-20 RX ORDER — POTASSIUM CHLORIDE 1500 MG/1
20 TABLET, EXTENDED RELEASE ORAL DAILY
Qty: 5 TABLET | Refills: 0 | Status: SHIPPED | OUTPATIENT
Start: 2018-06-20 | End: 2018-06-26

## 2018-06-20 RX ORDER — DIPHENHYDRAMINE HYDROCHLORIDE 50 MG/ML
50 INJECTION INTRAMUSCULAR; INTRAVENOUS
Status: CANCELLED
Start: 2018-06-20

## 2018-06-20 RX ORDER — ALBUTEROL SULFATE 90 UG/1
1-2 AEROSOL, METERED RESPIRATORY (INHALATION)
Status: CANCELLED
Start: 2018-06-20

## 2018-06-20 RX ORDER — HEPARIN SODIUM (PORCINE) LOCK FLUSH IV SOLN 100 UNIT/ML 100 UNIT/ML
5 SOLUTION INTRAVENOUS
Status: COMPLETED | OUTPATIENT
Start: 2018-06-20 | End: 2018-06-20

## 2018-06-20 RX ORDER — LORAZEPAM 2 MG/ML
0.5 INJECTION INTRAMUSCULAR EVERY 4 HOURS PRN
Status: CANCELLED
Start: 2018-06-20

## 2018-06-20 RX ORDER — EPINEPHRINE 1 MG/ML
0.3 INJECTION, SOLUTION INTRAMUSCULAR; SUBCUTANEOUS EVERY 5 MIN PRN
Status: CANCELLED | OUTPATIENT
Start: 2018-06-20

## 2018-06-20 RX ORDER — EPINEPHRINE 0.3 MG/.3ML
0.3 INJECTION SUBCUTANEOUS EVERY 5 MIN PRN
Status: CANCELLED | OUTPATIENT
Start: 2018-06-20

## 2018-06-20 RX ADMIN — SODIUM CHLORIDE, PRESERVATIVE FREE 5 ML: 5 INJECTION INTRAVENOUS at 12:16

## 2018-06-20 RX ADMIN — SODIUM CHLORIDE 150 MG: 9 INJECTION, SOLUTION INTRAVENOUS at 14:32

## 2018-06-20 RX ADMIN — PALONOSETRON HYDROCHLORIDE 0.25 MG: 0.25 INJECTION INTRAVENOUS at 15:01

## 2018-06-20 RX ADMIN — DOXORUBICIN HYDROCHLORIDE 100 MG: 2 INJECTABLE, LIPOSOMAL INTRAVENOUS at 15:06

## 2018-06-20 RX ADMIN — DEXTROSE MONOHYDRATE 250 ML: 50 INJECTION, SOLUTION INTRAVENOUS at 14:31

## 2018-06-20 ASSESSMENT — PAIN SCALES - GENERAL: PAINLEVEL: MILD PAIN (2)

## 2018-06-20 NOTE — MR AVS SNAPSHOT
After Visit Summary   6/20/2018    Hiram Tapia    MRN: [id number]           Patient Information     Date Of Birth          1960        Visit Information        Provider Department      6/20/2018 2:00 PM  11 ATC;  ONCOLOGY INFUSION [facility]        Today's Diagnoses     Chemotherapy-induced nausea and vomiting    -  1    Dehydration        Sarcoma of soft tissue (H)          Care Instructions    Contact Numbers  [facility]: [phone]    After Hours:  [phone]  Triage: [phone]    Please call the [facility] Triage line if you experience a temperature greater than or equal to 100.5, shaking chills, have uncontrolled nausea, vomiting and/or diarrhea, dizziness, shortness of breath, chest pain, bleeding, unexplained bruising, or if you have any other new/concerning symptoms, questions or concerns.     If it is after hours, weekends, or holidays, please call the main hospital  at  [phone] and ask to speak to the Oncology doctor on call.     If you are having any concerning symptoms or wish to speak to a provider before your next infusion visit, please call your care coordinator or triage to notify them so we can adequately serve you.     If you need a refill on a narcotic prescription or other medication, please call triage before your infusion appointment.             June 2018 Sunday Monday Tuesday Wednesday Thursday Friday Saturday                            1     LAB    9:30 AM   (15 min.)    LAB HOME INFUSION   [facility] Lab 2       3     4     5     6     7     8     9       10     11     12     13     14     15     16       17     18     PET ONCOLOGY WHOLE BODY    8:15 AM   (45 min.)   UUPET1   [facility] PET CT 19     20     [facility]   12[facility]15 PM   (15 min.)    [facility]   [facility]     [facility] RETURN   12:45 PM   (30 min.)   Gaurang Johnson MD   [facility]  [facility]     [facility] ONC INFUSION 180    2:00 PM   (180 min.)   [facility] ONCOLOGY INFUSION   [facility] 21     22     23       24     25     26     [facility] RETURN    7:45 AM   (15 min.)   Brad Betts MD   [facility]     [facility] ONC INFUSION 120    4:30 PM   (120 min.)   [facility] ONCOLOGY INFUSION   [facility] 27     [facility] ONC INFUSION 180    5:00 PM   (180 min.)   UC ONCOLOGY INFUSION   [facility] 28     29 30 July 2018 Sunday Monday Tuesday Wednesday Thursday Friday Saturday   1     2     3     4     5     6     7       8     9     10     11     12     13     14       15     16     17     [facility] DRAW    8:00 AM   (15 min.)    [facility] LAB DRAW   [facility] Lab Draw     [facility] RETURN    8:15 AM   (30 min.)   Gaurang Johnson MD   [facility] 18     19     20     21  Happy Birthday!       22     23     24     25     26     27     28       29     30     31                                      Lab Results:  Recent Results (from the past 12 hour(s))   CBC with platelets differential    Collection Time: 06/20/18 12:26 PM   Result Value Ref Range    WBC 5.7 4.0 - 11.0 10e9/L    RBC Count 3.71 (L) 4.4 - 5.9 10e12/L    Hemoglobin 9.6 (L) 13.3 - 17.7 g/dL    Hematocrit 31.2 (L) 40.0 - 53.0 %    MCV 84 78 - 100 fl    MCH 25.9 (L) 26.5 - 33.0 pg    MCHC 30.8 (L) 31.5 - 36.5 g/dL    RDW 21.2 (H) 10.0 - 15.0 %    Platelet Count 451 (H) 150 - 450 10e9/L    Diff Method Automated Method     % Neutrophils 64.8 %    % Lymphocytes 19.0 %    % Monocytes 13.2 %    % Eosinophils 0.5 %    % Basophils 1.8 %    % Immature Granulocytes 0.7 %    Nucleated RBCs 0 0 /100    Absolute Neutrophil 3.7 1.6 - 8.3 10e9/L    Absolute Lymphocytes 1.1 0.8 - 5.3 10e9/L    Absolute Monocytes 0.8 0.0 - 1.3 10e9/L    Absolute Eosinophils 0.0 0.0 - 0.7 10e9/L    Absolute Basophils 0.1 0.0 - 0.2 10e9/L    Abs Immature Granulocytes 0.0 0 - 0.4  10e9/L    Absolute Nucleated RBC 0.0    Comprehensive metabolic panel    Collection Time: 06/20/18 12:26 PM   Result Value Ref Range    Sodium 139 133 - 144 mmol/L    Potassium 3.8 3.4 - 5.3 mmol/L    Chloride 105 94 - 109 mmol/L    Carbon Dioxide 23 20 - 32 mmol/L    Anion Gap 11 3 - 14 mmol/L    Glucose 117 (H) 70 - 99 mg/dL    Urea Nitrogen 6 (L) 7 - 30 mg/dL    Creatinine 0.74 0.66 - 1.25 mg/dL    GFR Estimate >90 >60 mL/min/1.7m2    GFR Estimate If Black >90 >60 mL/min/1.7m2    Calcium 9.6 8.5 - 10.1 mg/dL    Bilirubin Total 0.3 0.2 - 1.3 mg/dL    Albumin 3.1 (L) 3.4 - 5.0 g/dL    Protein Total 7.5 6.8 - 8.8 g/dL    Alkaline Phosphatase 133 40 - 150 U/L    ALT 24 0 - 70 U/L    AST 17 0 - 45 U/L   Phosphorus    Collection Time: 06/20/18 12:26 PM   Result Value Ref Range    Phosphorus 3.4 2.5 - 4.5 mg/dL   Magnesium    Collection Time: 06/20/18 12:26 PM   Result Value Ref Range    Magnesium 2.1 1.6 - 2.3 mg/dL   Lactate Dehydrogenase    Collection Time: 06/20/18 12:26 PM   Result Value Ref Range    Lactate Dehydrogenase 135 85 - 227 U/L               Follow-ups after your visit        Your next 10 appointments already scheduled     Jun 26, 2018  8:00 AM CDT   (Arrive by 7:45 AM)   Return Visit with Brad Betts MD   [facility])    [address] [place] [address]  4th Floor  [facility] [id number] [phone]            Jun 26, 2018  4:30 PM CDT   Infusion 120 with UC ONCOLOGY INFUSION, UC 11 ATC   [facility] ([facility])    [address] [place] [address]  [address]  [facility] [id number] [phone]            Jun 27, 2018  5:00 PM CDT   Infusion 180 with UC ONCOLOGY INFUSION, UC 29 ATC   [facility] ([facility])    [address] [place] [address]  [address]  [facility] [id number] [phone]            Jul 17, 2018  8:00 AM T   [facility] Draw with  [facility] DRAW   M  [facility] Lab Draw ([facility])    [address]  [address]  [facility] [id number] [phone]            Jul 17, 2018  8:30 AM CDT   (Arrive by 8:15 AM)   Return Visit with Gaurang Johnson MD   [facility] ([facility])    [address]  [address]  [facility] [id number] [phone]              Future tests that were ordered for you today     Open Future Orders        Priority Expected Expires Ordered    Phosphorus Routine 7/17/2018 6/20/2019 6/20/2018    GASTROENTEROLOGY ADULT REF PROCEDURE ONLY Routine 7/18/2018 11/20/2018 6/20/2018            Who to contact     If you have questions or need follow up information about today's clinic visit or your schedule please contact [facility] directly at [phone].  Normal or non-critical lab and imaging results will be communicated to you by MyChart, letter or phone within 4 business days after the clinic has received the results. If you do not hear from us within 7 days, please contact the clinic through MyChart or phone. If you have a critical or abnormal lab result, we will notify you by phone as soon as possible.  Submit refill requests through [employer] or call your pharmacy and they will forward the refill request to us. Please allow 3 business days for your refill to be completed.          Additional Information About Your Visit        Care EveryWhere ID     This is your Care EveryWhere ID. This could be used by other organizations to access your [place] medical records  [id number]         Blood Pressure from Last 3 Encounters:   06/20/18 97/60   05/29/18 107/70   05/28/18 114/71    Weight from Last 3 Encounters:   06/20/18 81.2 kg (179 lb)   05/22/18 86.4 kg (190 lb 8 oz)   05/01/18 91.9 kg (202 lb 8 oz)              We Performed the Following     CBC with platelets differential     Comprehensive metabolic panel     Lactate  Dehydrogenase     Magnesium     Phosphorus          Where to get your medicines      These medications were sent to [place], [place] - [address]  [address], [facility] [address]    Hours:  TRANSPLANT PHONE NUMBER [phone] Phone:  [phone]     potassium chloride SA 20 MEQ CR tablet    rivaroxaban ANTICOAGULANT 20 MG Tabs tablet          Primary Care Provider Office Phone # Fax #    Louisa Fry [phone]       [facility] [address]  [facility] [address]        Equal Access to Services     YVONNE TOLENTINO : Hadii aad ku hadasho Soomaali, waaxda luqadaha, qaybta kaalmada adeegyada, waxvielka hicks hayaan adeeg yadira epps . So [facility] [phone].    ATENCIÓN: Si habla español, tiene a sheridan disposición servicios gratuitos de asistencia lingüística. Markos al [phone].    We comply with applicable federal civil rights laws and [place] laws. We do not discriminate on the basis of race, color, national origin, age, disability, sex, sexual orientation, or gender identity.            Thank you!     Thank you for choosing [facility]  for your care. Our goal is always to provide you with excellent care. Hearing back from our patients is one way we can continue to improve our services. Please take a few minutes to complete the written survey that you may receive in the mail after your visit with us. Thank you!             Your Updated Medication List - Protect others around you: Learn how to safely use, store and throw away your medicines at www.disposemymeds.org.          This list is accurate as of 6/20/18  4:54 PM.  Always use your most recent med list.                   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), Other pulmonary embolism without acute cor pulmonale, unspecified chronicity (H), Lesion of colon, Pain of right lower extremity, Personal history of tobacco use, presenting hazards to health, Idiopathic gout, unspecified chronicity, unspecified site, Pulmonary nodules       nystatin [id number] UNIT/ML suspension    MYCOSTATIN    473 mL    Take 5 mLs (500,000 Units) by mouth 4 times daily    Sarcoma (H), Thrush, Other pulmonary embolism without acute cor pulmonale, unspecified chronicity (H), Postoperative wound infection, subsequent encounter, 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), Other pulmonary embolism without acute cor pulmonale, unspecified chronicity (H), Lesion of colon, Pain of right lower extremity, Personal history of tobacco use, presenting hazards to health, Idiopathic gout, unspecified chronicity, unspecified site, Pulmonary nodules       PROCHLORPERAZINE MALEATE PO      Take 10 mg by mouth        * Rivaroxaban 15 & 20 MG Tbpk     51 each    Take 15 mg by mouth 2 times daily Take 15 mg twice a day for 2 weeks then 20 mg once a day    Sarcoma  (H), Thrush, Other pulmonary embolism without acute cor pulmonale, unspecified chronicity (H), Postoperative wound infection, subsequent encounter, 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), Pain of right lower extremity, Sarcoma of soft tissue (H)       * rivaroxaban ANTICOAGULANT 20 MG Tabs tablet    XARELTO    30 tablet    Take 1 tablet (20 mg) by mouth daily (with dinner)    Other pulmonary embolism without acute cor pulmonale, unspecified chronicity (H), 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 Read the directions carefully, and ask your doctor or other care provider to review them with you.

## 2018-06-20 NOTE — PROGRESS NOTES
"Infusion Nursing Note:  Hiram Tapia presents today for Cycle 4 Day 1 of Doxorubicin Liposome and Ifosfamide/[place] pump connect.    Patient seen by provider today: Yes: Dr. Johnson   present during visit today: Not Applicable.    Note: Patient presented to infusion feeling well, with no new issues or complaints.Patient notified that someone would call to schedule colonoscopy.     Intravenous Access:  Implanted Port.    Treatment Conditions:  Lab Results   Component Value Date    HGB 9.6 06/20/2018     Lab Results   Component Value Date    WBC 5.7 06/20/2018      Lab Results   Component Value Date    ANEU 3.7 06/20/2018     Lab Results   Component Value Date     06/20/2018      Lab Results   Component Value Date     06/20/2018                   Lab Results   Component Value Date    POTASSIUM 3.8 06/20/2018           Lab Results   Component Value Date    MAG 2.1 06/20/2018            Lab Results   Component Value Date    CR 0.74 06/20/2018                   Lab Results   Component Value Date    JAYESH 9.6 06/20/2018                Lab Results   Component Value Date    BILITOTAL 0.3 06/20/2018           Lab Results   Component Value Date    ALBUMIN 3.1 06/20/2018                    Lab Results   Component Value Date    ALT 24 06/20/2018           Lab Results   Component Value Date    AST 17 06/20/2018       Results reviewed, labs MET treatment parameters, ok to proceed with treatment.  Echo on 3/23 with EF of 60-65%.      Post Infusion Assessment:  Patient tolerated infusion without incident.  Blood return noted pre and post infusion.  Site patent and intact, free from redness, edema or discomfort.  No evidence of extravasations.    Prior to discharge: Port is secured in place with tegaderm and flushed with 10cc NS with positive blood return noted.  Continuous home infusion CADD pump connected.    All connectors secured in place and clamps taped open.    Pump started, \"running\" noted on display " (CADD): YES.  Patient instructed to call our clinic or [facility] with any questions or concerns at home.  Patient verbalized understanding.    Patient set up for pump exchange, labs, and possible IVF on 6/26/18 at 4:30PM. Also, pump disconnect, possible IVF, Neulasta, and labs at our clinic on 6/27/2018 at 5PM.    Discharge Plan:   Prescription refill given for Xarelto and Potassium.  Discharge instructions reviewed with: Patient and wife.  Patient and family verbalized understanding of discharge instructions and all questions answered.  Copy of AVS reviewed with patient and/or family.  Patient will return 6/26/2018 for next appointment.  Patient discharged in stable condition accompanied by: wife.  Departure Mode: Ambulatory.    Lisa Ballard RN

## 2018-06-20 NOTE — NURSING NOTE
"Chief Complaint   Patient presents with     Port Draw     port accessed and labs drawn by rn.  vs taken.     Port accessed with 20g 3/4\" power needle, labs drawn, port flushed with saline and heparin, vitals checked.  Nely Knox RN    "

## 2018-06-20 NOTE — PATIENT INSTRUCTIONS
Preventive Care:    Colorectal Cancer Screening: During our visit today, we discussed that it is recommended you receive colorectal cancer screening. Please call or make an appointment with your primary care provider to discuss this. You may also call the [facility] scheduling line ([phone]) to set up a colonoscopy appointment.

## 2018-06-20 NOTE — PROGRESS NOTES
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      Background  In brief he has had a lot of problems with his right leg for many years including sciatica for 20 years.  He developed more discomfort in the right thigh and in 2017 hit his lateral thigh against a truck tailgate causing a lot of pain.  Subsequently there was a fair amount of swelling and stiffness.  This eventually led to some imaging showing a cystic mass.  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  At the time of the biopsy more than a liter of fluid was removed and that markedly improved his symptoms of stiffness and pain.  -         Interval history         He began doxil/ifos 3-23-18.  He has had 3 cycles with suggestion of a response after 1 cycle.  A new PE asymptomatic was noted and he started rivaroxaban in April.    He tolerated cycle 3 a bit better yet.  He gets nausea at the end.  The plan is to do kytril bid this time.  No clear GERD.    Mild mouth sensitivity and minor hand soreness for 2 days. Occ constipation.    He is going up 2 floors but gets tired at that point.      Its hard for him to walk due to limitation of ROM of the R leg and discomfort.  He is not very active due to this. This is exacerbated by gout (for which he uses ibuprofen).    He spends a lot of time in a recliner and sleeps in that.     The wound is being debreided 2x/wk now; hes off the pump now but will see Dr Betts.    Mood is OK.     His wife broke her hip and is now using a crutch.    Aside from this problem his 10 point ROS unremarkable.        -  Background PMH, FH, SH  Past history is not particular remarkable other than for tonsillectomy and surgery for lazy eye.  His left eye is the dominant eye.    The family history is not particularly remarkable but his father  of leukemia at age 42.    He denies any drug allergies.  He is  and has an adult somewhat autistic boy who lives with them.  He smoked a pack  "a day for about 10 years, stopping about 1990, and does not abuse alcohol or other drugs.  He works as a  at [employer].  -        On exam he appeared comfortable with normal affect.    BP 97/60 (BP Location: Right arm, Patient Position: Sitting, Cuff Size: Adult Regular)  Pulse 131  Temp 97.8  F (36.6  C) (Oral)  Resp 16  Ht 1.778 m (5' 10\")  Wt 81.2 kg (179 lb)  SpO2 98%  BMI 25.68 kg/m2  HEENT There is no icterus, Lazy eye (L dominant). Minimal mild thrush on tongue.  NECK: no thyromegaly or neck mass  CHEST:  the chest is clear  CV: there is tachycardia with no significant murmur  ABD:  no HSMT  LYMPH: no lymphadenopathy  EXT: 1+ edema of the right lower leg  MS: there is a large mass in the right thigh with an open wound, not very tender; difficult to appreciate much size change. Cant fully extend or flex R knee.  NEURO: Nabila mentation; got on table OK, normal Romberg  SKIN: very mild doxil effect  PSYCH: mood fairly good  JOINTS: minimal gout of L MTPs now       -  CBC OK GNE, LFT, PO4 OK.      -  Baseline PET/CT showed marked PET activity in the periphery of the cystic thigh mass.  There was no definite metastatic disease but there is a calcified nodule in the left lung and a probable hemangioma in the liver and a probable small adenoma in the left adrenal gland.  There was also a small right pleural effusion with a bit of right lower lobe consolidation mild FDG uptake.            The last images showed what appeared to be a segmental PE on the R.  There was also a small R lung nodule not present on the last image, but this is nonspecific.  There is a response by SUV of the primary tumor.    I reviewed the new imaging which shows continued response.  There is a pet-positive lesion in the colon.  I reviewed the images w him and his wife.  There are no definite metastases.    -      We discussed the situation; its complicated.  Most of the tumor was cystic on the last scan.     I think " we should give cycle 4 now followed by surgery and XRT in the order preferred by Dr Betts.  We will arrange a colonoscopy at the time on the next MRI (before surgery) when he recovers from cycle 4.     He will stop rivaroxaban 4 days before colonoscopy and surgery.    Though asymptomatic we will Rx the PE w rivaroxaban.       We will give the same doses as last time for cycle 4.    He will take KCl starting today for the   He will continue prn nystatin or the thrush.      He will take kytril bid this time.      All of his questions were addressed and he will call if he has others.      Gaurang Johnson M.D.  Professor  Hematology, Oncology and Transplantation

## 2018-06-20 NOTE — PATIENT INSTRUCTIONS
Contact Numbers  [facility]: [phone]    After Hours:  [phone]  Triage: [phone]    Please call the [facility] Triage line if you experience a temperature greater than or equal to 100.5, shaking chills, have uncontrolled nausea, vomiting and/or diarrhea, dizziness, shortness of breath, chest pain, bleeding, unexplained bruising, or if you have any other new/concerning symptoms, questions or concerns.     If it is after hours, weekends, or holidays, please call the main hospital  at  [phone] and ask to speak to the Oncology doctor on call.     If you are having any concerning symptoms or wish to speak to a provider before your next infusion visit, please call your care coordinator or triage to notify them so we can adequately serve you.     If you need a refill on a narcotic prescription or other medication, please call triage before your infusion appointment.             June 2018 Sunday Monday Tuesday Wednesday Thursday Friday Saturday                            1     LAB    9:30 AM   (15 min.)    LAB HOME INFUSION   [facility] Lab 2       3     4     5     6     7     8     9       10     11     12     13     14     15     16       17     18     PET ONCOLOGY WHOLE BODY    8:15 AM   (45 min.)   UUPET1   [facility] [place] PET CT 19     20     [facility] LAB DRAW   12:15 PM   (15 min.)   [facility] LAB DRAW   [facility] Lab Draw     [facility] RETURN   12:45 PM   (30 min.)   Gaurang Johnson MD   [facility] ONC INFUSION 180    2:00 PM   (180 min.)    ONCOLOGY INFUSION   [facility] 21     22     23       24     25     26     [facility] RETURN    7:45 AM   (15 min.)   Brad Betts MD   [facility] ONC INFUSION 120    4:30 PM   (120 min.)    ONCOLOGY INFUSION   [facility] 27     [facility] ONC INFUSION 180    5:00 PM   (180 min.)    ONCOLOGY INFUSION   [facility] 28 29 30 July 2018 Sunday Monday Tuesday Wednesday Thursday Friday Saturday   1     2     3     4     5     6     7       8     9     10     11     12     13     14       15     16     17     [facility] DRAW    8:00 AM   (15 min.)    [facility] DRAW   [facility] Draw     [facility] RETURN    8:15 AM   (30 min.)   Gaurang Johnson MD   [facility] 18     19 20 21  Happy Birthday!       22     23     24     25     26     27     28       29     30     31                                      Lab Results:  Recent Results (from the past 12 hour(s))   CBC with platelets differential    Collection Time: 06/20/18 12:26 PM   Result Value Ref Range    WBC 5.7 4.0 - 11.0 10e9/L    RBC Count 3.71 (L) 4.4 - 5.9 10e12/L    Hemoglobin 9.6 (L) 13.3 - 17.7 g/dL    Hematocrit 31.2 (L) 40.0 - 53.0 %    MCV 84 78 - 100 fl    MCH 25.9 (L) 26.5 - 33.0 pg    MCHC 30.8 (L) 31.5 - 36.5 g/dL    RDW 21.2 (H) 10.0 - 15.0 %    Platelet Count 451 (H) 150 - 450 10e9/L    Diff Method Automated Method     % Neutrophils 64.8 %    % Lymphocytes 19.0 %    % Monocytes 13.2 %    % Eosinophils 0.5 %    % Basophils 1.8 %    % Immature Granulocytes 0.7 %    Nucleated RBCs 0 0 /100    Absolute Neutrophil 3.7 1.6 - 8.3 10e9/L    Absolute Lymphocytes 1.1 0.8 - 5.3 10e9/L    Absolute Monocytes 0.8 0.0 - 1.3 10e9/L    Absolute Eosinophils 0.0 0.0 - 0.7 10e9/L    Absolute Basophils 0.1 0.0 - 0.2 10e9/L    Abs Immature Granulocytes 0.0 0 - 0.4 10e9/L    Absolute Nucleated RBC 0.0    Comprehensive metabolic panel    Collection Time: 06/20/18 12:26 PM   Result Value Ref Range    Sodium 139 133 - 144 mmol/L    Potassium 3.8 3.4 - 5.3 mmol/L    Chloride 105 94 - 109 mmol/L    Carbon Dioxide 23 20 - 32 mmol/L    Anion Gap 11 3 - 14 mmol/L    Glucose 117 (H) 70 - 99 mg/dL    Urea Nitrogen 6 (L) 7 - 30 mg/dL    Creatinine 0.74 0.66 - 1.25 mg/dL    GFR Estimate >90 >60 mL/min/1.7m2    GFR  Estimate If Black >90 >60 mL/min/1.7m2    Calcium 9.6 8.5 - 10.1 mg/dL    Bilirubin Total 0.3 0.2 - 1.3 mg/dL    Albumin 3.1 (L) 3.4 - 5.0 g/dL    Protein Total 7.5 6.8 - 8.8 g/dL    Alkaline Phosphatase 133 40 - 150 U/L    ALT 24 0 - 70 U/L    AST 17 0 - 45 U/L   Phosphorus    Collection Time: 06/20/18 12:26 PM   Result Value Ref Range    Phosphorus 3.4 2.5 - 4.5 mg/dL   Magnesium    Collection Time: 06/20/18 12:26 PM   Result Value Ref Range    Magnesium 2.1 1.6 - 2.3 mg/dL   Lactate Dehydrogenase    Collection Time: 06/20/18 12:26 PM   Result Value Ref Range    Lactate Dehydrogenase 135 85 - 227 U/L

## 2018-06-20 NOTE — LETTER
6/20/2018       RE: Hiram Tapia  [address]  [facility] [address]     Dear Colleague,    Thank you for referring your patient, Hiram Tapia, to the [facility]. Please see a copy of my visit note below.    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      Background  In brief he has had a lot of problems with his right leg for many years including sciatica for 20 years.  He developed more discomfort in the right thigh and in October 2017 hit his lateral thigh against a truck tailgate causing a lot of pain.  Subsequently there was a fair amount of swelling and stiffness.  This eventually led to some imaging showing a cystic mass.  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  At the time of the biopsy more than a liter of fluid was removed and that markedly improved his symptoms of stiffness and pain.  -         Interval history         He began doxil/ifos 3-23-18.  He has had 3 cycles with suggestion of a response after 1 cycle.  A new PE asymptomatic was noted and he started rivaroxaban in April.    He tolerated cycle 3 a bit better yet.  He gets nausea at the end.  The plan is to do kytril bid this time.  No clear GERD.    Mild mouth sensitivity and minor hand soreness for 2 days. Occ constipation.    He is going up 2 floors but gets tired at that point.      Its hard for him to walk due to limitation of ROM of the R leg and discomfort.  He is not very active due to this. This is exacerbated by gout (for which he uses ibuprofen).    He spends a lot of time in a recliner and sleeps in that.     The wound is being debreided 2x/wk now; hes off the pump now but will see Dr Betts.    Mood is OK.     His wife broke her hip and is now using a crutch.    Aside from this problem his 10 point ROS unremarkable.        -  Background PMH, FH, SH  Past history is not particular remarkable other than for tonsillectomy and surgery for lazy eye.  His left eye is  "the dominant eye.    The family history is not particularly remarkable but his father  of leukemia at age 42.    He denies any drug allergies.  He is  and has an adult somewhat autistic boy who lives with them.  He smoked a pack a day for about 10 years, stopping about , and does not abuse alcohol or other drugs.  He works as a  at [employer].  -        On exam he appeared comfortable with normal affect.    BP 97/60 (BP Location: Right arm, Patient Position: Sitting, Cuff Size: Adult Regular)  Pulse 131  Temp 97.8  F (36.6  C) (Oral)  Resp 16  Ht 1.778 m (5' 10\")  Wt 81.2 kg (179 lb)  SpO2 98%  BMI 25.68 kg/m2  HEENT There is no icterus, Lazy eye (L dominant). Minimal mild thrush on tongue.  NECK: no thyromegaly or neck mass  CHEST:  the chest is clear  CV: there is tachycardia with no significant murmur  ABD:  no HSMT  LYMPH: no lymphadenopathy  EXT: 1+ edema of the right lower leg  MS: there is a large mass in the right thigh with an open wound, not very tender; difficult to appreciate much size change. Cant fully extend or flex R knee.  NEURO: Nabila mentation; got on table OK, normal Romberg  SKIN: very mild doxil effect  PSYCH: mood fairly good  JOINTS: minimal gout of L MTPs now       -  CBC OK GNE, LFT, PO4 OK.      -  Baseline PET/CT showed marked PET activity in the periphery of the cystic thigh mass.  There was no definite metastatic disease but there is a calcified nodule in the left lung and a probable hemangioma in the liver and a probable small adenoma in the left adrenal gland.  There was also a small right pleural effusion with a bit of right lower lobe consolidation mild FDG uptake.            The last images showed what appeared to be a segmental PE on the R.  There was also a small R lung nodule not present on the last image, but this is nonspecific.  There is a response by SUV of the primary tumor.    I reviewed the new imaging which shows continued response.  " There is a pet-positive lesion in the colon.  I reviewed the images w him and his wife.  There are no definite metastases.    -      We discussed the situation; its complicated.  Most of the tumor was cystic on the last scan.     I think we should give cycle 4 now followed by surgery and XRT in the order preferred by Dr Betts.  We will arrange a colonoscopy at the time on the next MRI (before surgery) when he recovers from cycle 4.     He will stop rivaroxaban 4 days before colonoscopy and surgery.    Though asymptomatic we will Rx the PE w rivaroxaban.       We will give the same doses as last time for cycle 4.    He will take KCl starting today for the   He will continue prn nystatin or the thrush.      He will take kytril bid this time.      All of his questions were addressed and he will call if he has others.      Gaurang Johnson M.D.  Professor  Hematology, Oncology and Transplantation

## 2018-06-20 NOTE — MR AVS SNAPSHOT
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 Please call or make an appointment with your primary care provider to discuss this. You may also call the [facility] scheduling line ([phone]) to set up a colonoscopy appointment.            Follow-ups after your visit        Additional Services     GASTROENTEROLOGY ADULT REF PROCEDURE ONLY                 Your next 10 appointments already scheduled     Jun 20, 2018  2:00 PM CDT   Infusion 180 with  ONCOLOGY INFUSION, UC 11 ATC   [facility] ([facility])    [address] [place] [address]  [address]  [facility] [id number] [phone]            Jul 17, 2018  8:00 AM CDT   [facility] LAB DRAW   [facility] Lab Draw ([facility])    [address] [place] [address]  [address]  [facility] [id number] [phone]            Jul 17, 2018  8:30 AM CDT   (Arrive by 8:15 AM)   Return Visit with Gaurang Johnson MD   [facility] ([facility])    [address]  [address]  [facility] [address]  "  [phone]              Future tests that were ordered for you today     Open Future Orders        Priority Expected Expires Ordered    GASTROENTEROLOGY ADULT REF PROCEDURE ONLY Routine 7/18/2018 11/20/2018 6/20/2018            Who to contact     If you have questions or need follow up information about today's clinic visit or your schedule please contact [facility] directly at [phone].  Normal or non-critical lab and imaging results will be communicated to you by MyChart, letter or phone within 4 business days after the clinic has received the results. If you do not hear from us within 7 days, please contact the clinic through MyChart or phone. If you have a critical or abnormal lab result, we will notify you by phone as soon as possible.  Submit refill requests through [employer] or call your pharmacy and they will forward the refill request to us. Please allow 3 business days for your refill to be completed.          Additional Information About Your Visit        Care EveryWhere ID     This is your Care EveryWhere ID. This could be used by other organizations to access your [place] medical records  [id number]        Your Vitals Were     Pulse Temperature Respirations Height Pulse Oximetry BMI (Body Mass Index)    131 97.8  F (36.6  C) (Oral) 16 1.778 m (5' 10\") 98% 25.68 kg/m2       Blood Pressure from Last 3 Encounters:   06/20/18 97/60   05/29/18 107/70   05/28/18 114/71    Weight from Last 3 Encounters:   06/20/18 81.2 kg (179 lb)   05/22/18 86.4 kg (190 lb 8 oz)   05/01/18 91.9 kg (202 lb 8 oz)                 Where to get your medicines      These medications were sent to [facility] [place], [place] - [phone]  [address], [facility] [address]    Hours:  TRANSPLANT PHONE NUMBER [phone] Phone:  [phone]     potassium chloride SA 20 MEQ CR tablet    rivaroxaban ANTICOAGULANT 20 MG Tabs tablet          Primary Care " Provider Office Phone # Fax #    Louisa Fry [phone]       [facility] [address]  [place] [address]        Equal Access to Services     DORIAN TOLENTINO : Juarez troy martinez kiannao Sokvngali, wacorettada luqadaha, qaybta kaalmada adelaurita, mendez may laRiccooren mcnair. So [facility] [phone].    ATENCIÓN: Si habla español, tiene a sheridan disposición servicios gratuitos de asistencia lingüística. Llame al [phone].    We comply with applicable federal civil rights laws and [place] laws. We do not discriminate on the basis of race, color, national origin, age, disability, sex, sexual orientation, or gender identity.            Thank you!     Thank you for choosing [facility]  for your care. Our goal is always to provide you with excellent care. Hearing back from our patients is one way we can continue to improve our services. Please take a few minutes to complete the written survey that you may receive in the mail after your visit with us. Thank you!             Your Updated Medication List - Protect others around you: Learn how to safely use, store and throw away your medicines at www.disposemymeds.org.          This list is accurate as of 6/20/18  1:35 PM.  Always use your most recent med list.                   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), Other pulmonary embolism without acute cor pulmonale, unspecified chronicity (H), Lesion of colon, Pain of right lower extremity, Personal history of tobacco use, presenting  hazards to health, Idiopathic gout, unspecified chronicity, unspecified site, Pulmonary nodules       nystatin 965302 UNIT/ML suspension    MYCOSTATIN    473 mL    Take 5 mLs (500,000 Units) by mouth 4 times daily    Sarcoma (H), Thrush, Other pulmonary embolism without acute cor pulmonale, unspecified chronicity (H), Postoperative wound infection, subsequent encounter, 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), Other pulmonary embolism without acute cor pulmonale, unspecified chronicity (H), Lesion of colon, Pain of right lower extremity, Personal history of tobacco use, presenting hazards to health, Idiopathic gout, unspecified chronicity, unspecified site, Pulmonary nodules       PROCHLORPERAZINE MALEATE PO      Take 10 mg by mouth        * Rivaroxaban 15 & 20 MG Tbpk     51 each    Take 15 mg by mouth 2 times daily Take 15 mg twice a day for 2 weeks then 20 mg once a day    Sarcoma (H), Thrush, Other pulmonary embolism without acute cor pulmonale, unspecified chronicity (H), Postoperative wound infection, subsequent encounter, 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), Pain of right lower extremity, Sarcoma of soft tissue (H)       * rivaroxaban ANTICOAGULANT 20 MG Tabs tablet    XARELTO    30 tablet    Take 1 tablet (20 mg) by mouth daily (with dinner)    Other pulmonary embolism without acute cor  pulmonale, unspecified chronicity (H), 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 Read the directions carefully, and ask your doctor or other care provider to review them with you.

## 2018-06-20 NOTE — NURSING NOTE
"Oncology Rooming Note    June 20, 2018 12:40 PM   Hiram Tapia is a 57 year old male who presents for:    Chief Complaint   Patient presents with     Port Draw     port accessed and labs drawn by rn.  vs taken.     Oncology Clinic Visit     Return, High Grade Sarcoma     Initial Vitals: BP 97/60 (BP Location: Right arm, Patient Position: Sitting, Cuff Size: Adult Regular)  Pulse 131  Temp 97.8  F (36.6  C) (Oral)  Resp 16  Ht 1.778 m (5' 10\")  Wt 81.2 kg (179 lb)  SpO2 98%  BMI 25.68 kg/m2 Estimated body mass index is 25.68 kg/(m^2) as calculated from the following:    Height as of this encounter: 1.778 m (5' 10\").    Weight as of this encounter: 81.2 kg (179 lb). Body surface area is 2 meters squared.  Mild Pain (2) Comment: right leg   No LMP for male patient.  Allergies reviewed: Yes  Medications reviewed: Yes    Medications: Medication refills not needed today.  Pharmacy name entered into [facility]:    [facility] [address] [place] [address] - [facility] [address] [facility] [address]  [facility] [place], [place] - [address] [facility] [place], [place] - [address] [facility] [phone]    Clinical concerns: Yes, Patient complains of pain in his right leg. Dr Johnson was notified.    10 minutes for nursing intake (face to face time)     KHRIS GUEVARA LPN            "

## 2018-06-21 ENCOUNTER — HOSPITAL ENCOUNTER (OUTPATIENT)
Facility: CLINIC | Age: 58
End: 2018-06-21
Attending: INTERNAL MEDICINE | Admitting: INTERNAL MEDICINE

## 2018-06-22 ENCOUNTER — CARE COORDINATION (OUTPATIENT)
Dept: ONCOLOGY | Facility: CLINIC | Age: 58
End: 2018-06-22

## 2018-06-22 NOTE — PROGRESS NOTES
Spoke to Lea to let her know that  would like for David to have labs on Days 5,7,8 of this cycle of doxil/ifos.  Let her know that day 5 will be on Sunday 6/24/18 and he will have to go to [facility].  He is scheduled for day 6 on 6/26 for ifos/mesna exchange and labs, and day 7,  6/27 for mesna disconnect and neulasta.  Also reminded Lea to let David know he should start taking his oral zofran either tonight or tomorrow morning so he hopefully doesn't get as nauseated this time.  She verbalized understanding of the plan of care.

## 2018-06-26 ENCOUNTER — HOSPITAL ENCOUNTER (INPATIENT)
Facility: CLINIC | Age: 58
LOS: 2 days | Discharge: HOME HEALTH | DRG: 640 | End: 2018-06-29
Attending: INTERNAL MEDICINE | Admitting: INTERNAL MEDICINE
Payer: COMMERCIAL

## 2018-06-26 ENCOUNTER — APPOINTMENT (OUTPATIENT)
Dept: CT IMAGING | Facility: CLINIC | Age: 58
DRG: 640 | End: 2018-06-26
Attending: INTERNAL MEDICINE
Payer: COMMERCIAL

## 2018-06-26 ENCOUNTER — APPOINTMENT (OUTPATIENT)
Dept: LAB | Facility: CLINIC | Age: 58
DRG: 640 | End: 2018-06-26
Attending: INTERNAL MEDICINE
Payer: COMMERCIAL

## 2018-06-26 ENCOUNTER — ONCOLOGY VISIT (OUTPATIENT)
Dept: ONCOLOGY | Facility: CLINIC | Age: 58
DRG: 640 | End: 2018-06-26
Attending: PHYSICIAN ASSISTANT
Payer: COMMERCIAL

## 2018-06-26 ENCOUNTER — TELEPHONE (OUTPATIENT)
Dept: ONCOLOGY | Facility: CLINIC | Age: 58
End: 2018-06-26

## 2018-06-26 ENCOUNTER — INFUSION THERAPY VISIT (OUTPATIENT)
Dept: ONCOLOGY | Facility: CLINIC | Age: 58
DRG: 640 | End: 2018-06-26
Attending: INTERNAL MEDICINE
Payer: COMMERCIAL

## 2018-06-26 DIAGNOSIS — C49.21 SOFT TISSUE SARCOMA OF RIGHT THIGH (H): ICD-10-CM

## 2018-06-26 DIAGNOSIS — E87.6 HYPOKALEMIA: Primary | ICD-10-CM

## 2018-06-26 DIAGNOSIS — T45.1X5A CHEMOTHERAPY-INDUCED NAUSEA AND VOMITING: ICD-10-CM

## 2018-06-26 DIAGNOSIS — E86.0 DEHYDRATION: ICD-10-CM

## 2018-06-26 DIAGNOSIS — T45.1X5A CHEMOTHERAPY-INDUCED NAUSEA AND VOMITING: Primary | ICD-10-CM

## 2018-06-26 DIAGNOSIS — C49.9 SARCOMA OF SOFT TISSUE (H): ICD-10-CM

## 2018-06-26 DIAGNOSIS — C49.9 SARCOMA (H): ICD-10-CM

## 2018-06-26 DIAGNOSIS — R11.2 CHEMOTHERAPY-INDUCED NAUSEA AND VOMITING: ICD-10-CM

## 2018-06-26 DIAGNOSIS — R94.31 EKG ABNORMALITY: ICD-10-CM

## 2018-06-26 DIAGNOSIS — T81.328D: ICD-10-CM

## 2018-06-26 DIAGNOSIS — M10.00 IDIOPATHIC GOUT, UNSPECIFIED CHRONICITY, UNSPECIFIED SITE: ICD-10-CM

## 2018-06-26 DIAGNOSIS — Z87.891 PERSONAL HISTORY OF TOBACCO USE, PRESENTING HAZARDS TO HEALTH: ICD-10-CM

## 2018-06-26 DIAGNOSIS — M10.00 IDIOPATHIC GOUT, UNSPECIFIED CHRONICITY, UNSPECIFIED SITE: Primary | ICD-10-CM

## 2018-06-26 DIAGNOSIS — Z86.711 HISTORY OF PULMONARY EMBOLISM: ICD-10-CM

## 2018-06-26 DIAGNOSIS — R91.8 PULMONARY NODULES: ICD-10-CM

## 2018-06-26 DIAGNOSIS — R11.2 CHEMOTHERAPY-INDUCED NAUSEA AND VOMITING: Primary | ICD-10-CM

## 2018-06-26 DIAGNOSIS — M79.604 PAIN OF RIGHT LOWER EXTREMITY: ICD-10-CM

## 2018-06-26 DIAGNOSIS — D63.0 ANEMIA IN NEOPLASTIC DISEASE: ICD-10-CM

## 2018-06-26 LAB
ALBUMIN SERPL-MCNC: 3.1 G/DL (ref 3.4–5)
ALP SERPL-CCNC: 131 U/L (ref 40–150)
ALT SERPL W P-5'-P-CCNC: 20 U/L (ref 0–70)
ANION GAP SERPL CALCULATED.3IONS-SCNC: 14 MMOL/L (ref 3–14)
AST SERPL W P-5'-P-CCNC: 19 U/L (ref 0–45)
BASOPHILS # BLD AUTO: 0 10E9/L (ref 0–0.2)
BASOPHILS NFR BLD AUTO: 0.4 %
BILIRUB DIRECT SERPL-MCNC: 0.2 MG/DL (ref 0–0.2)
BILIRUB SERPL-MCNC: 0.6 MG/DL (ref 0.2–1.3)
BUN SERPL-MCNC: 22 MG/DL (ref 7–30)
CALCIUM SERPL-MCNC: 10.2 MG/DL (ref 8.5–10.1)
CHLORIDE SERPL-SCNC: 104 MMOL/L (ref 94–109)
CO2 SERPL-SCNC: 18 MMOL/L (ref 20–32)
CREAT SERPL-MCNC: 0.91 MG/DL (ref 0.66–1.25)
CREAT SERPL-MCNC: 0.96 MG/DL (ref 0.66–1.25)
CREAT SERPL-MCNC: NORMAL MG/DL (ref 0.66–1.25)
DIFFERENTIAL METHOD BLD: ABNORMAL
EOSINOPHIL # BLD AUTO: 0 10E9/L (ref 0–0.7)
EOSINOPHIL NFR BLD AUTO: 0.2 %
ERYTHROCYTE [DISTWIDTH] IN BLOOD BY AUTOMATED COUNT: 18.6 % (ref 10–15)
GFR SERPL CREATININE-BSD FRML MDRD: 80 ML/MIN/1.7M2
GFR SERPL CREATININE-BSD FRML MDRD: 85 ML/MIN/1.7M2
GFR SERPL CREATININE-BSD FRML MDRD: NORMAL ML/MIN/1.7M2
GLUCOSE SERPL-MCNC: 138 MG/DL (ref 70–99)
HCT VFR BLD AUTO: 28.4 % (ref 40–53)
HGB BLD-MCNC: 9.2 G/DL (ref 13.3–17.7)
IMM GRANULOCYTES # BLD: 0 10E9/L (ref 0–0.4)
IMM GRANULOCYTES NFR BLD: 0.7 %
LYMPHOCYTES # BLD AUTO: 0.3 10E9/L (ref 0.8–5.3)
LYMPHOCYTES NFR BLD AUTO: 4.6 %
MAGNESIUM SERPL-MCNC: 2.5 MG/DL (ref 1.6–2.3)
MCH RBC QN AUTO: 26.1 PG (ref 26.5–33)
MCHC RBC AUTO-ENTMCNC: 32.4 G/DL (ref 31.5–36.5)
MCV RBC AUTO: 81 FL (ref 78–100)
MONOCYTES # BLD AUTO: 0.1 10E9/L (ref 0–1.3)
MONOCYTES NFR BLD AUTO: 1.4 %
NEUTROPHILS # BLD AUTO: 5.2 10E9/L (ref 1.6–8.3)
NEUTROPHILS NFR BLD AUTO: 92.7 %
NRBC # BLD AUTO: 0 10*3/UL
NRBC BLD AUTO-RTO: 0 /100
PHOSPHATE SERPL-MCNC: 2.4 MG/DL (ref 2.5–4.5)
PLATELET # BLD AUTO: 297 10E9/L (ref 150–450)
PLATELET # BLD AUTO: 349 10E9/L (ref 150–450)
POTASSIUM SERPL-SCNC: 2.2 MMOL/L (ref 3.4–5.3)
POTASSIUM SERPL-SCNC: 2.8 MMOL/L (ref 3.4–5.3)
POTASSIUM SERPL-SCNC: NORMAL MMOL/L (ref 3.4–5.3)
PROT SERPL-MCNC: 7.8 G/DL (ref 6.8–8.8)
RBC # BLD AUTO: 3.53 10E12/L (ref 4.4–5.9)
SODIUM SERPL-SCNC: 133 MMOL/L (ref 133–144)
SODIUM SERPL-SCNC: 136 MMOL/L (ref 133–144)
TROPONIN I SERPL-MCNC: <0.015 UG/L (ref 0–0.04)
WBC # BLD AUTO: 5.7 10E9/L (ref 4–11)

## 2018-06-26 PROCEDURE — 93005 ELECTROCARDIOGRAM TRACING: CPT

## 2018-06-26 PROCEDURE — 82565 ASSAY OF CREATININE: CPT | Performed by: INTERNAL MEDICINE

## 2018-06-26 PROCEDURE — G0378 HOSPITAL OBSERVATION PER HR: HCPCS

## 2018-06-26 PROCEDURE — G0498 CHEMO EXTEND IV INFUS W/PUMP: HCPCS

## 2018-06-26 PROCEDURE — 85049 AUTOMATED PLATELET COUNT: CPT | Performed by: INTERNAL MEDICINE

## 2018-06-26 PROCEDURE — 96365 THER/PROPH/DIAG IV INF INIT: CPT

## 2018-06-26 PROCEDURE — 36415 COLL VENOUS BLD VENIPUNCTURE: CPT | Performed by: INTERNAL MEDICINE

## 2018-06-26 PROCEDURE — 84295 ASSAY OF SERUM SODIUM: CPT | Performed by: INTERNAL MEDICINE

## 2018-06-26 PROCEDURE — 80076 HEPATIC FUNCTION PANEL: CPT | Performed by: INTERNAL MEDICINE

## 2018-06-26 PROCEDURE — 25000128 H RX IP 250 OP 636: Mod: ZF | Performed by: PHYSICIAN ASSISTANT

## 2018-06-26 PROCEDURE — 36592 COLLECT BLOOD FROM PICC: CPT | Performed by: INTERNAL MEDICINE

## 2018-06-26 PROCEDURE — 96367 TX/PROPH/DG ADDL SEQ IV INF: CPT

## 2018-06-26 PROCEDURE — 80048 BASIC METABOLIC PNL TOTAL CA: CPT | Performed by: INTERNAL MEDICINE

## 2018-06-26 PROCEDURE — 84132 ASSAY OF SERUM POTASSIUM: CPT | Performed by: INTERNAL MEDICINE

## 2018-06-26 PROCEDURE — 84484 ASSAY OF TROPONIN QUANT: CPT | Performed by: INTERNAL MEDICINE

## 2018-06-26 PROCEDURE — 96376 TX/PRO/DX INJ SAME DRUG ADON: CPT

## 2018-06-26 PROCEDURE — 83735 ASSAY OF MAGNESIUM: CPT | Performed by: INTERNAL MEDICINE

## 2018-06-26 PROCEDURE — 96366 THER/PROPH/DIAG IV INF ADDON: CPT

## 2018-06-26 PROCEDURE — 99207 ZZC CHGS TRANSFERRED TO HOSPITAL: CPT | Mod: ZP | Performed by: PHYSICIAN ASSISTANT

## 2018-06-26 PROCEDURE — 25000128 H RX IP 250 OP 636: Mod: ZF | Performed by: INTERNAL MEDICINE

## 2018-06-26 PROCEDURE — 93010 ELECTROCARDIOGRAM REPORT: CPT | Mod: 76 | Performed by: INTERNAL MEDICINE

## 2018-06-26 PROCEDURE — 25000132 ZZH RX MED GY IP 250 OP 250 PS 637: Performed by: INTERNAL MEDICINE

## 2018-06-26 PROCEDURE — 96374 THER/PROPH/DIAG INJ IV PUSH: CPT

## 2018-06-26 PROCEDURE — 70450 CT HEAD/BRAIN W/O DYE: CPT

## 2018-06-26 PROCEDURE — 25000128 H RX IP 250 OP 636: Performed by: INTERNAL MEDICINE

## 2018-06-26 PROCEDURE — 85025 COMPLETE CBC W/AUTO DIFF WBC: CPT | Performed by: INTERNAL MEDICINE

## 2018-06-26 PROCEDURE — 96361 HYDRATE IV INFUSION ADD-ON: CPT

## 2018-06-26 PROCEDURE — 84100 ASSAY OF PHOSPHORUS: CPT | Performed by: INTERNAL MEDICINE

## 2018-06-26 RX ORDER — POLYETHYLENE GLYCOL 3350 17 G/17G
17 POWDER, FOR SOLUTION ORAL DAILY PRN
Status: DISCONTINUED | OUTPATIENT
Start: 2018-06-26 | End: 2018-06-26

## 2018-06-26 RX ORDER — LANOLIN ALCOHOL/MO/W.PET/CERES
3 CREAM (GRAM) TOPICAL
Status: DISCONTINUED | OUTPATIENT
Start: 2018-06-26 | End: 2018-06-29 | Stop reason: HOSPADM

## 2018-06-26 RX ORDER — ONDANSETRON 2 MG/ML
4 INJECTION INTRAMUSCULAR; INTRAVENOUS EVERY 6 HOURS PRN
Status: DISCONTINUED | OUTPATIENT
Start: 2018-06-26 | End: 2018-06-28

## 2018-06-26 RX ORDER — SODIUM CHLORIDE, SODIUM LACTATE, POTASSIUM CHLORIDE, CALCIUM CHLORIDE 600; 310; 30; 20 MG/100ML; MG/100ML; MG/100ML; MG/100ML
INJECTION, SOLUTION INTRAVENOUS CONTINUOUS
Status: DISCONTINUED | OUTPATIENT
Start: 2018-06-26 | End: 2018-06-27

## 2018-06-26 RX ORDER — MAGNESIUM SULFATE HEPTAHYDRATE 40 MG/ML
4 INJECTION, SOLUTION INTRAVENOUS EVERY 4 HOURS PRN
Status: DISCONTINUED | OUTPATIENT
Start: 2018-06-26 | End: 2018-06-29 | Stop reason: HOSPADM

## 2018-06-26 RX ORDER — POTASSIUM CHLORIDE 750 MG/1
20-40 TABLET, EXTENDED RELEASE ORAL
Status: DISCONTINUED | OUTPATIENT
Start: 2018-06-26 | End: 2018-06-29 | Stop reason: HOSPADM

## 2018-06-26 RX ORDER — POTASSIUM CHLORIDE 1.5 G/1.58G
20-40 POWDER, FOR SOLUTION ORAL
Status: DISCONTINUED | OUTPATIENT
Start: 2018-06-26 | End: 2018-06-29 | Stop reason: HOSPADM

## 2018-06-26 RX ORDER — POTASSIUM CHLORIDE 7.45 MG/ML
10 INJECTION INTRAVENOUS
Status: DISCONTINUED | OUTPATIENT
Start: 2018-06-26 | End: 2018-06-29 | Stop reason: HOSPADM

## 2018-06-26 RX ORDER — POTASSIUM CL/LIDO/0.9 % NACL 10MEQ/0.1L
10 INTRAVENOUS SOLUTION, PIGGYBACK (ML) INTRAVENOUS
Status: DISCONTINUED | OUTPATIENT
Start: 2018-06-26 | End: 2018-06-29 | Stop reason: HOSPADM

## 2018-06-26 RX ORDER — ACETAMINOPHEN 325 MG/1
650 TABLET ORAL EVERY 4 HOURS PRN
Status: DISCONTINUED | OUTPATIENT
Start: 2018-06-26 | End: 2018-06-29 | Stop reason: HOSPADM

## 2018-06-26 RX ORDER — DOCUSATE SODIUM 100 MG/1
100 CAPSULE, LIQUID FILLED ORAL 2 TIMES DAILY
Status: DISCONTINUED | OUTPATIENT
Start: 2018-06-26 | End: 2018-06-28

## 2018-06-26 RX ORDER — MAGNESIUM SULFATE HEPTAHYDRATE 40 MG/ML
2 INJECTION, SOLUTION INTRAVENOUS DAILY PRN
Status: DISCONTINUED | OUTPATIENT
Start: 2018-06-26 | End: 2018-06-29 | Stop reason: HOSPADM

## 2018-06-26 RX ORDER — POTASSIUM CHLORIDE 29.8 MG/ML
20 INJECTION INTRAVENOUS
Status: DISCONTINUED | OUTPATIENT
Start: 2018-06-26 | End: 2018-06-29 | Stop reason: HOSPADM

## 2018-06-26 RX ORDER — POLYETHYLENE GLYCOL 3350 17 G/17G
17 POWDER, FOR SOLUTION ORAL DAILY
Status: DISCONTINUED | OUTPATIENT
Start: 2018-06-26 | End: 2018-06-29 | Stop reason: HOSPADM

## 2018-06-26 RX ORDER — PROCHLORPERAZINE MALEATE 5 MG
5 TABLET ORAL EVERY 6 HOURS PRN
Status: DISCONTINUED | OUTPATIENT
Start: 2018-06-26 | End: 2018-06-28

## 2018-06-26 RX ADMIN — RIVAROXABAN 20 MG: 10 TABLET, FILM COATED ORAL at 22:32

## 2018-06-26 RX ADMIN — POTASSIUM CHLORIDE: 149 INJECTION, SOLUTION, CONCENTRATE INTRAVENOUS at 16:49

## 2018-06-26 RX ADMIN — POLYETHYLENE GLYCOL 3350 17 G: 17 POWDER, FOR SOLUTION ORAL at 21:12

## 2018-06-26 RX ADMIN — Medication 10 MEQ: at 23:53

## 2018-06-26 RX ADMIN — DOCUSATE SODIUM 100 MG: 100 CAPSULE, LIQUID FILLED ORAL at 21:12

## 2018-06-26 RX ADMIN — Medication 10 MEQ: at 21:22

## 2018-06-26 RX ADMIN — Medication 10 MEQ: at 22:44

## 2018-06-26 RX ADMIN — SODIUM CHLORIDE, POTASSIUM CHLORIDE, SODIUM LACTATE AND CALCIUM CHLORIDE: 600; 310; 30; 20 INJECTION, SOLUTION INTRAVENOUS at 21:13

## 2018-06-26 RX ADMIN — DEXAMETHASONE SODIUM PHOSPHATE: 10 INJECTION, SOLUTION INTRAMUSCULAR; INTRAVENOUS at 16:00

## 2018-06-26 RX ADMIN — SODIUM CHLORIDE 1500 ML: 9 INJECTION, SOLUTION INTRAVENOUS at 15:24

## 2018-06-26 ASSESSMENT — ACTIVITIES OF DAILY LIVING (ADL)
TOILETING: 0-->INDEPENDENT
SWALLOWING: 0-->SWALLOWS FOODS/LIQUIDS WITHOUT DIFFICULTY
TRANSFERRING: 0-->INDEPENDENT
COGNITION: 0 - NO COGNITION ISSUES REPORTED
DRESS: 0-->INDEPENDENT
BATHING: 0-->INDEPENDENT
NUMBER_OF_TIMES_PATIENT_HAS_FALLEN_WITHIN_LAST_SIX_MONTHS: 2
FALL_HISTORY_WITHIN_LAST_SIX_MONTHS: YES
AMBULATION: 0-->INDEPENDENT
RETIRED_COMMUNICATION: 0-->UNDERSTANDS/COMMUNICATES WITHOUT DIFFICULTY
RETIRED_EATING: 0-->INDEPENDENT

## 2018-06-26 ASSESSMENT — PAIN SCALES - GENERAL: PAINLEVEL: NO PAIN (0)

## 2018-06-26 NOTE — LETTER
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 He is currently on treatment with Doxil plus Ifosfamide since March 2018. Imaging after 3 cycles revealed positive response to treatment though he was noted to have incidental PE and was started on Xarelto. He received cycle 4 on 6/20/18 and is due for pump disconnect/neulasta today.  He has been struggling with nausea during treatment and was asked to be seen today for ongoing issues with nausea/vomiting. Please see Dr. Johnson note for full oncologic history. The plan is for him to go to surgery following this cycle.    Interval History:  Mr. Tapia returns to clinic today with his wife. He is sobbing upon my arrival to infusion since he feels so poorly. He states that while the first three days of treatment went well he has had significant nausea with multiple episodes of emesis since then. This has continued to worsen to the point he has not been able to eat or drink anything the past couple days. He was last able to drink water yesterday and He tries to drink water but ends up vomiting. He has been taking Kytril and Compazine with no relief in his symptoms and he notes he tends to throw up the medications. He denies abdominal pain thoough notes that he hasn't had a true bowel movement for the past couple days (he had a small one yesterday). He is taking Miralax every other day. He is still passing gas. He has a difficult time talking during the visit as he is crying and feeling so uncomfortable. He has had symptoms with previous cycles but notes this is the worst he has been.     He does seem to have more trouble with word slurry today and the past few days per his wife. He denies confusion and no other neurologic symptoms. He initially had  "noticeable word slurring during my visit but this improved. He does admit to dizziness upon standing. He feels extremely weak now and his wife notes he fell on his right leg this morning when they were leaving this house. He states his legs gave out from underneath him. This caused significant pain \"10.5/10\" in his leg. His leg pain is now a 3/10. He has not been taking anything for the pain. His wound is continuing to be cleaned twice weekly and has been doing better. There is less drainage and no surrounding erythema. It continues to be mildly tender to palpation.    He denies fevers or chills. No headaches, vision changes, or numbness/tingling. No chest pain, SOB, cough. No urinary concerns, swelling, bleeding issues (previously with epistaxis), or rashes. He does admit to a sore throat from retching.     Current Outpatient Prescriptions   Medication Sig Dispense Refill     granisetron (KYTRIL) 1 MG tablet Take 1 tablet (1 mg) by mouth every 12 hours as needed for nausea (Patient not taking: Reported on 6/20/2018) 30 tablet 3     IBUPROFEN PO Take 800 mg by mouth every 8 hours as needed for moderate pain       INDOMETHACIN PO Take 50 mg by mouth 3 times daily as needed for moderate pain (2-3 times a day with food)       lidocaine (XYLOCAINE) 4 % solution        nystatin (MYCOSTATIN) 787340 UNIT/ML suspension Take 5 mLs (500,000 Units) by mouth 4 times daily 473 mL 3     oxyCODONE IR (ROXICODONE) 5 MG tablet Take 1-2 tablets (5-10 mg) by mouth every 3 hours as needed for other (pain control or improvement in physical function. Hold dose for analgesic side effects.) (Patient not taking: Reported on 6/20/2018) 30 tablet 0     polyethylene glycol (MIRALAX/GLYCOLAX) Packet Take 17 g by mouth daily 7 packet 1     potassium chloride SA (KLOR-CON) 20 MEQ CR tablet Take 1 tablet (20 mEq) by mouth daily 5 tablet 0     PROCHLORPERAZINE MALEATE PO Take 10 mg by mouth       Rivaroxaban 15 & 20 MG TBPK Take 15 mg by mouth 2 " times daily Take 15 mg twice a day for 2 weeks then 20 mg once a day (Patient not taking: Reported on 6/20/2018) 51 each 1     rivaroxaban ANTICOAGULANT (XARELTO) 20 MG TABS tablet Take 1 tablet (20 mg) by mouth daily (with dinner) 30 tablet 3       Physical Examination:  /80  Temp 99.3 RR 18 SpO2 98  Wt Readings from Last 10 Encounters:   06/20/18 81.2 kg (179 lb)   05/22/18 86.4 kg (190 lb 8 oz)   05/01/18 91.9 kg (202 lb 8 oz)   04/24/18 89.8 kg (198 lb)   04/21/18 94.3 kg (208 lb)   03/29/18 97.3 kg (214 lb 9.6 oz)   03/28/18 95.6 kg (210 lb 12.2 oz)   03/27/18 96.8 kg (213 lb 4.8 oz)   03/23/18 96.6 kg (213 lb)   03/23/18 99.3 kg (219 lb)     Constitutional: Well-appearing male who appears very uncomfortable and crying but in no acute distress.  Eyes: EOMI, PERRL. No scleral icterus. Mild right lazy eye.  ENT: Oral mucosa is moist without lesions or thrush.   Lymphatic: Neck is supple without cervical or supraclavicular lymphadenopathy.  Cardiovascular: Regular rate and rhythm. No murmurs, gallops, or rubs. No peripheral edema.  Respiratory: Clear to auscultation bilaterally. No wheezes or crackles.  Gastrointestinal: Bowel sounds present. Abdomen soft, slight tenderness in epigastric region, otherwise non-tender. No palpable hepatosplenomegaly or masses.   Neurologic: Cranial nerves II through XII are grossly intact though he does occasional slur his words and had a harder time expressing himself early in the visit.  Skin: No abnormalities of right knee. Right thigh wound covered with no surrounding erythema, warmth, or tenderness. No rashes, petechiae, or bruising noted on exposed skin.    Laboratory Data:  Results for RUT CHAO (MRN [id number]) as of 6/26/2018 17:10   6/26/2018 15:35 6/26/2018 15:50   Sodium 136    Potassium 2.2 (LL)    Chloride 104    Carbon Dioxide 18 (L)    Urea Nitrogen 22    Creatinine 0.96    GFR Estimate 80    GFR Estimate If Black >90    Calcium 10.2 (H)    Anion  Gap 14    Magnesium 2.5 (H)    Phosphorus 2.4 (L)    Albumin 3.1 (L)    Protein Total 7.8    Bilirubin Total 0.6    Alkaline Phosphatase 131    ALT 20    AST 19    Bilirubin Direct 0.2    Troponin I ES <0.015    Glucose 138 (H)    WBC  5.7   Hemoglobin  9.2 (L)   Hematocrit  28.4 (L)   Platelet Count  349   RBC Count  3.53 (L)   MCV  81   MCH  26.1 (L)   MCHC  32.4   RDW  18.6 (H)   Diff Method  Automated Method   % Neutrophils  92.7   % Lymphocytes  4.6   % Monocytes  1.4   % Eosinophils  0.2   % Basophils  0.4   % Immature Granulocytes  0.7   Nucleated RBCs  0   Absolute Neutrophil  5.2   Absolute Lymphocytes  0.3 (L)   Absolute Monocytes  0.1   Absolute Eosinophils  0.0   Absolute Basophils  0.0   Abs Immature Granulocytes  0.0   Absolute Nucleated RBC  0.0       Assessment and Plan:  1. Dehydration 2/2 intractable nausea with vomiting and FTT  Patient now with 4 days of significant nausea with frequent vomiting, poor oral intake including no food for multiple days or water for one day, and new fall at home 2/2 weakness. Vitally stable other than mild tachycardia. Labs today with HEATHER with creat up to 0.96 and BUN to 22, acute hypokalemia with potassium at 2.2, and elevated calcium (corrected to 10.9)  -Did start IVF in clinic along with dexamethasone and Emend. Symptoms did improve however given the degree of his dehydration, falls at home, and electrolyte abnormalities, he will be admitted to the hospital. He was not happy about this though we discussed that with him living so far away it would be better to bring him in and get him feeling better vs driving multiple hours to come back to clinic for more IVF and potassium. He was agreeable to a short hospital stay  -Will start replacing potassium in clinic-plan for 40meq over the next 2 hours. He was previously on supplements though he hasn't been able to keep anything down. Did check EKG which does show ST and T wave abnormalities but no U waves. Did check  "troponin which was negative. He has no cardiac or respiratory symptoms. These EKG changes with hypokalemia further support admission today. Spoke with inpatient team and patient will need telemetry monitoring until his potassium improves.   -Monitor hypercalcemia and should improve with IVF treatment. If continues to be elevated, need to do further work-up. Of note if you correct his prior calcium levels based on his albumin he has had intermittent elevated calcium previously  -Plan to switch him to Zofran ODT 8mg every 8 hours in addition to Compazine. Could also consider Zyprexa 5mg QHS if still having nausea. Continue IVF and antiemetics in the hospital  -Consider PT/OT evaluation given fall at home. Slurred speech and slow response times improved with IVF and improvement in emotional state    2. Constipation  Admits to a \"couple days\" without a true bowel movement, though admits he had a small BM yesterday. He is still passing gas and denies abdominal pain so less concern for obstruction  -Continue Miralax. Increase to daily. Consider adding Senna if needed.    3. UPS   -Now s/p 4 cycles of Doxil/Ifosfamide. Most recent cycle given 6/20. He was disconnected in clinic today and then attached to his [place] at 1713. This will run for 24 hours. He will then need to be disconnected at that time. He will then be due for Neulasta but may need to receive Neupogen inpatient if he is not discharged in time.  -Continue twice weekly wound debridement/dressing changes with home nursing  -Consider starting pain medications in hospital if having worsening pain. Does have oxycodone prescribed but not currently taking  -Continue Dr. Betts follow-up, rad onc, and Dr. Johnson follow-up as planned. Will need a colonascopy per Dr. Johnson last note    4. History of PE  Asymptomatic noted on prior CT. Continue Xarelto 20mg daily.    Greater than 40 min was spent with the patient with >50% of the time spent in counseling and " coordinating care.     Ignacio Ramirez PA-C  [facility]  [address] [place], [place] [id number] [phone]      SENAIT Carter

## 2018-06-26 NOTE — MR AVS SNAPSHOT
After Visit Summary   6/26/2018    Hiram Tapia    MRN: [id number]           Patient Information     Date Of Birth          1960        Visit Information        Provider Department      6/26/2018 4:30 PM UC 11 ATC; [facility]        Today's Diagnoses     Chemotherapy-induced nausea and vomiting    -  1    Dehydration        Sarcoma (H)        Sarcoma of soft tissue (H)        Anemia in neoplastic disease           Follow-ups after your visit        Your next 10 appointments already scheduled     Oct 23, 2018 10:30 AM CDT   (Arrive by 10:15 AM)   Return Visit with Gaurang Johnson MD   [facility] ([facility])    [address]  [address]  [facility] [address] [phone]            Oct 30, 2018  3:30 PM CDT   (Arrive by 3:15 PM)   Return Visit with Brad Betts MD   [facility] ([facility])    [address]  4th Floor  [facility] [address] [phone]              Who to contact     If you have questions or need follow up information about today's clinic visit or your schedule please contact [facility] directly at [phone].  Normal or non-critical lab and imaging results will be communicated to you by MyChart, letter or phone within 4 business days after the clinic has received the results. If you do not hear from us within 7 days, please contact the clinic through MyChart or phone. If you have a critical or abnormal lab result, we will notify you by phone as soon as possible.  Submit refill requests through [employer] or call your pharmacy and they will forward the refill request to us. Please allow 3 business days for your refill to be completed.          Additional Information About Your Visit        Care EveryWhere ID     This is your Care EveryWhere ID. This could be used by other organizations to access  your [facility] medical records  [id number]        Your Vitals Were     Pulse Temperature Respirations Pulse Oximetry          72 98.2  F (36.8  C) (Oral) 16 98%         Blood Pressure from Last 3 Encounters:   09/19/18 117/70   09/11/18 132/83   07/17/18 101/71    Weight from Last 3 Encounters:   09/17/18 83.1 kg (183 lb 3.2 oz)   09/11/18 82.4 kg (181 lb 11.2 oz)   09/04/18 83.1 kg (183 lb 1.6 oz)              Today, you had the following     No orders found for display         Today's Medication Changes          These changes are accurate as of 6/26/18  7:10 PM.  If you have any questions, ask your nurse or doctor.               These medicines have changed or have updated prescriptions.        Dose/Directions    rivaroxaban ANTICOAGULANT 20 MG Tabs tablet   Commonly known as:  XARELTO   This may have changed:  Another medication with the same name was removed. Continue taking this medication, and follow the directions you see here.   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 Please contact your care team if you have questions.     IBUPROFEN PO   Stopped by:  Ignacio Ramirez PA           INDOMETHACIN PO   Stopped by:  Ignacio Ramirez PA           lidocaine 4 % solution   Commonly known as:  XYLOCAINE   Stopped by:  Ignacio Ramirez PA           nystatin 588354 UNIT/ML suspension   Commonly known as:  MYCOSTATIN   Stopped by:  Ignacio Ramirez PA           potassium chloride SA 20 MEQ CR tablet   Commonly known as:   KLOR-TOÑO   Stopped by:  Ignacio Ramirez PA                    Primary Care Provider Office Phone # Fax #    Louisa Fry [phone]       [facility] [address]  [place] [address]        Equal Access to Services     [facility] Center[facility] : Hadii aad ku hadasho Soomaali, waaxda luqadaha, qaybta kaalmada adeegyada, waxay idiin hayaan adepippa khquinnmela laashley . So [facility] [phone].    ATENCIÓN: Si habla español, tiene a sheridan disposición servicios gratuitos de asistencia lingüística. Samiaame al [phone].    We comply with applicable federal civil rights laws and [place] laws. We do not discriminate on the basis of race, color, national origin, age, disability, sex, sexual orientation, or gender identity.            Thank you!     Thank you for choosing [facility]  for your care. Our goal is always to provide you with excellent care. Hearing back from our patients is one way we can continue to improve our services. Please take a few minutes to complete the written survey that you may receive in the mail after your visit with us. Thank you!             Your Updated Medication List - Protect others around you: Learn how to safely use, store and throw away your medicines at www.disposemymeds.org.          This list is accurate as of 6/26/18  7:10 PM.  Always use your most recent med list.                   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

## 2018-06-26 NOTE — IP AVS SNAPSHOT
MRN:[id number]                      After Visit Summary   6/26/2018    Hiram Tapia    MRN: [id number]           Thank you!     Thank you for choosing [facility] for your care. Our goal is always to provide you with excellent care. Hearing back from our patients is one way we can continue to improve our services. Please take a few minutes to complete the written survey that you may receive in the mail after you visit with us. Thank you!        Patient Information     Date Of Birth          1960        Designated Caregiver       Most Recent Value    Caregiver    Will someone help with your care after discharge? yes    Name of designated caregiver Lea Tapia    Phone number of caregiver [phone]    Caregiver address [address] [place] [address]      About your hospital stay     You were admitted on:  June 26, 2018 You last received care in the:  [address] [facility]    You were discharged on:  June 29, 2018        Reason for your hospital stay       You were admitted for dehydration, acute hypokalemia and nausea and vomiting. You were given electrolyte replacement and IV fluids                  Who to Call     For medical emergencies, please call 911.  For non-urgent questions about your medical care, please call your primary care provider or clinic, [phone]          Attending Provider     Provider Specialty    Gaurang Johnson MD Hematology    YovannyHarpal Walden MD Hematology & Oncology       Primary Care Provider Office Phone # Fax #    Louisa Fry [phone]       When to contact your care team       { [facility] clinic triage line at [phone] for temp >100.4, uncontrolled nausea/vomiting/diarrhea/constipation, unrelieved pain, bleeding not relieved with pressure, dizziness, chest pain, shortness of breath, loss of consciousness, and any new or concerning symptoms.                  After Care Instructions     Activity        Your activity upon discharge: activity as tolerated            Diet       Follow this diet upon discharge: Regular            Discharge Instructions       Neulasta 6/30/18 @ 12:30  Zofran for nausea every 8 hours, Zyprexa at night time.  Continue taking in increased hydration  Phos Nak packets, and potassium tablets                  Follow-up Appointments     Follow Up and recommended labs and tests       F/u For Neulasta on 6/30 @ 1230  F/u in Clinic on Tuesday with Oncology @ 7:15am                  Your next 10 appointments already scheduled     Jun 30, 2018 12:30 PM CDT   (Arrive by 12:15 PM)   INJECTION with  Oncology Injection Nurse   [facility] ([facility])    [address]  [address]  [place] [address]   [phone]            Jul 03, 2018  7:15 AM CDT   [facility] Draw with [facility] DRAW   [facility] ([facility])    [address]  [address]  [place] [facility] [address]   [phone]            Jul 03, 2018  8:00 AM CDT   Infusion 360 with  ONCOLOGY INFUSION, UC 13 ATC   [facility] ([facility])    [address]  [address]  [place] [facility] [address]   [phone]            Jul 03, 2018 11:20 AM CDT   (Arrive by 11:05 AM)   Return Visit with SENAIT Springer   [facility] ([facility])    [address]  [address]  [place] [facility] [address]   [phone]            Jul 03, 2018  3:30 PM CDT   (Arrive by 3:15 PM)   Return Visit with Brad Betts MD   [facility] ([facility])    [address]  4th Floor  [place] [facility] [address]   [phone]            Jul 10, 2018  9:30 AM CDT   CONSULT with Kimberley Solo MD   Radiation Oncology Clinic ([facility])    [facility]  1st Floor  [address]  [facility] [address]   [phone]            Jul 17, 2018  8:00 AM CDT   [facility] Lab Draw with  [facility] DRAW   [facility] ([facility])    [address]  [address]  [facility] [address]   [phone]            Jul 17, 2018  8:30 AM CDT   (Arrive by 8:15 AM)   Return Visit with Gaurang Johnson MD   [facility] ([facility])    [address]  [address]  [facility] [address]   [phone]            Jul 18, 2018   Procedure with Pito Sinha MD   [facility] [place] ([facility])    [address] [facility] [address]   [phone]           The [facility] is located on the corner of [address] and [address] on the [facility]. It is easily accessible from virtually any point in the [facility] area, via [address].              Additional Services     Home care nursing referral       [facility] [phone]  Fax [phone]    RN skilled nursing visit. RN to assess vital signs and weight, respiratory and cardiac status, pain level and activity tolerance, hydration, nutrition and bowel status and home safety.  Resume twice weekly dressing changes    Your provider has ordered home care nursing services. If you have not been contacted within 2 days of your discharge please call the inpatient department phone number at [phone] .                  Future tests that were ordered for you     **Basic metabolic panel FUTURE anytime           **CBC with platelets FUTURE anytime       Last Lab Result: Hemoglobin (g/dL)       Date                     Value                 06/29/2018               7.7 (L)          ----------            Phosphorus                 Further instructions from your care team       R hip wound: May leave current dressing in place upon discharge.  (should last until seen by home care) OK to return to home POC upon discharge.  If blue sponge in dressing turns white or drainage saturates dressing or pt wishes dressing change prior to discharge use the following POC:    Every 3-5 days cleanse with microklenz and pat dry.  Cut 2 pieces of hydrofera blue (#[id number]) to fit into wound.  Dampen with normal saline.  Squeeze out excess if needed, then pack into wound bed.  Cover with 1/2 Comfeel hydrocolloid, then secure with large tegaderm.      Pending Results     No orders found from 6/24/2018 to 6/27/2018.            Statement of Approval     Ordered          06/29/18 1403  I have reviewed and agree with all the recommendations and orders detailed in this document.  EFFECTIVE NOW     Approved and electronically signed by:  Lay Rodriguez APRN CNP             Admission Information     Date & Time Provider Department Dept. Phone    6/26/2018 Harpal Singh MD [address] [facility] [phone]      Your Vitals Were     Blood Pressure Pulse Temperature Respirations Weight Pulse Oximetry    111/66 (BP Location: Left arm) 90 99.9  F (37.7  C) (Oral) 18 81.4 kg (179 lb 8 oz) 97%    BMI (Body Mass Index)                   25.76 kg/m2           Care EveryWhere ID     This is your Care EveryWhere ID. This could be used by other organizations to access your [facility] medical records  [id number]        Equal Access to Services     DORIAN TOLENTINO AH: Hadii troy hutchisono Sonikita, waaxda luqadaha, qaybta kaalmada mendez lamas. So [facility] [phone].    ATENCIÓN: Si habla español, tiene a sheridan disposición servicios gratuitos de asistencia lingüística. Llame al [phone].    We comply with applicable federal civil rights laws and [place] laws. We do not discriminate on the basis of race, color, national origin, age, disability, sex, sexual orientation, or gender identity.               Review of your medicines      START  taking        Dose / Directions    OLANZapine 5 MG tablet   Commonly known as:  zyPREXA   Used for:  Chemotherapy-induced nausea and vomiting        Dose:  5 mg   Take 1 tablet (5 mg) by mouth At Bedtime   Quantity:  30 tablet   Refills:  0       omeprazole 20 MG CR capsule   Commonly known as:  priLOSEC   Used for:  Chemotherapy-induced nausea and vomiting        Dose:  20 mg   Start taking on:  6/30/2018   Take 1 capsule (20 mg) by mouth every morning (before breakfast)   Quantity:  30 capsule   Refills:  0       ondansetron 8 MG tablet   Commonly known as:  ZOFRAN   Used for:  Chemotherapy-induced nausea and vomiting        Dose:  8 mg   Take 1 tablet (8 mg) by mouth every 8 hours   Quantity:  30 tablet   Refills:  0       oxyCODONE IR 5 MG tablet   Commonly known as:  ROXICODONE   Used for:  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)        Dose:  5 mg   Take 1 tablet (5 mg) by mouth every 4 hours as needed for moderate to severe pain   Quantity:  20 tablet   Refills:  0       potassium & sodium phosphates 280-160-250 MG Packet   Commonly known as:  NEUTRA-PHOS   Used for:  Dehydration        Dose:  1 packet   Take 1 packet by mouth 4 times daily   Quantity:  60 each   Refills:  0       potassium chloride SA 20 MEQ CR tablet   Commonly known as:  K-DUR/KLOR-CON M   Used for:  Idiopathic gout, unspecified chronicity, unspecified site        Dose:  20 mEq   Take 1 tablet (20 mEq) by mouth daily   Quantity:  60 tablet   Refills:  0       traMADol 50 MG tablet   Commonly known as:  ULTRAM   Used for:  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 If we are uncertain of the size of tablets/capsules you have at home, strength may be listed as something that might have changed.        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 Make sure you understand how and when to take each.        Dose:  10 mg   Take 10 mg by mouth every 6 hours as needed   Refills:  0       * prochlorperazine 10 MG tablet   Commonly known as:  COMPAZINE   This may have changed:  You were already taking a medication with the same name, and this prescription was added. Make sure you understand how and when to take each.   Used for:  Chemotherapy-induced nausea and vomiting        Dose:  10 mg   Take 1 tablet (10 mg) by mouth every 6 hours as needed for nausea or vomiting   Quantity:  30 tablet   Refills:  0       * Notice:  This list has 2 medication(s) that are the same as other medications prescribed for you. Read the directions carefully, and ask your doctor or other care provider to review them with you.      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        Dose:  20 mg   Take 1 tablet (20 mg) by mouth daily (with dinner)   Quantity:  30 tablet   Refills:  3            Where to get  your medicines      These medications were sent to [facility] - [place], [place] - [address]  [address], [facility] [address]     Phone:  [phone]     OLANZapine 5 MG tablet    omeprazole 20 MG CR capsule    ondansetron 8 MG tablet    potassium & sodium phosphates 280-160-250 MG Packet    potassium chloride SA 20 MEQ CR tablet    prochlorperazine 10 MG tablet         Some of these will need a paper prescription and others can be bought over the counter. Ask your nurse if you have questions.     Bring a paper prescription for each of these medications     oxyCODONE IR 5 MG tablet    traMADol 50 MG tablet                Protect others around you: Learn how to safely use, store and throw away your medicines at www.disposemymeds.org.        Information about OPIOIDS     PRESCRIPTION OPIOIDS: WHAT YOU NEED TO KNOW   We gave you an opioid (narcotic) pain medicine. It is important to manage your pain, but opioids are not always the best choice. You should first try all the other options your care team gave you. Take this medicine for as short a time (and as few doses) as possible.     These medicines have risks:    DO NOT drive when on new or higher doses of pain medicine. These medicines can affect your alertness and reaction times, and you could be arrested for driving under the influence (DUI). If you need to use opioids long-term, talk to your care team about driving.    DO NOT operate heave machinery    DO NOT do any other dangerous activities while taking these medicines.     DO NOT drink any alcohol while taking these medicines.      If the opioid prescribed includes acetaminophen, DO NOT take with any other medicines that contain acetaminophen. Read all labels carefully. Look for the word  acetaminophen  or  Tylenol.  Ask your pharmacist if you have questions or are unsure.    You can get addicted to pain medicines, especially if you have a history of addiction (chemical,  alcohol or substance dependence). Talk to your care team about ways to reduce this risk.    Store your pills in a secure place, locked if possible. We will not replace any lost or stolen medicine. If you don t finish your medicine, please throw away (dispose) as directed by your pharmacist. The [place] Pollution [employer] Agency has more information about safe disposal: https://www.pca.[facility].[place].us/living-green/managing-unwanted-medications.     All opioids tend to cause constipation. Drink plenty of water and eat foods that have a lot of fiber, such as fruits, vegetables, prune juice, apple juice and high-fiber cereal. Take a laxative (Miralax, milk of magnesia, Colace, Senna) if you don t move your bowels at least every other day.              Medication List: This is a list of all your medications and when to take them. Check marks below indicate your daily home schedule. Keep this list as a reference.      Medications           Morning Afternoon Evening Bedtime As Needed    granisetron 1 MG tablet   Commonly known as:  KYTRIL   Take 1 tablet (1 mg) by mouth every 12 hours as needed for nausea                                OLANZapine 5 MG tablet   Commonly known as:  zyPREXA   Take 1 tablet (5 mg) by mouth At Bedtime   Last time this was given:  5 mg on 6/29/2018  8:32 AM                                omeprazole 20 MG CR capsule   Commonly known as:  priLOSEC   Take 1 capsule (20 mg) by mouth every morning (before breakfast)   Start taking on:  6/30/2018   Last time this was given:  20 mg on 6/29/2018  6:45 AM                                ondansetron 8 MG tablet   Commonly known as:  ZOFRAN   Take 1 tablet (8 mg) by mouth every 8 hours                                oxyCODONE IR 5 MG tablet   Commonly known as:  ROXICODONE   Take 1 tablet (5 mg) by mouth every 4 hours as needed for moderate to severe pain   Last time this was given:  5 mg on 6/29/2018 11:20 AM                                polyethylene glycol  Packet   Commonly known as:  MIRALAX/GLYCOLAX   Take 17 g by mouth daily   Last time this was given:  17 g on 6/28/2018  9:29 PM                                potassium & sodium phosphates 280-160-250 MG Packet   Commonly known as:  NEUTRA-PHOS   Take 1 packet by mouth 4 times daily                                potassium chloride SA 20 MEQ CR tablet   Commonly known as:  K-DUR/KLOR-CON M   Take 1 tablet (20 mEq) by mouth daily   Last time this was given:  20 mEq on 6/28/2018 10:12 PM                                * PROCHLORPERAZINE MALEATE PO   Take 10 mg by mouth every 6 hours as needed                                * prochlorperazine 10 MG tablet   Commonly known as:  COMPAZINE   Take 1 tablet (10 mg) by mouth every 6 hours as needed for nausea or vomiting                                rivaroxaban ANTICOAGULANT 20 MG Tabs tablet   Commonly known as:  XARELTO   Take 1 tablet (20 mg) by mouth daily (with dinner)   Last time this was given:  20 mg on 6/28/2018  4:24 PM                                traMADol 50 MG tablet   Commonly known as:  ULTRAM   Take 1 tablet (50 mg) by mouth every 6 hours as needed for moderate pain   Last time this was given:  50 mg on 6/28/2018  4:16 AM                                * Notice:  This list has 2 medication(s) that are the same as other medications prescribed for you. Read the directions carefully, and ask your doctor or other care provider to review them with you.

## 2018-06-26 NOTE — PROGRESS NOTES
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 He is currently on treatment with Doxil plus Ifosfamide since March 2018. Imaging after 3 cycles revealed positive response to treatment though he was noted to have incidental PE and was started on Xarelto. He received cycle 4 on 6/20/18 and is due for pump disconnect/neulasta today.  He has been struggling with nausea during treatment and was asked to be seen today for ongoing issues with nausea/vomiting. Please see Dr. Johnson note for full oncologic history. The plan is for him to go to surgery following this cycle.    Interval History:  Mr. Tapia returns to clinic today with his wife. He is sobbing upon my arrival to infusion since he feels so poorly. He states that while the first three days of treatment went well he has had significant nausea with multiple episodes of emesis since then. This has continued to worsen to the point he has not been able to eat or drink anything the past couple days. He was last able to drink water yesterday and He tries to drink water but ends up vomiting. He has been taking Kytril and Compazine with no relief in his symptoms and he notes he tends to throw up the medications. He denies abdominal pain thoough notes that he hasn't had a true bowel movement for the past couple days (he had a small one yesterday). He is taking Miralax every other day. He is still passing gas. He has a difficult time talking during the visit as he is crying and feeling so uncomfortable. He has had symptoms with previous cycles but notes this is the worst he has been.     He does seem to have more trouble with word slurry today and the past few days per his wife. He denies confusion and no other neurologic symptoms. He initially had noticeable word slurring during my visit but this improved. He does admit to dizziness  "upon standing. He feels extremely weak now and his wife notes he fell on his right leg this morning when they were leaving this house. He states his legs gave out from underneath him. This caused significant pain \"10.5/10\" in his leg. His leg pain is now a 3/10. He has not been taking anything for the pain. His wound is continuing to be cleaned twice weekly and has been doing better. There is less drainage and no surrounding erythema. It continues to be mildly tender to palpation.    He denies fevers or chills. No headaches, vision changes, or numbness/tingling. No chest pain, SOB, cough. No urinary concerns, swelling, bleeding issues (previously with epistaxis), or rashes. He does admit to a sore throat from retching.     Current Outpatient Prescriptions   Medication Sig Dispense Refill     granisetron (KYTRIL) 1 MG tablet Take 1 tablet (1 mg) by mouth every 12 hours as needed for nausea (Patient not taking: Reported on 6/20/2018) 30 tablet 3     IBUPROFEN PO Take 800 mg by mouth every 8 hours as needed for moderate pain       INDOMETHACIN PO Take 50 mg by mouth 3 times daily as needed for moderate pain (2-3 times a day with food)       lidocaine (XYLOCAINE) 4 % solution        nystatin (MYCOSTATIN) 203634 UNIT/ML suspension Take 5 mLs (500,000 Units) by mouth 4 times daily 473 mL 3     oxyCODONE IR (ROXICODONE) 5 MG tablet Take 1-2 tablets (5-10 mg) by mouth every 3 hours as needed for other (pain control or improvement in physical function. Hold dose for analgesic side effects.) (Patient not taking: Reported on 6/20/2018) 30 tablet 0     polyethylene glycol (MIRALAX/GLYCOLAX) Packet Take 17 g by mouth daily 7 packet 1     potassium chloride SA (KLOR-CON) 20 MEQ CR tablet Take 1 tablet (20 mEq) by mouth daily 5 tablet 0     PROCHLORPERAZINE MALEATE PO Take 10 mg by mouth       Rivaroxaban 15 & 20 MG TBPK Take 15 mg by mouth 2 times daily Take 15 mg twice a day for 2 weeks then 20 mg once a day (Patient not taking: " Reported on 6/20/2018) 51 each 1     rivaroxaban ANTICOAGULANT (XARELTO) 20 MG TABS tablet Take 1 tablet (20 mg) by mouth daily (with dinner) 30 tablet 3       Physical Examination:  /80  Temp 99.3 RR 18 SpO2 98  Wt Readings from Last 10 Encounters:   06/20/18 81.2 kg (179 lb)   05/22/18 86.4 kg (190 lb 8 oz)   05/01/18 91.9 kg (202 lb 8 oz)   04/24/18 89.8 kg (198 lb)   04/21/18 94.3 kg (208 lb)   03/29/18 97.3 kg (214 lb 9.6 oz)   03/28/18 95.6 kg (210 lb 12.2 oz)   03/27/18 96.8 kg (213 lb 4.8 oz)   03/23/18 96.6 kg (213 lb)   03/23/18 99.3 kg (219 lb)     Constitutional: Well-appearing male who appears very uncomfortable and crying but in no acute distress.  Eyes: EOMI, PERRL. No scleral icterus. Mild right lazy eye.  ENT: Oral mucosa is moist without lesions or thrush.   Lymphatic: Neck is supple without cervical or supraclavicular lymphadenopathy.  Cardiovascular: Regular rate and rhythm. No murmurs, gallops, or rubs. No peripheral edema.  Respiratory: Clear to auscultation bilaterally. No wheezes or crackles.  Gastrointestinal: Bowel sounds present. Abdomen soft, slight tenderness in epigastric region, otherwise non-tender. No palpable hepatosplenomegaly or masses.   Neurologic: Cranial nerves II through XII are grossly intact though he does occasional slur his words and had a harder time expressing himself early in the visit.  Skin: No abnormalities of right knee. Right thigh wound covered with no surrounding erythema, warmth, or tenderness. No rashes, petechiae, or bruising noted on exposed skin.    Laboratory Data:  Results for BERE CHAOLETICIA BELTRAN (MRN [id number]) as of 6/26/2018 17:10   6/26/2018 15:35 6/26/2018 15:50   Sodium 136    Potassium 2.2 (LL)    Chloride 104    Carbon Dioxide 18 (L)    Urea Nitrogen 22    Creatinine 0.96    GFR Estimate 80    GFR Estimate If Black >90    Calcium 10.2 (H)    Anion Gap 14    Magnesium 2.5 (H)    Phosphorus 2.4 (L)    Albumin 3.1 (L)    Protein Total 7.8     Bilirubin Total 0.6    Alkaline Phosphatase 131    ALT 20    AST 19    Bilirubin Direct 0.2    Troponin I ES <0.015    Glucose 138 (H)    WBC  5.7   Hemoglobin  9.2 (L)   Hematocrit  28.4 (L)   Platelet Count  349   RBC Count  3.53 (L)   MCV  81   MCH  26.1 (L)   MCHC  32.4   RDW  18.6 (H)   Diff Method  Automated Method   % Neutrophils  92.7   % Lymphocytes  4.6   % Monocytes  1.4   % Eosinophils  0.2   % Basophils  0.4   % Immature Granulocytes  0.7   Nucleated RBCs  0   Absolute Neutrophil  5.2   Absolute Lymphocytes  0.3 (L)   Absolute Monocytes  0.1   Absolute Eosinophils  0.0   Absolute Basophils  0.0   Abs Immature Granulocytes  0.0   Absolute Nucleated RBC  0.0       Assessment and Plan:  1. Dehydration 2/2 intractable nausea with vomiting and FTT  Patient now with 4 days of significant nausea with frequent vomiting, poor oral intake including no food for multiple days or water for one day, and new fall at home 2/2 weakness. Vitally stable other than mild tachycardia. Labs today with HEATHER with creat up to 0.96 and BUN to 22, acute hypokalemia with potassium at 2.2, and elevated calcium (corrected to 10.9)  -Did start IVF in clinic along with dexamethasone and Emend. Symptoms did improve however given the degree of his dehydration, falls at home, and electrolyte abnormalities, he will be admitted to the hospital. He was not happy about this though we discussed that with him living so far away it would be better to bring him in and get him feeling better vs driving multiple hours to come back to clinic for more IVF and potassium. He was agreeable to a short hospital stay  -Will start replacing potassium in clinic-plan for 40meq over the next 2 hours. He was previously on supplements though he hasn't been able to keep anything down. Did check EKG which does show ST and T wave abnormalities but no U waves. Did check troponin which was negative. He has no cardiac or respiratory symptoms. These EKG changes with  "hypokalemia further support admission today. Spoke with inpatient team and patient will need telemetry monitoring until his potassium improves.   -Monitor hypercalcemia and should improve with IVF treatment. If continues to be elevated, need to do further work-up. Of note if you correct his prior calcium levels based on his albumin he has had intermittent elevated calcium previously  -Plan to switch him to Zofran ODT 8mg every 8 hours in addition to Compazine. Could also consider Zyprexa 5mg QHS if still having nausea. Continue IVF and antiemetics in the hospital  -Consider PT/OT evaluation given fall at home. Slurred speech and slow response times improved with IVF and improvement in emotional state    2. Constipation  Admits to a \"couple days\" without a true bowel movement, though admits he had a small BM yesterday. He is still passing gas and denies abdominal pain so less concern for obstruction  -Continue Miralax. Increase to daily. Consider adding Senna if needed.    3. UPS   -Now s/p 4 cycles of Doxil/Ifosfamide. Most recent cycle given 6/20. He was disconnected in clinic today and then attached to his [place] at 1713. This will run for 24 hours. He will then need to be disconnected at that time. He will then be due for Neulasta but may need to receive Neupogen inpatient if he is not discharged in time.  -Continue twice weekly wound debridement/dressing changes with home nursing  -Consider starting pain medications in hospital if having worsening pain. Does have oxycodone prescribed but not currently taking  -Continue Dr. Betts follow-up, rad onc, and Dr. Johnson follow-up as planned. Will need a colonascopy per Dr. Johnson last note    4. History of PE  Asymptomatic noted on prior CT. Continue Xarelto 20mg daily.    Greater than 40 min was spent with the patient with >50% of the time spent in counseling and coordinating care.     Ignacio Ramirez PA-C  [facility] [address] " [place] [address]  [phone]

## 2018-06-26 NOTE — IP AVS SNAPSHOT
[address] [address]    Phone:  [phone]                                       After Visit Summary   6/26/2018    Hiram Tapia    MRN: [id number]           After Visit Summary Signature Page     I have received my discharge instructions, and my questions have been answered. I have discussed any challenges I see with this plan with the nurse or doctor.    ..........................................................................................................................................  Patient/Patient Representative Signature      ..........................................................................................................................................  Patient Representative Print Name and Relationship to Patient    ..................................................               ................................................  Date                                            Time    ..........................................................................................................................................  Reviewed by Signature/Title    ...................................................              ..............................................  Date                                                            Time

## 2018-06-26 NOTE — TELEPHONE ENCOUNTER
[facility] Telephone Triage Note    Assessment: Spouse/Partner Lea called in to triage reporting the following symptoms: nausea and vomiting x2 days, unable to keep down oral medications and fluids.    Recommendations: Discussed with Dr. Guarang Johnson.  addition of 1.5L normal saline, Emend, and 10mg dexamethasone to patients scheduled infusion appointment today. Rubens Mckeon, RNCC, added these orders to the current therapy plan..    Follow-Up: Order for above labs/procedures/infusion placed, Message sent to schedulers to add pt on to schedule, Informed patient of appointment times, Instructed patient to seek care immediately for worsening symptoms, including: fever, chest pain, shortness of breath, dizziness. Patient voiced understanding of advice and/or instructions given.

## 2018-06-26 NOTE — MR AVS SNAPSHOT
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)        Pain of right lower extremity        Disruption of tissue around surgical drain, subsequent encounter        Personal history of tobacco use, presenting hazards to health        Postoperative wound infection, subsequent encounter        History of pulmonary embolism        Idiopathic gout, unspecified chronicity, unspecified site        Pulmonary nodules        Dehydration        Chemotherapy-induced nausea and vomiting        EKG abnormality           Follow-ups after your visit        Your next 10 appointments already scheduled     Jul 03, 2018  3:30 PM CDT   (Arrive by 3:15 PM)   Return Visit with Brad Betts MD   [facility] ([facility] [place])    [address]  4th Floor  [facility] [address] [phone]            Jul 10, 2018  9:30 AM CDT   CONSULT with Kimberley Solo MD   Radiation Oncology Clinic ([facility])    [facility]  1st Floor  [address]  [place] [facility] [place] [address]   [phone]            Jul 17, 2018  8:00 AM CDT   [facility] Lab Draw with  [employer] LAB DRAW   [facility] Draw ([facility])    [address]  [address]  [place] [facility] [address]   [phone]            Jul 17, 2018  8:30 AM CDT   (Arrive by 8:15 AM)   Return Visit with Gaurang Johnson MD   [facility] ([facility])    [address]  [address]  [place] [facility] [address]   [phone]            Jul 18, 2018   Procedure with Pito  Rashad Sinha MD   [facility] [place] Endoscopy ([facility])    [address]  [facility] [place] [id number] [phone]           The [facility] is located on the corner of [address] and [address] on the [facility] [address]. It is easily accessible from virtually any point in the [place] area, via [address] and [address].              Future tests that were ordered for you today     Open Standing Orders        Priority Remaining Interval Expires Ordered    Phosphorus Routine 100/100 CONDITIONAL (SPECIFY)  6/26/2018    Potassium Routine 100/100 CONDITIONAL (SPECIFY)  6/26/2018    Magnesium Routine 100/100 CONDITIONAL (SPECIFY)  6/26/2018    Oxygen: Nasal cannula Routine 65207/44039 CONTINUOUS  6/26/2018    Blood culture Routine 100/100 CONDITIONAL (SPECIFY)  6/26/2018    Blood culture Routine 100/100 CONDITIONAL (SPECIFY)  6/26/2018    Platelet count Routine 24/24 EVERY THREE DAYS  6/26/2018            Who to contact     If you have questions or need follow up information about today's clinic visit or your schedule please contact [facility] directly at [phone].  Normal or non-critical lab and imaging results will be communicated to you by MyChart, letter or phone within 4 business days after the clinic has received the results. If you do not hear from us within 7 days, please contact the clinic through MyChart or phone. If you have a critical or abnormal lab result, we will notify you by phone as soon as possible.  Submit refill requests through [facility] or call your pharmacy and they will forward the refill request to us. Please allow 3 business days for your refill to be completed.          Additional Information About Your Visit        Care EveryWhere ID     This is your Care EveryWhere ID. This could be used by other organizations to access your [place] medical records  [id number]         Blood Pressure from Last 3  Encounters:   06/27/18 106/61   06/26/18 113/80   06/20/18 97/60    Weight from Last 3 Encounters:   06/26/18 79.4 kg (175 lb)   06/20/18 81.2 kg (179 lb)   05/22/18 86.4 kg (190 lb 8 oz)              We Performed the Following     Basic metabolic panel     CBC with platelets differential     EKG 12-lead complete w/read - Clinics     Hepatic panel     Magnesium     Phosphorus     Troponin I          Today's Medication Changes          These changes are accurate as of 6/26/18  7:10 PM.  If you have any questions, ask your nurse or doctor.               These medicines have changed or have updated prescriptions.        Dose/Directions    rivaroxaban ANTICOAGULANT 20 MG Tabs tablet   Commonly known as:  XARELTO   This may have changed:  Another medication with the same name was removed. Continue taking this medication, and follow the directions you see here.   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 Please contact your care team if you have questions.     IBUPROFEN PO   Stopped by:  Ignacio Ramirez PA           INDOMETHACIN PO   Stopped by:  Ignacio Ramirez PA           lidocaine 4 % solution   Commonly known as:  XYLOCAINE   Stopped by:  Ignacio Ramirez PA           nystatin 883919 UNIT/ML suspension   Commonly known as:  MYCOSTATIN   Stopped by:  Ignacio Ramirez PA           potassium chloride SA 20 MEQ CR tablet   Commonly known as:  KLOR-CON   Stopped by:  Ignacio Ramirez PA                    Primary Care  Provider Office Phone # Fax #    Louisa Fry [phone]       [facility] [address]  [place] [address]        Equal Access to Services     DORIAN TOLENTINO : Hadricky troy martinez kiannao Sokvngali, waaxda luqadaha, qaybta kaalmada adelaurita, mendez may laRiccooren mcnair. So [facility] [phone].    ATENCIÓN: Si habla español, tiene a sheridan disposición servicios gratuitos de asistencia lingüística. Llame al [phone].    We comply with applicable federal civil rights laws and [place] laws. We do not discriminate on the basis of race, color, national origin, age, disability, sex, sexual orientation, or gender identity.            Thank you!     Thank you for choosing [facility]  for your care. Our goal is always to provide you with excellent care. Hearing back from our patients is one way we can continue to improve our services. Please take a few minutes to complete the written survey that you may receive in the mail after your visit with us. Thank you!             Your Updated Medication List - Protect others around you: Learn how to safely use, store and throw away your medicines at www.disposemymeds.org.          This list is accurate as of 6/26/18  7:10 PM.  Always use your most recent med list.                   Brand Name Dispense Instructions for use Diagnosis    granisetron 1 MG tablet    KYTRIL    30 tablet    Take 1 tablet (1 mg) by mouth every 12 hours as needed for nausea    Sarcoma of soft tissue (H)       oxyCODONE IR 5 MG tablet    ROXICODONE    30 tablet    Take 1-2 tablets (5-10 mg) by mouth every 3 hours as needed for other (pain control or improvement in physical function. 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

## 2018-06-26 NOTE — Clinical Note
6/26/2018       RE: Hiram Tapia  [address]  [facility] [address]     Dear Colleague,    Thank you for referring your patient, Hiram Tapia, to the [facility]. Please see a copy of my visit note below.    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 He is currently on treatment with Doxil plus Ifosfamide since March 2018. Imaging after 3 cycles revealed positive response to treatment though he was noted to have incidental PE and was started on Xarelto. He received cycle 4 on 6/20/18 and is due for pump disconnect/neulasta today.  He has been struggling with nausea during treatment and was asked to be seen today for ongoing issues with nausea/vomiting. Please see Dr. Johnson note for full oncologic history. The plan is for him to go to surgery following this cycle.    Interval History:  Mr. Tapia returns to clinic today with his wife. He is sobbing upon my arrival to [facility] since he feels so poorly. He states that while the first three days of treatment went well he has had significant nausea with multiple episodes of emesis since then. This has continued to worsen to the point he has not been able to eat or drink anything the past couple days. He was last able to drink water yesterday and He tries to drink water but ends up vomiting. He has been taking Kytril and Compazine with no relief in his symptoms and he notes he tends to throw up the medications. He denies abdominal pain thoough notes that he hasn't had a true bowel movement for the past couple days (he had a small one yesterday). He is taking Miralax every other day. He is still passing gas. He has a difficult time talking during the visit as he is crying and feeling so uncomfortable. He has had symptoms with previous cycles but notes this is the worst he has been.     He does seem to  "have more trouble with word slurry today and the past few days per his wife. He denies confusion and no other neurologic symptoms. He initially had noticeable word slurring during my visit but this improved. He does admit to dizziness upon standing. He feels extremely weak now and his wife notes he fell on his right leg this morning when they were leaving this house. He states his legs gave out from underneath him. This caused significant pain \"10.5/10\" in his leg. His leg pain is now a 3/10. He has not been taking anything for the pain. His wound is continuing to be cleaned twice weekly and has been doing better. There is less drainage and no surrounding erythema. It continues to be mildly tender to palpation.    He denies fevers or chills. No headaches, vision changes, or numbness/tingling. No chest pain, SOB, cough. No urinary concerns, swelling, bleeding issues (previously with epistaxis), or rashes. He does admit to a sore throat from retching.     Current Outpatient Prescriptions   Medication Sig Dispense Refill     granisetron (KYTRIL) 1 MG tablet Take 1 tablet (1 mg) by mouth every 12 hours as needed for nausea (Patient not taking: Reported on 6/20/2018) 30 tablet 3     IBUPROFEN PO Take 800 mg by mouth every 8 hours as needed for moderate pain       INDOMETHACIN PO Take 50 mg by mouth 3 times daily as needed for moderate pain (2-3 times a day with food)       lidocaine (XYLOCAINE) 4 % solution        nystatin (MYCOSTATIN) 986326 UNIT/ML suspension Take 5 mLs (500,000 Units) by mouth 4 times daily 473 mL 3     oxyCODONE IR (ROXICODONE) 5 MG tablet Take 1-2 tablets (5-10 mg) by mouth every 3 hours as needed for other (pain control or improvement in physical function. Hold dose for analgesic side effects.) (Patient not taking: Reported on 6/20/2018) 30 tablet 0     polyethylene glycol (MIRALAX/GLYCOLAX) Packet Take 17 g by mouth daily 7 packet 1     potassium chloride SA (KLOR-CON) 20 MEQ CR tablet Take 1 " tablet (20 mEq) by mouth daily 5 tablet 0     PROCHLORPERAZINE MALEATE PO Take 10 mg by mouth       Rivaroxaban 15 & 20 MG TBPK Take 15 mg by mouth 2 times daily Take 15 mg twice a day for 2 weeks then 20 mg once a day (Patient not taking: Reported on 6/20/2018) 51 each 1     rivaroxaban ANTICOAGULANT (XARELTO) 20 MG TABS tablet Take 1 tablet (20 mg) by mouth daily (with dinner) 30 tablet 3       Physical Examination:  /80  Temp 99.3 RR 18 SpO2 98  Wt Readings from Last 10 Encounters:   06/20/18 81.2 kg (179 lb)   05/22/18 86.4 kg (190 lb 8 oz)   05/01/18 91.9 kg (202 lb 8 oz)   04/24/18 89.8 kg (198 lb)   04/21/18 94.3 kg (208 lb)   03/29/18 97.3 kg (214 lb 9.6 oz)   03/28/18 95.6 kg (210 lb 12.2 oz)   03/27/18 96.8 kg (213 lb 4.8 oz)   03/23/18 96.6 kg (213 lb)   03/23/18 99.3 kg (219 lb)     Constitutional: Well-appearing male who appears very uncomfortable and crying but in no acute distress.  Eyes: EOMI, PERRL. No scleral icterus. Mild right lazy eye.  ENT: Oral mucosa is moist without lesions or thrush.   Lymphatic: Neck is supple without cervical or supraclavicular lymphadenopathy.  Cardiovascular: Regular rate and rhythm. No murmurs, gallops, or rubs. No peripheral edema.  Respiratory: Clear to auscultation bilaterally. No wheezes or crackles.  Gastrointestinal: Bowel sounds present. Abdomen soft, slight tenderness in epigastric region, otherwise non-tender. No palpable hepatosplenomegaly or masses.   Neurologic: Cranial nerves II through XII are grossly intact though he does occasional slur his words and had a harder time expressing himself early in the visit.  Skin: No abnormalities of right knee. Right thigh wound covered with no surrounding erythema, warmth, or tenderness. No rashes, petechiae, or bruising noted on exposed skin.    Laboratory Data:  Results for RUT CHAO (MRN [id number]) as of 6/26/2018 17:10   6/26/2018 15:35 6/26/2018 15:50   Sodium 136    Potassium 2.2 (LL)     Chloride 104    Carbon Dioxide 18 (L)    Urea Nitrogen 22    Creatinine 0.96    GFR Estimate 80    GFR Estimate If Black >90    Calcium 10.2 (H)    Anion Gap 14    Magnesium 2.5 (H)    Phosphorus 2.4 (L)    Albumin 3.1 (L)    Protein Total 7.8    Bilirubin Total 0.6    Alkaline Phosphatase 131    ALT 20    AST 19    Bilirubin Direct 0.2    Troponin I ES <0.015    Glucose 138 (H)    WBC  5.7   Hemoglobin  9.2 (L)   Hematocrit  28.4 (L)   Platelet Count  349   RBC Count  3.53 (L)   MCV  81   MCH  26.1 (L)   MCHC  32.4   RDW  18.6 (H)   Diff Method  Automated Method   % Neutrophils  92.7   % Lymphocytes  4.6   % Monocytes  1.4   % Eosinophils  0.2   % Basophils  0.4   % Immature Granulocytes  0.7   Nucleated RBCs  0   Absolute Neutrophil  5.2   Absolute Lymphocytes  0.3 (L)   Absolute Monocytes  0.1   Absolute Eosinophils  0.0   Absolute Basophils  0.0   Abs Immature Granulocytes  0.0   Absolute Nucleated RBC  0.0       Assessment and Plan:  1. Dehydration 2/2 intractable nausea with vomiting and FTT  Patient now with 4 days of significant nausea with frequent vomiting, poor oral intake including no food for multiple days or water for one day, and new fall at home 2/2 weakness. Vitally stable other than mild tachycardia. Labs today with HEATHER with creat up to 0.96 and BUN to 22, acute hypokalemia with potassium at 2.2, and elevated calcium (corrected to 10.9)  -Did start IVF in clinic along with dexamethasone and Emend. Symptoms did improve however given the degree of his dehydration, falls at home, and electrolyte abnormalities, he will be admitted to the hospital. He was not happy about this though we discussed that with him living so far away it would be better to bring him in and get him feeling better vs driving multiple hours to come back to clinic for more IVF and potassium. He was agreeable to a short hospital stay  -Will start replacing potassium in clinic-plan for 40meq over the next 2 hours. He was  "previously on supplements though he hasn't been able to keep anything down. Did check EKG which does show ST and T wave abnormalities but no U waves. Did check troponin which was negative. He has no cardiac or respiratory symptoms. These EKG changes with hypokalemia further support admission today. Spoke with inpatient team and patient will need telemetry monitoring until his potassium improves.   -Monitor hypercalcemia and should improve with IVF treatment. If continues to be elevated, need to do further work-up. Of note if you correct his prior calcium levels based on his albumin he has had intermittent elevated calcium previously  -Plan to switch him to Zofran ODT 8mg every 8 hours in addition to Compazine. Could also consider Zyprexa 5mg QHS if still having nausea. Continue IVF and antiemetics in the hospital  -Consider PT/OT evaluation given fall at home    2. Constipation  Admits to a \"couple days\" without a true bowel movement, though admits he had a small BM yesterday. He is still passing gas and denies abdominal pain so less concern for obstruction  -Continue Miralax. Increase to daily. Consider adding Senna if needed.    3. UPS   -Now s/p 4 cycles of Doxil/Ifosfamide. Most recent cycle given 6/20. He was disconnected in clinic today and then attached to his Mesna at 1713. This will run for 24 hours. He will then need to be disconnected at that time. He will then be due for Neulasta but may need to receive Neupogen inpatient if he is not discharged in time.  -Continue twice weekly wound debridement/dressing changes with home nursing  -Consider starting pain medications in hospital if having worsening pain. Does have oxycodone prescribed but not currently taking  -Continue Dr. Betts follow-up, rad onc, and Dr. Johnson follow-up as planned. Will need a colonascopy per Dr. Johnson last note    4. History of PE  Asymptomatic noted on prior CT. Continue Xarelto 20mg daily.    [facility]  [address] [place] [id number]  [phone]      Again, thank you for allowing me to participate in the care of your patient.      Sincerely,    SENAIT Carter

## 2018-06-26 NOTE — PROGRESS NOTES
Infusion Nursing Note:  Hiram Tapia presents today for Mesna bag change, IVF.  *Accompanied by wife*  Patient seen by provider today: Yes: Ignacio SAPP    Treatment Conditions:  Lab Results   Component Value Date    HGB 9.2 06/26/2018     Lab Results   Component Value Date    WBC 5.7 06/26/2018      Lab Results   Component Value Date    ANEU 5.2 06/26/2018     Lab Results   Component Value Date     06/26/2018      Lab Results   Component Value Date     06/26/2018                   Lab Results   Component Value Date    POTASSIUM 2.2 06/26/2018           Lab Results   Component Value Date    MAG 2.5 06/26/2018            Lab Results   Component Value Date    CR 0.96 06/26/2018                   Lab Results   Component Value Date    JAYESH 10.2 06/26/2018                Lab Results         Intravenous Access:  Implanted Port: accessed on 6/20/18    Note: Ifosfamide/Mesna CADD pump infusing through port on arrival to clinic.     Pt arrived to infusion in  feeling nauseated.   Pt reports vomiting at home x 3 days, reports constipation x 4 days and not tolerating PO food or liquids x 2 days.  Reports pain in Right leg at 10/10 with movement and 0 at rest.   Pt also reports falling this morning at home - see provider note for details.     Ifosfamide / Mesna pump empty at 1700.   Mesna 3.33 grams IV [facility] CADD pump started at 1713 to infuse at 6.9 ml/hr x 24 hours. * SEE MAR    Potassium 2.2     1600 6/26/18: TORB Ignacio SAPP / Loida Hope RN:  Give potassium chloride 40 meq IV over 2 hours through port for potassium 2.2 (pt unable to take PO potassium replacement)  EKG now  Troponin level drawn (negative)  Pt will be admitted to  for low potassium and EKG changes      Pt received a total of:  1000 ml NS IV  Emend 150 mg + Decadron 10 mg IV for nausea  40 meq KCL in 500 ml NS IV    PT WILL BE DUE FOR NEULASTA tomorrow, 6/27/18.       Post Infusion Assessment:  Blood  return from port.     Discharge Plan:   Report called to 5B by Ignacio SAPP.  Patient discharged to hospital with [facility] Transportation  Departure Mode: Cart.  Face to Face time: 15 min.    Loida Hope RN

## 2018-06-26 NOTE — LETTER
Transition Communication Hand-off for Care Transitions to Next Level of Care Provider    Name: Hiram Tapia  : 1960  MRN #: [id number]  Primary Care Provider: Louisa Fry     Primary Clinic: [facility] [address]  [facility] [address]     Reason for Hospitalization:  dehydration  nausea  vomiting  failure to thrive  Failure to thrive (0-17)  Hypokalemia  Admit Date/Time: 2018  7:10 PM  Discharge Date: 18  Payor Source: Payor: MEDICA / Plan: MEDICA CHOICE / Product Type: Indemnity /            Reason for Communication Hand-off Referral: continuation of care    Discharge Plan: home with resumption of [facility] for RN       Concern for non-adherence with plan of care:   Y/N n  Discharge Needs Assessment:  Needs       Most Recent Value    Home Care [facility] [phone], Fax: [phone]          Already enrolled in Tele-monitoring program and name of program:    Follow-up specialty is recommended: Yes    Follow-up plan:  Future Appointments  Date Time Provider Department Center   2018 12:30 PM Nurse,  Oncology Injection [facility]   7/3/2018 7:15 AM  [facility] DRAW [facility]   7/3/2018 8:00 AM [facility] ONCOLOGY INFUSION [facility]   7/3/2018 11:20 AM Flora Barrientos PA [facility]   7/3/2018 3:30 PM Brad Betts MD [facility]   7/10/2018 9:30 AM Kimberley Solo MD [facility]   2018 8:00 AM  [facility] DRAW [facility]   2018 8:30 AM Gaurang Johnson MD [facility]       Any outstanding tests or procedures:        Referrals     Future Labs/Procedures    Home care nursing referral     Comments:    [facility]  Ph [phone]  Fax [phone]    RN skilled nursing visit. RN to assess vital signs and weight, respiratory and cardiac status, pain level and activity tolerance, hydration, nutrition and bowel status and home safety.  Resume twice weekly dressing changes    Your provider has ordered home care  nursing services. If you have not been contacted within 2 days of your discharge please call the inpatient department phone number at [phone] .            [place] Recommendations:      Wanda Cardenas    AVS/Discharge Summary is the source of truth; this is a helpful guide for improved communication of patient story

## 2018-06-27 PROBLEM — E87.6 HYPOKALEMIA: Status: ACTIVE | Noted: 2018-06-27

## 2018-06-27 LAB
ANION GAP SERPL CALCULATED.3IONS-SCNC: 11 MMOL/L (ref 3–14)
BASOPHILS # BLD AUTO: 0 10E9/L (ref 0–0.2)
BASOPHILS NFR BLD AUTO: 0.3 %
BUN SERPL-MCNC: 20 MG/DL (ref 7–30)
CALCIUM SERPL-MCNC: 9.1 MG/DL (ref 8.5–10.1)
CHLORIDE SERPL-SCNC: 106 MMOL/L (ref 94–109)
CO2 SERPL-SCNC: 18 MMOL/L (ref 20–32)
CREAT SERPL-MCNC: 0.84 MG/DL (ref 0.66–1.25)
DIFFERENTIAL METHOD BLD: ABNORMAL
EOSINOPHIL # BLD AUTO: 0 10E9/L (ref 0–0.7)
EOSINOPHIL NFR BLD AUTO: 0.3 %
ERYTHROCYTE [DISTWIDTH] IN BLOOD BY AUTOMATED COUNT: 18.5 % (ref 10–15)
GFR SERPL CREATININE-BSD FRML MDRD: >90 ML/MIN/1.7M2
GLUCOSE SERPL-MCNC: 118 MG/DL (ref 70–99)
HCT VFR BLD AUTO: 22.1 % (ref 40–53)
HGB BLD-MCNC: 7.3 G/DL (ref 13.3–17.7)
IMM GRANULOCYTES # BLD: 0 10E9/L (ref 0–0.4)
IMM GRANULOCYTES NFR BLD: 0.3 %
INR PPP: 1.66 (ref 0.86–1.14)
INTERPRETATION ECG - MUSE: NORMAL
INTERPRETATION ECG - MUSE: NORMAL
LYMPHOCYTES # BLD AUTO: 0.4 10E9/L (ref 0.8–5.3)
LYMPHOCYTES NFR BLD AUTO: 9.1 %
MAGNESIUM SERPL-MCNC: 2.1 MG/DL (ref 1.6–2.3)
MCH RBC QN AUTO: 26 PG (ref 26.5–33)
MCHC RBC AUTO-ENTMCNC: 33 G/DL (ref 31.5–36.5)
MCV RBC AUTO: 79 FL (ref 78–100)
MONOCYTES # BLD AUTO: 0 10E9/L (ref 0–1.3)
MONOCYTES NFR BLD AUTO: 1 %
NEUTROPHILS # BLD AUTO: 3.4 10E9/L (ref 1.6–8.3)
NEUTROPHILS NFR BLD AUTO: 89 %
NRBC # BLD AUTO: 0 10*3/UL
NRBC BLD AUTO-RTO: 0 /100
PHOSPHATE SERPL-MCNC: 1.4 MG/DL (ref 2.5–4.5)
PLATELET # BLD AUTO: 304 10E9/L (ref 150–450)
POTASSIUM SERPL-SCNC: 2.7 MMOL/L (ref 3.4–5.3)
POTASSIUM SERPL-SCNC: 3.6 MMOL/L (ref 3.4–5.3)
RBC # BLD AUTO: 2.81 10E12/L (ref 4.4–5.9)
SODIUM SERPL-SCNC: 135 MMOL/L (ref 133–144)
WBC # BLD AUTO: 3.8 10E9/L (ref 4–11)

## 2018-06-27 PROCEDURE — 84132 ASSAY OF SERUM POTASSIUM: CPT | Performed by: NURSE PRACTITIONER

## 2018-06-27 PROCEDURE — 25800025 ZZH RX 258: Performed by: NURSE PRACTITIONER

## 2018-06-27 PROCEDURE — G0378 HOSPITAL OBSERVATION PER HR: HCPCS

## 2018-06-27 PROCEDURE — 84132 ASSAY OF SERUM POTASSIUM: CPT | Performed by: INTERNAL MEDICINE

## 2018-06-27 PROCEDURE — 80048 BASIC METABOLIC PNL TOTAL CA: CPT | Performed by: INTERNAL MEDICINE

## 2018-06-27 PROCEDURE — 25000125 ZZHC RX 250: Performed by: INTERNAL MEDICINE

## 2018-06-27 PROCEDURE — 25000128 H RX IP 250 OP 636: Performed by: INTERNAL MEDICINE

## 2018-06-27 PROCEDURE — 96376 TX/PRO/DX INJ SAME DRUG ADON: CPT

## 2018-06-27 PROCEDURE — 40000893 ZZH STATISTIC PT IP EVAL DEFER

## 2018-06-27 PROCEDURE — 12000008 ZZH R&B INTERMEDIATE UMMC

## 2018-06-27 PROCEDURE — 25000132 ZZH RX MED GY IP 250 OP 250 PS 637: Performed by: NURSE PRACTITIONER

## 2018-06-27 PROCEDURE — 96375 TX/PRO/DX INJ NEW DRUG ADDON: CPT

## 2018-06-27 PROCEDURE — 85025 COMPLETE CBC W/AUTO DIFF WBC: CPT | Performed by: INTERNAL MEDICINE

## 2018-06-27 PROCEDURE — 99223 1ST HOSP IP/OBS HIGH 75: CPT | Mod: AI | Performed by: INTERNAL MEDICINE

## 2018-06-27 PROCEDURE — 25000132 ZZH RX MED GY IP 250 OP 250 PS 637: Performed by: INTERNAL MEDICINE

## 2018-06-27 PROCEDURE — 84100 ASSAY OF PHOSPHORUS: CPT | Performed by: INTERNAL MEDICINE

## 2018-06-27 PROCEDURE — 36415 COLL VENOUS BLD VENIPUNCTURE: CPT | Performed by: INTERNAL MEDICINE

## 2018-06-27 PROCEDURE — 40000894 ZZH STATISTIC OT IP EVAL DEFER: Performed by: OCCUPATIONAL THERAPIST

## 2018-06-27 PROCEDURE — 96361 HYDRATE IV INFUSION ADD-ON: CPT

## 2018-06-27 PROCEDURE — 85610 PROTHROMBIN TIME: CPT | Performed by: INTERNAL MEDICINE

## 2018-06-27 PROCEDURE — 83735 ASSAY OF MAGNESIUM: CPT | Performed by: INTERNAL MEDICINE

## 2018-06-27 RX ORDER — HEPARIN SODIUM,PORCINE 10 UNIT/ML
5-10 VIAL (ML) INTRAVENOUS EVERY 24 HOURS
Status: DISCONTINUED | OUTPATIENT
Start: 2018-06-27 | End: 2018-06-29 | Stop reason: HOSPADM

## 2018-06-27 RX ORDER — CALCIUM CARBONATE 500 MG/1
500 TABLET, CHEWABLE ORAL DAILY PRN
Status: DISCONTINUED | OUTPATIENT
Start: 2018-06-27 | End: 2018-06-29 | Stop reason: HOSPADM

## 2018-06-27 RX ORDER — LIDOCAINE 40 MG/G
CREAM TOPICAL
Status: DISCONTINUED | OUTPATIENT
Start: 2018-06-27 | End: 2018-06-29 | Stop reason: HOSPADM

## 2018-06-27 RX ORDER — HEPARIN SODIUM (PORCINE) LOCK FLUSH IV SOLN 100 UNIT/ML 100 UNIT/ML
5 SOLUTION INTRAVENOUS
Status: DISCONTINUED | OUTPATIENT
Start: 2018-06-27 | End: 2018-06-29 | Stop reason: HOSPADM

## 2018-06-27 RX ORDER — DEXTROSE MONOHYDRATE, SODIUM CHLORIDE, AND POTASSIUM CHLORIDE 50; 1.49; 4.5 G/1000ML; G/1000ML; G/1000ML
INJECTION, SOLUTION INTRAVENOUS CONTINUOUS
Status: DISCONTINUED | OUTPATIENT
Start: 2018-06-27 | End: 2018-06-29 | Stop reason: HOSPADM

## 2018-06-27 RX ORDER — POTASSIUM CHLORIDE 750 MG/1
40 TABLET, EXTENDED RELEASE ORAL ONCE
Status: COMPLETED | OUTPATIENT
Start: 2018-06-27 | End: 2018-06-27

## 2018-06-27 RX ORDER — HEPARIN SODIUM,PORCINE 10 UNIT/ML
5-10 VIAL (ML) INTRAVENOUS
Status: DISCONTINUED | OUTPATIENT
Start: 2018-06-27 | End: 2018-06-29 | Stop reason: HOSPADM

## 2018-06-27 RX ORDER — TRAMADOL HYDROCHLORIDE 50 MG/1
50 TABLET ORAL EVERY 6 HOURS PRN
Status: DISCONTINUED | OUTPATIENT
Start: 2018-06-27 | End: 2018-06-29 | Stop reason: HOSPADM

## 2018-06-27 RX ORDER — NALOXONE HYDROCHLORIDE 0.4 MG/ML
.1-.4 INJECTION, SOLUTION INTRAMUSCULAR; INTRAVENOUS; SUBCUTANEOUS
Status: DISCONTINUED | OUTPATIENT
Start: 2018-06-27 | End: 2018-06-29 | Stop reason: HOSPADM

## 2018-06-27 RX ADMIN — PROCHLORPERAZINE EDISYLATE 5 MG: 5 INJECTION INTRAMUSCULAR; INTRAVENOUS at 21:51

## 2018-06-27 RX ADMIN — CALCIUM CARBONATE (ANTACID) CHEW TAB 500 MG 500 MG: 500 CHEW TAB at 03:14

## 2018-06-27 RX ADMIN — TRAMADOL HYDROCHLORIDE 50 MG: 50 TABLET, COATED ORAL at 11:43

## 2018-06-27 RX ADMIN — ONDANSETRON 4 MG: 2 INJECTION INTRAMUSCULAR; INTRAVENOUS at 15:09

## 2018-06-27 RX ADMIN — PROCHLORPERAZINE EDISYLATE 5 MG: 5 INJECTION INTRAMUSCULAR; INTRAVENOUS at 16:02

## 2018-06-27 RX ADMIN — POLYETHYLENE GLYCOL 3350 17 G: 17 POWDER, FOR SOLUTION ORAL at 19:43

## 2018-06-27 RX ADMIN — DOCUSATE SODIUM 100 MG: 100 CAPSULE, LIQUID FILLED ORAL at 19:43

## 2018-06-27 RX ADMIN — Medication 10 MEQ: at 08:53

## 2018-06-27 RX ADMIN — PROCHLORPERAZINE EDISYLATE 5 MG: 5 INJECTION INTRAMUSCULAR; INTRAVENOUS at 04:27

## 2018-06-27 RX ADMIN — RIVAROXABAN 20 MG: 10 TABLET, FILM COATED ORAL at 18:16

## 2018-06-27 RX ADMIN — OMEPRAZOLE 20 MG: 20 CAPSULE, DELAYED RELEASE ORAL at 16:02

## 2018-06-27 RX ADMIN — POTASSIUM CHLORIDE 40 MEQ: 750 TABLET, EXTENDED RELEASE ORAL at 11:34

## 2018-06-27 RX ADMIN — POTASSIUM CHLORIDE, DEXTROSE MONOHYDRATE AND SODIUM CHLORIDE: 150; 5; 450 INJECTION, SOLUTION INTRAVENOUS at 08:53

## 2018-06-27 RX ADMIN — POTASSIUM PHOSPHATE, MONOBASIC AND POTASSIUM PHOSPHATE, DIBASIC 20 MMOL: 224; 236 INJECTION, SOLUTION INTRAVENOUS at 21:44

## 2018-06-27 RX ADMIN — Medication 10 MEQ: at 11:24

## 2018-06-27 RX ADMIN — Medication 10 MEQ: at 13:41

## 2018-06-27 RX ADMIN — Medication 10 MEQ: at 15:02

## 2018-06-27 RX ADMIN — Medication 10 MEQ: at 10:18

## 2018-06-27 RX ADMIN — Medication 10 MEQ: at 00:56

## 2018-06-27 RX ADMIN — TRAMADOL HYDROCHLORIDE 50 MG: 50 TABLET, COATED ORAL at 21:51

## 2018-06-27 RX ADMIN — DOCUSATE SODIUM 100 MG: 100 CAPSULE, LIQUID FILLED ORAL at 08:53

## 2018-06-27 RX ADMIN — Medication 10 MEQ: at 02:03

## 2018-06-27 RX ADMIN — Medication 10 MEQ: at 12:32

## 2018-06-27 RX ADMIN — ONDANSETRON 4 MG: 2 INJECTION INTRAMUSCULAR; INTRAVENOUS at 01:16

## 2018-06-27 RX ADMIN — Medication 10 MEQ: at 03:14

## 2018-06-27 NOTE — PROGRESS NOTES
[facility]  Patient is currently open to home care services with [facility].  The patient is currently receiving  services for wound cares and assess response to chemo.  [facility]  and team have been notified of patient admission.  [facility] liaison will continue to follow patient during stay.  If appropriate provide orders to resume home care at time of discharge.    Thank you  Savannah Arrieta RN, BSN  [facility] Liaison  [phone]

## 2018-06-27 NOTE — SUMMARY OF CARE
Pt arrived with pair of brown crocs, pair white socks, navy kye sweatshirt, grey sweat pants, camo [place] backpack, navy elizabeth shirt, glasses, ring, black bag, blue lunch box, white portable , cell phone, cable for cell phone   Rogelio Tolbert on 6/26/2018 at 7:23 PM

## 2018-06-27 NOTE — PROGRESS NOTES
Care Coordinator Progress Note    Admission Date/Time:  6/26/2018  Attending MD:  Harpal Singh,*    Data  Chart reviewed, discussed with interdisciplinary team.   Patient was admitted for: Idiopathic gout, unspecified chronicity, unspecified site.    Concerns with insurance coverage for discharge needs: None.  Current Living Situation: Patient lives with spouse.  Support System: Supportive and Involved  Services Involved: Home Care  Transportation at Discharge: Family or friend will provide  Transportation to Medical Appointments:   - Not applicable  Barriers to Discharge: medical course    Coordination of Care and Referrals: Provided patient/family with options for Home Care.        Assessment  Patient admitted to obs for IV rehydration and correction of metabolic abnormalities. History of sarcoma of right thigh.   Met with patient at bedside, introduced self and RNCC role. Patient lives at home with his wife and open to [facility] for twice weekly wound dressing changes. Denies further needs at this time. Family to provide transportation home at time of discharge.     Plan  Anticipated Discharge Date:  TBD  Anticipated Discharge Plan:  Home with home care    Wanda Cardenas RN

## 2018-06-27 NOTE — PROGRESS NOTES
"CLINICAL NUTRITION SERVICES - ASSESSMENT NOTE     Nutrition Prescription    RECOMMENDATIONS FOR MDs/PROVIDERS TO ORDER:  - Consider ordering multivitamin with minerals (Thera-Vit-M) to ensure pt meeting micronutrient needs    Malnutrition Status:    Severe malnutrition in the context of chronic illness.     Recommendations already ordered by Registered Dietitian (RD):  - Supplements with meals if pt requests    Future/Additional Recommendations:  - Continue to encourage adequate fluid intake with N/V and high calorie, high protein foods. Also encourage snacks between meals.      REASON FOR ASSESSMENT  Hiram Tapia is a/an 57 year old male assessed by the dietitian for Admission Nutrition Risk Screen for unintentional loss of 10# or more in the past two months and reduced oral intake over the last month    NUTRITION HISTORY  Pt with hx of sarcoma of R thigh receiving chemotherapy. Pt experiencing N/V, dehydration, and weakness with this round of chemo. Per pt report, his appetite has been poor since his chemo started beginning of March. He could not quantify how many meals/snacks he eats per day, but implied it was very minimal. Fruits and vegetables worked well for him, but with taste changes he could not tolerate breads, hard cheeses, and salty foods such as lunch meats. He also does not use silverware (uses plastic) d/t taste changes. Pt does not have food allergies and no issues chewing swallowing. Largest barrier to eating is nausea with chemo. Visit was brief given pt was nauseated.    CURRENT NUTRITION ORDERS  Diet: Regular  Intake/Tolerance: No intake recorded in flowsheet    LABS  K+ 2.7 (L)  Phos 1.4 (L)    MEDICATIONS  High electrolyte replacement protocol    ANTHROPOMETRICS  Height: 0 cm (Data Unavailable) 5' 10\"  Most Recent Weight: 79.4 kg (175 lb)    IBW: 75.5 kg  BMI: Overweight BMI 25-29.9  Weight History: Pt with 44 lbs (20%) wt loss in 3 months (3/23-6/26)  Wt Readings from Last 15 Encounters: "   06/26/18 79.4 kg (175 lb)   06/20/18 81.2 kg (179 lb)   05/22/18 86.4 kg (190 lb 8 oz)   05/01/18 91.9 kg (202 lb 8 oz)   04/24/18 89.8 kg (198 lb)   04/21/18 94.3 kg (208 lb)   03/29/18 97.3 kg (214 lb 9.6 oz)   03/28/18 95.6 kg (210 lb 12.2 oz)   03/27/18 96.8 kg (213 lb 4.8 oz)   03/23/18 96.6 kg (213 lb)   03/23/18 99.3 kg (219 lb)   03/23/18 99.5 kg (219 lb 6.4 oz)   03/08/18 106.6 kg (235 lb)   03/02/18 108.4 kg (238 lb 14.4 oz)     Dosing Weight: 79 kg (actual) - based on lowest documented wt so far this adm (79.4 kg on 6/26) and IBW of 75.5 kg.     ASSESSED NUTRITION NEEDS  Estimated Energy Needs: 2866-8508 kcals/day (25 - 30 kcals/kg )  Justification: Maintenance  Estimated Protein Needs:  grams protein/day (1.2 - 1.5 grams of pro/kg)  Justification: Repletion  Estimated Fluid Needs: (1 mL/kcal)   Justification: Maintenance and Per provider pending fluid status    PHYSICAL FINDINGS  See malnutrition section below.    MALNUTRITION  % Intake: </=50% for >/= 1 month (severe)  % Weight Loss: > 7.5% in 3 months (severe)  Subcutaneous Fat Loss: None observed  Muscle Loss: Temporal:  Mild  Fluid Accumulation/Edema: None noted  Malnutrition Diagnosis: Severe malnutrition in the context of chronic illness.     NUTRITION DIAGNOSIS  Inadequate protein-energy intake related to poor appetite and N/V as evidenced by pt with 44 lbs (20%) wt loss in 3 months (3/23-6/26)      INTERVENTIONS  Implementation  1. Nutrition Education: Discussed role of RD in nutrition POC and encouraged pt to try eating small frequent meals/snacks (4-6x per day) rather than 3 meals/day to increase PO intake. Offered nutrition supplements scheduled but pt declined at this time d/t feeling nauseous during visit. Provided pt with recipe booklet of high protein, high calorie shake and smoothie recipes for pt to take home.   2. Medical food supplement therapy - PRN with meals per pt request    Goals  Patient to consume % of  nutritionally adequate meal trays TID, or the equivalent with supplements/snacks.     Monitoring/Evaluation  Progress toward goals will be monitored and evaluated per protocol.    Gwendolyn Borjas MS, RD, LD  Unit 5A/5B Pager [phone]

## 2018-06-27 NOTE — PLAN OF CARE
"Problem: Patient Care Overview  Goal: Plan of Care/Patient Progress Review  Outcome: Therapy, progress toward functional goals is gradual  Pt alert and oriented. Vitally stable on ra. Up with SBA. K+ low this am at 2.7; prn K+ replacement initiated via IV. One time oral potassium administered as well. To run IVMF at 125 cc/hr post completion of K+ infusion. Providing team scheduled K+ draw during infusions to assess if level is improving; pt refused redraw. Pt didn't want to be \"poked\" and stated to draw from his port; education provided regarding contamination with chemotherapy medications that is running if port was utilized. Had a couple voids, however did not use provided urinal; pt educated about need to use urinal for I/o measurement. Prilosec ordered for GERD symptom management. Refused to have writer assess  Right leg dressing. IV Zofran given for nausea management. Tramadol given for right leg pain.  R: Time lab draw post K+ infusion; reinforce education about need to draw for this level. Please instruct pt to utilize bedside urinal for I/O measurements. Continue to monitor status and implement poc.      "

## 2018-06-27 NOTE — PHARMACY-ADMISSION MEDICATION HISTORY
Admission medication history interview status for the 6/26/2018 admission is complete. See Epic admission navigator for allergy information, pharmacy, prior to admission medications and immunization status.     Medication history interview sources:  Patient Interview    Changes made to PTA medication list (reason)  Added: none  Deleted: none  Changed: Miralax direction changed from once daily to every other day    Additional medication history information (including reliability of information, actions taken by pharmacist):  -Patient was a good historian  -Stated he takes Xarelto for PE diagnosed around February of this year  -Prefers the granisetron as Compazine causes him to throw up but has taken it here and states it helps with the nausea        Prior to Admission medications    Medication Sig Last Dose Taking? Auth Provider   granisetron (KYTRIL) 1 MG tablet Take 1 tablet (1 mg) by mouth every 12 hours as needed for nausea 6/26/2018 at Unknown time Yes Ignacio Ramirez PA   polyethylene glycol (MIRALAX/GLYCOLAX) Packet Take 17 g by mouth daily  Patient taking differently: Take 1 packet by mouth every 48 hours  6/26/2018 at Unknown time Yes Gaurang Johnson MD   PROCHLORPERAZINE MALEATE PO Take 10 mg by mouth every 6 hours as needed  Past Week at Unknown time Yes Reported, Patient   rivaroxaban ANTICOAGULANT (XARELTO) 20 MG TABS tablet Take 1 tablet (20 mg) by mouth daily (with dinner) 6/26/2018 at Unknown time Yes Gaurang Johnson MD         Medication history completed by: Grzegorz Delatorre, PharmD

## 2018-06-27 NOTE — PLAN OF CARE
Problem: Patient Care Overview  Goal: Plan of Care/Patient Progress Review  Outcome: No Change  A:  Patient states some pain in leg.  States does not take pain meds, just positions till not hurting.  States does take ibuprofen at home occasionally.  Voided times one.  Potassium infusing.  Two more doses.   complains of burping and getting burning in throat.  Zofran given with little relief.  Trying sea bands.  Refused to try peppermint oil.  Patient states compazine causes him to throw up.  Page out to hem/onc to request something for reflux.  R:  Continue to monitor and treat per plan of care.

## 2018-06-27 NOTE — PLAN OF CARE
Problem: Patient Care Overview  Goal: Plan of Care/Patient Progress Review  A:  Patient resting with eyes closed.  No complaints of nausea.  Cheeks flushed.  No fever noted.  Progressing toward goals.

## 2018-06-27 NOTE — H&P
History and Physical     Hiram Tapia MRN# [id number]   YOB: 1960 Age: 57 year old      Date of Admission:  6/26/2018    Primary care provider: Louisa Fry          Assessment and Plan:   Hiram Tapia is a 57 year old male w/ sarcoma of right thigh that presents with dehydration, N/V and FTT in the setting of chemo therapy. At this time he is clinically stable. He will be admitted to obs for IV rehydration and correction of metabolic abnormalities.    Hypokalemia:  Hypercalcemia:   Likely due to N/V, poor PO. Labs today with creatnine up to 0.96 (baseline 0.7-0.8) and BUN to 22, acute hypokalemia with potassium at 2.2, and elevated calcium (corrected to 10.9) Had EKG changes in clinic which does show ST and T wave abnormalities but no U waves. Was given 1.5 L NS and 500 mL NS w/ 40 mEq KCl before admission.   -tele monitoring   -high electrolyte replacement protocol  -repeat K stat on admission  -repeat BMP, Mg, Phos in the AM  --Monitor hypercalcemia and should improve with IVF treatment. If continues to be elevated, need to do further work-up. Of note if corrected, his prior calcium levels based on his albumin have been intermittently elevated.     Hypoalbuminemia:   Hypophosphatemia:  Likely due to poor PO  -Monitor Phos and replace as needed, monitor labs    FTT:   NBNB Vomiting:   Likely due to chemotherapy. He denies any abdominal pain, hematemesis, hematochezia or melena. Getting CT head (see below) to r/o intracranial pathology. Exam with no abd tenderness. Will treat as related to chemo and reassess if no improvement  -IV rehydration as above  -IV compazine/zofran PRN  -ADAT  -At discharge: Per clinic note, switch him to Zofran ODT 8mg every 8 hours in addition to Compazine. Could also consider Zyprexa 5mg QHS if still having nausea.     Mechanical Fall:  Patient fell down stairs and hit left side of face. States his legs just gave out. Was noted to have word finding difficulties  "in clinic, but per wife this has been persistent over the last 3-4 days. Also had word finding difficulties during my exam, but this did improve as he became more engaged in the coversation. Otherwise his neuro exam is nonfocal. He is on Xarelto for PE. Will obtain CT head to r/o bleed.  -CT head without contrast  -PT/OT consult  -tylenol for pain     Constipation:  Admits to a \"couple days\" without a true bowel movement, though admits he had a small BM yesterday. He is still passing gas and denies abdominal pain so less concern for obstruction. He is not taking any narcotics at this time that would contribute to constipation. Electrolyte abnormalities may be playing a role. He has been taking miralax every other day and not been taking colace. He does not want to get too aggressive with treating his constipation  -Miralax QD  -Colace BID  -Monitor electrolytes        UPS:   -Now s/p 4 cycles of Doxil/Ifosfamide. Most recent cycle given 6/20. He was disconnected in clinic today and then attached to his [place] at 1713. This will run for 24 hours. He will then need to be disconnected at that time. He will then be due for Neulasta but may need to receive Neupogen inpatient if he is not discharged in time.  -Continue twice weekly wound debridement/dressing changes with home nursing. Consider wound consult if staying longer than tomorrow.   -Consider escalating pain medications in hospital if having worsening pain. Does have oxycodone prescribed but not currently taking it. Will treat his pain with tylenol for now.   -Continue Dr. Betts follow-up, rad onc, and Dr. Johnson follow-up as planned. Will need a colonoscopy per Dr. Johnson last note  -rest of management per day team     History of PE:  Asymptomatic noted on prior CT. Continue Xarelto 20mg daily.    Normocytic Anemia:   Likely due to chemo. Peak Hgb was 13.1 before he started chemo. Now down to 9.2 today.   -monitor CBC. Transfuse if < 7    Pain assessment: " "Hiram is experiencing right leg pain due to cancer. He rates his pain 3/10. Discussed with Hiram and we will treat his pain conservatively with tylenol and escalate pain medications if needed. He is in agreement with this plan.     FEN: regular diet, electrolyte replacement per protocol as above , started on LR @125 mL/hour, will  after 24 hours  Code Status: FULL, discussed with patient. \"I am not done yet\"  Dispo: pending clinical improvement, correction of electrolyte abnormalilites and eval by PT/OT (ordered)  PPX: on Xarelto    This patient will be formally staffed in the AM. Briefly discussed patient with Dr. Siu    Electronically signed by:  Javier Rodriguez M.D.   Pager: [phone]  2018, 9:14 PM                Chief Complaint:   \"This chemo is killing me\"     History is obtained from the patient and electronic health record         History of Present Illness:   Mr. Tapia is a 57 year old male with sarcoma of the right thigh. He was seen at clinic today for NV and weakness. He complains that this round of chemo has been tougher than previous episodes. He has had several episodes of NBNB emesis in the last few days. Also complains of not having a good BM. Last BM was yesterday and was small. He has been taking his miralax every other day and not been taking colace. His PO intake has been poor due to N/V and it has been several days since he ate a good meal. He last drank water yesterday, but attempts to drink since then have resulted in vomiting. Kytril and Compazine have not been effective at relieving his symptoms. No abdominal pain, fever, chills, rash, hematuria, dysuria, CP, SOB. He endorses dizziness with standing, generalized weakness. He does have some throat pain he associates with vomiting.     When trying to walk to the clinic today he fell down the stairs when \"his legs gave out.\". He twisted his right knee and struck his face on the left side, knocking off his glasses. He did " not have LOC. At clinic today he was noted to have trouble with word slurry, but per his wife this has been present the past few days He denies confusion and no other neurologic symptoms. The fall caused significant pain in his right leg leg, but this since has improved to 3/10. His leg wound has been well maintained, and he denies any drainage from this. At clinic today, he was noted to have potassium of 2.2. He was given 1.5 L NS and 500 mL NS w/ 40 mEq KCl.                 Past Medical History:   History of Malignancy: Adopted from clinic notes  In brief he has had a lot of problems with his right leg for many years including sciatica for 20 years.  He developed more discomfort in the right thigh and in October 2017 hit his lateral thigh against a truck tailgate causing a lot of pain.  Subsequently there was a fair amount of swelling and stiffness. This eventually led to some imaging showing a cystic mass.  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  At the time of the biopsy more than a liter of fluid was removed and that markedly improved his symptoms of stiffness and pain. He began doxil/ifos 3-23-18.He has had 3 cycles with suggestion of a response after 1 cycle. A new PE asymptomatic was noted and he started rivaroxaban in April. He tolerated cycle 3 a bit better yet.  The wound is being debreided 2x/wk now. He started cycle 4 on 6/20/18 and is due for pump disconnect/neulasta today. The plan is for him to go to surgery following this cycle.     Past Medical History:   Diagnosis Date     Sarcoma (H)              Past Surgical History:     Past Surgical History:   Procedure Laterality Date     EXCISE SOFT TISSUE TUMOR THIGH Right 3/8/2018    Procedure: EXCISE SOFT TISSUE TUMOR THIGH;  Biopsy Right Thigh Tumor;  Surgeon: Brad Betts MD;  Location:  OR     INSERT PORT VASCULAR ACCESS N/A 3/28/2018    Procedure: INSERT PORT VASCULAR ACCESS;  Vascular Access Port Insertion with C-arm;  Surgeon:  Sarah Bowie MD;  Location: [facility] OR     IRRIGATION AND DEBRIDEMENT LOWER EXTREMITY, COMBINED Right 4/6/2018    Procedure: COMBINED IRRIGATION AND DEBRIDEMENT LOWER EXTREMITY;  Irrigation And Debridement Right Thigh ;  Surgeon: Brad Betts MD;  Location: [facility] OR     IRRIGATION AND DEBRIDEMENT LOWER EXTREMITY, COMBINED Right 4/9/2018    Procedure: COMBINED IRRIGATION AND DEBRIDEMENT LOWER EXTREMITY;  Irrigation And Debridement Right Thigh Wound. with Wound VAC Exchange;  Surgeon: Brad Betts MD;  Location: [facility] OR     STRABISMUS SURGERY      as a child             Social History:     Social History   Substance Use Topics     Smoking status: Former Smoker     Packs/day: 1.00     Years: 10.00     Types: Cigarettes     Start date: 1/1/1975     Quit date: 1/1/1990     Smokeless tobacco: Never Used     Alcohol use 8.4 oz/week     14 Cans of beer per week             Family History:     Family History   Problem Relation Age of Onset     Leukemia Father              Immunizations:     There is no immunization history on file for this patient.         Allergies:   No Known Allergies          Medications:     Prescriptions Prior to Admission   Medication Sig Dispense Refill Last Dose     granisetron (KYTRIL) 1 MG tablet Take 1 tablet (1 mg) by mouth every 12 hours as needed for nausea 30 tablet 3 Taking     oxyCODONE IR (ROXICODONE) 5 MG tablet Take 1-2 tablets (5-10 mg) by mouth every 3 hours as needed for other (pain control or improvement in physical function. Hold dose for analgesic side effects.) (Patient not taking: Reported on 6/20/2018) 30 tablet 0 Not Taking     polyethylene glycol (MIRALAX/GLYCOLAX) Packet Take 17 g by mouth daily 7 packet 1 Taking     PROCHLORPERAZINE MALEATE PO Take 10 mg by mouth   Not Taking     rivaroxaban ANTICOAGULANT (XARELTO) 20 MG TABS tablet Take 1 tablet (20 mg) by mouth daily (with dinner) 30 tablet 3 Taking             Review of Systems:   The 10 point Review of Systems  is negative other than noted in the HPI           Physical Exam:   Vitals were reviewed  Patient Vitals for the past 12 hrs:   BP Temp Temp src Pulse Resp SpO2   06/26/18 1913 123/73 96.5  F (35.8  C) Oral 86 16 99 %       Constitutional: chronically ill appearing, talks with eyes closed  Head: Normocephalic. No masses, lesions, tenderness or abnormalities  Eyes: Anicteric, normal extra-ocular movements, Pupils are equal and reactive to light  ENT: ENT exam normal, no neck nodes or sinus tenderness. OP is erythematous, c/w vomiting. OP is also dry   Neck: Neck supple. No adenopathy. Thyroid symmetric, normal size,, Carotids without bruits.  Lymph: Normal cervical lymph nodes  Chest: no tenderness or erythema about port  Cardiovascular: negative, PMI normal. No lifts, heaves, or thrills. RRR. No murmurs, clicks gallops or rub  Respiratory: negative, Percussion normal. Good diaphragmatic excursion. Lungs clear  Abdomen: Abdomen soft, non-tender. BS normal. No masses, organomegaly  Joint/Extremeties: RLE edema and tenderness consistent with his recent procedure and known sarcoma. The dressing is c/d/i. Right knee is NT, but is more edematous than the left. Per the patient, this is not new  Skin: normal, warm, no rash, normal skin turgor  Neuro: CN II-XII intact. Intermittent word finding difficulty that improved throughout conversation. Rest of exam is nonfocal  Psychiatric: tearful         Data:   Last Basic Metabolic Panel:  Lab Results   Component Value Date     06/26/2018      Lab Results   Component Value Date    POTASSIUM 2.2 06/26/2018     Lab Results   Component Value Date    CHLORIDE 104 06/26/2018     Lab Results   Component Value Date    JAYESH 10.2 06/26/2018     Lab Results   Component Value Date    CO2 18 06/26/2018     Lab Results   Component Value Date    BUN 22 06/26/2018     Lab Results   Component Value Date    CR 0.96 06/26/2018     Lab Results   Component Value Date     06/26/2018        Lab Results   Component Value Date    WBC 5.7 06/26/2018     Lab Results   Component Value Date    RBC 3.53 06/26/2018     Lab Results   Component Value Date    HGB 9.2 06/26/2018     Lab Results   Component Value Date    HCT 28.4 06/26/2018     No components found for: MCT  Lab Results   Component Value Date    MCV 81 06/26/2018     Lab Results   Component Value Date    MCH 26.1 06/26/2018     Lab Results   Component Value Date    MCHC 32.4 06/26/2018     Lab Results   Component Value Date    RDW 18.6 06/26/2018     Lab Results   Component Value Date     06/26/2018     Trop: negative      All cardiac studies reviewed by me.  Results for orders placed or performed during the hospital encounter of 06/18/18   PET Oncology Whole Body     Value    Radiologist flags Hypermetabolic focus in the left transverse    Narrative    Combined Report of:    PET and CT on  6/18/2018 9:57 AM :    1. PET of the neck, chest, abdomen, and pelvis.  2. PET CT Fusion for Attenuation Correction and Anatomical  Localization:    3. Diagnostic CT scan of the chest, abdomen, and pelvis with  intravenous contrast for interpretation.  3. CT of the chest, abdomen and pelvis obtained for diagnostic  interpretation.  4. 3D MIP and PET-CT fused images were processed on an independent  workstation and archived to PACS and reviewed by a radiologist.    Technique:    1. PET: The patient received 12.1 mCi of F-18-FDG; the serum glucose  was 103 prior to administration, body weight was 86 kg. Images were  evaluated in the axial, sagittal, and coronal planes as well as the  rotational whole body MIP. Images were acquired from the Vertex to the  Feet.    UPTAKE WAS MEASURED AT 60 MINUTES.     BACKGROUND:  Liver SUV max= 4.5,   Aorta Blood SUV Max: 3.0.     2. CT: Volumetric acquisition for clinical interpretation of the  chest, abdomen, and pelvis acquired at 3 mm sections . The chest,  abdomen, and pelvis were evaluated at 5 mm sections in  bone, soft  tissue, and lung windows.      The patient received 116 cc. Of Isovue 370 intravenously for the  examination.     3. 3D MIP and PET-CT fused images were processed on an independent  workstation and archived to PACS and reviewed by a radiologist.    INDICATION: ; Sarcoma of soft tissue (H); Soft tissue sarcoma of right  thigh (H); Pain of right lower extremity; Disruption of tissue around  surgical drain, subsequent encounter; Personal history of tobacco use,  presenting hazards to health; Postoperative wound infection,  subsequent encounter; History of pulmonary embolism; Idiopathic gout,  unspecified chronicity, unspecified site; Pulmonary nodules    ADDITIONAL INFORMATION OBTAINED FROM EMR: High-grade soft tissue  sarcoma of the right thigh. Neoadjuvant chemotherapy planned.  Sequencing of radiation and surgery to be determined after neoadjuvant  treatment completed. Status post incision and drainage of wound  infection involving the right thigh sarcoma.    COMPARISON: Pet/CT 4/21/2018 and 3/17/2018    FINDINGS:     HEAD/NECK:  There is no  suspicious FDG uptake in the neck.     The paranasal sinuses are clear. The mastoid air cells are clear.     The mucosal pharyngeal space, the , prevertebral and carotid  spaces are within normal limits.     No masses, mass effect or pathologically enlarged lymph nodes are  evident. The thyroid gland is unremarkable.    CHEST:  There is no suspicious FDG uptake in the chest.     There are no pathologically enlarged mediastinal, hilar or axillary  lymph nodes.    Calcified granuloma again seen in the lingula. Rounded nodule in the  subpleural inferior right lower lobe (series 7 image 123) measuring 11  x 9 mm, previously 14 x 13 mm. This is not FDG avid.    Slight decrease in pulmonary nodule along the right major fissure in  the mid lung (series 7 image 63) measuring 5 mm, previously 6 mm.    Redemonstrated ill-defined pulmonary nodules in the anterior  right  apex (series 7 image 31). The lateralmost nodule of these does not  appear significantly changed measuring 4 mm, however the more medial  nodule appears more conspicuous compared to prior, measuring 4 mm.    Right chest wall Port-A-Cath tip is in the right atrium. Mild  calcifications of the coronary arteries.    The pulmonary embolism noted in the right inferior pulmonary arteries  is no longer seen. This examination is not optimally timed for the  detection of pulmonary emboli.    There is no significant pericardial or plural effusions.    ABDOMEN AND PELVIS:  Unchanged 4.5 x 4.5 cm hypoattenuating lesion in the inferior right  lobe of the liver with peripheral and nodular enhancement without FDG  activity.    No splenomegaly or suspicious pancreatic lesion. Unchanged small left  adrenal 16 mm myelolipoma.    Symmetric nephrographic phase without evidence of hydronephrosis.    No bowel obstruction. Normal appendix. Urinary bladder is  decompressed. The prostate gland is enlarged.    Rounded focus of radiotracer uptake within the anterior aspect of the  left transverse colon. No abnormal finding on CT to correlate with  this finding.    Decreased FDG activity of ellipsoid-shaped lymph node in the right  pelvic sidewall, is also slightly decreased in size, with SUV max of  3.8, previously 5.8, and measuring 0.8 cm in short axis dimension,  previously 1.2 cm.    Increased size and a 0.8 cm left inguinal lymph node, previously 0.6  cm, has new FDG activity with SUV max of 4.2.    LOWER EXTREMITIES:   Necrotic, fluid-filled mass over the anterior right thigh again seen,  slightly increased in size measuring 13.5 x 11.7 cm in the axial  plane, previously 13.2 x 10.8 cm, with changes predominantly due to  central fluid attenuation. Significant decrease in SUV max now at  13.8, previously 17.4, also with a marked decrease in metabolically  active volume of approximately 23 cc, previously 225 cc.    BONES:    Redemonstrated expansile lucency over the anterior right femoral  cortex adjacent to the inferior margin of the soft tissue sarcoma is  unchanged, without associated FDG uptake. There is no abnormal FDG  uptake in the skeleton. Bilateral L4 spondylolysis with L4 on L5 grade  1 spondylolisthesis.        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 Marked decrease in total metabolic tumor volume and SUV max of  right soft tissue sarcoma, overall which is slightly increased in size  due to central fluid volume.  2. Indeterminant central focus of FDG activity in the left transverse  colon (SUV max 7.8), also seen on 4/21/2018. Recommend colonoscopy for  further evaluation, approximately 25% chance of malignancy.  3. Decreased non-FDG avid small right basilar pulmonary nodule.  4. Decreased size and FDG activity of right pelvic node.  5. Unchanged right hepatic hemangioma.  6. Stable incidental left adrenal myelolipoma.  7. Pulmonary emboli noted on prior are no longer visualized. However  this study is not optimally timed for the detection of pulmonary  emboli.      [Recommend Follow Up: Hypermetabolic focus in the left transverse  colon.]    This report will be copied to the [facility] to ensure a  provider acknowledges the finding.     I have personally reviewed the examination and initial interpretation  and I agree with the findings.    SHAI FRANCO MD

## 2018-06-27 NOTE — PLAN OF CARE
Problem: Patient Care Overview  Goal: Plan of Care/Patient Progress Review  Patient admitted from clinic with nausea and vomiting and hypokalemia.  VSS.  Alert and oriented but confused at times.  Bed alarm on.  Patient was instructed to only get up with assistance.  Patient is weak and unsteady and fell at home today.  Head CT done secondary to fall.  Patient tolerating crackers and water. No nausea or emesis since his arrival to .  He has chemotherapy infusing  Into his port.  Potassium was 2.8.  Replacement potassium started. Telemetry is normal sinus rhythm.  Plan to monitor closely and notify MD of any changes.

## 2018-06-27 NOTE — PLAN OF CARE
Problem: Patient Care Overview  Goal: Plan of Care/Patient Progress Review  OT: after chart review, conversation with Pt and conversation/observation with this pt it is concluded that this pt is not a candidate for acute OT. Pt is refusing all use of AE at this time however was open to education. Pt currently is planning on using his SO to assist with all dressing and other ADL's that are limited by his immobility in LE. Pt after risk/benefit education in OT treatment vs refusal continues to refuse. DISCH from OT at this time.

## 2018-06-27 NOTE — PLAN OF CARE
Problem: Patient Care Overview  Goal: Plan of Care/Patient Progress Review  Outcome: No Change  A:  Patient having pain in leg but repositions for comfort.  Compazine given for nausea with some decrease in nausea.  States Tums helped decreased burning in throat and requested the whole bottle. Continues eating to control nausea.  Removed sea bands.  States not working.  R:  Continue to monitor and treat per plan of care.  Recheck potassium this am.  Goal to decrease partially met.

## 2018-06-27 NOTE — H&P
[facility], [place]    Hematology / Oncology Progress Note    Date of Service (when I saw the patient): 06/27/2018     Assessment & Plan   Hiram Tapia is a 57 year old male with undifferentiated pleomorphic sarcoma of right thigh currently undergoing cycle 4 of IFos/Dox that presented to the clinic with dehydration, N/V and FTT , and acute hypokalemia.       # Acute hypovolemia secondary to vomiting   # Nausea and Vomiting  # Failure to thrive   Had currently been on cycle 4 of Ifos/Dox when seen in the clinic yesterday for constant nausea/vomiting.He denies any abdominal pain, hematemesis, hematochezia or melena.   -IV hydration D51/2NS with 20K @ 125ml/hr  -IV compazine/zofran PRN  -ADAT  -At discharge: Per clinic note, switch him to Zofran ODT 8mg every 8 hours in addition to Compazine. Could also consider Zyprexa 5mg QHS if still having nausea.     # Hypokalemia:  # Hypercalcemia:   Likely due to N/V, poor PO intake. On admit creatinine was 0.96 (baseline 0.7-0.8) and BUN to 22, Admission potassium 2.2 and elevated calcium (corrected to 10.9) Had EKG changes in clinic which does show ST and T wave abnormalities but no U waves.   - Potassium level today 2.8  - Continue Telemetry monitoring.   - High electrolyte replacement protocol, 40meq oral give 1 X dose, IVF with potassium  - Corrected calcium improved to 9.8 after fluid hydration.      #Hypoalbuminemia:   #Hypophosphatemia:  # MALNUTRITION  % Intake: </=50% for >/= 1 month (severe)  % Weight Loss: > 7.5% in 3 months (severe)  Likely due to poor PO  -Phosphorus 1.4 today, will replete per protocol, magnesium WNL  -Monitor daily    #Dyspepsia  - Start Omeprazole 20mg daily      # Mechanical Fall  # Word difficulty and Slurred speech query Ifosfamide Toxicity   Patient at home  fell down stairs and hit left side of face. States his legs just gave out. Was noted to have word finding difficulties in clinic, but per wife this has  been persistent over the last 3-4 days prior to admission  - Continue to Have word finding difficulties during my exam, but is AAO x 3.  Difficult to engage in conversation.  Exam is benign otherwise,  I suspect this may be in relation to ifosfamide toxicity given the timing.  We will need to discuss with the outpatient setting about possibly doing next cycle of chemotherapy in inpatient setting.  -CT of the head done, no intracranial abnormalities.  - PT/OT consult  - Tylenol/Tramadol for pain      # Constipation:  Has not had a bowel movement since 6/25. He is still passing gas. No s/s of obstruction  He is not taking any narcotics at this time that would contribute to constipation. He has been taking miralax every other day and not been taking colace. He does not want to get too aggressive with treating his constipation    -Miralax QD  -Colace BID        # Undifferentiated pleomorphic sarcoma  -Now s/p 4 cycles of Doxil/Ifosfamide. Most recent cycle given 6/20. He was disconnected in clinic on 6/26 and then attached to his Mesna, that willl run for 24 hours. He will then need to be disconnected at that time. He will then be due for Neulasta but may need to receive Neupogen inpatient if he is not discharged in time.  -Continue twice weekly wound debridement/dressing changes with home nursing. Consider wound consult if staying longer than tomorrow.   -Continue outpatient Dr. Betts follow-up, rad onc, and Dr. Johnson follow-up as planned.     History of PE:  Asymptomatic noted on prior CT. Continue Xarelto 20mg daily.     Normocytic Anemia:   Likely due to chemo. Peak Hgb was 13.1 before he started chemo. Had been 9.2 yesterday, today 7.3, may be due to aggressive hydration  -monitor CBC. Transfuse if < 7     # Pain Assessment:  Current Pain Score 6/26/2018   Patient currently in pain? yes   Pain score (0-10) -   Pain location Leg   Pain descriptors -   - Hiram is experiencing pain due to his right leg due to  his sarcoma. Pain management was discussed and the plan was created in a collaborative fashion.  Hiram's response to the current recommendations: engaged  - Tylenol 650mg Q4hr/ Tramadol 50mg Q 6hr for pain  - Miralax and Colace        FEN: regular diet, electrolyte replacement per protocol as above , D51/2NS 20K @ 125ml/hr  Code Status: FULL  Dispo: pending clinical improvement, correction of electrolyte abnormalilites and eval by PT/OT (ordered)  PPX: on Kenan Rodriguez    Interval History   Mr. Tapia was seen and examined this morning.  He was admitted yesterday for hypokalemia and severe dehydration.  In talking with him he is completely alert and oriented ×3 but does have difficulty word finding as well as seeing some off things.  Neurologically he is intact on exam.  He has been experiencing pain in his right leg due to his sarcoma.  He reports that this has been his worst cycle he has had constant nausea and vomiting over the last 7 days and appears to be very dehydrated.. His potassium was 2.2 on arrival yesterday he has been supplemented and has increased today to 2.7 we will continue to supplement him per the protocol.  He denies any chest pain, shortness of breath, diarrhea.  He has been having ongoing constipation and did have a bowel movement on Monday, 6/26/2018.  He is currently still hooked up to his mesna bag s/p post ifosfamide and Doxil.  We will work on getting him back hydrated and feeling better with the hopes of discharging him by Friday.    Physical Exam   Temp: 97  F (36.1  C) Temp src: Oral BP: 106/61 Pulse: 83   Resp: 16 SpO2: 97 % O2 Device: None (Room air)    Vitals:    06/26/18 2134   Weight: 79.4 kg (175 lb)     Vital Signs with Ranges  Temp:  [96.5  F (35.8  C)-99.3  F (37.4  C)] 97  F (36.1  C)  Pulse:  [] 83  Resp:  [16-18] 16  BP: (106-123)/(61-80) 106/61  SpO2:  [97 %-99 %] 97 %  I/O last 3 completed shifts:  In: 850 [P.O.:550; I.V.:300]  Out: -      Constitutional: Awake, alert and oriented ×3, difficult to engage in conversation  ENT: Oral mucosa pink and moist without visible lesions or petechiae  Respiratory: No increased work of breathing, good air exchange, clear to auscultation bilaterally, no crackles or wheezing.  Cardiovascular: regular rate and rhythm, normal S1 and S2, no S3 or S4, and no murmur noted.  GI: normal bowel sounds, soft, non-distended, non-tender, no masses palpated  Lymph/Hematologic: No cervical lymphadenopathy and no supraclavicular lymphadenopathy.  Skin: Right thigh mass visualized, dressing in place, slight tenderness on palpation.  Extremeties: No peripheral extremity edema noted  Neurologic: Awake, alert, oriented to name, place and time. Difficult word finding.  No focal deficits noted       Data   I personally reviewed no images or EKG's today.  Results for orders placed or performed during the hospital encounter of 06/26/18 (from the past 24 hour(s))   Platelet count   Result Value Ref Range    Platelet Count 297 150 - 450 10e9/L   Creatinine   Result Value Ref Range    Creatinine Canceled, Test credited 0.66 - 1.25 mg/dL    GFR Estimate Canceled, Test credited >60 mL/min/1.7m2    GFR Estimate If Black Canceled, Test credited >60 mL/min/1.7m2   Potassium   Result Value Ref Range    Potassium Canceled, Test credited 3.4 - 5.3 mmol/L   CT Head w/o Contrast    Narrative    CT HEAD W/O CONTRAST 6/26/2018 8:57 PM    Provided History: fall, hit head, currently on rivaroxaban. need to  r/o bleed    Comparison: None available.    Technique: Using multidetector thin collimation helical acquisition  technique, axial, coronal and sagittal CT images from the skull base  to the vertex were obtained without intravenous contrast.     Findings:    No intracranial hemorrhage, mass effect, midline shift, or abnormal  extra axial fluid collection. No hydrocephalus. Basal cisterns are  patent. Gray to white matter differentiation of the  cerebral  hemispheres is preserved.    Calvarium and visualized facial bones are intact. Visualized paranasal  sinuses and mastoid air cells are clear.      Impression    Impression:   No acute intracranial pathology.    I have personally reviewed the examination and initial interpretation  and I agree with the findings.    VLADIMIR LEONARD MD   Potassium   Result Value Ref Range    Potassium 2.8 (L) 3.4 - 5.3 mmol/L   Sodium   Result Value Ref Range    Sodium 133 133 - 144 mmol/L   Creatinine   Result Value Ref Range    Creatinine 0.91 0.66 - 1.25 mg/dL    GFR Estimate 85 >60 mL/min/1.7m2    GFR Estimate If Black >90 >60 mL/min/1.7m2   EKG 12-lead, tracing only   Result Value Ref Range    Interpretation ECG Click View Image link to view waveform and result    CBC with platelets differential   Result Value Ref Range    WBC 3.8 (L) 4.0 - 11.0 10e9/L    RBC Count 2.81 (L) 4.4 - 5.9 10e12/L    Hemoglobin 7.3 (L) 13.3 - 17.7 g/dL    Hematocrit 22.1 (L) 40.0 - 53.0 %    MCV 79 78 - 100 fl    MCH 26.0 (L) 26.5 - 33.0 pg    MCHC 33.0 31.5 - 36.5 g/dL    RDW 18.5 (H) 10.0 - 15.0 %    Platelet Count 304 150 - 450 10e9/L    Diff Method Automated Method     % Neutrophils 89.0 %    % Lymphocytes 9.1 %    % Monocytes 1.0 %    % Eosinophils 0.3 %    % Basophils 0.3 %    % Immature Granulocytes 0.3 %    Nucleated RBCs 0 0 /100    Absolute Neutrophil 3.4 1.6 - 8.3 10e9/L    Absolute Lymphocytes 0.4 (L) 0.8 - 5.3 10e9/L    Absolute Monocytes 0.0 0.0 - 1.3 10e9/L    Absolute Eosinophils 0.0 0.0 - 0.7 10e9/L    Absolute Basophils 0.0 0.0 - 0.2 10e9/L    Abs Immature Granulocytes 0.0 0 - 0.4 10e9/L    Absolute Nucleated RBC 0.0    Basic metabolic panel   Result Value Ref Range    Sodium 135 133 - 144 mmol/L    Potassium 2.7 (L) 3.4 - 5.3 mmol/L    Chloride 106 94 - 109 mmol/L    Carbon Dioxide 18 (L) 20 - 32 mmol/L    Anion Gap 11 3 - 14 mmol/L    Glucose 118 (H) 70 - 99 mg/dL    Urea Nitrogen 20 7 - 30 mg/dL    Creatinine 0.84 0.66 -  1.25 mg/dL    GFR Estimate >90 >60 mL/min/1.7m2    GFR Estimate If Black >90 >60 mL/min/1.7m2    Calcium 9.1 8.5 - 10.1 mg/dL   Magnesium   Result Value Ref Range    Magnesium 2.1 1.6 - 2.3 mg/dL   Phosphorus   Result Value Ref Range    Phosphorus 1.4 (L) 2.5 - 4.5 mg/dL   INR   Result Value Ref Range    INR 1.66 (H) 0.86 - 1.14

## 2018-06-27 NOTE — PLAN OF CARE
Problem: Patient Care Overview  Goal: Plan of Care/Patient Progress Review  Patient refusing PT evaluation upon attempt. Reports increased pain with sitting up and ambulating, declining use of FWW for ambulation to bathroom. Will attempt to coordinate PT eval with RN as pt calls for assist to bathroom.     Patient demos indep bed mobility, transfers, ambulation without assistive device ~20 feet to/from toilet. Patient declines use of walker or cane for mobility. Patient reports functional mobility at baseline as he typically spends time in bed at home, uses wc for mobility to appts, and ambulates only short distances in home. Fall as result of electrolyte imbalance, per patient. PT would recommend HH vs OP PT but patient presently refusing. PT will complete orders.

## 2018-06-28 ENCOUNTER — APPOINTMENT (OUTPATIENT)
Dept: CT IMAGING | Facility: CLINIC | Age: 58
DRG: 640 | End: 2018-06-28
Attending: NURSE PRACTITIONER
Payer: COMMERCIAL

## 2018-06-28 LAB
ABO + RH BLD: NORMAL
ABO + RH BLD: NORMAL
ANION GAP SERPL CALCULATED.3IONS-SCNC: 11 MMOL/L (ref 3–14)
ANION GAP SERPL CALCULATED.3IONS-SCNC: 9 MMOL/L (ref 3–14)
ANION GAP SERPL CALCULATED.3IONS-SCNC: ABNORMAL MMOL/L (ref 3–14)
BASOPHILS # BLD AUTO: 0 10E9/L (ref 0–0.2)
BASOPHILS NFR BLD AUTO: 0.8 %
BLD GP AB SCN SERPL QL: NORMAL
BLD PROD TYP BPU: NORMAL
BLD PROD TYP BPU: NORMAL
BLD UNIT ID BPU: 0
BLOOD BANK CMNT PATIENT-IMP: NORMAL
BLOOD PRODUCT CODE: NORMAL
BPU ID: NORMAL
BUN SERPL-MCNC: 8 MG/DL (ref 7–30)
BUN SERPL-MCNC: 9 MG/DL (ref 7–30)
BUN SERPL-MCNC: ABNORMAL MG/DL (ref 7–30)
CALCIUM SERPL-MCNC: 8.6 MG/DL (ref 8.5–10.1)
CALCIUM SERPL-MCNC: 9.3 MG/DL (ref 8.5–10.1)
CALCIUM SERPL-MCNC: ABNORMAL MG/DL (ref 8.5–10.1)
CHLORIDE SERPL-SCNC: 108 MMOL/L (ref 94–109)
CHLORIDE SERPL-SCNC: 110 MMOL/L (ref 94–109)
CHLORIDE SERPL-SCNC: ABNORMAL MMOL/L (ref 94–109)
CO2 SERPL-SCNC: 17 MMOL/L (ref 20–32)
CO2 SERPL-SCNC: 18 MMOL/L (ref 20–32)
CO2 SERPL-SCNC: ABNORMAL MMOL/L (ref 20–32)
CREAT SERPL-MCNC: 0.79 MG/DL (ref 0.66–1.25)
CREAT SERPL-MCNC: 0.87 MG/DL (ref 0.66–1.25)
CREAT SERPL-MCNC: ABNORMAL MG/DL (ref 0.66–1.25)
DIFFERENTIAL METHOD BLD: ABNORMAL
EOSINOPHIL # BLD AUTO: 0 10E9/L (ref 0–0.7)
EOSINOPHIL NFR BLD AUTO: 0.8 %
ERYTHROCYTE [DISTWIDTH] IN BLOOD BY AUTOMATED COUNT: 18.2 % (ref 10–15)
GFR SERPL CREATININE-BSD FRML MDRD: 89 ML/MIN/1.7M2
GFR SERPL CREATININE-BSD FRML MDRD: >90 ML/MIN/1.7M2
GFR SERPL CREATININE-BSD FRML MDRD: ABNORMAL ML/MIN/1.7M2
GLUCOSE SERPL-MCNC: 109 MG/DL (ref 70–99)
GLUCOSE SERPL-MCNC: 92 MG/DL (ref 70–99)
GLUCOSE SERPL-MCNC: ABNORMAL MG/DL (ref 70–99)
HCT VFR BLD AUTO: 21.4 % (ref 40–53)
HGB BLD-MCNC: 6.9 G/DL (ref 13.3–17.7)
IMM GRANULOCYTES # BLD: 0 10E9/L (ref 0–0.4)
IMM GRANULOCYTES NFR BLD: 0.8 %
LYMPHOCYTES # BLD AUTO: 0.2 10E9/L (ref 0.8–5.3)
LYMPHOCYTES NFR BLD AUTO: 6.1 %
MCH RBC QN AUTO: 26.6 PG (ref 26.5–33)
MCHC RBC AUTO-ENTMCNC: 32.2 G/DL (ref 31.5–36.5)
MCV RBC AUTO: 83 FL (ref 78–100)
MONOCYTES # BLD AUTO: 0 10E9/L (ref 0–1.3)
MONOCYTES NFR BLD AUTO: 0.8 %
NEUTROPHILS # BLD AUTO: 2.4 10E9/L (ref 1.6–8.3)
NEUTROPHILS NFR BLD AUTO: 90.7 %
NRBC # BLD AUTO: 0 10*3/UL
NRBC BLD AUTO-RTO: 0 /100
NUM BPU REQUESTED: 1
PHOSPHATE SERPL-MCNC: 1.9 MG/DL (ref 2.5–4.5)
PHOSPHATE SERPL-MCNC: 2.6 MG/DL (ref 2.5–4.5)
PLATELET # BLD AUTO: 200 10E9/L (ref 150–450)
POTASSIUM SERPL-SCNC: 3.4 MMOL/L (ref 3.4–5.3)
POTASSIUM SERPL-SCNC: 3.8 MMOL/L (ref 3.4–5.3)
POTASSIUM SERPL-SCNC: >10 MMOL/L (ref 3.4–5.3)
RBC # BLD AUTO: 2.59 10E12/L (ref 4.4–5.9)
SODIUM SERPL-SCNC: 135 MMOL/L (ref 133–144)
SODIUM SERPL-SCNC: 138 MMOL/L (ref 133–144)
SODIUM SERPL-SCNC: ABNORMAL MMOL/L (ref 133–144)
SPECIMEN EXP DATE BLD: NORMAL
TRANSFUSION STATUS PATIENT QL: NORMAL
TRANSFUSION STATUS PATIENT QL: NORMAL
WBC # BLD AUTO: 2.6 10E9/L (ref 4–11)

## 2018-06-28 PROCEDURE — 86901 BLOOD TYPING SEROLOGIC RH(D): CPT | Performed by: INTERNAL MEDICINE

## 2018-06-28 PROCEDURE — 25000128 H RX IP 250 OP 636: Performed by: INTERNAL MEDICINE

## 2018-06-28 PROCEDURE — 73701 CT LOWER EXTREMITY W/DYE: CPT | Mod: RT

## 2018-06-28 PROCEDURE — 80048 BASIC METABOLIC PNL TOTAL CA: CPT | Performed by: NURSE PRACTITIONER

## 2018-06-28 PROCEDURE — 80048 BASIC METABOLIC PNL TOTAL CA: CPT | Performed by: INTERNAL MEDICINE

## 2018-06-28 PROCEDURE — 84100 ASSAY OF PHOSPHORUS: CPT | Performed by: INTERNAL MEDICINE

## 2018-06-28 PROCEDURE — 25000132 ZZH RX MED GY IP 250 OP 250 PS 637: Performed by: INTERNAL MEDICINE

## 2018-06-28 PROCEDURE — 25800025 ZZH RX 258: Performed by: NURSE PRACTITIONER

## 2018-06-28 PROCEDURE — 99223 1ST HOSP IP/OBS HIGH 75: CPT | Performed by: INTERNAL MEDICINE

## 2018-06-28 PROCEDURE — 36415 COLL VENOUS BLD VENIPUNCTURE: CPT | Performed by: INTERNAL MEDICINE

## 2018-06-28 PROCEDURE — 86900 BLOOD TYPING SEROLOGIC ABO: CPT | Performed by: INTERNAL MEDICINE

## 2018-06-28 PROCEDURE — 25000132 ZZH RX MED GY IP 250 OP 250 PS 637: Performed by: NURSE PRACTITIONER

## 2018-06-28 PROCEDURE — 36592 COLLECT BLOOD FROM PICC: CPT | Performed by: NURSE PRACTITIONER

## 2018-06-28 PROCEDURE — 25000128 H RX IP 250 OP 636: Performed by: NURSE PRACTITIONER

## 2018-06-28 PROCEDURE — 25000128 H RX IP 250 OP 636: Performed by: STUDENT IN AN ORGANIZED HEALTH CARE EDUCATION/TRAINING PROGRAM

## 2018-06-28 PROCEDURE — 25000125 ZZHC RX 250: Performed by: INTERNAL MEDICINE

## 2018-06-28 PROCEDURE — 12000008 ZZH R&B INTERMEDIATE UMMC

## 2018-06-28 PROCEDURE — 86850 RBC ANTIBODY SCREEN: CPT | Performed by: INTERNAL MEDICINE

## 2018-06-28 PROCEDURE — 84100 ASSAY OF PHOSPHORUS: CPT | Performed by: NURSE PRACTITIONER

## 2018-06-28 PROCEDURE — P9016 RBC LEUKOCYTES REDUCED: HCPCS | Performed by: INTERNAL MEDICINE

## 2018-06-28 PROCEDURE — 85025 COMPLETE CBC W/AUTO DIFF WBC: CPT | Performed by: NURSE PRACTITIONER

## 2018-06-28 PROCEDURE — 86923 COMPATIBILITY TEST ELECTRIC: CPT | Performed by: INTERNAL MEDICINE

## 2018-06-28 PROCEDURE — G0463 HOSPITAL OUTPT CLINIC VISIT: HCPCS

## 2018-06-28 RX ORDER — ONDANSETRON 2 MG/ML
8 INJECTION INTRAMUSCULAR; INTRAVENOUS EVERY 6 HOURS PRN
Status: DISCONTINUED | OUTPATIENT
Start: 2018-06-28 | End: 2018-06-28

## 2018-06-28 RX ORDER — IOPAMIDOL 755 MG/ML
109 INJECTION, SOLUTION INTRAVASCULAR ONCE
Status: COMPLETED | OUTPATIENT
Start: 2018-06-28 | End: 2018-06-28

## 2018-06-28 RX ORDER — OLANZAPINE 5 MG/1
5 TABLET ORAL 2 TIMES DAILY
Status: DISCONTINUED | OUTPATIENT
Start: 2018-06-28 | End: 2018-06-29 | Stop reason: HOSPADM

## 2018-06-28 RX ORDER — PROCHLORPERAZINE MALEATE 5 MG
10 TABLET ORAL EVERY 6 HOURS PRN
Status: DISCONTINUED | OUTPATIENT
Start: 2018-06-28 | End: 2018-06-29 | Stop reason: HOSPADM

## 2018-06-28 RX ORDER — OXYCODONE HYDROCHLORIDE 5 MG/1
5 TABLET ORAL EVERY 4 HOURS PRN
Status: DISCONTINUED | OUTPATIENT
Start: 2018-06-28 | End: 2018-06-29 | Stop reason: HOSPADM

## 2018-06-28 RX ORDER — OLANZAPINE 5 MG/1
5 TABLET ORAL AT BEDTIME
Status: DISCONTINUED | OUTPATIENT
Start: 2018-06-28 | End: 2018-06-28

## 2018-06-28 RX ORDER — ONDANSETRON 2 MG/ML
8 INJECTION INTRAMUSCULAR; INTRAVENOUS EVERY 8 HOURS
Status: DISCONTINUED | OUTPATIENT
Start: 2018-06-28 | End: 2018-06-29 | Stop reason: HOSPADM

## 2018-06-28 RX ORDER — SENNOSIDES 8.6 MG
8.6 TABLET ORAL 2 TIMES DAILY
Status: DISCONTINUED | OUTPATIENT
Start: 2018-06-28 | End: 2018-06-29 | Stop reason: HOSPADM

## 2018-06-28 RX ORDER — OLANZAPINE 2.5 MG/1
2.5 TABLET, FILM COATED ORAL ONCE
Status: COMPLETED | OUTPATIENT
Start: 2018-06-28 | End: 2018-06-28

## 2018-06-28 RX ADMIN — RIVAROXABAN 20 MG: 10 TABLET, FILM COATED ORAL at 16:24

## 2018-06-28 RX ADMIN — OXYCODONE HYDROCHLORIDE 5 MG: 5 TABLET ORAL at 19:04

## 2018-06-28 RX ADMIN — FILGRASTIM 480 MCG: 480 INJECTION, SOLUTION INTRAVENOUS; SUBCUTANEOUS at 13:47

## 2018-06-28 RX ADMIN — POTASSIUM CHLORIDE 20 MEQ: 750 TABLET, EXTENDED RELEASE ORAL at 22:12

## 2018-06-28 RX ADMIN — ONDANSETRON 4 MG: 2 INJECTION INTRAMUSCULAR; INTRAVENOUS at 07:13

## 2018-06-28 RX ADMIN — POTASSIUM CHLORIDE, DEXTROSE MONOHYDRATE AND SODIUM CHLORIDE: 150; 5; 450 INJECTION, SOLUTION INTRAVENOUS at 20:48

## 2018-06-28 RX ADMIN — TRAMADOL HYDROCHLORIDE 50 MG: 50 TABLET, COATED ORAL at 04:16

## 2018-06-28 RX ADMIN — PROCHLORPERAZINE EDISYLATE 5 MG: 5 INJECTION INTRAMUSCULAR; INTRAVENOUS at 04:16

## 2018-06-28 RX ADMIN — OLANZAPINE 2.5 MG: 2.5 TABLET, FILM COATED ORAL at 17:09

## 2018-06-28 RX ADMIN — POTASSIUM CHLORIDE 20 MEQ: 750 TABLET, EXTENDED RELEASE ORAL at 09:21

## 2018-06-28 RX ADMIN — DOCUSATE SODIUM 100 MG: 100 CAPSULE, LIQUID FILLED ORAL at 09:10

## 2018-06-28 RX ADMIN — OXYCODONE HYDROCHLORIDE 5 MG: 5 TABLET ORAL at 15:17

## 2018-06-28 RX ADMIN — IOPAMIDOL 109 ML: 755 INJECTION, SOLUTION INTRAVENOUS at 12:30

## 2018-06-28 RX ADMIN — POLYETHYLENE GLYCOL 3350 17 G: 17 POWDER, FOR SOLUTION ORAL at 21:29

## 2018-06-28 RX ADMIN — ONDANSETRON 8 MG: 2 INJECTION INTRAMUSCULAR; INTRAVENOUS at 21:29

## 2018-06-28 RX ADMIN — ONDANSETRON 8 MG: 2 INJECTION INTRAMUSCULAR; INTRAVENOUS at 15:17

## 2018-06-28 RX ADMIN — OXYCODONE HYDROCHLORIDE 5 MG: 5 TABLET ORAL at 09:09

## 2018-06-28 RX ADMIN — POTASSIUM CHLORIDE, DEXTROSE MONOHYDRATE AND SODIUM CHLORIDE: 150; 5; 450 INJECTION, SOLUTION INTRAVENOUS at 04:15

## 2018-06-28 RX ADMIN — OMEPRAZOLE 20 MG: 20 CAPSULE, DELAYED RELEASE ORAL at 07:13

## 2018-06-28 RX ADMIN — POTASSIUM CHLORIDE, DEXTROSE MONOHYDRATE AND SODIUM CHLORIDE: 150; 5; 450 INJECTION, SOLUTION INTRAVENOUS at 13:49

## 2018-06-28 RX ADMIN — POTASSIUM PHOSPHATE, MONOBASIC AND POTASSIUM PHOSPHATE, DIBASIC 20 MMOL: 224; 236 INJECTION, SOLUTION INTRAVENOUS at 09:12

## 2018-06-28 RX ADMIN — OLANZAPINE 5 MG: 5 TABLET, FILM COATED ORAL at 19:01

## 2018-06-28 RX ADMIN — SENNOSIDES 8.6 MG: 8.6 TABLET, FILM COATED ORAL at 19:01

## 2018-06-28 RX ADMIN — CALCIUM CARBONATE (ANTACID) CHEW TAB 500 MG 500 MG: 500 CHEW TAB at 04:21

## 2018-06-28 NOTE — CONSULTS
[facility]  Palliative Care Consultation Note    Patient: Hiram Tapia  Date of Admission:  6/26/2018    Requesting Clinician / Team: heme/onc  Reason for consult: Symptom management  Patient and family support    Recommendations:  Schedule ondansetron IV 8mg q 8 hours  Schedule zyprexa sublingual tab 5mg BID, give 2.5mg dose now to get started  Continue prn IV compazine  Continue prn oxycodone 5mg  Stop colace  Start senna 1 tab BID, continue miralax daily  Discussed scheduling tylenol for pain but pt declines this option --hasnt helped him in past and with nausea would prefer to limit pills. If nausea improves can revisit this option as he really avoids opioids if possible.   Discussed role of SW and  on team. He is not interested in additional support for coping at this time but he is aware it is available.       These recommendations have been discussed with heme/onc service.    Pain management was discussed with patient and the plan was created in a collaborative fashion.  The patient's response to the current recommendations: in agreement.     Thank you for the opportunity to participate in the care of this patient and family. Our team will follow . Please feel free to contact the on-call Palliative provider with any urgent needs.     Thank you for involving us in the patient's care.     Total time spent was 75 minutes,  >50% of time was spent counseling and/or coordination of care regarding symptoms and support.    Agnes Champion MD / Palliative Medicine / Pager [phone] / [facility] Inpatient Team Consult Pager [phone] (answered 8am-430pm M-F) - ok to text page via [employer] / After-Hours Answering Service [phone] / Palliative Clinic in the [facility] the [facility] - [phone] (scheduling); [phone] (triage).        Assessments:  Hiram Tapia is a 57 year old male with undifferentiated pleomorphic sarcoma of right thigh and hx PE currently  undergoing cycle 4 of Ifos/Dox presents with delayed chemotherapy induced nausea and vomiting with dehydration, n/v x 7 days, FTT and hypokalemia.    Symptoms:   Nausea, vomiting, anorexia  Pain -left thigh, site of tumor and skin wound  Fatigue, deconditioning  Sadness, grief over many physical and functional losses    Prognosis, Goals, & Planning:   Discussion about disease understanding, prognosis, care goals: brief discussion about overall plan--complete this last cycle of chemotherapy, then radiation and surgery. He was aware that there have not been any metastatic lesions identified and he hopes that surgery and treatments can cure this.    Decision making capacity: intact   Preferred way of decision making:   Health care directive: no   Health care agent: would be wife by default  Code Status:  Full Code  POLST Physician orders for life-sustaining treatment (POLST) form is not completed    Coping, Meaning, & Spirituality: having difficult time coping with the series of         Social:   Living situation: lives at home with wife and adult son with mild autism spectrum, son works part time and has been helping out around the house  Support system/Family: wife, son; of note wife recent fell and fractured her hip and needed surgery, is healing but still walking with cane  Functional status: very limited, stays relined most of time due to pain in leg, very hesitant to take pain medications  Financial concerns: no - says wifes income is good enough and his work says he will have his job whenever he is able to go back  Substance use disorder (past / present): no  Occupation/Past-times: /tech repair for [employer]      History of Present Illness:   Sources of History:patient and electronic health record      56 yo male with undifferentiated pleomorphic sarcoma of right thigh and hx PE currently undergoing cycle 4 of Ifos/Dox present with fall in setting of dehydration, n/v, FTT and hypokalemia. Treating with IV  fluids and antiemetics   Per outpatient clinic note 6/26 has had n/v since last chemo 6/20. Was taking kytril and compazine with no relief but was vomiting his medications. Also having constipation.     In clinic was started on IVF, given dexamethasone and emend and potassium replacement. Then admitted. Since admission has been getting some prn IV compazine and IV zofran with some improvement. But not on a schedule so nausea keeps coming back. Also no appetite.     Pain--pain in his right thigh at site of tumor and wound, does nto like opioids because does not want to be drowsy. Due to pain he primarily stays reclined and does minimal activity and is getting weaker. Says he cant bend his right knee because stretchign the quad muscle is very painful so he just doesn't move much. He did take a prn oxycodone today and it helped but he is hesitant to take it at home.     Constipation - no BM since 6/25, on colace and miralax, hasnt tried senna      ROS:  Comprehensive ROS is reviewed and is negative except as here & per HPI:   Palliative Symptom Review (0=no symptom/no concern, 1=mild, 2=moderate, 3=severe):  Pain: 2  Fatigue: 2  Nausea: 2-  Constipation: 2  Diarrhea: 0  Depressive Symptoms: 2  Anxiety: 2  Drowsiness: 1  Poor Appetite: 3  Shortness of Breath: 0  Insomnia: 1  Confusion: 0  Other: 0  Overall (0 good/no concerns, 3 very poor): 2     Past Medical History:  Past Medical History:   Diagnosis Date     Sarcoma (H)         Past Surgical History:  Past Surgical History:   Procedure Laterality Date     EXCISE SOFT TISSUE TUMOR THIGH Right 3/8/2018    Procedure: EXCISE SOFT TISSUE TUMOR THIGH;  Biopsy Right Thigh Tumor;  Surgeon: Brad Betts MD;  Location: [facility] OR     INSERT PORT VASCULAR ACCESS N/A 3/28/2018    Procedure: INSERT PORT VASCULAR ACCESS;  Vascular Access Port Insertion with C-arm;  Surgeon: Sarah Bowie MD;  Location: [facility] OR     IRRIGATION AND DEBRIDEMENT LOWER EXTREMITY, COMBINED Right  4/6/2018    Procedure: COMBINED IRRIGATION AND DEBRIDEMENT LOWER EXTREMITY;  Irrigation And Debridement Right Thigh ;  Surgeon: Brad Betts MD;  Location: [facility] OR     IRRIGATION AND DEBRIDEMENT LOWER EXTREMITY, COMBINED Right 4/9/2018    Procedure: COMBINED IRRIGATION AND DEBRIDEMENT LOWER EXTREMITY;  Irrigation And Debridement Right Thigh Wound. with Wound VAC Exchange;  Surgeon: Brad Betts MD;  Location: [facility] OR     STRABISMUS SURGERY      as a child         Family History:  Family History   Problem Relation Age of Onset     Leukemia Father          Allergies:  No Known Allergies     Medications:  I have reviewed this patient's medication profile and medications from this hospitalization.   Noted scheduled meds are:    Colace 100 BID  neupogen given today  Olanzapine -dose ordered fro 5mg qhs  Omeprazole started yesterday - 20m gBID  miralax daily  xarelto    On d5 half NS at 125/hr    Noted PRN meds are:  Prn tums- minimal use  Prn melatonin not using  Prn zofran 4mg q 6 hours prn -given x3 then increased to 8mg but not given yet; 4mg at 1am, 3pm, 7am (far less than max)  Prn oxycodone 5mg q 4 hours prn -first dose 9am today  Prn compazine 5mg q 6 hours prn -got x 4 doses, then dose increased to 10mg and none given since ; got 3 doses q 6 hours apart since yesterday afternoon  Prn tramadol 50mg q 6 -x2 yesterday, x1 this am    Physical Exam:  Vital Signs: Temp: 98.2  F (36.8  C) Temp src: Oral BP: 110/69 Pulse: 71 Heart Rate: 76 Resp: 18 SpO2: 99 % O2 Device: None (Room air)    Weight: 179 lbs 8 oz     Gen: sitting/lying in bed. Appears comfortable but tearful at times  HEENT: NCAT. Conjunctiva clear. Sclera anicteric .  CV: RRR , Peripheral perfusion intact.   Resp: unlabored  work of breathing, CTAB  Abd: soft, nt, nd, +BS   Msk: large swelling over right thigh with superficial wound with wound care dressing. pain with palpation and with movement.  Skin:  no jaundice  Ext: warm, well perfused.    Neuro: face symmetric. EOM, vision, hearing grossly intact. Speech fluent. Moves all extremities but limited right LE movement due to pain  Mental status/Psych: alert. Oriented. Asks/answers questions appropriately. 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 12 x 9 cm necrotic mass centered within the right vastus lateralis  muscle, representing the biopsy-proven primary cancer.  2. No evidence of distant metastasis.  3. Consolidation and associated FDG uptake in the right lung base.  This could represent organized pneumonia. Recommend clinical  correlation and possible treatment for pneumonia and follow-up scan.   4. Right hepatic lobe hemangioma.  5. Incidental left adrenal adenoma.  6. Bilateral spondylolysis and grade I anterolisthesis at L4-5.      CT thigh--awaiting results   Recent lab data reviewed, comments on pertinents:    GFR >90  WBC 2.6  Platelets 200  Hg 6.9

## 2018-06-28 NOTE — PLAN OF CARE
Problem: Patient Care Overview  Goal: Plan of Care/Patient Progress Review  Outcome: No Change  Pt alert and oriented. Vitally stable on ra. Up with SBA. C/o of right leg pain; CT of area right femur completed. Receiving prn oxycodone which seems to be helping him with the leg pain per pt. Continues with strict I/O. Hgb came back low at 6.9 this am; one unit of blood administered. One time dose of Neupogen administered. K+ replaced and be checked tomorrow. Phos currently infusing and recheck time pending until completion of infusion. To start IVMF following completion of phos as well. Poor appetite and has not been eating. Met with palliative physician today; antiemetic med doses increased. Consulted with WOC RN and wound to right femur changed; orders placed for wound care as well.  R: Continue to encourage oral intake of food and fluids. Manage pain. Follow up with labs and vitals. Continue with monitoring I/O. Implement poc

## 2018-06-28 NOTE — PROGRESS NOTES
[facility] Nurse Inpatient Wound Assessment   Reason for consultation: Evaluate and treat R hip wound     Assessment  R hip wound due to Surgical Wound 2/2 drain  Status: initial assessment    Treatment Plan  R hip wound: May leave current dressing in place upon discharge. (should last until seen by home care) OK to return to home POC upon discharge.  If blue sponge in dressing turns white or drainage saturates dressing or pt wishes dressing change prior to discharge use the following POC:    Every 3-5 days cleanse with microklenz and pat dry.  Cut 2 pieces of hydrofera blue (#[id number]) to fit into wound.  Dampen with normal saline.  Squeeze out excess if needed, then pack into wound bed.  Cover with 1/2 Comfeel hydrocolloid, then secure with large tegaderm.    Orders Written  Recommended provider order: none  WO Nurse follow-up plan:weekly  Nursing to notify the Provider(s) and re-consult the WOC Nurse if wound(s) deteriorates or new skin concern.    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       Objective Data  Active Diet Order    Active Diet Order      Regular Diet Adult    Output:   I/O last 3 completed shifts:  In: 2951.25 [P.O.:1870; I.V.:1081.25]  Out: -     Risk Assessment:   Sensory Perception: 4-->no impairment  Moisture: 4-->rarely moist  Activity: 3-->walks occasionally  Mobility: 3-->slightly limited  Nutrition: 4-->excellent  Friction and Shear: 3-->no apparent problem  Marc Score: 21                          Labs:   Recent Labs  Lab 06/28/18  0614 06/27/18  0605  06/26/18  1535   ALBUMIN  --   --   --  3.1*   HGB 6.9* 7.3*  < >  --    INR  --  1.66*  --   --    WBC 2.6* 3.8*  < >  --    < > = values in this interval not displayed.    Physical Exam  Skin  inspection: focused right hip    Wound Location: Right hip        Date of last photo 6/28/18  Wound History: present on admit.  Pt had drain in place, which did not heal.  Previous to admission had wound vac in place.  Now using a foam dressing that we do not carry.  Pt expects to discharge tomorrow with home care resuming on Monday or Tuesday.  He does not feel comfortable doing wound care.  POC developed that would last through home care visit.  Three dried fibrinous scabs distal to wound removed with gentle cleansing and revealed intact skin.     Measurements (length x width x depth, in cm) 3.9 x 2 x 1 cm    Wound Base:  Pale pink sides with grey base.  Thin layer of loose pale yellow nonviable tissue easily removed with gentle cleansing.    Tunneling N/A  Undermining N/A  Palpation of the wound bed: firm   Periwound skin: intact and macerated  Color: normal and consistent with surrounding tissue  Temperature: normal   Drainage:, small  Description of drainage: serosanguinous  Odor: none  Pain: during dressing change, aching    Interventions  Current support surface: Standard  Atmos Air mattress  Current off-loading measures: Pillows under calves  Visual inspection of wound(s) completed  Wound Care: done per plan of care  Supplies: gathered, placed at the bedside and discussed with RN  Education provided: importance of repositioning, plan of care, wound progress and Infection prevention   Discussed plan of care with Patient, Nurse and Physician (palliative care interviewing pt at time of dressing change)    Ritika Salguero RN

## 2018-06-28 NOTE — DISCHARGE INSTRUCTIONS
R hip wound: May leave current dressing in place upon discharge. (should last until seen by home care) OK to return to home POC upon discharge.  If blue sponge in dressing turns white or drainage saturates dressing or pt wishes dressing change prior to discharge use the following POC:    Every 3-5 days cleanse with microklenz and pat dry.  Cut 2 pieces of hydrofera blue (#[id number]) to fit into wound.  Dampen with normal saline.  Squeeze out excess if needed, then pack into wound bed.  Cover with 1/2 Comfeel hydrocolloid, then secure with large tegaderm.

## 2018-06-28 NOTE — PLAN OF CARE
Problem: Patient Care Overview  Goal: Plan of Care/Patient Progress Review  Outcome: Improving  Patient alert and oriented x4, calm pleasant and coopeartive with cares. Was on mesna infusion via Chest port-a Cath at the start of the shift and was completed. Maintenance IV D5 1/2 NS + 20 mEq Kcl was then resumed. Potassium was also replaced and was rechecked. Complained of Nausea and was given Compazine twice this shift after unable to obtain relief with Zofran. Obtained good relief thereafter. Poor appetite noted this shift. Phos replacement currently ongoing. Will not order recheck as BMP will be drawn daily. Resting.  P: continue to monitor patient and follow plan of care. Replace lytes as necessary.

## 2018-06-28 NOTE — PROGRESS NOTES
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    Plan  1 unit of PRBC  CT scan of right femur  Palliative care consult  Start oxycodone  Increase Zofran and Compazine dosing  Add Zyprexa at bedtime  Start Neupogen      # Acute hypovolemia secondary to vomiting   # Nausea and Vomiting  # Failure to thrive   Had currently been on cycle 4 of Ifos/Dox when seen in the clinic yesterday for constant nausea/vomiting.He denies any abdominal pain, hematemesis, hematochezia or melena.   -IV hydration D51/2NS with 20K @ 125ml/hr  -IV compazine/zofran, increased today to 8 mg of Zofran every 8 hours, Compazine 10 mg every 6 hours, added Zyprexa 6/28 5 mg at bedtime  -ADAT  - Palliative care consult place 6/28 appreciate recommendations    # Hypokalemia:  # Hypercalcemia:   Likely due to N/V, poor PO intake. On admit creatinine was 0.96 (baseline 0.7-0.8) and BUN to 22, Admission potassium 2.2 and elevated calcium (corrected to 10.9) Had EKG changes in clinic which does show ST and T wave abnormalities but no U waves.   - Potassium level improved today to 3.4  - Continue Telemetry monitoring.   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 States his legs just gave out. Was noted to have word finding difficulties in clinic, but per wife this has been persistent over the last 3-4 days prior to admission  On admission he continued to Have word finding difficulties during my exam, but was AAO x 3.  Difficult to engage in conversation.  Exam is benign otherwise,  I suspect this may be in relation to ifosfamide toxicity given the timing.    -CT of the head done, no intracranial abnormalities.  - PT/OT consult-patient had originally been refusing I did discuss with him today how important it is for his safety given his recent fall and he needs to continue to work with physical therapy.  -Given Mr. Tapia recent fall onto his right thigh mass and continued pain despite tramadol and Tylenol.  Oxycodone 5 mg every 4 hours was started today given the continuing of pain despite this.  A CT of the right femur was ordered to ensure that there is no bleeding or other cause of pain.     # Constipation:  Has not had a bowel movement since 6/25. He is still passing gas. No s/s of obstruction  He is not taking any narcotics at this time that would contribute to constipation. He has been taking miralax every other day and not been taking colace. He does not want to get too aggressive with treating his constipation    -Miralax QD  -Colace BID       # Undifferentiated pleomorphic sarcoma  -Now s/p 4 cycles of Doxil/Ifosfamide. Most recent cycle given 6/20. He finished the cycle on 6/27.  -Was due for Neulasta yesterday given and patient status we will start Neupogen today.  If leaving tomorrow could consider outpatient Neulasta.  -Continue twice weekly wound debridement/dressing changes with home nursing.  Consulted [facility] nurse  -Continue outpatient Dr. Betts follow-up, rad onc, and Dr. Johnson follow-up as planned.     # History of  PE:  Asymptomatic noted on prior CT. Continue Xarelto 20mg daily.     # Normocytic Anemia likely due to chemotherapy  Likely due to chemo. Peak Hgb was 13.1 before he started chemo. Has trended down.   -Today 6.9, will receive 1 unit of PRBC.   -Given the pain on the right thigh mass, status post fall, and currently on Xarelto will obtain CT imaging to rule out bleed.  -monitor CBC. Transfuse if < 7     # Pain Assessment:  Current Pain Score 6/28/2018   Patient currently in pain? yes   Pain score (0-10) -   Pain location Leg   Pain descriptors Constant;Aching   - Hiram is experiencing pain due to his right leg due to his sarcoma. Pain management was discussed and the plan was created in a collaborative fashion.  Hiram's response to the current recommendations: engaged  - Tylenol 650mg Q4hr/ Tramadol 50mg Q 6hr for pain. Oxycodone 5mg Q4hr prn  - Miralax and Colace        FEN: regular diet, electrolyte replacement per protocol as above , D51/2NS 20K @ 125ml/hr  Code Status: FULL  Dispo: pending clinical improvement, correction of electrolyte abnormalilites and eval by PT/OT  PPX: on Xarelto    Lay Rodriguez    Interval History   Mr. Tapia was seen and examined this morning.  I had a long talk with Mr. Tapia this morning.  It appears that he has been refusing physical therapy, occupational therapy and lab draws.  We discussed today the importance of these things and the goal is to get him home with safety being priority.  We also discussed his hemoglobin being 6.9 and the need for transfusion and a consent was signed.  He continues to have pain on his right thigh mass due from his recent fall.  Given the acute hemoglobin drop as well as pain will obtain CT imaging to rule out a bleed.  We will also get a palliative care consult given that his admission is due to symptomatic pain and vomiting due to chemotherapy.  He denies any chest pain, shortness of breath, diarrhea.  He has been having ongoing constipation  but did have a bowel movement on Monday 6/26.  He continues to struggle with ongoing nausea despite nausea medications, we have made adjustments to this and hopefully will give him some reprieve.  I have discussed with him that he will stay overnight as we are working on his symptom management he will likely be able to discharge tomorrow.          Physical Exam   Temp: 98.2  F (36.8  C) Temp src: Oral BP: 110/69 Pulse: 71 Heart Rate: 76 Resp: 18 SpO2: 99 % O2 Device: None (Room air)    Vitals:    06/26/18 2134 06/27/18 1917   Weight: 79.4 kg (175 lb) 81.4 kg (179 lb 8 oz)     Vital Signs with Ranges  Temp:  [96  F (35.6  C)-98.2  F (36.8  C)] 98.2  F (36.8  C)  Pulse:  [70-74] 71  Heart Rate:  [74-76] 76  Resp:  [16-18] 18  BP: (106-119)/(63-71) 110/69  SpO2:  [97 %-100 %] 99 %  I/O last 3 completed shifts:  In: 2951.25 [P.O.:1870; I.V.:1081.25]  Out: -     Constitutional: Awake, alert and oriented ×3, awake and conversive  ENT: Oral mucosa pink and moist without visible lesions or petechiae  Respiratory: No increased work of breathing, good air exchange, clear to auscultation bilaterally, no crackles or wheezing.  Cardiovascular: regular rate and rhythm, normal S1 and S2, no S3 or S4, and no murmur noted.  GI: normal bowel sounds, soft, non-distended, non-tender, no masses palpated  Lymph/Hematologic: No cervical lymphadenopathy and no supraclavicular lymphadenopathy.  Skin: Right thigh mass visualized, dressing in place, slight tenderness on palpation.  Extremeties: No peripheral extremity edema noted  Neurologic: Awake, alert, oriented to name, place and time. Difficult word finding.  No focal deficits noted       Data   I personally reviewed no images or EKG's today.  Results for orders placed or performed during the hospital encounter of 06/26/18 (from the past 24 hour(s))   Potassium   Result Value Ref Range    Potassium 3.6 3.4 - 5.3 mmol/L   ABO/Rh type and screen   Result Value Ref Range    Units Ordered 1      ABO A     RH(D) Neg     Antibody Screen Neg     Test Valid Only At          [facility]    Specimen Expires 07/01/2018     Crossmatch Red Blood Cells    Blood component   Result Value Ref Range    Unit Number [id number]     Blood Component Type Red Blood Cells LeukoReduced (Part 2)     Division Number 00     Status of Unit Released to care unit 06/28/2018 1102     Blood Product Code [id number]     Unit Status ISS    Basic metabolic panel   Result Value Ref Range    Sodium 138 133 - 144 mmol/L    Potassium 3.4 3.4 - 5.3 mmol/L    Chloride 110 (H) 94 - 109 mmol/L    Carbon Dioxide 17 (L) 20 - 32 mmol/L    Anion Gap 11 3 - 14 mmol/L    Glucose 109 (H) 70 - 99 mg/dL    Urea Nitrogen 9 7 - 30 mg/dL    Creatinine 0.79 0.66 - 1.25 mg/dL    GFR Estimate >90 >60 mL/min/1.7m2    GFR Estimate If Black >90 >60 mL/min/1.7m2    Calcium 8.6 8.5 - 10.1 mg/dL   CBC with platelets differential   Result Value Ref Range    WBC 2.6 (L) 4.0 - 11.0 10e9/L    RBC Count 2.59 (L) 4.4 - 5.9 10e12/L    Hemoglobin 6.9 (LL) 13.3 - 17.7 g/dL    Hematocrit 21.4 (L) 40.0 - 53.0 %    MCV 83 78 - 100 fl    MCH 26.6 26.5 - 33.0 pg    MCHC 32.2 31.5 - 36.5 g/dL    RDW 18.2 (H) 10.0 - 15.0 %    Platelet Count 200 150 - 450 10e9/L    Diff Method Automated Method     % Neutrophils 90.7 %    % Lymphocytes 6.1 %    % Monocytes 0.8 %    % Eosinophils 0.8 %    % Basophils 0.8 %    % Immature Granulocytes 0.8 %    Nucleated RBCs 0 0 /100    Absolute Neutrophil 2.4 1.6 - 8.3 10e9/L    Absolute Lymphocytes 0.2 (L) 0.8 - 5.3 10e9/L    Absolute Monocytes 0.0 0.0 - 1.3 10e9/L    Absolute Eosinophils 0.0 0.0 - 0.7 10e9/L    Absolute Basophils 0.0 0.0 - 0.2 10e9/L    Abs Immature Granulocytes 0.0 0 - 0.4 10e9/L    Absolute Nucleated RBC 0.0    Phosphorus   Result Value Ref Range    Phosphorus 1.9 (L) 2.5 - 4.5 mg/dL

## 2018-06-28 NOTE — PLAN OF CARE
Problem: Patient Care Overview  Goal: Plan of Care/Patient Progress Review  Outcome: Improving  A:  Patient sleeping till now.  States having pain in leg and nausea and heartburn.  Tramadol , compazine and tums given.  Up to bathroom with stand by assist.  R:  Continue to monitor and treat per plan of care.

## 2018-06-29 VITALS
WEIGHT: 179.5 LBS | RESPIRATION RATE: 18 BRPM | HEART RATE: 90 BPM | TEMPERATURE: 99.9 F | SYSTOLIC BLOOD PRESSURE: 111 MMHG | DIASTOLIC BLOOD PRESSURE: 66 MMHG | BODY MASS INDEX: 25.76 KG/M2 | OXYGEN SATURATION: 97 %

## 2018-06-29 VITALS
TEMPERATURE: 98.2 F | RESPIRATION RATE: 16 BRPM | DIASTOLIC BLOOD PRESSURE: 72 MMHG | OXYGEN SATURATION: 98 % | SYSTOLIC BLOOD PRESSURE: 120 MMHG | HEART RATE: 72 BPM

## 2018-06-29 LAB
ANION GAP SERPL CALCULATED.3IONS-SCNC: 9 MMOL/L (ref 3–14)
ANISOCYTOSIS BLD QL SMEAR: SLIGHT
BASOPHILS # BLD AUTO: 0 10E9/L (ref 0–0.2)
BASOPHILS NFR BLD AUTO: 0 %
BUN SERPL-MCNC: 8 MG/DL (ref 7–30)
BURR CELLS BLD QL SMEAR: ABNORMAL
CALCIUM SERPL-MCNC: 8.6 MG/DL (ref 8.5–10.1)
CHLORIDE SERPL-SCNC: 111 MMOL/L (ref 94–109)
CO2 SERPL-SCNC: 16 MMOL/L (ref 20–32)
CREAT SERPL-MCNC: 0.86 MG/DL (ref 0.66–1.25)
DIFFERENTIAL METHOD BLD: ABNORMAL
EOSINOPHIL # BLD AUTO: 0 10E9/L (ref 0–0.7)
EOSINOPHIL NFR BLD AUTO: 0.9 %
ERYTHROCYTE [DISTWIDTH] IN BLOOD BY AUTOMATED COUNT: 17.8 % (ref 10–15)
GFR SERPL CREATININE-BSD FRML MDRD: >90 ML/MIN/1.7M2
GLUCOSE SERPL-MCNC: 164 MG/DL (ref 70–99)
HCT VFR BLD AUTO: 23.9 % (ref 40–53)
HGB BLD-MCNC: 7.7 G/DL (ref 13.3–17.7)
LYMPHOCYTES # BLD AUTO: 0.2 10E9/L (ref 0.8–5.3)
LYMPHOCYTES NFR BLD AUTO: 13.1 %
MCH RBC QN AUTO: 27.1 PG (ref 26.5–33)
MCHC RBC AUTO-ENTMCNC: 32.2 G/DL (ref 31.5–36.5)
MCV RBC AUTO: 84 FL (ref 78–100)
MONOCYTES # BLD AUTO: 0 10E9/L (ref 0–1.3)
MONOCYTES NFR BLD AUTO: 0 %
NEUTROPHILS # BLD AUTO: 1.3 10E9/L (ref 1.6–8.3)
NEUTROPHILS NFR BLD AUTO: 86 %
PHOSPHATE SERPL-MCNC: 2 MG/DL (ref 2.5–4.5)
PLATELET # BLD AUTO: 193 10E9/L (ref 150–450)
PLATELET # BLD EST: ABNORMAL 10*3/UL
POIKILOCYTOSIS BLD QL SMEAR: ABNORMAL
POTASSIUM SERPL-SCNC: 4 MMOL/L (ref 3.4–5.3)
RBC # BLD AUTO: 2.84 10E12/L (ref 4.4–5.9)
SODIUM SERPL-SCNC: 136 MMOL/L (ref 133–144)
WBC # BLD AUTO: 1.5 10E9/L (ref 4–11)

## 2018-06-29 PROCEDURE — 25000128 H RX IP 250 OP 636: Performed by: NURSE PRACTITIONER

## 2018-06-29 PROCEDURE — 25000132 ZZH RX MED GY IP 250 OP 250 PS 637: Performed by: NURSE PRACTITIONER

## 2018-06-29 PROCEDURE — 99233 SBSQ HOSP IP/OBS HIGH 50: CPT | Performed by: INTERNAL MEDICINE

## 2018-06-29 PROCEDURE — 25000125 ZZHC RX 250: Performed by: INTERNAL MEDICINE

## 2018-06-29 PROCEDURE — 25000128 H RX IP 250 OP 636: Performed by: INTERNAL MEDICINE

## 2018-06-29 PROCEDURE — 84100 ASSAY OF PHOSPHORUS: CPT | Performed by: NURSE PRACTITIONER

## 2018-06-29 PROCEDURE — 85025 COMPLETE CBC W/AUTO DIFF WBC: CPT | Performed by: NURSE PRACTITIONER

## 2018-06-29 PROCEDURE — 36592 COLLECT BLOOD FROM PICC: CPT | Performed by: NURSE PRACTITIONER

## 2018-06-29 PROCEDURE — 25000132 ZZH RX MED GY IP 250 OP 250 PS 637: Performed by: INTERNAL MEDICINE

## 2018-06-29 PROCEDURE — 99239 HOSP IP/OBS DSCHRG MGMT >30: CPT | Performed by: INTERNAL MEDICINE

## 2018-06-29 PROCEDURE — 80048 BASIC METABOLIC PNL TOTAL CA: CPT | Performed by: NURSE PRACTITIONER

## 2018-06-29 RX ORDER — PROCHLORPERAZINE MALEATE 10 MG
10 TABLET ORAL EVERY 6 HOURS PRN
Qty: 30 TABLET | Refills: 0 | Status: SHIPPED | OUTPATIENT
Start: 2018-06-29 | End: 2018-07-10

## 2018-06-29 RX ORDER — ONDANSETRON 8 MG/1
8 TABLET, FILM COATED ORAL EVERY 8 HOURS
Qty: 30 TABLET | Refills: 0 | Status: SHIPPED | OUTPATIENT
Start: 2018-06-29 | End: 2018-07-03

## 2018-06-29 RX ORDER — TRAMADOL HYDROCHLORIDE 50 MG/1
50 TABLET ORAL EVERY 6 HOURS PRN
Qty: 20 TABLET | Refills: 0 | Status: SHIPPED | OUTPATIENT
Start: 2018-06-29 | End: 2018-07-03

## 2018-06-29 RX ORDER — OLANZAPINE 5 MG/1
5 TABLET ORAL AT BEDTIME
Qty: 30 TABLET | Refills: 0 | Status: SHIPPED | OUTPATIENT
Start: 2018-06-29 | End: 2018-09-14

## 2018-06-29 RX ORDER — POTASSIUM CHLORIDE 1500 MG/1
20 TABLET, EXTENDED RELEASE ORAL DAILY
Qty: 60 TABLET | Refills: 0 | Status: SHIPPED | OUTPATIENT
Start: 2018-06-29 | End: 2018-07-03

## 2018-06-29 RX ORDER — OXYCODONE HYDROCHLORIDE 5 MG/1
5 TABLET ORAL EVERY 4 HOURS PRN
Qty: 20 TABLET | Refills: 0 | Status: SHIPPED | OUTPATIENT
Start: 2018-06-29 | End: 2018-07-25

## 2018-06-29 RX ADMIN — PROCHLORPERAZINE EDISYLATE 5 MG: 5 INJECTION INTRAMUSCULAR; INTRAVENOUS at 02:25

## 2018-06-29 RX ADMIN — OMEPRAZOLE 20 MG: 20 CAPSULE, DELAYED RELEASE ORAL at 06:45

## 2018-06-29 RX ADMIN — POLYETHYLENE GLYCOL 3350 17 G: 17 POWDER, FOR SOLUTION ORAL at 15:53

## 2018-06-29 RX ADMIN — OXYCODONE HYDROCHLORIDE 5 MG: 5 TABLET ORAL at 11:20

## 2018-06-29 RX ADMIN — FILGRASTIM 480 MCG: 480 INJECTION, SOLUTION INTRAVENOUS; SUBCUTANEOUS at 13:11

## 2018-06-29 RX ADMIN — SENNOSIDES 8.6 MG: 8.6 TABLET, FILM COATED ORAL at 08:33

## 2018-06-29 RX ADMIN — OXYCODONE HYDROCHLORIDE 5 MG: 5 TABLET ORAL at 15:27

## 2018-06-29 RX ADMIN — ONDANSETRON 8 MG: 2 INJECTION INTRAMUSCULAR; INTRAVENOUS at 15:27

## 2018-06-29 RX ADMIN — ONDANSETRON 8 MG: 2 INJECTION INTRAMUSCULAR; INTRAVENOUS at 06:46

## 2018-06-29 RX ADMIN — POTASSIUM PHOSPHATE, MONOBASIC AND POTASSIUM PHOSPHATE, DIBASIC 15 MMOL: 224; 236 INJECTION, SOLUTION INTRAVENOUS at 11:21

## 2018-06-29 RX ADMIN — HEPARIN 5 ML: 100 SYRINGE at 15:36

## 2018-06-29 RX ADMIN — PROCHLORPERAZINE EDISYLATE 5 MG: 5 INJECTION INTRAMUSCULAR; INTRAVENOUS at 08:32

## 2018-06-29 RX ADMIN — OXYCODONE HYDROCHLORIDE 5 MG: 5 TABLET ORAL at 02:24

## 2018-06-29 RX ADMIN — POTASSIUM PHOSPHATE, MONOBASIC AND POTASSIUM PHOSPHATE, DIBASIC 10 MMOL: 224; 236 INJECTION, SOLUTION INTRAVENOUS at 02:18

## 2018-06-29 RX ADMIN — OXYCODONE HYDROCHLORIDE 5 MG: 5 TABLET ORAL at 06:45

## 2018-06-29 RX ADMIN — OLANZAPINE 5 MG: 5 TABLET, FILM COATED ORAL at 08:32

## 2018-06-29 RX ADMIN — RIVAROXABAN 20 MG: 10 TABLET, FILM COATED ORAL at 16:28

## 2018-06-29 ASSESSMENT — PAIN DESCRIPTION - DESCRIPTORS
DESCRIPTORS: ACHING
DESCRIPTORS: ACHING;CONSTANT

## 2018-06-29 NOTE — DISCHARGE SUMMARY
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  He developed more discomfort in the right thigh and in October 2017 hit his lateral thigh against a truck tailgate causing a lot of pain.  Subsequently there was a fair amount of swelling and stiffness. This eventually led to some imaging showing a cystic mass.  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  At the time of the biopsy more than a liter of fluid was removed and that markedly improved his symptoms of stiffness and pain. He began doxil/ifos 3-23-18.He has had 3 cycles with suggestion of a response after 1 cycle. A new PE asymptomatic was noted and he started rivaroxaban in April. He tolerated cycle 3 a bit better yet.  The wound is being debreided 2x/wk now. He started cycle 4 on 6/20/18 and is due for pump disconnect/neulasta today. The plan is for him to go to surgery following this cycle.     Mr. Tapia was seen in the clinic on the day of admission for acute nausea and vomiting, while he was at the clinic he was noted to have acute hypokalemia with a potassium level of 2.2 and was noted to be severely dehydrated and was having intractable nausea and vomiting.  Once admitted he was given IV fluids, potassium and phosphorus  Replacement.  He was started on scheduled antiemetics with noted improvement.  He was experiencing pain to his right thigh mass he was started on oxycodone noted improvement.  Positive care consult was placed and will  follow up with the patient in the outpatient setting but did have good recommendations as far as symptomatic regimen from the chemotherapy.  He did have a hemoglobin drop down to 6.9 with no evidence of bleeding.  He was transfused and a CT scan of the right thigh was done to ensure that there is no bleeding this was negative.  On 6/29 he was deemed safe for him to go home he was sent with all of his medications and discharge instructions were done.  While he was here he is receiving daily Neupogen he, received 2 days of this and will be sent home with an appointment for follow-up tomorrow receiving Neulasta.  He will have labs and follow-up with a provider as well as possible transfusion needs on Tuesday, July 3..    In seeing Mr. Tapia this morning.  He is feeling much better overall his nausea has almost completely dissipated.  He denies any chest pain, shortness of breath, vomiting, palpitations, constipation, diarrhea.  He has not had a bowel movement since he has been here but he does not feel that he needs to have an aggressive bowel regimen at this time.  He has been walking without assistance to the bathroom without any difficulties.  His pain has gotten better since the use of oxycodone.      Hospital Course   Hiram Tapia was admitted on 6/26/2018.  The following problems were addressed during his hospitalization:    NOTE:  Plan  Neulasta tomorrow at 12:30 PM  Continue scheduled Zofran 8 mg every 8 hours  Continue Zyprexa 5 mg at night  Continue increased hydration  Labs with KIKE visit with possible transfusion on July 3, labs ordered  Continue oral phosphorus and potassium replacement   -If doing Ifos again would recommend doing an inpatient setting as he likely could have had Ifos toxicity.     # Acute hypovolemia secondary to vomiting   # Nausea and Vomiting  # Failure to thrive   Had currently been on cycle 4 of Ifos/Dox when seen in the clinic yesterday for constant nausea/vomiting.He denies any  abdominal pain, hematemesis, hematochezia or melena.   -IV hydration D51/2NS with 20K @ 125ml/hr  -Scheduled 8 mg of Zofran every 8 hours, Compazine 10 mg every 6 hours as needed, added Zyprexa 6/28 5 mg at bedtime-we will discharge on this regimen  -ADAT  - Palliative care consult place 6/28 appreciate recommendations     # Hypokalemia:  # Hypercalcemia:   # Hypoalbuminemia:   # Hypophosphatemia:    Likely due to N/V, poor PO intake. On admit creatinine was 0.96 (baseline 0.7-0.8) and BUN to 22, Admission potassium 2.2 and elevated calcium (corrected to 10.9) Had EKG changes in clinic which does show ST and T wave abnormalities but no U waves.   - Potassium level improved today to 4.0, Phosphorus 2.0-will be discharged on potassium 20 mEq oral daily, Phos-NAK 4 times daily  -Calcium level WNL   -Has started eating better meals in more calories in the last 24 hours better than he had been in weeks.     #Dyspepsia  - Started Omeprazole 20mg daily 6/27-discharged on this      # Mechanical Fall  # Right thigh mass pain, s/p fall  # Word difficulty and Slurred speech query Ifosfamide Toxicity   Patient at home  fell down stairs and hit left side of face. States his legs just gave out. Was noted to have word finding difficulties in clinic, but per wife this has been persistent over the last 3-4 days prior to admission  On admission he continued to Have word finding difficulties during my exam, but was AAO x 3.  Difficult to engage in conversation.  He has improved significantly.I suspect some of this may have been in relation to ifosfamide toxicity given the timing.    -CT of the head done, no intracranial abnormalities.  -Given Mr. Tapia recent fall onto his right thigh mass and continued pain despite tramadol and Tylenol.  Oxycodone 5 mg every 4 hours was started today given the continuing of pain despite this. -He was given a prescription for this on discharge  A CT of the right femur was ordered to ensure that there  is no bleeding or other cause of pain-no evidence of bleeding      # Constipation:  Has not had a bowel movement since 6/25. He is still passing gas. No s/s of obstruction  He is not taking any narcotics at this time that would contribute to constipation. He has been taking miralax every other day and not been taking colace. He does not want to get too aggressive with treating his constipation     -Miralax QD  -Colace BID  -Continue good oral hydration         # Undifferentiated pleomorphic sarcoma  -Now s/p 4 cycles of Doxil/Ifosfamide. Most recent cycle given 6/20. He finished the cycle on 6/27.  -Was due for Neulasta yesterday given and patient status we will start Neupogen today.  If leaving tomorrow could consider outpatient Neulasta.  -Continue twice weekly wound debridement/dressing changes with home nursing.  Consulted [facility] nurse  -Continue outpatient Dr. Betts follow-up, rad onc, and Dr. Johnson follow-up as planned.   -Follow-up in clinic 7/3     # History of PE:  Asymptomatic noted on prior CT. Continue Xarelto 20mg daily.      # Normocytic Anemia likely due to chemotherapy  Likely due to chemo. Peak Hgb was 13.1 before he started chemo. Has trended down.   -Today 7.7, will possibly need a transfusion on next visit Tuesday, July 3.  -monitor CBC. Transfuse if < 7    Lay Rodriguez         Code Status   Full Code    Primary Care Physician   Louisa Fry    Constitutional: Awake, alert and oriented ×3, difficult to engage in conversation  ENT: Oral mucosa pink and moist without visible lesions or petechiae  Respiratory: No increased work of breathing, good air exchange, clear to auscultation bilaterally, no crackles or wheezing.  Cardiovascular: regular rate and rhythm, normal S1 and S2, no S3 or S4, and no murmur noted.  GI: normal bowel sounds, soft, non-distended, non-tender, no masses palpated  Lymph/Hematologic: No cervical lymphadenopathy and no supraclavicular lymphadenopathy.  Skin: Right  thigh mass visualized, dressing in place, slight tenderness on palpation.  Extremeties: No peripheral extremity edema noted  Neurologic: Awake, alert, oriented to name, place and time. Difficult word finding.  No focal deficits noted      Discharge Disposition   Discharged to home  Condition at discharge: Stable    Consultations This Hospital Stay   OCCUPATIONAL THERAPY ADULT IP CONSULT  PHYSICAL THERAPY ADULT IP CONSULT  MEDICATION HISTORY IP PHARMACY CONSULT  WOUND OSTOMY CONTINENCE NURSE  IP CONSULT  PALLIATIVE CARE ADULT IP CONSULT    Discharge Orders     **CBC with platelets FUTURE anytime   Last Lab Result: Hemoglobin (g/dL)      Date                     Value                06/29/2018               7.7 (L)          ----------     **Basic metabolic panel FUTURE anytime     Phosphorus     Home care nursing referral     Reason for your hospital stay   You were admitted for dehydration, acute hypokalemia and nausea and vomiting. You were given electrolyte replacement and IV fluids     Follow Up and recommended labs and tests   F/u For Neulasta on 6/30 @ 1230  F/u in Clinic on Tuesday with Oncology @ 7:15am     Activity   Your activity upon discharge: activity as tolerated     When to contact your care team   { [facility] triage line at [phone] for temp >100.4, uncontrolled nausea/vomiting/diarrhea/constipation, unrelieved pain, bleeding not relieved with pressure, dizziness, chest pain, shortness of breath, loss of consciousness, and any new or concerning symptoms.     Discharge Instructions   Neulasta 6/30/18 @ 12:30  Zofran for nausea every 8 hours, Zyprexa at night time.  Continue taking in increased hydration  Phos Nak packets, and potassium tablets     Full Code     Diet   Follow this diet upon discharge: Regular       Discharge Medications   Current Discharge Medication List      START taking these medications    Details   OLANZapine (ZYPREXA) 5 MG tablet Take 1 tablet (5 mg) by mouth At  Bedtime  Qty: 30 tablet, Refills: 0    Associated Diagnoses: Chemotherapy-induced nausea and vomiting      omeprazole (PRILOSEC) 20 MG CR capsule Take 1 capsule (20 mg) by mouth every morning (before breakfast)  Qty: 30 capsule, Refills: 0    Associated Diagnoses: Chemotherapy-induced nausea and vomiting      ondansetron (ZOFRAN) 8 MG tablet Take 1 tablet (8 mg) by mouth every 8 hours  Qty: 30 tablet, Refills: 0    Associated Diagnoses: Chemotherapy-induced nausea and vomiting      oxyCODONE IR (ROXICODONE) 5 MG tablet Take 1 tablet (5 mg) by mouth every 4 hours as needed for moderate to severe pain  Qty: 20 tablet, Refills: 0    Associated Diagnoses: 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 - Potential duplicate medications found. Please discuss with provider.      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 - Potential duplicate medications found. Please discuss with provider.        Allergies   No Known Allergies  Data   Most Recent 3 CBC's:  Recent Labs   Lab Test  06/29/18   0711  06/28/18   0614  06/27/18   0605   WBC  1.5*  2.6*  3.8*   HGB  7.7*  6.9*  7.3*   MCV  84  83  79   PLT  193  200  304      Most Recent 3 BMP's:  Recent Labs   Lab Test  06/29/18   0711  06/28/18   1621  06/28/18   1527   NA  136  135  Canceled, Test credited   POTASSIUM  4.0  3.8  >10.0*   CHLORIDE  111*  108  Canceled, Test credited   CO2  16*  18*  Canceled, Test credited   BUN  8  8  Canceled, Test credited   CR  0.86  0.87  Canceled, Test credited   ANIONGAP  9  9  Not Calculated   JAYESH  8.6  9.3  Canceled, Test credited   GLC  164*  92  Canceled, Test credited     Most Recent 2 LFT's:  Recent Labs   Lab Test  06/26/18   1535  06/20/18   1226   AST  19  17   ALT  20  24   ALKPHOS  131  133   BILITOTAL  0.6  0.3     Most Recent INR's and Anticoagulation Dosing History:  Anticoagulation Dose History     Recent Dosing and Labs Latest Ref Rng & Units 6/27/2018    INR 0.86 - 1.14 1.66(H)        Most Recent 3 Troponin's:  Recent Labs   Lab Test  06/26/18   1535   TROPI  <0.015     Most Recent Cholesterol Panel:No lab results found.  Most Recent 6 Bacteria Isolates From Any Culture (See EPIC  Reports for Culture Details):  Recent Labs   Lab Test  04/06/18   0738  04/06/18   0735  04/05/18   1330  03/08/18   1316   CULT  Culture negative after 4 weeks  No anaerobes isolated  Light growth  Staphylococcus epidermidis  *  Susceptibility testing done on previous specimen  Culture negative after 4 weeks  No anaerobes isolated  Light growth  Staphylococcus epidermidis  *  Susceptibility testing done on previous specimen  Culture negative after 4 weeks  No anaerobes isolated  Light growth  Staphylococcus epidermidis  These bacteria are part of normal skin [place], but on occasion, may be true pathogens.    Clinical correlation must be applied to interpreting this microbiology result.  This isolate DOES NOT demonstrate inducible clindamycin resistance in vitro. Clindamycin   is susceptible and could be used when indicated, however, erythromycin is resistant and   should not be used.  *  Critical Value/Significant Value called to and read back by  Elizabeth Zurita RN at 1129 on 04.07.18 by mp    No anaerobes isolated  No growth     Most Recent TSH, T4 and A1c Labs:No lab results found.

## 2018-06-29 NOTE — PROGRESS NOTES
"[facility], [place]    Palliative Care Progress Note    Patient: Hiram Tapia  Date of Admission:  6/26/2018    Recommendations:  Pain Symptoms-    Tylenol 1000mg TID     Oxycodone 5mg Q4H PRN for breakthrough pain    Non-pain Symptoms-    Ondansetron 8mg Q8H    Olanzapine 5mg BID    Compazine 10mg Q6H PRN for nausea    Senna 8.6mg BID, for constipation 1-2 tabs BID, but can titrate up to 3-4 BID for goal BM every 1-2 days    Miralax 17g oral packet 1-2 times per day, for constipation    Anticipate needing the scheduled antiemetics for another week and then if nausea remains controlled/resolved consider tapering off the antiemetics as chemotherapy effects wear off. Would first stop the scheduled ondansetron (make it prn), then would reduce the zyprexa to QHS only for a few days and then if nausea controlled would stop the zyprexa too.     Assessment:  Hiram Tapia is a 57 year old male with undifferentiated pleomorphic sarcoma of right thigh and hx PE currently undergoing cycle 4 of Ifos/Dox presents with delayed chemotherapy induced nausea and vomiting with dehydration, n/v x 7 days, FTT and hypokalemia.    Symptoms:  Right thigh pain   Nausea / vomiting - resolving  General fatigue and deconditioning    Coping: At times he feels overwhelmingly sad about his limited mobility and the severe pain he experiences when making short trips up and down the stairs in his home.  His wife is very supportive, though he feels some guilt as she fell and broke her hip not long ago while helping him around the house.  He describes thinking of his radiation and surgery as \"a checklist,\" which helps him categorize and plan for the next steps in his care.      David reports that it is very hard for him to ask for help, whether it's from friends or members of his health care team.  He also notes hesitancy about pain medications as he doesn't want to \"be numb or not feel the pain\" because he's " "concerned he might overexert himself and \"damage his leg further.\"  We discussed how taking basal pain control like scheduled tylenol and oxycodone PRN certainly wouldn't make the pain go away completely, but would take the edge off and hopefully help his mobility.  We also discussed how improving his conditioning through mobility would help his rehab s/p surgery.  He was agreeable to this plan.    These recommendations have been discussed with the patient.  Pain management was discussed with patient and the plan was created in a collaborative fashion.      Misael Lobato, MS4      This patient has been seen and evaluated independently by me. I discussed with the medical student. This note was initiated by the medical student and completed by myself to reflect my full findings, assessment, and plan.        Agnes Champion  Palliative Care   Pager [phone]    [facility] Inpatient Team Consult pager [phone] (M-F 8-4:30)  After-hours Answering Service [phone]   Palliative Clinic:  [phone]    Total time spent was 50 minutes, >50% of time was spent counseling and/or coordination of care regarding symptom management and pain control.    Interval History  Feels his nausea has resolved and he ate a stack of pancakes and apple sauce this morning without any difficulty.  He continues to have significant pain in his right thigh when moving to the bathroom and while lying in bed.  He tries to find \"the sweet spot\" while sitting, such that he avoids pressure on his affected leg.  No CP, SOB, or abdominal pain reported this morning.still no BM. No sedation or other side effects from current medications.     Medications   I have reviewed this patient's medication profile and medications given in the past 24 hours.   Oxycodone 5mg Q4H  Zofran 8mg Q8H  Olanzapine 5mg BID  Compazine prn         Review of Systems   Palliative Symptom Review (0=no symptom/no concern, 1=mild, 2=moderate, 3=severe):  Pain: 2  Fatigue: " 2  Nausea: 0-1  Constipation: 1-2  Diarrhea: 0  Depressive Symptoms: 1-2  Anxiety: 1  Drowsiness: 1  Poor Appetite: 0  Shortness of Breath: 0  Insomnia: 0-1  Delirium: 0  Other: 0  Overall (0 good/no concerns, 3 very poor): 1-2    Physical Exam   Vital Signs: Temp: 99.9  F (37.7  C) Temp src: Oral BP: 111/66 Pulse: 90   Resp: 18 SpO2: 97 % O2 Device: None (Room air)    Weight: 179 lbs 8 oz     Gen: sitting/lying in bed. Appears comfortable but tearful when discussing limited mobility and pain.  HEENT: NCAT. Conjunctiva clear. Sclera anicteric.  CV: RRR, Peripheral perfusion intact.   Resp: unlabored  work of breathing, CTAB  Abd: soft, nt, nd, +BS   Msk: large swelling over right thigh with superficial wound with wound care dressing blue in color without drainage. Right thigh pain with palpation and with movement.  Skin: No jaundice  Ext: warm, well perfused.   Neuro: face symmetric. EOM, vision, hearing grossly intact. Speech fluent. Moves all extremities but limited right LE movement due to pain  Mental status/Psych: alert. Oriented. Asks/answers questions appropriately. Affect is tearful, sad    Data reviewed today:   6/28/2018 CT Femur Right w/ Contrast  IMPRESSION:  1. Overall stable exam from June 18, 2018 without evidence of new  hematoma or contrast extravasation.  2. Redemonstration of right anterior thigh mass with likely large  necrotic component and viable tumor component especially caudally.    Misael Lobato, MS4      This patient has been seen and evaluated independently by me. I discussed with the medical student. This note was initiated by the medical student and completed by myself to reflect my full findings, assessment, and plan.          Agnes Champion  Palliative Care   Pager [phone]    [facility] Inpatient Team Consult pager [phone] (M-F 8-4:30)  After-hours Answering Service [phone]   Palliative Clinic:  [phone]    Total time spent was 50 minutes, >50% of time was spent  counseling and/or coordination of care regarding symptom management and pain control.

## 2018-06-29 NOTE — PLAN OF CARE
Problem: Patient Care Overview  Goal: Plan of Care/Patient Progress Review  Outcome: Adequate for Discharge Date Met: 06/29/18  Pt discharged to home ~ 1630 with wife providing tx to home. Port a cath deaccessed and scheduled compazine and PRN oxycodone given prior to discharge. HC RN contacted pt prior to discharge re: resuming cares, pt will need help with dressing changes at home and supplies sent home with pt. Pt declined home PT/outpatient PT and PT orders were completed 2 days ago. F/u on Monday and Tuesday this week.

## 2018-06-29 NOTE — PLAN OF CARE
Problem: Patient Care Overview  Goal: Plan of Care/Patient Progress Review  Outcome: Improving  Vitals stable, afebrile, RA. Up with SBA to bathroom.Given oxycodone for right leg pain with post relief. Nausea improving post zofran. Fair appetite. Adequate UO, Last BM on Sunday, given senna/miralax, no outcome yet. K+ replaced. Awaiting phos replacement bag from pharmacy. Please continue with replacement. Right hip wound dressing CDI. Tele SR. MIVF infusing at 125cc/hr. Continue with cares.

## 2018-06-29 NOTE — PLAN OF CARE
Problem: Patient Care Overview  Goal: Plan of Care/Patient Progress Review  Outcome: Improving  A:  Patient resting between cares.  Having nausea at 0215.  Compazine given with relief and pain in right leg and oxycodone given with relief.  Having nausea and pain again this am and scheduled Zofran and oxycodone given.  Patient gait steady.  Voiding well and measuring urine. No stool reported. States appetite better last night.  Phos replaced last night.  Hemoglobin this am 7.7.  Waiting on results of potassium and phosphorus.  R:  Continue to monitor and treat per plan of care.

## 2018-06-30 ENCOUNTER — ALLIED HEALTH/NURSE VISIT (OUTPATIENT)
Dept: ONCOLOGY | Facility: CLINIC | Age: 58
DRG: 640 | End: 2018-06-30
Attending: INTERNAL MEDICINE
Payer: COMMERCIAL

## 2018-06-30 VITALS
SYSTOLIC BLOOD PRESSURE: 98 MMHG | TEMPERATURE: 97.7 F | OXYGEN SATURATION: 98 % | DIASTOLIC BLOOD PRESSURE: 63 MMHG | RESPIRATION RATE: 18 BRPM | HEART RATE: 103 BPM

## 2018-06-30 DIAGNOSIS — T45.1X5A CHEMOTHERAPY-INDUCED NAUSEA AND VOMITING: Primary | ICD-10-CM

## 2018-06-30 DIAGNOSIS — E86.0 DEHYDRATION: ICD-10-CM

## 2018-06-30 DIAGNOSIS — C49.9 SARCOMA OF SOFT TISSUE (H): ICD-10-CM

## 2018-06-30 DIAGNOSIS — R11.2 CHEMOTHERAPY-INDUCED NAUSEA AND VOMITING: Primary | ICD-10-CM

## 2018-06-30 PROCEDURE — 25000128 H RX IP 250 OP 636: Mod: ZF | Performed by: INTERNAL MEDICINE

## 2018-06-30 RX ADMIN — PEGFILGRASTIM 6 MG: 6 INJECTION SUBCUTANEOUS at 12:50

## 2018-06-30 NOTE — PATIENT INSTRUCTIONS
Contact Numbers  [facility] Nurse Triage: [phone]  After Hours Nurse Line:  [phone]    Please call the [facility] Triage line if you experience a temperature greater than or equal to 100.5, shaking chills, have uncontrolled nausea, vomiting and/or diarrhea, dizziness, shortness of breath, chest pain, bleeding, unexplained bruising, or if you have any other new/concerning symptoms, questions or concerns.     If it is after hours, weekends, or holidays, please call either the after hours nurse line listed above.     If you are having any concerning symptoms or wish to speak to a provider before your next infusion visit, please call your care coordinator or triage to notify them so we can adequately serve you.     If you need a refill on a narcotic prescription or other medication, please call triage before your infusion appointment.         June 2018 Sunday Monday Tuesday Wednesday Thursday Friday Saturday 1     LAB    9:30 AM   (15 min.)    LAB HOME INFUSION   [facility] Lab 2       3     4     5     6     7     8     9       10     11     12     13     14     15     16       17     18     PET ONCOLOGY WHOLE BODY    8:15 AM   (45 min.)   UUPET1   [facility] [place] PET CT 19     20     [facility]   12:15 PM   (15 min.)   [facility]   [facility] Lab Draw     [facility] RETURN   12:45 PM   (30 min.)   Gaurang Johnson MD   [facility] ONC INFUSION 180    2:00 PM   (180 min.)    ONCOLOGY INFUSION   [facility] 21     22     23       24     25     26     [facility]    2:30 PM   (15 min.)   [facility]   [facility] Lab Draw     [facility] RETURN    2:45 PM   (50 min.)   Ignacio Ramirez PA   [facility] ONC INFUSION 120    4:30 PM   (120 min.)    ONCOLOGY INFUSION   [facility]     Admission    7:10 PM   Harpal Singh,  MD   [address] [facility]   (Discharge: 6/29/2018)     CT HEAD WO    8:20 PM   (15 min.)   UUCT1   [facility] [place], CT 27     28     CT FEMUR RIGHT WWO   11:35 AM   (30 min.)   UUCT1   [facility] [place], CT 29     30     [facility] INJECTION   12:15 PM   (30 min.)   Nurse, [facility] Oncology Injection   [facility]            July 2018 Sunday Monday Tuesday Wednesday Thursday Friday Saturday   1     2     3     [facility] LAB DRAW    7:15 AM   (15 min.)    [facility] LAB DRAW   [facility] Lab Draw     P ONC INFUSION 360    8:00 AM   (360 min.)   UC ONCOLOGY INFUSION   [facility]     UMP RETURN   11:05 AM   (50 min.)   Flora Barrientos PA   [facility]     UMP RETURN    3:15 PM   (15 min.)   Brad Betts MD   [facility] 4     5     6     7       8     9     10     [facility] CONSULT    9:30 AM   (90 min.)   Kimberley Solo MD   [facility] 11     12     13     14       15     16     17     [facility] LAB DRAW    8:00 AM   (15 min.)    [facility] LAB DRAW   [facility] Lab Draw     [facility] RETURN    8:15 AM   (30 min.)   Gaurang Johnson MD   [facility] 18     COLONOSCOPY    1:40 PM   Pito Sinha MD   U GI 19     20     21  Happy Birthday!       22     23     24     25     26     27     28       29     30     31                                      Lab Results:  No results found for this or any previous visit (from the past 12 hour(s)).

## 2018-06-30 NOTE — PROGRESS NOTES
Infusion Nursing Note:  Hiram Tapia presents today for Neulasta injection.    Patient seen by provider today: No    Intravenous Access:  No Intravenous access/labs at this visit.    Treatment Conditions:  Not Applicable.    Note: Patient discharged from [facility] 6/29/18. Denied any issues or concerns. Using Kytril and Zofran for nausea; no vomiting since hospitalization. Trying to push fluids and eat, small frequent meals to help with nausea. Started using Miralax and senna for bowel regimen; no BM since Wed 6/27/18. Continues to have right leg pain; managing with oral pain medication. Patient denied questions about today's neulasta injection.    Post Infusion Assessment:  Patient tolerated injection without incident.    Discharge Plan:   Patient declined prescription refills.  Discharge instructions reviewed with: Patient and Family.  Patient and/or family verbalized understanding of discharge instructions and all questions answered.  Copy of AVS reviewed with patient and/or family.  Patient will return 7/3/18 for next appointment.  Patient discharged in stable condition accompanied by: self and wife.  Departure Mode: Wheelchair.    Bertha Rios RN

## 2018-06-30 NOTE — MR AVS SNAPSHOT
After Visit Summary   6/30/2018    Hiram Tapia    MRN: [id number]           Patient Information     Date Of Birth          1960        Visit Information        Provider Department      6/30/2018 12:30 PM Nurse,  Oncology Injection [facility]        Today's Diagnoses     Chemotherapy-induced nausea and vomiting    -  1    Dehydration        Sarcoma of soft tissue (H)          Care Instructions    Contact Numbers  [facility] Nurse Triage: [phone]  After Hours Nurse Line:  [phone]    Please call the [facility] Triage line if you experience a temperature greater than or equal to 100.5, shaking chills, have uncontrolled nausea, vomiting and/or diarrhea, dizziness, shortness of breath, chest pain, bleeding, unexplained bruising, or if you have any other new/concerning symptoms, questions or concerns.     If it is after hours, weekends, or holidays, please call either the after hours nurse line listed above.     If you are having any concerning symptoms or wish to speak to a provider before your next infusion visit, please call your care coordinator or triage to notify them so we can adequately serve you.     If you need a refill on a narcotic prescription or other medication, please call triage before your infusion appointment.         June 2018 Sunday Monday Tuesday Wednesday Thursday Friday Saturday 1     LAB    9:30 AM   (15 min.)    LAB HOME INFUSION   [facility] Lab 2       3     4     5     6     7     8     9       10     11     12     13     14     15     16       17     18     PET ONCOLOGY WHOLE BODY    8:15 AM   (45 min.)   UUPET1   [facility] [place] PET CT 19     20     [facility]   12:15 PM   (15 min.)   [facility]   [facility] Lab Draw     [facility] RETURN   12:45 PM   (30 min.)   Gaurang Johnson MD   [facility] ONC INFUSION 180    2:00 PM   (180 min.)     ONCOLOGY INFUSION   [facility] 21     22     23       24     25     26     [facility] DRAW    2:30 PM   (15 min.)    [facility] LAB DRAW   [facility] Lab Draw     [facility] RETURN    2:45 PM   (50 min.)   Ignacio Ramirez PA   [facility]     UMP ONC INFUSION 120    4:30 PM   (120 min.)    ONCOLOGY INFUSION   [facility]     Admission    7:10 PM   Harpal Singh MD   [address] [facility]   (Discharge: 6/29/2018)     CT HEAD WO    8:20 PM   (15 min.)   UUCT1   [place], [place] 27     28     CT FEMUR RIGHT WWO   11:35 AM   (30 min.)   UUCT1   [place], [place] 29     30     UMP INJECTION   12:15 PM   (30 min.)   Nurse,  Oncology Injection   [facility]            July 2018 Sunday Monday Tuesday Wednesday Thursday Friday Saturday   1     2     3     [facility] LAB DRAW    7:15 AM   (15 min.)   [facility] DRAW   [facility] Lab Draw     P ONC INFUSION 360    8:00 AM   (360 min.)    ONCOLOGY INFUSION   [facility]     UMP RETURN   11:05 AM   (50 min.)   Flora Barrientos PA   [facility]     UMP RETURN    3:15 PM   (15 min.)   Brad Betts MD   [facility] 4     5     6     7       8     9     10     UMP CONSULT    9:30 AM   (90 min.)   Kimberley Solo MD   [facility] 11     12     13     14       15     16     17     [facility] LAB DRAW    8:00 AM   (15 min.)    [facility] LAB DRAW   [facility] Lab Draw     [facility] RETURN    8:15 AM   (30 min.)   Gaurang Johnson MD   [facility] 18     COLONOSCOPY    1:40 PM   Ptio Sinha MD   U GI 19     20     21  Happy Birthday!       22     23     24     25     26     27     28       29     30     31                                      Lab Results:  No results found for this or any previous visit (from the past 12 hour(s)).             Follow-ups after your visit        Your next 10 appointments already scheduled     Jul 03, 2018  7:15 AM CDT   [facility] Lab Draw with [facility] LAB DRAW   [facility] Lab Draw ([facility])    [address]  [address]  [facility] [address]   [phone]            Jul 03, 2018  8:00 AM CDT   Infusion 360 with UC ONCOLOGY INFUSION, UC 13 ATC   [facility] ([facility])    [address]  [address]  [facility] [address]   [phone]            Jul 03, 2018 11:20 AM CDT   (Arrive by 11:05 AM)   Return Visit with SENAIT Springer   [facility] ([facility])    [address] [place] [address]  [address]  [facility] [address]   [phone]            Jul 03, 2018  3:30 PM CDT   (Arrive by 3:15 PM)   Return Visit with Brad Betts MD   [facility] ([facility])    [address] [place] [address]  4th Floor  [facility] [address]   [phone]            Jul 10, 2018  9:30 AM CDT   CONSULT with Kimberley Solo MD   [facility] ([facility])    [facility]  1st Floor  [address] [address]   [phone]            Jul 17, 2018  8:00 AM CDT   [facility] Lab Draw with [facility] LAB DRAW   [facility] Lab Draw ([facility])    [address]  [address]  [facility] [address]   [phone]            Jul 17, 2018  8:30 AM CDT   (Arrive by 8:15 AM)   Return Visit with Gaurang Johnson MD   [facility] ([facility])    [address] [place] [address]  [address]  [facility] [address]   [phone]            Jul 18, 2018   Procedure with Pito Sinha MD   [facility] [place] Endoscopy ([facility])    [address] [facility] [address] [phone]            The [facility] is located on the corner of [address] and [address] on the [address] [facility]. It is easily accessible from virtually any point in the [place] area, via [address] and [id number].              Who to contact     If you have questions or need follow up information about today's clinic visit or your schedule please contact [facility] directly at [phone].  Normal or non-critical lab and imaging results will be communicated to you by MyChart, letter or phone within 4 business days after the clinic has received the results. If you do not hear from us within 7 days, please contact the clinic through MyChart or phone. If you have a critical or abnormal lab result, we will notify you by phone as soon as possible.  Submit refill requests through [employer] or call your pharmacy and they will forward the refill request to us. Please allow 3 business days for your refill to be completed.          Additional Information About Your Visit        Care EveryWhere ID     This is your Care EveryWhere ID. This could be used by other organizations to access your [place] medical records  [id number]        Your Vitals Were     Pulse Respirations Pulse Oximetry             103 18 98%          Blood Pressure from Last 3 Encounters:   06/30/18 98/63   06/29/18 111/66   06/29/18 120/72    Weight from Last 3 Encounters:   06/27/18 81.4 kg (179 lb 8 oz)   06/20/18 81.2 kg (179 lb)   05/22/18 86.4 kg (190 lb 8 oz)              Today, you had the following     No orders found for display         Today's Medication Changes          These changes are accurate as of 6/30/18 12:47 PM.  If you have any questions, ask your nurse or doctor.               These medicines have changed or have updated prescriptions.        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 So [facility] [phone].    ATENCIÓN: Si habla español, tiene a sheridan disposición servicios gratuitos de asistencia lingüística. Markos al [phone].    We comply with applicable federal civil rights laws and [place] laws. We do not discriminate on the basis of race, color, national origin, age, disability, sex, sexual orientation, or gender identity.            Thank you!     Thank you for choosing [facility]  for your care. Our goal is always to provide you with excellent care. Hearing back from our patients is one way we can continue to improve our services. Please take a few minutes to complete the written survey that you may receive in the mail after your visit with us. Thank you!             Your Updated Medication List - Protect others around you: Learn how to safely use, store and throw away your medicines at www.disposemymeds.org.          This list is accurate as of 6/30/18 12:47 PM.  Always use your most recent med list.                   Brand Name Dispense Instructions for use Diagnosis    granisetron 1 MG tablet    KYTRIL    30 tablet    Take 1 tablet (1 mg) by mouth every 12 hours as needed for nausea    Sarcoma of soft tissue (H)       OLANZapine 5 MG tablet    zyPREXA    30 tablet    Take 1 tablet (5 mg) by mouth At Bedtime    Chemotherapy-induced nausea and vomiting       omeprazole 20 MG CR capsule    priLOSEC    30 capsule    Take 1 capsule (20 mg) by mouth every morning (before breakfast)    Chemotherapy-induced nausea and vomiting        ondansetron 8 MG tablet    ZOFRAN    30 tablet    Take 1 tablet (8 mg) by mouth every 8 hours    Chemotherapy-induced nausea and vomiting       oxyCODONE IR 5 MG tablet    ROXICODONE    20 tablet    Take 1 tablet (5 mg) by mouth every 4 hours as needed for moderate to severe pain    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)       polyethylene glycol Packet    MIRALAX/GLYCOLAX    7 packet    Take 17 g by mouth daily    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)       potassium & sodium phosphates 280-160-250 MG Packet    NEUTRA-PHOS    60 each    Take 1 packet by mouth 4 times daily    Dehydration       potassium chloride SA 20 MEQ CR tablet    K-DUR/KLOR-CON M    60 tablet    Take 1 tablet (20 mEq) by mouth daily    Idiopathic gout, unspecified chronicity, unspecified site       * PROCHLORPERAZINE MALEATE PO      Take 10 mg by mouth every 6 hours as needed        * prochlorperazine 10 MG tablet    COMPAZINE    30 tablet    Take 1 tablet (10 mg) by mouth every 6 hours as needed for nausea or vomiting    Chemotherapy-induced nausea and vomiting       rivaroxaban ANTICOAGULANT 20 MG Tabs tablet    XARELTO    30 tablet    Take 1 tablet (20 mg) by mouth daily (with dinner)    Other pulmonary embolism without acute cor pulmonale, unspecified chronicity (H), 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 Read the directions carefully, and ask your doctor or other care provider to review them with you.

## 2018-07-01 ENCOUNTER — DOCUMENTATION ONLY (OUTPATIENT)
Dept: CARE COORDINATION | Facility: CLINIC | Age: 58
End: 2018-07-01

## 2018-07-01 NOTE — PROGRESS NOTES
Dear Louisa Caballero  Medicare Home Health regulations requires [facility] to provide an initial assessment visit either within 48 hours of the patient's return home, or on the physician ordered Start of Care date.    There will be a delay in the Initial Assessment for Hiram Tapia; MRN [id number]  We anticipate the Start of care will be on 7/2/2018.      Sincerely [facility]  Curtis Francisco  [phone]

## 2018-07-02 ENCOUNTER — HOME INFUSION (PRE-WILLOW HOME INFUSION) (OUTPATIENT)
Dept: PHARMACY | Facility: CLINIC | Age: 58
End: 2018-07-02

## 2018-07-02 DIAGNOSIS — D63.0 ANEMIA IN NEOPLASTIC DISEASE: ICD-10-CM

## 2018-07-03 ENCOUNTER — HOME INFUSION (PRE-WILLOW HOME INFUSION) (OUTPATIENT)
Dept: PHARMACY | Facility: CLINIC | Age: 58
End: 2018-07-03

## 2018-07-03 ENCOUNTER — ONCOLOGY VISIT (OUTPATIENT)
Dept: ONCOLOGY | Facility: CLINIC | Age: 58
End: 2018-07-03
Attending: PHYSICIAN ASSISTANT
Payer: COMMERCIAL

## 2018-07-03 ENCOUNTER — INFUSION THERAPY VISIT (OUTPATIENT)
Dept: ONCOLOGY | Facility: CLINIC | Age: 58
End: 2018-07-03
Attending: INTERNAL MEDICINE
Payer: COMMERCIAL

## 2018-07-03 ENCOUNTER — CARE COORDINATION (OUTPATIENT)
Dept: ONCOLOGY | Facility: CLINIC | Age: 58
End: 2018-07-03

## 2018-07-03 ENCOUNTER — OFFICE VISIT (OUTPATIENT)
Dept: ORTHOPEDICS | Facility: CLINIC | Age: 58
End: 2018-07-03
Payer: COMMERCIAL

## 2018-07-03 VITALS
RESPIRATION RATE: 16 BRPM | BODY MASS INDEX: 24.41 KG/M2 | HEART RATE: 139 BPM | OXYGEN SATURATION: 97 % | DIASTOLIC BLOOD PRESSURE: 57 MMHG | TEMPERATURE: 99.7 F | WEIGHT: 170.1 LBS | SYSTOLIC BLOOD PRESSURE: 85 MMHG

## 2018-07-03 VITALS
TEMPERATURE: 97.9 F | RESPIRATION RATE: 16 BRPM | OXYGEN SATURATION: 98 % | HEART RATE: 82 BPM | DIASTOLIC BLOOD PRESSURE: 79 MMHG | SYSTOLIC BLOOD PRESSURE: 120 MMHG

## 2018-07-03 DIAGNOSIS — M79.604 PAIN OF RIGHT LOWER EXTREMITY: ICD-10-CM

## 2018-07-03 DIAGNOSIS — C49.9 SARCOMA (H): ICD-10-CM

## 2018-07-03 DIAGNOSIS — C49.9 SARCOMA OF SOFT TISSUE (H): Primary | ICD-10-CM

## 2018-07-03 DIAGNOSIS — E87.6 HYPOKALEMIA: Primary | ICD-10-CM

## 2018-07-03 DIAGNOSIS — R11.2 CHEMOTHERAPY-INDUCED NAUSEA AND VOMITING: ICD-10-CM

## 2018-07-03 DIAGNOSIS — D63.0 ANEMIA IN NEOPLASTIC DISEASE: ICD-10-CM

## 2018-07-03 DIAGNOSIS — Z87.891 PERSONAL HISTORY OF TOBACCO USE, PRESENTING HAZARDS TO HEALTH: ICD-10-CM

## 2018-07-03 DIAGNOSIS — T81.328D: ICD-10-CM

## 2018-07-03 DIAGNOSIS — T45.1X5A CHEMOTHERAPY-INDUCED NAUSEA AND VOMITING: ICD-10-CM

## 2018-07-03 DIAGNOSIS — E86.0 DEHYDRATION: ICD-10-CM

## 2018-07-03 DIAGNOSIS — E86.0 DEHYDRATION: Primary | ICD-10-CM

## 2018-07-03 DIAGNOSIS — E83.39 HYPOPHOSPHATEMIA: ICD-10-CM

## 2018-07-03 DIAGNOSIS — C49.21 SOFT TISSUE SARCOMA OF RIGHT THIGH (H): ICD-10-CM

## 2018-07-03 DIAGNOSIS — C49.9 SARCOMA OF SOFT TISSUE (H): ICD-10-CM

## 2018-07-03 DIAGNOSIS — C49.21 SOFT TISSUE SARCOMA OF RIGHT THIGH (H): Primary | ICD-10-CM

## 2018-07-03 LAB
ABO + RH BLD: NORMAL
ABO + RH BLD: NORMAL
ANION GAP SERPL CALCULATED.3IONS-SCNC: 8 MMOL/L (ref 3–14)
BLD GP AB SCN SERPL QL: NORMAL
BLD PROD TYP BPU: NORMAL
BLD PROD TYP BPU: NORMAL
BLD UNIT ID BPU: 0
BLOOD BANK CMNT PATIENT-IMP: NORMAL
BLOOD PRODUCT CODE: NORMAL
BPU ID: NORMAL
BUN SERPL-MCNC: 9 MG/DL (ref 7–30)
CALCIUM SERPL-MCNC: 9.7 MG/DL (ref 8.5–10.1)
CHLORIDE SERPL-SCNC: 100 MMOL/L (ref 94–109)
CO2 SERPL-SCNC: 25 MMOL/L (ref 20–32)
CREAT SERPL-MCNC: 0.93 MG/DL (ref 0.66–1.25)
DIFFERENTIAL METHOD BLD: ABNORMAL
ERYTHROCYTE [DISTWIDTH] IN BLOOD BY AUTOMATED COUNT: 16.7 % (ref 10–15)
GFR SERPL CREATININE-BSD FRML MDRD: 83 ML/MIN/1.7M2
GLUCOSE SERPL-MCNC: 129 MG/DL (ref 70–99)
HCT VFR BLD AUTO: 24.5 % (ref 40–53)
HGB BLD-MCNC: 8.1 G/DL (ref 13.3–17.7)
MAGNESIUM SERPL-MCNC: 2.4 MG/DL (ref 1.6–2.3)
MCH RBC QN AUTO: 27.2 PG (ref 26.5–33)
MCHC RBC AUTO-ENTMCNC: 33.1 G/DL (ref 31.5–36.5)
MCV RBC AUTO: 82 FL (ref 78–100)
NUM BPU REQUESTED: 1
PHOSPHATE SERPL-MCNC: 1.5 MG/DL (ref 2.5–4.5)
PLATELET # BLD AUTO: 125 10E9/L (ref 150–450)
POTASSIUM SERPL-SCNC: 2.8 MMOL/L (ref 3.4–5.3)
RBC # BLD AUTO: 2.98 10E12/L (ref 4.4–5.9)
SODIUM SERPL-SCNC: 133 MMOL/L (ref 133–144)
SPECIMEN EXP DATE BLD: NORMAL
TRANSFUSION STATUS PATIENT QL: NORMAL
TRANSFUSION STATUS PATIENT QL: NORMAL
WBC # BLD AUTO: 0.3 10E9/L (ref 4–11)

## 2018-07-03 PROCEDURE — 25000128 H RX IP 250 OP 636: Mod: ZF | Performed by: PHYSICIAN ASSISTANT

## 2018-07-03 PROCEDURE — 96366 THER/PROPH/DIAG IV INF ADDON: CPT

## 2018-07-03 PROCEDURE — 84100 ASSAY OF PHOSPHORUS: CPT | Performed by: NURSE PRACTITIONER

## 2018-07-03 PROCEDURE — 86900 BLOOD TYPING SEROLOGIC ABO: CPT | Performed by: INTERNAL MEDICINE

## 2018-07-03 PROCEDURE — 80048 BASIC METABOLIC PNL TOTAL CA: CPT | Performed by: NURSE PRACTITIONER

## 2018-07-03 PROCEDURE — 86901 BLOOD TYPING SEROLOGIC RH(D): CPT | Performed by: INTERNAL MEDICINE

## 2018-07-03 PROCEDURE — P9016 RBC LEUKOCYTES REDUCED: HCPCS | Performed by: INTERNAL MEDICINE

## 2018-07-03 PROCEDURE — 86923 COMPATIBILITY TEST ELECTRIC: CPT | Performed by: INTERNAL MEDICINE

## 2018-07-03 PROCEDURE — 40000556 ZZH STATISTIC PERIPHERAL IV START W US GUIDANCE: Mod: ZF

## 2018-07-03 PROCEDURE — 83735 ASSAY OF MAGNESIUM: CPT | Performed by: NURSE PRACTITIONER

## 2018-07-03 PROCEDURE — 96367 TX/PROPH/DG ADDL SEQ IV INF: CPT

## 2018-07-03 PROCEDURE — G0463 HOSPITAL OUTPT CLINIC VISIT: HCPCS | Mod: 25

## 2018-07-03 PROCEDURE — 85025 COMPLETE CBC W/AUTO DIFF WBC: CPT | Performed by: INTERNAL MEDICINE

## 2018-07-03 PROCEDURE — 96365 THER/PROPH/DIAG IV INF INIT: CPT

## 2018-07-03 PROCEDURE — 99214 OFFICE O/P EST MOD 30 MIN: CPT | Mod: ZP | Performed by: PHYSICIAN ASSISTANT

## 2018-07-03 PROCEDURE — 25000128 H RX IP 250 OP 636: Mod: ZF | Performed by: INTERNAL MEDICINE

## 2018-07-03 PROCEDURE — 25000125 ZZHC RX 250: Mod: ZF | Performed by: PHYSICIAN ASSISTANT

## 2018-07-03 PROCEDURE — 85027 COMPLETE CBC AUTOMATED: CPT

## 2018-07-03 PROCEDURE — 86850 RBC ANTIBODY SCREEN: CPT | Performed by: INTERNAL MEDICINE

## 2018-07-03 PROCEDURE — 36430 TRANSFUSION BLD/BLD COMPNT: CPT

## 2018-07-03 RX ORDER — ONDANSETRON 8 MG/1
8 TABLET, FILM COATED ORAL EVERY 8 HOURS
Qty: 60 TABLET | Refills: 0 | Status: SHIPPED | OUTPATIENT
Start: 2018-07-03 | End: 2018-07-10

## 2018-07-03 RX ORDER — POTASSIUM CHLORIDE 750 MG/1
20 TABLET, EXTENDED RELEASE ORAL 2 TIMES DAILY
Qty: 90 TABLET | Refills: 1 | Status: SHIPPED | OUTPATIENT
Start: 2018-07-03 | End: 2018-07-10

## 2018-07-03 RX ORDER — LORAZEPAM 1 MG/1
0.5 TABLET ORAL EVERY 8 HOURS PRN
Qty: 30 TABLET | Refills: 0 | Status: SHIPPED | OUTPATIENT
Start: 2018-07-03 | End: 2018-07-10

## 2018-07-03 RX ORDER — HEPARIN SODIUM (PORCINE) LOCK FLUSH IV SOLN 100 UNIT/ML 100 UNIT/ML
5 SOLUTION INTRAVENOUS ONCE
Status: COMPLETED | OUTPATIENT
Start: 2018-07-03 | End: 2018-07-03

## 2018-07-03 RX ORDER — HEPARIN SODIUM (PORCINE) LOCK FLUSH IV SOLN 100 UNIT/ML 100 UNIT/ML
500 SOLUTION INTRAVENOUS ONCE
Status: COMPLETED | OUTPATIENT
Start: 2018-07-03 | End: 2018-07-03

## 2018-07-03 RX ADMIN — POTASSIUM PHOSPHATE, MONOBASIC AND POTASSIUM PHOSPHATE, DIBASIC 15 MMOL: 224; 236 INJECTION, SOLUTION INTRAVENOUS at 08:55

## 2018-07-03 RX ADMIN — SODIUM CHLORIDE 1500 ML: 9 INJECTION, SOLUTION INTRAVENOUS at 07:32

## 2018-07-03 RX ADMIN — SODIUM CHLORIDE, PRESERVATIVE FREE 5 ML: 5 INJECTION INTRAVENOUS at 07:16

## 2018-07-03 RX ADMIN — DEXAMETHASONE SODIUM PHOSPHATE: 10 INJECTION, SOLUTION INTRAMUSCULAR; INTRAVENOUS at 07:55

## 2018-07-03 RX ADMIN — SODIUM CHLORIDE: 900 INJECTION, SOLUTION INTRAVENOUS at 12:02

## 2018-07-03 RX ADMIN — SODIUM CHLORIDE, PRESERVATIVE FREE 500 UNITS: 5 INJECTION INTRAVENOUS at 14:11

## 2018-07-03 ASSESSMENT — PAIN SCALES - GENERAL: PAINLEVEL: MILD PAIN (3)

## 2018-07-03 NOTE — PROGRESS NOTES
Oncology/Hematology Visit Note  Jul 3, 2018    Reason for Visit: follow up of     History of Present Illness: Hiram Tapia is a 57 year old male with  with UPS of the right thigh. He has been on treatment with Doxil plus Ifosfamide since March 2018. Imaging after 3 cycles revealed positive response to treatment though he was noted to have incidental PE and was started on Xarelto. He received cycle 4 on 6/20/18. Admitted 6/26-6/29 for nausea/vomiting, hypokalemia. Please see Dr. Johnson note for full oncologic history. The plan is for him to go to surgery following this cycle.He comes in today for hospital discharge follow up.    Interval History:  Hiram is in infusion today with his wife. He has been taking scheduled Zofran and zyprexa at bedtime, however he has still been having some vomiting. He has had copious sputum and nasal discharge that causes his some nausea. He denies cough--he thinks this is all saliva that he is spitting out, but the taste is salty and he admits it could be post-nasal drip. He has been taking scheduled zofran but takes it after he vomits. Wife states he has been taking zyprexa, although Hiram is not able to confirm. Throat has been sore since chemo, making eating and drinking difficult due to pain. Only eating cereal bars, 4 max/day. Drinking about 64 oz/day of water or gatorade. Having constipation that is managed with miralax.     He had a new dressing on RLE applied today by [facility] in infusion--last one changed 6/28. His leg is now sore. Pain was a little worse after discharge. Taking oxycodone and tylenol. Not using much oxycodone prior to today but car ride and dressing change makes pain worse.    He still has some visual hallucinations of lights and colors. He experiences just flashes now, while in the hospital was constant. No headaches, neuropathy, confusion, slurred speech, vertigo. Only lightheaded if he stands up too quickly.     Got neulasta on 6/30. Felt a little warm on  Monday, but did not check his temp. Voiding without difficulty. On xarelto but denies any bleeding/bruising.     Review of Systems:  Patient denies any of the following except if noted above: fevers, chills. Feels weak, no falls. No vision or hearing changes, chest pain, dyspnea, urinary concerns,  rashes or skin lesions. Chronic swelling in RLE 2/2 sarcoma.    Current Outpatient Prescriptions   Medication Sig Dispense Refill     granisetron (KYTRIL) 1 MG tablet Take 1 tablet (1 mg) by mouth every 12 hours as needed for nausea 30 tablet 3     OLANZapine (ZYPREXA) 5 MG tablet Take 1 tablet (5 mg) by mouth At Bedtime 30 tablet 0     omeprazole (PRILOSEC) 20 MG CR capsule Take 1 capsule (20 mg) by mouth every morning (before breakfast) 30 capsule 0     ondansetron (ZOFRAN) 8 MG tablet Take 1 tablet (8 mg) by mouth every 8 hours 30 tablet 0     oxyCODONE IR (ROXICODONE) 5 MG tablet Take 1 tablet (5 mg) by mouth every 4 hours as needed for moderate to severe pain 20 tablet 0     polyethylene glycol (MIRALAX/GLYCOLAX) Packet Take 17 g by mouth daily (Patient taking differently: Take 1 packet by mouth every 48 hours ) 7 packet 1     potassium & sodium phosphates (NEUTRA-PHOS) 280-160-250 MG Packet Take 1 packet by mouth 4 times daily 60 each 0     potassium chloride SA (K-DUR/KLOR-CON M) 20 MEQ CR tablet Take 1 tablet (20 mEq) by mouth daily 60 tablet 0     prochlorperazine (COMPAZINE) 10 MG tablet Take 1 tablet (10 mg) by mouth every 6 hours as needed for nausea or vomiting 30 tablet 0     PROCHLORPERAZINE MALEATE PO Take 10 mg by mouth every 6 hours as needed        rivaroxaban ANTICOAGULANT (XARELTO) 20 MG TABS tablet Take 1 tablet (20 mg) by mouth daily (with dinner) 30 tablet 3     traMADol (ULTRAM) 50 MG tablet Take 1 tablet (50 mg) by mouth every 6 hours as needed for moderate pain 20 tablet 0       Physical Examination:  General: The patient is a pleasant male in no acute distress.  There were no vitals taken for  this visit.  Wt Readings from Last 10 Encounters:   06/27/18 81.4 kg (179 lb 8 oz)   06/20/18 81.2 kg (179 lb)   05/22/18 86.4 kg (190 lb 8 oz)   05/01/18 91.9 kg (202 lb 8 oz)   04/24/18 89.8 kg (198 lb)   04/21/18 94.3 kg (208 lb)   03/29/18 97.3 kg (214 lb 9.6 oz)   03/28/18 95.6 kg (210 lb 12.2 oz)   03/27/18 96.8 kg (213 lb 4.8 oz)   03/23/18 96.6 kg (213 lb)     HEENT: EOMI, PERRL. Sclerae are anicteric. Oral mucosa is pink and moist with no lesions or thrush.   Lymph: Neck is supple with no lymphadenopathy in the cervical or supraclavicular areas.   Heart: Regular rate and rhythm.   Lungs: Clear to auscultation bilaterally.   Abdomen: Bowel sounds present, soft, nontender, nondistended  Extremities: RLE > left. No pitting edema noted bilaterally.   Neuro: Cranial nerves II through XII are grossly intact. Speech clear. Grossly non-focal  Skin: Right thigh with tegaderm dressing intact. No skin erythema, induration. Wound packed with blue sponge. No rashes, petechiae, or bruising noted on exposed skin.    Laboratory Data:  Results for RUT CHAO (MRN [id number]) as of 7/3/2018 14:15   7/3/2018 07:23   Sodium 133   Potassium 2.8 (L)   Chloride 100   Carbon Dioxide 25   Urea Nitrogen 9   Creatinine 0.93   GFR Estimate 83   GFR Estimate If Black >90   Calcium 9.7   Anion Gap 8   Magnesium 2.4 (H)   Phosphorus 1.5 (L)   Glucose 129 (H)   WBC 0.3 (LL)   Hemoglobin 8.1 (L)   Hematocrit 24.5 (L)   Platelet Count 125 (L)   RBC Count 2.98 (L)   MCV 82   MCH 27.2   MCHC 33.1   RDW 16.7 (H)   Diff Method WBC <0.5, Diff no...       Assessment and Plan:    Undifferentiated pleomorphic sarcoma  -Now s/p 4 cycles of Doxil/Ifosfamide. Most recent cycle given 6/20. He finished the cycle on 6/27.  -Received neulasta on 6/30  -Continue twice weekly wound debridement/dressing changes with home nursing.    -Continue outpatient Dr. Betts follow-up, rad onc, and Dr. Johnson follow-up as planned.   -Follow-up in clinic on  7/10    Nausea/vomiting  2/2 chemotherapy  --Continue scheduled Zofran 8mg TID and Zyprexa 5mg at bedtime. He states compazine makes him vomit. Will try ativan prn  --IVF and Emend + Dex given today. Offered infusion on 7/6 but patient reluctant to come in. Will check labs via home care on 7/5    Mucositis:   2/2 chemo. Affecting throat primarily  --Sent rx for MMW prn. Take oxycodone prior to meals    FEN:   Encouraged protein shakes, carnation instant breakfast, ensure in addition to whatever po intake he can manage  --Hypokalemia: re-ordered KCl as 10mEq tablets as may be smaller. Increased to 20mEq BID  --Hypophosphatemia: does not like taste of phos-Nak. Changed to neutraphos 2 tabs BID        Dyspepsia  -Continue Omeprazole 20mg daily 6/27-discharged on this      Right thigh wound s/p I&D  --Follow up with Dr. Betts.  -Home care to do dressing changes twice weekly      Constipation:  -Miralax QD  -Colace BID    History of PE:  Asymptomatic noted on prior CT. Continue Xarelto 20mg daily.      Pancytopenia:  2/2 to chemo.   --WBC 0.3. Given neulasta on 6/30  --Hgb 8.1. Will give 1 unit today as no infusion availability later this week  --Platelet 125k    Flora Barrientos PA-C  [facility]  [address] [place] [address] [phone]

## 2018-07-03 NOTE — LETTER
7/3/2018      RE: Hiram Tapia  [address]  [facility] [address]       Oncology/Hematology Visit Note  Jul 3, 2018    Reason for Visit: follow up of     History of Present Illness: Hiram Tapia is a 57 year old male with  with UPS of the right thigh. He has been on treatment with Doxil plus Ifosfamide since March 2018. Imaging after 3 cycles revealed positive response to treatment though he was noted to have incidental PE and was started on Xarelto. He received cycle 4 on 6/20/18. Admitted 6/26-6/29 for nausea/vomiting, hypokalemia. Please see Dr. Johnson note for full oncologic history. The plan is for him to go to surgery following this cycle.He comes in today for hospital discharge follow up.    Interval History:  Hiram is in infusion today with his wife. He has been taking scheduled Zofran and zyprexa at bedtime, however he has still been having some vomiting. He has had copious sputum and nasal discharge that causes his some nausea. He denies cough--he thinks this is all saliva that he is spitting out, but the taste is salty and he admits it could be post-nasal drip. He has been taking scheduled zofran but takes it after he vomits. Wife states he has been taking zyprexa, although Hiram is not able to confirm. Throat has been sore since chemo, making eating and drinking difficult due to pain. Only eating cereal bars, 4 max/day. Drinking about 64 oz/day of water or gatorade. Having constipation that is managed with miralax.     He had a new dressing on RLE applied today by [facility] in infusion--last one changed 6/28. His leg is now sore. Pain was a little worse after discharge. Taking oxycodone and tylenol. Not using much oxycodone prior to today but car ride and dressing change makes pain worse.    He still has some visual hallucinations of lights and colors. He experiences just flashes now, while in the hospital was constant. No headaches, neuropathy, confusion, slurred speech, vertigo. Only  lightheaded if he stands up too quickly.     Got neulasta on 6/30. Felt a little warm on Monday, but did not check his temp. Voiding without difficulty. On xarelto but denies any bleeding/bruising.     Review of Systems:  Patient denies any of the following except if noted above: fevers, chills. Feels weak, no falls. No vision or hearing changes, chest pain, dyspnea, urinary concerns,  rashes or skin lesions. Chronic swelling in RLE 2/2 sarcoma.    Current Outpatient Prescriptions   Medication Sig Dispense Refill     granisetron (KYTRIL) 1 MG tablet Take 1 tablet (1 mg) by mouth every 12 hours as needed for nausea 30 tablet 3     OLANZapine (ZYPREXA) 5 MG tablet Take 1 tablet (5 mg) by mouth At Bedtime 30 tablet 0     omeprazole (PRILOSEC) 20 MG CR capsule Take 1 capsule (20 mg) by mouth every morning (before breakfast) 30 capsule 0     ondansetron (ZOFRAN) 8 MG tablet Take 1 tablet (8 mg) by mouth every 8 hours 30 tablet 0     oxyCODONE IR (ROXICODONE) 5 MG tablet Take 1 tablet (5 mg) by mouth every 4 hours as needed for moderate to severe pain 20 tablet 0     polyethylene glycol (MIRALAX/GLYCOLAX) Packet Take 17 g by mouth daily (Patient taking differently: Take 1 packet by mouth every 48 hours ) 7 packet 1     potassium & sodium phosphates (NEUTRA-PHOS) 280-160-250 MG Packet Take 1 packet by mouth 4 times daily 60 each 0     potassium chloride SA (K-DUR/KLOR-CON M) 20 MEQ CR tablet Take 1 tablet (20 mEq) by mouth daily 60 tablet 0     prochlorperazine (COMPAZINE) 10 MG tablet Take 1 tablet (10 mg) by mouth every 6 hours as needed for nausea or vomiting 30 tablet 0     PROCHLORPERAZINE MALEATE PO Take 10 mg by mouth every 6 hours as needed        rivaroxaban ANTICOAGULANT (XARELTO) 20 MG TABS tablet Take 1 tablet (20 mg) by mouth daily (with dinner) 30 tablet 3     traMADol (ULTRAM) 50 MG tablet Take 1 tablet (50 mg) by mouth every 6 hours as needed for moderate pain 20 tablet 0       Physical  Examination:  General: The patient is a pleasant male in no acute distress.  There were no vitals taken for this visit.  Wt Readings from Last 10 Encounters:   06/27/18 81.4 kg (179 lb 8 oz)   06/20/18 81.2 kg (179 lb)   05/22/18 86.4 kg (190 lb 8 oz)   05/01/18 91.9 kg (202 lb 8 oz)   04/24/18 89.8 kg (198 lb)   04/21/18 94.3 kg (208 lb)   03/29/18 97.3 kg (214 lb 9.6 oz)   03/28/18 95.6 kg (210 lb 12.2 oz)   03/27/18 96.8 kg (213 lb 4.8 oz)   03/23/18 96.6 kg (213 lb)     HEENT: EOMI, PERRL. Sclerae are anicteric. Oral mucosa is pink and moist with no lesions or thrush.   Lymph: Neck is supple with no lymphadenopathy in the cervical or supraclavicular areas.   Heart: Regular rate and rhythm.   Lungs: Clear to auscultation bilaterally.   Abdomen: Bowel sounds present, soft, nontender, nondistended  Extremities: RLE > left. No pitting edema noted bilaterally.   Neuro: Cranial nerves II through XII are grossly intact. Speech clear. Grossly non-focal  Skin: Right thigh with tegaderm dressing intact. No skin erythema, induration. Wound packed with blue sponge. No rashes, petechiae, or bruising noted on exposed skin.    Laboratory Data:  Results for RUT CHAO (MRN [id number]) as of 7/3/2018 14:15   7/3/2018 07:23   Sodium 133   Potassium 2.8 (L)   Chloride 100   Carbon Dioxide 25   Urea Nitrogen 9   Creatinine 0.93   GFR Estimate 83   GFR Estimate If Black >90   Calcium 9.7   Anion Gap 8   Magnesium 2.4 (H)   Phosphorus 1.5 (L)   Glucose 129 (H)   WBC 0.3 (LL)   Hemoglobin 8.1 (L)   Hematocrit 24.5 (L)   Platelet Count 125 (L)   RBC Count 2.98 (L)   MCV 82   MCH 27.2   MCHC 33.1   RDW 16.7 (H)   Diff Method WBC <0.5, Diff no...       Assessment and Plan:    Undifferentiated pleomorphic sarcoma  -Now s/p 4 cycles of Doxil/Ifosfamide. Most recent cycle given 6/20. He finished the cycle on 6/27.  -Received neulasta on 6/30  -Continue twice weekly wound debridement/dressing changes with home nursing.    -Continue  outpatient Dr. Betts follow-up, rad onc, and Dr. Johnson follow-up as planned.   -Follow-up in clinic on 7/10    Nausea/vomiting  2/2 chemotherapy  --Continue scheduled Zofran 8mg TID and Zyprexa 5mg at bedtime. He states compazine makes him vomit. Will try ativan prn  --IVF and Emend + Dex given today. Offered infusion on 7/6 but patient reluctant to come in. Will check labs via home care on 7/5    Mucositis:   2/2 chemo. Affecting throat primarily  --Sent rx for MMW prn. Take oxycodone prior to meals    FEN:   Encouraged protein shakes, carnation instant breakfast, ensure in addition to whatever po intake he can manage  --Hypokalemia: re-ordered KCl as 10mEq tablets as may be smaller. Increased to 20mEq BID  --Hypophosphatemia: does not like taste of phos-Nak. Changed to neutraphos 2 tabs BID        Dyspepsia  -Continue Omeprazole 20mg daily 6/27-discharged on this      Right thigh wound s/p I&D  --Follow up with Dr. Betts.  -Home care to do dressing changes twice weekly      Constipation:  -Miralax QD  -Colace BID    History of PE:  Asymptomatic noted on prior CT. Continue Xarelto 20mg daily.      Pancytopenia:  2/2 to chemo.   --WBC 0.3. Given neulasta on 6/30  --Hgb 8.1. Will give 1 unit today as no infusion availability later this week  --Platelet 125k    Flora Barrientos PA-C  [facility]  [address] [place], [place] [id number] [phone]

## 2018-07-03 NOTE — NURSING NOTE
Chief Complaint   Patient presents with     Wound Check     Pt. states that the wound care nurse removed the dressing today along with the tegaderm and the wound looks worse according to the patient. Irrigation And Debridement Right Thigh Wound. with Wound VAC Exchange DOS: 4/9/18       57 year old  1960           [place], [place] - [address] [place] [address] [phone]    Allergies   Allergen Reactions     Tegaderm Ag Mesh [Silver] Dermatitis and Blisters     Skin irritation, blisters, and drainage.      Current Outpatient Prescriptions   Medication     granisetron (KYTRIL) 1 MG tablet     LORazepam (ATIVAN) 1 MG tablet     magic mouthwash suspension (diphenhydrAMINE, lidocaine, aluminum-magnesium & simethicone)     OLANZapine (ZYPREXA) 5 MG tablet     omeprazole (PRILOSEC) 20 MG CR capsule     ondansetron (ZOFRAN) 8 MG tablet     oxyCODONE IR (ROXICODONE) 5 MG tablet     phosphorus tablet 250 mg (PHOSPHA 250 NEUTRAL) 250 MG per tablet     polyethylene glycol (MIRALAX/GLYCOLAX) Packet     potassium chloride SA (K-DUR/KLOR-CON M) 10 MEQ CR tablet     prochlorperazine (COMPAZINE) 10 MG tablet     PROCHLORPERAZINE MALEATE PO     rivaroxaban ANTICOAGULANT (XARELTO) 20 MG TABS tablet     No current facility-administered medications for this visit.      Facility-Administered Medications Ordered in Other Visits   Medication     0.9% sodium chloride BOLUS     0.9% sodium chloride BOLUS

## 2018-07-03 NOTE — PATIENT INSTRUCTIONS
[facility] Main Line: [phone]    Call triage nurse with chills and/or temperature greater than or equal to 100.4, uncontrolled nausea/vomiting, diarrhea, constipation, dizziness, shortness of breath, chest pain, bleeding, unexplained bruising, or any new/concerning symptoms, questions/concerns.   If you are having any concerning symptoms or wish to speak to a provider before your next infusion visit, please call your care coordinator or triage to notify them so we can adequately serve you.   Triage Nurse Line: [phone]    If after hours, weekends, or holidays [phone]               July 2018 Sunday Monday Tuesday Wednesday Thursday Friday Saturday   1     2     3     [facility] D[facility]    7:15 AM   (15 min.)    [facility]   [facility] Draw     [facility] ONC INFUSION 360    8:00 AM   (360 min.)   [facility]   [facility]     [facility] NEW WOUND NURSE    9:30 AM   (60 min.)   Familia Stauffer, RN   [facility] Wound Ostomy     [facility] RETURN   11:05 AM   (50 min.)   Flora Barrientos PA   [facility]     [facility] RETURN    3:15 PM   (15 min.)   Brad Betts MD   [facility] 4     5     6     [facility] D[facility]    6:45 AM   (15 min.)    [facility]   [facility] Draw     [facility] ONC INFUSION 120    7:00 AM   (120 min.)   [facility]   [facility] 7       8     9     10     [facility] CONSULT    9:30 AM   (90 min.)   Kimberley Solo MD   [facility] 11     12     13     14       15     16     17     [facility]    8:00 AM   (15 min.)   [facility]     [facility] RETURN    8:15 AM   (30 min.)   Gaurang Johnson MD   [facility] 18     COLONOSCOPY    1:40 PM   Pito Sinha MD    GI 19     20     21  Happy Birthday!       22     23     24     25     26     27     28       29     30     31                                     August 2018 Sunday Monday Tuesday Wednesday Thursday Friday Saturday                  1     2     3     4       5     6     7     8     9     10     11       12     13     14     15     16     17     18       19     20     21     22     23     24     25       26     27     28     29     30     31                       Lab Results:  Recent Results (from the past 12 hour(s))   **Basic metabolic panel FUTURE anytime    Collection Time: 07/03/18  7:23 AM   Result Value Ref Range    Sodium 133 133 - 144 mmol/L    Potassium 2.8 (L) 3.4 - 5.3 mmol/L    Chloride 100 94 - 109 mmol/L    Carbon Dioxide 25 20 - 32 mmol/L    Anion Gap 8 3 - 14 mmol/L    Glucose 129 (H) 70 - 99 mg/dL    Urea Nitrogen 9 7 - 30 mg/dL    Creatinine 0.93 0.66 - 1.25 mg/dL    GFR Estimate 83 >60 mL/min/1.7m2    GFR Estimate If Black >90 >60 mL/min/1.7m2    Calcium 9.7 8.5 - 10.1 mg/dL   Phosphorus    Collection Time: 07/03/18  7:23 AM   Result Value Ref Range    Phosphorus 1.5 (L) 2.5 - 4.5 mg/dL   CBC with platelets differential    Collection Time: 07/03/18  7:23 AM   Result Value Ref Range    WBC 0.3 (LL) 4.0 - 11.0 10e9/L    RBC Count 2.98 (L) 4.4 - 5.9 10e12/L    Hemoglobin 8.1 (L) 13.3 - 17.7 g/dL    Hematocrit 24.5 (L) 40.0 - 53.0 %    MCV 82 78 - 100 fl    MCH 27.2 26.5 - 33.0 pg    MCHC 33.1 31.5 - 36.5 g/dL    RDW 16.7 (H) 10.0 - 15.0 %    Platelet Count 125 (L) 150 - 450 10e9/L    Diff Method WBC <0.5, Diff not done    Magnesium    Collection Time: 07/03/18  7:23 AM   Result Value Ref Range    Magnesium 2.4 (H) 1.6 - 2.3 mg/dL   ABO/Rh type and screen    Collection Time: 07/03/18  7:24 AM   Result Value Ref Range    Units Ordered 1     ABO A     RH(D) Neg     Antibody Screen Neg     Test Valid Only At          [facility]    Specimen Expires 07/06/2018     Crossmatch Red Blood Cells    Blood component    Collection Time: 07/03/18  7:24 AM   Result Value Ref Range     Unit Number [id number]     Blood Component Type Red Blood Cells LeukoReduced (Part 2)     Division Number 00     Status of Unit Released to care unit 07/03/2018 1053     Blood Product Code [id number]     Unit Status ISS

## 2018-07-03 NOTE — MR AVS SNAPSHOT
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)        Pain of right lower extremity        Disruption of tissue around surgical drain, subsequent encounter        Personal history of tobacco use, presenting hazards to health        Anemia in neoplastic disease        Sarcoma of soft tissue (H)        Chemotherapy-induced nausea and vomiting        Sarcoma (H)          Care Instructions    [facility] Main Line: [phone]    Call triage nurse with chills and/or temperature greater than or equal to 100.4, uncontrolled nausea/vomiting, diarrhea, constipation, dizziness, shortness of breath, chest pain, bleeding, unexplained bruising, or any new/concerning symptoms, questions/concerns.   If you are having any concerning symptoms or wish to speak to a provider before your next infusion visit, please call your care coordinator or triage to notify them so we can adequately serve you.   Triage Nurse Line: [phone]    If after hours, weekends, or holidays [phone]               July 2018 Sunday Monday Tuesday Wednesday Thursday Friday Saturday   1     2     3     [facility] DRAW    7:15 AM   (15 min.)    [facility] DRAW   [facility] Lab Draw     [facility] ONC INFUSION 360    8:00 AM   (360 min.)    ONCOLOGY INFUSION   [facility] NEW WOUND NURSE    9:30 AM   (60 min.)   Familia Stauffer RN   [facility] Wound Ostomy     [facility] RETURN   11:05 AM   (50 min.)   Flora Barrientos PA   [facility] RETURN    3:15 PM   (15 min.)   Brad Betts MD   [facility] 4     5     6     [facility] DRAW     6:45 AM   (15 min.)    [facility] DRAW   [facility] Lab Draw     [facility] ONC INFUSION 120    7:00 AM   (120 min.)   [facility] ONCOLOGY INFUSION   [facility] 7       8     9     10     [facility] CONSULT    9:30 AM   (90 min.)   Kimberley Solo MD   [facility] 11     12     13     14       15     16     17     [facility] DRAW    8:00 AM   (15 min.)    [facility] DRAW   [facility] Lab Draw     [facility] RETURN    8:15 AM   (30 min.)   Gaurang Johnson MD   [facility] Cancer [facility] 18     COLONOSCOPY    1:40 PM   Pito Sinha MD   [facility] GI 19     20     21  Happy Birthday!       22     23     24     25     26     27     28       29     30     31                                    August 2018 Sunday Monday Tuesday Wednesday Thursday Friday Saturday                  1     2     3     4       5     6     7     8     9     10     11       12     13     14     15     16     17     18       19     20     21     22     23     24     25       26     27     28     29     30     31                       Lab Results:  Recent Results (from the past 12 hour(s))   **Basic metabolic panel FUTURE anytime    Collection Time: 07/03/18  7:23 AM   Result Value Ref Range    Sodium 133 133 - 144 mmol/L    Potassium 2.8 (L) 3.4 - 5.3 mmol/L    Chloride 100 94 - 109 mmol/L    Carbon Dioxide 25 20 - 32 mmol/L    Anion Gap 8 3 - 14 mmol/L    Glucose 129 (H) 70 - 99 mg/dL    Urea Nitrogen 9 7 - 30 mg/dL    Creatinine 0.93 0.66 - 1.25 mg/dL    GFR Estimate 83 >60 mL/min/1.7m2    GFR Estimate If Black >90 >60 mL/min/1.7m2    Calcium 9.7 8.5 - 10.1 mg/dL   Phosphorus    Collection Time: 07/03/18  7:23 AM   Result Value Ref Range    Phosphorus 1.5 (L) 2.5 - 4.5 mg/dL   CBC with platelets differential    Collection Time: 07/03/18  7:23 AM   Result Value Ref Range    WBC 0.3 (LL) 4.0 - 11.0 10e9/L    RBC Count 2.98 (L) 4.4 - 5.9 10e12/L    Hemoglobin 8.1 (L) 13.3 - 17.7 g/dL    Hematocrit  24.5 (L) 40.0 - 53.0 %    MCV 82 78 - 100 fl    MCH 27.2 26.5 - 33.0 pg    MCHC 33.1 31.5 - 36.5 g/dL    RDW 16.7 (H) 10.0 - 15.0 %    Platelet Count 125 (L) 150 - 450 10e9/L    Diff Method WBC <0.5, Diff not done    Magnesium    Collection Time: 07/03/18  7:23 AM   Result Value Ref Range    Magnesium 2.4 (H) 1.6 - 2.3 mg/dL   ABO/Rh type and screen    Collection Time: 07/03/18  7:24 AM   Result Value Ref Range    Units Ordered 1     ABO A     RH(D) Neg     Antibody Screen Neg     Test Valid Only At          [facility]    Specimen Expires 07/06/2018     Crossmatch Red Blood Cells    Blood component    Collection Time: 07/03/18  7:24 AM   Result Value Ref Range    Unit Number [id number]     Blood Component Type Red Blood Cells LeukoReduced (Part 2)     Division Number 00     Status of Unit Released to care unit 07/03/2018 1053     Blood Product Code [id number]     Unit Status ISS              Follow-ups after your visit        Your next 10 appointments already scheduled     Jul 03, 2018  3:30 PM CDT   (Arrive by 3:15 PM)   Return Visit with Brad Betts MD   [facility] ([facility])    [address] [place] [address]  4th Floor  [facility] [address]   [phone]            Jul 06, 2018  6:45 AM CDT   [facility] Draw with  [facility] DRAW   [facility] Lab Draw ([facility])    [address] [place] [address]  [address]  [facility] [address]   [phone]            Jul 06, 2018  7:00 AM CDT   Infusion 120 with  ONCOLOGY INFUSION, UC 17 ATC   [facility] ([facility])    [address] [place] [address]  [address]  [facility] [address]   [phone]            Jul 10, 2018  9:30 AM CDT   CONSULT with Kimberley Solo MD   [facility] Clinic ([facility])    [facility]  1st Floor  [address] [facility]  [address]   [phone]            Jul 17, 2018  8:00 AM CDT   [facility] Draw with  [facility] LAB DRAW   [facility] Lab Draw ([facility])    [address]  [address]  [facility] [address]   [phone]            Jul 17, 2018  8:30 AM CDT   (Arrive by 8:15 AM)   Return Visit with Gaurang Johnson MD   [facility] ([facility])    [address]  [address]  [facility] [address]   [phone]            Jul 18, 2018   Procedure with Pito Sinha MD   [facility] [place] Endoscopy ([facility])    [address] [facility] [address]   [phone]           The [facility] is located on the corner of [address] and [address] on the [facility]. It is easily accessible from virtually any point in the [facility] [place] [facility] area, via [email or web address] and [address].              Future tests that were ordered for you today     Open Standing Orders        Priority Remaining Interval Expires Ordered    *CBC with platelets differential Routine 99/99  7/3/2019 7/3/2018    Comprehensive metabolic panel Routine 99/99  7/3/2019 7/3/2018    Transfuse red blood cell unit Routine 99/100 TRANSFUSE 1 UNIT  7/3/2018    Red blood cell prepare order unit Routine 99/100 CONDITIONAL (SPECIFY) BLOOD  7/2/2018            Who to contact     If you have questions or need follow up information about today's clinic visit or your schedule please contact [facility] directly at [phone].  Normal or non-critical lab and imaging results will be communicated to you by MyChart, letter or phone within 4 business days after the clinic has received the results. If you do not hear from us within 7 days, please contact the clinic through MyChart or phone. If you have a critical or abnormal lab result, we will notify you by phone as soon as possible.  Submit  refill requests through [employer] or call your pharmacy and they will forward the refill request to us. Please allow 3 business days for your refill to be completed.          Additional Information About Your Visit        Care EveryWhere ID     This is your Care EveryWhere ID. This could be used by other organizations to access your [place] medical records  [id number]        Your Vitals Were     Pulse Temperature Respirations Pulse Oximetry          82 97.9  F (36.6  C) 16 98%         Blood Pressure from Last 3 Encounters:   07/03/18 (!) 85/57   07/03/18 120/79   06/30/18 98/63    Weight from Last 3 Encounters:   07/03/18 77.2 kg (170 lb 1.6 oz)   06/27/18 81.4 kg (179 lb 8 oz)   06/20/18 81.2 kg (179 lb)              We Performed the Following     **Basic metabolic panel FUTURE anytime     ABO/Rh type and screen     Blood component     CBC with platelets differential     Magnesium     Phosphorus     Transfuse red blood cell unit          Today's Medication Changes          These changes are accurate as of 7/3/18  2:07 PM.  If you have any questions, ask your nurse or doctor.               Start taking these medicines.        Dose/Directions    lidocaine visc 2% 2.5mL/5mL & maalox/mylanta w/ simeth 2.5mL/5mL & diphenhydrAMINE 5mg/5mL Susp suspension   Commonly known as:  [facility]   Used for:  Sarcoma (H)   Started by:  Flora Barrientos PA        Dose:  10 mL   Swish and swallow 10 mLs in mouth every 6 hours as needed for mouth sores   Quantity:  240 mL   Refills:  1       LORazepam 1 MG tablet   Commonly known as:  ATIVAN   Used for:  Chemotherapy-induced nausea and vomiting   Started by:  Flora Barrientos PA        Dose:  0.5 mg   Take 0.5 tablets (0.5 mg) by mouth every 8 hours as needed for anxiety, nausea or vomiting Take 30 minutes prior to departure.  Do not operate a vehicle after taking this medication   Quantity:  30 tablet   Refills:  0       phosphorus tablet 250 mg 250 MG per tablet    Commonly known as:  PHOSPHA 250 NEUTRAL   Used for:  Sarcoma (H), Hypophosphatemia   Started by:  Flora Barrientos PA        Dose:  500 mg   Take 2 tablets (500 mg) by mouth 2 times daily   Quantity:  30 tablet   Refills:  0         These medicines have changed or have updated prescriptions.        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 Please contact your care team if you have questions.     potassium & sodium phosphates 280-160-250 MG Packet   Commonly known as:  NEUTRA-PHOS   Stopped by:  Flora Barrientos PA           traMADol 50 MG tablet   Commonly known as:  ULTRAM   Stopped by:  Flora Barrientos PA                Where to get your medicines      These medications were sent to [facility] [address] [address]    Hours:  TRANSPLANT PHONE NUMBER [phone] Phone:  [phone]     ondansetron 8 MG tablet    phosphorus tablet 250 mg 250 MG per tablet    potassium chloride SA 10 MEQ CR tablet         Some of these will need a paper prescription and others can be bought over the counter.  Ask your nurse if you have questions.     Bring a paper prescription for each of these medications     lidocaine visc 2% 2.5mL/5mL & maalox/mylanta w/ simeth 2.5mL/5mL & diphenhydrAMINE 5mg/5mL Susp suspension     LORazepam 1 MG tablet                Primary Care Provider Office Phone # Fax #    Louisa Fry [phone]       [facility] [address]  [place] [address]        Equal Access to Services     DORIAN TOLENTINO : Hadii aad ku hadirmao Soomaali, waaxda luqadaha, qaybta kaalmada adeegyada, waxvielka purdyn nanettepippa may mich mcnair. So [facility] [phone].    ATENCIÓN: Si habla español, tiene a sheridan disposición servicios gratuitos de asistencia lingüística. Llame al [phone].    We comply with applicable federal civil rights laws and [place] laws. We do not discriminate on the basis of race, color, national origin, age, disability, sex, sexual orientation, or gender identity.            Thank you!     Thank you for choosing [facility]  for your care. Our goal is always to provide you with excellent care. Hearing back from our patients is one way we can continue to improve our services. Please take a few minutes to complete the written survey that you may receive in the mail after your visit with us. Thank you!             Your Updated Medication List - Protect others around you: Learn how to safely use, store and throw away your medicines at www.disposemymeds.org.          This list is accurate as of 7/3/18  2:07 PM.  Always use your most recent med list.                   Brand Name Dispense Instructions for use Diagnosis    granisetron 1 MG tablet    KYTRIL    30 tablet    Take 1 tablet (1 mg) by mouth every 12 hours as needed for nausea    Sarcoma of soft tissue (H)       lidocaine visc 2% 2.5mL/5mL & maalox/mylanta w/ simeth 2.5mL/5mL & diphenhydrAMINE 5mg/5mL Susp suspension    [facility] Mouthwash [facility]    240 mL    Swish and swallow 10 mLs in mouth every 6 hours as needed for mouth sores    Sarcoma (H)       LORazepam 1 MG tablet    ATIVAN    30 tablet    Take 0.5 tablets (0.5 mg) by mouth every 8 hours as needed for anxiety, nausea or vomiting Take 30 minutes prior to  departure.  Do not operate a vehicle after taking this medication    Chemotherapy-induced nausea and vomiting       OLANZapine 5 MG tablet    zyPREXA    30 tablet    Take 1 tablet (5 mg) by mouth At Bedtime    Chemotherapy-induced nausea and vomiting       omeprazole 20 MG CR capsule    priLOSEC    30 capsule    Take 1 capsule (20 mg) by mouth every morning (before breakfast)    Chemotherapy-induced nausea and vomiting       ondansetron 8 MG tablet    ZOFRAN    60 tablet    Take 1 tablet (8 mg) by mouth every 8 hours    Chemotherapy-induced nausea and vomiting       oxyCODONE IR 5 MG tablet    ROXICODONE    20 tablet    Take 1 tablet (5 mg) by mouth every 4 hours as needed for moderate to severe pain    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)       phosphorus tablet 250 mg 250 MG per tablet    PHOSPHA 250 NEUTRAL    30 tablet    Take 2 tablets (500 mg) by mouth 2 times daily    Sarcoma (H), Hypophosphatemia       polyethylene glycol Packet    MIRALAX/GLYCOLAX    7 packet    Take 17 g by mouth daily    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)       potassium chloride SA 10 MEQ CR tablet    K-DUR/KLOR-CON M    90 tablet    Take 2 tablets (20 mEq) by mouth 2 times daily    Sarcoma (H), Hypokalemia       * PROCHLORPERAZINE MALEATE PO      Take 10 mg by mouth every 6 hours as needed        * prochlorperazine 10 MG tablet    COMPAZINE    30 tablet    Take 1 tablet (10 mg) by mouth every 6 hours as needed for nausea or vomiting    Chemotherapy-induced nausea and vomiting       rivaroxaban ANTICOAGULANT 20 MG Tabs tablet    XARELTO    30 tablet    Take 1 tablet (20 mg) by mouth daily (with dinner)    Other pulmonary embolism without acute cor pulmonale, unspecified chronicity (H), 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 Read the directions carefully, and ask your doctor or other care provider to review them with you.

## 2018-07-03 NOTE — PROGRESS NOTES
Infusion Nursing Note:  Hiram Tapia presents today for possible PRBC's.    Patient seen by provider today: Yes: Flora SAPP while in infusion   present during visit today: Not Applicable.    Note: Patient arrives in wheelchair directly from lab as he is vomiting, and is hypotensive.  He has hiccups as well.  Therapy plan of 1500 mls IV NS immediately initiated as well as Emend/Dex.  Temp noted to be 99.7 in lab.   Has dressing on leg wound.  Hasn't been changed since last Wednesday.         MARJORIE James RN / Flora SAPP  Contact ortho to change wound dressing on leg and assess for injection.  Replace Phosphorous (Flora will talk to pharmacy and place orders).  Replace Potassium per protocol (Per Pharmacist Erick, Phos will have 20 meq K, so will give 40 meq KCL to equal 60meq).  Give 1 unit PRBC for Hgb of 8.1.      Sent pictures of wound to Dr. Betts (ortho) per his request.   Sent with dressing on and dressing off.  I was unable to remove part of the purple sponge in his wound.  He said wound looks fine, no evidence of infection.   He instructed me to cleanse area around wound with Chloraprep, may leave the purple portion of sponge in place, cover with tegaderm.  When home care see's patient on Thursday, they may change it to the appropriate dressing.  If patient isn't feeling up to seeing Dr. Betts this afternoon, that's OK.  Dr. Betts will call him.    Skin around wound where tegaderm was red.  This area stung when I used Chloraprep.  I noted some blistering from the tegaderm as well.   I wasn't comfortable applying more tegaderm.  Wound Care RN Familia contacted and he came to infusion to address Hiram's wound.  Please see his note for details on wound care today.    Flora would like patient to return Friday for labs/infusion for possible electrolyte replacement.  Only availability is 0630 labs / 0700 infusion.  Patient and wife feel this is to early.  For now the plan  is to have Hiram get his labs checked locally (or via home care) on Thursday.  Rubens will follow results and cancel Friday infusion if not needed.  She would also like Salomón to see Ignacio Ramirez PA-C next week with labs.  Rubens will be coordinating this as well.    Hiram feels much better at time of d/c, he looks like he feels better as well.  He will see Dr. Betts as scheduled.            Intravenous Access:  Implanted Port.  PIV placed with ultrasound for PRBC's    Treatment Conditions:  Lab Results   Component Value Date    HGB 8.1 07/03/2018     Lab Results   Component Value Date    WBC 0.3 07/03/2018      Lab Results   Component Value Date     07/03/2018      Lab Results   Component Value Date     07/03/2018                   Lab Results   Component Value Date    POTASSIUM 2.8 07/03/2018           Lab Results   Component Value Date    MAG 2.4 07/03/2018            Lab Results   Component Value Date    CR 0.93 07/03/2018                   Lab Results   Component Value Date    JAYESH 9.7 07/03/2018                Lab Results   Component Value Date    BILITOTAL 0.6 06/26/2018           Component Results   Component Value Flag Ref Range Units Status Collected Lab   Phosphorus 1.5 (L) 2.5 - 4.5 mg/dL Final 07/03/2018  7:23 AM 1740           Blood transfusion consent signed today.      Post Infusion Assessment:  Patient tolerated infusion without incident.  Blood return noted pre and post infusion.  Site patent and intact, free from redness, edema or discomfort.  No evidence of extravasations.  Access discontinued per protocol.    Discharge Plan:   Prescription refills given for Ativan, Magic Mouthwash, Potassium, Zofran.  Copy of AVS reviewed with patient and/or family.  Patient will return Friday for next appointment.  Patient discharged in stable condition accompanied by: wife.  Departure Mode: Wheelchair.  Face to Face time: >20 minutes.    Dora James RN

## 2018-07-03 NOTE — MR AVS SNAPSHOT
After Visit Summary   7/3/2018    Hiram Tapia    MRN: [id number]           Patient Information     Date Of Birth          1960        Visit Information        Provider Department      7/3/2018 3:30 PM Brad Betts MD [facility]        Today's Diagnoses     Sarcoma of soft tissue (H)    -  1       Follow-ups after your visit        Your next 10 appointments already scheduled     Jul 06, 2018  6:45 AM CDT   [facility] Lab Draw with [facility] LAB DRAW   [facility]    [address] [place] [address]  [address]  [facility] [address]   [phone]            Jul 06, 2018  7:00 AM CDT   Infusion 120 with UC ONCOLOGY INFUSION, UC 17 ATC   [facility] ([facility])    [address] [place] [address]  [address]  [facility] [address]   [phone]            Jul 10, 2018  7:00 AM CDT   M[facility]ic Lab Draw with [facility] LAB DRAW   [facility] Masonic Lab Draw ([facility])    [address] [place] [address]  [address]  [facility] [address]   [phone]            Jul 10, 2018  7:30 AM CDT   (Arrive by 7:15 AM)   Return Visit with SENAIT Carter   [facility] ([facility])    [address] [place] [address]  [address] [place] [address]   [phone]            Jul 10, 2018  9:30 AM CDT   CONSULT with Kimberley Solo MD   Radiation Oncology Clinic ([facility])    [facility]  1st Floor  [address] [facility] [address] [address]   [phone]            Jul 17, 2018  8:00 AM CDT   M[facility]ic Lab Draw with [facility] MASONIC LAB DRAW   [facility] Masonic Lab Draw ([facility])    [address] [place] [address]  [address]  [facility] [address]   [phone]            Jul 17, 2018  8:30 AM CDT   (Arrive by 8:15 AM)   Return Visit with Gaurang Johnson MD   [facility]  Clinic ([facility])    [address]  [address]  [facility] [id number] [phone]            Jul 18, 2018   Procedure with Pito Sinha MD   [facility] [place] ([facility])    [address]  [facility] [place] [address] [phone]           The [facility] is located on the corner of [address] and [address] on the [facility]. It is easily accessible from virtually any point in the [place] [place] [facility] [place] [facility] [place] area, via [address] and [address].              Future tests that were ordered for you today     Open Standing Orders        Priority Remaining Interval Expires Ordered    *CBC with platelets differential Routine 99/99  7/3/2019 7/3/2018    Comprehensive metabolic panel Routine 99/99  7/3/2019 7/3/2018            Who to contact     Please call your clinic at [phone] to:    Ask questions about your health    Make or cancel appointments    Discuss your medicines    Learn about your test results    Speak to your doctor            Additional Information About Your Visit        Care EveryWhere ID     This is your Care EveryWhere ID. This could be used by other organizations to access your [facility] medical records  [id number]         Blood Pressure from Last 3 Encounters:   07/03/18 (!) 85/57   07/03/18 120/79   06/30/18 98/63    Weight from Last 3 Encounters:   07/03/18 77.2 kg (170 lb 1.6 oz)   06/27/18 81.4 kg (179 lb 8 oz)   06/20/18 81.2 kg (179 lb)              Today, you had the following     No orders found for display         Today's Medication Changes          These changes are accurate as of 7/3/18  5:54 PM.  If you have any questions, ask your nurse or doctor.               Start taking these medicines.        Dose/Directions    lidocaine visc 2% 2.5mL/5mL & maalox/mylanta w/ simeth 2.5mL/5mL & diphenhydrAMINE 5mg/5mL Susp suspension   Commonly known as:  MAGIC Mouthwash [facility]    Used for:  Sarcoma (H)   Started by:  Flora Barrientos PA        Dose:  10 mL   Swish and swallow 10 mLs in mouth every 6 hours as needed for mouth sores   Quantity:  240 mL   Refills:  1       LORazepam 1 MG tablet   Commonly known as:  ATIVAN   Used for:  Chemotherapy-induced nausea and vomiting   Started by:  Flora Barrientos PA        Dose:  0.5 mg   Take 0.5 tablets (0.5 mg) by mouth every 8 hours as needed for anxiety, nausea or vomiting Take 30 minutes prior to departure.  Do not operate a vehicle after taking this medication   Quantity:  30 tablet   Refills:  0       phosphorus tablet 250 mg 250 MG per tablet   Commonly known as:  PHOSPHA 250 NEUTRAL   Used for:  Sarcoma (H), Hypophosphatemia   Started by:  Flora Barrientos PA        Dose:  500 mg   Take 2 tablets (500 mg) by mouth 2 times daily   Quantity:  30 tablet   Refills:  0         These medicines have changed or have updated prescriptions.        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 Please contact your care team if you have questions.     potassium & sodium phosphates 280-160-250 MG Packet   Commonly known as:  NEUTRA-PHOS   Stopped by:  Flora Barrientos PA           traMADol 50 MG tablet   Commonly known as:  ULTRAM   Stopped by:  Flora Barrientos PA                Where to get your medicines      These medications were  sent to [place], [place] - [address]  [address], [facility] [address]    Hours:  TRANSPLANT PHONE NUMBER [phone] Phone:  [phone]     ondansetron 8 MG tablet    phosphorus tablet 250 mg 250 MG per tablet    potassium chloride SA 10 MEQ CR tablet         Some of these will need a paper prescription and others can be bought over the counter.  Ask your nurse if you have questions.     Bring a paper prescription for each of these medications     lidocaine visc 2% 2.5mL/5mL & maalox/mylanta w/ simeth 2.5mL/5mL & diphenhydrAMINE 5mg/5mL Susp suspension    LORazepam 1 MG tablet                Primary Care Provider Office Phone # Fax #    Louisa Fry [phone]       [facility] [address]  [facility] [address]        Equal Access to Services     DORIAN TOLENTINO : Hadii troy ku hadasho Soomaali, waaxda luqadaha, qaybta kaalmada adeegyada, waxay komalin haykevinn jaz epps . So [facility] [phone].    ATENCIÓN: Si habla español, tiene a sheridan disposición servicios gratuitos de asistencia lingüística. Llame al [phone].    We comply with applicable federal civil rights laws and [place] laws. We do not discriminate on the basis of race, color, national origin, age, disability, sex, sexual orientation, or gender identity.            Thank you!     Thank you for choosing [facility]  for your care. Our goal is always to provide you with excellent care. Hearing back from our patients is one way we can continue to improve our services. Please take a few minutes to complete the written survey that you may receive in the mail after your visit with us. Thank you!             Your Updated Medication List - Protect others around you: Learn how to safely use, store and throw away your medicines at www.disposemymeds.org.          This list is accurate as of 7/3/18  5:54 PM.  Always use your most recent med list.                    Brand Name Dispense Instructions for use Diagnosis    granisetron 1 MG tablet    KYTRIL    30 tablet    Take 1 tablet (1 mg) by mouth every 12 hours as needed for nausea    Sarcoma of soft tissue (H)       lidocaine visc 2% 2.5mL/5mL & maalox/mylanta w/ simeth 2.5mL/5mL & diphenhydrAMINE 5mg/5mL Susp suspension    [facility]    240 mL    Swish and swallow 10 mLs in mouth every 6 hours as needed for mouth sores    Sarcoma (H)       LORazepam 1 MG tablet    ATIVAN    30 tablet    Take 0.5 tablets (0.5 mg) by mouth every 8 hours as needed for anxiety, nausea or vomiting Take 30 minutes prior to departure.  Do not operate a vehicle after taking this medication    Chemotherapy-induced nausea and vomiting       OLANZapine 5 MG tablet    zyPREXA    30 tablet    Take 1 tablet (5 mg) by mouth At Bedtime    Chemotherapy-induced nausea and vomiting       omeprazole 20 MG CR capsule    priLOSEC    30 capsule    Take 1 capsule (20 mg) by mouth every morning (before breakfast)    Chemotherapy-induced nausea and vomiting       ondansetron 8 MG tablet    ZOFRAN    60 tablet    Take 1 tablet (8 mg) by mouth every 8 hours    Chemotherapy-induced nausea and vomiting       oxyCODONE IR 5 MG tablet    ROXICODONE    20 tablet    Take 1 tablet (5 mg) by mouth every 4 hours as needed for moderate to severe pain    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)       phosphorus tablet 250 mg 250 MG per tablet    PHOSPHA 250 NEUTRAL    30 tablet    Take 2 tablets (500 mg) by mouth 2 times daily    Sarcoma (H), Hypophosphatemia       polyethylene glycol Packet    MIRALAX/GLYCOLAX    7 packet    Take 17 g by mouth daily    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)       potassium chloride SA 10 MEQ CR tablet    K-DUR/KLOR-CON M    90 tablet    Take 2 tablets (20 mEq) by mouth 2 times daily    Sarcoma (H), Hypokalemia       * PROCHLORPERAZINE MALEATE PO      Take 10 mg by mouth every 6 hours as needed        * prochlorperazine 10  MG tablet    COMPAZINE    30 tablet    Take 1 tablet (10 mg) by mouth every 6 hours as needed for nausea or vomiting    Chemotherapy-induced nausea and vomiting       rivaroxaban ANTICOAGULANT 20 MG Tabs tablet    XARELTO    30 tablet    Take 1 tablet (20 mg) by mouth daily (with dinner)    Other pulmonary embolism without acute cor pulmonale, unspecified chronicity (H), 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 Read the directions carefully, and ask your doctor or other care provider to review them with you.

## 2018-07-03 NOTE — LETTER
7/3/2018     RE: Hiram Tapia  [address] [facility] [address]     Dear Colleague,    Thank you for referring your patient, Hiram Tapia, to the [facility]. Please see a copy of my visit note below.    Diagnosis: 1. High-grade soft tissue sarcoma right thigh  2. Delayed post op wound infection  3. Pulmonary embolis      Treatment: 1. Neoadjuvant chemotherapy planned.  We will begin the March 26, 2018.  Sequencing of radiation and surgery to be determined after neoadjuvant treatment completed staging studies evaluated.  2. I and D, antibiotics  3. Anticoagulation    I saw Mr. Tapia today for a wound inspection in anticipation of receiving radiation.  I had seen the wound earlier today and a photograph and saw it today directly in the afternoon by removing the bandage.  The wound looks excellent.  It is still open.  Because of the tension from the tumor and will be very difficult to close this wound.    Overall his right thigh is improved dramatically.  There is significantly less swelling.  The tumor is more well defined.  And by his report there is not a consistent drainage which had been troubling the wound previously.    Impression: Wound shows no signs of sepsis and is healthy.    Plan: He will see radiation next week.  I hope that they can proceed with radiation without wound closure.  Following that we would perform the resection.  The radiation field team feels they would like the wound closed we will do this though I am not optimistic that it will heal secondary to both the tension that will be placed across the skin as well as the need to manage the drainage from the tumor which has been happening since his diagnosis.    Again, thank you for allowing me to participate in the care of your patient.      Sincerely,    Brad Betts MD

## 2018-07-03 NOTE — NURSING NOTE
"Chief Complaint   Patient presents with     Port Draw     Labs drawn from port by RN. Line flushed with saline and heparin. Vs taken - notified provider and infusion of tachycardic/hypotensive state and N/V. Pt checked in for infusion by javed.     Port accessed with 20g 3/4\" gripper needle by RN, labs collected, line flushed with saline and heparin.  Vitals taken - provider and infusion charge RN notified of pt's hypotensive/tachycardic state along with N/V. Pt checked in for appointment by javed.    Aylin Ojeda, RN  "

## 2018-07-03 NOTE — PROGRESS NOTES
This is a recent snapshot of the patient's [facility] medical record.  For current drug dose and complete information and questions, call [phone] or In Basket pool, [facility] home infusion ([id number])  CSN Number:  [id number]

## 2018-07-03 NOTE — MR AVS SNAPSHOT
After Visit Summary   7/3/2018    Hiram Tapia    MRN: [id number]           Patient Information     Date Of Birth          1960        Visit Information        Provider Department      7/3/2018 11:20 AM Flora Barrientos PA [facility]        Today's Diagnoses     Hypokalemia    -  1    Anemia in neoplastic disease        Dehydration        Sarcoma (H)        Chemotherapy-induced nausea and vomiting        Hypophosphatemia           Follow-ups after your visit        Your next 10 appointments already scheduled     Jul 06, 2018  6:45 AM CDT   [facility] Lab Draw with [facility] DRAW   [facility] Lab Draw ([facility])    [address]  [address]  [place] [address]   [phone]            Jul 06, 2018  7:00 AM CDT   Infusion 120 with UC ONCOLOGY INFUSION, UC 17 ATC   [facility] ([facility])    [address]  [address]  [place] [address]   [phone]            Jul 10, 2018  7:00 AM CDT   [facility] Lab Draw with [facility] DRAW   [facility]    [address]  [address]  [place] [address]   [phone]            Jul 10, 2018  7:30 AM CDT   (Arrive by 7:15 AM)   Return Visit with SENAIT Carter   [facility] ([facility])    [address]  [address]  [place] [address]   [phone]            Jul 10, 2018  9:30 AM CDT   CONSULT with Kimberley Solo MD   Radiation Oncology Clinic ([facility])    [facility]  1st Floor  [address] [address]   [phone]            Jul 17, 2018  8:00 AM CDT   [facility] Lab Draw with [facility] DRAW   [facility])    [address]  [address]  [place] [address]   [phone]             Jul 17, 2018  8:30 AM CDT   (Arrive by 8:15 AM)   Return Visit with Gaurang Johnson MD   [facility] ([facility])    [address]  [address]  [place] [id number] [phone]            Jul 18, 2018   Procedure with Pito Sinha MD   [facility] [place] Endoscopy ([facility])    [address] [id number] [phone]           The [facility] is located on the corner of [address] and [address] on the [address]. It is easily accessible from virtually any point in the [place] area, via [address].              Future tests that were ordered for you today     Open Standing Orders        Priority Remaining Interval Expires Ordered    *CBC with platelets differential Routine 99/99  7/3/2019 7/3/2018    Comprehensive metabolic panel Routine 99/99  7/3/2019 7/3/2018    Transfuse red blood cell unit Routine 99/100 TRANSFUSE 1 UNIT  7/3/2018    Red blood cell prepare order unit Routine 99/100 CONDITIONAL (SPECIFY) BLOOD  7/2/2018            Who to contact     If you have questions or need follow up information about today's clinic visit or your schedule please contact [facility] directly at [phone].  Normal or non-critical lab and imaging results will be communicated to you by MyChart, letter or phone within 4 business days after the clinic has received the results. If you do not hear from us within 7 days, please contact the clinic through MyChart or phone. If you have a critical or abnormal lab result, we will notify you by phone as soon as possible.  Submit refill requests through [employer] or call your pharmacy and they will forward the refill request to us. Please allow 3 business days for your refill to be completed.          Additional Information About Your Visit        Care EveryWhere ID     This is  your Care EveryWhere ID. This could be used by other organizations to access your [place] medical records  [id number]        Your Vitals Were     Pulse Temperature Respirations Pulse Oximetry BMI (Body Mass Index)       139 99.7  F (37.6  C) (Oral) 16 97% 24.41 kg/m2        Blood Pressure from Last 3 Encounters:   07/03/18 (!) 85/57   07/03/18 120/79   06/30/18 98/63    Weight from Last 3 Encounters:   07/03/18 77.2 kg (170 lb 1.6 oz)   06/27/18 81.4 kg (179 lb 8 oz)   06/20/18 81.2 kg (179 lb)              Today, you had the following     No orders found for display         Today's Medication Changes          These changes are accurate as of 7/3/18  4:05 PM.  If you have any questions, ask your nurse or doctor.               Start taking these medicines.        Dose/Directions    lidocaine visc 2% 2.5mL/5mL & maalox/mylanta w/ simeth 2.5mL/5mL & diphenhydrAMINE 5mg/5mL Susp suspension   Commonly known as:  MAGIC Mouthwash [facility]   Used for:  Sarcoma (H)   Started by:  Flora Barrientos PA        Dose:  10 mL   Swish and swallow 10 mLs in mouth every 6 hours as needed for mouth sores   Quantity:  240 mL   Refills:  1       LORazepam 1 MG tablet   Commonly known as:  ATIVAN   Used for:  Chemotherapy-induced nausea and vomiting   Started by:  Flora Barrientos PA        Dose:  0.5 mg   Take 0.5 tablets (0.5 mg) by mouth every 8 hours as needed for anxiety, nausea or vomiting Take 30 minutes prior to departure.  Do not operate a vehicle after taking this medication   Quantity:  30 tablet   Refills:  0       phosphorus tablet 250 mg 250 MG per tablet   Commonly known as:  PHOSPHA 250 NEUTRAL   Used for:  Sarcoma (H), Hypophosphatemia   Started by:  Flora Barrientos PA        Dose:  500 mg   Take 2 tablets (500 mg) by mouth 2 times daily   Quantity:  30 tablet   Refills:  0         These medicines have changed or have updated prescriptions.        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 Please contact your care team if you have questions.     potassium & sodium phosphates 280-160-250 MG Packet   Commonly known as:  NEUTRA-PHOS   Stopped by:  Flora Barrientos PA           traMADol 50 MG tablet   Commonly known as:  ULTRAM   Stopped by:  Flora Barrientos PA                Where to get your medicines      These medications were sent to [address] [address]    Hours:  TRANSPLANT PHONE NUMBER [phone] Phone:  [phone]     ondansetron 8 MG tablet    phosphorus tablet 250 mg 250 MG per tablet    potassium chloride SA 10 MEQ CR tablet         Some of these will need a paper prescription and others can be bought over the counter.  Ask your nurse if you have questions.     Bring a paper prescription for each of these medications     lidocaine visc 2% 2.5mL/5mL & maalox/mylanta w/ simeth 2.5mL/5mL & diphenhydrAMINE 5mg/5mL Susp suspension    LORazepam 1 MG tablet                Primary Care Provider Office Phone # Fax #    Louisa Fry [phone]       [facility] [address]  [place] [address]        Equal Access to Services     [facility] RANDA AH: Juarez Suárez, waaxda luqadaha, qaybta kaalmada adeegyada, mendez mcnair. So [facility]  [phone].    ATENCIÓN: Si sean rhodes, tiene a sheridan disposición servicios gratuitos de asistencia lingüística. Markos simeon [phone].    We comply with applicable federal civil rights laws and [place] laws. We do not discriminate on the basis of race, color, national origin, age, disability, sex, sexual orientation, or gender identity.            Thank you!     Thank you for choosing [facility]  for your care. Our goal is always to provide you with excellent care. Hearing back from our patients is one way we can continue to improve our services. Please take a few minutes to complete the written survey that you may receive in the mail after your visit with us. Thank you!             Your Updated Medication List - Protect others around you: Learn how to safely use, store and throw away your medicines at www.disposemymeds.org.          This list is accurate as of 7/3/18  4:05 PM.  Always use your most recent med list.                   Brand Name Dispense Instructions for use Diagnosis    granisetron 1 MG tablet    KYTRIL    30 tablet    Take 1 tablet (1 mg) by mouth every 12 hours as needed for nausea    Sarcoma of soft tissue (H)       lidocaine visc 2% 2.5mL/5mL & maalox/mylanta w/ simeth 2.5mL/5mL & diphenhydrAMINE 5mg/5mL Susp suspension    [facility]IC Mouthwash [facility]    240 mL    Swish and swallow 10 mLs in mouth every 6 hours as needed for mouth sores    Sarcoma (H)       LORazepam 1 MG tablet    ATIVAN    30 tablet    Take 0.5 tablets (0.5 mg) by mouth every 8 hours as needed for anxiety, nausea or vomiting Take 30 minutes prior to departure.  Do not operate a vehicle after taking this medication    Chemotherapy-induced nausea and vomiting       OLANZapine 5 MG tablet    zyPREXA    30 tablet    Take 1 tablet (5 mg) by mouth At Bedtime    Chemotherapy-induced nausea and vomiting       omeprazole 20 MG CR capsule    priLOSEC    30 capsule    Take 1 capsule (20 mg) by mouth every morning  (before breakfast)    Chemotherapy-induced nausea and vomiting       ondansetron 8 MG tablet    ZOFRAN    60 tablet    Take 1 tablet (8 mg) by mouth every 8 hours    Chemotherapy-induced nausea and vomiting       oxyCODONE IR 5 MG tablet    ROXICODONE    20 tablet    Take 1 tablet (5 mg) by mouth every 4 hours as needed for moderate to severe pain    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)       phosphorus tablet 250 mg 250 MG per tablet    PHOSPHA 250 NEUTRAL    30 tablet    Take 2 tablets (500 mg) by mouth 2 times daily    Sarcoma (H), Hypophosphatemia       polyethylene glycol Packet    MIRALAX/GLYCOLAX    7 packet    Take 17 g by mouth daily    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)       potassium chloride SA 10 MEQ CR tablet    K-DUR/KLOR-CON M    90 tablet    Take 2 tablets (20 mEq) by mouth 2 times daily    Sarcoma (H), Hypokalemia       * PROCHLORPERAZINE MALEATE PO      Take 10 mg by mouth every 6 hours as needed        * prochlorperazine 10 MG tablet    COMPAZINE    30 tablet    Take 1 tablet (10 mg) by mouth every 6 hours as needed for nausea or vomiting    Chemotherapy-induced nausea and vomiting       rivaroxaban ANTICOAGULANT 20 MG Tabs tablet    XARELTO    30 tablet    Take 1 tablet (20 mg) by mouth daily (with dinner)    Other pulmonary embolism without acute cor pulmonale, unspecified chronicity (H), 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 Read the directions carefully, and ask your doctor or other care provider to review them with you.

## 2018-07-03 NOTE — PROGRESS NOTES
Diagnosis: 1. High-grade soft tissue sarcoma right thigh  2. Delayed post op wound infection  3. Pulmonary embolis      Treatment: 1. Neoadjuvant chemotherapy planned.  We will begin the March 26, 2018.  Sequencing of radiation and surgery to be determined after neoadjuvant treatment completed staging studies evaluated.  2. I and D, antibiotics  3. Anticoagulation    I saw Mr. Tapia today for a wound inspection in anticipation of receiving radiation.  I had seen the wound earlier today and a photograph and saw it today directly in the afternoon by removing the bandage.  The wound looks excellent.  It is still open.  Because of the tension from the tumor and will be very difficult to close this wound.    Overall his right thigh is improved dramatically.  There is significantly less swelling.  The tumor is more well defined.  And by his report there is not a consistent drainage which had been troubling the wound previously.    Impression: Wound shows no signs of sepsis and is healthy.    Plan: He will see radiation next week.  I hope that they can proceed with radiation without wound closure.  Following that we would perform the resection.  The radiation field team feels they would like the wound closed we will do this though I am not optimistic that it will heal secondary to both the tension that will be placed across the skin as well as the need to manage the drainage from the tumor which has been happening since his diagnosis.

## 2018-07-05 DIAGNOSIS — C49.9 SARCOMA (H): ICD-10-CM

## 2018-07-05 DIAGNOSIS — E83.39 HYPOPHOSPHATEMIA: ICD-10-CM

## 2018-07-05 DIAGNOSIS — C49.21 SOFT TISSUE SARCOMA OF RIGHT THIGH (H): Primary | ICD-10-CM

## 2018-07-05 LAB
ALBUMIN SERPL-MCNC: 2.7 G/DL (ref 3.4–5)
ALP SERPL-CCNC: 113 U/L (ref 40–150)
ALT SERPL W P-5'-P-CCNC: 24 U/L (ref 0–70)
ANION GAP SERPL CALCULATED.3IONS-SCNC: 8 MMOL/L (ref 3–14)
ANISOCYTOSIS BLD QL SMEAR: SLIGHT
AST SERPL W P-5'-P-CCNC: 17 U/L (ref 0–45)
BASOPHILS # BLD AUTO: 0 10E9/L (ref 0–0.2)
BASOPHILS NFR BLD AUTO: 0.5 %
BILIRUB SERPL-MCNC: 0.4 MG/DL (ref 0.2–1.3)
BUN SERPL-MCNC: 13 MG/DL (ref 7–30)
CALCIUM SERPL-MCNC: 9.2 MG/DL (ref 8.5–10.1)
CHLORIDE SERPL-SCNC: 103 MMOL/L (ref 94–109)
CO2 SERPL-SCNC: 24 MMOL/L (ref 20–32)
CREAT SERPL-MCNC: 0.6 MG/DL (ref 0.66–1.25)
DIFFERENTIAL METHOD BLD: ABNORMAL
EOSINOPHIL # BLD AUTO: 0 10E9/L (ref 0–0.7)
EOSINOPHIL NFR BLD AUTO: 0 %
ERYTHROCYTE [DISTWIDTH] IN BLOOD BY AUTOMATED COUNT: 17.1 % (ref 10–15)
GFR SERPL CREATININE-BSD FRML MDRD: >90 ML/MIN/1.7M2
GLUCOSE SERPL-MCNC: 91 MG/DL (ref 70–99)
HCT VFR BLD AUTO: 23 % (ref 40–53)
HGB BLD-MCNC: 7.7 G/DL (ref 13.3–17.7)
IMM GRANULOCYTES # BLD: 0 10E9/L (ref 0–0.4)
IMM GRANULOCYTES NFR BLD: 2.2 %
LYMPHOCYTES # BLD AUTO: 0.2 10E9/L (ref 0.8–5.3)
LYMPHOCYTES NFR BLD AUTO: 11.4 %
MAGNESIUM SERPL-MCNC: 2.2 MG/DL (ref 1.6–2.3)
MCH RBC QN AUTO: 27.4 PG (ref 26.5–33)
MCHC RBC AUTO-ENTMCNC: 33.5 G/DL (ref 31.5–36.5)
MCV RBC AUTO: 82 FL (ref 78–100)
MONOCYTES # BLD AUTO: 0.2 10E9/L (ref 0–1.3)
MONOCYTES NFR BLD AUTO: 8.7 %
NEUTROPHILS # BLD AUTO: 1.4 10E9/L (ref 1.6–8.3)
NEUTROPHILS NFR BLD AUTO: 77.2 %
NRBC # BLD AUTO: 0 10*3/UL
NRBC BLD AUTO-RTO: 0 /100
PHOSPHATE SERPL-MCNC: 1.3 MG/DL (ref 2.5–4.5)
PLATELET # BLD AUTO: 112 10E9/L (ref 150–450)
PLATELET # BLD EST: ABNORMAL 10*3/UL
POTASSIUM SERPL-SCNC: 3.7 MMOL/L (ref 3.4–5.3)
PROT SERPL-MCNC: 6.4 G/DL (ref 6.8–8.8)
RBC # BLD AUTO: 2.81 10E12/L (ref 4.4–5.9)
RBC INCLUSIONS BLD: SLIGHT
SODIUM SERPL-SCNC: 135 MMOL/L (ref 133–144)
WBC # BLD AUTO: 1.8 10E9/L (ref 4–11)

## 2018-07-05 PROCEDURE — 83735 ASSAY OF MAGNESIUM: CPT | Performed by: INTERNAL MEDICINE

## 2018-07-05 PROCEDURE — 80053 COMPREHEN METABOLIC PANEL: CPT | Performed by: INTERNAL MEDICINE

## 2018-07-05 PROCEDURE — 84100 ASSAY OF PHOSPHORUS: CPT | Performed by: INTERNAL MEDICINE

## 2018-07-05 PROCEDURE — 85025 COMPLETE CBC W/AUTO DIFF WBC: CPT | Performed by: INTERNAL MEDICINE

## 2018-07-09 DIAGNOSIS — C49.21 SOFT TISSUE SARCOMA OF RIGHT LOWER EXTREMITY (H): Primary | ICD-10-CM

## 2018-07-10 ENCOUNTER — HOSPITAL ENCOUNTER (OUTPATIENT)
Dept: MRI IMAGING | Facility: CLINIC | Age: 58
Discharge: HOME | End: 2018-07-10
Attending: RADIOLOGY | Admitting: RADIOLOGY
Payer: COMMERCIAL

## 2018-07-10 ENCOUNTER — OFFICE VISIT (OUTPATIENT)
Dept: RADIATION ONCOLOGY | Facility: CLINIC | Age: 58
End: 2018-07-10
Attending: RADIOLOGY
Payer: COMMERCIAL

## 2018-07-10 ENCOUNTER — ONCOLOGY VISIT (OUTPATIENT)
Dept: ONCOLOGY | Facility: CLINIC | Age: 58
End: 2018-07-10
Attending: INTERNAL MEDICINE
Payer: COMMERCIAL

## 2018-07-10 ENCOUNTER — APPOINTMENT (OUTPATIENT)
Dept: LAB | Facility: CLINIC | Age: 58
End: 2018-07-10
Attending: INTERNAL MEDICINE
Payer: COMMERCIAL

## 2018-07-10 VITALS
WEIGHT: 175 LBS | DIASTOLIC BLOOD PRESSURE: 67 MMHG | BODY MASS INDEX: 25.11 KG/M2 | SYSTOLIC BLOOD PRESSURE: 98 MMHG | HEART RATE: 88 BPM

## 2018-07-10 VITALS
BODY MASS INDEX: 25.07 KG/M2 | SYSTOLIC BLOOD PRESSURE: 99 MMHG | OXYGEN SATURATION: 98 % | DIASTOLIC BLOOD PRESSURE: 56 MMHG | TEMPERATURE: 98.7 F | RESPIRATION RATE: 16 BRPM | HEART RATE: 114 BPM | HEIGHT: 70 IN | WEIGHT: 175.1 LBS

## 2018-07-10 DIAGNOSIS — C49.21 SOFT TISSUE SARCOMA OF RIGHT LOWER EXTREMITY (H): ICD-10-CM

## 2018-07-10 DIAGNOSIS — C49.21 SOFT TISSUE SARCOMA OF RIGHT THIGH (H): ICD-10-CM

## 2018-07-10 DIAGNOSIS — C49.21 SOFT TISSUE SARCOMA OF RIGHT THIGH (H): Primary | ICD-10-CM

## 2018-07-10 LAB
ALBUMIN SERPL-MCNC: 2.9 G/DL (ref 3.4–5)
ALP SERPL-CCNC: 101 U/L (ref 40–150)
ALT SERPL W P-5'-P-CCNC: 20 U/L (ref 0–70)
ANION GAP SERPL CALCULATED.3IONS-SCNC: 10 MMOL/L (ref 3–14)
AST SERPL W P-5'-P-CCNC: 14 U/L (ref 0–45)
BASOPHILS # BLD AUTO: 0 10E9/L (ref 0–0.2)
BASOPHILS NFR BLD AUTO: 0.6 %
BILIRUB SERPL-MCNC: 0.2 MG/DL (ref 0.2–1.3)
BUN SERPL-MCNC: 8 MG/DL (ref 7–30)
CALCIUM SERPL-MCNC: 9.7 MG/DL (ref 8.5–10.1)
CHLORIDE SERPL-SCNC: 106 MMOL/L (ref 94–109)
CO2 SERPL-SCNC: 23 MMOL/L (ref 20–32)
CREAT SERPL-MCNC: 0.7 MG/DL (ref 0.66–1.25)
DIFFERENTIAL METHOD BLD: ABNORMAL
EOSINOPHIL # BLD AUTO: 0 10E9/L (ref 0–0.7)
EOSINOPHIL NFR BLD AUTO: 0 %
ERYTHROCYTE [DISTWIDTH] IN BLOOD BY AUTOMATED COUNT: 18.6 % (ref 10–15)
GFR SERPL CREATININE-BSD FRML MDRD: >90 ML/MIN/1.7M2
GLUCOSE SERPL-MCNC: 104 MG/DL (ref 70–99)
HCT VFR BLD AUTO: 28.3 % (ref 40–53)
HGB BLD-MCNC: 8.8 G/DL (ref 13.3–17.7)
IMM GRANULOCYTES # BLD: 0.1 10E9/L (ref 0–0.4)
IMM GRANULOCYTES NFR BLD: 1.3 %
LYMPHOCYTES # BLD AUTO: 0.4 10E9/L (ref 0.8–5.3)
LYMPHOCYTES NFR BLD AUTO: 8.3 %
MAGNESIUM SERPL-MCNC: 2.1 MG/DL (ref 1.6–2.3)
MCH RBC QN AUTO: 27.5 PG (ref 26.5–33)
MCHC RBC AUTO-ENTMCNC: 31.1 G/DL (ref 31.5–36.5)
MCV RBC AUTO: 88 FL (ref 78–100)
MONOCYTES # BLD AUTO: 0.5 10E9/L (ref 0–1.3)
MONOCYTES NFR BLD AUTO: 10.4 %
NEUTROPHILS # BLD AUTO: 3.7 10E9/L (ref 1.6–8.3)
NEUTROPHILS NFR BLD AUTO: 79.4 %
NRBC # BLD AUTO: 0 10*3/UL
NRBC BLD AUTO-RTO: 0 /100
PHOSPHATE SERPL-MCNC: 2.9 MG/DL (ref 2.5–4.5)
PLATELET # BLD AUTO: 283 10E9/L (ref 150–450)
POTASSIUM SERPL-SCNC: 4 MMOL/L (ref 3.4–5.3)
PROT SERPL-MCNC: 6.5 G/DL (ref 6.8–8.8)
RBC # BLD AUTO: 3.2 10E12/L (ref 4.4–5.9)
SODIUM SERPL-SCNC: 140 MMOL/L (ref 133–144)
WBC # BLD AUTO: 4.7 10E9/L (ref 4–11)

## 2018-07-10 PROCEDURE — A9585 GADOBUTROL INJECTION: HCPCS | Performed by: RADIOLOGY

## 2018-07-10 PROCEDURE — 99214 OFFICE O/P EST MOD 30 MIN: CPT | Mod: ZP | Performed by: PHYSICIAN ASSISTANT

## 2018-07-10 PROCEDURE — 77334 RADIATION TREATMENT AID(S): CPT | Performed by: RADIOLOGY

## 2018-07-10 PROCEDURE — 73720 MRI LWR EXTREMITY W/O&W/DYE: CPT | Mod: RT

## 2018-07-10 PROCEDURE — 36415 COLL VENOUS BLD VENIPUNCTURE: CPT

## 2018-07-10 PROCEDURE — 83735 ASSAY OF MAGNESIUM: CPT | Performed by: INTERNAL MEDICINE

## 2018-07-10 PROCEDURE — 25000128 H RX IP 250 OP 636: Performed by: RADIOLOGY

## 2018-07-10 PROCEDURE — G0463 HOSPITAL OUTPT CLINIC VISIT: HCPCS | Mod: 25

## 2018-07-10 PROCEDURE — G0463 HOSPITAL OUTPT CLINIC VISIT: HCPCS | Performed by: RADIOLOGY

## 2018-07-10 PROCEDURE — 85025 COMPLETE CBC W/AUTO DIFF WBC: CPT | Performed by: INTERNAL MEDICINE

## 2018-07-10 PROCEDURE — 80053 COMPREHEN METABOLIC PANEL: CPT | Performed by: INTERNAL MEDICINE

## 2018-07-10 PROCEDURE — 84100 ASSAY OF PHOSPHORUS: CPT | Performed by: INTERNAL MEDICINE

## 2018-07-10 RX ORDER — RIVAROXABAN 15 MG-20MG
KIT ORAL
COMMUNITY
Start: 2018-04-24 | End: 2018-07-10

## 2018-07-10 RX ORDER — NYSTATIN 100000/ML
SUSPENSION, ORAL (FINAL DOSE FORM) ORAL
COMMUNITY
Start: 2018-04-24 | End: 2018-07-10

## 2018-07-10 RX ORDER — TRAMADOL HYDROCHLORIDE 50 MG/1
TABLET ORAL
COMMUNITY
Start: 2018-06-29 | End: 2018-07-10

## 2018-07-10 RX ORDER — GADOBUTROL 604.72 MG/ML
7.5 INJECTION INTRAVENOUS ONCE
Status: COMPLETED | OUTPATIENT
Start: 2018-07-10 | End: 2018-07-10

## 2018-07-10 RX ADMIN — GADOBUTROL 7.5 ML: 604.72 INJECTION INTRAVENOUS at 15:20

## 2018-07-10 ASSESSMENT — ENCOUNTER SYMPTOMS
WEIGHT LOSS: 0
BLURRED VISION: 0
DIZZINESS: 0
BRUISES/BLEEDS EASILY: 0
DYSURIA: 0
FEVER: 0
HEADACHES: 0
SPUTUM PRODUCTION: 0
CHILLS: 0
NAUSEA: 0
TREMORS: 0
HEARTBURN: 0
DEPRESSION: 0
MYALGIAS: 1
TINGLING: 0
NECK PAIN: 0
SHORTNESS OF BREATH: 0
NERVOUS/ANXIOUS: 0

## 2018-07-10 ASSESSMENT — PAIN SCALES - GENERAL: PAINLEVEL: MILD PAIN (3)

## 2018-07-10 NOTE — NURSING NOTE
Chief Complaint   Patient presents with     Blood Draw     labs drawn with vpt by rn.  vs taken     Labs drawn with vpt by rn.  Pt tolerated well.  VS taken.  Pt checked in for next appt.    Elise Ridley RN

## 2018-07-10 NOTE — LETTER
"7/10/2018     RE: Hiram Tapia  [address]  [facility] [address]     Dear Colleague,    Thank you for referring your patient, Hiram Tapia, to the RADIATION ONCOLOGY CLINIC. Please see a copy of my visit note below.      HPI  INITIAL PATIENT ASSESSMENT    Diagnosis: sarcoma right thigh    Prior radiation therapy: None    Prior chemotherapy:     Prior hormonal therapy:No    Pain Eval:  Current history of pain associated with this visit:   Intensity: 3/10  Current: aching  Location: right thigh  Treatment: acetaminophen, occasional oxycodone- not daily    Psychosocial  Living arrangements: with wife    Fall Risk: ambulates with assistive device   referral needs: Not needed    Advanced Directive: No  Implantable Cardiac Device? No    Nurse face-to-face time: Level 3:  10 min face to face time    Review of Systems   Constitutional: Negative for chills, fever and weight loss.   HENT: Positive for congestion. Negative for hearing loss.    Eyes: Negative for blurred vision.   Respiratory: Negative for sputum production and shortness of breath.    Cardiovascular: Negative for chest pain.   Gastrointestinal: Negative for heartburn and nausea.   Genitourinary: Negative for dysuria and urgency.   Musculoskeletal: Positive for myalgias (pain right thigh, \"shooting pain\" on diagnosis has lessened). Negative for neck pain.   Skin:        Wound care twice weekly by [facility].  Minimal drainage he says.   Neurological: Negative for dizziness, tingling, tremors and headaches.   Endo/Heme/Allergies: Does not bruise/bleed easily.   Psychiatric/Behavioral: Negative for depression. The patient is not nervous/anxious.                  RADIATION ONCOLOGY CONSULT NOTE  DATE OF CONSULTATION:July 10, 2018    PATIENT NAME: Hiram Tapia  MRN: [id number]  : 1960    REFERRAL:  Gaurang Johnson    REASON FOR CONSULTATION:Discussion of neoadjuvant radiotherapy for high grade UPS of the right " upper thigh.      Mell is a 57-year-old gentleman initially seen in consultation with Dr. Eckert prior to neoadjuvant chemotherapy on 3/23/2018.  HPI is accepted from this note with additions from chart review and patient interview.    HISTORY OF PRESENT ILLNESS:  Mr. 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 In brief, the patient first presented to care on 2/26/0018 with the CCs of swelling and discomfort in his right thigh. These symptoms were reported to have been persistent for 4 months, ever since 10/2018 when the patient accidentally slid into the tailgate of his truck and injured the lateral aspect of his right leg. After this incident, the patient initially noted some tenderness over the impacted area, but progressive swelling ensued and he  developed decreased active range of motion in his right leg. He underwent further workup with MRI of the RLE on 2/28/2018, which revealed a 17.5cm complex intramuscular fluid collection along the vastus intermedius suggestive of a cystic neoplasm. An element of intramuscular hematoma was also noted. Lifting of the periosteum on the ventral surface of the mid-femur over a distance of approximately 3cm was seen.  The patient was then referred to Dr. Betts here at [facility], who recommended that his lesion be further evaluated by open biopsy. This biopsy was completed on 3/8/2018, with an intraoperative frozen section suggesting high grade sarcoma. Final pathology confirmed the tumor to be sarcoma, likely UPS. The patient then underwent a staging PET/CT on 3/17/2018, which showed a hypermetabolic 12 x 9cm necrotic lesion in the patient s vastus lateralis, but no evidence of distant metastasis. Additionally as seen on the axial image below there was an open draining wound originating from the sarcoma through the skin.       He then underwent a repeat PET/CT on 4/21/18 after 1 cycle of doxil/ifosfamide  showing an interval partial metabolic resolution.  Additionally there was a 1.2 cm mildly FDG avid pulmonary nodule in the right lung base.         He then underwent another repeat PET/CT on 6/18/2018 after 3 cycles of doxil/ifosfamide.  Which again showed partial metabolic resolution of the right thigh sarcoma.  Additionally there was a central focus of FDG avidity in the left transverse colon with SUV max of 7.8.  Colonoscopy was recommended  and is scheduled for 7/18/18.         After multiplanar discussion the decision was made to proceed with after additional discussion decision was made to proceed with preoperative radiotherapy versus postoperative radiotherapy given his visualized above the open wound tract draining from his surgical which is not resolved over the time course of chemotherapy.  As such he seen today for consultation for discussion of these of adjunctive radiation therapy in the management of his sarcoma.     On review today the patient reports that he is in his usual state of health.  He reports that he has been having bilateral leg swelling for quite some time.  He also reports that he has for several months since the start of neoadjuvant chemotherapy has had a chronic open draining wound which is being managed by wound care nurse nurse twice weekly.  He reports tenderness at the site of the sarcoma with vigorous palpation.  He denies fevers chills or constitutional symptoms.  He has no systemic signs of infection.  He reports he has chronic rhinorrhea as a side effect of chemotherapy.  Furthermore he reports significant fatigue as a residual side effect of chemotherapy. He is having an MRI of the right lower extremity today for planning purposes.    REVIEW OF SYSTEMS: A 10 point review of systems was obtained. Pertinent findings are noted in the HPI and nursing note from today, but are otherwise unremarkable.     CHEMOTHERAPY HISTORY:   Doxorubicin liposome (Doxil) IV + Ifosfamide (with  "mesna support): x 4 cycles [3/29/18, 4/24/18, 5/22/18,6/20/18]    RADIATION THERAPY HISTORY: None    IMPLANTABLE CARDIAC DEVICE: None    PAST MEDICAL /SURGICAL HISTORY:  Past Medical History:   Diagnosis Date     Sarcoma (H)      Past Surgical History:   Procedure Laterality Date     EXCISE SOFT TISSUE TUMOR THIGH Right 3/8/2018    Procedure: EXCISE SOFT TISSUE TUMOR THIGH;  Biopsy Right Thigh Tumor;  Surgeon: Brad Betts MD;  Location: [facility] OR     INSERT PORT VASCULAR ACCESS N/A 3/28/2018    Procedure: INSERT PORT VASCULAR ACCESS;  Vascular Access Port Insertion with C-arm;  Surgeon: Sarah Bowie MD;  Location: [facility] OR     IRRIGATION AND DEBRIDEMENT LOWER EXTREMITY, COMBINED Right 4/6/2018    Procedure: COMBINED IRRIGATION AND DEBRIDEMENT LOWER EXTREMITY;  Irrigation And Debridement Right Thigh ;  Surgeon: Brad Betts MD;  Location: [facility] OR     IRRIGATION AND DEBRIDEMENT LOWER EXTREMITY, COMBINED Right 4/9/2018    Procedure: COMBINED IRRIGATION AND DEBRIDEMENT LOWER EXTREMITY;  Irrigation And Debridement Right Thigh Wound. with Wound VAC Exchange;  Surgeon: Brad Betts MD;  Location: [facility] OR     STRABISMUS SURGERY      as a child       ALLERGIES:  Allergies as of 07/10/2018 - Jose as Reviewed 07/10/2018   Allergen Reaction Noted     Hydrofera blue 4\"x4\" [wound dressings] Dermatitis and Blisters 07/03/2018     Tegaderm ag mesh [silver] Dermatitis and Blisters 07/03/2018       MEDICATIONS:  Current Outpatient Prescriptions   Medication Sig Dispense Refill     oxyCODONE IR (ROXICODONE) 5 MG tablet Take 1 tablet (5 mg) by mouth every 4 hours as needed for moderate to severe pain 20 tablet 0     rivaroxaban ANTICOAGULANT (XARELTO) 20 MG TABS tablet Take 1 tablet (20 mg) by mouth daily (with dinner) 30 tablet 3     lidocaine, viscous, (XYLOCAINE) 2 % solution        magic mouthwash suspension (diphenhydrAMINE, lidocaine, aluminum-magnesium & simethicone) Swish and swallow 10 mLs in mouth every 6 " hours as needed for mouth sores (Patient not taking: Reported on 7/10/2018) 240 mL 1     OLANZapine (ZYPREXA) 5 MG tablet Take 1 tablet (5 mg) by mouth At Bedtime (Patient not taking: Reported on 7/10/2018) 30 tablet 0     polyethylene glycol (MIRALAX/GLYCOLAX) Packet Take 17 g by mouth daily (Patient not taking: Reported on 7/10/2018) 7 packet 1     [DISCONTINUED] XARELTO STARTER PACK 15 & 20 MG TBPK           FAMILY HISTORY:  Family History   Problem Relation Age of Onset     Leukemia Father        SOCIAL HISTORY:  Social History     Social History     Marital status:      Spouse name: N/A     Number of children: N/A     Years of education: N/A     Occupational History     Not on file.     Social History Main Topics     Smoking status: Former Smoker     Packs/day: 1.00     Years: 10.00     Types: Cigarettes     Start date: 1/1/1975     Quit date: 1/1/1990     Smokeless tobacco: Never Used     Alcohol use 8.4 oz/week     14 Cans of beer per week     Drug use: No     Sexual activity: Not Currently     Partners: Female     Other Topics Concern     Not on file     Social History Narrative       PHYSICAL EXAM:  Vital Signs: BP 98/67  Pulse 88  Wt 79.4 kg (175 lb)  BMI 25.11 kg/m2  Gen: Alert, in NAD  Eyes: EOMI, sclera anicteric  HENT      Head: NC/AT     Ears: No external auricular lesions     Nose/sinus: Rhinorrhea clear thin liquid.  No epistaxis     Oral Cavity/Oropharynx: MMM, no thrush noted  Pulm: Breathing comfortably on room air, no audible wheezes or ronchi  CV: Well-perfused, Patient has bilateral 1+ edema  Skin: Normal color and turgor, erythema noted around wound site  Neurologic: Antalgic gait.  Patient normally walks with a crutch.   Patient has 5 out of 5 strength to flexion extension abduction and adduction of the lower extremity at the hip bilaterally he reports subjectively decreased strength of the right of the right lower extremity at the hip versus the left lower extremity.  MSK:  Tenderness to vigorous palpation over the right upper thigh in the region of the cervical    ECOG PERFORMANCE STATUS:1 (0 prior to chemotherapy), patient reports profound fatigue at this time and gait related pain to the point at which he spends most of his time sitting.       RADIOLOGY AND LABORATORIES: Relevant studies reviewed as above outlined in HPI.     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  He is an appropriate candidate for adjunctive radiotherapy either pre-or postoperatively in the curative management of his sarcoma.    RECOMMENDATION:  After long discussion of the risks and benefits associated with adjunctive radiotherapy for the treatment of sarcoma we recommend preoperative radiotherapy to a dose of 50 gray to the right thigh.  The rationale, logistics, risks, benefits, and potential side effects of the proposed treatment were reviewed in detail.     He has an open wound which will not close until the tumor is resected.  We considered surgery followed by postoperative radiotherapy but the dose is higher ( 60 + Gy ) in that situation, the field is bigger and because of the proximity to the femur and femoral head he would be at even higher and significant risk of patholgic fracture of the hip/femur.  We think that preoperative radiotherapy is a better option. He understands that the wound may enlarge during the radiotherapy and he may have issues with infection or bleeding from this wound.     The patient had many questions during our conversation today, which were answered to the best of our ability. By the end of our consultation  today, the patient decided to proceed with plans for treatment and signed an informed consent to that effect.     We simulated the patient and her department today.  We will plan to start treatment 7/17/2018.  We do not anticipate for any concurrent chemotherapy however we will defer this this decision to Dr. Johnson.     The patient was seen and discussed with staff, Dr. Solo. Thank you for involving us in the care of this patient.  Please feel free to contact us with questions or concerns at any time.    TENTATIVE PLAN:  Prescription: 5000 cGy via 6MV megavoltage photons  Fractionation: 25 daily fractions, 200 cGy/ fx   Technique: IMRT (Tomotherapy)  To spare the femoral head and femur  As well as genitalia  Target volume: PTV 5000: GTV + 3cm sup/inf   Image Guidance: Daily fan beam MV CT  Simulation: kv CT    Murphy Ricardo MD  Radiation Oncology Resident, PGY-4  [facility]  Phone: [phone]     I was personally present with the resident during the history and exam.  I discussed the case with the resident and agree with the findings and plan of care as documented in the resident's note.    The risks and benefits of radiotherapy were discussed with the patient.  All questions were answered.      Please do not hesitate to call me if you have questions or concerns.    Thank you for allowing me to participate in the care of this patient.     Kimberley Solo MD  Pager  [phone]  Clinic   [phone]  [facility]    CC  Patient Care Team:  Louisa Fry MD as PCP - General (Emergency Medicine)  Isaiah Paulson MD as MD (Family Practice)  Brad Betts MD as MD (Orthopedics)  [facility] ([facility] ([facility] ([place]  Rubens Mckeon RN as Nurse Coordinator (Oncology)  Gaurang Johnson MD as MD (Hematology & Oncology)  GAURANG JOHNSON        CC  Patient Care Team:  Louisa Fry MD as PCP - General (Emergency  Medicine)  Isaiah Paulson MD as MD (Family Practice)  Brad Betts MD as MD (Orthopedics)  [facility] ([facility] ([facility] ([place]  Rubens Mckeon, RN as Nurse Coordinator (Oncology)  Gaurang Johnson MD as MD (Hematology & Oncology)      Radiation Therapy Patient Education    Person involved with teaching: Patient and Wife    Patient educational needs for self management of treatment-related side effects assessment completed.  EPIC Patient Ed tab contains Patient Learning Assessment    Education Materials Given  Radiation Therapy and You and Skin Care During Radiation Treatment    Educational Topics Discussed  Side effects expected, Skin care and When to call MD/RN    Response To Teaching  Verbalizes understanding    Referrals sent: None    Chemotherapy?  No    Again, thank you for allowing me to participate in the care of your patient.      Sincerely,    Kimberley Solo MD

## 2018-07-10 NOTE — NURSING NOTE
"Oncology Rooming Note    July 10, 2018 7:36 AM   Hiram Tapia is a 57 year old male who presents for:    Chief Complaint   Patient presents with     Blood Draw     labs drawn with vpt by rn.  vs taken     Oncology Clinic Visit     Return visit related to High Grade Sarcoma     Initial Vitals: BP 99/56 (BP Location: Right arm, Patient Position: Sitting, Cuff Size: Adult Regular)  Pulse 114  Temp 98.7  F (37.1  C) (Oral)  Resp 16  Ht 1.778 m (5' 10\")  Wt 79.4 kg (175 lb 1.6 oz)  SpO2 98%  BMI 25.12 kg/m2 Estimated body mass index is 25.12 kg/(m^2) as calculated from the following:    Height as of this encounter: 1.778 m (5' 10\").    Weight as of this encounter: 79.4 kg (175 lb 1.6 oz). Body surface area is 1.98 meters squared.  Mild Pain (3) Comment: Data Unavailable   No LMP for male patient.  Allergies reviewed: Yes  Medications reviewed: Yes    Medications: Medication refills not needed today.  Pharmacy name entered into [employer]:    [facility] [address] [place] [address] - [facility] [address] [facility] [address]  [facility] [place], [place] - [address]  [facility] [place], [place] - [address] [facility] [phone]    Clinical concerns: No new concerns. Provider was notified.    10 minutes for nursing intake (face to face time)     Christelle Ray LPN            "

## 2018-07-10 NOTE — PROGRESS NOTES
Oncology/Hematology Visit Note  Jul 10, 2018    Reason for Visit: Follow up of UPS of right thigh     History of Present Illness: Hiram Tapia is a 57 year old male with UPS of the right thigh. He has had a lot of problems with his right leg for many years including sciatica for 20 years.  He developed more discomfort in the right thigh and in October 2017 hit his lateral thigh against a truck tailgate causing a lot of pain.  Subsequently there was a fair amount of swelling and stiffness.  This eventually led to some imaging showing a cystic mass.  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 He began doxil/ifos 3-23-18.  He has had 4 cycles with suggestion of a response after 1 cycle. A new PE asymptomatic was noted and he started rivaroxaban in April. His course has been complicated by post-op wound infection and poor tolerance to chemotherapy, most recently requiring hospitalization for nausea, vomiting, dehydration, and electrolyte abnormalities as well as concern for possible ifosfamide toxicity following cycle 4. Plan now to do XRT and surgery as he has completed neoadjuvant chemotherapy. He will be meeting with Dr. Johnson next week to discuss this. Please see Dr. Johnson note for more oncologic history details.    I am seeing him today for close follow-up of dehydration, nausea, and electrolyte abnormalities.     Interval History:  Mr. Tapia returns to clinic today with his wife. Overall he is feeling much better. He is no longer having any nausea or vomiting and has been eating and drinking well. He admits to poor taste and that he probably doesn't drink as much water as he should but overall his oral intake is improving. He denies dizziness or lightheadedness and no headaches or confusion. His word slurry has also improved. He has not had any falls since the one prior to his hospital stay.    He continues to have a sore mouth but it is improving. His mouth pain resolved with magic mouth wash. His  right leg is also sore but is managed with Tylenol and Oxycodone PRN. He continues to have leg swelling, right more than left, though admits now that he is spending more time up out of bed he isn't elevating his legs as much. The swelling at times causes mild pain in his right knee as well. He has compression stockings but doesn't wear them. The wound itself is healing and he still has twice weekly nursing visits.    He denies fevers or chills. No chest pain, SOB, or cough. He continues to have rhinorrhea but no sinus congestion. He has not tried anything for the rhinorrhea. No abdominal pain. Bowels are managed with Miralax and Senna and he has a BM approximately every other day. No urinary concerns. No rashes but he does note dry skin on his hands and feet with mild erythema and soreness. He hasn't been applying anything for this. No neuropathy. No bleeding issues.     He is spending most of his day up out of bed though admits he doesn't do a ton of walking 2/2 pain in his leg. Overall though he feels things are continuing to improve the farther he gets from chemo.     Current Outpatient Prescriptions   Medication Sig Dispense Refill     lidocaine, viscous, (XYLOCAINE) 2 % solution        polyethylene glycol (MIRALAX/GLYCOLAX) Packet Take 17 g by mouth daily (Patient not taking: Reported on 7/10/2018) 7 packet 1     rivaroxaban ANTICOAGULANT (XARELTO) 20 MG TABS tablet Take 1 tablet (20 mg) by mouth daily (with dinner) 30 tablet 3     magic mouthwash suspension (diphenhydrAMINE, lidocaine, aluminum-magnesium & simethicone) Swish and swallow 10 mLs in mouth every 6 hours as needed for mouth sores (Patient not taking: Reported on 7/10/2018) 240 mL 1     OLANZapine (ZYPREXA) 5 MG tablet Take 1 tablet (5 mg) by mouth At Bedtime (Patient not taking: Reported on 7/10/2018) 30 tablet 0     oxyCODONE IR (ROXICODONE) 5 MG tablet Take 1 tablet (5 mg) by mouth every 4 hours as needed for moderate to severe pain 20 tablet 0  "    [DISCONTINUED] XARELTO STARTER PACK 15 & 20 MG TBPK          Physical Examination:  BP 99/56 (BP Location: Right arm, Patient Position: Sitting, Cuff Size: Adult Regular)  Pulse 114  Temp 98.7  F (37.1  C) (Oral)  Resp 16  Ht 1.778 m (5' 10\")  Wt 79.4 kg (175 lb 1.6 oz)  SpO2 98%  BMI 25.12 kg/m2  Wt Readings from Last 10 Encounters:   07/03/18 77.2 kg (170 lb 1.6 oz)   06/27/18 81.4 kg (179 lb 8 oz)   06/20/18 81.2 kg (179 lb)   05/22/18 86.4 kg (190 lb 8 oz)   05/01/18 91.9 kg (202 lb 8 oz)   04/24/18 89.8 kg (198 lb)   04/21/18 94.3 kg (208 lb)   03/29/18 97.3 kg (214 lb 9.6 oz)   03/28/18 95.6 kg (210 lb 12.2 oz)   03/27/18 96.8 kg (213 lb 4.8 oz)     Constitutional: Well-appearing male in no acute distress.  Eyes: EOMI, PERRL. No scleral icterus.  ENT: Oral mucosa is moist without thrush. One small lesion on posterior right cheek.  Lymphatic: Neck is supple without cervical or supraclavicular lymphadenopathy.   Cardiovascular: Regular rate and rhythm. No murmurs, gallops, or rubs. Bilateral 1+ peripheral edema, right greater than left.  Respiratory: Clear to auscultation bilaterally. No wheezes or crackles.  Gastrointestinal: Bowel sounds present. Abdomen soft, non-tender. No palpable hepatosplenomegaly or masses.   Neurologic: Cranial nerves II through XII are grossly intact. Prior slurred speech and trouble with word finding much improved compared to last visit.  Skin: Mild erythema and skin peeling on hands that is mildly tender and slightly edenamtous. Mild erythema on feet without skin peeling. Right leg wound covered but without surrounding erythema, warmth, or tenderness. No other rashes, petechiae, or bruising noted on exposed skin.  Musculoskeletal: Large right thigh mass, non-tender.  Laboratory Data:  Results for RUT CHAO (MRN [id number]) as of 7/10/2018 10:09   7/10/2018 07:27   Sodium 140   Potassium 4.0   Chloride 106   Carbon Dioxide 23   Urea Nitrogen 8   Creatinine 0.70   GFR " Estimate >90   GFR Estimate If Black >90   Calcium 9.7   Anion Gap 10   Magnesium 2.1   Phosphorus 2.9   Albumin 2.9 (L)   Protein Total 6.5 (L)   Bilirubin Total 0.2   Alkaline Phosphatase 101   ALT 20   AST 14   Glucose 104 (H)   WBC 4.7   Hemoglobin 8.8 (L)   Hematocrit 28.3 (L)   Platelet Count 283   RBC Count 3.20 (L)   MCV 88   MCH 27.5   MCHC 31.1 (L)   RDW 18.6 (H)   Diff Method Automated Method   % Neutrophils 79.4   % Lymphocytes 8.3   % Monocytes 10.4   % Eosinophils 0.0   % Basophils 0.6   % Immature Granulocytes 1.3   Nucleated RBCs 0   Absolute Neutrophil 3.7   Absolute Lymphocytes 0.4 (L)   Absolute Monocytes 0.5   Absolute Eosinophils 0.0   Absolute Basophils 0.0   Abs Immature Granulocytes 0.1   Absolute Nucleated RBC 0.0       Assessment and Plan:  1. 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 Now s/p 4 cycles of neoadjuvant Doxil/Ifos, last cycle given 6/20/18. Course complicated by nausea, vomiting, dehydration, and electrolyte abnormalities along with likely ifosfamide neurotoxicity so if this treatment is needed adjuvantly would recommend it is done inpatient.    He is meeting with Dr. Betts, rad onc, and Dr. Johnson to discuss timing of radiation and surgery. He will also need a colonoscopy around this time due to concern for FDG avid activity on recent PETCT.     2. FEN  Continues to improve is oral intake after hospital discharge. Continue to struggle with dysgeusia and discussed Miracle Berry and Zinc for this. Weight is improved today. Continue to push fluids. No need for IVF today and he is asymptomatic from slightly lower BP.    Previously with multiple electrolyte abnormalities including potassium to 2.2 with EKG changes. Was on both potassium and phos supplement, though admits today he hasn't been taking it consistently. Creat and all electrolytes WNL today. Can stop supplementation as long as he continues to eat  well. Recheck next week.    3. GI  Previously with significant nausea and vomiting along with constipation. Now all resolved and not needing any antiemetics. Still using Miralax and Senna PRN. Discussed aiming for one soft bowel movement daily. Can use Zyprexa still at bedtime if struggling with sleep or anorexia.    Continue MMW for mouth soreness and one posterior lesion.    4. Ortho  Right leg mass with open wound that per ortho is stable and healing as well as to be expected. He is getting an MRI later today for radiation planning. No bleed into tumor on prior CT.     For pain control, continue Tylenol PRN and discussed using Oxycodone more often to provider better comfort, especially before bed. Continue leg elevation and start compression stockings for peripheral edema.    5. Vascular/Heme  Previously diagnosed with incidental PE. Continue Xarelto 20mg daily-no current bleeding concerns. Was a concern for bleeding inpatient with decrease in hgb to 6.9 but CT head and CT femur negative. Hgb improved today-most likely bone marrow recovery from chemotherapy.    WBC and plt also improving. No longer neutropenic (did receive Neulasta late-given 6/30).    6. Neuro  Previously with aphasia and slurred speech. Head CT negative. Thought most likely 2/2 ifosfamide toxicity. All symptoms improved today and he is much clearer during the visit. No further falls. Continue to monitor if repeat treatment in the future.    7. Derm  Does have grade 1-2 hand/foot syndrome with mild swelling, erythema, dry skin, and tenderness. This is improving and should continue to improve of Doxil. Start Udder Cream daily.      Ignacio Ramirez PA-C  [facility]  [address] [place] [address]  [phone]

## 2018-07-10 NOTE — PROGRESS NOTES
Radiation Therapy Patient Education    Person involved with teaching: Patient and Wife    Patient educational needs for self management of treatment-related side effects assessment completed.  [facility] Patient Ed tab contains Patient Learning Assessment    Education Materials Given  Radiation Therapy and You and Skin Care During Radiation Treatment    Educational Topics Discussed  Side effects expected, Skin care and When to call MD/RN    Response To Teaching  Verbalizes understanding    Referrals sent: None    Chemotherapy?  No

## 2018-07-10 NOTE — MR AVS SNAPSHOT
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 Bring a list of your current medicines to your exam (including vitamins, minerals and over-the-counter drugs).  You may or may not receive intravenous (IV) contrast for this exam pending the discretion of the Radiologist.  You do not need to do anything special to prepare.  The MRI machine uses a strong magnet. Please wear clothes without metal (snaps, zippers). A sweatsuit works well, or we may give you a hospital gown.  Please remove any body piercings and hair extensions before you arrive. You will also remove watches, jewelry, hairpins, wallets, dentures, partial dental plates and hearing aids. You may wear contact lenses, and you may be able to wear your rings. We have a safe place to keep your personal items, but it is safer to leave them at home.  **IMPORTANT** THE INSTRUCTIONS BELOW ARE ONLY FOR THOSE PATIENTS WHO HAVE BEEN PRESCRIBED SEDATION OR GENERAL ANESTHESIA DURING THEIR MRI PROCEDURE:  IF YOUR DOCTOR PRESCRIBED ORAL SEDATION (take medicine to help you relax during your exam):   You must get the medicine from your doctor (oral medication) before you arrive. Bring the medicine to the exam. Do not take it at home. You ll be told when to take it upon arriving for your exam.    Arrive one hour early. Bring someone who can take you home after the test. Your medicine will make you sleepy. After the exam, you may not drive, take a bus or take a taxi by yourself.  IF YOUR DOCTOR PRESCRIBED IV SEDATION:   Arrive one hour early. Bring someone who can take you home after the test. Your medicine will make you sleepy. After the exam, you may not drive, take a bus or take a taxi by yourself.   No eating 6 hours before your exam. You may have clear liquids up until 4 hours before your exam. (Clear liquids include water, clear tea, black coffee and fruit juice without pulp.)  IF YOUR DOCTOR PRESCRIBED ANESTHESIA (be asleep for your exam):   Arrive 1 1/2 hours early. Bring someone who can take you home after the test. You may not drive, take a bus or take a taxi by yourself.   No eating 8 hours before your exam. You may have clear liquids up until 4 hours before your exam. (Clear liquids include water, clear tea, black coffee and fruit juice without pulp.)   You will spend four to five hours in the recovery room.  Please call the Imaging Department at your exam site with any questions.            Jul 17, 2018  8:00 AM CDT   [facility] Lab Draw with  [email or web address] LAB DRAW   [facility] Draw ([facility])    [address] [place] [address]  [address] [place] [id number] [phone]            Jul 17, 2018  8:30 AM CDT   (Arrive by 8:15 AM)   Return Visit with Gaurang Johnson MD   [facility] ([facility])    [address] [place] [address]  [address] [place] [id number] [phone]              Future tests that were ordered for you today     Open Future Orders        Priority Expected Expires Ordered    MR Femur Right w/o & w Contrast Routine  7/9/2019 7/9/2018            Who to contact     If you have questions or need follow up information about today's clinic visit or your schedule please contact [facility]  "CL[facility] directly at [phone].  Normal or non-critical lab and imaging results will be communicated to you by MyChart, letter or phone within 4 business days after the clinic has received the results. If you do not hear from us within 7 days, please contact the clinic through MyChart or phone. If you have a critical or abnormal lab result, we will notify you by phone as soon as possible.  Submit refill requests through [employer] or call your pharmacy and they will forward the refill request to us. Please allow 3 business days for your refill to be completed.          Additional Information About Your Visit        Care EveryWhere ID     This is your Care EveryWhere ID. This could be used by other organizations to access your [facility] medical records  [id number]        Your Vitals Were     Pulse Temperature Respirations Height Pulse Oximetry BMI (Body Mass Index)    114 98.7  F (37.1  C) (Oral) 16 1.778 m (5' 10\") 98% 25.12 kg/m2       Blood Pressure from Last 3 Encounters:   07/10/18 98/67   07/10/18 99/56   07/03/18 (!) 85/57    Weight from Last 3 Encounters:   07/10/18 79.4 kg (175 lb)   07/10/18 79.4 kg (175 lb 1.6 oz)   07/03/18 77.2 kg (170 lb 1.6 oz)              We Performed the Following     *CBC with platelets differential     Comprehensive metabolic panel     Magnesium     Phosphorus          Today's Medication Changes          These changes are accurate as of 7/10/18 10:40 AM.  If you have any questions, ask your nurse or doctor.               These medicines have changed or have updated prescriptions.        Dose/Directions    rivaroxaban ANTICOAGULANT 20 MG Tabs tablet   Commonly known as:  XARELTO   This may have changed:  Another medication with the same name was removed. Continue taking this medication, and follow the directions you see here.   Used for:  Other pulmonary embolism without acute cor pulmonale, unspecified chronicity (H), 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), Lesion of colon, Pain of " right lower extremity, Personal history of tobacco use, presenting hazards to health, Idiopathic gout, unspecified chronicity, unspecified site, Pulmonary nodules   Changed by:  Ignacio Ramirez PA        Dose:  20 mg   Take 1 tablet (20 mg) by mouth daily (with dinner)   Quantity:  30 tablet   Refills:  3         Stop taking these medicines if you haven't already. Please contact your care team if you have questions.     granisetron 1 MG tablet   Commonly known as:  KYTRIL   Stopped by:  Ignacio Ramirez PA           LORazepam 1 MG tablet   Commonly known as:  ATIVAN   Stopped by:  Ignacio Ramirez PA           nystatin 973126 UNIT/ML suspension   Commonly known as:  MYCOSTATIN   Stopped by:  Ignacio Ramirez PA           omeprazole 20 MG CR capsule   Commonly known as:  priLOSEC   Stopped by:  Ignacio Ramirez PA           ondansetron 8 MG tablet   Commonly known as:  ZOFRAN   Stopped by:  Ignacio Ramirez PA           phosphorus tablet 250 mg 250 MG per tablet   Commonly known as:  PHOSPHA 250 NEUTRAL   Stopped by:  Ignacio Ramirez PA           potassium chloride SA 10 MEQ CR tablet   Commonly known as:  K-DUR/KLOR-CON M   Stopped by:  Ignacio Ramirez PA           prochlorperazine 10 MG tablet   Commonly known as:  COMPAZINE   Stopped by:  Ignacio Ramirez PA           PROCHLORPERAZINE MALEATE PO   Stopped by:  Ignacio Ramirez PA           traMADol 50 MG tablet   Commonly known as:  ULTRAM   Stopped by:  Ignacio Ramirez PA                    Primary Care Provider Office Phone # Fax #    Louisa Fry [phone]       [facility] [address]  [place] [address]        Equal Access to Services     [facility] RANDA AH: Juarez hutchisono Sonikita, waaxda luqadaha, qaybta kaalmada adeegyada, waxay fabiola mcnair. So [facility] [phone].    ATENCIÓN: Si habla español, tiene a sheridan disposición servicios gratuitos de asistencia  lingüística. Markos al [phone].    We comply with applicable federal civil rights laws and [place] laws. We do not discriminate on the basis of race, color, national origin, age, disability, sex, sexual orientation, or gender identity.            Thank you!     Thank you for choosing [facility]  for your care. Our goal is always to provide you with excellent care. Hearing back from our patients is one way we can continue to improve our services. Please take a few minutes to complete the written survey that you may receive in the mail after your visit with us. Thank you!             Your Updated Medication List - Protect others around you: Learn how to safely use, store and throw away your medicines at www.disposemymeds.org.          This list is accurate as of 7/10/18 10:40 AM.  Always use your most recent med list.                   Brand Name Dispense Instructions for use Diagnosis    lidocaine (viscous) 2 % solution    XYLOCAINE          lidocaine visc 2% 2.5mL/5mL & maalox/mylanta w/ simeth 2.5mL/5mL & diphenhydrAMINE 5mg/5mL Susp suspension    [facility]    240 mL    Swish and swallow 10 mLs in mouth every 6 hours as needed for mouth sores    Sarcoma (H)       OLANZapine 5 MG tablet    zyPREXA    30 tablet    Take 1 tablet (5 mg) by mouth At Bedtime    Chemotherapy-induced nausea and vomiting       oxyCODONE IR 5 MG tablet    ROXICODONE    20 tablet    Take 1 tablet (5 mg) by mouth every 4 hours as needed for moderate to severe pain    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)       polyethylene glycol Packet    MIRALAX/GLYCOLAX    7 packet    Take 17 g by mouth daily    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)       rivaroxaban ANTICOAGULANT 20 MG Tabs tablet    XARELTO    30 tablet    Take 1 tablet (20 mg) by mouth daily (with dinner)    Other pulmonary embolism without acute cor pulmonale, unspecified chronicity (H), 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), Lesion of colon,  Pain of right lower extremity, Personal history of tobacco use, presenting hazards to health, Idiopathic gout, unspecified chronicity, unspecified site, Pulmonary nodules

## 2018-07-10 NOTE — MR AVS SNAPSHOT
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)    -  1       Follow-ups after your visit        Your next 10 appointments already scheduled     Jul 17, 2018  8:00 AM CDT   [facility] with  [facility]   [facility] ([facility])    [address]  [address]  [facility] [address]   [phone]            Jul 17, 2018  8:30 AM CDT   (Arrive by 8:15 AM)   Return Visit with Gaurang Johnson MD   [facility] ([facility])    [address]  [address]  [place] [address]   [phone]            Jul 17, 2018  9:00 AM CDT   EXTERNAL RADIATION TREATMENT with [facility] RAD ONC FELIPE   Radiation Oncology Clinic ([facility])    [facility]  1st Floor  [address] [address]   [phone]            Jul 17, 2018  9:30 AM CDT   ON TREATMENT VISIT with Kimberley Solo MD   Radiation Oncology Clinic ([facility])    [facility]  1st Floor  [address] [address]   [phone]            Jul 18, 2018 11:00 AM CDT   EXTERNAL RADIATION TREATMENT with [facility] RAD ONC FELIPE   Radiation Oncology Clinic ([facility])    [facility]  1st Floor  [address] [address]   [phone]            Jul 19, 2018 11:00 AM CDT   EXTERNAL RADIATION TREATMENT with [facility] RAD ONC FELIPE   Radiation Oncology Clinic ([facility])    [facility]  1st Floor  [address] [address]   [phone]            Jul 20, 2018 11:00 AM CDT    EXTERNAL RADIATION TREATMENT with [facility] RAD ONC FELIPE   Radiation Oncology Clinic ([facility])    [facility]  1st Floor  [address] [address]   [phone]            Jul 23, 2018 11:00 AM CDT   EXTERNAL RADIATION TREATMENT with [facility] RAD ONC FELIPE   Radiation Oncology Clinic ([facility])    [facility]  1st Floor  [address] [facility] [address]   [phone]            Jul 24, 2018 11:00 AM CDT   EXTERNAL RADIATION TREATMENT with [facility] RAD ONC FELIPE   Radiation Oncology Clinic ([facility])    [facility]  1st Floor  [address] [facility] [address]   [phone]            Jul 24, 2018 11:30 AM CDT   ON TREATMENT VISIT with Kimberley Solo MD   Radiation Oncology Clinic ([facility])    [facility]  1st Floor  [address] [address]   [phone]              Future tests that were ordered for you today     Open Future Orders        Priority Expected Expires Ordered    MR Femur Right w/o & w Contrast Routine  7/9/2019 7/9/2018            Who to contact     Please call your clinic at [phone] to:    Ask questions about your health    Make or cancel appointments    Discuss your medicines    Learn about your test results    Speak to your doctor            Additional Information About Your Visit        Care EveryWhere ID     This is your Care EveryWhere ID. This could be used by other organizations to access your [place] medical records  [id number]        Your Vitals Were     Pulse BMI (Body Mass Index)                88 25.11 kg/m2           Blood Pressure from Last 3 Encounters:   07/10/18 98/67   07/10/18 99/56   07/03/18 (!) 85/57    Weight from Last 3 Encounters:   07/10/18 79.4 kg (175 lb)   07/10/18 79.4 kg (175 lb 1.6 oz)   07/03/18 77.2 kg (170 lb 1.6 oz)              Today, you had the following      No orders found for display         Today's Medication Changes          These changes are accurate as of 7/10/18  3:48 PM.  If you have any questions, ask your nurse or doctor.               These medicines have changed or have updated prescriptions.        Dose/Directions    rivaroxaban ANTICOAGULANT 20 MG Tabs tablet   Commonly known as:  XARELTO   This may have changed:  Another medication with the same name was removed. Continue taking this medication, and follow the directions you see here.   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 Please contact your care team if you have questions.     granisetron 1 MG tablet   Commonly known as:  KYTRIL   Stopped by:  Ignacio Ramirez PA           LORazepam 1 MG tablet   Commonly known as:  ATIVAN   Stopped by:  Ignacio Ramirez PA           nystatin 329484 UNIT/ML suspension   Commonly known as:  MYCOSTATIN   Stopped by:  Ignacio Ramirez PA           omeprazole 20 MG CR capsule   Commonly known as:  priLOSEC   Stopped by:  Ignacio Ramirez PA           ondansetron 8 MG tablet   Commonly known as:  ZOFRAN   Stopped by:  Ignacio Ramirez PA           phosphorus tablet 250 mg 250 MG per tablet   Commonly known as:  PHOSPHA 250 NEUTRAL   Stopped by:  Ignacio Ramirez PA           potassium chloride SA 10 MEQ CR tablet   Commonly known as:  K-DUR/KLOR-CON M   Stopped by:  Ignacio Ramirez PA           prochlorperazine 10 MG tablet   Commonly known as:  COMPAZINE    Stopped by:  Ignacio Ramirez PA           PROCHLORPERAZINE MALEATE PO   Stopped by:  Ignacio Ramirez PA           traMADol 50 MG tablet   Commonly known as:  ULTRAM   Stopped by:  Ignacio Ramirez PA                    Primary Care Provider Office Phone # Fax #    Louisa ROSHAN Fry [phone]       [facility] [address]  [facility] [address]        Equal Access to Services     [facility] : Hadii aad ku hadasho Soomaali, waaxda luqadaha, qaybta kaalmada adeegyada, waxay idiin hayaan adeeg kharash la'oren . So [facility] [phone].    ATENCIÓN: Si habla español, tiene a sheridan disposición servicios gratuitos de asistencia lingüística. [facility] [phone].    We comply with applicable federal civil rights laws and [place] laws. We do not discriminate on the basis of race, color, national origin, age, disability, sex, sexual orientation, or gender identity.            Thank you!     Thank you for choosing [facility]Y CLINIC  for your care. Our goal is always to provide you with excellent care. Hearing back from our patients is one way we can continue to improve our services. Please take a few minutes to complete the written survey that you may receive in the mail after your visit with us. Thank you!             Your Updated Medication List - Protect others around you: Learn how to safely use, store and throw away your medicines at www.disposemymeds.org.          This list is accurate as of 7/10/18  3:48 PM.  Always use your most recent med list.                   Brand Name Dispense Instructions for use Diagnosis    lidocaine (viscous) 2 % solution    XYLOCAINE          lidocaine visc 2% 2.5mL/5mL & maalox/mylanta w/ simeth 2.5mL/5mL & diphenhydrAMINE 5mg/5mL Susp suspension    [facility]    240 mL    Swish and swallow 10 mLs in mouth every 6 hours as needed for mouth sores    Sarcoma (H)       OLANZapine 5 MG tablet    zyPREXA    30 tablet    Take 1 tablet (5 mg)  by mouth At Bedtime    Chemotherapy-induced nausea and vomiting       oxyCODONE IR 5 MG tablet    ROXICODONE    20 tablet    Take 1 tablet (5 mg) by mouth every 4 hours as needed for moderate to severe pain    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

## 2018-07-10 NOTE — PROGRESS NOTES
"  HPI  INITIAL PATIENT ASSESSMENT    Diagnosis: sarcoma right thigh    Prior radiation therapy: None    Prior chemotherapy:     Prior hormonal therapy:No    Pain Eval:  Current history of pain associated with this visit:   Intensity: 3/10  Current: aching  Location: right thigh  Treatment: acetaminophen, occasional oxycodone- not daily    Psychosocial  Living arrangements: with wife    Fall Risk: ambulates with assistive device   referral needs: Not needed    Advanced Directive: No  Implantable Cardiac Device? No    Nurse face-to-face time: Level 3:  10 min face to face time    Review of Systems   Constitutional: Negative for chills, fever and weight loss.   HENT: Positive for congestion. Negative for hearing loss.    Eyes: Negative for blurred vision.   Respiratory: Negative for sputum production and shortness of breath.    Cardiovascular: Negative for chest pain.   Gastrointestinal: Negative for heartburn and nausea.   Genitourinary: Negative for dysuria and urgency.   Musculoskeletal: Positive for myalgias (pain right thigh, \"shooting pain\" on diagnosis has lessened). Negative for neck pain.   Skin:        Wound care twice weekly by [facility] care.  Minimal drainage he says.   Neurological: Negative for dizziness, tingling, tremors and headaches.   Endo/Heme/Allergies: Does not bruise/bleed easily.   Psychiatric/Behavioral: Negative for depression. The patient is not nervous/anxious.                "

## 2018-07-10 NOTE — PROGRESS NOTES
RADIATION ONCOLOGY CONSULT NOTE  DATE OF CONSULTATION:July 10, 2018    PATIENT NAME: Hiram Tapia  MRN: [id number]  : 1960    REFERRAL:  Gaurang Johnson    REASON FOR CONSULTATION:Discussion of neoadjuvant radiotherapy for high grade UPS of the right upper thigh.      Mell is a 57-year-old gentleman initially seen in consultation with Dr. Eckert prior to neoadjuvant chemotherapy on 3/23/2018.  HPI is accepted from this note with additions from chart review and patient interview.    HISTORY OF PRESENT ILLNESS:  Mr. 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 In brief, the patient first presented to care on 18 with the CCs of swelling and discomfort in his right thigh. These symptoms were reported to have been persistent for 4 months, ever since 10/2018 when the patient accidentally slid into the tailgate of his truck and injured the lateral aspect of his right leg. After this incident, the patient initially noted some tenderness over the impacted area, but progressive swelling ensued and he  developed decreased active range of motion in his right leg. He underwent further workup with MRI of the RLE on 2018, which revealed a 17.5cm complex intramuscular fluid collection along the vastus intermedius suggestive of a cystic neoplasm. An element of intramuscular hematoma was also noted. Lifting of the periosteum on the ventral surface of the mid-femur over a distance of approximately 3cm was seen.  The patient was then referred to Dr. Alpesh laughlin at [facility], who recommended that his lesion be further evaluated by open biopsy. This biopsy was completed on 3/8/2018, with an intraoperative frozen section suggesting high grade sarcoma. Final pathology confirmed the tumor to be sarcoma, likely UPS. The patient then underwent a staging PET/CT on 3/17/2018, which showed a hypermetabolic 12 x 9cm necrotic lesion in the patient s  vastus lateralis, but no evidence of distant metastasis. Additionally as seen on the axial image below there was an open draining wound originating from the sarcoma through the skin.       He then underwent a repeat PET/CT on 4/21/18 after 1 cycle of doxil/ifosfamide showing an interval partial metabolic resolution.  Additionally there was a 1.2 cm mildly FDG avid pulmonary nodule in the right lung base.         He then underwent another repeat PET/CT on 6/18/2018 after 3 cycles of doxil/ifosfamide.  Which again showed partial metabolic resolution of the right thigh sarcoma.  Additionally there was a central focus of FDG avidity in the left transverse colon with SUV max of 7.8.  Colonoscopy was recommended  and is scheduled for 7/18/18.         After multiplanar discussion the decision was made to proceed with after additional discussion decision was made to proceed with preoperative radiotherapy versus postoperative radiotherapy given his visualized above the open wound tract draining from his surgical which is not resolved over the time course of chemotherapy.  As such he seen today for consultation for discussion of these of adjunctive radiation therapy in the management of his sarcoma.     On review today the patient reports that he is in his usual state of health.  He reports that he has been having bilateral leg swelling for quite some time.  He also reports that he has for several months since the start of neoadjuvant chemotherapy has had a chronic open draining wound which is being managed by wound care nurse nurse twice weekly.  He reports tenderness at the site of the sarcoma with vigorous palpation.  He denies fevers chills or constitutional symptoms.  He has no systemic signs of infection.  He reports he has chronic rhinorrhea as a side effect of chemotherapy.  Furthermore he reports significant fatigue as a residual side effect of chemotherapy. He is having an MRI of the right lower extremity today for  "planning purposes.    REVIEW OF SYSTEMS: A 10 point review of systems was obtained. Pertinent findings are noted in the HPI and nursing note from today, but are otherwise unremarkable.     CHEMOTHERAPY HISTORY:   Doxorubicin liposome (Doxil) IV + Ifosfamide (with mesna support): x 4 cycles [3/29/18, 4/24/18, 5/22/18,6/20/18]    RADIATION THERAPY HISTORY: None    IMPLANTABLE CARDIAC DEVICE: None    PAST MEDICAL /SURGICAL HISTORY:  Past Medical History:   Diagnosis Date     Sarcoma (H)      Past Surgical History:   Procedure Laterality Date     EXCISE SOFT TISSUE TUMOR THIGH Right 3/8/2018    Procedure: EXCISE SOFT TISSUE TUMOR THIGH;  Biopsy Right Thigh Tumor;  Surgeon: Brad Betts MD;  Location: [facility] OR     INSERT PORT VASCULAR ACCESS N/A 3/28/2018    Procedure: INSERT PORT VASCULAR ACCESS;  Vascular Access Port Insertion with C-arm;  Surgeon: Sarah Bowie MD;  Location: [facility] OR     IRRIGATION AND DEBRIDEMENT LOWER EXTREMITY, COMBINED Right 4/6/2018    Procedure: COMBINED IRRIGATION AND DEBRIDEMENT LOWER EXTREMITY;  Irrigation And Debridement Right Thigh ;  Surgeon: Brad Betts MD;  Location: [facility] OR     IRRIGATION AND DEBRIDEMENT LOWER EXTREMITY, COMBINED Right 4/9/2018    Procedure: COMBINED IRRIGATION AND DEBRIDEMENT LOWER EXTREMITY;  Irrigation And Debridement Right Thigh Wound. with Wound VAC Exchange;  Surgeon: Brad Betts MD;  Location: [facility] OR     STRABISMUS SURGERY      as a child       ALLERGIES:  Allergies as of 07/10/2018 - Jose as Reviewed 07/10/2018   Allergen Reaction Noted     Hydrofera blue 4\"x4\" [wound dressings] Dermatitis and Blisters 07/03/2018     Tegaderm ag mesh [silver] Dermatitis and Blisters 07/03/2018       MEDICATIONS:  Current Outpatient Prescriptions   Medication Sig Dispense Refill     oxyCODONE IR (ROXICODONE) 5 MG tablet Take 1 tablet (5 mg) by mouth every 4 hours as needed for moderate to severe pain 20 tablet 0     rivaroxaban ANTICOAGULANT (XARELTO) 20 MG " TABS tablet Take 1 tablet (20 mg) by mouth daily (with dinner) 30 tablet 3     lidocaine, viscous, (XYLOCAINE) 2 % solution        magic mouthwash suspension (diphenhydrAMINE, lidocaine, aluminum-magnesium & simethicone) Swish and swallow 10 mLs in mouth every 6 hours as needed for mouth sores (Patient not taking: Reported on 7/10/2018) 240 mL 1     OLANZapine (ZYPREXA) 5 MG tablet Take 1 tablet (5 mg) by mouth At Bedtime (Patient not taking: Reported on 7/10/2018) 30 tablet 0     polyethylene glycol (MIRALAX/GLYCOLAX) Packet Take 17 g by mouth daily (Patient not taking: Reported on 7/10/2018) 7 packet 1     [DISCONTINUED] XARELTO STARTER PACK 15 & 20 MG TBPK           FAMILY HISTORY:  Family History   Problem Relation Age of Onset     Leukemia Father        SOCIAL HISTORY:  Social History     Social History     Marital status:      Spouse name: N/A     Number of children: N/A     Years of education: N/A     Occupational History     Not on file.     Social History Main Topics     Smoking status: Former Smoker     Packs/day: 1.00     Years: 10.00     Types: Cigarettes     Start date: 1/1/1975     Quit date: 1/1/1990     Smokeless tobacco: Never Used     Alcohol use 8.4 oz/week     14 Cans of beer per week     Drug use: No     Sexual activity: Not Currently     Partners: Female     Other Topics Concern     Not on file     Social History Narrative       PHYSICAL EXAM:  Vital Signs: BP 98/67  Pulse 88  Wt 79.4 kg (175 lb)  BMI 25.11 kg/m2  Gen: Alert, in NAD  Eyes: EOMI, sclera anicteric  HENT      Head: NC/AT     Ears: No external auricular lesions     Nose/sinus: Rhinorrhea clear thin liquid.  No epistaxis     Oral Cavity/Oropharynx: MMM, no thrush noted  Pulm: Breathing comfortably on room air, no audible wheezes or ronchi  CV: Well-perfused, Patient has bilateral 1+ edema  Skin: Normal color and turgor, erythema noted around wound site  Neurologic: Antalgic gait.  Patient normally walks with a crutch.    Patient has 5 out of 5 strength to flexion extension abduction and adduction of the lower extremity at the hip bilaterally he reports subjectively decreased strength of the right of the right lower extremity at the hip versus the left lower extremity.  MSK: Tenderness to vigorous palpation over the right upper thigh in the region of the cervical    ECOG PERFORMANCE STATUS:1 (0 prior to chemotherapy), patient reports profound fatigue at this time and gait related pain to the point at which he spends most of his time sitting.       RADIOLOGY AND LABORATORIES: Relevant studies reviewed as above outlined in HPI.     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  He is an appropriate candidate for adjunctive radiotherapy either pre-or postoperatively in the curative management of his sarcoma.    RECOMMENDATION:  After long discussion of the risks and benefits associated with adjunctive radiotherapy for the treatment of sarcoma we recommend preoperative radiotherapy to a dose of 50 gray to the right thigh.  The rationale, logistics, risks, benefits, and potential side effects of the proposed treatment were reviewed in detail.     He has an open wound which will not close until the tumor is resected.  We considered surgery followed by postoperative radiotherapy but the dose is higher ( 60 + Gy ) in that situation, the field is bigger and because of the proximity to the femur and femoral head he would be at even higher and significant risk of patholgic fracture of the hip/femur.  We think that preoperative radiotherapy is a better option. He understands that  the wound may enlarge during the radiotherapy and he may have issues with infection or bleeding from this wound.     The patient had many questions during our conversation today, which were answered to the best of our ability. By the end of our consultation today, the patient decided to proceed with plans for treatment and signed an informed consent to that effect.     We simulated the patient and her department today.  We will plan to start treatment 7/17/2018.  We do not anticipate for any concurrent chemotherapy however we will defer this this decision to Dr. Johnson.     The patient was seen and discussed with staff, Dr. Solo. Thank you for involving us in the care of this patient.  Please feel free to contact us with questions or concerns at any time.    TENTATIVE PLAN:  Prescription: 5000 cGy via 6MV megavoltage photons  Fractionation: 25 daily fractions, 200 cGy/ fx   Technique: IMRT (Tomotherapy)  To spare the femoral head and femur  As well as genitalia  Target volume: PTV 5000: GTV + 3cm sup/inf   Image Guidance: Daily fan beam MV CT  Simulation: kv CT    Murphy Ricardo MD  Radiation Oncology Resident, PGY-4  [facility]  Phone: [phone]     I was personally present with the resident during the history and exam.  I discussed the case with the resident and agree with the findings and plan of care as documented in the resident's note.    The risks and benefits of radiotherapy were discussed with the patient.  All questions were answered.      Please do not hesitate to call me if you have questions or concerns.    Thank you for allowing me to participate in the care of this patient.     Kimberley Solo MD  Pager  [phone]  [facility]   [phone]  [facility]    CC  Patient Care Team:  Louisa Fry MD as PCP - General (Emergency Medicine)  Isaiah Paulson MD as MD (Family Practice)  Brad Betts MD as MD (Orthopedics)  [facility]  [facility] ([facility] ([place]  Rubens Mckeon, RN as Nurse Coordinator (Oncology)  Gaurang Johnson MD as MD (Hematology & Oncology)  GAURANG JOHNSON        CC  Patient Care Team:  Louisa Fry MD as PCP - General (Emergency Medicine)  Isaiah Paulson MD as MD (Family Practice)  Brad Betts MD as MD (Orthopedics)  [facility] [facility] ([facility]  Rubens Mckeon, RN as Nurse Coordinator (Oncology)  Gaurang Johnson MD as MD (Hematology & Oncology)

## 2018-07-17 ENCOUNTER — ONCOLOGY VISIT (OUTPATIENT)
Dept: ONCOLOGY | Facility: CLINIC | Age: 58
End: 2018-07-17
Attending: INTERNAL MEDICINE
Payer: COMMERCIAL

## 2018-07-17 ENCOUNTER — APPOINTMENT (OUTPATIENT)
Dept: RADIATION ONCOLOGY | Facility: CLINIC | Age: 58
End: 2018-07-17
Attending: RADIOLOGY
Payer: COMMERCIAL

## 2018-07-17 ENCOUNTER — APPOINTMENT (OUTPATIENT)
Dept: LAB | Facility: CLINIC | Age: 58
End: 2018-07-17
Attending: INTERNAL MEDICINE
Payer: COMMERCIAL

## 2018-07-17 VITALS
OXYGEN SATURATION: 98 % | TEMPERATURE: 98.8 F | HEART RATE: 114 BPM | RESPIRATION RATE: 16 BRPM | DIASTOLIC BLOOD PRESSURE: 71 MMHG | HEIGHT: 70 IN | BODY MASS INDEX: 24.45 KG/M2 | WEIGHT: 170.8 LBS | SYSTOLIC BLOOD PRESSURE: 101 MMHG

## 2018-07-17 VITALS — BODY MASS INDEX: 24.54 KG/M2 | WEIGHT: 171 LBS

## 2018-07-17 DIAGNOSIS — D63.0 ANEMIA IN NEOPLASTIC DISEASE: Primary | ICD-10-CM

## 2018-07-17 DIAGNOSIS — C49.21 SOFT TISSUE SARCOMA OF RIGHT THIGH (H): ICD-10-CM

## 2018-07-17 DIAGNOSIS — C49.21 SOFT TISSUE SARCOMA OF RIGHT THIGH (H): Primary | ICD-10-CM

## 2018-07-17 DIAGNOSIS — M10.00 IDIOPATHIC GOUT, UNSPECIFIED CHRONICITY, UNSPECIFIED SITE: ICD-10-CM

## 2018-07-17 DIAGNOSIS — M79.604 PAIN OF RIGHT LOWER EXTREMITY: ICD-10-CM

## 2018-07-17 DIAGNOSIS — Z87.891 PERSONAL HISTORY OF TOBACCO USE, PRESENTING HAZARDS TO HEALTH: ICD-10-CM

## 2018-07-17 DIAGNOSIS — T81.328D: ICD-10-CM

## 2018-07-17 DIAGNOSIS — K63.9 LESION OF COLON: ICD-10-CM

## 2018-07-17 DIAGNOSIS — I26.99 OTHER PULMONARY EMBOLISM WITHOUT ACUTE COR PULMONALE, UNSPECIFIED CHRONICITY (H): ICD-10-CM

## 2018-07-17 LAB
ALBUMIN SERPL-MCNC: 3.3 G/DL (ref 3.4–5)
ALP SERPL-CCNC: 109 U/L (ref 40–150)
ALT SERPL W P-5'-P-CCNC: 18 U/L (ref 0–70)
ANION GAP SERPL CALCULATED.3IONS-SCNC: 10 MMOL/L (ref 3–14)
AST SERPL W P-5'-P-CCNC: 16 U/L (ref 0–45)
BASOPHILS # BLD AUTO: 0.1 10E9/L (ref 0–0.2)
BASOPHILS NFR BLD AUTO: 1.3 %
BILIRUB SERPL-MCNC: 0.2 MG/DL (ref 0.2–1.3)
BUN SERPL-MCNC: 8 MG/DL (ref 7–30)
CALCIUM SERPL-MCNC: 9.8 MG/DL (ref 8.5–10.1)
CHLORIDE SERPL-SCNC: 107 MMOL/L (ref 94–109)
CO2 SERPL-SCNC: 23 MMOL/L (ref 20–32)
CREAT SERPL-MCNC: 0.8 MG/DL (ref 0.66–1.25)
DIFFERENTIAL METHOD BLD: ABNORMAL
EOSINOPHIL # BLD AUTO: 0 10E9/L (ref 0–0.7)
EOSINOPHIL NFR BLD AUTO: 0 %
ERYTHROCYTE [DISTWIDTH] IN BLOOD BY AUTOMATED COUNT: 19.6 % (ref 10–15)
GFR SERPL CREATININE-BSD FRML MDRD: >90 ML/MIN/1.7M2
GLUCOSE SERPL-MCNC: 94 MG/DL (ref 70–99)
HCT VFR BLD AUTO: 30.9 % (ref 40–53)
HGB BLD-MCNC: 9.7 G/DL (ref 13.3–17.7)
IMM GRANULOCYTES # BLD: 0 10E9/L (ref 0–0.4)
IMM GRANULOCYTES NFR BLD: 0.8 %
LYMPHOCYTES # BLD AUTO: 1.1 10E9/L (ref 0.8–5.3)
LYMPHOCYTES NFR BLD AUTO: 20.3 %
MAGNESIUM SERPL-MCNC: 2.3 MG/DL (ref 1.6–2.3)
MCH RBC QN AUTO: 27.8 PG (ref 26.5–33)
MCHC RBC AUTO-ENTMCNC: 31.4 G/DL (ref 31.5–36.5)
MCV RBC AUTO: 89 FL (ref 78–100)
MONOCYTES # BLD AUTO: 0.8 10E9/L (ref 0–1.3)
MONOCYTES NFR BLD AUTO: 14.8 %
NEUTROPHILS # BLD AUTO: 3.3 10E9/L (ref 1.6–8.3)
NEUTROPHILS NFR BLD AUTO: 62.8 %
NRBC # BLD AUTO: 0 10*3/UL
NRBC BLD AUTO-RTO: 0 /100
PHOSPHATE SERPL-MCNC: 3.6 MG/DL (ref 2.5–4.5)
PLATELET # BLD AUTO: 460 10E9/L (ref 150–450)
POTASSIUM SERPL-SCNC: 4 MMOL/L (ref 3.4–5.3)
PROT SERPL-MCNC: 7.5 G/DL (ref 6.8–8.8)
RBC # BLD AUTO: 3.49 10E12/L (ref 4.4–5.9)
SODIUM SERPL-SCNC: 140 MMOL/L (ref 133–144)
WBC # BLD AUTO: 5.3 10E9/L (ref 4–11)

## 2018-07-17 PROCEDURE — 85025 COMPLETE CBC W/AUTO DIFF WBC: CPT | Performed by: INTERNAL MEDICINE

## 2018-07-17 PROCEDURE — 84100 ASSAY OF PHOSPHORUS: CPT | Performed by: INTERNAL MEDICINE

## 2018-07-17 PROCEDURE — 77386 ZZH IMRT TREATMENT DELIVERY, COMPLEX: CPT | Performed by: RADIOLOGY

## 2018-07-17 PROCEDURE — 83735 ASSAY OF MAGNESIUM: CPT | Performed by: INTERNAL MEDICINE

## 2018-07-17 PROCEDURE — G0463 HOSPITAL OUTPT CLINIC VISIT: HCPCS | Mod: ZF

## 2018-07-17 PROCEDURE — 80053 COMPREHEN METABOLIC PANEL: CPT | Performed by: INTERNAL MEDICINE

## 2018-07-17 PROCEDURE — 99214 OFFICE O/P EST MOD 30 MIN: CPT | Mod: GC | Performed by: INTERNAL MEDICINE

## 2018-07-17 PROCEDURE — 36415 COLL VENOUS BLD VENIPUNCTURE: CPT

## 2018-07-17 RX ORDER — AMOXICILLIN 250 MG
1 CAPSULE ORAL DAILY
COMMUNITY
End: 2018-09-25

## 2018-07-17 ASSESSMENT — PAIN SCALES - GENERAL: PAINLEVEL: MILD PAIN (3)

## 2018-07-17 NOTE — MR AVS SNAPSHOT
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)    -  1       Follow-ups after your visit        Your next 10 appointments already scheduled     Jul 19, 2018 10:30 AM CDT   EXTERNAL RADIATION TREATMENT with [facility] RAD ONC FELIPE   Radiation Oncology Clinic ([facility])    [facility]  1st Floor  [address] [address]   [phone]            Jul 20, 2018 10:30 AM CDT   EXTERNAL RADIATION TREATMENT with UMP RAD ONC FELIPE   Radiation Oncology Clinic ([facility])    [facility]  1st Floor  [address] [address]   [phone]            Jul 23, 2018  9:30 AM CDT   EXTERNAL RADIATION TREATMENT with UMP RAD ONC FELIPE   Radiation Oncology Clinic ([facility])    [facility]  1st Floor  [address] [address]   [phone]            Jul 24, 2018 10:30 AM CDT   EXTERNAL RADIATION TREATMENT with [facility]MP RAD ONC FELIPE   Radiation Oncology Clinic ([facility])    [facility]  1st Floor  [address] [address]   [phone]            Jul 24, 2018 11:00 AM CDT   ON TREATMENT VISIT with Kimberley Solo MD   Radiation Oncology Clinic ([facility])    [facility]  1st Floor  [address] [address]   [phone]            Jul 25, 2018 10:30 AM CDT   EXTERNAL RADIATION TREATMENT with P RAD ONC FELIPE   Radiation Oncology Clinic ([facility])    [facility]  1st Floor  [address] [address]   [phone]            Jul  26, 2018 10:30 AM CDT   EXTERNAL RADIATION TREATMENT with [facility] RAD ONC FELIPE   Radiation Oncology Clinic ([facility])    [facility]  1st Floor  [address] [facility] [address]   [phone]            Jul 27, 2018 10:30 AM CDT   EXTERNAL RADIATION TREATMENT with [facility] RAD ONC FELIPE   Radiation Oncology Clinic ([facility])    [facility]  1st Floor  [address] [facility] [address]   [phone]            Jul 30, 2018 10:30 AM CDT   EXTERNAL RADIATION TREATMENT with [facility] RAD ONC FELIPE   Radiation Oncology Clinic ([facility])    [facility]  1st Floor  [address] [facility] [address]   [phone]            Jul 31, 2018 11:00 AM CDT   ON TREATMENT VISIT with Kimberley Solo MD   Radiation Oncology Clinic ([facility])    [facility]  1st Floor  [address] [facility] [address] [phone]              Who to contact     Please call your clinic at [phone] to:    Ask questions about your health    Make or cancel appointments    Discuss your medicines    Learn about your test results    Speak to your doctor            Additional Information About Your Visit        Care EveryWhere ID     This is your Care EveryWhere ID. This could be used by other organizations to access your [place] medical records  [id number]        Your Vitals Were     BMI (Body Mass Index)                   24.54 kg/m2            Blood Pressure from Last 3 Encounters:   07/17/18 101/71   07/10/18 98/67   07/10/18 99/56    Weight from Last 3 Encounters:   07/17/18 77.6 kg (171 lb)   07/17/18 77.5 kg (170 lb 12.8 oz)   07/10/18 79.4 kg (175 lb)              Today, you had the following     No orders found for display       Primary Care Provider Office Phone # Fax #    Louisa Fry [phone]       [address]  [facility]  [address]        Equal Access to Services     [facility]: Hadii troy martinez alfonso Suárez, wacorettada luqadaha, qahectorta kaamandashaye lamas, mendez rochaquinnmela mcnair. So [facility] [phone].    ATENCIÓN: Si habla español, tiene a sheridan disposición servicios gratuitos de asistencia lingüística. Samiaame al [phone].    We comply with applicable federal civil rights laws and [place] laws. We do not discriminate on the basis of race, color, national origin, age, disability, sex, sexual orientation, or gender identity.            Thank you!     Thank you for choosing RADIATION ONCOLOGY CLINIC  for your care. Our goal is always to provide you with excellent care. Hearing back from our patients is one way we can continue to improve our services. Please take a few minutes to complete the written survey that you may receive in the mail after your visit with us. Thank you!             Your Updated Medication List - Protect others around you: Learn how to safely use, store and throw away your medicines at www.disposemymeds.org.          This list is accurate as of 7/17/18 11:59 PM.  Always use your most recent med list.                   Brand Name Dispense Instructions for use Diagnosis    lidocaine (viscous) 2 % solution    XYLOCAINE          lidocaine visc 2% 2.5mL/5mL & maalox/mylanta w/ simeth 2.5mL/5mL & diphenhydrAMINE 5mg/5mL Susp suspension    [facility] [place]    240 mL    Swish and swallow 10 mLs in mouth every 6 hours as needed for mouth sores    Sarcoma (H)       OLANZapine 5 MG tablet    zyPREXA    30 tablet    Take 1 tablet (5 mg) by mouth At Bedtime    Chemotherapy-induced nausea and vomiting       oxyCODONE IR 5 MG tablet    ROXICODONE    20 tablet    Take 1 tablet (5 mg) by mouth every 4 hours as needed for moderate to severe pain    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)       polyethylene glycol Packet    MIRALAX/GLYCOLAX    7 packet    Take 17 g by mouth daily    Soft tissue sarcoma of right  thigh (H)       rivaroxaban ANTICOAGULANT 20 MG Tabs tablet    XARELTO    30 tablet    Take 1 tablet (20 mg) by mouth daily (with dinner)    Other pulmonary embolism without acute cor pulmonale, unspecified chronicity (H), 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

## 2018-07-17 NOTE — NURSING NOTE
"Oncology Rooming Note    July 17, 2018 8:56 AM   Hiram Tapia is a 57 year old male who presents for:    Chief Complaint   Patient presents with     Blood Draw     labs drawn with vpt by rn.  vs taken     Oncology Clinic Visit     Return : High Grade Sarcoma     Initial Vitals: /71 (BP Location: Right arm, Patient Position: Sitting, Cuff Size: Adult Regular)  Pulse 114  Temp 98.8  F (37.1  C) (Oral)  Resp 16  Ht 1.778 m (5' 10\")  Wt 77.5 kg (170 lb 12.8 oz)  SpO2 98%  BMI 24.51 kg/m2 Estimated body mass index is 24.51 kg/(m^2) as calculated from the following:    Height as of this encounter: 1.778 m (5' 10\").    Weight as of this encounter: 77.5 kg (170 lb 12.8 oz). Body surface area is 1.96 meters squared.  Mild Pain (3) Comment: Upper right leg   No LMP for male patient.  Allergies reviewed: Yes  Medications reviewed: Yes    Medications: Medication refills not needed today.  Pharmacy name entered into [employer]:    [facility] [address] [place] [address] - [facility] [address] [facility] [address]  [address]  [facility] [place], [place] - [address]  [facility] [place], [place] - [address] [facility] [phone]    Clinical concerns: Patient has no other concerns at this time. Dr. Johnson was NOT notified.    10 minutes for nursing intake (face to face time)     Prachi Peña CMA              "

## 2018-07-17 NOTE — LETTER
2018       RE: Hiram Tapia  [address]  [facility] [address]     Dear Colleague,    Thank you for referring your patient, Hiram Tapia, to the RADIATION ONCOLOGY CLINIC. Please see a copy of my visit note below.    [facility]ANS  SPECIALIZING IN BREAKTHROUGHS  Radiation Oncology    On Treatment Visit Note      Hiram Tapia      Date: 2018   MRN: [id number]   : 1960  Diagnosis: Sarcoma      Reason for Visit:  On Radiation Treatment Visit     Treatment Summary to Date  Treatment Site: Right thigh Current Dose: 200/5000 cGy Fractions:            Subjective:  Mr. Tapia is seen today after his first fraction of treatment.     Nursing ROS:   Nutrition Alteration  Diet Type: Patient's Preference  Skin  Skin Reaction: 0 - No changes  Skin Note: Aquaphor    Gastrointestinal  Nausea: 0 - None    Pain Assessment  0-10 Pain Scale: 4  Pain Descriptors: Constant, Dull, Aching  Pain Note: Oxycodone PRN      Objective:   Wt 77.6 kg (171 lb)  BMI 24.54 kg/m2  Gen: Appears well, in no acute distress  Skin: No erythema    Labs:  CBC RESULTS:   Recent Labs   Lab Test  18   0839   WBC  5.3   RBC  3.49*   HGB  9.7*   HCT  30.9*   MCV  89   MCH  27.8   MCHC  31.4*   RDW  19.6*   PLT  460*     ELECTROLYTES:  Recent Labs   Lab Test  18   0839   NA  140   POTASSIUM  4.0   CHLORIDE  107   JAYESH  9.8   CO2  23   BUN  8   CR  0.80   GLC  94       Assessment:    Tolerating radiation therapy well.  All questions and concerns addressed.    Toxicities:  Fatigue: Grade 0: No toxicity    Plan:   1. Continue current therapy.      Mosaiq chart and setup information reviewed  MVCT/IGRT images checked    Medication Review  Med list reviewed with patient?: Yes    Educational Topic Discussed  Education Instructions: Reviewed      Kimberley Solo  [phone] pager      CC  Patient Care Team:  Louisa Fry MD as PCP - General (Emergency Medicine)  Isaiah Paulson MD as MD  (Family Practice)  Brad Betts MD as MD (Orthopedics)  [facility] ([facility]ENCY ([facility]  Rubens Mckeon RN as Nurse Coordinator (Oncology)  Gaurang Johnson MD as MD (Hematology & Oncology)

## 2018-07-17 NOTE — PROCEDURES
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     FUSION    yes/      Patient Position:  Supine      Immobilization:  Vac-loc Immobilization     Contrast:  none     Narrative:  After consent, the patient was placed in the desired position and immobilized  A treatment planning CT scan was performed and reviewed.      Patient was marked for future setup and information was put into the treatment chart.  Setup and identification photos were taken.      Data was transferred to the treatment planning system.    No complication was encountered.     I was present for the critical portion of the simulation     Kimberley Solo M.D.        Therapeutic Radiology-Radiation Oncology:  [facility] [address] [facility] [place] [address]

## 2018-07-17 NOTE — PROGRESS NOTES
Oncology/Hematology Visit Note  Jul 17, 2018    Reason for Visit: follow up of     History of Present Illness: Hiram Tapia is a 57 year old male with  with UPS of the right thigh. He has been on treatment with Doxil plus Ifosfamide since March 2018. Imaging after 3 cycles revealed positive response to treatment though he was noted to have incidental PE and was started on Xarelto. He received cycle 4 on 6/20/18. Admitted 6/26-6/29 for nausea/vomiting, hypokalemia. Please see Dr. Johnson note for full oncologic history. The plan is for him to go to surgery following this cycle.He comes in today for hospital discharge follow up.    Interval History:  Hiram is in infusion today with his wife. He has been taking scheduled Zofran and zyprexa at bedtime, however he has still been having some vomiting. He has had copious sputum and nasal discharge that causes his some nausea. He denies cough--he thinks this is all saliva that he is spitting out, but the taste is salty and he admits it could be post-nasal drip. He has been taking scheduled zofran but takes it after he vomits. Wife states he has been taking zyprexa, although Hiram is not able to confirm. Throat has been sore since chemo, making eating and drinking difficult due to pain. Only eating cereal bars, 4 max/day. Drinking about 64 oz/day of water or gatorade. Having constipation that is managed with miralax.     He had a new dressing on RLE applied today by [facility] in infusion--last one changed 6/28. His leg is now sore. Pain was a little worse after discharge. Taking oxycodone and tylenol. Not using much oxycodone prior to today but car ride and dressing change makes pain worse.    He still has some visual hallucinations of lights and colors. He experiences just flashes now, while in the hospital was constant. No headaches, neuropathy, confusion, slurred speech, vertigo. Only lightheaded if he stands up too quickly.     Got neulasta on 6/30. Felt a little warm  "on Monday, but did not check his temp. Voiding without difficulty. On xarelto but denies any bleeding/bruising.     Review of Systems:  Patient denies any of the following except if noted above: fevers, chills. Feels weak, no falls. No vision or hearing changes, chest pain, dyspnea, urinary concerns,  rashes or skin lesions. Chronic swelling in RLE 2/2 sarcoma.    Current Outpatient Prescriptions   Medication Sig Dispense Refill     Acetaminophen (TYLENOL PO) Take 1,000 mg by mouth daily as needed for mild pain or fever       lidocaine, viscous, (XYLOCAINE) 2 % solution        oxyCODONE IR (ROXICODONE) 5 MG tablet Take 1 tablet (5 mg) by mouth every 4 hours as needed for moderate to severe pain 20 tablet 0     polyethylene glycol (MIRALAX/GLYCOLAX) Packet Take 17 g by mouth daily 7 packet 1     rivaroxaban ANTICOAGULANT (XARELTO) 20 MG TABS tablet Take 1 tablet (20 mg) by mouth daily (with dinner) 30 tablet 3     senna-docusate (SENOKOT-S;PERICOLACE) 8.6-50 MG per tablet Take 1 tablet by mouth daily       magic mouthwash suspension (diphenhydrAMINE, lidocaine, aluminum-magnesium & simethicone) Swish and swallow 10 mLs in mouth every 6 hours as needed for mouth sores (Patient not taking: Reported on 7/10/2018) 240 mL 1     OLANZapine (ZYPREXA) 5 MG tablet Take 1 tablet (5 mg) by mouth At Bedtime (Patient not taking: Reported on 7/10/2018) 30 tablet 0       Physical Examination:  General: The patient is a pleasant male in no acute distress.  /71 (BP Location: Right arm, Patient Position: Sitting, Cuff Size: Adult Regular)  Pulse 114  Temp 98.8  F (37.1  C) (Oral)  Resp 16  Ht 1.778 m (5' 10\")  Wt 77.5 kg (170 lb 12.8 oz)  SpO2 98%  BMI 24.51 kg/m2  Wt Readings from Last 10 Encounters:   07/17/18 77.5 kg (170 lb 12.8 oz)   07/10/18 79.4 kg (175 lb)   07/10/18 79.4 kg (175 lb 1.6 oz)   07/03/18 77.2 kg (170 lb 1.6 oz)   06/27/18 81.4 kg (179 lb 8 oz)   06/20/18 81.2 kg (179 lb)   05/22/18 86.4 kg (190 lb 8 " oz)   05/01/18 91.9 kg (202 lb 8 oz)   04/24/18 89.8 kg (198 lb)   04/21/18 94.3 kg (208 lb)     HEENT: EOMI, PERRL. Sclerae are anicteric. Oral mucosa is pink and moist with no lesions or thrush.   Lymph: Neck is supple with no lymphadenopathy in the cervical or supraclavicular areas.   Heart: Regular rate and rhythm.   Lungs: Clear to auscultation bilaterally.   Abdomen: Bowel sounds present, soft, nontender, nondistended  Extremities: RLE > left. No pitting edema noted bilaterally.   Neuro: Cranial nerves II through XII are grossly intact. Speech clear. Grossly non-focal  Skin: Right thigh with tegaderm dressing intact. No skin erythema, induration. Wound packed with blue sponge. No rashes, petechiae, or bruising noted on exposed skin.    Laboratory Data:  Results for RUT CHAO (MRN [id number]) as of 7/3/2018 14:15   7/3/2018 07:23   Sodium 133   Potassium 2.8 (L)   Chloride 100   Carbon Dioxide 25   Urea Nitrogen 9   Creatinine 0.93   GFR Estimate 83   GFR Estimate If Black >90   Calcium 9.7   Anion Gap 8   Magnesium 2.4 (H)   Phosphorus 1.5 (L)   Glucose 129 (H)   WBC 0.3 (LL)   Hemoglobin 8.1 (L)   Hematocrit 24.5 (L)   Platelet Count 125 (L)   RBC Count 2.98 (L)   MCV 82   MCH 27.2   MCHC 33.1   RDW 16.7 (H)   Diff Method WBC <0.5, Diff no...       Assessment and Plan:    Undifferentiated pleomorphic sarcoma  -Now s/p 4 cycles of Doxil/Ifosfamide. Most recent cycle given 6/20. He finished the cycle on 6/27.  -Received neulasta on 6/30  -Continue twice weekly wound debridement/dressing changes with home nursing.    -Continue outpatient Dr. Betts follow-up, rad onc, and Dr. Johnson follow-up as planned.   -Follow-up in clinic on 7/10    Nausea/vomiting  2/2 chemotherapy  --Continue scheduled Zofran 8mg TID and Zyprexa 5mg at bedtime. He states compazine makes him vomit. Will try ativan prn  --IVF and Emend + Dex given today. Offered infusion on 7/6 but patient reluctant to come in. Will check labs via  home care on 7/5    Mucositis:   2/2 chemo. Affecting throat primarily  --Sent rx for MMW prn. Take oxycodone prior to meals    FEN:   Encouraged protein shakes, carnation instant breakfast, ensure in addition to whatever po intake he can manage  --Hypokalemia: re-ordered KCl as 10mEq tablets as may be smaller. Increased to 20mEq BID  --Hypophosphatemia: does not like taste of phos-Nak. Changed to neutraphos 2 tabs BID        Dyspepsia  -Continue Omeprazole 20mg daily 6/27-discharged on this      Right thigh wound s/p I&D  --Follow up with Dr. Betts.  -Home care to do dressing changes twice weekly      Constipation:  -Miralax QD  -Colace BID    History of PE:  Asymptomatic noted on prior CT. Continue Xarelto 20mg daily.      Pancytopenia:  2/2 to chemo.   --WBC 0.3. Given neulasta on 6/30  --Hgb 8.1. Will give 1 unit today as no infusion availability later this week  --Platelet 125k    Flora Barrientos PA-C  [facility]  [address] [place] [address] [phone]

## 2018-07-17 NOTE — PROGRESS NOTES
[facility] PHYSICIANS  SPECIALIZING IN BREAKTHROUGHS  Radiation Oncology    On Treatment Visit Note      Hiram Tapia      Date: 2018   MRN: [id number]   : 1960  Diagnosis: Sarcoma      Reason for Visit:  On Radiation Treatment Visit     Treatment Summary to Date  Treatment Site: Right thigh Current Dose: 200/5000 cGy Fractions:            Subjective:  Mr. Tapia is seen today after his first fraction of treatment.     Nursing ROS:   Nutrition Alteration  Diet Type: Patient's Preference  Skin  Skin Reaction: 0 - No changes  Skin Note: Aquaphor    Gastrointestinal  Nausea: 0 - None    Pain Assessment  0-10 Pain Scale: 4  Pain Descriptors: Constant, Dull, Aching  Pain Note: Oxycodone PRN      Objective:   Wt 77.6 kg (171 lb)  BMI 24.54 kg/m2  Gen: Appears well, in no acute distress  Skin: No erythema    Labs:  CBC RESULTS:   Recent Labs   Lab Test  18   0839   WBC  5.3   RBC  3.49*   HGB  9.7*   HCT  30.9*   MCV  89   MCH  27.8   MCHC  31.4*   RDW  19.6*   PLT  460*     ELECTROLYTES:  Recent Labs   Lab Test  18   0839   NA  140   POTASSIUM  4.0   CHLORIDE  107   JAYESH  9.8   CO2  23   BUN  8   CR  0.80   GLC  94       Assessment:    Tolerating radiation therapy well.  All questions and concerns addressed.    Toxicities:  Fatigue: Grade 0: No toxicity    Plan:   1. Continue current therapy.      Mosaiq chart and setup information reviewed  MVCT/IGRT images checked    Medication Review  Med list reviewed with patient?: Yes    Educational Topic Discussed  Education Instructions: Reviewed      Kimberley Solo  [phone] pager      CC  Patient Care Team:  Louisa Fry MD as PCP - General (Emergency Medicine)  Isaiah Paulson MD as MD (Family Practice)  Brad Betts MD as MD (Orthopedics)  [facility] (HOME HEALTH AGENCY ([facility]), ([facility]))  Rubens Mckeon RN as Nurse Coordinator (Oncology)  Gaurang Johnson MD as MD (Hematology &  Oncology)  ROMELIA MEDEIROS

## 2018-07-17 NOTE — MR AVS SNAPSHOT
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)    [facility]  1st Floor  [address] [address]   [phone]            Jul 20, 2018 10:30 AM CDT   EXTERNAL RADIATION TREATMENT with [facility] RAD ONC FELIPE   Radiation Oncology Clinic ([facility])    [facility]  1st Floor  [address] [address]   [phone]            Jul 23, 2018  9:30 AM CDT   EXTERNAL RADIATION TREATMENT with [facility] RAD ONC FELIPE   Radiation Oncology Clinic ([facility])    [facility]  1st Floor  [address] [address]   [phone]            Jul 24, 2018 10:30 AM CDT   EXTERNAL RADIATION TREATMENT with [facility] RAD ONC FELIPE   Radiation Oncology Clinic ([facility])    [facility]  1st Floor  [address] [address]   [phone]            Jul 24, 2018 11:00 AM CDT   ON TREATMENT VISIT with Kimberley Solo MD   Radiation Oncology Clinic  ([facility])    [facility]  1st Floor  [address] [address]   [phone]            Jul 25, 2018 10:30 AM CDT   EXTERNAL RADIATION TREATMENT with [facility] RAD ONC FELIPE   Radiation Oncology Clinic ([facility])    [facility]  1st Floor  [address] [address]   [phone]            Jul 26, 2018 10:30 AM CDT   EXTERNAL RADIATION TREATMENT with [facility] RAD ONC FELIPE   Radiation Oncology Clinic ([facility])    [facility]  1st Floor  [address] [address]   [phone]            Jul 27, 2018 10:30 AM CDT   EXTERNAL RADIATION TREATMENT with [facility] RAD ONC FELIPE   Radiation Oncology Clinic ([facility])    [facility]  1st Floor  [address] [address]   [phone]            Jul 30, 2018 10:30 AM CDT   EXTERNAL RADIATION TREATMENT with [facility] RAD ONC FELIPE   Radiation Oncology Clinic ([facility])    [facility]  1st Floor  [address] [address]   [phone]            Jul 31, 2018 11:00 AM CDT   ON TREATMENT VISIT with Kimberley Solo MD   Radiation Oncology Clinic ([facility])    [facility]  1st Floor  [address] [address]   [phone]              Who to contact     If you have questions or need follow up information about today's clinic visit or your schedule please contact [facility] directly at [phone].  Normal or non-critical lab and imaging results will be communicated to you by MyChart, letter or phone within 4 business days after the clinic has received the results. If you do not hear from us within 7 days, please contact the clinic through MyChart or phone. If you have a critical or abnormal lab result, we will notify you by phone as soon as  "possible.  Submit refill requests through [employer] or call your pharmacy and they will forward the refill request to us. Please allow 3 business days for your refill to be completed.          Additional Information About Your Visit        Care EveryWhere ID     This is your Care EveryWhere ID. This could be used by other organizations to access your [facility] medical records  [id number]        Your Vitals Were     Pulse Temperature Respirations Height Pulse Oximetry BMI (Body Mass Index)    114 98.8  F (37.1  C) (Oral) 16 1.778 m (5' 10\") 98% 24.51 kg/m2       Blood Pressure from Last 3 Encounters:   07/17/18 101/71   07/10/18 98/67   07/10/18 99/56    Weight from Last 3 Encounters:   07/17/18 77.6 kg (171 lb)   07/17/18 77.5 kg (170 lb 12.8 oz)   07/10/18 79.4 kg (175 lb)              We Performed the Following     CBC with platelets differential     Comprehensive metabolic panel     Magnesium     Phosphorus        Primary Care Provider Office Phone # Fax #    Louisa Fry [phone]       [facility] [address]  [facility] [address]        Equal Access to Services     [facility] : Hadii troy ku kiannao Sonikita, waaxda luqadaha, qaybta kaalmada adepippayada, mendez epps . So [facility] [phone].    ATENCIÓN: Si habla español, tiene a sheridan disposición servicios gratuitos de asistencia lingüística. [facility] [phone].    We comply with applicable federal civil rights laws and [place] laws. We do not discriminate on the basis of race, color, national origin, age, disability, sex, sexual orientation, or gender identity.            Thank you!     Thank you for choosing [facility]  for your care. Our goal is always to provide you with excellent care. Hearing back from our patients is one way we can continue to improve our services. Please take a few minutes to complete the written survey that you may receive in the mail after your visit with us. " Thank you!             Your Updated Medication List - Protect others around you: Learn how to safely use, store and throw away your medicines at www.disposemymeds.org.          This list is accurate as of 7/17/18 11:59 PM.  Always use your most recent med list.                   Brand Name Dispense Instructions for use Diagnosis    lidocaine (viscous) 2 % solution    XYLOCAINE          lidocaine visc 2% 2.5mL/5mL & maalox/mylanta w/ simeth 2.5mL/5mL & diphenhydrAMINE 5mg/5mL Susp suspension    [facility]    240 mL    Swish and swallow 10 mLs in mouth every 6 hours as needed for mouth sores    Sarcoma (H)       OLANZapine 5 MG tablet    zyPREXA    30 tablet    Take 1 tablet (5 mg) by mouth At Bedtime    Chemotherapy-induced nausea and vomiting       oxyCODONE IR 5 MG tablet    ROXICODONE    20 tablet    Take 1 tablet (5 mg) by mouth every 4 hours as needed for moderate to severe pain    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

## 2018-07-17 NOTE — LETTER
7/17/2018       RE: Hiram Tapia  [address]  [facility] [address]     Dear Colleague,    Thank you for referring your patient, Hiram Tapia, to the [facility]. Please see a copy of my visit note below.    July 17, 2018    We saw Hiram Tapia for f/u of a sarcoma of the right thigh.      Background  In brief he has had a lot of problems with his right leg for many years including sciatica for 20 years.  He developed more discomfort in the right thigh and in October 2017 hit his lateral thigh against a truck tailgate causing a lot of pain.  Subsequently there was a fair amount of swelling and stiffness.  This eventually led to some imaging showing a cystic mass.  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  At the time of the biopsy more than a liter of fluid was removed and that markedly improved his symptoms of stiffness and pain.  -     Interval history         He began doxil/ifos 3-23-18.  He has had 4 cycles with suggestion of a response after 1 cycle.  A new PE asymptomatic was noted and he started rivaroxaban in April.    Plan for now is to start radiation with Dr. Myrick today 7/17/17 and post-radiation have surgery with Dr. Betts.  He prior to surgery will require a colonoscopy due to activity seen on PET CT.  This is recommended sooner then later since it might change treatment plan.    Overall his past symptoms have resolved except pain at his leg that is really well controlled with oxycodone 5mg 2-3 times daily.  He uses it to control his pain so he can be more mobile and active with his daily life.  He reports that also his taste is still off but he is tolerating his diet.    Aside from this problem his 10 point ROS unremarkable.   Denies headaches, fevers, night sweats, fatigue, mouth sores, cp, sob, abdominal pain, nausea/vomiting, problems with bowel movements or urination.  Denies spontaneous bleeding or bruising, changes to skin or new sites of  "pain.    -  Background PMH, FH, SH  Past history is not particular remarkable other than for tonsillectomy and surgery for lazy eye.  His left eye is the dominant eye.    The family history is not particularly remarkable but his father  of leukemia at age 42.    He denies any drug allergies.  He is  and has an adult somewhat autistic boy who lives with them.  He smoked a pack a day for about 10 years, stopping about , and does not abuse alcohol or other drugs.  He works as a  at [employer].  -        On exam he appeared comfortable with normal affect.    /71 (BP Location: Right arm, Patient Position: Sitting, Cuff Size: Adult Regular)  Pulse 114  Temp 98.8  F (37.1  C) (Oral)  Resp 16  Ht 1.778 m (5' 10\")  Wt 77.5 kg (170 lb 12.8 oz)  SpO2 98%  BMI 24.51 kg/m2  HEENT There is no icterus, Lazy eye (L dominant). Minimal mild thrush on tongue.  NECK:neck mass  CHEST:  the chest is clear  CV: there is tachycardia with no significant murmur  ABD:  no HSMT  EXT: 1+ edema of the right lower leg  MS: able to get on exam table without difficulty  LYMPH: no lymphadenopathy at neck axilla groin  NEURO: Nabila mentation; got on table OK, normal Romberg  SKIN: warm dry no rash  PSYCH: mood fairly good      -  CMP wnl and CBC improving      -  MRI thigh shows stable mass.  We reviewed the images.      -      At this time we discussed with patient and his wife that he has completed the expected 4 cycles of chemotherapy and will continue now with radiation and with future goal of resection with Dr. Betts.  We re-discussed today the need for colonoscopy.  Patient understands and plans to arrange it closer to home.  He will need to stop rivaroxaban at least one day prior to procedure and can restart it the day after colonoscopy.  We also discussed post surgery he will likely only have to continue anticoagulation for an additional 6 weeks.  He should contact our clinic with colonoscopy " results and follow up sometime in October after completing his surgery.       All of his questions were addressed and he will call if he has others.      Case and plan discussed with Dr. Gaurang Pradhan MD  PGY5  Hematology Oncology Fellow  Pager: [phone]    I independently examined this patient and my assessment is in agreement with the above note.  I reviewed all tests and past medical history and my evaluation agrees with the findings in the note. We reviewed the images and he will now get XRT before surgery. He does need f/u of the colon lesion before surgery and he will arrange now before toxicities of XRT set in.  We will see him after surgery.    Gaurang Johnson M.D.  Professor  Hematology, Oncology and Transplantation

## 2018-07-17 NOTE — PROGRESS NOTES
2018    We saw Hiram Tapia for f/u of a sarcoma of the right thigh.      Background  In brief he has had a lot of problems with his right leg for many years including sciatica for 20 years.  He developed more discomfort in the right thigh and in 2017 hit his lateral thigh against a truck tailgate causing a lot of pain.  Subsequently there was a fair amount of swelling and stiffness.  This eventually led to some imaging showing a cystic mass.  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  At the time of the biopsy more than a liter of fluid was removed and that markedly improved his symptoms of stiffness and pain.  -     Interval history         He began doxil/ifos 3-23-18.  He has had 4 cycles with suggestion of a response after 1 cycle.  A new PE asymptomatic was noted and he started rivaroxaban in April.    Plan for now is to start radiation with Dr. Myrick today 17 and post-radiation have surgery with Dr. Betts.  He prior to surgery will require a colonoscopy due to activity seen on PET CT.  This is recommended sooner then later since it might change treatment plan.    Overall his past symptoms have resolved except pain at his leg that is really well controlled with oxycodone 5mg 2-3 times daily.  He uses it to control his pain so he can be more mobile and active with his daily life.  He reports that also his taste is still off but he is tolerating his diet.    Aside from this problem his 10 point ROS unremarkable.   Denies headaches, fevers, night sweats, fatigue, mouth sores, cp, sob, abdominal pain, nausea/vomiting, problems with bowel movements or urination.  Denies spontaneous bleeding or bruising, changes to skin or new sites of pain.    -  Background PMH, FH, SH  Past history is not particular remarkable other than for tonsillectomy and surgery for lazy eye.  His left eye is the dominant eye.    The family history is not particularly remarkable but his father  of  "leukemia at age 42.    He denies any drug allergies.  He is  and has an adult somewhat autistic boy who lives with them.  He smoked a pack a day for about 10 years, stopping about 1990, and does not abuse alcohol or other drugs.  He works as a  at "[employer].  -        On exam he appeared comfortable with normal affect.    /71 (BP Location: Right arm, Patient Position: Sitting, Cuff Size: Adult Regular)  Pulse 114  Temp 98.8  F (37.1  C) (Oral)  Resp 16  Ht 1.778 m (5' 10\")  Wt 77.5 kg (170 lb 12.8 oz)  SpO2 98%  BMI 24.51 kg/m2  HEENT There is no icterus, Lazy eye (L dominant). Minimal mild thrush on tongue.  NECK:neck mass  CHEST:  the chest is clear  CV: there is tachycardia with no significant murmur  ABD:  no HSMT  EXT: 1+ edema of the right lower leg  MS: able to get on exam table without difficulty  LYMPH: no lymphadenopathy at neck axilla groin  NEURO: Nabila mentation; got on table OK, normal Romberg  SKIN: warm dry no rash  PSYCH: mood fairly good      -  CMP wnl and CBC improving      -  MRI thigh shows stable mass.  We reviewed the images.      -      At this time we discussed with patient and his wife that he has completed the expected 4 cycles of chemotherapy and will continue now with radiation and with future goal of resection with Dr. Betts.  We re-discussed today the need for colonoscopy.  Patient understands and plans to arrange it closer to home.  He will need to stop rivaroxaban at least one day prior to procedure and can restart it the day after colonoscopy.  We also discussed post surgery he will likely only have to continue anticoagulation for an additional 6 weeks.  He should contact our clinic with colonoscopy results and follow up sometime in October after completing his surgery.       All of his questions were addressed and he will call if he has others.      Case and plan discussed with Dr. Gaurang Pradhan MD  PGY5  Hematology " Oncology Fellow  Pager: [phone]    I independently examined this patient and my assessment is in agreement with the above note.  I reviewed all tests and past medical history and my evaluation agrees with the findings in the note. We reviewed the images and he will now get XRT before surgery. He does need f/u of the colon lesion before surgery and he will arrange now before toxicities of XRT set in.  We will see him after surgery.    Gaurang Johnson M.D.  Professor  Hematology, Oncology and Transplantation

## 2018-07-18 ENCOUNTER — APPOINTMENT (OUTPATIENT)
Dept: RADIATION ONCOLOGY | Facility: CLINIC | Age: 58
End: 2018-07-18
Attending: RADIOLOGY
Payer: COMMERCIAL

## 2018-07-18 PROCEDURE — 77386 ZZH IMRT TREATMENT DELIVERY, COMPLEX: CPT | Performed by: RADIOLOGY

## 2018-07-19 ENCOUNTER — APPOINTMENT (OUTPATIENT)
Dept: RADIATION ONCOLOGY | Facility: CLINIC | Age: 58
End: 2018-07-19
Attending: RADIOLOGY
Payer: COMMERCIAL

## 2018-07-19 ENCOUNTER — HOME INFUSION (PRE-WILLOW HOME INFUSION) (OUTPATIENT)
Dept: PHARMACY | Facility: CLINIC | Age: 58
End: 2018-07-19

## 2018-07-19 PROCEDURE — 77386 ZZH IMRT TREATMENT DELIVERY, COMPLEX: CPT | Performed by: RADIOLOGY

## 2018-07-20 ENCOUNTER — APPOINTMENT (OUTPATIENT)
Dept: RADIATION ONCOLOGY | Facility: CLINIC | Age: 58
End: 2018-07-20
Attending: RADIOLOGY
Payer: COMMERCIAL

## 2018-07-20 PROCEDURE — 77386 ZZH IMRT TREATMENT DELIVERY, COMPLEX: CPT | Performed by: RADIOLOGY

## 2018-07-23 ENCOUNTER — APPOINTMENT (OUTPATIENT)
Dept: RADIATION ONCOLOGY | Facility: CLINIC | Age: 58
End: 2018-07-23
Attending: RADIOLOGY
Payer: COMMERCIAL

## 2018-07-23 PROCEDURE — 77386 ZZH IMRT TREATMENT DELIVERY, COMPLEX: CPT | Performed by: RADIOLOGY

## 2018-07-23 PROCEDURE — 77336 RADIATION PHYSICS CONSULT: CPT | Performed by: RADIOLOGY

## 2018-07-24 ENCOUNTER — OFFICE VISIT (OUTPATIENT)
Dept: RADIATION ONCOLOGY | Facility: CLINIC | Age: 58
End: 2018-07-24
Attending: RADIOLOGY
Payer: COMMERCIAL

## 2018-07-24 ENCOUNTER — CARE COORDINATION (OUTPATIENT)
Dept: ONCOLOGY | Facility: CLINIC | Age: 58
End: 2018-07-24

## 2018-07-24 VITALS — BODY MASS INDEX: 25.1 KG/M2 | WEIGHT: 174.9 LBS

## 2018-07-24 DIAGNOSIS — C49.21 SOFT TISSUE SARCOMA OF RIGHT THIGH (H): Primary | ICD-10-CM

## 2018-07-24 PROCEDURE — 77386 ZZH IMRT TREATMENT DELIVERY, COMPLEX: CPT | Performed by: RADIOLOGY

## 2018-07-24 NOTE — PROGRESS NOTES
[facility]  SPECIALIZING IN BREAKTHROUGHS  Radiation Oncology    On Treatment Visit Note      Hiram Tapia      Date: 2018   MRN: [id number]   : 1960  Diagnosis: Sarcoma      Reason for Visit:  On Radiation Treatment Visit     Treatment Summary to Date  Treatment Site: Right thigh Current Dose: 1200/5000 cGy Fractions:       Subjective:  Mr. Tapia is seen today after his first fraction of treatment.  Patient reports that he has no skin changes.  Patient also reports that he has no increased discharge from his open wound with treatment field.  Patient notes that he has his wound dressed by a wound care nurse twice weekly.     Nursing ROS:   Nutrition Alteration  Diet Type: Patient's Preference  Skin  Skin Reaction: 0 - No changes  Skin Note: Aquaphor    Gastrointestinal  Nausea: 0 - None    Pain Assessment  0-10 Pain Scale: 4  Pain Descriptors: Constant, Dull, Aching  Pain Note: Oxycodone PRN      Objective:   Wt 79.3 kg (174 lb 14.4 oz)  BMI 25.1 kg/m2  Gen: Appears well, in no acute distress  Skin: No erythema, no discharge from his wound site.  Packing with tegaderm over wound    Labs:  CBC RESULTS:   Recent Labs   Lab Test  18   0839   WBC  5.3   RBC  3.49*   HGB  9.7*   HCT  30.9*   MCV  89   MCH  27.8   MCHC  31.4*   RDW  19.6*   PLT  460*     ELECTROLYTES:  Recent Labs   Lab Test  18   0839   NA  140   POTASSIUM  4.0   CHLORIDE  107   JAYESH  9.8   CO2  23   BUN  8   CR  0.80   GLC  94       Assessment:    Tolerating radiation therapy well.  All questions and concerns addressed.    Toxicities:  Fatigue: Grade 0: No toxicity    Plan:   1. Continue radiotherapy.    2. Continue pain management with oxycodone PRN   3. Follow-up after radiotherapy with colonoscopy surveillance.     [facility] chart and setup information reviewed  MVCT/IGRT images checked    Medication Review  Med list reviewed with patient?: Yes    Educational Topic Discussed  Education  Instructions: Reviewed    Murphy Ricardo MD  Radiation Oncology Resident, PGY-4  [facility]  Phone: [phone]    I was personally present with the resident during the history and exam.  I discussed the case with the resident and agree with the findings and plan of care  All questions were answered.      Please do not hesitate to call me if you have questions or concerns.    Thank you for allowing me to participate in the care of this patient.     Kimberley Solo MD  Pager  [phone]  [facility]   [phone]  [facility]    CC  Patient Care Team:  Louisa Fry MD as PCP - General (Emergency Medicine)  Isaiah Paulson MD as MD (Family Practice)  Brad Betts MD as MD (Orthopedics)  [facility] ([facility] ([facility]  Rubens Mckeon, RN as Nurse Coordinator (Oncology)  Gaurang Johnson MD as MD (Hematology & Oncology)  GAURANG JOHNSON  [phone] pager

## 2018-07-24 NOTE — MR AVS SNAPSHOT
After Visit Summary   7/24/2018    Hiram Tapia    MRN: [id number]           Patient Information     Date Of Birth          1960        Visit Information        Provider Department      7/24/2018 11:00 AM Kimberley Solo MD Radiation Oncology Clinic         Follow-ups after your visit        Your next 10 appointments already scheduled     Jul 24, 2018 11:00 AM CDT   ON TREATMENT VISIT with Kimberley Solo MD   Radiation Oncology Clinic ([facility])    [facility]  1st Floor  [address] [facility] [address]   [phone]            Jul 25, 2018 10:30 AM CDT   EXTERNAL RADIATION TREATMENT with P RAD ONC FELIPE   Radiation Oncology Clinic ([facility])    [facility]  1st Floor  [address] [facility] [address]   [phone]            Jul 26, 2018 10:30 AM CDT   EXTERNAL RADIATION TREATMENT with [facility] RAD ONC FELIPE   Radiation Oncology Clinic ([facility])    [facility]  1st Floor  [address] [facility] [address]   [phone]            Jul 27, 2018 10:30 AM CDT   EXTERNAL RADIATION TREATMENT with [facility] RAD ONC FELIPE   Radiation Oncology Clinic ([facility])    [facility]  1st Floor  [address] [facility] [address]   [phone]            Jul 30, 2018 10:30 AM CDT   EXTERNAL RADIATION TREATMENT with [facility] RAD ONC FELIPE   Radiation Oncology Clinic ([facility])    [facility]  1st Floor  [address] [facility] [address]   [phone]            Jul 31, 2018 10:30 AM CDT   EXTERNAL RADIATION TREATMENT with P RAD ONC FELIPE   Radiation Oncology Clinic ([facility])    [facility]  1st Floor  [address] [facility] [address]   [phone]            Jul 31, 2018 11:00 AM CDT   ON TREATMENT VISIT with Kimberley Solo MD    Radiation Oncology Clinic ([facility])    [facility]  1st Floor  [address] [facility] [address]   [phone]            Aug 01, 2018  9:30 AM CDT   EXTERNAL RADIATION TREATMENT with [facility] RAD ONC FELIPE   Radiation Oncology Clinic ([facility])    [facility]  1st Floor  [address] [facility] [address]   [phone]            Aug 02, 2018  9:30 AM CDT   EXTERNAL RADIATION TREATMENT with [facility] RAD ONC FELIPE   Radiation Oncology Clinic ([facility])    [facility]  1st Floor  [address] [facility] [address]   [phone]            Aug 03, 2018  9:30 AM CDT   EXTERNAL RADIATION TREATMENT with P RAD ONC FELIPE   Radiation Oncology Clinic ([facility])    [facility]  1st Floor  [address] [facility] [address]   [phone]              Who to contact     Please call your clinic at [phone] to:    Ask questions about your health    Make or cancel appointments    Discuss your medicines    Learn about your test results    Speak to your doctor            Additional Information About Your Visit        Care EveryWhere ID     This is your Care EveryWhere ID. This could be used by other organizations to access your [place] medical records  [id number]         Blood Pressure from Last 3 Encounters:   07/17/18 101/71   07/10/18 98/67   07/10/18 99/56    Weight from Last 3 Encounters:   07/17/18 77.6 kg (171 lb)   07/17/18 77.5 kg (170 lb 12.8 oz)   07/10/18 79.4 kg (175 lb)              Today, you had the following     No orders found for display       Primary Care Provider Office Phone # Fax #    Louisa Fry [phone]       [facility] [address]  [place] [address]        Equal Access to Services     DORIAN TOLENTINO : Juarez Suárez, alex oh, kasandra lamas, mendez sebastian  yadira gonzálesaabecky ah. So [facility] [phone].    ATENCIÓN: Si sean rhodes, tiene a sheridan disposición servicios gratuitos de asistencia lingüística. Markos al [phone].    We comply with applicable federal civil rights laws and [place] laws. We do not discriminate on the basis of race, color, national origin, age, disability, sex, sexual orientation, or gender identity.            Thank you!     Thank you for choosing [facility]ATION ONCOLOGY CLINIC  for your care. Our goal is always to provide you with excellent care. Hearing back from our patients is one way we can continue to improve our services. Please take a few minutes to complete the written survey that you may receive in the mail after your visit with us. Thank you!             Your Updated Medication List - Protect others around you: Learn how to safely use, store and throw away your medicines at www.disposemymeds.org.          This list is accurate as of 7/24/18 10:44 AM.  Always use your most recent med list.                   Brand Name Dispense Instructions for use Diagnosis    lidocaine (viscous) 2 % solution    XYLOCAINE          lidocaine visc 2% 2.5mL/5mL & maalox/mylanta w/ simeth 2.5mL/5mL & diphenhydrAMINE 5mg/5mL Susp suspension    [facility] Mouthwash [facility]    240 mL    Swish and swallow 10 mLs in mouth every 6 hours as needed for mouth sores    Sarcoma (H)       OLANZapine 5 MG tablet    zyPREXA    30 tablet    Take 1 tablet (5 mg) by mouth At Bedtime    Chemotherapy-induced nausea and vomiting       oxyCODONE IR 5 MG tablet    ROXICODONE    20 tablet    Take 1 tablet (5 mg) by mouth every 4 hours as needed for moderate to severe pain    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)       polyethylene glycol Packet    MIRALAX/GLYCOLAX    7 packet    Take 17 g by mouth daily    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)       rivaroxaban ANTICOAGULANT 20 MG Tabs tablet    XARELTO    30 tablet    Take 1 tablet (20 mg) by mouth daily (with dinner)    Other pulmonary  embolism without acute cor pulmonale, unspecified chronicity (H), 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

## 2018-07-24 NOTE — LETTER
2018       RE: Hiram Tapia  [address]  [facility] [address]     Dear Colleague,    Thank you for referring your patient, Hiram Tapia, to the RADIATION ONCOLOGY CLINIC. Please see a copy of my visit note below.    [facility] PHYSICIANS  SPECIALIZING IN BREAKTHROUGHS  Radiation Oncology    On Treatment Visit Note      Hiram Tapia      Date: 2018   MRN: [id number]   : 1960  Diagnosis: Sarcoma      Reason for Visit:  On Radiation Treatment Visit     Treatment Summary to Date  Treatment Site: Right thigh Current Dose: 1200/5000 cGy Fractions:       Subjective:  Mr. Tapia is seen today after his first fraction of treatment.  Patient reports that he has no skin changes.  Patient also reports that he has no increased discharge from his open wound with treatment field.  Patient notes that he has his wound dressed by a wound care nurse twice weekly.     Nursing ROS:   Nutrition Alteration  Diet Type: Patient's Preference  Skin  Skin Reaction: 0 - No changes  Skin Note: Aquaphor    Gastrointestinal  Nausea: 0 - None    Pain Assessment  0-10 Pain Scale: 4  Pain Descriptors: Constant, Dull, Aching  Pain Note: Oxycodone PRN      Objective:   Wt 79.3 kg (174 lb 14.4 oz)  BMI 25.1 kg/m2  Gen: Appears well, in no acute distress  Skin: No erythema, no discharge from his wound site.  Packing with tegaderm over wound    Labs:  CBC RESULTS:   Recent Labs   Lab Test  18   0839   WBC  5.3   RBC  3.49*   HGB  9.7*   HCT  30.9*   MCV  89   MCH  27.8   MCHC  31.4*   RDW  19.6*   PLT  460*     ELECTROLYTES:  Recent Labs   Lab Test  18   0839   NA  140   POTASSIUM  4.0   CHLORIDE  107   JAYESH  9.8   CO2  23   BUN  8   CR  0.80   GLC  94       Assessment:    Tolerating radiation therapy well.  All questions and concerns addressed.    Toxicities:  Fatigue: Grade 0: No toxicity    Plan:   1. Continue radiotherapy.    2. Continue pain management with oxycodone PRN   3. Follow-up  after radiotherapy with colonoscopy surveillance.     Mosaiq chart and setup information reviewed  MVCT/IGRT images checked    Medication Review  Med list reviewed with patient?: Yes    Educational Topic Discussed  Education Instructions: Reviewed    Murphy Ricardo MD  Radiation Oncology Resident, PGY-4  [facility]  Phone: [phone]    I was personally present with the resident during the history and exam.  I discussed the case with the resident and agree with the findings and plan of care  All questions were answered.      Please do not hesitate to call me if you have questions or concerns.    Thank you for allowing me to participate in the care of this patient.     Kimberley Solo MD  Pager  [phone]  Clinic   [phone]  [facility]    CC  Patient Care Team:  Louisa Fry MD as PCP - General (Emergency Medicine)  Isaiah Paulson MD as MD (Family Practice)  rBad Betts MD as MD (Orthopedics)  [facility] ([facility] ([facility] ([facility]))  Rubens Mckeon, RN as Nurse Coordinator (Oncology)  Gaurang Johnson MD as MD (Hematology & Oncology)  GAURANG JOHNSON  [phone] pager

## 2018-07-24 NOTE — PROGRESS NOTES
Called and [facility] for Lea to let her know that Carmelita iglesias has 3 refills left and she can call the pharmacy directly to renew.  Let her know that the oxycodone is a controlled substance and that can't be called in or electronically prescribed.   will sign the script and it can either be mailed or fed exed to her.  Asked for a call back.

## 2018-07-25 ENCOUNTER — APPOINTMENT (OUTPATIENT)
Dept: RADIATION ONCOLOGY | Facility: CLINIC | Age: 58
End: 2018-07-25
Attending: RADIOLOGY
Payer: COMMERCIAL

## 2018-07-25 DIAGNOSIS — C49.21 SOFT TISSUE SARCOMA OF RIGHT THIGH (H): ICD-10-CM

## 2018-07-25 PROCEDURE — 77386 ZZH IMRT TREATMENT DELIVERY, COMPLEX: CPT | Performed by: RADIOLOGY

## 2018-07-25 RX ORDER — OXYCODONE HYDROCHLORIDE 5 MG/1
5 TABLET ORAL EVERY 4 HOURS PRN
Qty: 40 TABLET | Refills: 0 | Status: SHIPPED | OUTPATIENT
Start: 2018-07-25 | End: 2018-08-07

## 2018-07-26 ENCOUNTER — APPOINTMENT (OUTPATIENT)
Dept: RADIATION ONCOLOGY | Facility: CLINIC | Age: 58
End: 2018-07-26
Attending: RADIOLOGY
Payer: COMMERCIAL

## 2018-07-26 PROCEDURE — 77386 ZZH IMRT TREATMENT DELIVERY, COMPLEX: CPT | Performed by: RADIOLOGY

## 2018-07-27 ENCOUNTER — APPOINTMENT (OUTPATIENT)
Dept: RADIATION ONCOLOGY | Facility: CLINIC | Age: 58
End: 2018-07-27
Attending: RADIOLOGY
Payer: COMMERCIAL

## 2018-07-27 PROCEDURE — 77386 ZZH IMRT TREATMENT DELIVERY, COMPLEX: CPT | Performed by: RADIOLOGY

## 2018-07-30 ENCOUNTER — APPOINTMENT (OUTPATIENT)
Dept: RADIATION ONCOLOGY | Facility: CLINIC | Age: 58
End: 2018-07-30
Attending: RADIOLOGY
Payer: COMMERCIAL

## 2018-07-30 PROCEDURE — 77336 RADIATION PHYSICS CONSULT: CPT | Performed by: RADIOLOGY

## 2018-07-30 PROCEDURE — 77386 ZZH IMRT TREATMENT DELIVERY, COMPLEX: CPT | Performed by: RADIOLOGY

## 2018-07-31 ENCOUNTER — OFFICE VISIT (OUTPATIENT)
Dept: RADIATION ONCOLOGY | Facility: CLINIC | Age: 58
End: 2018-07-31
Attending: RADIOLOGY
Payer: COMMERCIAL

## 2018-07-31 VITALS — WEIGHT: 179 LBS | BODY MASS INDEX: 25.68 KG/M2

## 2018-07-31 DIAGNOSIS — C49.21 SOFT TISSUE SARCOMA OF RIGHT LOWER EXTREMITY (H): Primary | ICD-10-CM

## 2018-07-31 PROCEDURE — 77386 ZZH IMRT TREATMENT DELIVERY, COMPLEX: CPT | Performed by: RADIOLOGY

## 2018-07-31 NOTE — LETTER
2018       RE: Hiram Tapia  [address]  [place] [address]     Dear Colleague,    Thank you for referring your patient, Hiram Tapia, to the RADIATION ONCOLOGY CLINIC. Please see a copy of my visit note below.    [facility] PHYSICIANS  SPECIALIZING IN BREAKTHROUGHS  Radiation Oncology    On Treatment Visit Note      Hiram Tapia      Date: 2018   MRN: [id number]   : 1960  Diagnosis: Sarcoma      Reason for Visit:  On Radiation Treatment Visit     Treatment Summary to Date  Treatment Site: Right thigh Current Dose: 2200/5000 cGy Fractions:       Subjective:  Mr. Tapia is seen today after his first fraction of treatment.  Patient reports that he has minimalskin changes.  Patient also reports that he has no increased discharge from his open wound with treatment field.  He continues home care for wound dressings twice weekly who reports no change in discharge from his wound or change in the skin overlying.  He is currently taking 4 oxycodone with tylenol every day    Nursing ROS:   Nutrition Alteration  Diet Type: Patient's Preference  Skin  Skin Reaction: 0 - No changes  Skin Note: Aquaphor    Gastrointestinal  Nausea: 0 - None    Pain Assessment  0-10 Pain Scale: 4  Pain Descriptors: Constant, Dull, Aching  Pain Note: Oxycodone PRN    Objective:   Wt 81.2 kg (179 lb)  BMI 25.68 kg/m2  Gen: Appears well, in no acute distress  Skin: No erythema, no discharge from his wound site.  Packing with tegaderm over wound    Labs:  CBC RESULTS:   Recent Labs   Lab Test  18   0839   WBC  5.3   RBC  3.49*   HGB  9.7*   HCT  30.9*   MCV  89   MCH  27.8   MCHC  31.4*   RDW  19.6*   PLT  460*     ELECTROLYTES:  Recent Labs   Lab Test  18   0839   NA  140   POTASSIUM  4.0   CHLORIDE  107   JAYESH  9.8   CO2  23   BUN  8   CR  0.80   GLC  94       Assessment:    Tolerating radiation therapy well.  All questions and concerns addressed.    Toxicities:  Fatigue: Grade 0: No  toxicity    Plan:   1. Continue radiotherapy.    2. Continue pain management with oxycodone PRN   3. Follow-up after radiotherapy with colonoscopy surveillance.   4. Consideration of surgery approximately Sept 17, 2018.    Mosaiq chart and setup information reviewed  MVCT/IGRT images checked    Medication Review  Med list reviewed with patient?: Yes    Educational Topic Discussed  Education Instructions: Reviewed    Murphy Ricardo MD  Radiation Oncology Resident, PGY-4  [facility]  Phone: [phone]      I saw the patient with the resident.  I agree with the resident note and plan of care.      Kimberley Solo MD  [phone]    CC  Patient Care Team:  Louisa Fry MD as PCP - General (Emergency Medicine)  Isaiah Paulson MD as MD (Family Practice)  [facility] as MD (Orthopedics)  [facility] ([facility] ([facility] ([facility]  Rubens Mckeon, RN as Nurse Coordinator (Oncology)  Gaurang Johnson MD as MD (Hematology & Oncology)

## 2018-07-31 NOTE — PROGRESS NOTES
[facility] PHYSICIANS  SPECIALIZING IN BREAKTHROUGHS  Radiation Oncology    On Treatment Visit Note      Hiram Tapia      Date: 2018   MRN: [id number]   : 1960  Diagnosis: Sarcoma      Reason for Visit:  On Radiation Treatment Visit     Treatment Summary to Date  Treatment Site: Right thigh Current Dose: 2200/5000 cGy Fractions:       Subjective:  Mr. Tapia is seen today after his first fraction of treatment.  Patient reports that he has minimalskin changes.  Patient also reports that he has no increased discharge from his open wound with treatment field.  He continues home care for wound dressings twice weekly who reports no change in discharge from his wound or change in the skin overlying.  He is currently taking 4 oxycodone with tylenol every day    Nursing ROS:   Nutrition Alteration  Diet Type: Patient's Preference  Skin  Skin Reaction: 0 - No changes  Skin Note: Aquaphor    Gastrointestinal  Nausea: 0 - None    Pain Assessment  0-10 Pain Scale: 4  Pain Descriptors: Constant, Dull, Aching  Pain Note: Oxycodone PRN    Objective:   Wt 81.2 kg (179 lb)  BMI 25.68 kg/m2  Gen: Appears well, in no acute distress  Skin: No erythema, no discharge from his wound site.  Packing with tegaderm over wound    Labs:  CBC RESULTS:   Recent Labs   Lab Test  18   0839   WBC  5.3   RBC  3.49*   HGB  9.7*   HCT  30.9*   MCV  89   MCH  27.8   MCHC  31.4*   RDW  19.6*   PLT  460*     ELECTROLYTES:  Recent Labs   Lab Test  18   0839   NA  140   POTASSIUM  4.0   CHLORIDE  107   JAYESH  9.8   CO2  23   BUN  8   CR  0.80   GLC  94       Assessment:    Tolerating radiation therapy well.  All questions and concerns addressed.    Toxicities:  Fatigue: Grade 0: No toxicity    Plan:   1. Continue radiotherapy.    2. Continue pain management with oxycodone PRN   3. Follow-up after radiotherapy with colonoscopy surveillance.   4. Consideration of surgery approximately 2018.    Mosaiq chart  and setup information reviewed  MVCT/IGRT images checked    Medication Review  Med list reviewed with patient?: Yes    Educational Topic Discussed  Education Instructions: Reviewed    Murphy Ricadro MD  Radiation Oncology Resident, PGY-4  [facility]  Phone: [phone]      I saw the patient with the resident.  I agree with the resident note and plan of care.      Kimberley Solo MD  [phone]    CC  Patient Care Team:  Louisa Fry MD as PCP - General (Emergency Medicine)  Isaiah Paulson MD as MD (Family Practice)  Brad Betts MD as MD (Orthopedics)  [facility] ([facility] ([facility] ([facility]))  Rubens Mckeon, RN as Nurse Coordinator (Oncology)  Gaurang Johnson MD as MD (Hematology & Oncology)

## 2018-07-31 NOTE — MR AVS SNAPSHOT
After Visit Summary   7/31/2018    Hiram Tapia    MRN: [id number]           Patient Information     Date Of Birth          1960        Visit Information        Provider Department      7/31/2018 11:00 AM Kimberley Solo MD Radiation Oncology Clinic        Today's Diagnoses     Soft tissue sarcoma of right lower extremity (H)    -  1       Follow-ups after your visit        Your next 10 appointments already scheduled     Aug 03, 2018  9:30 AM CDT   EXTERNAL RADIATION TREATMENT with P RAD ONC FELIPE   Radiation Oncology Clinic ([facility])    [facility]  1st Floor  [address] [facility] [address]   [phone]            Aug 06, 2018  9:30 AM CDT   EXTERNAL RADIATION TREATMENT with [facility] RAD ONC FELIPE   Radiation Oncology Clinic ([facility])    [facility]  1st Floor  [address] [facility] [address]   [phone]            Aug 07, 2018  9:30 AM CDT   EXTERNAL RADIATION TREATMENT with P RAD ONC FELIPE   Radiation Oncology Clinic ([facility])    [facility]  1st Floor  [address] [facility] [address]   [phone]            Aug 07, 2018 10:00 AM CDT   ON TREATMENT VISIT with Kimberley Solo MD   Radiation Oncology Clinic ([facility])    [facility]  1st Floor  [address] [facility] [address]   [phone]            Aug 08, 2018  9:30 AM CDT   EXTERNAL RADIATION TREATMENT with P RAD ONC FELIPE   Radiation Oncology Clinic ([facility])    [facility]  1st Floor  [address] [facility] [address]   [phone]            Aug 09, 2018  9:30 AM CDT   EXTERNAL RADIATION TREATMENT with P RAD ONC FELIPE   Radiation Oncology Clinic ([facility])    [facility]  1st Floor  [address] [facility] [address]   [phone]             Aug 10, 2018  9:30 AM CDT   EXTERNAL RADIATION TREATMENT with [facility] RAD ONC FELIPE   Radiation Oncology Clinic ([facility])    [facility]  1st Floor  [address] [facility] [address]   [phone]            Aug 13, 2018  9:30 AM CDT   EXTERNAL RADIATION TREATMENT with [facility] RAD ONC FELIPE   Radiation Oncology Clinic ([facility])    [facility]  1st Floor  [address] [facility] [address] [phone]            Aug 14, 2018  9:30 AM CDT   EXTERNAL RADIATION TREATMENT with [facility] RAD ONC FELIPE   Radiation Oncology Clinic ([facility])    [facility]  1st Floor  [address] [facility] [address] [phone]            Aug 14, 2018 10:00 AM CDT   ON TREATMENT VISIT with Kimberley Solo MD   Radiation Oncology Clinic ([facility])    [facility]  1st Floor  [address] [facility] [address] [phone]              Who to contact     Please call your clinic at [phone] to:    Ask questions about your health    Make or cancel appointments    Discuss your medicines    Learn about your test results    Speak to your doctor            Additional Information About Your Visit        Care EveryWhere ID     This is your Care EveryWhere ID. This could be used by other organizations to access your [place] medical records  [id number]        Your Vitals Were     BMI (Body Mass Index)                   25.68 kg/m2            Blood Pressure from Last 3 Encounters:   07/17/18 101/71   07/10/18 98/67   07/10/18 99/56    Weight from Last 3 Encounters:   07/31/18 81.2 kg (179 lb)   07/24/18 79.3 kg (174 lb 14.4 oz)   07/17/18 77.6 kg (171 lb)              Today, you had the following     No orders found for display       Primary Care Provider Office Phone # Fax #    Louisa Fry [phone]       [place] [address]  [place] [address]        Equal Access to Services     [facility]: Hadii troy ku hadirmao Sokvngali, waaxda luqadaha, qaybta kaalmada nanettekonstantinshaye, mendez hicks gurwinderbecky rochaquinnmela mcnair. So [facility] [phone].    ATENCIÓN: Si habla español, tiene a sheridan disposición servicios gratuitos de asistencia lingüística. Samiaame al [phone].    We comply with applicable federal civil rights laws and [place] laws. We do not discriminate on the basis of race, color, national origin, age, disability, sex, sexual orientation, or gender identity.            Thank you!     Thank you for choosing RADIATION ONCOLOGY CLINIC  for your care. Our goal is always to provide you with excellent care. Hearing back from our patients is one way we can continue to improve our services. Please take a few minutes to complete the written survey that you may receive in the mail after your visit with us. Thank you!             Your Updated Medication List - Protect others around you: Learn how to safely use, store and throw away your medicines at www.disposemymeds.org.          This list is accurate as of 7/31/18 11:59 PM.  Always use your most recent med list.                   Brand Name Dispense Instructions for use Diagnosis    lidocaine (viscous) 2 % solution    XYLOCAINE          lidocaine visc 2% 2.5mL/5mL & maalox/mylanta w/ simeth 2.5mL/5mL & diphenhydrAMINE 5mg/5mL Susp suspension    [facility] Mouthwash [facility]    240 mL    Swish and swallow 10 mLs in mouth every 6 hours as needed for mouth sores    Sarcoma (H)       OLANZapine 5 MG tablet    zyPREXA    30 tablet    Take 1 tablet (5 mg) by mouth At Bedtime    Chemotherapy-induced nausea and vomiting       oxyCODONE IR 5 MG tablet    ROXICODONE    40 tablet    Take 1 tablet (5 mg) by mouth every 4 hours as needed for moderate to severe pain    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)       polyethylene glycol Packet    MIRALAX/GLYCOLAX    7 packet    Take 17 g by mouth daily    Soft tissue  sarcoma of right thigh (H)       rivaroxaban ANTICOAGULANT 20 MG Tabs tablet    XARELTO    30 tablet    Take 1 tablet (20 mg) by mouth daily (with dinner)    Other pulmonary embolism without acute cor pulmonale, unspecified chronicity (H), 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

## 2018-08-01 ENCOUNTER — APPOINTMENT (OUTPATIENT)
Dept: RADIATION ONCOLOGY | Facility: CLINIC | Age: 58
End: 2018-08-01
Attending: RADIOLOGY
Payer: COMMERCIAL

## 2018-08-01 PROCEDURE — 77386 ZZH IMRT TREATMENT DELIVERY, COMPLEX: CPT | Performed by: RADIOLOGY

## 2018-08-02 ENCOUNTER — APPOINTMENT (OUTPATIENT)
Dept: RADIATION ONCOLOGY | Facility: CLINIC | Age: 58
End: 2018-08-02
Attending: RADIOLOGY
Payer: COMMERCIAL

## 2018-08-02 PROCEDURE — 77386 ZZH IMRT TREATMENT DELIVERY, COMPLEX: CPT | Performed by: RADIOLOGY

## 2018-08-02 NOTE — PROGRESS NOTES
This is a recent snapshot of the patient's [facility] medical record.  For current drug dose and complete information and questions, call [phone] or In Basket pool, [facility] home infusion ([id number])  CSN Number:  [id number]

## 2018-08-03 ENCOUNTER — APPOINTMENT (OUTPATIENT)
Dept: RADIATION ONCOLOGY | Facility: CLINIC | Age: 58
End: 2018-08-03
Attending: RADIOLOGY
Payer: COMMERCIAL

## 2018-08-03 PROCEDURE — 77386 ZZH IMRT TREATMENT DELIVERY, COMPLEX: CPT | Performed by: RADIOLOGY

## 2018-08-06 ENCOUNTER — APPOINTMENT (OUTPATIENT)
Dept: RADIATION ONCOLOGY | Facility: CLINIC | Age: 58
End: 2018-08-06
Attending: RADIOLOGY
Payer: COMMERCIAL

## 2018-08-06 PROCEDURE — 77386 ZZH IMRT TREATMENT DELIVERY, COMPLEX: CPT | Performed by: RADIOLOGY

## 2018-08-06 PROCEDURE — 77336 RADIATION PHYSICS CONSULT: CPT | Performed by: RADIOLOGY

## 2018-08-07 ENCOUNTER — OFFICE VISIT (OUTPATIENT)
Dept: RADIATION ONCOLOGY | Facility: CLINIC | Age: 58
End: 2018-08-07
Attending: RADIOLOGY
Payer: COMMERCIAL

## 2018-08-07 VITALS — WEIGHT: 179.3 LBS | BODY MASS INDEX: 25.73 KG/M2

## 2018-08-07 DIAGNOSIS — C49.21 SOFT TISSUE SARCOMA OF RIGHT THIGH (H): ICD-10-CM

## 2018-08-07 PROCEDURE — 77386 ZZH IMRT TREATMENT DELIVERY, COMPLEX: CPT | Performed by: RADIOLOGY

## 2018-08-07 RX ORDER — OXYCODONE HYDROCHLORIDE 5 MG/1
5 TABLET ORAL EVERY 4 HOURS PRN
Qty: 60 TABLET | Refills: 0 | Status: SHIPPED | OUTPATIENT
Start: 2018-08-07 | End: 2018-08-22

## 2018-08-07 NOTE — PROGRESS NOTES
[facility] PHYSICIANS  SPECIALIZING IN BREAKTHROUGHS  Radiation Oncology    On Treatment Visit Note    Hiram Tapia      Date: 2018   MRN: [id number]   : 1960  Diagnosis: Sarcoma      Reason for Visit:  On Radiation Treatment Visit     Treatment Summary to Date  Treatment Site: Right thigh Current Dose: 3200/5000 cGy Fractions:       Subjective:  Mr. Tapia is seen today after his first fraction of treatment.  Patient reports that his skin reaction has started to become more vigorous. Patient also reports that he has no increased discharge from his open wound with treatment field.  He continues home care for wound dressings twice weekly who reports no change in discharge from his wound or change in the skin overlying.  He is currently taking oxycodone four times per day with tylenol every day for pain management.    Nursing ROS:   Nutrition Alteration  Diet Type: Patient's Preference  Skin  Skin Reaction: 0 - No changes  Skin Intervention: bandage right hip changed twice a week by home care  Skin Note: Aquaphor     Gastrointestinal  Nausea: 0 - None    Pain Assessment  Pain Note: oxy, acetaminphen 4 times a day      Objective:   Wt 81.3 kg (179 lb 4.8 oz)  BMI 25.73 kg/m2  Gen: Appears well, in no acute distress  Skin: No erythema, no discharge from his wound site.  Packing with tegaderm over wound    Labs:  CBC RESULTS:   Recent Labs   Lab Test  18   0839   WBC  5.3   RBC  3.49*   HGB  9.7*   HCT  30.9*   MCV  89   MCH  27.8   MCHC  31.4*   RDW  19.6*   PLT  460*     ELECTROLYTES:  Recent Labs   Lab Test  18   0839   NA  140   POTASSIUM  4.0   CHLORIDE  107   JAYESH  9.8   CO2  23   BUN  8   CR  0.80   GLC  94       Assessment:    Tolerating radiation therapy well.  All questions and concerns addressed.    Toxicities:  Fatigue: Grade 0: No toxicity    Plan:   1. Continue radiotherapy.    2. Continue pain management with oxycodone PRN   3. Follow-up after radiotherapy with  colonoscopy surveillance.   4. Consideration of surgery approximately Sept 17, 2018.    Mosaiq chart and setup information reviewed  MVCT/IGRT images checked    Medication Review  Med list reviewed with patient?: Yes    Educational Topic Discussed  Education Instructions: Reviewed    Murphy Ricardo MD  Radiation Oncology Resident, PGY-4  [facility]  Phone: [phone]      I saw the patient with the resident.  I agree with the resident note and plan of care.      Kimberley Solo MD  [phone]  CC  Patient Care Team:  Louisa Fry MD as PCP - General (Emergency Medicine)  Isaiah Paulson MD as MD (Family Practice)  Brad Betts MD as MD (Orthopedics)  [facility] (HOME HEALTH AGENCY ([facility]), ([facility]))  Rubens Mckeon, RN as Nurse Coordinator (Oncology)  Gaurang Johnson MD as MD (Hematology & Oncology)

## 2018-08-07 NOTE — MR AVS SNAPSHOT
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      Your next 10 appointments already scheduled     Aug 10, 2018  9:30 AM CDT   EXTERNAL RADIATION TREATMENT with P RAD ONC FELIPE   Radiation Oncology Clinic ([facility])    [facility]  1st Floor  [address] [address]   [phone]            Aug 11, 2018  9:00 AM CDT   EXTERNAL RADIATION TREATMENT with [facility] RAD ONC FELIPE   Radiation Oncology Clinic ([facility])    [facility]  1st Floor  [address] [address]   [phone]            Aug 13, 2018  9:30 AM CDT   EXTERNAL RADIATION TREATMENT with [facility] RAD ONC FELIPE   Radiation Oncology Clinic ([facility])    [facility]  1st Floor  [address] [address]   [phone]            Aug 14, 2018  8:30 AM CDT   EXTERNAL RADIATION TREATMENT with [facility] RAD ONC FELIPE   Radiation Oncology Clinic ([facility])    [facility]  1st Floor  [address] [address]   [phone]            Aug 14, 2018  9:00 AM CDT   ON TREATMENT VISIT with Kimberley Solo MD   Radiation Oncology Clinic ([facility])    [facility]  1st Floor  [address] [address]   [phone]            Aug 15, 2018  9:30 AM CDT   EXTERNAL RADIATION TREATMENT with [facility] RAD ONC FELIPE   Radiation Oncology Clinic ([facility])    [facility] Ctr  1st Floor  [address] [address]   [phone]            Aug 16, 2018  9:30 AM CDT   EXTERNAL RADIATION TREATMENT with P RAD ONC FELIPE   Radiation Oncology Clinic ([facility])    [facility]  1st Floor  [address] [address]   [phone]            Aug 17, 2018  9:30 AM CDT   EXTERNAL RADIATION TREATMENT with [facility] RAD ONC FELIPE   Radiation Oncology Clinic ([facility])    [facility]  1st Floor  [address] [address]   [phone]            Oct 23, 2018 10:30 AM CDT   (Arrive by 10:15 AM)   Return Visit with Gaurang Johnson MD   [facility] ([facility])    [address]  [address]  [place] [address] [phone]              Who to contact     Please call your clinic at [phone] to:    Ask questions about your health    Make or cancel appointments    Discuss your medicines    Learn about your test results    Speak to your doctor            Additional Information About Your Visit        Care EveryWhere ID     This is your Care EveryWhere ID. This could be used by other organizations to access your [place] medical records  [id number]        Your Vitals Were     BMI (Body Mass Index)                   25.73 kg/m2            Blood Pressure from Last 3 Encounters:   07/17/18 101/71   07/10/18 98/67   07/10/18 99/56    Weight from Last 3 Encounters:   08/07/18 81.3 kg (179 lb 4.8 oz)   07/31/18 81.2 kg (179 lb)   07/24/18 79.3 kg (174 lb 14.4 oz)              Today, you had the following     No orders found for display         Where to get your medicines      Some of these will need a paper prescription and others can be bought over the counter.  Ask your nurse if you have questions.     Bring a paper prescription for each of these medications     oxyCODONE IR 5 MG tablet         Information  about OPIOIDS     PRESCRIPTION OPIOIDS: WHAT YOU NEED TO KNOW   We gave you an opioid (narcotic) pain medicine. It is important to manage your pain, but opioids are not always the best choice. You should first try all the other options your care team gave you. Take this medicine for as short a time (and as few doses) as possible.    Some activities can increase your pain, such as bandage changes or therapy sessions. It may help to take your pain medicine 30 to 60 minutes before these activities. Reduce your stress by getting enough sleep, working on hobbies you enjoy and practicing relaxation or meditation. Talk to your care team about ways to manage your pain beyond prescription opioids.    These medicines have risks:    DO NOT drive when on new or higher doses of pain medicine. These medicines can affect your alertness and reaction times, and you could be arrested for driving under the influence (DUI). If you need to use opioids long-term, talk to your care team about driving.    DO NOT operate heavy machinery    DO NOT do any other dangerous activities while taking these medicines.    DO NOT drink any alcohol while taking these medicines.     If the opioid prescribed includes acetaminophen, DO NOT take with any other medicines that contain acetaminophen. Read all labels carefully. Look for the word  acetaminophen  or  Tylenol.  Ask your pharmacist if you have questions or are unsure.    You can get addicted to pain medicines, especially if you have a history of addiction (chemical, alcohol or substance dependence). Talk to your care team about ways to reduce this risk.    All opioids tend to cause constipation. Drink plenty of water and eat foods that have a lot of fiber, such as fruits, vegetables, prune juice, apple juice and high-fiber cereal. Take a laxative (Miralax, milk of magnesia, Colace, Senna) if you don t move your bowels at least every other day. Other side effects include upset stomach, sleepiness,  dizziness, throwing up, tolerance (needing more of the medicine to have the same effect), physical dependence and slowed breathing.    Store your pills in a secure place, locked if possible. We will not replace any lost or stolen medicine. If you don t finish your medicine, please throw away (dispose) as directed by your pharmacist. The [place] Pollution Control Agency has more information about safe disposal: https://www.pca.[facility].mn.us/living-green/managing-unwanted-medications         Primary Care Provider Office Phone # Fax #    Louisa Fry [phone]       [facility] [address]  [facility] [address]        Equal Access to Services     DORIAN TOLENTINO : Juarez Suárez, alex oh, kasandra gonsalezmada cydney, mendez epps . So [facility] [phone].    ATENCIÓN: Si habla español, tiene a sheridan disposición servicios gratuitos de asistencia lingüística. Llame al [phone].    We comply with applicable federal civil rights laws and [place] laws. We do not discriminate on the basis of race, color, national origin, age, disability, sex, sexual orientation, or gender identity.            Thank you!     Thank you for choosing RADIATION ONCOLOGY CLINIC  for your care. Our goal is always to provide you with excellent care. Hearing back from our patients is one way we can continue to improve our services. Please take a few minutes to complete the written survey that you may receive in the mail after your visit with us. Thank you!             Your Updated Medication List - Protect others around you: Learn how to safely use, store and throw away your medicines at www.disposemymeds.org.          This list is accurate as of 8/7/18 11:59 PM.  Always use your most recent med list.                   Brand Name Dispense Instructions for use Diagnosis    lidocaine (viscous) 2 % solution    XYLOCAINE          lidocaine visc 2% 2.5mL/5mL & maalox/mylanta w/ simeth  2.5mL/5mL & diphenhydrAMINE 5mg/5mL Susp suspension    [facility]    240 mL    Swish and swallow 10 mLs in mouth every 6 hours as needed for mouth sores    Sarcoma (H)       OLANZapine 5 MG tablet    zyPREXA    30 tablet    Take 1 tablet (5 mg) by mouth At Bedtime    Chemotherapy-induced nausea and vomiting       oxyCODONE IR 5 MG tablet    ROXICODONE    60 tablet    Take 1 tablet (5 mg) by mouth every 4 hours as needed for moderate to severe pain    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

## 2018-08-07 NOTE — LETTER
2018       RE: Hiram Tapia  [address]  [place] [address]     Dear Colleague,    Thank you for referring your patient, Hiram Tapia, to the RADIATION ONCOLOGY CLINIC. Please see a copy of my visit note below.    [facility] PHYSICIANS  SPECIALIZING IN BREAKTHROUGHS  Radiation Oncology    On Treatment Visit Note    Hiram Tapia      Date: 2018   MRN: [id number]   : 1960  Diagnosis: Sarcoma      Reason for Visit:  On Radiation Treatment Visit     Treatment Summary to Date  Treatment Site: Right thigh Current Dose: 3200/5000 cGy Fractions:       Subjective:  Mr. Tapia is seen today after his first fraction of treatment.  Patient reports that his skin reaction has started to become more vigorous. Patient also reports that he has no increased discharge from his open wound with treatment field.  He continues home care for wound dressings twice weekly who reports no change in discharge from his wound or change in the skin overlying.  He is currently taking oxycodone four times per day with tylenol every day for pain management.    Nursing ROS:   Nutrition Alteration  Diet Type: Patient's Preference  Skin  Skin Reaction: 0 - No changes  Skin Intervention: bandage right hip changed twice a week by home care  Skin Note: Aquaphor     Gastrointestinal  Nausea: 0 - None    Pain Assessment  Pain Note: oxy, acetaminphen 4 times a day      Objective:   Wt 81.3 kg (179 lb 4.8 oz)  BMI 25.73 kg/m2  Gen: Appears well, in no acute distress  Skin: No erythema, no discharge from his wound site.  Packing with tegaderm over wound    Labs:  CBC RESULTS:   Recent Labs   Lab Test  18   0839   WBC  5.3   RBC  3.49*   HGB  9.7*   HCT  30.9*   MCV  89   MCH  27.8   MCHC  31.4*   RDW  19.6*   PLT  460*     ELECTROLYTES:  Recent Labs   Lab Test  18   0839   NA  140   POTASSIUM  4.0   CHLORIDE  107   JAYESH  9.8   CO2  23   BUN  8   CR  0.80   GLC  94       Assessment:    Tolerating  radiation therapy well.  All questions and concerns addressed.    Toxicities:  Fatigue: Grade 0: No toxicity    Plan:   1. Continue radiotherapy.    2. Continue pain management with oxycodone PRN   3. Follow-up after radiotherapy with colonoscopy surveillance.   4. Consideration of surgery approximately Sept 17, 2018.    Mosaiq chart and setup information reviewed  MVCT/IGRT images checked    Medication Review  Med list reviewed with patient?: Yes    Educational Topic Discussed  Education Instructions: Reviewed    Murphy Ricardo MD  Radiation Oncology Resident, PGY-4  [facility]  Phone: [phone]      I saw the patient with the resident.  I agree with the resident note and plan of care.      Kimberley Solo MD  [phone]    CC  Patient Care Team:  Louisa Fry MD as PCP - General (Emergency Medicine)  Isaiah Paulson MD as MD (Family Practice)  Brad Betts MD as MD (Orthopedics)  [facility] (HOME HEALTH AGENCY ([facility]), (HI))  Rubens Mckeon RN as Nurse Coordinator (Oncology)  Gaurang Johnson MD as MD (Hematology & Oncology)

## 2018-08-08 ENCOUNTER — APPOINTMENT (OUTPATIENT)
Dept: RADIATION ONCOLOGY | Facility: CLINIC | Age: 58
End: 2018-08-08
Attending: RADIOLOGY
Payer: COMMERCIAL

## 2018-08-08 PROCEDURE — 77386 ZZH IMRT TREATMENT DELIVERY, COMPLEX: CPT | Performed by: RADIOLOGY

## 2018-08-09 ENCOUNTER — APPOINTMENT (OUTPATIENT)
Dept: RADIATION ONCOLOGY | Facility: CLINIC | Age: 58
End: 2018-08-09
Attending: RADIOLOGY
Payer: COMMERCIAL

## 2018-08-09 PROCEDURE — 77386 ZZH IMRT TREATMENT DELIVERY, COMPLEX: CPT | Performed by: RADIOLOGY

## 2018-08-10 ENCOUNTER — APPOINTMENT (OUTPATIENT)
Dept: RADIATION ONCOLOGY | Facility: CLINIC | Age: 58
End: 2018-08-10
Attending: RADIOLOGY
Payer: COMMERCIAL

## 2018-08-10 ENCOUNTER — TELEPHONE (OUTPATIENT)
Dept: RADIATION ONCOLOGY | Facility: CLINIC | Age: 58
End: 2018-08-10

## 2018-08-10 PROCEDURE — 77386 ZZH IMRT TREATMENT DELIVERY, COMPLEX: CPT | Performed by: RADIOLOGY

## 2018-08-10 NOTE — TELEPHONE ENCOUNTER
Called [facility] regarding increased redness at bandage site in radiation field.   has changed to ABD, kerlix and coban to avoid adhesive in treatment field.  Wound nurse will evaluate on Monday.

## 2018-08-11 ENCOUNTER — APPOINTMENT (OUTPATIENT)
Dept: RADIATION ONCOLOGY | Facility: CLINIC | Age: 58
End: 2018-08-11
Attending: RADIOLOGY
Payer: COMMERCIAL

## 2018-08-11 PROCEDURE — 77336 RADIATION PHYSICS CONSULT: CPT | Performed by: RADIOLOGY

## 2018-08-11 PROCEDURE — 77386 ZZH IMRT TREATMENT DELIVERY, COMPLEX: CPT | Performed by: RADIOLOGY

## 2018-08-13 ENCOUNTER — APPOINTMENT (OUTPATIENT)
Dept: RADIATION ONCOLOGY | Facility: CLINIC | Age: 58
End: 2018-08-13
Attending: RADIOLOGY
Payer: COMMERCIAL

## 2018-08-13 PROCEDURE — 77386 ZZH IMRT TREATMENT DELIVERY, COMPLEX: CPT | Performed by: RADIOLOGY

## 2018-08-14 ENCOUNTER — APPOINTMENT (OUTPATIENT)
Dept: RADIATION ONCOLOGY | Facility: CLINIC | Age: 58
End: 2018-08-14
Attending: RADIOLOGY
Payer: COMMERCIAL

## 2018-08-14 VITALS — BODY MASS INDEX: 25.54 KG/M2 | WEIGHT: 178 LBS

## 2018-08-14 DIAGNOSIS — C49.21 SOFT TISSUE SARCOMA OF RIGHT THIGH (H): Primary | ICD-10-CM

## 2018-08-14 PROCEDURE — 77386 ZZH IMRT TREATMENT DELIVERY, COMPLEX: CPT | Performed by: RADIOLOGY

## 2018-08-14 NOTE — LETTER
2018       RE: Hiram Tapia  [address]  [place] [address]     Dear Colleague,    Thank you for referring your patient, Hiram Tapia, to the RADIATION ONCOLOGY CLINIC. Please see a copy of my visit note below.    [facility] PHYSICIANS  SPECIALIZING IN BREAKTHROUGHS  Radiation Oncology    On Treatment Visit Note    Hiram Tapia      Date: 2018   MRN: [id number]   : 1960  Diagnosis: Sarcoma      Reason for Visit:  On Radiation Treatment Visit     Treatment Summary to Date  Treatment Site: Right thigh Current Dose: 4400/5000 cGy Fractions:       Subjective:  Mr. Tapia is seen today for his weekly on treatment visit.  He reports that he has increased skin reaction and that his wound care nurses have changed his wound care management.  He has tenderness to touch in the treatment area.  He has noticed some enlargement. He continues to use aquaphor to manage his skin reaction.  He uses oxycodone and APAP PRN for pain management.   Nursing ROS:   Nutrition Alteration  Diet Type: Patient's Preference  Skin  Skin Reaction: 0 - No changes  Skin Intervention: bandage right hip changed twice a week by home care  Skin Note: Aquaphor     Gastrointestinal  Nausea: 0 - None       Pain Assessment  Pain Note: oxy, acetaminphen 4 times a day      Objective:   Wt 80.7 kg (178 lb)  BMI 25.54 kg/m2  Gen: Appears well, in no acute distress  Skin: Erythema in the treatment field.  Kerlix dressing over packing.  Wound packing not taken down     Labs:      Lab Results   Component Value Date    WBC 5.3 2018     Lab Results   Component Value Date    RBC 3.49 2018     Lab Results   Component Value Date    HGB 9.7 2018     Lab Results   Component Value Date    HCT 30.9 2018     No components found for: MCT  Lab Results   Component Value Date    MCV 89 2018     Lab Results   Component Value Date    MCH 27.8 2018     Lab Results   Component Value Date    MCHC  31.4 07/17/2018     Lab Results   Component Value Date    RDW 19.6 07/17/2018     Lab Results   Component Value Date     07/17/2018       Assessment:    Tolerating radiation therapy well.  All questions and concerns addressed.    Toxicities:  Dermatitis: Grade 2: Moderate to brisk erythema; patchy moist desquamation, mostly confined to skin folds and creases; moderate erythema      Plan:   1. Continue radiotherapy.    2. Continue pain management with oxycodone PRN   3. Follow-up after radiotherapy with colonoscopy surveillance.   4. Consideration of surgery approximately Sept 17, 2018.    Mosaiq chart and setup information reviewed  MVCT/IGRT images checked    Medication Review  Med list reviewed with patient?: Yes    Educational Topic Discussed  Education Instructions: Reviewed    Murphy Ricardo MD  Radiation Oncology Resident, PGY-4  [facility]  Phone: [phone]    I saw the patient with the resident.  I agree with the resident note and plan of care.      Kimberley Solo MD  [phone]      Patient Care Team:  Louisa Fry MD as PCP - General (Emergency Medicine)  Isaaih Paulson MD as MD (Family Practice)  Brad Betts MD as MD (Orthopedics)  [facility] (HOME HEALTH AGENCY (HHC), (HI))  Rubens Mckeon RN as Nurse Coordinator (Oncology)  Gaurang Johnson MD as MD (Hematology & Oncology)

## 2018-08-14 NOTE — MR AVS SNAPSHOT
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)    -  1       Follow-ups after your visit        Your next 10 appointments already scheduled     Aug 16, 2018  9:30 AM CDT   EXTERNAL RADIATION TREATMENT [email or web address]   Radiation Oncology Clinic ([facility])    [facility]  1st Floor  [address] [address]   [phone]            Aug 17, 2018  9:30 AM CDT   EXTERNAL RADIATION TREATMENT with [email or web address] RAD ONC FELIPE   Radiation Oncology Clinic ([facility])    [facility]  1st Floor  [address] [address] [phone]            Oct 23, 2018 10:30 AM CDT   (Arrive by 10:15 AM)   Return Visit with Gaurang Johnson MD   [facility] ([facility] [place])    [address]  [address]  [place] [address] [phone]              Who to contact     Please call your clinic at [phone] to:    Ask questions about your health    Make or cancel appointments    Discuss your medicines    Learn about your test results    Speak to your doctor            Additional Information About Your Visit        Care EveryWhere ID     This is your Care EveryWhere ID. This could be used by other organizations to access your [place] medical records  [id number]        Your Vitals Were     BMI (Body Mass Index)                   25.54 kg/m2            Blood Pressure from Last 3 Encounters:   07/17/18 101/71   07/10/18 98/67   07/10/18 99/56    Weight from Last 3 Encounters:   08/14/18 80.7 kg (178 lb)   08/07/18 81.3 kg (179 lb 4.8 oz)   07/31/18 81.2 kg (179 lb)              Today,  you had the following     No orders found for display       Primary Care Provider Office Phone # Fax #    Louisa Fry [phone]       [facility] [address]  [facility] [address]        Equal Access to Services     DORIAN TOLENTINO : Hadii troy ku hadirmao Soomaali, waaxda luqadaha, qaybta kaalmada adeegyada, mendez komalin haykevinn nanettepippa may mich . So [facility] [phone].    ATENCIÓN: Si habla español, tiene a sheridan disposición servicios gratuitos de asistencia lingüística. Llame al [phone].    We comply with applicable federal civil rights laws and [place] laws. We do not discriminate on the basis of race, color, national origin, age, disability, sex, sexual orientation, or gender identity.            Thank you!     Thank you for choosing RADIATION ONCOLOGY CLINIC  for your care. Our goal is always to provide you with excellent care. Hearing back from our patients is one way we can continue to improve our services. Please take a few minutes to complete the written survey that you may receive in the mail after your visit with us. Thank you!             Your Updated Medication List - Protect others around you: Learn how to safely use, store and throw away your medicines at www.disposemymeds.org.          This list is accurate as of 8/14/18 11:59 PM.  Always use your most recent med list.                   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)       rivaroxaban ANTICOAGULANT 20  MG Tabs tablet    XARELTO    30 tablet    Take 1 tablet (20 mg) by mouth daily (with dinner)    Other pulmonary embolism without acute cor pulmonale, unspecified chronicity (H), 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

## 2018-08-14 NOTE — PROGRESS NOTES
[facility] PHYSICIANS  SPECIALIZING IN BREAKTHROUGHS  Radiation Oncology    On Treatment Visit Note    Hiram Tapia      Date: 2018   MRN: [id number]   : 1960  Diagnosis: Sarcoma      Reason for Visit:  On Radiation Treatment Visit     Treatment Summary to Date  Treatment Site: Right thigh Current Dose: 4400/5000 cGy Fractions:       Subjective:  Mr. Tapia is seen today for his weekly on treatment visit.  He reports that he has increased skin reaction and that his wound care nurses have changed his wound care management.  He has tenderness to touch in the treatment area.  He has noticed some enlargement. He continues to use aquaphor to manage his skin reaction.  He uses oxycodone and APAP PRN for pain management.   Nursing ROS:   Nutrition Alteration  Diet Type: Patient's Preference  Skin  Skin Reaction: 0 - No changes  Skin Intervention: bandage right hip changed twice a week by home care  Skin Note: Aquaphor     Gastrointestinal  Nausea: 0 - None       Pain Assessment  Pain Note: oxy, acetaminphen 4 times a day      Objective:   Wt 80.7 kg (178 lb)  BMI 25.54 kg/m2  Gen: Appears well, in no acute distress  Skin: Erythema in the treatment field.  Kerlix dressing over packing.  Wound packing not taken down     Labs:      Lab Results   Component Value Date    WBC 5.3 2018     Lab Results   Component Value Date    RBC 3.49 2018     Lab Results   Component Value Date    HGB 9.7 2018     Lab Results   Component Value Date    HCT 30.9 2018     No components found for: MCT  Lab Results   Component Value Date    MCV 89 2018     Lab Results   Component Value Date    MCH 27.8 2018     Lab Results   Component Value Date    MCHC 31.4 2018     Lab Results   Component Value Date    RDW 19.6 2018     Lab Results   Component Value Date     2018       Assessment:    Tolerating radiation therapy well.  All questions and concerns  addressed.    Toxicities:  Dermatitis: Grade 2: Moderate to brisk erythema; patchy moist desquamation, mostly confined to skin folds and creases; moderate erythema      Plan:   1. Continue radiotherapy.    2. Continue pain management with oxycodone PRN   3. Follow-up after radiotherapy with colonoscopy surveillance.   4. Consideration of surgery approximately Sept 17, 2018.    Mosaiq chart and setup information reviewed  MVCT/IGRT images checked    Medication Review  Med list reviewed with patient?: Yes    Educational Topic Discussed  Education Instructions: Reviewed    Murphy Ricardo MD  Radiation Oncology Resident, PGY-4  [facility]  Phone: [phone]    I saw the patient with the resident.  I agree with the resident note and plan of care.      Kimberley Solo MD  [phone]  CC  Patient Care Team:  Louisa Fry MD as PCP - General (Emergency Medicine)  Isaiah Paulson MD as MD (Family Practice)  Brad Betts MD as MD (Orthopedics)  [facility] ([facility] ([facility]), ([facility]))  Rubens Mckeon, RN as Nurse Coordinator (Oncology)  Gaurang Johnson MD as MD (Hematology & Oncology)

## 2018-08-15 ENCOUNTER — APPOINTMENT (OUTPATIENT)
Dept: RADIATION ONCOLOGY | Facility: CLINIC | Age: 58
End: 2018-08-15
Attending: RADIOLOGY
Payer: COMMERCIAL

## 2018-08-15 PROCEDURE — 77386 ZZH IMRT TREATMENT DELIVERY, COMPLEX: CPT | Performed by: RADIOLOGY

## 2018-08-16 ENCOUNTER — APPOINTMENT (OUTPATIENT)
Dept: RADIATION ONCOLOGY | Facility: CLINIC | Age: 58
End: 2018-08-16
Attending: RADIOLOGY
Payer: COMMERCIAL

## 2018-08-16 PROCEDURE — 77386 ZZH IMRT TREATMENT DELIVERY, COMPLEX: CPT | Performed by: RADIOLOGY

## 2018-08-17 ENCOUNTER — APPOINTMENT (OUTPATIENT)
Dept: RADIATION ONCOLOGY | Facility: CLINIC | Age: 58
End: 2018-08-17
Attending: RADIOLOGY
Payer: COMMERCIAL

## 2018-08-17 DIAGNOSIS — C49.9 SARCOMA OF SOFT TISSUE (H): Primary | ICD-10-CM

## 2018-08-17 PROCEDURE — 77336 RADIATION PHYSICS CONSULT: CPT | Performed by: RADIOLOGY

## 2018-08-17 PROCEDURE — 77386 ZZH IMRT TREATMENT DELIVERY, COMPLEX: CPT | Performed by: RADIOLOGY

## 2018-08-17 NOTE — PROGRESS NOTES
This is a recent snapshot of the patient's [facility] medical record.  For current drug dose and complete information and questions, call [phone] or In Basket pool, [facility]v home infusion ([id number])  CSN Number:  [id number]

## 2018-08-21 ENCOUNTER — ONCOLOGY VISIT (OUTPATIENT)
Dept: RADIATION ONCOLOGY | Facility: CLINIC | Age: 58
End: 2018-08-21

## 2018-08-21 NOTE — MR AVS SNAPSHOT
After Visit Summary   8/21/2018    Hiram Tapia    MRN: [id number]           Patient Information     Date Of Birth          1960        Visit Information        Provider Department      8/21/2018 10:00 PM Kimberley Solo MD Radiation Oncology Clinic         Follow-ups after your visit        Your next 10 appointments already scheduled     Sep 04, 2018  2:30 PM CDT   MR FEMUR THIGH RIGHT W CONTRAST with [id number]   [facility] ([facility] [place])    [address] [address] [phone]           Take your medicines as usual, unless your doctor tells you not to. Bring a list of your current medicines to your exam (including vitamins, minerals and over-the-counter drugs).  You may or may not receive intravenous (IV) contrast for this exam pending the discretion of the Radiologist.  You do not need to do anything special to prepare.  The MRI machine uses a strong magnet. Please wear clothes without metal (snaps, zippers). A sweatsuit works well, or we may give you a hospital gown.  Please remove any body piercings and hair extensions before you arrive. You will also remove watches, jewelry, hairpins, wallets, dentures, partial dental plates and hearing aids. You may wear contact lenses, and you may be able to wear your rings. We have a safe place to keep your personal items, but it is safer to leave them at home.  **IMPORTANT** THE INSTRUCTIONS BELOW ARE ONLY FOR THOSE PATIENTS WHO HAVE BEEN PRESCRIBED SEDATION OR GENERAL ANESTHESIA DURING THEIR MRI PROCEDURE:  IF YOUR DOCTOR PRESCRIBED ORAL SEDATION (take medicine to help you relax during your exam):   You must get the medicine from your doctor (oral medication) before you arrive. Bring the medicine to the exam. Do not take it at home. You ll be told when to take it upon arriving for your exam.   Arrive one hour early. Bring someone who can take you home after the test. Your  medicine will make you sleepy. After the exam, you may not drive, take a bus or take a taxi by yourself.  IF YOUR DOCTOR PRESCRIBED IV SEDATION:   Arrive one hour early. Bring someone who can take you home after the test. Your medicine will make you sleepy. After the exam, you may not drive, take a bus or take a taxi by yourself.   No eating 6 hours before your exam. You may have clear liquids up until 4 hours before your exam. (Clear liquids include water, clear tea, black coffee and fruit juice without pulp.)  IF YOUR DOCTOR PRESCRIBED ANESTHESIA (be asleep for your exam):   Arrive 1 1/2 hours early. Bring someone who can take you home after the test. You may not drive, take a bus or take a taxi by yourself.   No eating 8 hours before your exam. You may have clear liquids up until 4 hours before your exam. (Clear liquids include water, clear tea, black coffee and fruit juice without pulp.)   You will spend four to five hours in the recovery room.  Please call the Imaging Department at your exam site with any questions.            Sep 04, 2018  3:45 PM CDT   (Arrive by 3:30 PM)   Return Visit with Brad Betts MD   [facility] ([facility])    [address]  4th Floor  [facility] [address] [phone]            Sep 17, 2018   Procedure with Brad Betts MD   [facility], [place], Same Day Surgery (--)    [address] [address] [phone]            Oct 23, 2018 10:30 AM CDT   (Arrive by 10:15 AM)   Return Visit with Gaurang Johnson MD   [facility] ([facility])    [address]  [address]  [facility] [id number] [phone]              Who to contact     Please call your clinic at [phone] to:    Ask questions about your health    Make or cancel appointments    Discuss your medicines    Learn about your test results    Speak to your doctor            Additional  Information About Your Visit        CM Sistemi Information     CM Sistemi is an electronic gateway that provides easy, online access to your medical records. With CM Sistemi, you can request a clinic appointment, read your test results, renew a prescription or communicate with your care team.     To sign up for CM Sistemi visit the website at [email or web address]   You will be asked to enter the access code listed below, as well as some personal information. Please follow the directions to create your username and password.     Your access code is: [email or web address]  Expires: 2018 10:00 PM     Your access code will  in 90 days. If you need help or a new code, please contact your [facility] or call [phone] for assistance.        Care EveryWhere ID     This is your Care EveryWhere ID. This could be used by other organizations to access your [place] medical records  [id number]         Blood Pressure from Last 3 Encounters:   No data found for BP    Weight from Last 3 Encounters:   No data found for Wt              Today, you had the following     No orders found for display       Primary Care Provider Office Phone # Fax #    Louisa Fry [phone]       [facility] [address]  [facility] [address]        Equal Access to Services     DORIAN TOLENTINO : Hadii aad ku hadasho Soomaali, waaxda luqadaha, qaybta kaalmada adeegyada, waxay idiin haykevinn jaz may laashley . So [facility] [phone].    ATENCIÓN: Si habla español, tiene a sheridan disposición servicios gratuitos de asistencia lingüística. Llame al [phone].    We comply with applicable federal civil rights laws and [place] laws. We do not discriminate on the basis of race, color, national origin, age, disability, sex, sexual orientation, or gender identity.            Thank you!     Thank you for choosing RADIATION ONCOLOGY CLINIC  for your care. Our goal is always to provide you with excellent care.  Hearing back from our patients is one way we can continue to improve our services. Please take a few minutes to complete the written survey that you may receive in the mail after your visit with us. Thank you!             Your Updated Medication List - Protect others around you: Learn how to safely use, store and throw away your medicines at www.disposemymeds.org.          This list is accurate as of 8/21/18 11:59 PM.  Always use your most recent med list.                   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

## 2018-08-22 DIAGNOSIS — C49.21 SOFT TISSUE SARCOMA OF RIGHT THIGH (H): ICD-10-CM

## 2018-08-22 RX ORDER — OXYCODONE HYDROCHLORIDE 5 MG/1
5 TABLET ORAL EVERY 4 HOURS PRN
Qty: 100 TABLET | Refills: 0 | Status: ON HOLD | OUTPATIENT
Start: 2018-08-22 | End: 2018-09-19

## 2018-08-31 NOTE — PROCEDURES
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  Treatment Summary:  Radiation Oncology - Course: 1   Treatment Site Current Dose Modality From To Elapsed Days Fx.  1 R thigh  5,000 cGy 06 X  7/17/2018  2018  31 25         Dose per Fraction:  200 cGy      Total Dose:   5000cGy           COMMENTS:      Mr. 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  He had an open wound tract that   continued draining and did not heal through his course of neoadjuvant chemotherapy.  After   multidisciplinary discussion, the decision was made to proceed with pre-op radiotherapy with the   open wound in place with understanding that we would not expect healing during the treatment.  He   completed treatment with the development of a maximum of grade 2 skin reaction and grade 2 pain   secondary to treatment requiring use of Tylenol and oxycodone PRN for pain management   approximately 4 times daily.  He had wound care dress his wound twice weekly during treatment and   did not experience any infection during treatment. He required no unanticipated treatment breaks   during treatment.      PAIN MANAGEMENT:  The patient will continue pain management as needed until he undergoes surgery.                               FOLLOW UP PLAN: Mr. Tapia will follow-up in our department and orthopedic surgery within the next   month.  He has planned resection of his sarcoma. The patient will follow-up with Dr. Johnson on 10/23/18.                      Resident Physician:    Murphy Ricardo M.D.   Staff Physician: Kimberley Solo M.D.  Physicist: Kumar Pardo, PhD     CC: MD Yana NavarroRoger Williams Medical CenterBrad maria MD                                 Radiation Oncology:  [facility] [address] [place] [address]

## 2018-09-04 ENCOUNTER — RADIANT APPOINTMENT (OUTPATIENT)
Dept: MRI IMAGING | Facility: CLINIC | Age: 58
End: 2018-09-04
Attending: ORTHOPAEDIC SURGERY
Payer: COMMERCIAL

## 2018-09-04 ENCOUNTER — OFFICE VISIT (OUTPATIENT)
Dept: ORTHOPEDICS | Facility: CLINIC | Age: 58
End: 2018-09-04
Payer: COMMERCIAL

## 2018-09-04 VITALS — HEIGHT: 70 IN | WEIGHT: 183.1 LBS | BODY MASS INDEX: 26.21 KG/M2

## 2018-09-04 DIAGNOSIS — C49.21 SOFT TISSUE SARCOMA OF RIGHT THIGH (H): Primary | ICD-10-CM

## 2018-09-04 DIAGNOSIS — C49.9 SARCOMA OF SOFT TISSUE (H): ICD-10-CM

## 2018-09-04 RX ORDER — GADOBUTROL 604.72 MG/ML
7.5 INJECTION INTRAVENOUS ONCE
Status: COMPLETED | OUTPATIENT
Start: 2018-09-04 | End: 2018-09-04

## 2018-09-04 RX ADMIN — GADOBUTROL 7.5 ML: 604.72 INJECTION INTRAVENOUS at 14:45

## 2018-09-04 NOTE — NURSING NOTE
Teaching Flowsheet   Relevant Diagnosis: removal right thigh tumor  Teaching Topic: preop     Person(s) involved in teaching:   Patient     Motivation Level:  Asks Questions: Yes  Eager to Learn: Yes  Cooperative: Yes  Receptive (willing/able to accept information): Yes  Any cultural factors/Shinto beliefs that may influence understanding or compliance? No   Patient demonstrates understanding of the following:  Reason for the appointment, diagnosis and treatment plan: Yes  Knowledge of proper use of medications and conditions for which they are ordered (with special attention to potential side effects or drug interactions): Yes  Which situations necessitate calling provider and whom to contact: Yes       Teaching Concerns Addressed:        Proper use and care of soap (medical equip, care aids, etc.): Yes  Nutritional needs and diet plan: Yes  Pain management techniques: Yes  Wound Care: Yes  How and/when to access community resources: NA     Instructional Materials Used/Given: surgery packet reviewed with patient.  He already has a date set for  09-17-18. He will obtain a H&P with PCP.  He has no other questions at this time.

## 2018-09-04 NOTE — NURSING NOTE
"Chief Complaint   Patient presents with     Schedule Surgery     Pt. states that he is here today to Review Same day MRI Scan and to Discuss Surgery sched. for 9/17.        58 year old  1960    Ht 1.784 m (5' 10.24\")  Wt 83.1 kg (183 lb 1.6 oz)  BMI 26.1 kg/m2           [facility] [employer] [facility] [address] [place] [address] [address] [facility]  [address]      Allergies   Allergen Reactions     Hydrofera Blue 4\"X4\" [Wound Dressings] Dermatitis and Blisters     Patient reports that Dressing causes skin irritation, blisters, and drainage.      Tegaderm Ag Mesh [Silver] Dermatitis and Blisters     Patient reports that Dressing causes Skin irritation, blisters, and drainage.     Current Outpatient Prescriptions   Medication     Acetaminophen (TYLENOL PO)     lidocaine, viscous, (XYLOCAINE) 2 % solution     OLANZapine (ZYPREXA) 5 MG tablet     oxyCODONE IR (ROXICODONE) 5 MG tablet     polyethylene glycol (MIRALAX/GLYCOLAX) Packet     rivaroxaban ANTICOAGULANT (XARELTO) 20 MG TABS tablet     senna-docusate (SENOKOT-S;PERICOLACE) 8.6-50 MG per tablet     No current facility-administered medications for this visit.      Facility-Administered Medications Ordered in Other Visits   Medication     0.9% sodium chloride BOLUS                   "

## 2018-09-04 NOTE — MR AVS SNAPSHOT
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)    -  1       Follow-ups after your visit        Your next 10 appointments already scheduled     Sep 17, 2018   Procedure with Brad Betts MD   [facility], [place], Same Day Surgery (--)    [address] [facility] [address] [phone]            Oct 23, 2018 10:30 AM CDT   (Arrive by 10:15 AM)   Return Visit with Gaurang Johnson MD   [facility] ([facility] [place])    [address]  [address]  [facility] [address] [phone]              Who to contact     Please call your clinic at [phone] to:    Ask questions about your health    Make or cancel appointments    Discuss your medicines    Learn about your test results    Speak to your doctor            Additional Information About Your Visit        MyChart Information     Every1Mobilet is an electronic gateway that provides easy, online access to your medical records. With Toolwi, you can request a clinic appointment, read your test results, renew a prescription or communicate with your care team.     To sign up for Every1Mobilet visit the website at [email or web address]   You will be asked to enter the access code listed below, as well as some personal information. Please follow the directions to create your username and password.     Your access code is: [email or web address]  Expires: 2018 10:00 PM     Your access code will  in 90 days. If you need help or a new code, please contact your [facility] or call [phone] for assistance.        Care EveryWhere ID     This is your Care EveryWhere ID. This could be used by  "other organizations to access your [place] medical records  [id number]        Your Vitals Were     Height BMI (Body Mass Index)                1.784 m (5' 10.24\") 26.1 kg/m2           Blood Pressure from Last 3 Encounters:   07/17/18 101/71   07/10/18 98/67   07/10/18 99/56    Weight from Last 3 Encounters:   09/04/18 83.1 kg (183 lb 1.6 oz)   08/14/18 80.7 kg (178 lb)   08/07/18 81.3 kg (179 lb 4.8 oz)              Today, you had the following     No orders found for display       Primary Care Provider Office Phone # Fax #    Louisa Fry [phone]       [facility] [address]  [facility] [address]        Equal Access to Services     DORIAN TOLENTINO : Hadii aad ku hadasho Sonikita, waaxda luqadaha, qaybta kaalmada adepippayada, mendez epps . So [facility] [phone].    ATENCIÓN: Si habla español, tiene a sheridan disposición servicios gratuitos de asistencia lingüística. Markos al [phone].    We comply with applicable federal civil rights laws and [place] laws. We do not discriminate on the basis of race, color, national origin, age, disability, sex, sexual orientation, or gender identity.            Thank you!     Thank you for choosing [facility]  for your care. Our goal is always to provide you with excellent care. Hearing back from our patients is one way we can continue to improve our services. Please take a few minutes to complete the written survey that you may receive in the mail after your visit with us. Thank you!             Your Updated Medication List - Protect others around you: Learn how to safely use, store and throw away your medicines at www.disposemymeds.org.          This list is accurate as of 9/4/18  5:39 PM.  Always use your most recent med list.                   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

## 2018-09-04 NOTE — LETTER
9/4/2018     RE: Hiram Tapia  [address]  [facility] [address]     Dear Colleague,    Thank you for referring your patient, Hiram Tapia, to the [facility] at [facility]. Please see a copy of my visit note below.    Diagnosis: 1. High-grade soft tissue sarcoma right thigh  2. Delayed post op wound infection  3. Pulmonary embolis      Treatment: 1. Neoadjuvant chemotherapy.  We will begin the March 26, 2018.  Pre op radiation..  2. I and D, antibiotics  3. Anticoagulation       Mr. Tapia is seen today for his preoperative discussion.  He reports his pain is improved but still present.  He is looking forward to having the surgery removed.    On exam it is difficult to determine if the tumor is fixed to the femur.  I suspect based on exam and imaging that it is not.  His previous open wound has been managed well and is now shows no evidence of infection.    MRI scan was reviewed the tumor appears to be encapsulated and is large and involves at least 80% of the quadriceps compartment.    I reviewed the surgical plan with Mr. Gandhi I told him that he will have significant difficulty walking afterwards because of possible total loss of his quadriceps function.  We discussed several risk including the risk of wound dehiscence wound infection wound complications.  He understands all these and is anxious to have the surgery performed.    Of note he has had a pulmonary embolus.  He is currently on Xarelto.  We will have him seen in the hematology clinic to be certain that they provide guidelines for us in the perioperative period.    Impression:  Ready for surgery  Plan: 1.  Surgery scheduled for September 17.  2.  Must clear perioperative management of PE history with the hematology clinic    Patient was seen today for 15 minutes.  10 minutes spent answering questions coordinating his care    Again, thank you for allowing me to participate in the care of your  patient.      Sincerely,    Brad Betts MD

## 2018-09-04 NOTE — PROGRESS NOTES
Diagnosis: 1. High-grade soft tissue sarcoma right thigh  2. Delayed post op wound infection  3. Pulmonary embolis      Treatment: 1. Neoadjuvant chemotherapy.  We will begin the March 26, 2018.  Pre op radiation..  2. I and D, antibiotics  3. Anticoagulation       Mr. Tapia is seen today for his preoperative discussion.  He reports his pain is improved but still present.  He is looking forward to having the surgery removed.    On exam it is difficult to determine if the tumor is fixed to the femur.  I suspect based on exam and imaging that it is not.  His previous open wound has been managed well and is now shows no evidence of infection.    MRI scan was reviewed the tumor appears to be encapsulated and is large and involves at least 80% of the quadriceps compartment.    I reviewed the surgical plan with Mr. Gandhi I told him that he will have significant difficulty walking afterwards because of possible total loss of his quadriceps function.  We discussed several risk including the risk of wound dehiscence wound infection wound complications.  He understands all these and is anxious to have the surgery performed.    Of note he has had a pulmonary embolus.  He is currently on Xarelto.  We will have him seen in the hematology clinic to be certain that they provide guidelines for us in the perioperative period.    Impression:  Ready for surgery  Plan: 1.  Surgery scheduled for September 17.  2.  Must clear perioperative management of PE history with the hematology clinic    Patient was seen today for 15 minutes.  10 minutes spent answering questions coordinating his care

## 2018-09-04 NOTE — DISCHARGE INSTRUCTIONS
MRI Contrast Discharge Instructions    The IV contrast you received today will pass out of your body in your  urine. This will happen in the next 24 hours. You will not feel this process.  Your urine will not change color.    Drink at least 4 extra glasses of water or juice today (unless your doctor  has restricted your fluids). This reduces the stress on your kidneys.  You may take your regular medicines.    If you are on dialysis: It is best to have dialysis today.    If you have a reaction: Most reactions happen right away. If you have  any new symptoms after leaving the hospital (such as hives or swelling),  call your hospital at the correct number below. Or call your family doctor.  If you have breathing distress or wheezing, call 911.    Special instructions: ***    I have read and understand the above information.    Signature:______________________________________ Date:___________    Staff:__________________________________________ Date:___________     Time:__________    [facility] Departments:    ___[place]: [phone]  ___[facility]: [phone]  ___[place]: [phone] ___[facility]: [phone]  ___[facility]: [phone]  ___[facility]: [phone]  ___[phone]  ___[facility]: [phone]  ___[phone]

## 2018-09-05 ENCOUNTER — TELEPHONE (OUTPATIENT)
Dept: ORTHOPEDICS | Facility: CLINIC | Age: 58
End: 2018-09-05

## 2018-09-05 NOTE — TELEPHONE ENCOUNTER
RN called and spoke with Hiram.   He is set on  09-11-18at 1000 at  [address] [place] [address], [phone]

## 2018-09-11 ENCOUNTER — RADIANT APPOINTMENT (OUTPATIENT)
Dept: ULTRASOUND IMAGING | Facility: CLINIC | Age: 58
End: 2018-09-11
Attending: PHYSICIAN ASSISTANT
Payer: COMMERCIAL

## 2018-09-11 ENCOUNTER — OFFICE VISIT (OUTPATIENT)
Dept: HEMATOLOGY | Facility: CLINIC | Age: 58
End: 2018-09-11
Attending: ORTHOPAEDIC SURGERY
Payer: COMMERCIAL

## 2018-09-11 VITALS
TEMPERATURE: 97.7 F | WEIGHT: 181.7 LBS | HEIGHT: 70 IN | HEART RATE: 80 BPM | BODY MASS INDEX: 26.01 KG/M2 | SYSTOLIC BLOOD PRESSURE: 132 MMHG | RESPIRATION RATE: 12 BRPM | DIASTOLIC BLOOD PRESSURE: 83 MMHG | OXYGEN SATURATION: 98 %

## 2018-09-11 DIAGNOSIS — M79.89 RIGHT LEG SWELLING: ICD-10-CM

## 2018-09-11 DIAGNOSIS — C49.9 SARCOMA OF SOFT TISSUE (H): ICD-10-CM

## 2018-09-11 DIAGNOSIS — Z86.711 HISTORY OF PULMONARY EMBOLISM: ICD-10-CM

## 2018-09-11 DIAGNOSIS — Z86.711 HISTORY OF PULMONARY EMBOLISM: Primary | ICD-10-CM

## 2018-09-11 PROCEDURE — 99214 OFFICE O/P EST MOD 30 MIN: CPT | Performed by: PHYSICIAN ASSISTANT

## 2018-09-11 PROCEDURE — G0463 HOSPITAL OUTPT CLINIC VISIT: HCPCS

## 2018-09-11 ASSESSMENT — PAIN SCALES - GENERAL: PAINLEVEL: MODERATE PAIN (4)

## 2018-09-11 NOTE — PROGRESS NOTES
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 Recent incidental finding of asymptomatic pulmonary embolism. Currently on Xarelto. Here to discuss anticoagulation management for his upcoming tumor resection surgery scheduled on 2018.     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 Betts for evaluation and treatment recommendation in regard to perioperative anticoagulation management for his upcoming tumor resection surgery.     Hiram apparently developed discomfort of his right thigh after he accidentally hit his right lateral thigh against a truck's tailgate back in Oct 2017. Subsequently there was significant swelling and stiffness of this area for which imaging study showed a cystic mass. He underwent biopsy of this mass on 3/8/2018 with pathology showed undifferentiated pleomorphic sarcoma. He then began doxil/ifos on 3/23/2018 and had 4 cycles of chemo therapy. His course was complicated by post biopsy wound infection as well as poor tolerance to chemotherapy with hospitalization for nausea, vomiting, dehydration and electrolyte abnormalities back in 2018. He also underwent neoadjuvant XRT, all under the supervision of his oncologist, Dr. Johnson.     Back in 2018, he underwent a routine PET scan and was found to have filling defect within the  right inferior pulmonary artery consistent with pulmonary emboli. For this he was started on anticoagulation therapy with Xarelto.     He recently saw Dr. Betts who recommending tumor excision and Hiram is scheduled for this on 9/17/2018. Hiram is referred by Dr. Betts today for perioperative anticoagulation therapy management consultation.     Past Medical History:  Past Medical History:   Diagnosis Date     Sarcoma (H)        Past Surgical History:  Past Surgical History:   Procedure Laterality Date     EXCISE SOFT TISSUE TUMOR THIGH Right 3/8/2018    Procedure: EXCISE SOFT TISSUE TUMOR THIGH;  Biopsy Right Thigh Tumor;  Surgeon: Brad Betts MD;  [facility]     INSERT PORT VASCULAR ACCESS N/A 3/28/2018    Procedure: INSERT PORT VASCULAR ACCESS;  Vascular Access Port Insertion with C-arm;  Surgeon: Sarah Bowie MD;  Location: [facility] OR     IRRIGATION AND DEBRIDEMENT LOWER EXTREMITY, COMBINED Right 4/6/2018    Procedure: COMBINED IRRIGATION AND DEBRIDEMENT LOWER EXTREMITY;  Irrigation And Debridement Right Thigh ;  Surgeon: Brad Betts MD;  Location: [facility] OR     IRRIGATION AND DEBRIDEMENT LOWER EXTREMITY, COMBINED Right 4/9/2018    Procedure: COMBINED IRRIGATION AND DEBRIDEMENT LOWER EXTREMITY;  Irrigation And Debridement Right Thigh Wound. with Wound VAC Exchange;  Surgeon: Brad Betts MD;  Location: [facility] OR     STRABISMUS SURGERY      as a child       Medications:  Current Outpatient Prescriptions   Medication Sig Dispense Refill     Acetaminophen (TYLENOL PO) Take 1,000 mg by mouth daily as needed for mild pain or fever       lidocaine, viscous, (XYLOCAINE) 2 % solution        OLANZapine (ZYPREXA) 5 MG tablet Take 1 tablet (5 mg) by mouth At Bedtime 30 tablet 0     oxyCODONE IR (ROXICODONE) 5 MG tablet Take 1 tablet (5 mg) by mouth every 4 hours as needed for moderate to severe pain 100 tablet 0     polyethylene glycol (MIRALAX/GLYCOLAX) Packet Take 17 g by mouth daily 7  "packet 1     rivaroxaban ANTICOAGULANT (XARELTO) 20 MG TABS tablet Take 1 tablet (20 mg) by mouth daily (with dinner) 30 tablet 3     senna-docusate (SENOKOT-S;PERICOLACE) 8.6-50 MG per tablet Take 1 tablet by mouth daily          Allergies:  Allergies   Allergen Reactions     Hydrofera Blue 4\"X4\" [Wound Dressings] Dermatitis and Blisters     Patient reports that Dressing causes skin irritation, blisters, and drainage.      Tegaderm Ag Mesh [Silver] Dermatitis and Blisters     Patient reports that Dressing causes Skin irritation, blisters, and drainage.       ROS:  Denies any bleeding issues. No gum bleeding, No nose bleed. Denies any hematuria or blood in stools. Denies any ecchymosis. Denies any fever, no chest pain. Denies any shortness of breath. He does report right sided lower extremity swelling and pain, especially at the tumor sight.     Social History:  Denies any tobacco use. No significant alcohol use. Denies any illicit drug use.     Family History:  Deferred.    Objectives:  Pleasant 58 year old male in no acute distress.  Vitals: B/P: 132/83, T: 97.7, P: 80, R: 12, Wt: 181 lbs 11.2 oz  Exam:   Lungs: Clear to auscultation bilaterally.  Card: S1 S2, RRR, No murmurs rubs or gallops.  Abd: Non tender. Non distended. Positive bowel sound. Soft.   Ext: Large soft tissue mass over the right lateral thigh. Some mild right lower extremity swelling. Negative Lr sign. Left leg has no swelling.     Labs:  Component      Latest Ref Rng & Units 7/17/2018   WBC      4.0 - 11.0 10e9/L 5.3   RBC Count      4.4 - 5.9 10e12/L 3.49 (L)   Hemoglobin      13.3 - 17.7 g/dL 9.7 (L)   Hematocrit      40.0 - 53.0 % 30.9 (L)   MCV      78 - 100 fl 89   MCH      26.5 - 33.0 pg 27.8   MCHC      31.5 - 36.5 g/dL 31.4 (L)   RDW      10.0 - 15.0 % 19.6 (H)   Platelet Count      150 - 450 10e9/L 460 (H)   Diff Method       Automated Method   % Neutrophils      % 62.8   % Lymphocytes      % 20.3   % Monocytes      % 14.8   % " Eosinophils      % 0.0   % Basophils      % 1.3   % Immature Granulocytes      % 0.8   Nucleated RBCs      0 /100 0   Absolute Neutrophil      1.6 - 8.3 10e9/L 3.3   Absolute Lymphocytes      0.8 - 5.3 10e9/L 1.1   Absolute Monocytes      0.0 - 1.3 10e9/L 0.8   Absolute Eosinophils      0.0 - 0.7 10e9/L 0.0   Absolute Basophils      0.0 - 0.2 10e9/L 0.1   Abs Immature Granulocytes      0 - 0.4 10e9/L 0.0   Absolute Nucleated RBC       0.0   Sodium      133 - 144 mmol/L 140   Potassium      3.4 - 5.3 mmol/L 4.0   Chloride      94 - 109 mmol/L 107   Carbon Dioxide      20 - 32 mmol/L 23   Anion Gap      3 - 14 mmol/L 10   Glucose      70 - 99 mg/dL 94   Urea Nitrogen      7 - 30 mg/dL 8   Creatinine      0.66 - 1.25 mg/dL 0.80   GFR Estimate      >60 mL/min/1.7m2 >90   GFR Estimate If Black      >60 mL/min/1.7m2 >90   Calcium      8.5 - 10.1 mg/dL 9.8   Bilirubin Total      0.2 - 1.3 mg/dL 0.2   Albumin      3.4 - 5.0 g/dL 3.3 (L)   Protein Total      6.8 - 8.8 g/dL 7.5   Alkaline Phosphatase      40 - 150 U/L 109   ALT      0 - 70 U/L 18   AST      0 - 45 U/L 16       Imaging:  PET scan on 4/21/2018:  In this patient with history of right thigh soft tissue sarcoma;  1. Interval decrease in the total metabolic tumor volume and decreased FDG avidity within the right thigh soft tissue sarcoma, consistent with treatment response. 2. 1.2 cm mildly FDG avid pulmonary nodule in the right lung base. On the prior study, there was pleural effusion and compression atelectasis in the right lung base. It is indeterminant whether this nodule was present in the prior study or not. This is suspicious for metastatic disease. Additional suspicious nodules in the right upper lobe measuring 5 mm, more conspicuous than prior and lower lobe measuring 3 mm, unchanged. 3. Unchanged FDG-avid right iliac nodes, indeterminate could be inflammatory/infectious or metastatic. 4. Right hepatic hemangioma. 5. Incidental left adrenal  myelolipoma.  Addendum: There is filling defect in the right inferior pulmonary artery,  consistent with pulmonary emboli.    Assessment:  In summary, Hiram is a 58 year old male who is found to have undifferentiated pleomorphic sarcoma of the right thigh, who was incidentally found to have asymptomatic pulmonary emboli back in April 2018 on a PET scan during his neoadjuvant chemotherapy, referred to this clinic to discuss perioperative anticoagulation therapy for his upcoming tumor resection surgery by Dr. Betts on 9/17/2018.     Currently the patient is on Xarelto at 20 mg PO Qday and is doing well without any bleeding complications.    His pulmonary embolic event was a provoked event as at the time he had active tumor and was undergoing chemotherapy.     Diagnosis:  1. Provoked pulmonary embolism back in April 2018.  2. Undifferentiated pleomorphic sarcoma of the right thigh.  3. Right leg swelling.     Plan:  I have a long discussion with the patient today in regard to our plan and recommendation.    First and foremost, we should confirm if he has a DVT of the right leg or not. This might change our recommendation in regard to the need of IVC filter placement prior to his upcoming tumor resection surgery for which it can be hemostatically challenging and anticoagulation therapy use might need to be interrupted for a prolonged period of time. If ultrasound confirmed lower extremity DVT, we would recommend pre-operative IVC filter placement. Otherwise, if ultrasound from today is negative for DVT, no IVC filter placement is necessary.     As to anticoagulation therapy, I have instructed the patient to take his last dose of Xarelto on 9/15/2018 (Saturday) evening. Hold his Xarelto on 9/16/2018 (Sunday) evening dose.     Post operatively, we do recommend that he should be restarting anticoagulation therapy as soon as it is felt safe to do so from a hemostatic standpoint per Dr. Matt's team. One could  potentially consider using unfractionated heparin while the patient is still in the hospital for ease of reversal or discontinuation if the patient should have any complications or need to return to the surgical suite. He could then restart Xarelto at discharge.     I will defer the decision on ultimate duration of anticoagulation therapy for his pulmonary embolism to Dr. Johnson (his oncologist).     The patient is given our center's contact information and is instructed to call if he should have any further questions or concerns.  Otherwise, at this time, I do not have any plans to see this patient back on a routine basis.     Thank you for letting us to participate in this patient's care.     Catherine Prather, nurse clinician is present throughout the entire clinic visit with the patient today.  Patient understands and agrees with the above plan and recommendation.    Case discussed in details with Dr. Juan Chavarria, staff hematologist, he agrees with the above plan and recommendation.      Eulalio Martinez PA-C, ROMULO  Physician Assistant  [facility].     Addendum on 9/12/2018 at 10:20am    Bilateral lower extremity Ultrasound done on 9/11/2018:  Negative for DVT in either lower extremities.    No IVC filter is indicated. The patient is cleared from a hematological standpoint to proceed with above mentioned surgery. I have called the patient on 9/12/2018 at 10:20am to communicate the above ultrasound result to the patient and to remind him about holding his Xarelto dose on Sunday 9/16/2018 in preparation for his surgery on 9/17/2018.      ROMULO Weiner PA-C  Physician Assistant  [facility].

## 2018-09-11 NOTE — MR AVS SNAPSHOT
After Visit Summary   9/11/2018    Hiram Tapia    MRN: [id number]           Patient Information     Date Of Birth          1960        Visit Information        Provider Department      9/11/2018 10:00 AM Eulalio Martinez PA-C [facility]        Today's Diagnoses     History of pulmonary embolism    -  1    Right leg swelling        Sarcoma of soft tissue (H)          Care Instructions    1.  Ultrasound of both legs today at [address] [place] [address] at 12 noon.  2.  If clot in either leg, then will arrange IVC filter to be placed.  3.  Follow up by phone.  4.  Hold Xarelto Sunday evening (last dose Saturday evening)  5.  Eulalio to discuss anticoagulation restart with Dr. Betts.            Follow-ups after your visit        Your next 10 appointments already scheduled     Sep 11, 2018 12:00 PM CDT   US LOWER EXTREMITY VENOUS DUPLEX BILATERAL with [id number]   [facility] ([facility])    [address] [place] [address] [phone]           How do I prepare for my exam? (Food and drink instructions) No Food and Drink Restrictions.  How do I prepare for my exam? (Other instructions) You do not need to do anything special to prepare for your exam.  What should I wear: Wear comfortable clothes.  How long does the exam take: Most ultrasounds take 30 to 60 minutes.  What should I bring: Bring a list of your medicines, including vitamins, minerals and over-the-counter drugs. It is safest to leave personal items at home.  Do I need a :  No  is needed.  What do I need to tell my doctor: Tell your doctor about any allergies you may have.  What should I do after the exam: No restrictions, You may resume normal activities.  What is this test: An ultrasound uses sound waves to make pictures of the body. Sound waves do not cause pain. The only discomfort may be the pressure of the wand against your skin or  "full bladder.  Who should I call with questions: If you have any questions, please call the Imaging Department where you will have your exam. Directions, parking instructions, and other information is available on our website, [email or web address].            Sep 17, 2018   Procedure with Brad Betts MD   [facility] [place], Same Day Surgery (--)    [address]  [facility] [id number] [phone]            Oct 23, 2018 10:30 AM CDT   (Arrive by 10:15 AM)   Return Visit with Gaurang Johnson MD   [facility] ([facility])    [address]  [address]  [facility] [address] [phone]              Future tests that were ordered for you today     Open Future Orders        Priority Expected Expires Ordered    US Venous lower extremity bilat Routine  9/11/2019 9/11/2018            Who to contact     If you have questions or need follow up information about today's clinic visit or your schedule please contact [facility] directly at [phone].  Normal or non-critical lab and imaging results will be communicated to you by MyChart, letter or phone within 4 business days after the clinic has received the results. If you do not hear from us within 7 days, please contact the clinic through [employer] or phone. If you have a critical or abnormal lab result, we will notify you by phone as soon as possible.  Submit refill requests through "[employer] or call your pharmacy and they will forward the refill request to us. Please allow 3 business days for your refill to be completed.          Additional Information About Your Visit        "[employer] Information     "[employer] lets you send messages to your doctor, view your test results, renew your prescriptions, schedule appointments and more. To sign up, go to [email or web address] . Click on \"Log in\" on the left side of the screen, which will take you to the Welcome page. Then click " "on \"Sign up Now\" on the right side of the page.     You will be asked to enter the access code listed below, as well as some personal information. Please follow the directions to create your username and password.     Your access code is: [email or web address]  Expires: 2018 10:00 PM     Your access code will  in 90 days. If you need help or a new code, please call your [facility] or [phone].        Care EveryWhere ID     This is your Care EveryWhere ID. This could be used by other organizations to access your [facility] medical records  [id number]        Your Vitals Were     Pulse Temperature Respirations Height Pulse Oximetry BMI (Body Mass Index)    80 97.7  F (36.5  C) (Oral) 12 1.778 m (5' 10\") 98% 26.07 kg/m2       Blood Pressure from Last 3 Encounters:   18 132/83   18 101/71   07/10/18 98/67    Weight from Last 3 Encounters:   18 82.4 kg (181 lb 11.2 oz)   18 83.1 kg (183 lb 1.6 oz)   18 80.7 kg (178 lb)               Primary Care Provider Office Phone # Fax #    Louisa Fry [phone]       [facility] [address]  [facility] [address]        Equal Access to Services     [facility]: Hadii aad ku hadasho Soomaali, waaxda luqadaha, qaybta kaalmada adepippayada, mendez epps . So [facility] [phone].    ATENCIÓN: Si habla español, tiene a sheridan disposición servicios gratuitos de asistencia lingüística. Markos al [phone].    We comply with applicable federal civil rights laws and [place] laws. We do not discriminate on the basis of race, color, national origin, age, disability, sex, sexual orientation, or gender identity.            Thank you!     Thank you for choosing [facility]  for your care. Our goal is always to provide you with excellent care. Hearing back from our patients is one way we can continue to improve our services. Please take a few minutes to complete the written survey " that you may receive in the mail after your visit with us. Thank you!             Your Updated Medication List - Protect others around you: Learn how to safely use, store and throw away your medicines at www.disposemymeds.org.          This list is accurate as of 9/11/18 10:50 AM.  Always use your most recent med list.                   Brand Name Dispense Instructions for use Diagnosis    lidocaine (viscous) 2 % solution    XYLOCAINE          OLANZapine 5 MG tablet    zyPREXA    30 tablet    Take 1 tablet (5 mg) by mouth At Bedtime    Chemotherapy-induced nausea and vomiting       oxyCODONE IR 5 MG tablet    ROXICODONE    100 tablet    Take 1 tablet (5 mg) by mouth every 4 hours as needed for moderate to severe pain    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

## 2018-09-11 NOTE — NURSING NOTE
Hiram Tapia  MRN: [id number]  Preferred Name: David     Saw David today with SENAIT Berg.  David has right thigh tumor resection scheduled 9/17/18.  He has completed chemo & radiation.  A  Pulmonary embolism was incidentally found on PET imaging 4/21/18.  He was started on Xarelto in May.  He reports an occasional nosebleed, but no other bleeding issues.  He was told to hold his Xarelto Sunday evening & Monday prior to surgery.  Restarting directions will be discussed with Dr. Betts & Eulalio will relay to patient.  He will get bilat US done today. If clot is found, would place temporary IVC filter prior to surgery.  He also has port in place & is uncertain of timing of removal.  Follow up by phone only.  Carola Prather, RN, MSN -Nurse Clinician, [facility] [phone]

## 2018-09-11 NOTE — PATIENT INSTRUCTIONS
1.  Ultrasound of both legs today at [address] [place] [address] at 12 noon.  2.  If clot in either leg, then will arrange IVC filter to be placed.  3.  Follow up by phone.  4.  Hold Xarelto Sunday evening (last dose Saturday evening)  5.  Eulalio to discuss anticoagulation restart with Dr. Betts.

## 2018-09-16 ENCOUNTER — ANESTHESIA EVENT (OUTPATIENT)
Dept: SURGERY | Facility: CLINIC | Age: 58
End: 2018-09-16
Payer: COMMERCIAL

## 2018-09-17 ENCOUNTER — HOSPITAL ENCOUNTER (OUTPATIENT)
Facility: CLINIC | Age: 58
Setting detail: OBSERVATION
Discharge: HOME HEALTH | End: 2018-09-19
Attending: ORTHOPAEDIC SURGERY | Admitting: ORTHOPAEDIC SURGERY
Payer: COMMERCIAL

## 2018-09-17 ENCOUNTER — ANESTHESIA (OUTPATIENT)
Dept: SURGERY | Facility: CLINIC | Age: 58
End: 2018-09-17
Payer: COMMERCIAL

## 2018-09-17 DIAGNOSIS — C49.9 SARCOMA OF SOFT TISSUE (H): Primary | ICD-10-CM

## 2018-09-17 DIAGNOSIS — R91.8 PULMONARY NODULES: ICD-10-CM

## 2018-09-17 DIAGNOSIS — C49.21 SOFT TISSUE SARCOMA OF RIGHT THIGH (H): ICD-10-CM

## 2018-09-17 DIAGNOSIS — M79.604 PAIN OF RIGHT LOWER EXTREMITY: ICD-10-CM

## 2018-09-17 DIAGNOSIS — Z98.890 STATUS POST SURGERY: ICD-10-CM

## 2018-09-17 DIAGNOSIS — I26.99 OTHER PULMONARY EMBOLISM WITHOUT ACUTE COR PULMONALE, UNSPECIFIED CHRONICITY (H): ICD-10-CM

## 2018-09-17 DIAGNOSIS — M10.00 IDIOPATHIC GOUT, UNSPECIFIED CHRONICITY, UNSPECIFIED SITE: ICD-10-CM

## 2018-09-17 DIAGNOSIS — Z87.891 PERSONAL HISTORY OF TOBACCO USE, PRESENTING HAZARDS TO HEALTH: ICD-10-CM

## 2018-09-17 DIAGNOSIS — K63.9 LESION OF COLON: ICD-10-CM

## 2018-09-17 LAB
ABO + RH BLD: NORMAL
ABO + RH BLD: NORMAL
BLD GP AB SCN SERPL QL: NORMAL
BLOOD BANK CMNT PATIENT-IMP: NORMAL
GLUCOSE SERPL-MCNC: 103 MG/DL (ref 70–99)
SPECIMEN EXP DATE BLD: NORMAL

## 2018-09-17 PROCEDURE — 27210794 ZZH OR GENERAL SUPPLY STERILE: Performed by: ORTHOPAEDIC SURGERY

## 2018-09-17 PROCEDURE — 40000170 ZZH STATISTIC PRE-PROCEDURE ASSESSMENT II: Performed by: ORTHOPAEDIC SURGERY

## 2018-09-17 PROCEDURE — 25000128 H RX IP 250 OP 636: Performed by: ORTHOPAEDIC SURGERY

## 2018-09-17 PROCEDURE — 86901 BLOOD TYPING SEROLOGIC RH(D): CPT | Performed by: ANESTHESIOLOGY

## 2018-09-17 PROCEDURE — 82947 ASSAY GLUCOSE BLOOD QUANT: CPT | Performed by: ANESTHESIOLOGY

## 2018-09-17 PROCEDURE — 25000128 H RX IP 250 OP 636: Performed by: NURSE ANESTHETIST, CERTIFIED REGISTERED

## 2018-09-17 PROCEDURE — 71000014 ZZH RECOVERY PHASE 1 LEVEL 2 FIRST HR: Performed by: ORTHOPAEDIC SURGERY

## 2018-09-17 PROCEDURE — 25000128 H RX IP 250 OP 636: Performed by: ANESTHESIOLOGY

## 2018-09-17 PROCEDURE — 37000009 ZZH ANESTHESIA TECHNICAL FEE, EACH ADDTL 15 MIN: Performed by: ORTHOPAEDIC SURGERY

## 2018-09-17 PROCEDURE — 71000015 ZZH RECOVERY PHASE 1 LEVEL 2 EA ADDTL HR: Performed by: ORTHOPAEDIC SURGERY

## 2018-09-17 PROCEDURE — 25000132 ZZH RX MED GY IP 250 OP 250 PS 637: Performed by: ANESTHESIOLOGY

## 2018-09-17 PROCEDURE — 88309 TISSUE EXAM BY PATHOLOGIST: CPT | Mod: 26 | Performed by: ORTHOPAEDIC SURGERY

## 2018-09-17 PROCEDURE — 25000128 H RX IP 250 OP 636: Performed by: STUDENT IN AN ORGANIZED HEALTH CARE EDUCATION/TRAINING PROGRAM

## 2018-09-17 PROCEDURE — 25000132 ZZH RX MED GY IP 250 OP 250 PS 637: Performed by: STUDENT IN AN ORGANIZED HEALTH CARE EDUCATION/TRAINING PROGRAM

## 2018-09-17 PROCEDURE — 88309 TISSUE EXAM BY PATHOLOGIST: CPT | Performed by: ORTHOPAEDIC SURGERY

## 2018-09-17 PROCEDURE — 86850 RBC ANTIBODY SCREEN: CPT | Performed by: ANESTHESIOLOGY

## 2018-09-17 PROCEDURE — 86900 BLOOD TYPING SEROLOGIC ABO: CPT | Performed by: ANESTHESIOLOGY

## 2018-09-17 PROCEDURE — 36000064 ZZH SURGERY LEVEL 4 EA 15 ADDTL MIN - UMMC: Performed by: ORTHOPAEDIC SURGERY

## 2018-09-17 PROCEDURE — 25000566 ZZH SEVOFLURANE, EA 15 MIN: Performed by: ORTHOPAEDIC SURGERY

## 2018-09-17 PROCEDURE — 36000062 ZZH SURGERY LEVEL 4 1ST 30 MIN - UMMC: Performed by: ORTHOPAEDIC SURGERY

## 2018-09-17 PROCEDURE — 25000125 ZZHC RX 250: Performed by: NURSE ANESTHETIST, CERTIFIED REGISTERED

## 2018-09-17 PROCEDURE — 25000125 ZZHC RX 250: Performed by: ORTHOPAEDIC SURGERY

## 2018-09-17 PROCEDURE — G0378 HOSPITAL OBSERVATION PER HR: HCPCS

## 2018-09-17 PROCEDURE — 37000008 ZZH ANESTHESIA TECHNICAL FEE, 1ST 30 MIN: Performed by: ORTHOPAEDIC SURGERY

## 2018-09-17 PROCEDURE — 36415 COLL VENOUS BLD VENIPUNCTURE: CPT | Performed by: ANESTHESIOLOGY

## 2018-09-17 RX ORDER — NALOXONE HYDROCHLORIDE 0.4 MG/ML
.1-.4 INJECTION, SOLUTION INTRAMUSCULAR; INTRAVENOUS; SUBCUTANEOUS
Status: DISCONTINUED | OUTPATIENT
Start: 2018-09-17 | End: 2018-09-17

## 2018-09-17 RX ORDER — METOCLOPRAMIDE 10 MG/1
10 TABLET ORAL EVERY 6 HOURS PRN
Status: DISCONTINUED | OUTPATIENT
Start: 2018-09-17 | End: 2018-09-19 | Stop reason: HOSPADM

## 2018-09-17 RX ORDER — LIDOCAINE HYDROCHLORIDE 20 MG/ML
INJECTION, SOLUTION INFILTRATION; PERINEURAL PRN
Status: DISCONTINUED | OUTPATIENT
Start: 2018-09-17 | End: 2018-09-17

## 2018-09-17 RX ORDER — SODIUM CHLORIDE 9 MG/ML
INJECTION, SOLUTION INTRAVENOUS CONTINUOUS
Status: DISCONTINUED | OUTPATIENT
Start: 2018-09-17 | End: 2018-09-19 | Stop reason: HOSPADM

## 2018-09-17 RX ORDER — ONDANSETRON 2 MG/ML
4 INJECTION INTRAMUSCULAR; INTRAVENOUS EVERY 30 MIN PRN
Status: DISCONTINUED | OUTPATIENT
Start: 2018-09-17 | End: 2018-09-17 | Stop reason: HOSPADM

## 2018-09-17 RX ORDER — FENTANYL CITRATE 50 UG/ML
25-50 INJECTION, SOLUTION INTRAMUSCULAR; INTRAVENOUS
Status: DISCONTINUED | OUTPATIENT
Start: 2018-09-17 | End: 2018-09-17 | Stop reason: HOSPADM

## 2018-09-17 RX ORDER — METOCLOPRAMIDE HYDROCHLORIDE 5 MG/ML
10 INJECTION INTRAMUSCULAR; INTRAVENOUS EVERY 6 HOURS PRN
Status: DISCONTINUED | OUTPATIENT
Start: 2018-09-17 | End: 2018-09-19 | Stop reason: HOSPADM

## 2018-09-17 RX ORDER — ACETAMINOPHEN 325 MG/1
650 TABLET ORAL EVERY 4 HOURS PRN
Status: DISCONTINUED | OUTPATIENT
Start: 2018-09-17 | End: 2018-09-19 | Stop reason: HOSPADM

## 2018-09-17 RX ORDER — CEFAZOLIN SODIUM 2 G/100ML
2 INJECTION, SOLUTION INTRAVENOUS
Status: COMPLETED | OUTPATIENT
Start: 2018-09-17 | End: 2018-09-17

## 2018-09-17 RX ORDER — OXYCODONE HYDROCHLORIDE 5 MG/1
5-10 TABLET ORAL
Status: DISCONTINUED | OUTPATIENT
Start: 2018-09-17 | End: 2018-09-18

## 2018-09-17 RX ORDER — ONDANSETRON 4 MG/1
4 TABLET, ORALLY DISINTEGRATING ORAL EVERY 30 MIN PRN
Status: DISCONTINUED | OUTPATIENT
Start: 2018-09-17 | End: 2018-09-17 | Stop reason: HOSPADM

## 2018-09-17 RX ORDER — ACETAMINOPHEN 325 MG/1
650 TABLET ORAL ONCE
Status: COMPLETED | OUTPATIENT
Start: 2018-09-17 | End: 2018-09-17

## 2018-09-17 RX ORDER — BUPIVACAINE HYDROCHLORIDE AND EPINEPHRINE 2.5; 5 MG/ML; UG/ML
INJECTION, SOLUTION INFILTRATION; PERINEURAL PRN
Status: DISCONTINUED | OUTPATIENT
Start: 2018-09-17 | End: 2018-09-17 | Stop reason: HOSPADM

## 2018-09-17 RX ORDER — PROCHLORPERAZINE MALEATE 10 MG
10 TABLET ORAL EVERY 6 HOURS PRN
Status: DISCONTINUED | OUTPATIENT
Start: 2018-09-17 | End: 2018-09-19 | Stop reason: HOSPADM

## 2018-09-17 RX ORDER — FENTANYL CITRATE 50 UG/ML
INJECTION, SOLUTION INTRAMUSCULAR; INTRAVENOUS PRN
Status: DISCONTINUED | OUTPATIENT
Start: 2018-09-17 | End: 2018-09-17

## 2018-09-17 RX ORDER — KETOROLAC TROMETHAMINE 30 MG/ML
INJECTION, SOLUTION INTRAMUSCULAR; INTRAVENOUS PRN
Status: DISCONTINUED | OUTPATIENT
Start: 2018-09-17 | End: 2018-09-17

## 2018-09-17 RX ORDER — GABAPENTIN 100 MG/1
300 CAPSULE ORAL ONCE
Status: COMPLETED | OUTPATIENT
Start: 2018-09-17 | End: 2018-09-17

## 2018-09-17 RX ORDER — ONDANSETRON 2 MG/ML
INJECTION INTRAMUSCULAR; INTRAVENOUS PRN
Status: DISCONTINUED | OUTPATIENT
Start: 2018-09-17 | End: 2018-09-17

## 2018-09-17 RX ORDER — HYDROMORPHONE HYDROCHLORIDE 1 MG/ML
.3-.5 INJECTION, SOLUTION INTRAMUSCULAR; INTRAVENOUS; SUBCUTANEOUS EVERY 5 MIN PRN
Status: DISCONTINUED | OUTPATIENT
Start: 2018-09-17 | End: 2018-09-17 | Stop reason: HOSPADM

## 2018-09-17 RX ORDER — CALCIUM CARBONATE 500 MG/1
1000 TABLET, CHEWABLE ORAL 4 TIMES DAILY PRN
Status: DISCONTINUED | OUTPATIENT
Start: 2018-09-17 | End: 2018-09-19 | Stop reason: HOSPADM

## 2018-09-17 RX ORDER — SODIUM CHLORIDE, SODIUM LACTATE, POTASSIUM CHLORIDE, CALCIUM CHLORIDE 600; 310; 30; 20 MG/100ML; MG/100ML; MG/100ML; MG/100ML
INJECTION, SOLUTION INTRAVENOUS CONTINUOUS
Status: DISCONTINUED | OUTPATIENT
Start: 2018-09-17 | End: 2018-09-17 | Stop reason: HOSPADM

## 2018-09-17 RX ORDER — ONDANSETRON 2 MG/ML
4 INJECTION INTRAMUSCULAR; INTRAVENOUS EVERY 6 HOURS PRN
Status: DISCONTINUED | OUTPATIENT
Start: 2018-09-17 | End: 2018-09-19 | Stop reason: HOSPADM

## 2018-09-17 RX ORDER — ONDANSETRON 4 MG/1
4 TABLET, ORALLY DISINTEGRATING ORAL EVERY 6 HOURS PRN
Status: DISCONTINUED | OUTPATIENT
Start: 2018-09-17 | End: 2018-09-19 | Stop reason: HOSPADM

## 2018-09-17 RX ORDER — PROPOFOL 10 MG/ML
INJECTION, EMULSION INTRAVENOUS PRN
Status: DISCONTINUED | OUTPATIENT
Start: 2018-09-17 | End: 2018-09-17

## 2018-09-17 RX ORDER — MAGNESIUM HYDROXIDE 1200 MG/15ML
LIQUID ORAL PRN
Status: DISCONTINUED | OUTPATIENT
Start: 2018-09-17 | End: 2018-09-17 | Stop reason: HOSPADM

## 2018-09-17 RX ORDER — SODIUM CHLORIDE, SODIUM LACTATE, POTASSIUM CHLORIDE, CALCIUM CHLORIDE 600; 310; 30; 20 MG/100ML; MG/100ML; MG/100ML; MG/100ML
INJECTION, SOLUTION INTRAVENOUS CONTINUOUS
Status: DISCONTINUED | OUTPATIENT
Start: 2018-09-17 | End: 2018-09-19 | Stop reason: HOSPADM

## 2018-09-17 RX ORDER — HYDROMORPHONE HYDROCHLORIDE 1 MG/ML
.3-.5 INJECTION, SOLUTION INTRAMUSCULAR; INTRAVENOUS; SUBCUTANEOUS
Status: DISCONTINUED | OUTPATIENT
Start: 2018-09-17 | End: 2018-09-19 | Stop reason: HOSPADM

## 2018-09-17 RX ORDER — NALOXONE HYDROCHLORIDE 0.4 MG/ML
.1-.4 INJECTION, SOLUTION INTRAMUSCULAR; INTRAVENOUS; SUBCUTANEOUS
Status: DISCONTINUED | OUTPATIENT
Start: 2018-09-17 | End: 2018-09-19 | Stop reason: HOSPADM

## 2018-09-17 RX ORDER — LIDOCAINE 40 MG/G
CREAM TOPICAL
Status: DISCONTINUED | OUTPATIENT
Start: 2018-09-17 | End: 2018-09-19 | Stop reason: HOSPADM

## 2018-09-17 RX ADMIN — FENTANYL CITRATE 100 MCG: 50 INJECTION, SOLUTION INTRAMUSCULAR; INTRAVENOUS at 14:10

## 2018-09-17 RX ADMIN — FENTANYL CITRATE 25 MCG: 50 INJECTION, SOLUTION INTRAMUSCULAR; INTRAVENOUS at 16:12

## 2018-09-17 RX ADMIN — ROCURONIUM BROMIDE 50 MG: 10 INJECTION INTRAVENOUS at 13:32

## 2018-09-17 RX ADMIN — PHENYLEPHRINE HYDROCHLORIDE 100 MCG: 10 INJECTION, SOLUTION INTRAMUSCULAR; INTRAVENOUS; SUBCUTANEOUS at 14:40

## 2018-09-17 RX ADMIN — FENTANYL CITRATE 25 MCG: 50 INJECTION INTRAMUSCULAR; INTRAVENOUS at 17:23

## 2018-09-17 RX ADMIN — HYDROMORPHONE HYDROCHLORIDE 0.5 MG: 1 INJECTION, SOLUTION INTRAMUSCULAR; INTRAVENOUS; SUBCUTANEOUS at 18:32

## 2018-09-17 RX ADMIN — FENTANYL CITRATE 25 MCG: 50 INJECTION INTRAMUSCULAR; INTRAVENOUS at 17:19

## 2018-09-17 RX ADMIN — PHENYLEPHRINE HYDROCHLORIDE 100 MCG: 10 INJECTION, SOLUTION INTRAMUSCULAR; INTRAVENOUS; SUBCUTANEOUS at 14:06

## 2018-09-17 RX ADMIN — CEFAZOLIN SODIUM 2 G: 2 INJECTION, SOLUTION INTRAVENOUS at 13:48

## 2018-09-17 RX ADMIN — ACETAMINOPHEN 650 MG: 325 TABLET, FILM COATED ORAL at 12:01

## 2018-09-17 RX ADMIN — SUGAMMADEX 160 MG: 100 INJECTION, SOLUTION INTRAVENOUS at 16:16

## 2018-09-17 RX ADMIN — HYDROMORPHONE HYDROCHLORIDE 0.5 MG: 1 INJECTION, SOLUTION INTRAMUSCULAR; INTRAVENOUS; SUBCUTANEOUS at 15:48

## 2018-09-17 RX ADMIN — PROPOFOL 40 MG: 10 INJECTION, EMULSION INTRAVENOUS at 16:43

## 2018-09-17 RX ADMIN — SODIUM CHLORIDE, POTASSIUM CHLORIDE, SODIUM LACTATE AND CALCIUM CHLORIDE: 600; 310; 30; 20 INJECTION, SOLUTION INTRAVENOUS at 15:38

## 2018-09-17 RX ADMIN — LIDOCAINE HYDROCHLORIDE 80 MG: 20 INJECTION, SOLUTION INFILTRATION; PERINEURAL at 13:31

## 2018-09-17 RX ADMIN — MIDAZOLAM 2 MG: 1 INJECTION INTRAMUSCULAR; INTRAVENOUS at 13:21

## 2018-09-17 RX ADMIN — SODIUM CHLORIDE, POTASSIUM CHLORIDE, SODIUM LACTATE AND CALCIUM CHLORIDE: 600; 310; 30; 20 INJECTION, SOLUTION INTRAVENOUS at 13:02

## 2018-09-17 RX ADMIN — CEFAZOLIN SODIUM 1 G: 2 INJECTION, SOLUTION INTRAVENOUS at 15:43

## 2018-09-17 RX ADMIN — FENTANYL CITRATE 50 MCG: 50 INJECTION INTRAMUSCULAR; INTRAVENOUS at 17:15

## 2018-09-17 RX ADMIN — GABAPENTIN 300 MG: 300 CAPSULE ORAL at 12:01

## 2018-09-17 RX ADMIN — FENTANYL CITRATE 100 MCG: 50 INJECTION, SOLUTION INTRAMUSCULAR; INTRAVENOUS at 13:31

## 2018-09-17 RX ADMIN — HYDROMORPHONE HYDROCHLORIDE 0.5 MG: 1 INJECTION, SOLUTION INTRAMUSCULAR; INTRAVENOUS; SUBCUTANEOUS at 17:45

## 2018-09-17 RX ADMIN — OXYCODONE HYDROCHLORIDE 5 MG: 5 TABLET ORAL at 19:50

## 2018-09-17 RX ADMIN — PHENYLEPHRINE HYDROCHLORIDE 100 MCG: 10 INJECTION, SOLUTION INTRAMUSCULAR; INTRAVENOUS; SUBCUTANEOUS at 13:45

## 2018-09-17 RX ADMIN — HYDROMORPHONE HYDROCHLORIDE 0.5 MG: 1 INJECTION, SOLUTION INTRAMUSCULAR; INTRAVENOUS; SUBCUTANEOUS at 18:04

## 2018-09-17 RX ADMIN — SODIUM CHLORIDE: 9 INJECTION, SOLUTION INTRAVENOUS at 21:17

## 2018-09-17 RX ADMIN — PHENYLEPHRINE HYDROCHLORIDE 100 MCG: 10 INJECTION, SOLUTION INTRAMUSCULAR; INTRAVENOUS; SUBCUTANEOUS at 15:28

## 2018-09-17 RX ADMIN — FENTANYL CITRATE 25 MCG: 50 INJECTION, SOLUTION INTRAMUSCULAR; INTRAVENOUS at 17:06

## 2018-09-17 RX ADMIN — HYDROMORPHONE HYDROCHLORIDE 0.5 MG: 1 INJECTION, SOLUTION INTRAMUSCULAR; INTRAVENOUS; SUBCUTANEOUS at 16:04

## 2018-09-17 RX ADMIN — KETOROLAC TROMETHAMINE 30 MG: 30 INJECTION, SOLUTION INTRAMUSCULAR at 16:17

## 2018-09-17 RX ADMIN — PROPOFOL 160 MG: 10 INJECTION, EMULSION INTRAVENOUS at 13:31

## 2018-09-17 RX ADMIN — ONDANSETRON 4 MG: 2 INJECTION INTRAMUSCULAR; INTRAVENOUS at 16:16

## 2018-09-17 RX ADMIN — HYDROMORPHONE HYDROCHLORIDE 0.5 MG: 1 INJECTION, SOLUTION INTRAMUSCULAR; INTRAVENOUS; SUBCUTANEOUS at 18:17

## 2018-09-17 RX ADMIN — PHENYLEPHRINE HYDROCHLORIDE 100 MCG: 10 INJECTION, SOLUTION INTRAMUSCULAR; INTRAVENOUS; SUBCUTANEOUS at 13:49

## 2018-09-17 RX ADMIN — OXYCODONE HYDROCHLORIDE 10 MG: 5 TABLET ORAL at 22:58

## 2018-09-17 RX ADMIN — FENTANYL CITRATE 50 MCG: 50 INJECTION, SOLUTION INTRAMUSCULAR; INTRAVENOUS at 17:00

## 2018-09-17 RX ADMIN — OXYCODONE HYDROCHLORIDE 5 MG: 5 TABLET ORAL at 18:31

## 2018-09-17 RX ADMIN — HYDROMORPHONE HYDROCHLORIDE 0.5 MG: 1 INJECTION, SOLUTION INTRAMUSCULAR; INTRAVENOUS; SUBCUTANEOUS at 17:09

## 2018-09-17 NOTE — ANESTHESIA POSTPROCEDURE EVALUATION
Patient: Hiram Tapia    Procedure(s):  Removal Right Thigh Tumor  - Wound Class: I-Clean    Diagnosis:Sarcoma  Diagnosis Additional Information: No value filed.    Anesthesia Type:  General, ETT    Note:  Anesthesia Post Evaluation    Patient location during evaluation: PACU  Patient participation: Able to fully participate in evaluation  Level of consciousness: awake  Pain management: adequate  Airway patency: patent  Cardiovascular status: acceptable  Respiratory status: acceptable  Hydration status: acceptable  PONV: none             Last vitals:  Vitals:    09/17/18 1112 09/17/18 1700 09/17/18 1715   BP: 128/88 142/88 138/88   Pulse: 89     Resp: 18 16 12   Temp: 37  C (98.6  F)     SpO2: 99% 98% 100%         Electronically Signed By: Nilesh Decker DO  September 17, 2018  6:14 PM

## 2018-09-17 NOTE — ANESTHESIA CARE TRANSFER NOTE
Patient: Hiram Tapia    Procedure(s):  Removal Right Thigh Tumor  - Wound Class: I-Clean    Diagnosis: Sarcoma  Diagnosis Additional Information: No value filed.    Anesthesia Type:   General, ETT     Note:  Airway :Face Mask  Patient transferred to:PACU  Comments: Regular respirations and patent airway. VSS. IV patent and infusing. Fentanyl and dilaudid given for moderate pain.  Report given to RN  Handoff Report: Identifed the Patient, Identified the Reponsible Provider, Reviewed the pertinent medical history, Discussed the surgical course, Reviewed Intra-OP anesthesia mangement and issues during anesthesia, Set expectations for post-procedure period and Allowed opportunity for questions and acknowledgement of understanding      Vitals: (Last set prior to Anesthesia Care Transfer)    CRNA VITALS  9/17/2018 1628 - 9/17/2018 1703      9/17/2018             NIBP: 142/88    Pulse: 72    Temp: 35.9  C (96.6  F)                Electronically Signed By: WILLAM Castañeda CRNA  September 17, 2018  5:03 PM

## 2018-09-17 NOTE — OR NURSING
Patient having extreme pain upon arrival to PACU. CRNA administered 75 mcg of fentanyl. This RN administered 0.5 mg of dilaudid. Notified Dr. Daxa Decker that patient continues to having large amount of pain moaning and crying. Verbal order per Dr. Decker ok to administer fentanyl to reduce pain. Will continue to monitor and notify with concerns.

## 2018-09-17 NOTE — OR NURSING
"PACU to Inpatient Nursing Handoff    Patient Hiram Tapia is a 58 year old male who speaks English.   Procedure Procedure(s):  Removal Right Thigh Tumor  - Wound Class: I-Clean   Surgeon(s) Primary: Brad Betts MD  Resident - Assisting: Rakesh Day MD     Allergies   Allergen Reactions     Hydrofera Blue 4\"X4\" [Wound Dressings] Dermatitis and Blisters     Patient reports that Dressing causes skin irritation, blisters, and drainage.      Tegaderm Ag Mesh [Silver] Dermatitis and Blisters     Patient reports that Dressing causes Skin irritation, blisters, and drainage.       Isolation  [unfilled]     Past Medical History   has a past medical history of Sarcoma (H).    Anesthesia General   Dermatome Level     Preop Meds acetaminophen (Tylenol) - time given: 1200  gabapentin (Neurontin) - time given: 1200   Nerve block Not applicable   Intraop Meds fentanyl (Sublimaze): 200 mcg total  hydromorphone (Dilaudid): 1 mg total  ketorolac (Toradol): last given at 1617  ondansetron (Zofran): last given at 1616   Local Meds Yes   Antibiotics cefazolin (Ancef) - last given at 1543     Pain Patient Currently in Pain: yes  Comfort: intolerable  Pain Control: inadequate pain control   PACU meds  fentanyl (Sublimaze): 100 mcg (total dose) last given at 1800   hydromorphone (Dilaudid): 2.5 mg (total dose) last given at 1830    PCA / epidural No   Capnography     Telemetry ECG Rhythm: Normal sinus rhythm   Inpatient Telemetry Monitor Ordered? No        Labs Glucose Lab Results   Component Value Date     09/17/2018       Hgb Lab Results   Component Value Date    HGB 9.7 07/17/2018       INR Lab Results   Component Value Date    INR 1.66 06/27/2018      PACU Imaging Not applicable     Wound/Incision Negative Pressure Wound Therapy Leg Right;Upper (Active)   Number of days:164       Wound Right Thigh (Active)   Number of days:       Incision/Surgical Site 03/08/18 Right Leg (Active)   Number of days:193 "       Incision/Surgical Site 03/28/18 Right Chest (Active)   Number of days:173       Incision/Surgical Site 03/28/18 Right Neck (Active)   Number of days:173       Incision/Surgical Site 04/06/18 Right;Lateral Thigh (Active)   Number of days:164       Incision/Surgical Site 09/17/18 Right;Anterior;Lateral Thigh (Active)   Incision Assessment WDL 9/17/2018  4:38 PM   Closure Sutures;Approximated;Adhesive strip(s) 9/17/2018  4:38 PM   Dressing Intervention New dressing applied 9/17/2018  4:38 PM   Number of days:0      CMS Peripheral Neurovascular WDL: WDL (09/17/18 1702)  All Extremities Temperature: warm (09/17/18 1702)  All Extremities Color: no discoloration (09/17/18 1702)   Equipment ice pack   Other LDA       IV Access Peripheral IV 09/17/18 Left Hand (Active)   Number of days:0       Peripheral IV 09/17/18 Right Hand (Active)   Number of days:0       Port A Cath Single 03/28/18 Right Chest wall (Active)   Number of days:173      Blood Products Not applicable    mL   Intake/Output Date 09/17/18 0700 - 09/18/18 0659   Shift 6018-6222 8653-6542 2948-7577 24 Hour Total   I  N  T  A  K  E   I.V. 500 700  1200    Shift Total  (mL/kg) 500  (6.02) 700  (8.42)  1200  (14.44)   O  U  T  P  U  T   Urine 200 100  300    Blood  400  400    Shift Total  (mL/kg) 200  (2.41) 500  (6.02)  700  (8.42)   Weight (kg) 83.1 83.1 83.1 83.1        Drains / Luz Closed/Suction Drain 1 Right;Anterior;Distal Thigh Accordion 10 Syriac SUTURED IN PLACE (Active)   Number of days:0       Negative Pressure Wound Therapy Leg Right;Upper (Active)   Number of days:164      Time of void PreOp Void Prior to Procedure: 1147 (09/17/18 1147)    PostOp      Diapered? No   Bladder Scan     PO    crackers     Vitals    B/P: (!) 127/94  T: 97.2  F (36.2  C)    Temp src: Oral  P:  Pulse: 89 (09/17/18 1112)    Heart Rate: 77 (09/17/18 1815)     R: 12  O2:  SpO2: 100 %    O2 Device: Nasal cannula (09/17/18 1815)    Oxygen Delivery: 2.5 LPM  (09/17/18 1815)         Family/support present significant other   Patient belongings Patient Belongings: clothing;crutches  Disposition of Belongings: Locker   Patient transported on cart and air mat   DC meds/scripts (obs/outpt) Not applicable   Inpatient Pain Meds Released? Yes       Special needs/considerations none   Tasks needing completion None       Morenita Bingham, RN  [facility]COM [id number]

## 2018-09-17 NOTE — IP AVS SNAPSHOT
MRN:[id number]                      After Visit Summary   9/17/2018    Hiram Tapia    MRN: [id number]           Thank you!     Thank you for choosing [facility] for your care. Our goal is always to provide you with excellent care. Hearing back from our patients is one way we can continue to improve our services. Please take a few minutes to complete the written survey that you may receive in the mail after you visit with us. Thank you!        Patient Information     Date Of Birth          1960        About your hospital stay     You were admitted on:  September 17, 2018 You last received care in the:   8A    You were discharged on:  September 19, 2018        Reason for your hospital stay       Right leg sarcoma resection                  Who to Call     For medical emergencies, please call 911.  For non-urgent questions about your medical care, please call your primary care provider or clinic, [phone]  For questions related to your surgery, please call your surgery clinic        Attending Provider     Provider Brad Luque MD Orthopedics       Primary Care Provider Office Phone # Fax #    Louisa Fry [phone]      After Care Instructions     Activity       Your activity upon discharge: activity as tolerated in your knee brace            Diet       Follow this diet upon discharge: Regular            Discharge Instructions       Follow up appointment Tuesday 9/25 to remove the drain.  Weightbearing as tolerated on your operative when in your knee brace. You do not have the muscles to straighten your knee, so your knee brace needs to be on when you weight wear so you do not fall.    Start your home Xarelto on 9/20 (post-op day 3) at 10mg instead of 20mg.            Discharge Instructions - diet       Resume pre procedure diet.  Keep dressing in place until clinic follow-up.  Keep the drain in until clinic folllow-up. Empty as needed and  record output            Dressing       Keep dressing clean and dry.  Leave dressing in place until follow up.   CALL YOUR PHYSICIAN IF:  1.  Your pain begins to worsen and is unable to be controlled with your medications.  2.  Excessive redness or drainage of cloudy or bloody material from the wounds (Clear red tinted fluid and some mild drainage should be expected). Drainage of any kind 5 days after surgery should be reported to the doctor.  3.  You have a temperature elevation greater than 101.5    4.  You have pain, swelling or redness in your calf. You have numbness or weakness in your leg or foot.    During regular business hours call the [facility] at [phone] and ask for the triage nurse. After hours or weekends call the hospital  at [phone] and ask for the ortho resident on call.            Ice to affected area       Ice to operative site, as needed.            Wound care and dressings       Instructions to care for your wound at home:  Keep the dressings over the incisions until clinic follow-up  Keep the drain in until clinic follow-up. Empty as needed. Record output.                  Follow-up Appointments     Adult [facility] [[email or web address] Follow-up and recommended labs and tests       Clinic with Dr. Betts in 1 week for drain removal.                  Your next 10 appointments already scheduled     Sep 25, 2018  5:30 PM CDT   (Arrive by 5:15 PM)   Return Visit with Brad Betts MD   [facility] ([facility])    [address]  [address]th [address]  [place] [address] [phone]            Oct 23, 2018 10:30 AM CDT   (Arrive by 10:15 AM)   Return Visit with Gaurang Johnson MD   [facility] ([facility])    [address]  [address]  [place] [address] [phone]              Additional Services     Home care nursing referral       Resume Home Care   [facility]  Phone   "[phone]  Fax  [phone]    RN skilled nursing visit. RN to assess vital signs and weight, respiratory and cardiac status, pain level and activity tolerance, incision for signs/symptoms of infection, hydration, nutrition and bowel status and home safety.  RN to teach medication management.  RN to provide site care and management.    Your provider has ordered home care nursing services. If you have not been contacted within 2 days of your discharge please call the inpatient department phone number at [phone] .                  Pending Results     Date and Time Order Name Status Description    2018 1616 Surgical pathology exam In process             Statement of Approval     Ordered          18 0845  I have reviewed and agree with all the recommendations and orders detailed in this document.  EFFECTIVE NOW     Approved and electronically signed by:  Norma Austin APRN CNP             Admission Information     Date & Time Provider Department Dept. Phone    2018 Brad Betts MD UR 8A [phone]      Your Vitals Were     Blood Pressure Pulse Temperature Respirations Height Weight    117/70 (BP Location: Right arm) 78 98  F (36.7  C) (Oral) 15 1.778 m (5' 10\") 83.1 kg (183 lb 3.2 oz)    Pulse Oximetry BMI (Body Mass Index)                98% 26.29 kg/m2          [facility] Information     [facility] lets you send messages to your doctor, view your test results, renew your prescriptions, schedule appointments and more. To sign up, go to [email or web address] . Click on \"Log in\" on the left side of the screen, which will take you to the Welcome page. Then click on \"Sign up Now\" on the right side of the page.     You will be asked to enter the access code listed below, as well as some personal information. Please follow the directions to create your username and password.     Your access code is: [email or web address]  Expires: 2018 10:00 PM     Your access code will  in 90 days. If " you need help or a new code, please call your [facility] clinic or [phone].        Care EveryWhere ID     This is your Care EveryWhere ID. This could be used by other organizations to access your [facility] medical records  [id number]        Equal Access to Services     DORAIN TOLENTINO : Hadii aad ku hadirmabrock Kamini, wacorettada luqadaha, qaybta kaalmada cydney, waxvielka fabiola gurwinderbecky sebastian shannonmela mcnair. So [facility] [phone].    ATENCIÓN: Si habla español, tiene a sheridan disposición servicios gratuitos de asistencia lingüística. Llame al [phone].    We comply with applicable federal civil rights laws and [place] laws. We do not discriminate on the basis of race, color, national origin, age, disability, sex, sexual orientation, or gender identity.               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 If we are uncertain of the size of tablets/capsules you have at home, strength may be listed as something that might have changed.        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)        Dose:  650 mg   Take 2 tablets (650 mg) by mouth every 4 hours as needed for other (mild pain)   Quantity:  30 tablet   Refills:  0       rivaroxaban  ANTICOAGULANT 20 MG Tabs tablet   Commonly known as:  XARELTO   This may have changed:  how much to take   Used for:  Other pulmonary embolism without acute cor pulmonale, unspecified chronicity (H), 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        Dose:  10 mg   Take 0.5 tablets (10 mg) by mouth daily (with dinner)   Quantity:  30 tablet   Refills:  3         CONTINUE these medicines which have NOT CHANGED        Dose / Directions    lidocaine (viscous) 2 % solution   Commonly known as:  XYLOCAINE        Refills:  0       polyethylene glycol Packet   Commonly known as:  MIRALAX/GLYCOLAX   Used for:  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)        Dose:  1 packet   Take 17 g by mouth daily   Quantity:  7 packet   Refills:  1       senna-docusate 8.6-50 MG per tablet   Commonly known as:  SENOKOT-S;PERICOLACE        Dose:  1 tablet   Take 1 tablet by mouth daily   Refills:  0         STOP taking     oxyCODONE IR 5 MG tablet   Commonly known as:  ROXICODONE                Where to get your medicines      These medications were sent to [facility] [place], [place] - [address]  [address] [address]     Phone:  [phone]     acetaminophen 325 MG tablet    hydrOXYzine 25 MG tablet    rivaroxaban ANTICOAGULANT 20 MG Tabs tablet         Some of these will need a paper prescription and others can be bought over the counter. Ask your nurse if you have questions.     Bring a paper prescription for each of these medications     HYDROmorphone 2 MG tablet                Protect others around you: Learn how to safely use, store and throw away your medicines at www.disposemymeds.org.        Information about OPIOIDS     PRESCRIPTION OPIOIDS: WHAT YOU NEED TO KNOW   We gave you an opioid (narcotic) pain  medicine. It is important to manage your pain, but opioids are not always the best choice. You should first try all the other options your care team gave you. Take this medicine for as short a time (and as few doses) as possible.    Some activities can increase your pain, such as bandage changes or therapy sessions. It may help to take your pain medicine 30 to 60 minutes before these activities. Reduce your stress by getting enough sleep, working on hobbies you enjoy and practicing relaxation or meditation. Talk to your care team about ways to manage your pain beyond prescription opioids.    These medicines have risks:    DO NOT drive when on new or higher doses of pain medicine. These medicines can affect your alertness and reaction times, and you could be arrested for driving under the influence (DUI). If you need to use opioids long-term, talk to your care team about driving.    DO NOT operate heavy machinery    DO NOT do any other dangerous activities while taking these medicines.    DO NOT drink any alcohol while taking these medicines.     If the opioid prescribed includes acetaminophen, DO NOT take with any other medicines that contain acetaminophen. Read all labels carefully. Look for the word  acetaminophen  or  Tylenol.  Ask your pharmacist if you have questions or are unsure.    You can get addicted to pain medicines, especially if you have a history of addiction (chemical, alcohol or substance dependence). Talk to your care team about ways to reduce this risk.    All opioids tend to cause constipation. Drink plenty of water and eat foods that have a lot of fiber, such as fruits, vegetables, prune juice, apple juice and high-fiber cereal. Take a laxative (Miralax, milk of magnesia, Colace, Senna) if you don t move your bowels at least every other day. Other side effects include upset stomach, sleepiness, dizziness, throwing up, tolerance (needing more of the medicine to have the same effect), physical  dependence and slowed breathing.    Store your pills in a secure place, locked if possible. We will not replace any lost or stolen medicine. If you don t finish your medicine, please throw away (dispose) as directed by your pharmacist. The [place] [employer]ution [employer] has more information about safe disposal: https://www.pca.[facility].[place].us/living-green/managing-unwanted-medications             Medication List: This is a list of all your medications and when to take them. Check marks below indicate your daily home schedule. Keep this list as a reference.      Medications           Morning Afternoon Evening Bedtime As Needed    acetaminophen 325 MG tablet   Commonly known as:  TYLENOL   Take 2 tablets (650 mg) by mouth every 4 hours as needed for other (mild pain)   Last time this was given:  650 mg on 9/17/2018 12:01 PM                                HYDROmorphone 2 MG tablet   Commonly known as:  DILAUDID   Take 1-2 tablets (2-4 mg) by mouth every 3 hours as needed for moderate to severe pain   Last time this was given:  4 mg on 9/19/2018  9:14 AM                                hydrOXYzine 25 MG tablet   Commonly known as:  ATARAX   Take 1 tablet (25 mg) by mouth every 6 hours as needed for other (adjuvant pain)   Last time this was given:  25 mg on 9/19/2018  5:54 AM                                lidocaine (viscous) 2 % solution   Commonly known as:  XYLOCAINE                                polyethylene glycol Packet   Commonly known as:  MIRALAX/GLYCOLAX   Take 17 g by mouth daily   Last time this was given:  17 g on 9/19/2018  7:00 AM                                rivaroxaban ANTICOAGULANT 20 MG Tabs tablet   Commonly known as:  XARELTO   Take 0.5 tablets (10 mg) by mouth daily (with dinner)                                senna-docusate 8.6-50 MG per tablet   Commonly known as:  SENOKOT-S;PERICOLACE   Take 1 tablet by mouth daily

## 2018-09-17 NOTE — IP AVS SNAPSHOT
UR 8A    [address]    [facility] [address]    Phone:  [phone]                                       After Visit Summary   9/17/2018    Hiram Tapia    MRN: [id number]           After Visit Summary Signature Page     I have received my discharge instructions, and my questions have been answered. I have discussed any challenges I see with this plan with the nurse or doctor.    ..........................................................................................................................................  Patient/Patient Representative Signature      ..........................................................................................................................................  Patient Representative Print Name and Relationship to Patient    ..................................................               ................................................  Date                                   Time    ..........................................................................................................................................  Reviewed by Signature/Title    ...................................................              ..............................................  Date                                               Time          22EPIC Rev 08/18

## 2018-09-17 NOTE — ANESTHESIA PREPROCEDURE EVALUATION
HPI:  Hiram Tapia is a 58 year old male with a primary diagnosis of Right thigh mass who presents for right thigh mass excision.    Otherwise, he  has a past medical history of Sarcoma (H).  he  has a past surgical history that includes Excise soft tissue tumor thigh (Right, 3/8/2018); strabismus surgery; Insert port vascular access (N/A, 3/28/2018); Irrigation and debridement lower extremity, combined (Right, 4/6/2018); and Irrigation and debridement lower extremity, combined (Right, 4/9/2018).  Anesthesia Evaluation     . Pt has had prior anesthetic. Type: General    No history of anesthetic complications          ROS/MED HX    ENT/Pulmonary:     (+), . Other pulmonary disease (History of PE, on Rivaroxaban.  Asymptomatic at time of diagnosis) .    Neurologic:  - neg neurologic ROS     Cardiovascular:  - neg cardiovascular ROS   (+) ----. : . . . :. . Previous cardiac testing Echodate:3/23/18results:Procedure  Echocardiogram with two-dimensional, color and spectral Doppler performed.  Contrast Optison. Optison (NDC #3200-0771-77) given intravenously. Patient was  given 6 ml mixture of 3 ml Optison and 6 ml saline. 3 ml wasted.  _____________________________________________________________________________  __        Interpretation Summary  Global and regional left ventricular function is normal with an EF of 60-65%.  Left ventricular diastolic function is normal.  Global right ventricular function is normal.  The inferior vena cava was normal in size.  No significant valvular dysfunction noted.  No pericardial effusion is present.  _____________________________________________________________________________  __        Left Ventricle  Left ventricular wall thickness is normal. Left ventricular size is normal.  Global and regional left ventricular function is normal with an EF of 60-65%.  Biplane LVEF measured at 66%. Left ventricular diastolic function is normal.     Right Ventricle  Global right ventricular  function is normal. The right ventricle is normal  size.     Atria  The right atria appears normal. The left atrium appears normal. The atrial  septum is intact as assessed by color Doppler .     Mitral Valve  Trace to mild mitral insufficiency is present.        Aortic Valve  Aortic valve is normal in structure and function. The aortic valve is  tricuspid. No aortic regurgitation is present.     Tricuspid Valve  Trace tricuspid insufficiency is present. The peak velocity of the tricuspid  regurgitant jet is not obtainable. Pulmonary artery systolic pressure cannot  be assessed.     Pulmonic Valve  The pulmonic valve is normal. Trace pulmonic insufficiency is present.     Vessels  The aorta root is normal. The inferior vena cava was normal in size with  preserved respiratory variability. Estimated mean right atrial pressure is 3  mmHg.     Pericardium  No pericardial effusion is present.        Compared to Previous Study  Previous study not available for comparison.     Attestation  I have personally viewed the imaging and agree with the interpretation and  report as documented by the fellow, Wallace Lee, and/or edited by me.date: results:ECG reviewed date:3/28/18 results:Sinus rhythm  Cannot rule out Inferior infarct , age undetermined  Abnormal ECG  No previous ECGs available date: results:          METS/Exercise Tolerance:     Hematologic:     (+) Anemia, Other Hematologic Disorder-leukopenia      Musculoskeletal:         GI/Hepatic:  - neg GI/hepatic ROS       Renal/Genitourinary:  - ROS Renal section negative       Endo:  - neg endo ROS       Psychiatric:  - neg psychiatric ROS       Infectious Disease:  - neg infectious disease ROS       Malignancy:   (+) Malignancy History of Other  Other CA soft tissue sarcoma, post op wound infection status post Surgery         Other:                     Physical Exam  Normal systems: cardiovascular, pulmonary and dental    Airway   Mallampati: II  TM distance: >3 FB  Neck  "ROM: full    Dental     Cardiovascular   Rhythm and rate: regular and normal      Pulmonary    breath sounds clear to auscultation             PCP: Louisa Fry    Lab Results   Component Value Date    WBC 5.3 07/17/2018    HGB 9.7 (L) 07/17/2018    HCT 30.9 (L) 07/17/2018     (H) 07/17/2018     07/17/2018    POTASSIUM 4.0 07/17/2018    CHLORIDE 107 07/17/2018    CO2 23 07/17/2018    BUN 8 07/17/2018    CR 0.80 07/17/2018    GLC 94 07/17/2018    JAYESH 9.8 07/17/2018    PHOS 3.6 07/17/2018    MAG 2.3 07/17/2018    ALBUMIN 3.3 (L) 07/17/2018    PROTTOTAL 7.5 07/17/2018    ALT 18 07/17/2018    AST 16 07/17/2018    ALKPHOS 109 07/17/2018    BILITOTAL 0.2 07/17/2018    INR 1.66 (H) 06/27/2018         Preop Vitals  BP Readings from Last 3 Encounters:   09/17/18 128/88   09/11/18 132/83   07/17/18 101/71    Pulse Readings from Last 3 Encounters:   09/17/18 89   09/11/18 80   07/17/18 114      Resp Readings from Last 3 Encounters:   09/17/18 18   09/11/18 12   07/17/18 16    SpO2 Readings from Last 3 Encounters:   09/17/18 99%   09/11/18 98%   07/17/18 98%      Temp Readings from Last 1 Encounters:   09/17/18 37  C (98.6  F) (Oral)    Ht Readings from Last 1 Encounters:   09/17/18 1.778 m (5' 10\")      Wt Readings from Last 1 Encounters:   09/17/18 83.1 kg (183 lb 3.2 oz)    Estimated body mass index is 26.29 kg/(m^2) as calculated from the following:    Height as of this encounter: 1.778 m (5' 10\").    Weight as of this encounter: 83.1 kg (183 lb 3.2 oz).     Current Medications  Prescriptions Prior to Admission   Medication Sig Dispense Refill Last Dose     Acetaminophen (TYLENOL PO) Take 1,000 mg by mouth daily as needed for mild pain or fever   9/17/2018 at 0730     oxyCODONE IR (ROXICODONE) 5 MG tablet Take 1 tablet (5 mg) by mouth every 4 hours as needed for moderate to severe pain 100 tablet 0 9/17/2018 at 0730     polyethylene glycol (MIRALAX/GLYCOLAX) Packet Take 17 g by mouth daily 7 packet 1 " 9/16/2018 at 0800     rivaroxaban ANTICOAGULANT (XARELTO) 20 MG TABS tablet Take 1 tablet (20 mg) by mouth daily (with dinner) 30 tablet 3 Past Week at Unknown time     senna-docusate (SENOKOT-S;PERICOLACE) 8.6-50 MG per tablet Take 1 tablet by mouth daily   9/17/2018 at 0730     lidocaine, viscous, (XYLOCAINE) 2 % solution    Unknown at Unknown time     Outpatient Prescriptions Marked as Taking for the 9/17/18 encounter (Hospital Encounter)   Medication Sig     Acetaminophen (TYLENOL PO) Take 1,000 mg by mouth daily as needed for mild pain or fever     oxyCODONE IR (ROXICODONE) 5 MG tablet Take 1 tablet (5 mg) by mouth every 4 hours as needed for moderate to severe pain     polyethylene glycol (MIRALAX/GLYCOLAX) Packet Take 17 g by mouth daily     rivaroxaban ANTICOAGULANT (XARELTO) 20 MG TABS tablet Take 1 tablet (20 mg) by mouth daily (with dinner)     senna-docusate (SENOKOT-S;PERICOLACE) 8.6-50 MG per tablet Take 1 tablet by mouth daily     No current outpatient prescriptions on file.         LDA  Port A Cath Single 03/28/18 Right Chest wall (Active)   Number of days:173       Negative Pressure Wound Therapy Leg Right;Upper (Active)   Number of days:164          Anesthesia Plan      History & Physical Review  History and physical reviewed and following examination; no interval change.    ASA Status:  3 .    NPO Status:  > 8 hours    Plan for General and ETT with Intravenous induction. Maintenance will be Balanced.    PONV prophylaxis:  Ondansetron (or other 5HT-3)  Additional equipment: 2nd IV and Arterial Line (Will discuss need for arterial line with surgical team based on anticipated blood loss)      Postoperative Care  Postoperative pain management:  Multi-modal analgesia.      Consents  Anesthetic plan, risks, benefits and alternatives discussed with:  Patient.  Use of blood products discussed: Yes.   Use of blood products discussed with Patient.  .                          .

## 2018-09-17 NOTE — BRIEF OP NOTE
[facility], [place]    Brief Operative Note    Pre-operative diagnosis: Sarcoma  Post-operative diagnosis * No post-op diagnosis entered *  Procedure: Procedure(s):  Removal Right Thigh Tumor  - Wound Class: I-Clean  Surgeon: Surgeon(s) and Role:     * Brad Betts MD - Primary     * Rakesh Day MD - Resident - Assisting  Anesthesia: General   Estimated blood loss: 400 ml  Drains: Luis-Musa  Specimens:   ID Type Source Tests Collected by Time Destination   A : Tumor right thigh please ink for margins Tissue Leg, Right SURGICAL PATHOLOGY EXAM Brad Betts MD 9/17/2018  3:43 PM      Findings:   see dictation.  Complications: None.  Implants: None.    Orthopaedics Primary  Activity: up ad camelia  Weight bearing status: WBAT in KI  Antibiotics: Ancef  Diet: regular  DVT prophylaxis: Xeralto when okayed by orthopaedics  Bracing/Splinting: KI when ambulating  Dressings: Keep clean, dry and intact until follow up  Elevation: Elevate RLE on pillows to keep at the level of the heart while in bed  Drains: SHAJI drain keep in until clinic follow-up    Follow-up: Clinic with Dr. Betts in 1 week    Disposition: home when pain controlled

## 2018-09-18 LAB
ERYTHROCYTE [DISTWIDTH] IN BLOOD BY AUTOMATED COUNT: 14.8 % (ref 10–15)
HCT VFR BLD AUTO: 30.3 % (ref 40–53)
HGB BLD-MCNC: 9.3 G/DL (ref 13.3–17.7)
MCH RBC QN AUTO: 26.2 PG (ref 26.5–33)
MCHC RBC AUTO-ENTMCNC: 30.7 G/DL (ref 31.5–36.5)
MCV RBC AUTO: 85 FL (ref 78–100)
PLATELET # BLD AUTO: 231 10E9/L (ref 150–450)
RBC # BLD AUTO: 3.55 10E12/L (ref 4.4–5.9)
WBC # BLD AUTO: 5.4 10E9/L (ref 4–11)

## 2018-09-18 PROCEDURE — 25000128 H RX IP 250 OP 636: Performed by: STUDENT IN AN ORGANIZED HEALTH CARE EDUCATION/TRAINING PROGRAM

## 2018-09-18 PROCEDURE — 25000132 ZZH RX MED GY IP 250 OP 250 PS 637: Performed by: NURSE PRACTITIONER

## 2018-09-18 PROCEDURE — 25000132 ZZH RX MED GY IP 250 OP 250 PS 637: Performed by: STUDENT IN AN ORGANIZED HEALTH CARE EDUCATION/TRAINING PROGRAM

## 2018-09-18 PROCEDURE — G0378 HOSPITAL OBSERVATION PER HR: HCPCS

## 2018-09-18 PROCEDURE — 99207 ZZC CONSULT E&M CHANGED TO SUBSEQUENT LEVEL: CPT | Performed by: PHYSICIAN ASSISTANT

## 2018-09-18 PROCEDURE — 25000128 H RX IP 250 OP 636: Performed by: ORTHOPAEDIC SURGERY

## 2018-09-18 PROCEDURE — 85027 COMPLETE CBC AUTOMATED: CPT | Performed by: STUDENT IN AN ORGANIZED HEALTH CARE EDUCATION/TRAINING PROGRAM

## 2018-09-18 PROCEDURE — 99214 OFFICE O/P EST MOD 30 MIN: CPT | Performed by: PHYSICIAN ASSISTANT

## 2018-09-18 PROCEDURE — 36415 COLL VENOUS BLD VENIPUNCTURE: CPT | Performed by: STUDENT IN AN ORGANIZED HEALTH CARE EDUCATION/TRAINING PROGRAM

## 2018-09-18 RX ORDER — HYDROXYZINE HYDROCHLORIDE 25 MG/1
50 TABLET, FILM COATED ORAL EVERY 6 HOURS PRN
Status: DISCONTINUED | OUTPATIENT
Start: 2018-09-18 | End: 2018-09-19 | Stop reason: HOSPADM

## 2018-09-18 RX ORDER — POLYETHYLENE GLYCOL 3350 17 G/17G
17 POWDER, FOR SOLUTION ORAL DAILY
Status: DISCONTINUED | OUTPATIENT
Start: 2018-09-19 | End: 2018-09-19 | Stop reason: HOSPADM

## 2018-09-18 RX ORDER — ACETAMINOPHEN 325 MG/1
650 TABLET ORAL EVERY 4 HOURS PRN
Qty: 30 TABLET | Refills: 0 | Status: SHIPPED | OUTPATIENT
Start: 2018-09-18 | End: 2018-10-02

## 2018-09-18 RX ORDER — SENNOSIDES 8.6 MG
1-2 TABLET ORAL 2 TIMES DAILY PRN
Status: DISCONTINUED | OUTPATIENT
Start: 2018-09-19 | End: 2018-09-19 | Stop reason: HOSPADM

## 2018-09-18 RX ORDER — OXYCODONE HYDROCHLORIDE 5 MG/1
5-10 TABLET ORAL
Qty: 40 TABLET | Refills: 0 | Status: SHIPPED | OUTPATIENT
Start: 2018-09-18 | End: 2018-09-19

## 2018-09-18 RX ORDER — HYDROXYZINE HYDROCHLORIDE 25 MG/1
25 TABLET, FILM COATED ORAL EVERY 6 HOURS PRN
Status: DISCONTINUED | OUTPATIENT
Start: 2018-09-18 | End: 2018-09-19 | Stop reason: HOSPADM

## 2018-09-18 RX ORDER — KETOROLAC TROMETHAMINE 30 MG/ML
30 INJECTION, SOLUTION INTRAMUSCULAR; INTRAVENOUS EVERY 6 HOURS PRN
Status: COMPLETED | OUTPATIENT
Start: 2018-09-18 | End: 2018-09-18

## 2018-09-18 RX ORDER — ACETAMINOPHEN 325 MG/1
650 TABLET ORAL EVERY 4 HOURS PRN
Qty: 30 TABLET | Refills: 0 | Status: SHIPPED | OUTPATIENT
Start: 2018-09-18 | End: 2018-09-18

## 2018-09-18 RX ORDER — HYDROMORPHONE HYDROCHLORIDE 2 MG/1
2-4 TABLET ORAL
Status: DISCONTINUED | OUTPATIENT
Start: 2018-09-18 | End: 2018-09-19 | Stop reason: HOSPADM

## 2018-09-18 RX ADMIN — HYDROMORPHONE HYDROCHLORIDE 4 MG: 2 TABLET ORAL at 12:17

## 2018-09-18 RX ADMIN — HYDROMORPHONE HYDROCHLORIDE 0.5 MG: 1 INJECTION, SOLUTION INTRAMUSCULAR; INTRAVENOUS; SUBCUTANEOUS at 06:35

## 2018-09-18 RX ADMIN — HYDROMORPHONE HYDROCHLORIDE 0.5 MG: 1 INJECTION, SOLUTION INTRAMUSCULAR; INTRAVENOUS; SUBCUTANEOUS at 03:20

## 2018-09-18 RX ADMIN — HYDROMORPHONE HYDROCHLORIDE 4 MG: 2 TABLET ORAL at 18:26

## 2018-09-18 RX ADMIN — OXYCODONE HYDROCHLORIDE 10 MG: 5 TABLET ORAL at 02:12

## 2018-09-18 RX ADMIN — HYDROMORPHONE HYDROCHLORIDE 4 MG: 2 TABLET ORAL at 21:33

## 2018-09-18 RX ADMIN — KETOROLAC TROMETHAMINE 30 MG: 30 INJECTION, SOLUTION INTRAMUSCULAR at 20:28

## 2018-09-18 RX ADMIN — HYDROMORPHONE HYDROCHLORIDE 0.5 MG: 1 INJECTION, SOLUTION INTRAMUSCULAR; INTRAVENOUS; SUBCUTANEOUS at 13:45

## 2018-09-18 RX ADMIN — OXYCODONE HYDROCHLORIDE 10 MG: 5 TABLET ORAL at 05:18

## 2018-09-18 RX ADMIN — KETOROLAC TROMETHAMINE 30 MG: 30 INJECTION, SOLUTION INTRAMUSCULAR at 14:19

## 2018-09-18 RX ADMIN — OXYCODONE HYDROCHLORIDE 10 MG: 5 TABLET ORAL at 09:22

## 2018-09-18 RX ADMIN — SODIUM CHLORIDE 1000 ML: 9 INJECTION, SOLUTION INTRAVENOUS at 13:40

## 2018-09-18 RX ADMIN — KETOROLAC TROMETHAMINE 30 MG: 30 INJECTION, SOLUTION INTRAMUSCULAR at 08:26

## 2018-09-18 RX ADMIN — HYDROMORPHONE HYDROCHLORIDE 4 MG: 2 TABLET ORAL at 15:15

## 2018-09-18 NOTE — PLAN OF CARE
"Problem: Surgery Nonspecified (Adult)  Goal: Signs and Symptoms of Listed Potential Problems Will be Absent, Minimized or Managed (Surgery Nonspecified)  Signs and symptoms of listed potential problems will be absent, minimized or managed by discharge/transition of care (reference Surgery Nonspecified (Adult) CPG).   Outcome: Improving    VS: /54  Pulse 89  Temp 98.4  F (36.9  C) (Oral)  Resp 16  Ht 1.778 m (5' 10\")  Wt 83.1 kg (183 lb 3.2 oz)  SpO2 99%  BMI 26.29 kg/m2   O2: 1L via NC, desats to upper 80's on RA.   Output: Voiding adequate amounts using urinal.   Last BM: 9/15/18, not passing flatus per pt.   Activity: Ax1 with walker, NWB, WBAT in knee immobilizer.   Skin: Incision, drain.   Pain: Constant aching pain in right leg managed with oxycodone 5-10 mg q3h last given at 0518, IV dilaudid 0.3-0.5 mg last given at 0635, and ice.   CMS: Intact.   Dressing: CDI.   Diet: Regular.   LDA: PIV right hand SL. PIV left hand infusing NS at 75 ml/hr. SHAJI with 1 ml bloody/red output.   Equipment: Walker, capno, trapeze, PCDs, urinal, IV pole, pillows, call light within reach.   Plan: Continue to monitor and manage pain.   Additional Info: Knee immobilizer needed.     OBS goals:  -Vital signs at baseline.   -Able to tolerate oral intake.   -Documented spontaneous urine output OR,   -Placement of sahu catheter after failed 2nd attempt at spontaneous voiding with documented retention of at least 150cc by bladder scan.   -Adequate pain control using oral analgesics   -Tolerates oral intake   -Cleared for discharge per provider   -Return to baseline mental status,   -Hypercapnia, hypoventilation or hypoxia resolved for at least 2 hours without supplemental oxygen,   -Deficits in sensation, mobility or coordination have resolved if spinal or regional anesthesia was used.     3034-0772: oxycodone given, tolerating sips with meds, alert and oriented, 1 L O2 via NC, desats to upper 80's on RA, CMS " intact  0738-3840: ambulated to bathroom, voided 225, PVR 77, oxycodone given, tolerating sips with meds, 1 L via NC  9843-7317: pt sleeping, 1 L via NC  5683-8427: oxycodone given, tolerating sips with meds, 1 L via NC  0794-8696: IV dilaudid given, voided 225 in urinal, 1 L via NC  4152-5970: voided 375, oxycodone given, tolerating sips, IV dilaudid given, 1 L via NC

## 2018-09-18 NOTE — OR NURSING
Patient given additional 0.5 mg of dilaudid for pain. Dr. Daxa Decker updated. No new orders received. Patient's pain more tolerable and transferred to floor.

## 2018-09-18 NOTE — PLAN OF CARE
Problem: Patient Care Overview  Goal: Plan of Care/Patient Progress Review  PT: PT holding eval until pt agreeable to mobilize OOB. Pt reporting too much pain to attempt any OOB mobility. Pt reports plans to go home with wife on crutches and per pt feels comfortable using crutches. Eval rescheduled for tomorrow morning.

## 2018-09-18 NOTE — PLAN OF CARE
Problem: Patient Care Overview  Goal: Plan of Care/Patient Progress Review  Outcome: Improving  Patient A/Ox4. VSS. Denies CP, SOB, dizziness/LH. LSCTA. +fl/BS. Voiding per urinal. CMS intact. Dressing to right thigh CDI, ice applied. Knee hinged brace on.Tolerating regular diet . IS encouraged. Activity as tolerated . IV SL. Pain rated tolerable - manageable throughout shift, managed with Dilaudid 4 mg po,Dilaudid 0.5mg IV ,Toradol 30 mg IV. Patient has demonstrated ability to call appropriately. Patient is resting with call light within reach. Will continue to monitor.

## 2018-09-18 NOTE — CONSULTS
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 Betts.    1. 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  ml. No chiara-operative complications noted. Pain poorly controlled overnight. Oxycodone transitioned to PO dilaudid and IV toradol added. Pain currently well controlled.   - Cont toradol for total of 3 doses  - Cont PO dilaudid 2-4mg q 3h prn  - Will add hydroxyzine 25-50mg q 6h prn as adjuvant to narcotics  - Further per ortho    2. Asymptomatic PE:  Noted incidentally on PET 4/2018. Treated with Xarelto. Recently seen in hematology clotting disorder clinic. Venous doppler US negative. Xarelto held appropriately pre-op.   - Hematology recommends restarting PTA Xarelto as soon as possible from a hemodynamic standpoint  - Daytime team to discuss with ortho prior to discharge    3. Chronic anemia:  Hgb stable post-op (9.3, prev 9.7 on 7/17).       The patient's care was discussed with the Attending Physician, Dr. Durham.    Chris Hastings PA-C  Internal Medicine Staff Hospitalist Service  [facility]  Pager: [phone]  ______________________________________________________________________    History of Present Illness   Hiram Tapia is a 58 year old male who was admitted post-op by Dr. Betts s/p resection of right lower extremity sarcoma. Patient reports poorly controlled pain overnight. After some medication adjustments the patient currently reports pain is well controlled, 2/10 severity. He denies any paresthesias or muscle weakness. He denies any other concerns. No fevers, chills, chest pain, dyspnea, cough, abdominal pain,  nausea, vomiting, diarrhea, or urinary complaints. Of note, patient was diagnosed with incidental PE in 4/2018. He was asymptomatic from this and was previously on Xarelto for anticoagulation.     Review of Systems   The 10 point Review of Systems is negative other than noted in the HPI or here.     Past Medical History    I have reviewed this patient's medical history and updated it with pertinent information if needed.   Past Medical History:   Diagnosis Date     Pulmonary embolism (H)      Sarcoma (H)         Past Surgical History   I have reviewed this patient's surgical history and updated it with pertinent information if needed.  Past Surgical History:   Procedure Laterality Date     EXCISE SOFT TISSUE TUMOR THIGH Right 3/8/2018    Procedure: EXCISE SOFT TISSUE TUMOR THIGH;  Biopsy Right Thigh Tumor;  Surgeon: Brad Betts MD;  Location: [facility] OR     EXCISE SOFT TISSUE TUMOR THIGH Right 9/17/2018    Procedure: EXCISE SOFT TISSUE TUMOR THIGH;  Removal Right Thigh Tumor ;  Surgeon: Brad Betts MD;  Location: [facility] OR     INSERT PORT VASCULAR ACCESS N/A 3/28/2018    Procedure: INSERT PORT VASCULAR ACCESS;  Vascular Access Port Insertion with C-arm;  Surgeon: Sarah Bowie MD;  Location: [facility] OR     IRRIGATION AND DEBRIDEMENT LOWER EXTREMITY, COMBINED Right 4/6/2018    Procedure: COMBINED IRRIGATION AND DEBRIDEMENT LOWER EXTREMITY;  Irrigation And Debridement Right Thigh ;  Surgeon: Brad Betts MD;  Location: [facility] OR     IRRIGATION AND DEBRIDEMENT LOWER EXTREMITY, COMBINED Right 4/9/2018    Procedure: COMBINED IRRIGATION AND DEBRIDEMENT LOWER EXTREMITY;  Irrigation And Debridement Right Thigh Wound. with Wound VAC Exchange;  Surgeon: Brad Betts MD;  Location: [facility] OR     STRABISMUS SURGERY      as a child        Social History   Social History   Substance Use Topics     Smoking status: Former Smoker     Packs/day: 1.00     Years: 10.00     Types: Cigarettes     Start date: 1/1/1975     " Quit date: 1/1/1990     Smokeless tobacco: Never Used     Alcohol use 8.4 oz/week     14 Cans of beer per week       Family History   Family history reviewed with patient and is noncontributory.    Medications   Prescriptions Prior to Admission   Medication Sig Dispense Refill Last Dose     oxyCODONE IR (ROXICODONE) 5 MG tablet Take 1 tablet (5 mg) by mouth every 4 hours as needed for moderate to severe pain 100 tablet 0 9/17/2018 at 0730     polyethylene glycol (MIRALAX/GLYCOLAX) Packet Take 17 g by mouth daily 7 packet 1 9/16/2018 at 0800     rivaroxaban ANTICOAGULANT (XARELTO) 20 MG TABS tablet Take 1 tablet (20 mg) by mouth daily (with dinner) 30 tablet 3 Past Week at Unknown time     senna-docusate (SENOKOT-S;PERICOLACE) 8.6-50 MG per tablet Take 1 tablet by mouth daily   9/17/2018 at 0730     lidocaine, viscous, (XYLOCAINE) 2 % solution    Unknown at Unknown time     [DISCONTINUED] Acetaminophen (TYLENOL PO) Take 1,000 mg by mouth daily as needed for mild pain or fever   9/17/2018 at 0730       Allergies   Allergies   Allergen Reactions     Hydrofera Blue 4\"X4\" [Wound Dressings] Dermatitis and Blisters     Patient reports that Dressing causes skin irritation, blisters, and drainage.      Tegaderm Ag Mesh [Silver] Dermatitis and Blisters     Patient reports that Dressing causes Skin irritation, blisters, and drainage.       Physical Exam   Vital Signs: Temp: 98.1  F (36.7  C) Temp src: Oral BP: 109/68 Pulse: 95 Heart Rate: 83 Resp: 14 SpO2: 95 % O2 Device: None (Room air) Oxygen Delivery: 1 LPM  Weight: 183 lbs 3.24 oz    GENERAL:  Awake. Alert. Oriented x 3. NAD.   HEENT:  No scleral icterus. Mucous membranes moist.   CV:  RRR. S1, S2. No murmurs appreciated.   RESPIRATORY:  Lungs CTAB with no wheezing, rales, rhonchi.   GI:  Abdomen soft, non-distended. Active bowel sounds. No tenderness, guarding, or rebound. No mass or HSM.    NEUROLOGICAL:  No focal deficits. Moves all extremities.    EXTREMITIES:  LLE " surgical dressings and bandages in place. No peripheral edema. No calf tenderness. Intact bilateral pedal pulses.   SKIN:  No jaundice, rashes, wounds or lesions.     Data   Data reviewed today: I have personally reviewed all medications, new labs and imaging results over the last 24 hours, as well as pertinent historical data.     ROUTINE IP LABS (Last four results)    Recent Labs  Lab 09/17/18  1159   *       Recent Labs  Lab 09/18/18  1018   WBC 5.4   RBC 3.55*   HGB 9.3*   HCT 30.3*   MCV 85   MCH 26.2*   MCHC 30.7*   RDW 14.8        No lab results found in last 7 days.     Glucose Values Latest Ref Rng & Units 9/17/2018   Bedside Glucose (mg/dl )  - --   GLUCOSE 70 - 99 mg/dL 103(H)   Some recent data might be hidden        All labs personally reviewed in [facility].  See A&P for additional results.     Unresulted Labs Ordered in the Past 30 Days of this Admission     Date and Time Order Name Status Description    9/17/2018 1616 Surgical pathology exam In process

## 2018-09-18 NOTE — PROGRESS NOTES
Pain over night. Dressing dry. Minimal drain output.  Active ankle dorsi/plantar flexion.  Begin 3 doses of toradol.  Imp: pain control inadequate. Not yet walking comfortably.

## 2018-09-18 NOTE — OP NOTE
Preoperative diagnosis: Sarcoma right thigh    Postoperative diagnosis: Same    Procedure performed excision of right quadriceps muscle with sarcoma right thigh, 20 cm    Surgeon: Dwain Betts and Rakesh Day    Estimated blood loss: 400 cc    Pathology submitted: Right quadriceps with tumor.    Mr. Tapia was interviewed in the preoperative area risk and benefits were again reviewed.  Consent was signed.  The surgical site was marked with my initials and line of intended incision.  He was seen preoperatively by the hematologist who recommended discontinuing his Xarelto until the wound was at minimal risk for bleeding.  In addition he has been treated for an open wound along the lateral aspect of his right thigh.  He received preoperative chemotherapy and radiation.  Surgical site was marked with my initials and line of intended incision.  Preoperative brief had been performed.  He was taken the operating room received a general anesthetic was placed in a floppy lateral position with the right side up.  Right lower extremity was prepped and draped sterilely surgical timeout was performed.    Incision stenting essentially from the tip of the greater trochanter to the suprapatellar region was then made.  This incorporated the site of previous wound infection creating an island of skin which was approximately 7 x 3 cm.  Dissection was taken down through the superficial fascia of the thigh leaving the anterior and posterior flaps with good fascia.  We turned our attention to the distal aspect of the wound first.  The quadriceps muscle was cut across down to bone.  Use of cautery device was then used to dissect along the medial aspect of the tumor.  This included the entire quadriceps muscle was dissected down to bone.  This dissection was carried as far proximally as the vessels which were feeding the tumor and were palpable.    We then turned our attention to the distal posterior aspect of the wound.  In the  distal to proximal fashion the vastus lateralis was incised off of the femur.  The dissection was then carried further posteriorly detaching that G max insertion and taking a subperiosteal dissection in the proximal portion of the lateral aspect of the wound.  Dissection in the proximal aspect of the wound was then taken from the lateral border of the feeding vessels down directly to bone and this was carried across the anterior aspect of the femur down to the dissection along the gluteus sandip muscle.    Under direct visualization with most of the tumor free the feeding vessels were then individually identified and ligated.  The tumor was then rolled off of the femur with the subperiosteal dissection in a medial to lateral fashion.  Wound was irrigated and closed with fascial subcutaneous and skin layers.    Postoperative D brief was performed.    Postoperative plan 1.  Knee immobilizer when up.  2.  Return to clinic for assessment of Luis-Musa drain in wound healing.  3.  We will hold the Xarelto until postop day 3-5 depending on the condition of the wound and the drain output.  When Xarelto is reinstated he will be placed on a prophylactic dose for 2 weeks and then escalated based on oncology's recommendation potentially to a therapeutic dose.

## 2018-09-18 NOTE — PROGRESS NOTES
9/18,18, 1PM, [facility] UR 8A Room 0803, Male, 68 , 83.1 Kg, Dr. Rakesh Day MD, Dx: Sarcoma of Soft Tissue    S:   - Patient was seen today at [facility] UR 8A Room 0803 for the evaluation/delivery of a Breg Post Op Tscope Premier XL knee orthosis on the patient s right knee.  Patient appears pleasant.   - Health History & Progression: Patient had sarcoma development around the right knee region. Patient received corrective right knee surgery and requires post-operative stabilization of the affected knee.  - Patient is currently limited by: Knee pain, swelling, and need for knee stability/support.    O:   - Patient is pressure intolerant in superior compartment of the affected side knee.  - Contralateral knee presents as WNL currently.   - Patient vocalized uncompromised sensation in right knee and feet  - Appearance of limb, joint & vasculature: Patient is currently in ACE wrap with no bracing at this time.    A:     - The Breg T Scope Premier Post-Op knee brace is used to provide comfort and support during post-operative knee rehabilitation. The brace is designed to provide protected, controlled range-of-motion (ROM) for patients recovering from knee surgery or those who have knee injuries or instabilities. Features of the T Scope knee brace include but not limited to:  o Easy to use ROM hinge with quick-adjusting flexion and extension stops to control and limit knee flexion and extension  o Telescoping calf and thigh sleeves for sizing a wide range of patient heights.   o Extension drop lock allows the brace to be locked out in 5 positions (in -10 , 0 , 10 , 20 , and 30 ) of knee extension.  o Telescoping bars and overall brace design limits knee range of motion  o The knee orthosis accommodates both right and left leg procedures  o Strap lock clips keep straps positioned on bars and the hinges in the correct placement around the knee  o Quick clip harris allow for easy  application and removal of the brace  - Goal: To provide comfort and support during post-operative knee rehabilitation. The brace is designed to provide protected, controlled range-of-motion (ROM) for patients recovering from knee surgery or those who have knee injuries or instabilities.  - Upon fitting the brace the patient vocalized satisfaction with the fit and feel of the orthosis. The trimlines and circumferences of the brace presented satisfactory after the orthosis straps were trimmed/upright bars were contoured to patient anatomy. The patient did not weight-bear with the orthosis donned at this visit.  Patient signed the delivery ticket and was given the [employer] receipt information sheet.      P:  - Patient will utilize the knee orthoses for the duration of the treatment or unless otherwise specified by the patient s provider. Further adjustments will be addressed when needed. Further plans for patient treatment will be made by the attending doctor. Verbal and written instructions were given to the patient on how to don/doff/care for the brace. Will follow-up PRN.    Electronically signed by GRODY Schmidt, MSPO, Board Eligible Prosthetist.

## 2018-09-18 NOTE — PROGRESS NOTES
"Orthopaedic Surgery Progress Note  September 18, 2018    Subjective: No acute events overnight. Pain noted in the right leg this AM.    Denies fever or chills, CP, SOB, numbness or tingling, motor dysfunction or weakness.    Objective: /54  Pulse 89  Temp 98.4  F (36.9  C) (Oral)  Resp 16  Ht 1.778 m (5' 10\")  Wt 83.1 kg (183 lb 3.2 oz)  SpO2 99%  BMI 26.29 kg/m2    Physical Exam:  General: healthy, alert, no distress  Respiratory: Breathing not labored.  MSK:  Focused examination of:   RLE: Toes WWP with BCR, + DP pulse. Fires EHL/FHL/GSC/TA. SILT SP/DP/Sa/Mayorga/T. Dressing c/d/i. Drain in place.    Labs:  No results for input(s): HGB, SED, CRP, IL6, WBC in the last 168 hours.    All cultures:  No results for input(s): CULT in the last 168 hours.    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      Orthopaedics Primary  Activity: up with assist  Weight bearing status: WBAT with KI  Antibiotics: Ancef  Diet: regular  DVT prophylaxis: holding Xeralto at this time  Labs: CBC this AM  Bracing/Splinting: KI when ambulating  Dressings: Keep clean, dry and intact until clinic f/u  Elevation: Elevate RLE on pillows to keep at the level of the heart while in bed  Drains: SHAJI drain to remain in place until clinic f/u next week  Physical Therapy/Occupational Therapy: Eval and treat for mobilization and ambulation    Follow-up: Clinic with Dr. Betts in 1 week.    Disposition: Pending progress with therapies, pain control on orals, and medical stability, anticipate discharge home today or tomorrow    Rakesh Day MD  Orthopaedic Resident, PGY-4  Pager: [phone]    "

## 2018-09-18 NOTE — PROGRESS NOTES
[facility]   Patient is currently open to home care services with [facility].  The patient is currently receiving RN services.  [facility]  and team have been notified that patient is under OBSERVATION STATUS. [facility] liaison will continue to follow patient during stay.  If patient is admitted to inpatient status please provide orders to resume home care at time of discharge if appropriate.

## 2018-09-18 NOTE — PLAN OF CARE
Problem: Patient Care Overview  Goal: Plan of Care/Patient Progress Review  OT: Orders received and appreciated.  Pt declining OOB and participating in PT this date.  After discussion with PT and chart review, will HOLD OT this date.  Reschedule for 9/19 following PT.  Will evaluate at that time if in need of skilled therapy.

## 2018-09-18 NOTE — PROGRESS NOTES
Focus: Patients arrival to 8A  DB: patient arrived to 8A.   I: Taught patient about use of I S, call system, frequent VS and hospital routines. Started frequent VS and explained capnography .   E: Patient was able to demonstrate use of IS. Patient was requesting something more for pain. Patient has positive pedal pulses bilaterally and was taught how to do ankle pumps. Patients VS are stable and patient was alert enough and carrying on conversation with his wife. Status of patient will continue to be monitored.

## 2018-09-19 ENCOUNTER — TELEPHONE (OUTPATIENT)
Dept: ORTHOPEDICS | Facility: CLINIC | Age: 58
End: 2018-09-19

## 2018-09-19 ENCOUNTER — APPOINTMENT (OUTPATIENT)
Dept: PHYSICAL THERAPY | Facility: CLINIC | Age: 58
End: 2018-09-19
Attending: ORTHOPAEDIC SURGERY
Payer: COMMERCIAL

## 2018-09-19 VITALS
RESPIRATION RATE: 15 BRPM | BODY MASS INDEX: 26.23 KG/M2 | TEMPERATURE: 98 F | WEIGHT: 183.2 LBS | HEART RATE: 78 BPM | OXYGEN SATURATION: 98 % | HEIGHT: 70 IN | SYSTOLIC BLOOD PRESSURE: 117 MMHG | DIASTOLIC BLOOD PRESSURE: 70 MMHG

## 2018-09-19 PROCEDURE — 40000193 ZZH STATISTIC PT WARD VISIT

## 2018-09-19 PROCEDURE — 25000132 ZZH RX MED GY IP 250 OP 250 PS 637: Performed by: NURSE PRACTITIONER

## 2018-09-19 PROCEDURE — 40000894 ZZH STATISTIC OT IP EVAL DEFER: Performed by: OCCUPATIONAL THERAPIST

## 2018-09-19 PROCEDURE — 97116 GAIT TRAINING THERAPY: CPT | Mod: GP

## 2018-09-19 PROCEDURE — 97110 THERAPEUTIC EXERCISES: CPT | Mod: GP

## 2018-09-19 PROCEDURE — 25000132 ZZH RX MED GY IP 250 OP 250 PS 637: Performed by: PHYSICIAN ASSISTANT

## 2018-09-19 PROCEDURE — 97161 PT EVAL LOW COMPLEX 20 MIN: CPT | Mod: GP

## 2018-09-19 PROCEDURE — G0378 HOSPITAL OBSERVATION PER HR: HCPCS

## 2018-09-19 PROCEDURE — 97530 THERAPEUTIC ACTIVITIES: CPT | Mod: GP

## 2018-09-19 RX ORDER — HYDROMORPHONE HYDROCHLORIDE 2 MG/1
2-4 TABLET ORAL
Qty: 50 TABLET | Refills: 0 | Status: SHIPPED | OUTPATIENT
Start: 2018-09-19 | End: 2018-09-25

## 2018-09-19 RX ORDER — HYDROXYZINE HYDROCHLORIDE 25 MG/1
25 TABLET, FILM COATED ORAL EVERY 6 HOURS PRN
Qty: 60 TABLET | Refills: 0 | Status: SHIPPED | OUTPATIENT
Start: 2018-09-19 | End: 2018-10-30

## 2018-09-19 RX ADMIN — HYDROMORPHONE HYDROCHLORIDE 4 MG: 2 TABLET ORAL at 09:14

## 2018-09-19 RX ADMIN — POLYETHYLENE GLYCOL 3350 17 G: 17 POWDER, FOR SOLUTION ORAL at 07:00

## 2018-09-19 RX ADMIN — HYDROXYZINE HYDROCHLORIDE 25 MG: 25 TABLET, FILM COATED ORAL at 05:54

## 2018-09-19 RX ADMIN — HYDROXYZINE HYDROCHLORIDE 25 MG: 25 TABLET, FILM COATED ORAL at 02:50

## 2018-09-19 RX ADMIN — SENNOSIDES 2 TABLET: 8.6 TABLET, FILM COATED ORAL at 07:00

## 2018-09-19 RX ADMIN — HYDROMORPHONE HYDROCHLORIDE 4 MG: 2 TABLET ORAL at 12:14

## 2018-09-19 RX ADMIN — HYDROMORPHONE HYDROCHLORIDE 4 MG: 2 TABLET ORAL at 02:45

## 2018-09-19 RX ADMIN — HYDROMORPHONE HYDROCHLORIDE 4 MG: 2 TABLET ORAL at 05:54

## 2018-09-19 NOTE — DISCHARGE SUMMARY
ORTHOPAEDIC DISCHARGE SUMMARY     Date of Admission: 9/17/2018  Date of Discharge: 9/19/2018 12:30 PM  Disposition: Home  Staff Physician: No att. providers found  Primary Care Provider: Louisa Fry    DISCHARGE DIAGNOSIS:  Sarcoma    PROCEDURES: Procedure(s):  EXCISE SOFT TISSUE TUMOR THIGH on 9/17/2018    BRIEF HISTORY:  58yoM with a right thigh sarcoma, confirmed on biopsy. He underwent radiation and chemotherapy prior to surgery. He developed a draining wound following the biopsy which had been treated with local wound cares. Risks and benefits to the sarcoma resection were discussed with the patient.    HOSPITAL COURSE:    Surgery was uncomplicated. Hiram Tapia has done well post-operatively. A drain was sown in. Medicine was consulted post operatively to aid in management of medical comorbidities. See final recommendations below. The patient received routine nursing cares and is medically stable. Vital signs are stable. The patient is tolerating a regular diet without GI distress/nausea or vomiting. Voiding spontaneously. All PT/OT goals have been met for safe mobility. Pain is now controlled on oral medications which will be available on discharge. Stool softeners have been used while taking pain medications to help prevent constipation. Hiram Tapia is deemed medically safe to discharge.     Antibiotics:  Ancef given periop and 24 hours postop.  DVT prophylaxis:  Xarelto 10mg daily for 1 weeks, then back to his 20mg home dosage.  PT Progress: Has met PT/OT goals for safe mobility.   Pain Meds:  Weaned off all IV pain meds by discharge.  Inpatient Events: No significant events or complications.  Drain:    To be removed in clinic next week.    Discharge orders and instructions as below.    FOLLOWUP:      Future Appointments  Date Time Provider Department Center   9/25/2018 5:30 PM Brad Betts MD [facility]   10/23/2018 10:30 AM Gaurang Johnson MD [facility]       Appointments  on [facility]. Call [phone] if you haven't heard regarding these appointments within 7 days of discharge.    PLANNED DISCHARGE ORDERS:           Discharge Medication List as of 9/19/2018 10:58 AM      START taking these medications    Details   HYDROmorphone (DILAUDID) 2 MG tablet Take 1-2 tablets (2-4 mg) by mouth every 3 hours as needed for moderate to severe pain, Disp-50 tablet, R-0, Local Print      hydrOXYzine (ATARAX) 25 MG tablet Take 1 tablet (25 mg) by mouth every 6 hours as needed for other (adjuvant pain), Disp-60 tablet, R-0, E-Prescribe         CONTINUE these medications which have CHANGED    Details   acetaminophen (TYLENOL) 325 MG tablet Take 2 tablets (650 mg) by mouth every 4 hours as needed for other (mild pain), Disp-30 tablet, R-0, E-Prescribe      rivaroxaban ANTICOAGULANT (XARELTO) 20 MG TABS tablet Take 0.5 tablets (10 mg) by mouth daily (with dinner), Disp-30 tablet, R-3, E-PrescribeStart post-op day 3 (9/2) at 10mg instead of 20mg         CONTINUE these medications which have NOT CHANGED    Details   lidocaine, viscous, (XYLOCAINE) 2 % solution Historical      polyethylene glycol (MIRALAX/GLYCOLAX) Packet Take 17 g by mouth daily, Disp-7 packet, R-1, E-Prescribe      senna-docusate (SENOKOT-S;PERICOLACE) 8.6-50 MG per tablet Take 1 tablet by mouth daily, Historical         STOP taking these medications       oxyCODONE IR (ROXICODONE) 5 MG tablet Comments:   Reason for Stopping:                 Discharge Procedure Orders  Home care nursing referral   Referral Type: Home Health Therapies & Aides     Home Care PT Referral for Hospital Discharge   Referral Type: Home Health Therapies & Aides     Ice to affected area   Order Comments: Ice to operative site, as needed.     Discharge Instructions - diet   Order Comments: Resume pre procedure diet.  Keep dressing in place until clinic follow-up.  Keep the drain in until clinic folllow-up. Empty as needed and  record output     Reason for your hospital stay   Order Comments: Right leg sarcoma resection     Activity   Order Comments: Your activity upon discharge: activity as tolerated in your knee brace   Order Specific Question Answer Comments   Is discharge order? Yes      Wound care and dressings   Order Comments: Instructions to care for your wound at home:  Keep the dressings over the incisions until clinic follow-up  Keep the drain in until clinic follow-up. Empty as needed. Record output.     Adult [facility] Follow-up and recommended labs and tests   Order Comments: Clinic with Dr. Betts in 1 week for drain removal.     Discharge Instructions   Order Comments: Follow up appointment Tuesday 9/25 to remove the drain.  Weightbearing as tolerated on your operative when in your knee brace. You do not have the muscles to straighten your knee, so your knee brace needs to be on when you weight wear so you do not fall.    Start your home Xarelto on 9/20 (post-op day 3) at 10mg instead of 20mg.     Dressing   Order Comments: Keep dressing clean and dry.  Leave dressing in place until follow up.   CALL YOUR PHYSICIAN IF:  1.  Your pain begins to worsen and is unable to be controlled with your medications.  2.  Excessive redness or drainage of cloudy or bloody material from the wounds (Clear red tinted fluid and some mild drainage should be expected). Drainage of any kind 5 days after surgery should be reported to the doctor.  3.  You have a temperature elevation greater than 101.5    4.  You have pain, swelling or redness in your calf. You have numbness or weakness in your leg or foot.    During regular business hours call the [facility] at [phone] and ask for the triage nurse. After hours or weekends call the hospital  at [phone] and ask for the ortho resident on call.     Diet   Order Comments: Follow this diet upon discharge: Regular   Order Specific Question Answer Comments   Is discharge order? Yes           Rakesh Day MD   Orthopaedic Surgery Resident, PGY-4

## 2018-09-19 NOTE — PLAN OF CARE
Problem: Surgery Nonspecified (Adult)  Goal: Signs and Symptoms of Listed Potential Problems Will be Absent, Minimized or Managed (Surgery Nonspecified)  Signs and symptoms of listed potential problems will be absent, minimized or managed by discharge/transition of care (reference Surgery Nonspecified (Adult) CPG).   Outcome: Improving    VS: VSS, capnography done   O2: >90% on RA   Output: Pt voiding in BR in adequate amounts   Last BM: LBM 9/15 per pt report, passing flatus and BS active. Senna and miralax ordered for AM.   Activity: Pt declined getting OOB this shift, has not worked with therapies d/t pain. Encouraged exercises in bed.    Skin: Skin intact ex for incision, skin is warm and dry   Pain: Pain is being managed with around-the-clock dosing of PRN dilaudid 4 mg Q3H, started on atarax 25-50 mg Q6H (pt wants to stay on schedule of previous toradol). Last dose of toradol given at 2020. Pain improved.    CMS: CMS intact, denies N/T in all extremities. DP +2 bilaterally, able to wiggle toes.   Dressing: Dressing CDI, no drainage visible. HKB locked when OOB.   Diet: Tolerating regular diet without N/V, adequate intake of PO fluids.   LDA: PIV x2 WDL And SL, flushed. SHAJI drain with 95mL total for this shift.    Equipment: IV pole, SHAJI, walker, HKB, personal belongings at bedside   Plan: Continue to monitor patient and manage pain. Plan to discharge home with home services once mobilizing with therapy and pain under control.       OBSERVATION GOALS:  -Adequate pain control using oral analgesics   -Tolerates oral intake   -Cleared for discharge per provider     1395-2206:  -Pain controlled and tolerable utilizing around-the-clock dosing of narcotics.   -Tolerating PO fluids, ate dinner without N/V.  -Has not been cleared for discharge    5942-7840:  -Pain controlled and tolerable utilizing around-the-clock dosing of narcotics.   -Tolerating PO fluids, ate dinner without N/V.  -Has not been cleared for  discharge    2494-3218:  -Pain controlled and tolerable utilizing around-the-clock dosing of narcotics.   -Tolerating PO fluids, ate dinner without N/V.  -Has not been cleared for discharge    9345-6083:  -Pain controlled and tolerable utilizing around-the-clock dosing of narcotics.   -Tolerating PO fluids, ate dinner without N/V.  -Has not been cleared for discharge

## 2018-09-19 NOTE — PLAN OF CARE
Problem: Patient Care Overview  Goal: Individualization & Mutuality  Outcome: Improving  Pt A&O x's 4. VSS. Afebrile. 02 sats in the 90s on RA. Lungs clear. Denies SOB, CP and nausea. Tolerating regular diet. Bowel sound active in all quadrants. No BM but passing gas. Voiding adequately into the urinal.Pain fairly managed with current pain med, uses ice pack. Left leg pcd on, right leg hinged brace on.CMS intact, denies N/T. PIV patent and SL. Pt slept between care and is able to make needs known, call light with in reach. Will continue to monitor.

## 2018-09-19 NOTE — PROGRESS NOTES
09/19/18 1100   Quick Adds   Type of Visit Initial PT Evaluation   Living Environment   Lives With spouse;child(izzy), adult   Living Arrangements house   Home Accessibility stairs within home;stairs to enter home   Number of Stairs to Enter Home 3   Number of Stairs Within Home 12   Transportation Available family or friend will provide   Living Environment Comment Pt lives at home wife wife and adult children, has three stairs to enter.  Can remain on main level, does not have to go up or down stairs inside.    Self-Care   Dominant Hand right   Usual Activity Tolerance good   Current Activity Tolerance moderate   Regular Exercise no   Equipment Currently Used at Home crutches   Activity/Exercise/Self-Care Comment Per pt he was IND with all ADL's prior to admission, has been using a single crutch for assist with mobility.  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  Adult child is home during the day to assist.    Functional Level Prior   Ambulation 1-->assistive equipment   Transferring 0-->independent   Toileting 0-->independent   Bathing 0-->independent   Dressing 0-->independent   Eating 0-->independent   Communication 0-->understands/communicates without difficulty   Swallowing 0-->swallows foods/liquids without difficulty   Cognition 0 - no cognition issues reported   Fall history within last six months yes   Number of times patient has fallen within last six months 1   Which of the above functional risks had a recent onset or change? transferring;ambulation   Prior Functional Level Comment Pt was IND with use of single crutch prior to admission.   General Information   Onset of Illness/Injury or Date of Surgery - Date 09/17/18   Referring Physician Rakesh Day MD   Patient/Family Goals Statement Pt wants to go home.   Pertinent History of Current Problem (include personal factors and/or comorbidities that impact the POC) 58 year old male who had a sarcoma of the right thigh  resected on 9/17   Precautions/Limitations fall precautions   Weight-Bearing Status - LUE full weight-bearing   Weight-Bearing Status - RUE full weight-bearing   Weight-Bearing Status - LLE full weight-bearing   Weight-Bearing Status - RLE weight-bearing as tolerated   General Observations OBS, on RA, KI for OOB mobility when WB   General Info Comments Activity: ambulate   Cognitive Status Examination   Orientation orientation to person, place and time   Level of Consciousness alert   Follows Commands and Answers Questions 100% of the time   Personal Safety and Judgment intact   Memory intact   Pain Assessment   Patient Currently in Pain Yes, see Vital Sign flowsheet  (At incision site)   Integumentary/Edema   Integumentary/Edema Comments R LE ace wrapped, KI on   Posture    Posture Not impaired   Range of Motion (ROM)   ROM Comment L LE WFl, R LE limited due to stiffness/pain, lacking 2-3 degrees extension, getting to about 20 degrees flexion   Strength   Strength Comments L LE 5/5, R LE not formally assessed due to pain/surgery   Bed Mobility   Bed Mobility Comments IND   Transfer Skills   Transfer Comments IND   Gait   Gait Comments Ambulated with use of walker Rashid, completed 3 stairs with SBA and B rails   Balance   Balance Comments Impaired due to R LE strength and ROM, increased pain   Sensory Examination   Sensory Perception no deficits were identified   Coordination   Coordination no deficits were identified   Muscle Tone   Muscle Tone no deficits were identified   General Therapy Interventions   Planned Therapy Interventions bed mobility training;gait training;ROM;strengthening;stretching;transfer training;risk factor education;home program guidelines;progressive activity/exercise   Clinical Impression   Criteria for Skilled Therapeutic Intervention yes, treatment indicated   PT Diagnosis Impaired functional mobility   Influenced by the following impairments Decreased R LE ROM and strength, increased pain  "  Functional limitations due to impairments Inability to complete functional mobility at baseline level of functioning   Clinical Presentation Stable/Uncomplicated   Clinical Presentation Rationale Medically stable, pain controlled   Clinical Decision Making (Complexity) Low complexity   Therapy Frequency` (1x eval and treat)   Predicted Duration of Therapy Intervention (days/wks) 1 day   Anticipated Equipment Needs at Discharge (Has crutches at home)   Anticipated Discharge Disposition Home with Home Therapy   Risk & Benefits of therapy have been explained Yes   Patient, Family & other staff in agreement with plan of care Yes   Clinical Impression Comments Pt would benefit from IP PT to ensure safety with d/c home.   [facility] [employer] TM \"6 Clicks\"   2016, Trustees of [facility], under license to [employer].  All rights reserved.   6 Clicks Short Forms Basic Mobility Inpatient Short Form   [facility] [employer]  \"6 Clicks\" V.2 Basic Mobility Inpatient Short Form   1. Turning from your back to your side while in a flat bed without using bedrails? 4 - None   2. Moving from lying on your back to sitting on the side of a flat bed without using bedrails? 4 - None   3. Moving to and from a bed to a chair (including a wheelchair)? 4 - None   4. Standing up from a chair using your arms (e.g., wheelchair, or bedside chair)? 4 - None   5. To walk in hospital room? 4 - None   6. Climbing 3-5 steps with a railing? 3 - A Little   Basic Mobility Raw Score (Score out of 24.Lower scores equate to lower levels of function) 23   Total Evaluation Time   Total Evaluation Time (Minutes) 10     "

## 2018-09-19 NOTE — PLAN OF CARE
Problem: Patient Care Overview  Goal: Discharge Needs Assessment  00:00: pt fairly managed with current pain regimen. Denies nausea or vomiting.   0200: pt sleeping  0400: pain fairly managed with current pain med, received first dose of hydrazine and tolerated well. pt denies nausea or vomiting   0600: pt is managed with current pain med, vss, denies nausea or vomiting

## 2018-09-19 NOTE — PLAN OF CARE
Problem: Patient Care Overview  Goal: Plan of Care/Patient Progress Review  Discharge Planner PT   Patient plan for discharge: Home with family  Current status: PT evaluation completed and treatment initiated.  Pt educated on use of KI with WB as well as how to manipulate brace and shaylee/doff.  Pt able to demonstrate IND with bed mobility and transfers, increased pain but able to trouble shoot through it.  Pt ambulated with use of walker and SBA with brace locked in extension, per pt he will be fine with his crutches at home.  Pt able to complete stairs with SBA, no concerns.  Also given HEP for gentle strengthening and ROM in NWB position.   Barriers to return to prior living situation: None  Recommendations for discharge: Home with assist from family and home PT  Rationale for recommendations: At this time pt has met all in-patient PT goals, safe to discharge home.  Would benefit from OP PT to progress strength, ROM and IND with mobility.       Entered by: Megan Khan 09/19/2018 11:03 AM

## 2018-09-19 NOTE — PLAN OF CARE
Problem: Surgery Nonspecified (Adult)  Goal: Signs and Symptoms of Listed Potential Problems Will be Absent, Minimized or Managed (Surgery Nonspecified)  Signs and symptoms of listed potential problems will be absent, minimized or managed by discharge/transition of care (reference Surgery Nonspecified (Adult) CPG).   Pt A/O X 4. Afebrile. VSS. Lungs-Clear bilaterally with both anterior and posterior. IS encouraged. Bowels-Hyperactive in all four quadrants. Voids spontaneously without difficulty into the bedside urinal. Denies nausea and vomiting. CMS and Neuro's are intact. Denies numbness and tingling in all extremities. Has pain in the right leg and given PO Dilaudid. Is on a Regular diet and appetite was Good this shift. Right thigh and hip area Incisional dressings are C/D/I. Pt up in room with A X 1 and a FWW. PIV's from each hand removed for discharge. Bilateral heels are elevated off the bed. Pt is able to make needs known, and call light is within reach. Pt. discharged at 12:30PM to home, was accompanied by his spouse, and left with personal belongings. Pt. received complete discharge paperwork and PO medications as filled by discharge pharmacy. Pt. was given times of last dose for all discharge medications in writing on discharge medication sheets. Discharge teaching included PO medication, pain management, activity restrictions, and signs and symptoms of infection. Pt. to follow up with  on Sept 25, 2018. Pt. had no further questions at the time of discharge and no unmet needs were identified.

## 2018-09-19 NOTE — PLAN OF CARE
Problem: Patient Care Overview  Goal: Plan of Care/Patient Progress Review  Physical Therapy Discharge Summary    Reason for therapy discharge:    All goals and outcomes met, no further needs identified.    Progress towards therapy goal(s). See goals on Care Plan in [facility] electronic health record for goal details.  Goals met    Therapy recommendation(s):    Continued therapy is recommended.  Rationale/Recommendations:  Would benefit from continued OP PT to progress strength, ROM and IND with mobility.  Continue home exercise program.

## 2018-09-19 NOTE — PROGRESS NOTES
"Orthopaedic Surgery Progress Note  September 19, 2018    Subjective: No acute events overnight. Pain much better controlled this AM.    Denies fever or chills, CP, SOB, numbness or tingling, motor dysfunction or weakness.    Objective: /60 (BP Location: Right arm)  Pulse 95  Temp 98.4  F (36.9  C) (Oral)  Resp 16  Ht 1.778 m (5' 10\")  Wt 83.1 kg (183 lb 3.2 oz)  SpO2 97%  BMI 26.29 kg/m2    Physical Exam:  General: healthy, alert, no distress  Respiratory: Breathing not labored.  MSK:  Focused examination of:   RLE: Toes WWP with BCR, + DP pulse. Fires EHL/FHL/GSC/TA. SILT SP/DP/Sa/Mayorga/T. Dressing c/d/i. Drain in place.    Labs:    Recent Labs  Lab 09/18/18  1018   HGB 9.3*   WBC 5.4       All cultures:  No results for input(s): CULT in the last 168 hours.    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      Orthopaedics Primary  Activity: up with assist  Weight bearing status: WBAT with KI  Antibiotics: Ancef  Diet: regular  DVT prophylaxis: holding Xeralto at this time  Labs: CBC this AM  Bracing/Splinting: KI when ambulating  Dressings: Keep clean, dry and intact until clinic f/u  Elevation: Elevate RLE on pillows to keep at the level of the heart while in bed  Drains: SHAJI drain to remain in place until clinic f/u next week  Physical Therapy/Occupational Therapy: Eval and treat for mobilization and ambulation    Follow-up: Clinic with Dr. Betts in 1 week.    Disposition: Pending progress with therapies, pain control on orals, and medical stability, anticipate discharge home today or tomorrow    Rakesh Day MD  Orthopaedic Resident, PGY-4  Pager: [phone]    "

## 2018-09-19 NOTE — TELEPHONE ENCOUNTER
----- Message from Rakesh Day MD sent at 9/19/2018  7:25 AM CDT -----  Clinic with Clohisy in 1 week for drain removal.

## 2018-09-19 NOTE — PROGRESS NOTES
Care Coordinator Progress Note    Admission Date/Time:  9/17/2018  Attending MD:  Brad Betts MD    Data  Chart reviewed, discussed with interdisciplinary team.   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    Concerns with insurance coverage for discharge needs: None.  Current Living Situation: Patient lives with spouse.  Support System: Involved  Services Involved: Home Care  Transportation at Discharge: Family or friend will provide  Transportation to Medical Appointments:   - Name of caregiver: Lea, Wife  Barriers to Discharge: None    Coordination of Care and Referrals: Provided patient/family with options for Home Care. Home care resumed with [facility] for skilled nursing and physical therapy.  RNCC available as needed.    [facility]  Phone  [phone]  Fax  [phone]      Plan  Anticipated Discharge Date:  9/19/2018  Anticipated Discharge Plan: Home    Deirdre Antoine RN, BSN  Care Coordinator, 8A  Phone [phone]  Pager [phone]

## 2018-09-19 NOTE — PLAN OF CARE
Problem: Patient Care Overview  Goal: Plan of Care/Patient Progress Review    Per chart review and discussion with PT, pt is OBS status - moving well post op with no acute OT needs. Plan in place for discharge home today. OT eval deferred and orders completed; please reorder if situation changes.

## 2018-09-20 ENCOUNTER — PATIENT OUTREACH (OUTPATIENT)
Dept: CARE COORDINATION | Facility: CLINIC | Age: 58
End: 2018-09-20

## 2018-09-20 LAB — COPATH REPORT: NORMAL

## 2018-09-21 NOTE — PROGRESS NOTES
Left message for patient to call back with any questions or concerns in regards to recent discharge

## 2018-09-25 ENCOUNTER — OFFICE VISIT (OUTPATIENT)
Dept: ORTHOPEDICS | Facility: CLINIC | Age: 58
End: 2018-09-25
Payer: COMMERCIAL

## 2018-09-25 ENCOUNTER — CARE COORDINATION (OUTPATIENT)
Dept: ONCOLOGY | Facility: CLINIC | Age: 58
End: 2018-09-25

## 2018-09-25 DIAGNOSIS — C49.9 SARCOMA (H): Primary | ICD-10-CM

## 2018-09-25 DIAGNOSIS — C49.9 SARCOMA OF SOFT TISSUE (H): ICD-10-CM

## 2018-09-25 RX ORDER — AMOXICILLIN 250 MG
1 CAPSULE ORAL DAILY
Qty: 100 TABLET | Refills: 0 | Status: SHIPPED | OUTPATIENT
Start: 2018-09-25 | End: 2018-11-29

## 2018-09-25 RX ORDER — HYDROMORPHONE HYDROCHLORIDE 2 MG/1
2-4 TABLET ORAL
Qty: 240 TABLET | Refills: 0 | Status: SHIPPED | OUTPATIENT
Start: 2018-09-25 | End: 2018-10-30

## 2018-09-25 RX ORDER — HYDROMORPHONE HYDROCHLORIDE 2 MG/1
2-4 TABLET ORAL 3 TIMES DAILY
Qty: 50 TABLET | Refills: 0 | Status: SHIPPED | OUTPATIENT
Start: 2018-09-25 | End: 2018-10-02

## 2018-09-25 NOTE — PROGRESS NOTES
Diagnosis: 1. High-grade soft tissue sarcoma right thigh  2. Delayed post op wound infection  3. Pulmonary embolis      Treatment: 1. Neoadjuvant chemotherapy.  We will begin the March 26, 2018.  Pre op radiation. Surgical resection with negative margin and >95% necrosis, 9/17/2018.  2. I and D, antibiotics  3. Anticoagulation    Mr. Tapia is seen today in follow-up with his wife.  He reports his pain has been manageable although he is taking approximately 2 mg Dilaudid every 3 hours.  He is requesting more Dilaudid.    He describes 150 cc of SHAJI output per day.  This is as recently as yesterday.    On physical exam his dressing was removed his wound looks excellent.  His drain site is intact.  He does have some medial quad, sartorius functioning which does allow active knee extension.    Histopathology was reviewed.  These show greater than 95% necrosis and tumor free margins.    Impression: Overall doing well.  We will keep drain in place for an additional week.    Plan: 1.  We will provide him with the final Dilaudid prescription for 50 tablets which allows for 1-2 tablets every 8 hours for 7 days.  2.  We will see him back in 1 week's time most likely to remove his drain.  In the interim his drain sponge should not be changed his dressing should remain sterile.

## 2018-09-25 NOTE — LETTER
9/25/2018       RE: Hiram Tapia  [address]  [place] [address]     Dear Colleague,    Thank you for referring your patient, Hiram Tapia, to the [facility]. Please see a copy of my visit note below.    Diagnosis: 1. High-grade soft tissue sarcoma right thigh  2. Delayed post op wound infection  3. Pulmonary embolis      Treatment: 1. Neoadjuvant chemotherapy.  We will begin the March 26, 2018.  Pre op radiation. Surgical resection with negative margin and >95% necrosis, 9/17/2018.  2. I and D, antibiotics  3. Anticoagulation    Mr. Tapia is seen today in follow-up with his wife.  He reports his pain has been manageable although he is taking approximately 2 mg Dilaudid every 3 hours.  He is requesting more Dilaudid.    He describes 150 cc of SHAJI output per day.  This is as recently as yesterday.    On physical exam his dressing was removed his wound looks excellent.  His drain site is intact.  He does have some medial quad, sartorius functioning which does allow active knee extension.    Histopathology was reviewed.  These show greater than 95% necrosis and tumor free margins.    Impression: Overall doing well.  We will keep drain in place for an additional week.    Plan: 1.  We will provide him with the final Dilaudid prescription for 50 tablets which allows for 1-2 tablets every 8 hours for 7 days.  2.  We will see him back in 1 week's time most likely to remove his drain.  In the interim his drain sponge should not be changed his dressing should remain sterile.  Brad Betts MD

## 2018-09-25 NOTE — NURSING NOTE
"Chief Complaint   Patient presents with     Surgical Followup     Removal Right Thigh Tumor DOS: 9/17/2018     RECHECK     Pt. states that he is still getting output of 100-120cc a day.        58 year old  1960        [address]    Allergies   Allergen Reactions     Hydrofera Blue 4\"X4\" [Wound Dressings] Dermatitis and Blisters     Patient reports that Dressing causes skin irritation, blisters, and drainage.      Tegaderm Ag Mesh [Silver] Dermatitis and Blisters     Patient reports that Dressing causes Skin irritation, blisters, and drainage.     Current Outpatient Prescriptions   Medication     acetaminophen (TYLENOL) 325 MG tablet     HYDROmorphone (DILAUDID) 2 MG tablet     hydrOXYzine (ATARAX) 25 MG tablet     lidocaine, viscous, (XYLOCAINE) 2 % solution     polyethylene glycol (MIRALAX/GLYCOLAX) Packet     rivaroxaban ANTICOAGULANT (XARELTO) 10 MG TABS tablet     rivaroxaban ANTICOAGULANT (XARELTO) 20 MG TABS tablet     senna-docusate (SENOKOT-S;PERICOLACE) 8.6-50 MG per tablet     No current facility-administered medications for this visit.      Facility-Administered Medications Ordered in Other Visits   Medication     0.9% sodium chloride BOLUS           "

## 2018-09-25 NOTE — MR AVS SNAPSHOT
After Visit Summary   2018    Hiram Tapia    MRN: [id number]           Patient Information     Date Of Birth          1960        Visit Information        Provider Department      2018 5:30 PM Brad Betts MD [facility]        Today's Diagnoses     Sarcoma (H)    -  1       Follow-ups after your visit        Your next 10 appointments already scheduled     Oct 02, 2018  2:45 PM CDT   (Arrive by 2:30 PM)   Return Visit with Brad Betts MD   [facility] ([facility])    [address]  4th [address]  [place] [facility] [address] [phone]            Oct 23, 2018 10:30 AM CDT   (Arrive by 10:15 AM)   Return Visit with Gaurang Johnson MD   [facility] ([facility])    [address] [place] [address]  [address]  [facility] [id number] [phone]              Who to contact     Please call your clinic at [phone] to:    Ask questions about your health    Make or cancel appointments    Discuss your medicines    Learn about your test results    Speak to your doctor            Additional Information About Your Visit        MyChart Information     [employer]hart is an electronic gateway that provides easy, online access to your medical records. With [facility], you can request a clinic appointment, read your test results, renew a prescription or communicate with your care team.     To sign up for Pix[employer] visit the website at [email or web address]   You will be asked to enter the access code listed below, as well as some personal information. Please follow the directions to create your username and password.     Your access code is: [email or web address]  Expires: 2018 10:00 PM     Your access code will  in 90 days. If you need help or a new code, please contact your [facility] or call [phone] for assistance.        Care EveryWhere ID      This is your Care EveryWhere ID. This could be used by other organizations to access your [facility] medical records  [id number]         Blood Pressure from Last 3 Encounters:   09/19/18 117/70   09/11/18 132/83   07/17/18 101/71    Weight from Last 3 Encounters:   09/17/18 83.1 kg (183 lb 3.2 oz)   09/11/18 82.4 kg (181 lb 11.2 oz)   09/04/18 83.1 kg (183 lb 1.6 oz)              Today, you had the following     No orders found for display         Today's Medication Changes          These changes are accurate as of 9/25/18 11:59 PM.  If you have any questions, ask your nurse or doctor.               These medicines have changed or have updated prescriptions.        Dose/Directions    * HYDROmorphone 2 MG tablet   Commonly known as:  DILAUDID   This may have changed:  Another medication with the same name was added. Make sure you understand how and when to take each.   Used for:  Sarcoma of soft tissue (H)   Changed by:  Brad Betts MD        Dose:  2-4 mg   Take 1-2 tablets (2-4 mg) by mouth every 3 hours as needed for moderate to severe pain   Quantity:  240 tablet   Refills:  0       * HYDROmorphone 2 MG tablet   Commonly known as:  DILAUDID   This may have changed:  You were already taking a medication with the same name, and this prescription was added. Make sure you understand how and when to take each.   Used for:  Sarcoma (H)   Changed by:  Brad Betts MD        Dose:  2-4 mg   Take 1-2 tablets (2-4 mg) by mouth 3 times daily   Quantity:  50 tablet   Refills:  0       * Notice:  This list has 2 medication(s) that are the same as other medications prescribed for you. Read the directions carefully, and ask your doctor or other care provider to review them with you.         Where to get your medicines      Some of these will need a paper prescription and others can be bought over the counter.  Ask your nurse if you have questions.     Bring a paper prescription for each of these  medications     HYDROmorphone 2 MG tablet    HYDROmorphone 2 MG tablet    senna-docusate 8.6-50 MG per tablet               Information about OPIOIDS     PRESCRIPTION OPIOIDS: WHAT YOU NEED TO KNOW   We gave you an opioid (narcotic) pain medicine. It is important to manage your pain, but opioids are not always the best choice. You should first try all the other options your care team gave you. Take this medicine for as short a time (and as few doses) as possible.    Some activities can increase your pain, such as bandage changes or therapy sessions. It may help to take your pain medicine 30 to 60 minutes before these activities. Reduce your stress by getting enough sleep, working on hobbies you enjoy and practicing relaxation or meditation. Talk to your care team about ways to manage your pain beyond prescription opioids.    These medicines have risks:    DO NOT drive when on new or higher doses of pain medicine. These medicines can affect your alertness and reaction times, and you could be arrested for driving under the influence (DUI). If you need to use opioids long-term, talk to your care team about driving.    DO NOT operate heavy machinery    DO NOT do any other dangerous activities while taking these medicines.    DO NOT drink any alcohol while taking these medicines.     If the opioid prescribed includes acetaminophen, DO NOT take with any other medicines that contain acetaminophen. Read all labels carefully. Look for the word  acetaminophen  or  Tylenol.  Ask your pharmacist if you have questions or are unsure.    You can get addicted to pain medicines, especially if you have a history of addiction (chemical, alcohol or substance dependence). Talk to your care team about ways to reduce this risk.    All opioids tend to cause constipation. Drink plenty of water and eat foods that have a lot of fiber, such as fruits, vegetables, prune juice, apple juice and high-fiber cereal. Take a laxative (Miralax, milk of  magnesia, Colace, Senna) if you don t move your bowels at least every other day. Other side effects include upset stomach, sleepiness, dizziness, throwing up, tolerance (needing more of the medicine to have the same effect), physical dependence and slowed breathing.    Store your pills in a secure place, locked if possible. We will not replace any lost or stolen medicine. If you don t finish your medicine, please throw away (dispose) as directed by your pharmacist. The [place] Pollution Control Agency has more information about safe disposal: [email or web address]/living-[email or web address]/managing-[email or web address]-medications         Primary Care Provider Office Phone # Fax #    Louisa Fry [phone]       [facility] [address]  [facility] [address]        Equal Access to Services     DORIAN TOLENTINO : Juarez hamilton Sonikita, waaxda luqadaha, qaybta kaalmada cydney, mendez epps . So [facility] [phone].    ATENCIÓN: Si habla español, tiene a sheridan disposición servicios gratuitos de asistencia lingüística. Markos al [phone].    We comply with applicable federal civil rights laws and [place] laws. We do not discriminate on the basis of race, color, national origin, age, disability, sex, sexual orientation, or gender identity.            Thank you!     Thank you for choosing [facility]  for your care. Our goal is always to provide you with excellent care. Hearing back from our patients is one way we can continue to improve our services. Please take a few minutes to complete the written survey that you may receive in the mail after your visit with us. Thank you!             Your Updated Medication List - Protect others around you: Learn how to safely use, store and throw away your medicines at www.disposemymeds.org.          This list is accurate as of 9/25/18 11:59 PM.  Always use your most recent med list.                   Brand Name Dispense  Instructions for use Diagnosis    acetaminophen 325 MG tablet    TYLENOL    30 tablet    Take 2 tablets (650 mg) by mouth every 4 hours as needed for other (mild pain)    Sarcoma of soft tissue (H)       * HYDROmorphone 2 MG tablet    DILAUDID    240 tablet    Take 1-2 tablets (2-4 mg) by mouth every 3 hours as needed for moderate to severe pain    Sarcoma of soft tissue (H)       * HYDROmorphone 2 MG tablet    DILAUDID    50 tablet    Take 1-2 tablets (2-4 mg) by mouth 3 times daily    Sarcoma (H)       hydrOXYzine 25 MG tablet    ATARAX    60 tablet    Take 1 tablet (25 mg) by mouth every 6 hours as needed for other (adjuvant pain)    Sarcoma of soft tissue (H)       lidocaine (viscous) 2 % solution    XYLOCAINE          polyethylene glycol Packet    MIRALAX/GLYCOLAX    7 packet    Take 17 g by mouth daily    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)       * rivaroxaban ANTICOAGULANT 20 MG Tabs tablet    XARELTO    30 tablet    Take 0.5 tablets (10 mg) by mouth daily (with dinner)    Other pulmonary embolism without acute cor pulmonale, unspecified chronicity (H), 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 Betts    Sarcoma of soft tissue (H)       senna-docusate 8.6-50 MG per tablet    SENOKOT-S;PERICOLACE    100 tablet    Take 1 tablet by mouth daily    Sarcoma (H)       * Notice:  This list has 4 medication(s) that are the same as other medications prescribed for you. Read the directions carefully, and ask your doctor or other care provider to review them with you.

## 2018-09-25 NOTE — PROGRESS NOTES
Spoke with Lea who stated that  David is out of pain medication-dilaudid.  They would like to  the prescription today since they see  at 5:30.  She states that David has been taking it consistently every 3 hours round the clock.  He was discharged on 9/19/18 and was given a supply of 50 dilaudid tablets and he is out.  The oxycodone doesn't even work for him anymore.  She is also requesting a refill on the senna.    Spoke with  who would prefer for  to address since this is post-operative pain.  Notified Elise Clarke RNCC to update her and they will take care of it at the visit.  Let Lea know of the above information and let her know that the senna will be available for  when they get here.  All of her questions were answered and she verbalized understanding of the plan of care.

## 2018-10-02 ENCOUNTER — OFFICE VISIT (OUTPATIENT)
Dept: ORTHOPEDICS | Facility: CLINIC | Age: 58
End: 2018-10-02
Payer: COMMERCIAL

## 2018-10-02 DIAGNOSIS — C49.9 SARCOMA (H): ICD-10-CM

## 2018-10-02 DIAGNOSIS — C49.21 SARCOMA OF RIGHT THIGH (H): Primary | ICD-10-CM

## 2018-10-02 RX ORDER — HYDROMORPHONE HYDROCHLORIDE 2 MG/1
2-4 TABLET ORAL 3 TIMES DAILY
Qty: 50 TABLET | Refills: 0 | Status: SHIPPED | OUTPATIENT
Start: 2018-10-02 | End: 2018-10-23

## 2018-10-02 NOTE — NURSING NOTE
"Chief Complaint   Patient presents with     Surgical Followup     Poss. Drain Removal S/p Removal Right Thigh Tumor DOS: 9/17/18       58 year old  1960          [address]    Allergies   Allergen Reactions     Hydrofera Blue 4\"X4\" [Wound Dressings] Dermatitis and Blisters     Patient reports that Dressing causes skin irritation, blisters, and drainage.      Tegaderm Ag Mesh [Silver] Dermatitis and Blisters     Patient reports that Dressing causes Skin irritation, blisters, and drainage.     Current Outpatient Prescriptions   Medication     HYDROmorphone (DILAUDID) 2 MG tablet     hydrOXYzine (ATARAX) 25 MG tablet     lidocaine, viscous, (XYLOCAINE) 2 % solution     polyethylene glycol (MIRALAX/GLYCOLAX) Packet     rivaroxaban ANTICOAGULANT (XARELTO) 10 MG TABS tablet     HYDROmorphone (DILAUDID) 2 MG tablet     rivaroxaban ANTICOAGULANT (XARELTO) 20 MG TABS tablet     senna-docusate (SENOKOT-S;PERICOLACE) 8.6-50 MG per tablet     No current facility-administered medications for this visit.      Facility-Administered Medications Ordered in Other Visits   Medication     0.9% sodium chloride BOLUS                     "

## 2018-10-02 NOTE — PROGRESS NOTES
Diagnosis: 1. High-grade soft tissue sarcoma right thigh  2. Delayed post op wound infection  3. Pulmonary embolis      Treatment: 1. Neoadjuvant chemotherapy.  We will begin the March 26, 2018.  Pre op radiation. Surgical resection with negative margin and >95% necrosis, 9/17/2018.  2. I and D, antibiotics  3. Anticoagulation    I saw Mr. Tapia with Dr. Watson.  I agree with his assessment and plan.  In summary he is doing very well.  We will see him back in a month to assess his progress with knee range of motion.

## 2018-10-02 NOTE — MR AVS SNAPSHOT
After Visit Summary   10/2/2018    Hiram Tapia    MRN: [id number]           Patient Information     Date Of Birth          1960        Visit Information        Provider Department      10/2/2018 2:45 PM Brad Betts MD [facility]        Today's Diagnoses     Sarcoma of right thigh (H)    -  1    Sarcoma (H)           Follow-ups after your visit        Your next 10 appointments already scheduled     Oct 23, 2018 10:30 AM CDT   (Arrive by 10:15 AM)   Return Visit with Gaurang Johnson MD   [facility] ([facility])    [address]  [address]  [facility] [place] [address] [phone]              Who to contact     Please call your clinic at [phone] to:    Ask questions about your health    Make or cancel appointments    Discuss your medicines    Learn about your test results    Speak to your doctor            Additional Information About Your Visit        Care EveryWhere ID     This is your Care EveryWhere ID. This could be used by other organizations to access your [place] medical records  [id number]         Blood Pressure from Last 3 Encounters:   09/19/18 117/70   09/11/18 132/83   07/17/18 101/71    Weight from Last 3 Encounters:   09/17/18 83.1 kg (183 lb 3.2 oz)   09/11/18 82.4 kg (181 lb 11.2 oz)   09/04/18 83.1 kg (183 lb 1.6 oz)              Today, you had the following     No orders found for display         Today's Medication Changes          These changes are accurate as of 10/2/18  3:39 PM.  If you have any questions, ask your nurse or doctor.               These medicines have changed or have updated prescriptions.        Dose/Directions    * rivaroxaban ANTICOAGULANT 20 MG Tabs tablet   Commonly known as:  XARELTO   This may have changed:  Another medication with the same name was added. Make sure you understand how and when to take each.   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 Make sure you understand how and when to take each.   Used for:  Sarcoma of soft tissue (H)        Dose:  10 mg   Take 1 tablet (10 mg) by mouth daily (with dinner) for 14 days Take until follow up with  Dr. Betts   Quantity:  14 tablet   Refills:  0       * rivaroxaban ANTICOAGULANT 20 MG Tabs tablet   Commonly known as:  XARELTO   This may have changed:  You were already taking a medication with the same name, and this prescription was added. Make sure you understand how and when to take each.   Used for:  Sarcoma of right thigh (H)        Dose:  20 mg   Take 1 tablet (20 mg) by mouth daily (with dinner) for 21 days   Quantity:  21 tablet   Refills:  0       * Notice:  This list has 3 medication(s) that are the same as other medications prescribed for you. Read the directions carefully, and ask your doctor or other care provider to review them with you.      Stop taking these medicines if you haven't already. Please contact your care team if you have questions.     acetaminophen 325 MG tablet   Commonly known as:  TYLENOL                Where to get your medicines      These medications were sent to [place] [address] [facility] [address] [place] [address] [address] [address]    Hours:  TRANSPLANT PHONE NUMBER [phone] Phone:  [phone]     rivaroxaban ANTICOAGULANT 20 MG Tabs tablet         Some of these will need a  paper prescription and others can be bought over the counter.  Ask your nurse if you have questions.     Bring a paper prescription for each of these medications     HYDROmorphone 2 MG tablet               Information about OPIOIDS     PRESCRIPTION OPIOIDS: WHAT YOU NEED TO KNOW   We gave you an opioid (narcotic) pain medicine. It is important to manage your pain, but opioids are not always the best choice. You should first try all the other options your care team gave you. Take this medicine for as short a time (and as few doses) as possible.    Some activities can increase your pain, such as bandage changes or therapy sessions. It may help to take your pain medicine 30 to 60 minutes before these activities. Reduce your stress by getting enough sleep, working on hobbies you enjoy and practicing relaxation or meditation. Talk to your care team about ways to manage your pain beyond prescription opioids.    These medicines have risks:    DO NOT drive when on new or higher doses of pain medicine. These medicines can affect your alertness and reaction times, and you could be arrested for driving under the influence (DUI). If you need to use opioids long-term, talk to your care team about driving.    DO NOT operate heavy machinery    DO NOT do any other dangerous activities while taking these medicines.    DO NOT drink any alcohol while taking these medicines.     If the opioid prescribed includes acetaminophen, DO NOT take with any other medicines that contain acetaminophen. Read all labels carefully. Look for the word  acetaminophen  or  Tylenol.  Ask your pharmacist if you have questions or are unsure.    You can get addicted to pain medicines, especially if you have a history of addiction (chemical, alcohol or substance dependence). Talk to your care team about ways to reduce this risk.    All opioids tend to cause constipation. Drink plenty of water and eat foods that have a lot of fiber, such as fruits, vegetables,  prune juice, apple juice and high-fiber cereal. Take a laxative (Miralax, milk of magnesia, Colace, Senna) if you don t move your bowels at least every other day. Other side effects include upset stomach, sleepiness, dizziness, throwing up, tolerance (needing more of the medicine to have the same effect), physical dependence and slowed breathing.    Store your pills in a secure place, locked if possible. We will not replace any lost or stolen medicine. If you don t finish your medicine, please throw away (dispose) as directed by your pharmacist. The [place] Pollution Control Agency has more information about safe disposal: https://www.pca.[facility].[place]/living-green/managing-unwanted-medications         Primary Care Provider Office Phone # Fax #    Louisa Fry [phone]       [facility] [address]  [facility] [address]        Equal Access to Services     DORIAN TOLENTINO : Hadii troy martinez hadasho Sokvngali, waaxda luqadaha, qaybta kaalmada adepippayada, mendez epps . So [facility] [phone].    ATENCIÓN: Si habla español, tiene a sheridan disposición servicios gratuitos de asistencia lingüística. Markos al [phone].    We comply with applicable federal civil rights laws and [place] laws. We do not discriminate on the basis of race, color, national origin, age, disability, sex, sexual orientation, or gender identity.            Thank you!     Thank you for choosing [facility]  for your care. Our goal is always to provide you with excellent care. Hearing back from our patients is one way we can continue to improve our services. Please take a few minutes to complete the written survey that you may receive in the mail after your visit with us. Thank you!             Your Updated Medication List - Protect others around you: Learn how to safely use, store and throw away your medicines at www.disposemymeds.org.          This list is accurate as of 10/2/18  3:39 PM.   Always use your most recent med list.                   Brand Name Dispense Instructions for use Diagnosis    * HYDROmorphone 2 MG tablet    DILAUDID    240 tablet    Take 1-2 tablets (2-4 mg) by mouth every 3 hours as needed for moderate to severe pain    Sarcoma of soft tissue (H)       * HYDROmorphone 2 MG tablet    DILAUDID    50 tablet    Take 1-2 tablets (2-4 mg) by mouth 3 times daily    Sarcoma (H)       hydrOXYzine 25 MG tablet    ATARAX    60 tablet    Take 1 tablet (25 mg) by mouth every 6 hours as needed for other (adjuvant pain)    Sarcoma of soft tissue (H)       lidocaine (viscous) 2 % solution    XYLOCAINE          polyethylene glycol Packet    MIRALAX/GLYCOLAX    7 packet    Take 17 g by mouth daily    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)       * rivaroxaban ANTICOAGULANT 20 MG Tabs tablet    XARELTO    30 tablet    Take 0.5 tablets (10 mg) by mouth daily (with dinner)    Other pulmonary embolism without acute cor pulmonale, unspecified chronicity (H), 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 Betts    Sarcoma of soft tissue (H)       * rivaroxaban ANTICOAGULANT 20 MG Tabs tablet    XARELTO    21 tablet    Take 1 tablet (20 mg) by mouth daily (with dinner) for 21 days    Sarcoma of right thigh (H)       senna-docusate 8.6-50 MG per tablet    SENOKOT-S;PERICOLACE    100 tablet    Take 1 tablet by mouth daily    Sarcoma (H)       * Notice:  This list has 5 medication(s) that are the same as other medications prescribed for you. Read the directions carefully, and ask your doctor or other care provider to review them with you.

## 2018-10-02 NOTE — PROGRESS NOTES
Diagnosis:   1. High-grade soft tissue sarcoma right thigh  2. Delayed post op wound infection  3. Pulmonary embolis      Treatment:   1. Neoadjuvant chemotherapy. Started March 26, 2018.  2. Pre op radiation.   3. Surgical resection with negative margin and >95% necrosis, 9/17/2018.  4. I and D, antibiotics  5. Anticoagulation    Mr. Tapia is seen today in follow-up with his wife.  Pain continue though improving slowly, he has been taking 6 dilaudid tablets a day. Drain output has slowed over the last week and been essentially 0 the last two days. He had some drainage on his dressing last week, but none in the last 4-5 days.     On physical exam his dressing was removed his wound looks excellent, it is fully healed over the distal 3/4, steris in place over proximal 1/4.  His drain site is intact.  He does have some medial quad, sartorius functioning which does allow active knee extension.    Impression: 59 yo male s/p high grade soft tissue sarcoma resection on 9/17/8    Plan:   1.  We will provide him with a final Dilaudid prescription for 50 tablets which allows for 1-2 tablets every 8 hours, he was instructed to start weaning off this medication.   2. Ok to begin working on ROM of the right knee with PT   3. Needs crutches or braced locked to minimal ROM when up   4. Rx for Xarelto at higher dose (20 mg), follow up with Dr. Johnson for further management.   5.  We will see him back in 4 week's time for a recheck    Maynor Watson MD   Orthopedic Surgery; PGY-4    This patient was seen, examined and counseled by Dr. Betts.

## 2018-10-23 ENCOUNTER — ONCOLOGY VISIT (OUTPATIENT)
Dept: ONCOLOGY | Facility: CLINIC | Age: 58
End: 2018-10-23
Attending: INTERNAL MEDICINE
Payer: COMMERCIAL

## 2018-10-23 VITALS
RESPIRATION RATE: 16 BRPM | SYSTOLIC BLOOD PRESSURE: 100 MMHG | HEIGHT: 70 IN | BODY MASS INDEX: 26.29 KG/M2 | TEMPERATURE: 98.1 F | HEART RATE: 70 BPM | DIASTOLIC BLOOD PRESSURE: 68 MMHG | OXYGEN SATURATION: 98 %

## 2018-10-23 DIAGNOSIS — R91.8 PULMONARY NODULES: ICD-10-CM

## 2018-10-23 DIAGNOSIS — Z87.891 PERSONAL HISTORY OF TOBACCO USE, PRESENTING HAZARDS TO HEALTH: ICD-10-CM

## 2018-10-23 DIAGNOSIS — M10.00 IDIOPATHIC GOUT, UNSPECIFIED CHRONICITY, UNSPECIFIED SITE: ICD-10-CM

## 2018-10-23 DIAGNOSIS — M79.604 PAIN OF RIGHT LOWER EXTREMITY: ICD-10-CM

## 2018-10-23 DIAGNOSIS — T81.328D: ICD-10-CM

## 2018-10-23 DIAGNOSIS — C49.9 SARCOMA (H): ICD-10-CM

## 2018-10-23 DIAGNOSIS — C49.21 SARCOMA OF RIGHT THIGH (H): ICD-10-CM

## 2018-10-23 DIAGNOSIS — Z86.711 HISTORY OF PULMONARY EMBOLISM: ICD-10-CM

## 2018-10-23 PROCEDURE — 99214 OFFICE O/P EST MOD 30 MIN: CPT | Mod: ZP | Performed by: INTERNAL MEDICINE

## 2018-10-23 PROCEDURE — G0463 HOSPITAL OUTPT CLINIC VISIT: HCPCS | Mod: ZF

## 2018-10-23 RX ORDER — HYDROMORPHONE HYDROCHLORIDE 2 MG/1
2-4 TABLET ORAL 3 TIMES DAILY
Qty: 50 TABLET | Refills: 0 | Status: SHIPPED | OUTPATIENT
Start: 2018-10-23 | End: 2018-11-29

## 2018-10-23 ASSESSMENT — PAIN SCALES - GENERAL: PAINLEVEL: MODERATE PAIN (4)

## 2018-10-23 NOTE — PROGRESS NOTES
10-23-18    I saw Hiram Tapia for f/u of a sarcoma of the right thigh.      Background  In brief he has had a lot of problems with his right leg for many years including sciatica for 20 years.  He developed more discomfort in the right thigh and in 2017 hit his lateral thigh against a truck tailgate causing a lot of pain.  Subsequently there was a fair amount of swelling and stiffness.  This eventually led to some imaging showing a cystic mass.  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  At the time of the biopsy more than a liter of fluid was removed and that markedly improved his symptoms of stiffness and pain.  -        Interval history      He began doxil/ifos 3-23-18.  He has had 4 cycles with suggestion of a response after 1 cycle.  A new PE asymptomatic was noted and he started rivaroxaban in 2018.  He is off this due to the colonoscopy.     He had preoperative XRT and resection on 18 <5% viable cells and negative margins with no LVI.    He had a colonoscopy yesterday due to activity seen on PET CTand a 6 and 10 mm polyp was removed from the hepatic flexure and a 15 mm polyp from the splenic flexure; path is pending.  He needs another in 3 years.     He is healing well though he is on dilaudid for pain at his leg at the op site; now 2 mg bid.  He uses it to control his pain so he can be more mobile and do some PT.  He sees Dr Betts next week on that.       Aside from this problem his 10 point ROS generally unremarkable.      -  Background PMH, FH, SH  Past history is not particular remarkable other than for tonsillectomy and surgery for lazy eye.  His left eye is the dominant eye.    The family history is not particularly remarkable but his father  of leukemia at age 42.    He denies any drug allergies.  He is  and has an adult somewhat autistic boy who lives with them.  He smoked a pack a day for about 10 years, stopping about , and does not abuse alcohol or other  "drugs.  He works as a  at [employer].  -         On exam he appeared comfortable with normal affect.    /68  Pulse 70  Temp 98.1  F (36.7  C)  Resp 16  Ht 1.778 m (5' 10\")  SpO2 98%  BMI 26.29 kg/m2  HEENT There is no icterus, Lazy eye (L dominant).   NECK:  CHEST:    CV:   ABD:    EXT:   MS: able to get on exam table without difficulty ,using crutches  LYMPH:   NEURO: Nabila mentation  SKIN:   PSYCH: mood fairly good  FOCUSED EXAM; wound looks good      -  No labs today.      -  No new imaging.      -      He had 4 cycles of chemotherapy and XRT before surgery on 9-17-18.    We discussed restarting anticoagulation for an additional 6 weeks; he will restart Saturday at 20.  We refilled this for another 21 days and then he will be off.  We will get a chest CT next week when he sees Dr Betts and then see him in Jan w restaging.  He will let us know the final polyp path.      All of his questions were addressed and he will call if he has others.  t>25 min all C&C     Gaurang Johnson M.D.  Professor  Hematology, Oncology and Transplantation  "

## 2018-10-23 NOTE — LETTER
10/23/2018       RE: Hiram Tapia  [address]  [place] [address]     Dear Colleague,    Thank you for referring your patient, Hiram Tapia, to the [facility]. Please see a copy of my visit note below.    10-23-18    I saw Hiram Tapia for f/u of a sarcoma of the right thigh.      Background  In brief he has had a lot of problems with his right leg for many years including sciatica for 20 years.  He developed more discomfort in the right thigh and in October 2017 hit his lateral thigh against a truck tailgate causing a lot of pain.  Subsequently there was a fair amount of swelling and stiffness.  This eventually led to some imaging showing a cystic mass.  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  At the time of the biopsy more than a liter of fluid was removed and that markedly improved his symptoms of stiffness and pain.  -        Interval history      He began doxil/ifos 3-23-18.  He has had 4 cycles with suggestion of a response after 1 cycle.  A new PE asymptomatic was noted and he started rivaroxaban in April 2018.  He is off this due to the colonoscopy.     He had preoperative XRT and resection on 9-17-18 <5% viable cells and negative margins with no LVI.    He had a colonoscopy yesterday due to activity seen on PET CTand a 6 and 10 mm polyp was removed from the hepatic flexure and a 15 mm polyp from the splenic flexure; path is pending.  He needs another in 3 years.     He is healing well though he is on dilaudid for pain at his leg at the op site; now 2 mg bid.  He uses it to control his pain so he can be more mobile and do some PT.  He sees Dr Betts next week on that.       Aside from this problem his 10 point ROS generally unremarkable.      -  Background PMH, FH, SH  Past history is not particular remarkable other than for tonsillectomy and surgery for lazy eye.  His left eye is the dominant eye.    The family history is not particularly remarkable but  "his father  of leukemia at age 42.    He denies any drug allergies.  He is  and has an adult somewhat autistic boy who lives with them.  He smoked a pack a day for about 10 years, stopping about , and does not abuse alcohol or other drugs.  He works as a  at [employer].  -         On exam he appeared comfortable with normal affect.    /68  Pulse 70  Temp 98.1  F (36.7  C)  Resp 16  Ht 1.778 m (5' 10\")  SpO2 98%  BMI 26.29 kg/m2  HEENT There is no icterus, Lazy eye (L dominant).   NECK:  CHEST:    CV:   ABD:    EXT:   MS: able to get on exam table without difficulty ,using crutches  LYMPH:   NEURO: Nabila mentation  SKIN:   PSYCH: mood fairly good  FOCUSED EXAM; wound looks good      -  No labs today.      -  No new imaging.      -      He had 4 cycles of chemotherapy and XRT before surgery on 18.    We discussed restarting anticoagulation for an additional 6 weeks; he will restart Saturday at 20.  We refilled this for another 21 days and then he will be off.  We will get a chest CT next week when he sees Dr Betts and then see him in [place] w restaging.  He will let us know the final polyp path.      All of his questions were addressed and he will call if he has others.  t>25 min all C&C     Gaurang Johnson M.D.  Professor  Hematology, Oncology and Transplantation      "

## 2018-10-23 NOTE — MR AVS SNAPSHOT
After Visit Summary   10/23/2018    Hiram Tapia    MRN: [id number]           Patient Information     Date Of Birth          1960        Visit Information        Provider Department      10/23/2018 10:30 AM Gaurang Johnson MD [facility]        Today's Diagnoses     Sarcoma of right thigh (H)        Sarcoma (H)        Pain of right lower extremity        Disruption of tissue around surgical drain, subsequent encounter        Personal history of tobacco use, presenting hazards to health        History of pulmonary embolism        Idiopathic gout, unspecified chronicity, unspecified site        Pulmonary nodules           Follow-ups after your visit        Your next 10 appointments already scheduled     Nov 27, 2018  8:20 AM CST   CT CHEST W/O CONTRAST with UCCT1   [facility] [place] [address] ([facility])    [address] [address] [phone]           How do I prepare for my exam? (Food and drink instructions) No Food and Drink Restrictions.  How do I prepare for my exam? (Other instructions) You do not need to do anything special to prepare for this exam. For a sinus scan: Use your nose spray (nasal decongestant spray) as directed.  What should I wear: Please wear loose clothing, such as a sweat suit or jogging clothes. Avoid snaps, zippers and other metal. We may ask you to undress and put on a hospital gown.  How long does the exam take: Most scans take less than 20 minutes.  What should I bring: Please bring any scans or X-rays taken at other hospitals, if similar tests were done. Also bring a list of your medicines, including vitamins, minerals and over-the-counter drugs. It is safest to leave personal items at home.  Do I need a : No  is needed.  What do I need to tell my doctor? Be sure to tell your doctor: * If you have any allergies. * If there s any chance you are pregnant. * If you  are breastfeeding.  What should I do after the exam: No restrictions, You may resume normal activities.  What is this test: A CT (computed tomography) scan is a series of pictures that allows us to look inside your body. The scanner creates images of the body in cross sections, much like slices of bread. This helps us see any problems more clearly.  Who should I call with questions: If you have any questions, please call the Imaging Department where you will have your exam. Directions, parking instructions, and other information is available on our website, [email or web address].            Nov 27, 2018 10:30 AM CST   (Arrive by 10:15 AM)   Return Visit with Gaurang Johnson MD   [facility] ([facility] [place])    [address] [facility]  [address]  [facility] [id number] [phone]            Jan 29, 2019  9:20 AM CST   CT CHEST W/O CONTRAST with UCCT2   [facility] ([facility])    [address] [facility] [address]  1st Floor  [facility] [id number] [phone]           How do I prepare for my exam? (Food and drink instructions) No Food and Drink Restrictions.  How do I prepare for my exam? (Other instructions) You do not need to do anything special to prepare for this exam. For a sinus scan: Use your nose spray (nasal decongestant spray) as directed.  What should I wear: Please wear loose clothing, such as a sweat suit or jogging clothes. Avoid snaps, zippers and other metal. We may ask you to undress and put on a hospital gown.  How long does the exam take: Most scans take less than 20 minutes.  What should I bring: Please bring any scans or X-rays taken at other hospitals, if similar tests were done. Also bring a list of your medicines, including vitamins, minerals and over-the-counter drugs. It is safest to leave personal items at home.  Do I need a : No  is needed.  What do I need to tell my doctor? Be sure to tell  your doctor: * If you have any allergies. * If there s any chance you are pregnant. * If you are breastfeeding.  What should I do after the exam: No restrictions, You may resume normal activities.  What is this test: A CT (computed tomography) scan is a series of pictures that allows us to look inside your body. The scanner creates images of the body in cross sections, much like slices of bread. This helps us see any problems more clearly.  Who should I call with questions: If you have any questions, please call the Imaging Department where you will have your exam. Directions, parking instructions, and other information is available on our website, [email or web address].            Jan 31, 2019 10:00 AM CST   (Arrive by 9:45 AM)   Return Visit with Gaurang Johnson MD   [facility] ([facility])    [address] [place] [address]  [address] [place] [address] [phone]              Future tests that were ordered for you today     Open Future Orders        Priority Expected Expires Ordered    CT Chest w/o Contrast Routine 11/26/2018 10/31/2019 10/31/2018            Who to contact     If you have questions or need follow up information about today's clinic visit or your schedule please contact [facility] directly at [phone].  Normal or non-critical lab and imaging results will be communicated to you by MyChart, letter or phone within 4 business days after the clinic has received the results. If you do not hear from us within 7 days, please contact the clinic through MyChart or phone. If you have a critical or abnormal lab result, we will notify you by phone as soon as possible.  Submit refill requests through [employer] or call your pharmacy and they will forward the refill request to us. Please allow 3 business days for your refill to be completed.          Additional Information About Your Visit        Care EveryWhere ID     This is your Care  "EveryWhere ID. This could be used by other organizations to access your [place] medical records  [id number]        Your Vitals Were     Pulse Temperature Respirations Height Pulse Oximetry BMI (Body Mass Index)    70 98.1  F (36.7  C) 16 1.778 m (5' 10\") 98% 26.29 kg/m2       Blood Pressure from Last 3 Encounters:   10/23/18 100/68   09/19/18 117/70   09/11/18 132/83    Weight from Last 3 Encounters:   09/17/18 83.1 kg (183 lb 3.2 oz)   09/11/18 82.4 kg (181 lb 11.2 oz)   09/04/18 83.1 kg (183 lb 1.6 oz)                 Where to get your medicines      These medications were sent to [id number] [place] - [address] AT [facility] [address] [place] [address]     Phone:  [phone]     rivaroxaban ANTICOAGULANT 20 MG Tabs tablet         Some of these will need a paper prescription and others can be bought over the counter.  Ask your nurse if you have questions.     Bring a paper prescription for each of these medications     HYDROmorphone 2 MG tablet         Information about OPIOIDS     PRESCRIPTION OPIOIDS: WHAT YOU NEED TO KNOW   We gave you an opioid (narcotic) pain medicine. It is important to manage your pain, but opioids are not always the best choice. You should first try all the other options your care team gave you. Take this medicine for as short a time (and as few doses) as possible.    Some activities can increase your pain, such as bandage changes or therapy sessions. It may help to take your pain medicine 30 to 60 minutes before these activities. Reduce your stress by getting enough sleep, working on hobbies you enjoy and practicing relaxation or meditation. Talk to your care team about ways to manage your pain beyond prescription opioids.    These medicines have risks:    DO NOT drive when on new or higher doses of pain medicine. These medicines can affect your alertness and reaction times, and you could be arrested for driving under the influence (DUI). " If you need to use opioids long-term, talk to your care team about driving.    DO NOT operate heavy machinery    DO NOT do any other dangerous activities while taking these medicines.    DO NOT drink any alcohol while taking these medicines.     If the opioid prescribed includes acetaminophen, DO NOT take with any other medicines that contain acetaminophen. Read all labels carefully. Look for the word  acetaminophen  or  Tylenol.  Ask your pharmacist if you have questions or are unsure.    You can get addicted to pain medicines, especially if you have a history of addiction (chemical, alcohol or substance dependence). Talk to your care team about ways to reduce this risk.    All opioids tend to cause constipation. Drink plenty of water and eat foods that have a lot of fiber, such as fruits, vegetables, prune juice, apple juice and high-fiber cereal. Take a laxative (Miralax, milk of magnesia, Colace, Senna) if you don t move your bowels at least every other day. Other side effects include upset stomach, sleepiness, dizziness, throwing up, tolerance (needing more of the medicine to have the same effect), physical dependence and slowed breathing.    Store your pills in a secure place, locked if possible. We will not replace any lost or stolen medicine. If you don t finish your medicine, please throw away (dispose) as directed by your pharmacist. The [place] Pollution Control Agency has more information about safe disposal: https://www.pca.[facility].mn.us/living-green/managing-unwanted-medications         Primary Care Provider Office Phone # Fax #    Louisa Fry [phone]       [facility] [address]  [facility] [address]        Equal Access to Services     YVONNE TOLENTINO : Hadii troy hutchisono Sonikita, waaxda luqadaha, qaybta kaalmada mendez lamas. So [facility] [phone].    ATENCIÓN: Si habla español, tiene a sheridan disposición servicios gratuitos de asistencia  lingüística. Markos al [phone].    We comply with applicable federal civil rights laws and [place] laws. We do not discriminate on the basis of race, color, national origin, age, disability, sex, sexual orientation, or gender identity.            Thank you!     Thank you for choosing [facility]  for your care. Our goal is always to provide you with excellent care. Hearing back from our patients is one way we can continue to improve our services. Please take a few minutes to complete the written survey that you may receive in the mail after your visit with us. Thank you!             Your Updated Medication List - Protect others around you: Learn how to safely use, store and throw away your medicines at www.disposemymeds.org.          This list is accurate as of 10/23/18 11:59 PM.  Always use your most recent med list.                   Brand Name Dispense Instructions for use Diagnosis    * HYDROmorphone 2 MG tablet    DILAUDID    240 tablet    Take 1-2 tablets (2-4 mg) by mouth every 3 hours as needed for moderate to severe pain    Sarcoma of soft tissue (H)       * HYDROmorphone 2 MG tablet    DILAUDID    50 tablet    Take 1-2 tablets (2-4 mg) by mouth 3 times daily    Sarcoma (H), Sarcoma of right thigh (H), Pain of right lower extremity, Disruption of tissue around surgical drain, subsequent encounter, Personal history of tobacco use, presenting hazards to health, History of pulmonary embolism, Idiopathic gout, unspecified chronicity, unspecified site, Pulmonary nodules       hydrOXYzine 25 MG tablet    ATARAX    60 tablet    Take 1 tablet (25 mg) by mouth every 6 hours as needed for other (adjuvant pain)    Sarcoma of soft tissue (H)       lidocaine (viscous) 2 % solution    XYLOCAINE          polyethylene glycol Packet    MIRALAX/GLYCOLAX    7 packet    Take 17 g by mouth daily    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)       * rivaroxaban ANTICOAGULANT 20 MG Tabs tablet    XARELTO     30 tablet    Take 0.5 tablets (10 mg) by mouth daily (with dinner)    Other pulmonary embolism without acute cor pulmonale, unspecified chronicity (H), 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       * rivaroxaban ANTICOAGULANT 20 MG Tabs tablet    XARELTO    21 tablet    Take 1 tablet (20 mg) by mouth daily (with dinner)    Sarcoma of right thigh (H), Sarcoma (H), Pain of right lower extremity, Disruption of tissue around surgical drain, subsequent encounter, Personal history of tobacco use, presenting hazards to health, History of pulmonary embolism, Idiopathic gout, unspecified chronicity, unspecified site, Pulmonary nodules       senna-docusate 8.6-50 MG per tablet    SENOKOT-S;PERICOLACE    100 tablet    Take 1 tablet by mouth daily    Sarcoma (H)       * Notice:  This list has 4 medication(s) that are the same as other medications prescribed for you. Read the directions carefully, and ask your doctor or other care provider to review them with you.

## 2018-10-23 NOTE — NURSING NOTE
"Oncology Rooming Note    October 23, 2018 10:38 AM   Hiram Tapia is a 58 year old male who presents for:    Chief Complaint   Patient presents with     Oncology Clinic Visit     Sarcoma     Initial Vitals: Resp 16  Ht 1.778 m (5' 10\")  BMI 26.29 kg/m2 Estimated body mass index is 26.29 kg/(m^2) as calculated from the following:    Height as of this encounter: 1.778 m (5' 10\").    Weight as of 9/17/18: 83.1 kg (183 lb 3.2 oz). Body surface area is 2.03 meters squared.  Moderate Pain (4) Comment: Right Thigh   No LMP for male patient.  Allergies reviewed: Yes  Medications reviewed: Yes    Medications: Medication refills not needed today.  Pharmacy name entered into [employer]: [facility] [place], [place] - [address] [place] [address] [phone]    Clinical concerns: No New Concerns    5 minutes for nursing intake (face to face time)     JOHNNIE Washington      "

## 2018-10-30 ENCOUNTER — RADIANT APPOINTMENT (OUTPATIENT)
Dept: CT IMAGING | Facility: CLINIC | Age: 58
End: 2018-10-30
Attending: INTERNAL MEDICINE
Payer: COMMERCIAL

## 2018-10-30 ENCOUNTER — OFFICE VISIT (OUTPATIENT)
Dept: ORTHOPEDICS | Facility: CLINIC | Age: 58
End: 2018-10-30
Payer: COMMERCIAL

## 2018-10-30 DIAGNOSIS — Z86.711 HISTORY OF PULMONARY EMBOLISM: ICD-10-CM

## 2018-10-30 DIAGNOSIS — Z87.891 PERSONAL HISTORY OF TOBACCO USE, PRESENTING HAZARDS TO HEALTH: ICD-10-CM

## 2018-10-30 DIAGNOSIS — C49.9 SARCOMA (H): ICD-10-CM

## 2018-10-30 DIAGNOSIS — T81.328D: ICD-10-CM

## 2018-10-30 DIAGNOSIS — C49.21 SARCOMA OF RIGHT THIGH (H): ICD-10-CM

## 2018-10-30 DIAGNOSIS — C49.9 SARCOMA OF SOFT TISSUE (H): Primary | ICD-10-CM

## 2018-10-30 DIAGNOSIS — M79.604 PAIN OF RIGHT LOWER EXTREMITY: ICD-10-CM

## 2018-10-30 DIAGNOSIS — R91.8 PULMONARY NODULES: ICD-10-CM

## 2018-10-30 DIAGNOSIS — M10.00 IDIOPATHIC GOUT, UNSPECIFIED CHRONICITY, UNSPECIFIED SITE: ICD-10-CM

## 2018-10-30 NOTE — MR AVS SNAPSHOT
After Visit Summary   10/30/2018    Hiram Tapia    MRN: [id number]           Patient Information     Date Of Birth          1960        Visit Information        Provider Department      10/30/2018 3:30 PM Brad Betts MD [facility]        Today's Diagnoses     Sarcoma of soft tissue (H)    -  1       Follow-ups after your visit        Your next 10 appointments already scheduled     Nov 27, 2018  8:20 AM CST   CT CHEST W/O CONTRAST with UCCT1   [facility] [place] [address] ([facility])    [address] [address] [phone]           How do I prepare for my exam? (Food and drink instructions) No Food and Drink Restrictions.  How do I prepare for my exam? (Other instructions) You do not need to do anything special to prepare for this exam. For a sinus scan: Use your nose spray (nasal decongestant spray) as directed.  What should I wear: Please wear loose clothing, such as a sweat suit or jogging clothes. Avoid snaps, zippers and other metal. We may ask you to undress and put on a hospital gown.  How long does the exam take: Most scans take less than 20 minutes.  What should I bring: Please bring any scans or X-rays taken at other hospitals, if similar tests were done. Also bring a list of your medicines, including vitamins, minerals and over-the-counter drugs. It is safest to leave personal items at home.  Do I need a : No  is needed.  What do I need to tell my doctor? Be sure to tell your doctor: * If you have any allergies. * If there s any chance you are pregnant. * If you are breastfeeding.  What should I do after the exam: No restrictions, You may resume normal activities.  What is this test: A CT (computed tomography) scan is a series of pictures that allows us to look inside your body. The scanner creates images of the body in cross sections, much like slices of bread. This helps us see any  problems more clearly.  Who should I call with questions: If you have any questions, please call the Imaging Department where you will have your exam. Directions, parking instructions, and other information is available on our website, [email or web address].            Nov 27, 2018 10:30 AM CST   (Arrive by 10:15 AM)   Return Visit with Gaurang Johnson MD   [facility] ([facility] [place])    [address] [facility] [address]  [address]  [facility] [id number] [phone]            Jan 29, 2019  9:20 AM CST   CT CHEST W/O CONTRAST with UCCT2   [facility] CT ([facility])    [address] [facility] [address]  1st Floor  [facility] [id number] [phone]           How do I prepare for my exam? (Food and drink instructions) No Food and Drink Restrictions.  How do I prepare for my exam? (Other instructions) You do not need to do anything special to prepare for this exam. For a sinus scan: Use your nose spray (nasal decongestant spray) as directed.  What should I wear: Please wear loose clothing, such as a sweat suit or jogging clothes. Avoid snaps, zippers and other metal. We may ask you to undress and put on a hospital gown.  How long does the exam take: Most scans take less than 20 minutes.  What should I bring: Please bring any scans or X-rays taken at other hospitals, if similar tests were done. Also bring a list of your medicines, including vitamins, minerals and over-the-counter drugs. It is safest to leave personal items at home.  Do I need a : No  is needed.  What do I need to tell my doctor? Be sure to tell your doctor: * If you have any allergies. * If there s any chance you are pregnant. * If you are breastfeeding.  What should I do after the exam: No restrictions, You may resume normal activities.  What is this test: A CT (computed tomography) scan is a series of pictures that allows us to look inside your body. The scanner  creates images of the body in cross sections, much like slices of bread. This helps us see any problems more clearly.  Who should I call with questions: If you have any questions, please call the Imaging Department where you will have your exam. Directions, parking instructions, and other information is available on our website, [email or web address].            Jan 31, 2019 10:00 AM CST   (Arrive by 9:45 AM)   Return Visit with Gaurang Johnson MD   [facility] ([facility] [place])    [address] [place] [address]  [address]  [facility] [address] [phone]              Who to contact     Please call your clinic at [phone] to:    Ask questions about your health    Make or cancel appointments    Discuss your medicines    Learn about your test results    Speak to your doctor            Additional Information About Your Visit        Care EveryWhere ID     This is your Care EveryWhere ID. This could be used by other organizations to access your [place] medical records  [id number]         Blood Pressure from Last 3 Encounters:   10/23/18 100/68   09/19/18 117/70   09/11/18 132/83    Weight from Last 3 Encounters:   09/17/18 83.1 kg (183 lb 3.2 oz)   09/11/18 82.4 kg (181 lb 11.2 oz)   09/04/18 83.1 kg (183 lb 1.6 oz)              Today, you had the following     No orders found for display         Today's Medication Changes          These changes are accurate as of 10/30/18 11:59 PM.  If you have any questions, ask your nurse or doctor.               These medicines have changed or have updated prescriptions.        Dose/Directions    HYDROmorphone 2 MG tablet   Commonly known as:  DILAUDID   This may have changed:  Another medication with the same name was removed. Continue taking this medication, and follow the directions you see here.   Used for:  Sarcoma (H), Sarcoma of right thigh (H), Pain of right lower extremity, Disruption of tissue around surgical drain,  subsequent encounter, Personal history of tobacco use, presenting hazards to health, History of pulmonary embolism, Idiopathic gout, unspecified chronicity, unspecified site, Pulmonary nodules   Changed by:  Brad Betts MD        Dose:  2-4 mg   Take 1-2 tablets (2-4 mg) by mouth 3 times daily   Quantity:  50 tablet   Refills:  0       rivaroxaban ANTICOAGULANT 20 MG Tabs tablet   Commonly known as:  XARELTO   This may have changed:  Another medication with the same name was removed. Continue taking this medication, and follow the directions you see here.   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 Please contact your care team if you have questions.     hydrOXYzine 25 MG tablet   Commonly known as:  ATARAX   Stopped by:  Brad Betts MD                    Primary Care Provider Office Phone # Fax #    Louisa ROSHAN Fry [phone]       [place] [address]  [facility] [address]        Equal Access to Services     [facility]: Hadii troy hutchisono Sonikita, waaxda luqadaha, qaybta kaalmada adelaurita, mendez epps . So [facility] [phone].    ATENCIÓN: Si habla español, tiene a sheridan disposición servicios gratuitos de asistencia lingüística. Llame al [phone].    We comply with applicable federal civil rights laws and [place] laws. We do not discriminate on the basis of race, color, national origin, age, disability, sex, sexual orientation, or gender identity.             Thank you!     Thank you for choosing [facility]  for your care. Our goal is always to provide you with excellent care. Hearing back from our patients is one way we can continue to improve our services. Please take a few minutes to complete the written survey that you may receive in the mail after your visit with us. Thank you!             Your Updated Medication List - Protect others around you: Learn how to safely use, store and throw away your medicines at www.disposemymeds.org.          This list is accurate as of 10/30/18 11:59 PM.  Always use your most recent med list.                   Brand Name Dispense Instructions for use Diagnosis    HYDROmorphone 2 MG tablet    DILAUDID    50 tablet    Take 1-2 tablets (2-4 mg) by mouth 3 times daily    Sarcoma (H), Sarcoma of right thigh (H), Pain of right lower extremity, Disruption of tissue around surgical drain, subsequent encounter, Personal history of tobacco use, presenting hazards to health, History of pulmonary embolism, Idiopathic gout, unspecified chronicity, unspecified site, Pulmonary nodules       lidocaine (viscous) 2 % solution    XYLOCAINE          polyethylene glycol Packet    MIRALAX/GLYCOLAX    7 packet    Take 17 g by mouth daily    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

## 2018-10-30 NOTE — PROGRESS NOTES
Diagnosis:   1. High-grade soft tissue sarcoma right thigh  2. Delayed post op wound infection  3. Pulmonary embolis      Treatment:   1. Neoadjuvant chemotherapy. Started March 26, 2018.  2. Pre op radiation.   3. Surgical resection with negative margin and >95% necrosis, 9/17/2018.  4. I and D, antibiotics  5. Anticoagulation    I saw David with Dr. Watson and they agree with his assessment and plan.  In summary he is doing as well as can be expected.  We will will await the final read by the radiologist today and his chest CT to my review there is some concern about progression of his in the lung.

## 2018-10-30 NOTE — PROGRESS NOTES
Diagnosis:   1. High-grade soft tissue sarcoma right thigh  2. Delayed post op wound infection  3. Pulmonary embolis      Treatment:   1. Neoadjuvant chemotherapy. Started March 26, 2018.  2. Pre op radiation.   3. Surgical resection with negative margin and >95% necrosis, 9/17/2018.  4. I and D, antibiotics  5. Anticoagulation    Mr. Tapia is seen today for a wound check in addition to a chest CT scan which was ordered by his oncologist.  Patient has continued to improve postoperatively.  He continues to have pain in the leg at rest and worse with activity such as weightbearing and range of motion.  He continues to take 1 Dilaudid tablet twice a day for pain.  He is not doing physical therapy formally however he is working on his exercises at home intermittently.  He reports his knee remains quite stiff however, it was quite stiff preoperatively given his long course of nonoperative treatment for his large sarcoma.    On physical exam the patient's wound on the lateral aspect of his right lower extremity is continuing to heal well.  There is some reactive erythema on the proximal end however this is minimal and there is no drainage.  Patient said knee range of motion is approximately 5-40 degrees today.    CT scan of the chest was obtained today which is concerning for progression of his pulmonary disease based on our read.  Radiology read pending.    Impression: 57 yo male s/p high grade soft tissue sarcoma resection on 9/17/18    Plan:   Patient is recovering nicely from his surgery.  His incision is continues to appear to do well.  We do have some concern regarding his chest CT scan and will follow up the results with him by phone once the final read is done.  We have recommended he continue with his home exercises to work on his knee range of motion.  We will be in touch with him regarding next follow-up pending results of his imaging studies.    Maynor Watson MD   Orthopedic Surgery; PGY-4    This patient  was seen, examined and counseled by Dr. Betts.

## 2018-10-30 NOTE — LETTER
10/30/2018       RE: Hiram Tapia  [address]  [place] [address]     Dear Colleague,    Thank you for referring your patient, Hiram Tapia, [facility]. Please see a copy of my visit note below.    Diagnosis:   1. High-grade soft tissue sarcoma right thigh  2. Delayed post op wound infection  3. Pulmonary embolis      Treatment:   1. Neoadjuvant chemotherapy. Started March 26, 2018.  2. Pre op radiation.   3. Surgical resection with negative margin and >95% necrosis, 9/17/2018.  4. I and D, antibiotics  5. Anticoagulation    Mr. Tapia is seen today for a wound check in addition to a chest CT scan which was ordered by his oncologist.  Patient has continued to improve postoperatively.  He continues to have pain in the leg at rest and worse with activity such as weightbearing and range of motion.  He continues to take 1 Dilaudid tablet twice a day for pain.  He is not doing physical therapy formally however he is working on his exercises at home intermittently.  He reports his knee remains quite stiff however, it was quite stiff preoperatively given his long course of nonoperative treatment for his large sarcoma.    On physical exam the patient's wound on the lateral aspect of his right lower extremity is continuing to heal well.  There is some reactive erythema on the proximal end however this is minimal and there is no drainage.  Patient said knee range of motion is approximately 5-40 degrees today.    CT scan of the chest was obtained today which is concerning for progression of his pulmonary disease based on our read.  Radiology read pending.    Impression: 57 yo male s/p high grade soft tissue sarcoma resection on 9/17/18    Plan:   Patient is recovering nicely from his surgery.  His incision is continues to appear to do well.  We do have some concern regarding his chest CT scan and will follow up the results with him by  phone once the final read is done.  We have recommended he continue with his home exercises to work on his knee range of motion.  We will be in touch with him regarding next follow-up pending results of his imaging studies.    Maynor Watson MD   Orthopedic Surgery; PGY-4    This patient was seen, examined and counseled by Dr. Betts.     Diagnosis:   1. High-grade soft tissue sarcoma right thigh  2. Delayed post op wound infection  3. Pulmonary embolis      Treatment:   1. Neoadjuvant chemotherapy. Started March 26, 2018.  2. Pre op radiation.   3. Surgical resection with negative margin and >95% necrosis, 9/17/2018.  4. I and D, antibiotics  5. Anticoagulation    I saw David with Dr. Watson and they agree with his assessment and plan.  In summary he is doing as well as can be expected.  We will will await the final read by the radiologist today and his chest CT to my review there is some concern about progression of his in the lung.    Again, thank you for allowing me to participate in the care of your patient.      Sincerely,    Brad Betts MD

## 2018-10-30 NOTE — NURSING NOTE
"Chief Complaint   Patient presents with     RECHECK     F/u S/p Removal Right Thigh Tumor DOS: 09/17/2018     Results     Same day Chest CT done by Oncology.        58 year old  1960           [place], [place] - [address] [place] [address] [phone]    Allergies   Allergen Reactions     Hydrofera Blue 4\"X4\" [Wound Dressings] Dermatitis and Blisters     Patient reports that Dressing causes skin irritation, blisters, and drainage.      Tegaderm Ag Mesh [Silver] Dermatitis and Blisters     Patient reports that Dressing causes Skin irritation, blisters, and drainage.     Current Outpatient Prescriptions   Medication     HYDROmorphone (DILAUDID) 2 MG tablet     lidocaine, viscous, (XYLOCAINE) 2 % solution     polyethylene glycol (MIRALAX/GLYCOLAX) Packet     rivaroxaban ANTICOAGULANT (XARELTO) 20 MG TABS tablet     senna-docusate (SENOKOT-S;PERICOLACE) 8.6-50 MG per tablet     [DISCONTINUED] rivaroxaban ANTICOAGULANT (XARELTO) 20 MG TABS tablet     No current facility-administered medications for this visit.                        "

## 2018-10-31 DIAGNOSIS — C49.9 SARCOMA OF SOFT TISSUE (H): Primary | ICD-10-CM

## 2018-11-27 ENCOUNTER — ONCOLOGY VISIT (OUTPATIENT)
Dept: ONCOLOGY | Facility: CLINIC | Age: 58
End: 2018-11-27
Attending: INTERNAL MEDICINE
Payer: COMMERCIAL

## 2018-11-27 ENCOUNTER — RADIANT APPOINTMENT (OUTPATIENT)
Dept: CT IMAGING | Facility: CLINIC | Age: 58
End: 2018-11-27
Attending: INTERNAL MEDICINE
Payer: COMMERCIAL

## 2018-11-27 VITALS
WEIGHT: 186 LBS | OXYGEN SATURATION: 100 % | TEMPERATURE: 97.6 F | HEART RATE: 74 BPM | DIASTOLIC BLOOD PRESSURE: 76 MMHG | RESPIRATION RATE: 16 BRPM | BODY MASS INDEX: 26.69 KG/M2 | SYSTOLIC BLOOD PRESSURE: 134 MMHG

## 2018-11-27 DIAGNOSIS — Z87.891 PERSONAL HISTORY OF TOBACCO USE, PRESENTING HAZARDS TO HEALTH: ICD-10-CM

## 2018-11-27 DIAGNOSIS — C49.9 SARCOMA OF SOFT TISSUE (H): ICD-10-CM

## 2018-11-27 DIAGNOSIS — R91.8 PULMONARY NODULES: Primary | ICD-10-CM

## 2018-11-27 DIAGNOSIS — C49.9 SARCOMA (H): ICD-10-CM

## 2018-11-27 DIAGNOSIS — C49.21 SOFT TISSUE SARCOMA OF RIGHT THIGH (H): ICD-10-CM

## 2018-11-27 DIAGNOSIS — M10.00 IDIOPATHIC GOUT, UNSPECIFIED CHRONICITY, UNSPECIFIED SITE: ICD-10-CM

## 2018-11-27 DIAGNOSIS — Z86.711 HISTORY OF PULMONARY EMBOLISM: ICD-10-CM

## 2018-11-27 DIAGNOSIS — M79.604 PAIN OF RIGHT LOWER EXTREMITY: ICD-10-CM

## 2018-11-27 PROCEDURE — G0463 HOSPITAL OUTPT CLINIC VISIT: HCPCS | Mod: ZF

## 2018-11-27 PROCEDURE — 99213 OFFICE O/P EST LOW 20 MIN: CPT | Mod: ZP | Performed by: INTERNAL MEDICINE

## 2018-11-27 ASSESSMENT — PAIN SCALES - GENERAL: PAINLEVEL: NO PAIN (0)

## 2018-11-27 NOTE — MR AVS SNAPSHOT
After Visit Summary   11/27/2018    Hiram Tapia    MRN: [id number]           Patient Information     Date Of Birth          1960        Visit Information        Provider Department      11/27/2018 10:30 AM Gaurang Johnson MD [facility]        Today's Diagnoses     Pulmonary nodules    -  1    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)        Pain of right lower extremity        Personal history of tobacco use, presenting hazards to health        Sarcoma (H)        History of pulmonary embolism        Idiopathic gout, unspecified chronicity, unspecified site           Follow-ups after your visit        Additional Services     THORACIC SURGERY REFERRAL       Do you want to biopsy one of the new nodules                  Your next 10 appointments already scheduled     Nov 29, 2018  9:30 AM CST   (Arrive by 9:15 AM)   New Patient Visit with Sarah JAIN MD   [facility] ([facility])    [address]  [address]  [facility] [address] [phone]            Jan 29, 2019  9:20 AM CST   CT CHEST W/O CONTRAST with UCCT2   [facility] [place] [facility] ([facility])    [address]  1st Floor  [facility] [id number] [phone]           How do I prepare for my exam? (Food and drink instructions) No Food and Drink Restrictions.  How do I prepare for my exam? (Other instructions) You do not need to do anything special to prepare for this exam. For a sinus scan: Use your nose spray (nasal decongestant spray) as directed.  What should I wear: Please wear loose clothing, such as a sweat suit or jogging clothes. Avoid snaps, zippers and other metal. We may ask you to undress and put on a hospital gown.  How long does the exam take: Most scans take less than 20 minutes.  What should I bring: Please bring any scans or X-rays taken at other hospitals, if similar tests were done. Also bring a  list of your medicines, including vitamins, minerals and over-the-counter drugs. It is safest to leave personal items at home.  Do I need a : No  is needed.  What do I need to tell my doctor? Be sure to tell your doctor: * If you have any allergies. * If there s any chance you are pregnant. * If you are breastfeeding.  What should I do after the exam: No restrictions, You may resume normal activities.  What is this test: A CT (computed tomography) scan is a series of pictures that allows us to look inside your body. The scanner creates images of the body in cross sections, much like slices of bread. This helps us see any problems more clearly.  Who should I call with questions: If you have any questions, please call the Imaging Department where you will have your exam. Directions, parking instructions, and other information is available on our website, [employer].            Jan 31, 2019 10:00 AM CST   (Arrive by 9:45 AM)   Return Visit with Gaurang Johnson MD   [facility] ([facility])    [address] [place] [address]  [address] [place] [address] [phone]              Who to contact     If you have questions or need follow up information about today's clinic visit or your schedule please contact [facility] directly at [phone].  Normal or non-critical lab and imaging results will be communicated to you by MyChart, letter or phone within 4 business days after the clinic has received the results. If you do not hear from us within 7 days, please contact the clinic through MyChart or phone. If you have a critical or abnormal lab result, we will notify you by phone as soon as possible.  Submit refill requests through [employer] or call your pharmacy and they will forward the refill request to us. Please allow 3 business days for your refill to be completed.          Additional Information About Your Visit        Care  EveryWhere ID     This is your Care EveryWhere ID. This could be used by other organizations to access your [place] medical records  [id number]        Your Vitals Were     Pulse Temperature Respirations Pulse Oximetry BMI (Body Mass Index)       74 97.6  F (36.4  C) (Oral) 16 100% 26.69 kg/m2        Blood Pressure from Last 3 Encounters:   11/27/18 134/76   10/23/18 100/68   09/19/18 117/70    Weight from Last 3 Encounters:   11/27/18 84.4 kg (186 lb)   09/17/18 83.1 kg (183 lb 3.2 oz)   09/11/18 82.4 kg (181 lb 11.2 oz)              We Performed the Following     THORACIC SURGERY REFERRAL        Primary Care Provider Office Phone # Fax #    Louisa Fry [phone]       [facility] [address]  [facility] [address]        Equal Access to Services     YVONNE TOLENTINO : Hadii troy ku hadasho Soomaali, waaxda luqadaha, qaybta kaalmada adeegyada, mendez saucedain gurwindern jaz epps . So [facility] [phone].    ATENCIÓN: Si habla español, tiene a sheridan disposición servicios gratuitos de asistencia lingüística. Markos al [phone].    We comply with applicable federal civil rights laws and [place] laws. We do not discriminate on the basis of race, color, national origin, age, disability, sex, sexual orientation, or gender identity.            Thank you!     Thank you for choosing [facility]  for your care. Our goal is always to provide you with excellent care. Hearing back from our patients is one way we can continue to improve our services. Please take a few minutes to complete the written survey that you may receive in the mail after your visit with us. Thank you!             Your Updated Medication List - Protect others around you: Learn how to safely use, store and throw away your medicines at www.disposemymeds.org.          This list is accurate as of 11/27/18 11:07 AM.  Always use your most recent med list.                   Brand Name Dispense Instructions for use  Diagnosis    HYDROmorphone 2 MG tablet    DILAUDID    50 tablet    Take 1-2 tablets (2-4 mg) by mouth 3 times daily    Sarcoma (H), Sarcoma of right thigh (H), Pain of right lower extremity, Disruption of tissue around surgical drain, subsequent encounter, Personal history of tobacco use, presenting hazards to health, History of pulmonary embolism, Idiopathic gout, unspecified chronicity, unspecified site, Pulmonary nodules       lidocaine VISCOUS 2 % solution    XYLOCAINE          polyethylene glycol Packet    MIRALAX/GLYCOLAX    7 packet    Take 17 g by mouth daily    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

## 2018-11-27 NOTE — LETTER
11/27/2018       RE: Hiram Tapia  [address]  [place] [address]     Dear Colleague,    Thank you for referring your patient, Hiram Tapia, to the [facility]. Please see a copy of my visit note below.      11-27-18    I saw Mr Tapia for f/u of a sarcoma w new lung nodules.  See my last note for further details.    He has 8 more days of anticoagulation left.    He is doing PT on his own and is back at work, walking w crutches.    I reviewed his new CT images that have not yet been formally read.  I think its not much different from last month, but its not improved.  We discussed we should consider a biopsy and will refer to thoracic to consider that.  In the interim I will see him in Jan as planned.  t>15 min all C&C.    Gaurang Johnson M.D.  Professor  Hematology, Oncology and Transplantation

## 2018-11-27 NOTE — PROGRESS NOTES
11-27-18    I saw Mr Tapia for f/u of a sarcoma w new lung nodules.  See my last note for further details.    He has 8 more days of anticoagulation left.    He is doing PT on his own and is back at work, walking w crutches.    I reviewed his new CT images that have not yet been formally read.  I think its not much different from last month, but its not improved.  We discussed we should consider a biopsy and will refer to thoracic to consider that.  In the interim I will see him in Jan as planned.  t>15 min all C&C.    Gaurang Johnson M.D.  Professor  Hematology, Oncology and Transplantation

## 2018-11-27 NOTE — NURSING NOTE
"Oncology Rooming Note    November 27, 2018 10:27 AM   Hiram Tapia is a 58 year old male who presents for:    Chief Complaint   Patient presents with     Oncology Clinic Visit     Sarcoma , CT     Initial Vitals: /76  Pulse 74  Temp 97.6  F (36.4  C) (Oral)  Resp 16  Wt 84.4 kg (186 lb)  SpO2 100%  BMI 26.69 kg/m2 Estimated body mass index is 26.69 kg/(m^2) as calculated from the following:    Height as of 10/23/18: 1.778 m (5' 10\").    Weight as of this encounter: 84.4 kg (186 lb). Body surface area is 2.04 meters squared.  No Pain (0) Comment: Data Unavailable   No LMP for male patient.  Allergies reviewed: Yes  Medications reviewed: Yes    Medications: Medication refills not needed today.  Pharmacy name entered into [facility]: [facility] [place], [place] - [address] [facility] [phone]    Clinical concerns: CT results  Elizabeth  was notified.    8 minutes for nursing intake (face to face time)     Paty Snyder MA                "

## 2018-11-28 NOTE — PROGRESS NOTES
THORACIC SURGERY - NEW PATIENT OFFICE VISIT      Dear Louisa Caballero,    I saw Mr. Tapia at Dr. Johnson s request in consultation for the evaluation and treatment of bilateral pulmonary nodules in the setting of recently treated thigh sarcoma.     HPI  Mr. Hiram Tapia is a 58 year old year-old male with no significant past medical who  underwent neoadjuvant chemoradiation followed by surgery for pleomorphic sarcoma of his right thigh.His wound is continuing to heal and he is being seen in ortho clinic for a delayed postop wound infection and goes to ortho clinic for wound checks. On CT of the chest on 10/30, he had numerous new bilateral pulmonary nodules highly concerning for metastatic disease.He is referred here today for discuss obtaining  a biopsy of one of these lesions.    Previsit Tests   CT Chest 11/27: bilateral pulmonary nodules       PMH  Past Medical History:   Diagnosis Date     Pulmonary embolism (H)      Sarcoma (H)         ETOH denies  TOB: smoked a pack a day for 10 years    Physical examination  BMI 27      From a personal perspective, he is here alone today. He is a  by profession.      IMPRESSION (C49.9) Sarcoma (H)  (primary encounter diagnosis)      58 year old year-old male with with recent history of undifferentiated pleomorphic sarcoma of right thigh s/p neoadjuvant chemotherapy and radiation and resection of right thigh tumor with negative margins. Now with new bilateral pulmonary nodules.     PLAN  I spent a total of 30 minutes with Mr. Tapia , more than 50% of which were spent in counseling, coordination of care, and face-to-face time. I reviewed the plan as follows:  1) Procedure planned: RIGHT  VATS wedge resection, with possible  thoracotomy.  I reviewed the indications, risks, and benefits of the procedure with Mr. Hiram Tapia. We discussed the intraoperative risks of bleeding and injury to vital organs, potential postoperative complications including, but  not limited to, major respiratory events, arrhythmia, bleeding, infection, reoperation, and death. I explained the anticipated hospital course (+/- 0-2 days) and postoperative recovery including pain control, chest drain management, and variable degrees of dyspnea (or need for supplemental oxygen) and fatigue that tend to get better with time.. I reviewed the indications, risks, and benefits of the procedure with Mr. Tapia .     Necessary Preop Testing:   PAC  PFTs  Regional Anesthesia Plan: Exparel intra op  Anticoagulation Plan: Lovenox in holding and post op.  Consent: done in clinic    They had all their questions answered and were in agreement with the plan.  I appreciate the opportunity to participate in the care of your patient and will keep you updated.  Sincerely,

## 2018-11-29 ENCOUNTER — OFFICE VISIT (OUTPATIENT)
Dept: SURGERY | Facility: CLINIC | Age: 58
End: 2018-11-29
Attending: STUDENT IN AN ORGANIZED HEALTH CARE EDUCATION/TRAINING PROGRAM
Payer: COMMERCIAL

## 2018-11-29 ENCOUNTER — ANESTHESIA EVENT (OUTPATIENT)
Dept: SURGERY | Facility: CLINIC | Age: 58
DRG: 165 | End: 2018-11-29
Payer: COMMERCIAL

## 2018-11-29 ENCOUNTER — DOCUMENTATION ONLY (OUTPATIENT)
Dept: SURGERY | Facility: CLINIC | Age: 58
End: 2018-11-29

## 2018-11-29 ENCOUNTER — OFFICE VISIT (OUTPATIENT)
Dept: SURGERY | Facility: CLINIC | Age: 58
End: 2018-11-29
Payer: COMMERCIAL

## 2018-11-29 VITALS
WEIGHT: 189.1 LBS | HEIGHT: 70 IN | HEART RATE: 73 BPM | SYSTOLIC BLOOD PRESSURE: 142 MMHG | BODY MASS INDEX: 27.07 KG/M2 | DIASTOLIC BLOOD PRESSURE: 82 MMHG | OXYGEN SATURATION: 98 % | RESPIRATION RATE: 16 BRPM | TEMPERATURE: 97.9 F

## 2018-11-29 VITALS
BODY MASS INDEX: 27.07 KG/M2 | HEIGHT: 70 IN | TEMPERATURE: 97.9 F | DIASTOLIC BLOOD PRESSURE: 82 MMHG | OXYGEN SATURATION: 98 % | SYSTOLIC BLOOD PRESSURE: 142 MMHG | HEART RATE: 73 BPM | WEIGHT: 189.1 LBS | RESPIRATION RATE: 16 BRPM

## 2018-11-29 DIAGNOSIS — Z01.818 PRE-OPERATIVE GENERAL PHYSICAL EXAMINATION: ICD-10-CM

## 2018-11-29 DIAGNOSIS — C49.9 SARCOMA (H): Primary | ICD-10-CM

## 2018-11-29 DIAGNOSIS — R91.8 PULMONARY NODULES: ICD-10-CM

## 2018-11-29 DIAGNOSIS — T81.328D: ICD-10-CM

## 2018-11-29 DIAGNOSIS — Z87.891 PERSONAL HISTORY OF TOBACCO USE, PRESENTING HAZARDS TO HEALTH: ICD-10-CM

## 2018-11-29 DIAGNOSIS — R91.1 LUNG NODULE: ICD-10-CM

## 2018-11-29 DIAGNOSIS — M79.604 PAIN OF RIGHT LOWER EXTREMITY: ICD-10-CM

## 2018-11-29 DIAGNOSIS — C49.21 SOFT TISSUE SARCOMA OF RIGHT THIGH (H): ICD-10-CM

## 2018-11-29 LAB
ALBUMIN SERPL-MCNC: 3.9 G/DL (ref 3.4–5)
ALP SERPL-CCNC: 88 U/L (ref 40–150)
ALT SERPL W P-5'-P-CCNC: 62 U/L (ref 0–70)
ANION GAP SERPL CALCULATED.3IONS-SCNC: 6 MMOL/L (ref 3–14)
AST SERPL W P-5'-P-CCNC: 37 U/L (ref 0–45)
BASOPHILS # BLD AUTO: 0 10E9/L (ref 0–0.2)
BASOPHILS NFR BLD AUTO: 0.9 %
BILIRUB SERPL-MCNC: 0.4 MG/DL (ref 0.2–1.3)
BUN SERPL-MCNC: 17 MG/DL (ref 7–30)
CALCIUM SERPL-MCNC: 9.6 MG/DL (ref 8.5–10.1)
CHLORIDE SERPL-SCNC: 107 MMOL/L (ref 94–109)
CO2 SERPL-SCNC: 26 MMOL/L (ref 20–32)
CREAT SERPL-MCNC: 0.78 MG/DL (ref 0.66–1.25)
DIFFERENTIAL METHOD BLD: ABNORMAL
EOSINOPHIL # BLD AUTO: 0 10E9/L (ref 0–0.7)
EOSINOPHIL NFR BLD AUTO: 0.7 %
ERYTHROCYTE [DISTWIDTH] IN BLOOD BY AUTOMATED COUNT: 18.7 % (ref 10–15)
GFR SERPL CREATININE-BSD FRML MDRD: >90 ML/MIN/1.7M2
GLUCOSE SERPL-MCNC: 97 MG/DL (ref 70–99)
HCT VFR BLD AUTO: 40.1 % (ref 40–53)
HGB BLD-MCNC: 12.3 G/DL (ref 13.3–17.7)
IMM GRANULOCYTES # BLD: 0 10E9/L (ref 0–0.4)
IMM GRANULOCYTES NFR BLD: 0.2 %
LYMPHOCYTES # BLD AUTO: 1.3 10E9/L (ref 0.8–5.3)
LYMPHOCYTES NFR BLD AUTO: 31.5 %
MAGNESIUM SERPL-MCNC: 2.3 MG/DL (ref 1.6–2.3)
MCH RBC QN AUTO: 25.2 PG (ref 26.5–33)
MCHC RBC AUTO-ENTMCNC: 30.7 G/DL (ref 31.5–36.5)
MCV RBC AUTO: 82 FL (ref 78–100)
MONOCYTES # BLD AUTO: 0.4 10E9/L (ref 0–1.3)
MONOCYTES NFR BLD AUTO: 8.2 %
NEUTROPHILS # BLD AUTO: 2.5 10E9/L (ref 1.6–8.3)
NEUTROPHILS NFR BLD AUTO: 58.5 %
NRBC # BLD AUTO: 0 10*3/UL
NRBC BLD AUTO-RTO: 0 /100
PHOSPHATE SERPL-MCNC: 4 MG/DL (ref 2.5–4.5)
PLATELET # BLD AUTO: 264 10E9/L (ref 150–450)
POTASSIUM SERPL-SCNC: 4.3 MMOL/L (ref 3.4–5.3)
PROT SERPL-MCNC: 7.4 G/DL (ref 6.8–8.8)
RBC # BLD AUTO: 4.89 10E12/L (ref 4.4–5.9)
SODIUM SERPL-SCNC: 140 MMOL/L (ref 133–144)
WBC # BLD AUTO: 4.3 10E9/L (ref 4–11)

## 2018-11-29 PROCEDURE — 83735 ASSAY OF MAGNESIUM: CPT | Performed by: INTERNAL MEDICINE

## 2018-11-29 PROCEDURE — 80053 COMPREHEN METABOLIC PANEL: CPT | Performed by: INTERNAL MEDICINE

## 2018-11-29 PROCEDURE — G0463 HOSPITAL OUTPT CLINIC VISIT: HCPCS | Mod: ZF

## 2018-11-29 PROCEDURE — 85025 COMPLETE CBC W/AUTO DIFF WBC: CPT | Performed by: INTERNAL MEDICINE

## 2018-11-29 PROCEDURE — 84100 ASSAY OF PHOSPHORUS: CPT | Performed by: INTERNAL MEDICINE

## 2018-11-29 RX ORDER — CHLORHEXIDINE GLUCONATE ORAL RINSE 1.2 MG/ML
15 SOLUTION DENTAL ONCE
Status: CANCELLED | OUTPATIENT
Start: 2018-11-29 | End: 2018-11-29

## 2018-11-29 RX ORDER — CEFAZOLIN SODIUM 2 G/50ML
2 SOLUTION INTRAVENOUS
Status: CANCELLED | OUTPATIENT
Start: 2018-11-29

## 2018-11-29 RX ORDER — CEFAZOLIN SODIUM 1 G/50ML
1 INJECTION, SOLUTION INTRAVENOUS SEE ADMIN INSTRUCTIONS
Status: CANCELLED | OUTPATIENT
Start: 2018-11-29

## 2018-11-29 RX ORDER — CELECOXIB 200 MG/1
200 CAPSULE ORAL ONCE
Status: CANCELLED | OUTPATIENT
Start: 2018-12-05

## 2018-11-29 RX ORDER — ACETAMINOPHEN 325 MG/1
975 TABLET ORAL ONCE
Status: CANCELLED | OUTPATIENT
Start: 2018-12-05

## 2018-11-29 ASSESSMENT — LIFESTYLE VARIABLES: TOBACCO_USE: 1

## 2018-11-29 ASSESSMENT — PAIN SCALES - GENERAL: PAINLEVEL: NO PAIN (0)

## 2018-11-29 NOTE — LETTER
11/29/2018       RE: Hiram Tapia  [address]  [place] [address]     Dear Colleague,    Thank you for referring your patient, Hiram Tapia, to the [facility]. Please see a copy of my visit note below.    THORACIC SURGERY - NEW PATIENT OFFICE VISIT      Dear Louisa Caballero,    I saw Mr. Tapia at Dr. Johnson s request in consultation for the evaluation and treatment of bilateral pulmonary nodules in the setting of recently treated thigh sarcoma.     HPI  Mr. Hiram Tapia is a 58 year old year-old male with no significant past medical who  underwent neoadjuvant chemoradiation followed by surgery for pleomorphic sarcoma of his right thigh.His wound is continuing to heal and he is being seen in ortho clinic for a delayed postop wound infection and goes to ortho clinic for wound checks. On CT of the chest on 10/30, he had numerous new bilateral pulmonary nodules highly concerning for metastatic disease.He is referred here today for discuss obtaining  a biopsy of one of these lesions.    Previsit Tests   CT Chest 11/27: bilateral pulmonary nodules       PMH  Past Medical History:   Diagnosis Date     Pulmonary embolism (H)      Sarcoma (H)         ETOH denies  TOB: smoked a pack a day for 10 years    Physical examination  BMI 27      From a personal perspective, he is here alone today. He is a  by profession.      IMPRESSION (C49.9) Sarcoma (H)  (primary encounter diagnosis)      58 year old year-old male with with recent history of undifferentiated pleomorphic sarcoma of right thigh s/p neoadjuvant chemotherapy and radiation and resection of right thigh tumor with negative margins. Now with new bilateral pulmonary nodules.     PLAN  I spent a total of 30 minutes with Mr. Tapia , more than 50% of which were spent in counseling, coordination of care, and face-to-face time. I reviewed the plan as follows:  1) Procedure planned: RIGHT  VATS wedge resection,  with possible  thoracotomy.  I reviewed the indications, risks, and benefits of the procedure with Mr. Hiramclarice Tapia. We discussed the intraoperative risks of bleeding and injury to vital organs, potential postoperative complications including, but not limited to, major respiratory events, arrhythmia, bleeding, infection, reoperation, and death. I explained the anticipated hospital course (+/- 0-2 days) and postoperative recovery including pain control, chest drain management, and variable degrees of dyspnea (or need for supplemental oxygen) and fatigue that tend to get better with time.. I reviewed the indications, risks, and benefits of the procedure with Mr. Tapia .     Necessary Preop Testing:   PAC  PFTs  Regional Anesthesia Plan: Exparel intra op  Anticoagulation Plan: Lovenox in holding and post op.  Consent: done in clinic    They had all their questions answered and were in agreement with the plan.  I appreciate the opportunity to participate in the care of your patient and will keep you updated.  Sincerely,      Sarah Bowie MD

## 2018-11-29 NOTE — MR AVS SNAPSHOT
After Visit Summary   2018    Hiram Tapia    MRN: [id number]           Patient Information     Date Of Birth          1960        Visit Information        Provider Department      2018 11:30 AM Louisa Ruff PA M [facility]        Care Instructions    Preparing for Your Surgery      Name:  Hiram Tapia   MRN:  [id number]   :  1960   Today's Date:  2018     Arriving for surgery:  Surgery date:  To be called with information  Arrival time:  To be called with information  Please come to:    To be called with information       What can I eat or drink?  -  You may have solid food or milk products until 8 hours prior to your surgery.  -  You may have water, apple juice or 7up/Sprite until 2 hours prior to your surgery.    Which medicines can I take?    Stop Aspirin, vitamins and supplements one week prior to surgery.  Hold Ibuprofen and Naproxen for 24 hours prior to surgery.   -  Do NOT take these medications in the morning, the day of surgery:  Follow instructions for xarelto.    -  Please take these medications the day of surgery:  Tylenol if needed; take all scheduled medications normally taken in the morning    How do I prepare myself?  -  Take two showers: one the night before surgery; and one the morning of surgery.         Use Scrubcare or Hibiclens to wash from neck down.  You may use your own     shampoo and conditioner. No other hair products.   -  Do NOT use lotion, powder, deodorant, or antiperspirant the day of your surgery.  -  Do NOT wear any jewelry.    - Do not bring your own medications to the hospital, except for inhalers and eye   drops.  -  Bring your ID and insurance card.    Questions or Concerns:  -If you have questions or concerns regarding the day of surgery, please call [phone].     -If you are scheduled at the Ambulatory Surgery Center please call [phone].    -For questions after surgery  please call your surgeons office.                     Follow-ups after your visit        Your next 10 appointments already scheduled     Nov 29, 2018 11:30 AM CST   (Arrive by 11:15 AM)   PAC EVALUATION with SENAIT Weeks   [facility] Preoperative [facility] ([facility])    [address] [place] [address]  4th Floor  [facility] [address] [phone]            Jan 29, 2019  9:20 AM CST   CT CHEST W/O CONTRAST with UCCT2   [facility] Imaging [facility] CT ([facility])    [address]  1st Floor  [facility] [id number] [phone]           How do I prepare for my exam? (Food and drink instructions) No Food and Drink Restrictions.  How do I prepare for my exam? (Other instructions) You do not need to do anything special to prepare for this exam. For a sinus scan: Use your nose spray (nasal decongestant spray) as directed.  What should I wear: Please wear loose clothing, such as a sweat suit or jogging clothes. Avoid snaps, zippers and other metal. We may ask you to undress and put on a hospital gown.  How long does the exam take: Most scans take less than 20 minutes.  What should I bring: Please bring any scans or X-rays taken at other hospitals, if similar tests were done. Also bring a list of your medicines, including vitamins, minerals and over-the-counter drugs. It is safest to leave personal items at home.  Do I need a : No  is needed.  What do I need to tell my doctor? Be sure to tell your doctor: * If you have any allergies. * If there s any chance you are pregnant. * If you are breastfeeding.  What should I do after the exam: No restrictions, You may resume normal activities.  What is this test: A CT (computed tomography) scan is a series of pictures that allows us to look inside your body. The scanner creates images of the body in cross sections, much like slices of bread. This helps us see any problems more clearly.   Who should I call with questions: If you have any questions, please call the Imaging Department where you will have your exam. Directions, parking instructions, and other information is available on our website, [email or web address].            Jan 31, 2019 10:00 AM CST   (Arrive by 9:45 AM)   Return Visit with Gaurang Johnson MD   [facility] ([facility])    [address]  [address]  [facility] [address] [phone]              Future tests that were ordered for you today     Open Future Orders        Priority Expected Expires Ordered    Pulmonary Function Test Routine  11/29/2019 11/29/2018            Who to contact     Please call your clinic at [phone] to:    Ask questions about your health    Make or cancel appointments    Discuss your medicines    Learn about your test results    Speak to your doctor            Additional Information About Your Visit        Care EveryWhere ID     This is your Care EveryWhere ID. This could be used by other organizations to access your [facility] medical records  [id number]         Blood Pressure from Last 3 Encounters:   11/29/18 142/82   11/27/18 134/76   10/23/18 100/68    Weight from Last 3 Encounters:   11/29/18 85.8 kg (189 lb 1.6 oz)   11/27/18 84.4 kg (186 lb)   09/17/18 83.1 kg (183 lb 3.2 oz)              Today, you had the following     No orders found for display       Primary Care Provider Office Phone # Fax #    Louisa Fry [phone]       [facility] [address]  [facility] [address]        Equal Access to Services     DORIAN TOLENTINO AH: Hadii aad ku hadasho Soomaali, waaxda luqadaha, qaybta kaalmada adeegyada, mendez mcnair. So [facility] [phone].    ATENCIÓN: Si habla español, tiene a sheridan disposición servicios gratuitos de asistencia lingüística. Llame al [phone].    We comply with applicable federal civil rights laws and [place] laws.  We do not discriminate on the basis of race, color, national origin, age, disability, sex, sexual orientation, or gender identity.            Thank you!     Thank you for choosing [facility] PREOPERATIVE [facility]ESSMENT [facility]ENTER  for your care. Our goal is always to provide you with excellent care. Hearing back from our patients is one way we can continue to improve our services. Please take a few minutes to complete the written survey that you may receive in the mail after your visit with us. Thank you!             Your Updated Medication List - Protect others around you: Learn how to safely use, store and throw away your medicines at www.disposemymeds.org.          This list is accurate as of 11/29/18 11:23 AM.  Always use your most recent med list.                   Brand Name Dispense Instructions for use Diagnosis    HYDROmorphone 2 MG tablet    DILAUDID    50 tablet    Take 1-2 tablets (2-4 mg) by mouth 3 times daily    Sarcoma (H), Sarcoma of right thigh (H), Pain of right lower extremity, Disruption of tissue around surgical drain, subsequent encounter, Personal history of tobacco use, presenting hazards to health, History of pulmonary embolism, Idiopathic gout, unspecified chronicity, unspecified site, Pulmonary nodules       lidocaine VISCOUS 2 % solution    XYLOCAINE          polyethylene glycol packet    MIRALAX/GLYCOLAX    7 packet    Take 17 g by mouth daily    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

## 2018-11-29 NOTE — PROGRESS NOTES
Met with patient to schedule surgery with Dr. Bowie    Surgery was scheduled on 12/05.    Patient will have H&P at  11/29  Post-Op care appointment was scheduled on 01/10  Patient is aware a / is needed day of surgery.   Patient received surgery packet has my direct contact information for any further questions.

## 2018-11-29 NOTE — MR AVS SNAPSHOT
After Visit Summary   11/29/2018    Hiram Tapia    MRN: [id number]           Patient Information     Date Of Birth          1960        Visit Information        Provider Department      11/29/2018 9:30 AM Sarah Bowie MD [facility]        Today's Diagnoses     Sarcoma (H)    -  1    Pulmonary nodules           Follow-ups after your visit        Additional Services     PAC Visit Referral (For [facility] Only)       Does this visit require an Anesthesia consult?  ERAS    H&P done by:  Other (Specify): PAC      Please be aware that coverage of these services is subject to the terms and limitations of your health insurance plan.  Call member services at your health plan with any benefit or coverage questions.      Please bring the following to your appointment:  >>   Any x-rays, CTs or MRIs which have been performed.  Contact the facility where they were done to arrange for  prior to your scheduled appointment.  Any new CT, MRI or other procedures ordered by your specialist must be performed at a [place] facility or coordinated by your clinic's referral office.    >>   List of current medications  >>   This referral request   >>   Any documents/labs given to you for this referral                  Your next 10 appointments already scheduled     Nov 30, 2018  9:30 AM CST   FULL PULMONARY FUNCTION with  PFL B   [facility] Pulmonary Function Testing ([facility])    [address]  3rd Floor  [facility] [id number] [phone]            Dec 05, 2018   Procedure with Sarah JAIN MD   [facility] [place], Same Day Surgery (--)    [address] [address]   [phone]            David 10, 2019  9:00 AM CST   (Arrive by 8:45 AM)   Return Visit with Sarah JAIN MD   [facility] ([facility])    [address]  [address]  [facility] [id number] [phone]            Jan 29, 2019   9:20 AM CST   CT CHEST W/O CONTRAST with UCCT2   [facility] CT ([facility])    [address]  1st Floor  [facility] [address] [phone]           How do I prepare for my exam? (Food and drink instructions) No Food and Drink Restrictions.  How do I prepare for my exam? (Other instructions) You do not need to do anything special to prepare for this exam. For a sinus scan: Use your nose spray (nasal decongestant spray) as directed.  What should I wear: Please wear loose clothing, such as a sweat suit or jogging clothes. Avoid snaps, zippers and other metal. We may ask you to undress and put on a hospital gown.  How long does the exam take: Most scans take less than 20 minutes.  What should I bring: Please bring any scans or X-rays taken at other hospitals, if similar tests were done. Also bring a list of your medicines, including vitamins, minerals and over-the-counter drugs. It is safest to leave personal items at home.  Do I need a : No  is needed.  What do I need to tell my doctor? Be sure to tell your doctor: * If you have any allergies. * If there s any chance you are pregnant. * If you are breastfeeding.  What should I do after the exam: No restrictions, You may resume normal activities.  What is this test: A CT (computed tomography) scan is a series of pictures that allows us to look inside your body. The scanner creates images of the body in cross sections, much like slices of bread. This helps us see any problems more clearly.  Who should I call with questions: If you have any questions, please call the Imaging Department where you will have your exam. Directions, parking instructions, and other information is available on our website, [email or web address].            Jan 31, 2019 10:00 AM CST   (Arrive by 9:45 AM)   Return Visit with Gaurang Johnson MD   [facility] ([facility])    [address] [place] [address]  "Street   [address] [place] [id number] [phone]              Future tests that were ordered for you today     Open Future Orders        Priority Expected Expires Ordered    ABO/Rh type and screen Routine 11/29/2018 12/29/2018 11/29/2018    Basic metabolic panel Routine 11/29/2018 12/29/2018 11/29/2018    CBC with platelets Routine 11/29/2018 12/29/2018 11/29/2018    Pulmonary Function Test Routine  11/29/2019 11/29/2018            Who to contact     If you have questions or need follow up information about today's clinic visit or your schedule please contact [facility] directly at [phone].  Normal or non-critical lab and imaging results will be communicated to you by MyChart, letter or phone within 4 business days after the clinic has received the results. If you do not hear from us within 7 days, please contact the clinic through MyChart or phone. If you have a critical or abnormal lab result, we will notify you by phone as soon as possible.  Submit refill requests through [employer] or call your pharmacy and they will forward the refill request to us. Please allow 3 business days for your refill to be completed.          Additional Information About Your Visit        Care EveryWhere ID     This is your Care EveryWhere ID. This could be used by other organizations to access your [facility] medical records  [id number]        Your Vitals Were     Pulse Temperature Respirations Height Pulse Oximetry BMI (Body Mass Index)    73 97.9  F (36.6  C) (Oral) 16 1.778 m (5' 10\") 98% 27.13 kg/m2       Blood Pressure from Last 3 Encounters:   11/29/18 142/82   11/29/18 142/82   11/27/18 134/76    Weight from Last 3 Encounters:   11/29/18 85.8 kg (189 lb 1.6 oz)   11/29/18 85.8 kg (189 lb 1.6 oz)   11/27/18 84.4 kg (186 lb)              We Performed the Following     PAC Visit Referral (For [facility] Only)     Mela-Operative Worksheet (Thoracic)        Primary Care Provider Office Phone # Fax #    " Louisa Fry [phone]       [facility] [address]  [place] [address]        Equal Access to Services     DORIAN TOLENTINO : Hadricky troy martinez alfonso Sonikita, wacorettada luqadaha, qaybta kaalmada cydney, mednez no nanettepippa may laanilbecky tabby. So [facility] [phone].    ATENCIÓN: Si habla español, tiene a sheridan disposición servicios gratuitos de asistencia lingüística. Llame al [phone].    We comply with applicable federal civil rights laws and [place] laws. We do not discriminate on the basis of race, color, national origin, age, disability, sex, sexual orientation, or gender identity.            Thank you!     Thank you for choosing [facility]  for your care. Our goal is always to provide you with excellent care. Hearing back from our patients is one way we can continue to improve our services. Please take a few minutes to complete the written survey that you may receive in the mail after your visit with us. Thank you!             Your Updated Medication List - Protect others around you: Learn how to safely use, store and throw away your medicines at www.disposemymeds.org.          This list is accurate as of 11/29/18  2:55 PM.  Always use your most recent med list.                   Brand Name Dispense Instructions for use Diagnosis    menthol 8 MG Lozg    COUGH DROP     Take 1 lozenge by mouth every hour as needed for cough    Sarcoma (H), Lung nodule, Pre-operative general physical examination

## 2018-11-29 NOTE — ANESTHESIA PREPROCEDURE EVALUATION
Anesthesia Evaluation     . Pt has had prior anesthetic. Type: General    No history of anesthetic complications          ROS/MED HX    ENT/Pulmonary:     (+)tobacco use, Past use 10 pack years: quit > 30 yrs ago packs/day  , . provoked on xarelto since March 2018-due to stop today.   Other pulmonary disease: History of PE, on Rivaroxaban.  Asymptomatic at time of diagnosis.   Neurologic:  - neg neurologic ROS     Cardiovascular:  - neg cardiovascular ROS   (+) ----. Taking blood thinners Pt has received instructions: Instructions Given to patient: stop today. . . :. . Previous cardiac testing Echodate:3/23/18results:Procedure  Echocardiogram with two-dimensional, color and spectral Doppler performed.  Contrast Optison. Optison (NDC #5373-5015-76) given intravenously. Patient was  given 6 ml mixture of 3 ml Optison and 6 ml saline. 3 ml wasted.  _____________________________________________________________________________  __        Interpretation Summary  Global and regional left ventricular function is normal with an EF of 60-65%.  Left ventricular diastolic function is normal.  Global right ventricular function is normal.  The inferior vena cava was normal in size.  No significant valvular dysfunction noted.  No pericardial effusion is present.  _____________________________________________________________________________  __        Left Ventricle  Left ventricular wall thickness is normal. Left ventricular size is normal.  Global and regional left ventricular function is normal with an EF of 60-65%.  Biplane LVEF measured at 66%. Left ventricular diastolic function is normal.     Right Ventricle  Global right ventricular function is normal. The right ventricle is normal  size.     Atria  The right atria appears normal. The left atrium appears normal. The atrial  septum is intact as assessed by color Doppler .     Mitral Valve  Trace to mild mitral insufficiency is present.        Aortic Valve  Aortic valve is  normal in structure and function. The aortic valve is  tricuspid. No aortic regurgitation is present.     Tricuspid Valve  Trace tricuspid insufficiency is present. The peak velocity of the tricuspid  regurgitant jet is not obtainable. Pulmonary artery systolic pressure cannot  be assessed.     Pulmonic Valve  The pulmonic valve is normal. Trace pulmonic insufficiency is present.     Vessels  The aorta root is normal. The inferior vena cava was normal in size with  preserved respiratory variability. Estimated mean right atrial pressure is 3  mmHg.     Pericardium  No pericardial effusion is present.        Compared to Previous Study  Previous study not available for comparison.     Attestation  I have personally viewed the imaging and agree with the interpretation and  report as documented by the fellow, Wallace Lee, and/or edited by me.date: results:ECG reviewed date:3/28/18 results:Sinus rhythm  Cannot rule out Inferior infarct , age undetermined  Abnormal ECG  No previous ECGs available date: results:          METS/Exercise Tolerance:  1 - Eating, dressing   Hematologic:     (+) History of blood clots pt is not anticoagulated, Anemia, History of Transfusion no previous transfusion reaction Other Hematologic Disorder-leukopenia      Musculoskeletal:   (+) , , other musculoskeletal- 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      GI/Hepatic:  - neg GI/hepatic ROS       Renal/Genitourinary:  - ROS Renal section negative       Endo:  - neg endo ROS       Psychiatric:  - neg psychiatric ROS       Infectious Disease:  - neg infectious disease ROS       Malignancy:   (+) Malignancy History of Other  Other CA soft tissue sarcoma, post op wound infection status post Surgery         Other:    (+) No chance of pregnancy no H/O Chronic Pain,no other significant disability                    Physical Exam      Airway   Mallampati: II  TM distance: >3 FB  Neck ROM: full    Dental   (+) chipped  Comment: Right upper  tooth chipped and with caries    Cardiovascular   Rhythm and rate: regular and normal      Pulmonary    breath sounds clear to auscultation               PAC Discussion and Assessment    ASA Classification: 3  Case is suitable for:   Anesthetic techniques and relevant risks discussed: GA  Invasive monitoring and risk discussed:   Types:   Possibility and Risk of blood transfusion discussed:   NPO instructions given:   Additional anesthetic preparation and risks discussed:   Needs early admission to pre-op area:   Other:     PAC Resident/NP Anesthesia Assessment:  58 year old male for right VATS/wedge resection of pulmonary nodule on 12/5/18, with Dr. Sarah Bowie. He had a  9/17/2018 resection of a right leg sarcoma followed by chemoradiation. PAC referral  for risk assessment and optimization for anesthesia with comorbid conditions of previous sarcoma, pulmonary embolus with anticoagulation, tobacco history.  Pre-operative considerations include:  1.) CV: Functional status independent. Exercise tolerance was > 4 METS one year ago until he began with sx of his sarcoma. Cardiac risks: ex-smoker of 10 pack years. Quit > 30 years ago. No other identifiable risks. EKG and ECHO as above  RCRI = 1 reflecting 0.9 % risk of major adverse cardiac event.   No further cardiac evaluation indicated  2.) Pulmonary: PE in setting of sarcoma and leg immobility. On xarelto since March-OK'd by heme-onc and surgery to discontinue today. Smoking hx as above.   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 Chemo ended in July 2018; complicated by admission x 3.  Radiation ended in August 2018. Doing fairly well -loss of muscle mass -pt still with crutch use.  4.) Heme:  Anemia related to chemotherapy and requiring  blood transfusion in July 2018.   VTE risk elevated due to previous VTE and cancer diagnosis  CBC, BMP, type and screen    )Anesthesia: No previous problems. Pt discussed  with Dr. Ramachandran.  ERAS protocol will be followed.      Reviewed and Signed by PAC Mid-Level Provider/Resident  Mid-Level Provider/Resident: Louisa Coon PA-C   Date: 11/29/18  Time: 12:50PM    Attending Anesthesiologist Anesthesia Assessment:        Anesthesiologist:   Date:   Time:   Pass/Fail:   Disposition:     PAC Pharmacist Assessment:        Pharmacist:   Date:   Time:      Anesthesia Plan      History & Physical Review  History and physical reviewed and following examination; no interval change.    ASA Status:  3 .    NPO Status:  > 8 hours    Plan for General and ETT with Intravenous induction. Maintenance will be Balanced.    PONV prophylaxis:  Ondansetron (or other 5HT-3)  Additional equipment: 2nd IV and Double Lumen ETT      Postoperative Care  Postoperative pain management:  IV analgesics.      Consents  Anesthetic plan, risks, benefits and alternatives discussed with:  Patient..          Anesthesia attending addendum    Prior to anesthetic I have examined the patient, reviewed the medical history, and discussed the ssessment and plan with the anesthesia and surgery teams.  58 year old male who  has a past medical history of Pulmonary embolism (H) and Sarcoma (H). to OR for Procedure(s):  Right Video Assisted Thoracoscopic Wedge Resection  NPO status adequate.  No results found for: HCGQUANT  Hemoglobin   Date Value Ref Range Status   11/29/2018 12.3 (L) 13.3 - 17.7 g/dL Final     Potassium   Date Value Ref Range Status   11/29/2018 4.3 3.4 - 5.3 mmol/L Final           ECG results from 06/26/18  -EKG 12-lead, tracing only    Value   Interpretation ECG Click View Image link to view waveform and result     Recent Results (from the past 8760 hour(s))   ECHO COMPLETE WITH OPTISON    Narrative    [id number]  [id number]  [id number]  [id number]^MALA^ROMELIA^JAMES           [facility]  Diagnostic and Treamtent-3rd Floor  [address] [place]  [address]     Name: RTU CHAO  MRN: [id number]  : 1960  Study Date: 2018 01:02 PM  Age: 57 yrs  Gender: Male  [place] [facility] [place] Study: Pre-chemotherapy  History: Pre-chemotherapy  Ordering Physician: ROMELIA MEDEIROS  Referring Physician: ROMELIA MEDEIROS  Performed By: Margaxu Sullivan RDCS     BSA: 2.2 m2  Height: 70 in  Weight: 219 lb  BP: 140/90 mmHg  _____________________________________________________________________________  __        Procedure  Echocardiogram with two-dimensional, color and spectral Doppler performed.  Contrast Optison. Optison (NDC #9605-1543-78) given intravenously. Patient was  given 6 ml mixture of 3 ml Optison and 6 ml saline. 3 ml wasted.  _____________________________________________________________________________  __        Interpretation Summary  Global and regional left ventricular function is normal with an EF of 60-65%.  Left ventricular diastolic function is normal.  Global right ventricular function is normal.  The inferior vena cava was normal in size.  No significant valvular dysfunction noted.  No pericardial effusion is present.  _____________________________________________________________________________  __        Left Ventricle  Left ventricular wall thickness is normal. Left ventricular size is normal.  Global and regional left ventricular function is normal with an EF of 60-65%.  Biplane LVEF measured at 66%. Left ventricular diastolic function is normal.     Right Ventricle  Global right ventricular function is normal. The right ventricle is normal  size.     Atria  The right atria appears normal. The left atrium appears normal. The atrial  septum is intact as assessed by color Doppler .     Mitral Valve  Trace to mild mitral insufficiency is present.        Aortic Valve  Aortic valve is normal in structure and function. The aortic valve is  tricuspid. No aortic regurgitation is present.     Tricuspid Valve  Trace tricuspid  insufficiency is present. The peak velocity of the tricuspid  regurgitant jet is not obtainable. Pulmonary artery systolic pressure cannot  be assessed.     Pulmonic Valve  The pulmonic valve is normal. Trace pulmonic insufficiency is present.     Vessels  The aorta root is normal. The inferior vena cava was normal in size with  preserved respiratory variability. Estimated mean right atrial pressure is 3  mmHg.     Pericardium  No pericardial effusion is present.        Compared to Previous Study  Previous study not available for comparison.     Attestation  I have personally viewed the imaging and agree with the interpretation and  report as documented by the fellow, Wallace Lee, and/or edited by me.  _____________________________________________________________________________  __     MMode/2D Measurements & Calculations  IVSd: 0.91 cm  LVIDd: 4.8 cm  LVIDs: 2.0 cm  LVPWd: 0.99 cm  FS: 57.9 %  LV mass(C)d: 155.9 grams  LV mass(C)dI: 71.9 grams/m2  Ao root diam: 3.8 cm  LA dimension: 5.0 cm  asc Aorta Diam: 3.7 cm  LA/Ao: 1.3  LVOT diam: 2.5 cm  LVOT area: 5.0 cm2  RWT: 0.42        Doppler Measurements & Calculations  MV E max oliverio: 64.5 cm/sec  MV A max oliverio: 56.3 cm/sec  MV E/A: 1.1  MV dec time: 0.17 sec  Ao V2 max: 103.8 cm/sec  Ao max P.0 mmHg  Ao V2 mean: 86.1 cm/sec  Ao mean PG: 3.1 mmHg  Ao V2 VTI: 18.8 cm  SEBASTIÁN(I,D): 4.2 cm2  SEBASTIÁN(V,D): 4.7 cm2     LV V1 max PG: 3.9 mmHg  LV V1 max: 99.0 cm/sec  LV V1 VTI: 15.9 cm  SV(LVOT): 78.8 ml  SI(LVOT): 36.3 ml/m2  PA acc time: 0.12 sec  AV Oliverio Ratio (DI): 0.95  SEBASTIÁN Index (cm2/m2): 1.9  E/E' av.5  Lateral E/e': 6.7  Medial E/e': 10.2     _____________________________________________________________________________  __           Report approved by: Rich Stein 2018 03:46 PM            Plan for GA with [place], standard monitors, large bore IVs, PONV prophylaxis.  Antibiotics per primary service. Anesthetic risks discussed with the patient, consent  in chart.    Maryjane Yo MD  12/05/18                .

## 2018-11-29 NOTE — PATIENT INSTRUCTIONS
Preparing for Your Surgery      Name:  Hiram Tapia   MRN:  [id number]   :  1960   Today's Date:  2018     Arriving for surgery:  Surgery date:  To be called with information  Arrival time:  To be called with information  Please come to:    To be called with information       What can I eat or drink?  -  You may have solid food or milk products until 8 hours prior to your surgery.  -  You may have water, apple juice or 7up/Sprite until 2 hours prior to your surgery.    Which medicines can I take?    Stop Aspirin, vitamins and supplements one week prior to surgery.  Hold Ibuprofen and Naproxen for 24 hours prior to surgery.   -  Do NOT take these medications in the morning, the day of surgery:  Follow instructions for xarelto.    -  Please take these medications the day of surgery:  Tylenol if needed; take all scheduled medications normally taken in the morning    How do I prepare myself?  -  Take two showers: one the night before surgery; and one the morning of surgery.         Use Scrubcare or Hibiclens to wash from neck down.  You may use your own     shampoo and conditioner. No other hair products.   -  Do NOT use lotion, powder, deodorant, or antiperspirant the day of your surgery.  -  Do NOT wear any jewelry.    - Do not bring your own medications to the hospital, except for inhalers and eye   drops.  -  Bring your ID and insurance card.    Questions or Concerns:  -If you have questions or concerns regarding the day of surgery, please call [phone].     -If you are scheduled at the [facility] please call [phone].    -For questions after surgery please call your surgeons office.

## 2018-11-29 NOTE — H&P
Pre-Operative H & P     CC:  Preoperative exam to assess for increased cardiopulmonary risk while undergoing surgery and anesthesia.    Date of Encounter: 11/29/2018  Primary Care Physician:  Louisa Fry DEVIN Tapia is a 58 year old male who presents for pre-operative H & P in preparation for right VATS/wedge resection of pulmonary nodule on 12/5/18, with Dr. Sarah Bowie. Location and time TBD.  He had a  9/17/2018 resection of a right leg sarcoma followed by chemoradiation, complicated by admission for anemia requiring blood transfusion. He is recovering well, using a crutch due to amount of muscle loss from surgery.    History is obtained from the patient.     Past Medical History  Past Medical History:   Diagnosis Date     Pulmonary embolism (H)     Pulmonary nodule      Sarcoma (H)        Past Surgical History  Past Surgical History:   Procedure Laterality Date     EXCISE SOFT TISSUE TUMOR THIGH Right 3/8/2018    Procedure: EXCISE SOFT TISSUE TUMOR THIGH;  Biopsy Right Thigh Tumor;  Surgeon: Brad Betts MD;  Location: [facility]     EXCISE SOFT TISSUE TUMOR THIGH Right 9/17/2018    Procedure: EXCISE SOFT TISSUE TUMOR THIGH;  Removal Right Thigh Tumor ;  Surgeon: Brad Betts MD;  Location: [facility] OR     INSERT PORT VASCULAR ACCESS N/A 3/28/2018    Procedure: INSERT PORT VASCULAR ACCESS;  Vascular Access Port Insertion with C-arm;  Surgeon: Sarah Bowie MD;  Location: [facility] OR     IRRIGATION AND DEBRIDEMENT LOWER EXTREMITY, COMBINED Right 4/6/2018    Procedure: COMBINED IRRIGATION AND DEBRIDEMENT LOWER EXTREMITY;  Irrigation And Debridement Right Thigh ;  Surgeon: Brad Betts MD;  Location: [facility] OR     IRRIGATION AND DEBRIDEMENT LOWER EXTREMITY, COMBINED Right 4/9/2018    Procedure: COMBINED IRRIGATION AND DEBRIDEMENT LOWER EXTREMITY;  Irrigation And Debridement Right Thigh Wound. with Wound VAC Exchange;  Surgeon: Brad Betts MD;  Location: [facility] OR     STRABISMUS  "SURGERY      as a child       Hx of Blood transfusions/reactions: yes July 2018     Hx of abnormal bleeding or anti-platelet use: finished xarelto today    Menstrual history: No LMP for male patient.    Steroid use in the last year: no    Personal or FH with difficulty with Anesthesia:  no    Prior to Admission Medications  Current Outpatient Prescriptions   Medication Sig Dispense Refill     menthol (COUGH DROP) 8 MG LOZG Take 1 lozenge by mouth every hour as needed for cough         Allergies  Allergies   Allergen Reactions     Hydrofera Blue 4\"X4\" [Wound Dressings] Dermatitis and Blisters     Patient reports that Dressing causes skin irritation, blisters, and drainage.      Tegaderm Ag Mesh [Silver] Dermatitis and Blisters     Patient reports that Dressing causes Skin irritation, blisters, and drainage.       Social History  Social History     Social History     Marital status:      Social History Main Topics     Smoking status: Former Smoker     Packs/day: 1.00     Years: 10.00     Types: Cigarettes     Start date: 1/1/1975     Quit date: 1/1/1990     Smokeless tobacco: Never Used     Alcohol use 8.4 oz/week     14 Cans of beer per week     Drug use: No     Sexual activity: Not Currently     Partners: Female      Family History  Family History   Problem Relation Age of Onset     Leukemia Father            Anesthesia Evaluation     . Pt has had prior anesthetic. Type: General    No history of anesthetic complications          ROS/MED HX    ENT/Pulmonary:     (+)tobacco use, Past use 10 pack years: quit > 30 yrs ago   PE in setting opf cancer and immobilization; on xarelto since March 2018-due to stop today.    Asymptomatic at time of diagnosis.   Neurologic:  - neg neurologic ROS     Cardiovascular:  - neg cardiovascular ROS   (+) aking blood thinners Pt has received instructions: Instructions Given to patient: stop today  Previous cardiac testing Echodate:3/23/18:     Interpretation Summary  Global and " regional left ventricular function is normal with an EF of 60-65%.  Left ventricular diastolic function is normal.  Global right ventricular function is normal.  The inferior vena cava was normal in size.  No significant valvular dysfunction noted.  No pericardial effusion is present.       Left Ventricle  Left ventricular wall thickness is normal. Left ventricular size is normal.  Global and regional left ventricular function is normal with an EF of 60-65%.  Biplane LVEF measured at 66%. Left ventricular diastolic function is normal.     Global right ventricular function is normal. The right ventricle is normal  size.  The right atria appears normal. The left atrium appears normal. The atrial  septum is intact as assessed by color Doppler .  Trace to mild mitral insufficiency is present.  Aortic valve is normal in structure and function. The aortic valve is  tricuspid. No aortic regurgitation is present.  Trace tricuspid insufficiency is present. The peak velocity of the tricuspid  regurgitant jet is not obtainable. Pulmonary artery systolic pressure cannot  be assessed.   The pulmonic valve is normal. Trace pulmonic insufficiency is present.   The aorta root is normal. The inferior vena cava was normal in size with  preserved respiratory variability. Estimated mean right atrial pressure is 3  mmHg.     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   Temp: 97.9  F (36.6  C) Temp src: Oral BP: 142/82 Pulse: 73   Resp: 16 SpO2: 98 %         189 lbs 1.6 oz  5' 10\"   Body mass index is 27.13 kg/(m^2).       Physical Exam  Constitutional: Awake, alert, cooperative, no apparent distress, and appears stated age.  Eyes: Pupils equal, round and reactive to light, extra ocular muscles intact, sclera clear, conjunctiva normal.  HENT: Normocephalic, oral pharynx with moist mucus membranes, good dentition. No goiter appreciated.   Respiratory: Clear to auscultation bilaterally, no crackles or wheezing.  Cardiovascular: Regular rate and rhythm, normal S1 and S2, and no murmur noted.  Carotids +2, no bruits. RLE swelling lower leg when compared with  LLE. No pitting edema. Palpable pulses to radial arteries.   GI: Normal bowel sounds, soft, non-distended, non-tender, no masses palpated, no hepatosplenomegaly.    Lymph/Hematologic: No cervical lymphadenopathy and no supraclavicular lymphadenopathy.  Genitourinary:  deferred  Skin: Warm and dry.  No rashes at anticipated surgical site.   Musculoskeletal: Full ROM of neck. There is limp and weakness of RLE   Neurologic: Awake, alert, oriented to name, place and time. Cranial nerves II-XII are grossly intact. Gait is normal.   Neuropsychiatric: Calm, cooperative. Normal affect.     Labs: (personally reviewed)  Lab Results   Component Value Date    WBC 5.4 09/18/2018     Lab Results   Component Value Date    RBC 3.55 09/18/2018     Lab Results   Component Value Date    HGB 9.3 09/18/2018     Lab Results   Component Value Date    HCT " 30.3 09/18/2018     Lab Results   Component Value Date    MCV 85 09/18/2018     Lab Results   Component Value Date    MCH 26.2 09/18/2018     Lab Results   Component Value Date    MCHC 30.7 09/18/2018     Lab Results   Component Value Date    RDW 14.8 09/18/2018     Lab Results   Component Value Date     09/18/2018     Last Comprehensive Metabolic Panel:  Sodium   Date Value Ref Range Status   07/17/2018 140 133 - 144 mmol/L Final     Potassium   Date Value Ref Range Status   07/17/2018 4.0 3.4 - 5.3 mmol/L Final     Chloride   Date Value Ref Range Status   07/17/2018 107 94 - 109 mmol/L Final     Carbon Dioxide   Date Value Ref Range Status   07/17/2018 23 20 - 32 mmol/L Final     Anion Gap   Date Value Ref Range Status   07/17/2018 10 3 - 14 mmol/L Final     Glucose   Date Value Ref Range Status   09/17/2018 103 (H) 70 - 99 mg/dL Final     Urea Nitrogen   Date Value Ref Range Status   07/17/2018 8 7 - 30 mg/dL Final     Creatinine   Date Value Ref Range Status   07/17/2018 0.80 0.66 - 1.25 mg/dL Final     GFR Estimate   Date Value Ref Range Status   07/17/2018 >90 >60 mL/min/1.7m2 Final     Comment:     Non  GFR Calc     Calcium   Date Value Ref Range Status   07/17/2018 9.8 8.5 - 10.1 mg/dL Final       ASSESSMENT and PLAN  Hiram Tapia is a 58 year old male scheduled to undergo  right VATS/wedge resection of pulmonary nodule on 12/5/18, with Dr. Sarah Bowie. He had a  9/17/2018 resection of a right leg sarcoma followed by chemoradiation.     Pre-operative considerations include:  1.) CV: Functional status independent. Exercise tolerance was > 4 METS one year ago until he began with sx of his sarcoma. Cardiac risks: ex-smoker of 10 pack years. Quit > 30 years ago. No other identifiable risks. EKG and ECHO as above  RCRI = 1 reflecting 0.9 % risk of major adverse cardiac event.   No further cardiac evaluation indicated    2.) Pulmonary: PE in setting of sarcoma and leg immobility. On  xarelto since March-OK'd by heme-onc and surgery to discontinue today. Smoking hx as above.   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 Chemo ended in July 2018; complicated by admission x 3.  Radiation ended in August 2018. Doing fairly well -loss of muscle mass -pt still with crutch use.    4.) Heme:  Anemia related to chemotherapy and requiring  blood transfusion in July 2018.   VTE risk elevated due to previous VTE and cancer diagnosis  CBC, BMP, type and screen      ERAS protocol will be followed.    Patient was discussed with Dr Ramachandran.  Patient is optimized and is acceptable candidate for the proposed procedure.  No further diagnostic evaluation is needed.     SENAIT Weeks  Preoperative Assessment Center  [facility]  Cl[facility] and [facility] Center  Phone: [phone]  Fax: [phone]

## 2018-11-30 DIAGNOSIS — R91.1 LUNG NODULE: ICD-10-CM

## 2018-11-30 DIAGNOSIS — C49.9 SARCOMA (H): ICD-10-CM

## 2018-11-30 DIAGNOSIS — Z01.818 PRE-OPERATIVE GENERAL PHYSICAL EXAMINATION: ICD-10-CM

## 2018-11-30 DIAGNOSIS — R91.8 PULMONARY NODULES: ICD-10-CM

## 2018-12-05 ENCOUNTER — APPOINTMENT (OUTPATIENT)
Dept: GENERAL RADIOLOGY | Facility: CLINIC | Age: 58
DRG: 165 | End: 2018-12-05
Attending: INTERNAL MEDICINE
Payer: COMMERCIAL

## 2018-12-05 ENCOUNTER — APPOINTMENT (OUTPATIENT)
Dept: GENERAL RADIOLOGY | Facility: CLINIC | Age: 58
DRG: 165 | End: 2018-12-05
Attending: STUDENT IN AN ORGANIZED HEALTH CARE EDUCATION/TRAINING PROGRAM
Payer: COMMERCIAL

## 2018-12-05 ENCOUNTER — ANESTHESIA (OUTPATIENT)
Dept: SURGERY | Facility: CLINIC | Age: 58
DRG: 165 | End: 2018-12-05
Payer: COMMERCIAL

## 2018-12-05 ENCOUNTER — HOSPITAL ENCOUNTER (INPATIENT)
Facility: CLINIC | Age: 58
LOS: 1 days | Discharge: LEFT BEFORE DISCHARGE | DRG: 165 | End: 2018-12-05
Attending: STUDENT IN AN ORGANIZED HEALTH CARE EDUCATION/TRAINING PROGRAM | Admitting: STUDENT IN AN ORGANIZED HEALTH CARE EDUCATION/TRAINING PROGRAM
Payer: COMMERCIAL

## 2018-12-05 ENCOUNTER — PATIENT OUTREACH (OUTPATIENT)
Dept: CARE COORDINATION | Facility: CLINIC | Age: 58
End: 2018-12-05

## 2018-12-05 VITALS
HEIGHT: 70 IN | DIASTOLIC BLOOD PRESSURE: 71 MMHG | OXYGEN SATURATION: 96 % | SYSTOLIC BLOOD PRESSURE: 124 MMHG | RESPIRATION RATE: 18 BRPM | BODY MASS INDEX: 26.92 KG/M2 | TEMPERATURE: 96.9 F | WEIGHT: 188.05 LBS

## 2018-12-05 DIAGNOSIS — C49.9 SARCOMA OF SOFT TISSUE (H): ICD-10-CM

## 2018-12-05 DIAGNOSIS — Z98.890 STATUS POST SURGERY: Primary | ICD-10-CM

## 2018-12-05 PROBLEM — R91.1 SOLITARY LUNG NODULE: Status: ACTIVE | Noted: 2018-12-05

## 2018-12-05 LAB
ABO + RH BLD: NORMAL
ABO + RH BLD: NORMAL
BLD GP AB SCN SERPL QL: NORMAL
BLOOD BANK CMNT PATIENT-IMP: NORMAL
BLOOD BANK CMNT PATIENT-IMP: NORMAL
GLUCOSE BLDC GLUCOMTR-MCNC: 86 MG/DL (ref 70–99)
SPECIMEN EXP DATE BLD: NORMAL

## 2018-12-05 PROCEDURE — 71045 X-RAY EXAM CHEST 1 VIEW: CPT

## 2018-12-05 PROCEDURE — 36000062 ZZH SURGERY LEVEL 4 1ST 30 MIN - UMMC: Performed by: STUDENT IN AN ORGANIZED HEALTH CARE EDUCATION/TRAINING PROGRAM

## 2018-12-05 PROCEDURE — C9290 INJ, BUPIVACAINE LIPOSOME: HCPCS | Performed by: STUDENT IN AN ORGANIZED HEALTH CARE EDUCATION/TRAINING PROGRAM

## 2018-12-05 PROCEDURE — 37000009 ZZH ANESTHESIA TECHNICAL FEE, EACH ADDTL 15 MIN: Performed by: STUDENT IN AN ORGANIZED HEALTH CARE EDUCATION/TRAINING PROGRAM

## 2018-12-05 PROCEDURE — 40000986 XR CHEST PORT 1 VW

## 2018-12-05 PROCEDURE — 12000008 ZZH R&B INTERMEDIATE UMMC

## 2018-12-05 PROCEDURE — 25000128 H RX IP 250 OP 636: Performed by: NURSE ANESTHETIST, CERTIFIED REGISTERED

## 2018-12-05 PROCEDURE — 25000132 ZZH RX MED GY IP 250 OP 250 PS 637: Performed by: ANESTHESIOLOGY

## 2018-12-05 PROCEDURE — 25000132 ZZH RX MED GY IP 250 OP 250 PS 637: Performed by: PHYSICIAN ASSISTANT

## 2018-12-05 PROCEDURE — 88307 TISSUE EXAM BY PATHOLOGIST: CPT | Performed by: STUDENT IN AN ORGANIZED HEALTH CARE EDUCATION/TRAINING PROGRAM

## 2018-12-05 PROCEDURE — 40000275 ZZH STATISTIC RCP TIME EA 10 MIN

## 2018-12-05 PROCEDURE — 37000008 ZZH ANESTHESIA TECHNICAL FEE, 1ST 30 MIN: Performed by: STUDENT IN AN ORGANIZED HEALTH CARE EDUCATION/TRAINING PROGRAM

## 2018-12-05 PROCEDURE — 25000125 ZZHC RX 250: Performed by: NURSE ANESTHETIST, CERTIFIED REGISTERED

## 2018-12-05 PROCEDURE — 25000128 H RX IP 250 OP 636: Performed by: STUDENT IN AN ORGANIZED HEALTH CARE EDUCATION/TRAINING PROGRAM

## 2018-12-05 PROCEDURE — 88331 PATH CONSLTJ SURG 1 BLK 1SPC: CPT | Performed by: STUDENT IN AN ORGANIZED HEALTH CARE EDUCATION/TRAINING PROGRAM

## 2018-12-05 PROCEDURE — 36000064 ZZH SURGERY LEVEL 4 EA 15 ADDTL MIN - UMMC: Performed by: STUDENT IN AN ORGANIZED HEALTH CARE EDUCATION/TRAINING PROGRAM

## 2018-12-05 PROCEDURE — 40000014 ZZH STATISTIC ARTERIAL MONITORING DAILY

## 2018-12-05 PROCEDURE — 71000016 ZZH RECOVERY PHASE 1 LEVEL 3 FIRST HR: Performed by: STUDENT IN AN ORGANIZED HEALTH CARE EDUCATION/TRAINING PROGRAM

## 2018-12-05 PROCEDURE — 27210794 ZZH OR GENERAL SUPPLY STERILE: Performed by: STUDENT IN AN ORGANIZED HEALTH CARE EDUCATION/TRAINING PROGRAM

## 2018-12-05 PROCEDURE — 71045 X-RAY EXAM CHEST 1 VIEW: CPT | Mod: 77

## 2018-12-05 PROCEDURE — 00000146 ZZHCL STATISTIC GLUCOSE BY METER IP

## 2018-12-05 PROCEDURE — 71000017 ZZH RECOVERY PHASE 1 LEVEL 3 EA ADDTL HR: Performed by: STUDENT IN AN ORGANIZED HEALTH CARE EDUCATION/TRAINING PROGRAM

## 2018-12-05 PROCEDURE — 0BBD4ZZ EXCISION OF RIGHT MIDDLE LUNG LOBE, PERCUTANEOUS ENDOSCOPIC APPROACH: ICD-10-PCS | Performed by: STUDENT IN AN ORGANIZED HEALTH CARE EDUCATION/TRAINING PROGRAM

## 2018-12-05 PROCEDURE — 71000027 ZZH RECOVERY PHASE 2 EACH 15 MINS: Performed by: STUDENT IN AN ORGANIZED HEALTH CARE EDUCATION/TRAINING PROGRAM

## 2018-12-05 PROCEDURE — 40000170 ZZH STATISTIC PRE-PROCEDURE ASSESSMENT II: Performed by: STUDENT IN AN ORGANIZED HEALTH CARE EDUCATION/TRAINING PROGRAM

## 2018-12-05 PROCEDURE — 25000565 ZZH ISOFLURANE, EA 15 MIN: Performed by: STUDENT IN AN ORGANIZED HEALTH CARE EDUCATION/TRAINING PROGRAM

## 2018-12-05 PROCEDURE — 25000128 H RX IP 250 OP 636: Performed by: ANESTHESIOLOGY

## 2018-12-05 RX ORDER — SODIUM CHLORIDE, SODIUM LACTATE, POTASSIUM CHLORIDE, CALCIUM CHLORIDE 600; 310; 30; 20 MG/100ML; MG/100ML; MG/100ML; MG/100ML
INJECTION, SOLUTION INTRAVENOUS CONTINUOUS
Status: DISCONTINUED | OUTPATIENT
Start: 2018-12-05 | End: 2018-12-05 | Stop reason: HOSPADM

## 2018-12-05 RX ORDER — PROPOFOL 10 MG/ML
INJECTION, EMULSION INTRAVENOUS PRN
Status: DISCONTINUED | OUTPATIENT
Start: 2018-12-05 | End: 2018-12-05

## 2018-12-05 RX ORDER — ACETAMINOPHEN 325 MG/1
975 TABLET ORAL ONCE
Status: COMPLETED | OUTPATIENT
Start: 2018-12-05 | End: 2018-12-05

## 2018-12-05 RX ORDER — CEFAZOLIN SODIUM 1 G/3ML
1 INJECTION, POWDER, FOR SOLUTION INTRAMUSCULAR; INTRAVENOUS SEE ADMIN INSTRUCTIONS
Status: DISCONTINUED | OUTPATIENT
Start: 2018-12-05 | End: 2018-12-05 | Stop reason: HOSPADM

## 2018-12-05 RX ORDER — FENTANYL CITRATE 50 UG/ML
INJECTION, SOLUTION INTRAMUSCULAR; INTRAVENOUS PRN
Status: DISCONTINUED | OUTPATIENT
Start: 2018-12-05 | End: 2018-12-05

## 2018-12-05 RX ORDER — HYDROCODONE BITARTRATE AND ACETAMINOPHEN 5; 325 MG/1; MG/1
1 TABLET ORAL EVERY 4 HOURS PRN
Status: DISCONTINUED | OUTPATIENT
Start: 2018-12-05 | End: 2018-12-05

## 2018-12-05 RX ORDER — LIDOCAINE 40 MG/G
CREAM TOPICAL
Status: DISCONTINUED | OUTPATIENT
Start: 2018-12-05 | End: 2018-12-05

## 2018-12-05 RX ORDER — ONDANSETRON 2 MG/ML
INJECTION INTRAMUSCULAR; INTRAVENOUS PRN
Status: DISCONTINUED | OUTPATIENT
Start: 2018-12-05 | End: 2018-12-05

## 2018-12-05 RX ORDER — NALOXONE HYDROCHLORIDE 0.4 MG/ML
.1-.4 INJECTION, SOLUTION INTRAMUSCULAR; INTRAVENOUS; SUBCUTANEOUS
Status: DISCONTINUED | OUTPATIENT
Start: 2018-12-05 | End: 2018-12-05 | Stop reason: HOSPADM

## 2018-12-05 RX ORDER — CEFAZOLIN SODIUM 2 G/100ML
2 INJECTION, SOLUTION INTRAVENOUS
Status: COMPLETED | OUTPATIENT
Start: 2018-12-05 | End: 2018-12-05

## 2018-12-05 RX ORDER — HYDROMORPHONE HYDROCHLORIDE 1 MG/ML
.3-.5 INJECTION, SOLUTION INTRAMUSCULAR; INTRAVENOUS; SUBCUTANEOUS EVERY 5 MIN PRN
Status: DISCONTINUED | OUTPATIENT
Start: 2018-12-05 | End: 2018-12-05 | Stop reason: HOSPADM

## 2018-12-05 RX ORDER — ONDANSETRON 4 MG/1
4 TABLET, ORALLY DISINTEGRATING ORAL EVERY 30 MIN PRN
Status: DISCONTINUED | OUTPATIENT
Start: 2018-12-05 | End: 2018-12-05 | Stop reason: HOSPADM

## 2018-12-05 RX ORDER — FENTANYL CITRATE 50 UG/ML
25-50 INJECTION, SOLUTION INTRAMUSCULAR; INTRAVENOUS EVERY 5 MIN PRN
Status: DISCONTINUED | OUTPATIENT
Start: 2018-12-05 | End: 2018-12-05 | Stop reason: HOSPADM

## 2018-12-05 RX ORDER — HYDROCODONE BITARTRATE AND ACETAMINOPHEN 5; 325 MG/1; MG/1
1 TABLET ORAL EVERY 4 HOURS PRN
Status: DISCONTINUED | OUTPATIENT
Start: 2018-12-05 | End: 2018-12-05 | Stop reason: HOSPADM

## 2018-12-05 RX ORDER — CELECOXIB 200 MG/1
200 CAPSULE ORAL ONCE
Status: COMPLETED | OUTPATIENT
Start: 2018-12-05 | End: 2018-12-05

## 2018-12-05 RX ORDER — LIDOCAINE HYDROCHLORIDE 20 MG/ML
INJECTION, SOLUTION INFILTRATION; PERINEURAL PRN
Status: DISCONTINUED | OUTPATIENT
Start: 2018-12-05 | End: 2018-12-05

## 2018-12-05 RX ORDER — DEXTROSE MONOHYDRATE, SODIUM CHLORIDE, AND POTASSIUM CHLORIDE 50; 1.49; 4.5 G/1000ML; G/1000ML; G/1000ML
INJECTION, SOLUTION INTRAVENOUS CONTINUOUS
Status: DISCONTINUED | OUTPATIENT
Start: 2018-12-05 | End: 2018-12-05 | Stop reason: HOSPADM

## 2018-12-05 RX ORDER — BUPIVACAINE HYDROCHLORIDE 2.5 MG/ML
INJECTION, SOLUTION INFILTRATION; PERINEURAL PRN
Status: DISCONTINUED | OUTPATIENT
Start: 2018-12-05 | End: 2018-12-05 | Stop reason: HOSPADM

## 2018-12-05 RX ORDER — CHLORHEXIDINE GLUCONATE ORAL RINSE 1.2 MG/ML
15 SOLUTION DENTAL ONCE
Status: COMPLETED | OUTPATIENT
Start: 2018-12-05 | End: 2018-12-05

## 2018-12-05 RX ORDER — SODIUM CHLORIDE, SODIUM LACTATE, POTASSIUM CHLORIDE, CALCIUM CHLORIDE 600; 310; 30; 20 MG/100ML; MG/100ML; MG/100ML; MG/100ML
INJECTION, SOLUTION INTRAVENOUS CONTINUOUS PRN
Status: DISCONTINUED | OUTPATIENT
Start: 2018-12-05 | End: 2018-12-05

## 2018-12-05 RX ORDER — ONDANSETRON 2 MG/ML
4 INJECTION INTRAMUSCULAR; INTRAVENOUS EVERY 30 MIN PRN
Status: DISCONTINUED | OUTPATIENT
Start: 2018-12-05 | End: 2018-12-05 | Stop reason: HOSPADM

## 2018-12-05 RX ORDER — HYDROCODONE BITARTRATE AND ACETAMINOPHEN 5; 325 MG/1; MG/1
1 TABLET ORAL EVERY 4 HOURS PRN
Qty: 30 TABLET | Refills: 0 | Status: SHIPPED | OUTPATIENT
Start: 2018-12-05 | End: 2019-01-10

## 2018-12-05 RX ADMIN — HYDROMORPHONE HYDROCHLORIDE 0.5 MG: 1 INJECTION, SOLUTION INTRAMUSCULAR; INTRAVENOUS; SUBCUTANEOUS at 09:41

## 2018-12-05 RX ADMIN — FENTANYL CITRATE 50 MCG: 50 INJECTION INTRAMUSCULAR; INTRAVENOUS at 10:36

## 2018-12-05 RX ADMIN — Medication 0.5 MG: at 10:49

## 2018-12-05 RX ADMIN — HYDROCODONE BITARTRATE AND ACETAMINOPHEN 1 TABLET: 5; 325 TABLET ORAL at 11:37

## 2018-12-05 RX ADMIN — Medication 0.3 MG: at 10:13

## 2018-12-05 RX ADMIN — FENTANYL CITRATE 50 MCG: 50 INJECTION INTRAMUSCULAR; INTRAVENOUS at 10:26

## 2018-12-05 RX ADMIN — MIDAZOLAM 2 MG: 1 INJECTION INTRAMUSCULAR; INTRAVENOUS at 07:50

## 2018-12-05 RX ADMIN — FENTANYL CITRATE 25 MCG: 50 INJECTION INTRAMUSCULAR; INTRAVENOUS at 10:21

## 2018-12-05 RX ADMIN — ACETAMINOPHEN 975 MG: 325 TABLET, FILM COATED ORAL at 07:34

## 2018-12-05 RX ADMIN — ROCURONIUM BROMIDE 60 MG: 10 INJECTION INTRAVENOUS at 08:10

## 2018-12-05 RX ADMIN — ROCURONIUM BROMIDE 10 MG: 10 INJECTION INTRAVENOUS at 08:46

## 2018-12-05 RX ADMIN — CELECOXIB 200 MG: 200 CAPSULE ORAL at 07:34

## 2018-12-05 RX ADMIN — FENTANYL CITRATE 50 MCG: 50 INJECTION, SOLUTION INTRAMUSCULAR; INTRAVENOUS at 09:41

## 2018-12-05 RX ADMIN — FENTANYL CITRATE 50 MCG: 50 INJECTION, SOLUTION INTRAMUSCULAR; INTRAVENOUS at 08:46

## 2018-12-05 RX ADMIN — FENTANYL CITRATE 25 MCG: 50 INJECTION INTRAMUSCULAR; INTRAVENOUS at 10:17

## 2018-12-05 RX ADMIN — SODIUM CHLORIDE, POTASSIUM CHLORIDE, SODIUM LACTATE AND CALCIUM CHLORIDE: 600; 310; 30; 20 INJECTION, SOLUTION INTRAVENOUS at 07:52

## 2018-12-05 RX ADMIN — ONDANSETRON 4 MG: 2 INJECTION INTRAMUSCULAR; INTRAVENOUS at 09:25

## 2018-12-05 RX ADMIN — CHLORHEXIDINE GLUCONATE 0.12% ORAL RINSE 15 ML: 1.2 LIQUID ORAL at 07:34

## 2018-12-05 RX ADMIN — PROPOFOL 200 MG: 10 INJECTION, EMULSION INTRAVENOUS at 08:10

## 2018-12-05 RX ADMIN — SUGAMMADEX 200 MG: 100 INJECTION, SOLUTION INTRAVENOUS at 09:32

## 2018-12-05 RX ADMIN — FENTANYL CITRATE 50 MCG: 50 INJECTION, SOLUTION INTRAMUSCULAR; INTRAVENOUS at 09:35

## 2018-12-05 RX ADMIN — ENOXAPARIN SODIUM 40 MG: 40 INJECTION SUBCUTANEOUS at 07:34

## 2018-12-05 RX ADMIN — CEFAZOLIN SODIUM 2 G: 2 INJECTION, SOLUTION INTRAVENOUS at 08:30

## 2018-12-05 RX ADMIN — FENTANYL CITRATE 100 MCG: 50 INJECTION, SOLUTION INTRAMUSCULAR; INTRAVENOUS at 08:04

## 2018-12-05 RX ADMIN — FENTANYL CITRATE 50 MCG: 50 INJECTION INTRAMUSCULAR; INTRAVENOUS at 10:52

## 2018-12-05 RX ADMIN — LIDOCAINE HYDROCHLORIDE 100 MG: 20 INJECTION, SOLUTION INFILTRATION; PERINEURAL at 08:10

## 2018-12-05 ASSESSMENT — PAIN DESCRIPTION - DESCRIPTORS: DESCRIPTORS: JABBING;THROBBING

## 2018-12-05 ASSESSMENT — ACTIVITIES OF DAILY LIVING (ADL): ADLS_ACUITY_SCORE: 13

## 2018-12-05 NOTE — DISCHARGE INSTRUCTIONS
THORACIC SURGERY DISCHARGE INSTRUCTIONS    DIET: Regular diet - as prior to admission.    If your plans upon discharge include prolonged periods of sitting (i.e a lengthy car or plane ride), it is highly recommended to get up and walk at least once per hour to help prevent swelling and blood clots.       You may get incision wet 2 days after operation. Do not submerge, soak, or scrub incision or swim until seen in follow-up or after two weeks.        Activity as tolerated, no strenous activity until seen in follow-up, no lifting greater than 20 pounds for the next 2 weeks.      Stay hydrated. Take over the counter fiber (metamucil or benefiber) and stool softeners (Miralax, docusate or senna) if becoming constipated with narcotic use.        Call for fever greater than 101.5, chills, increased size of incision, red skin around incision, vision changes, muscle strength changes, sensation changes, shortness of breath, or other concerns.      No driving while taking narcotic pain medication.      Transition to ibuprofen or tylenol/acetaminophen for pain control. Do not take tylenol/acetaminophen and acetaminophen containing narcotic (e.g., percocet or vicodin) at the same time. If you have known ulcer problems, or kidney trouble (elevated creatinine) do not take the ibuprofen.      In emergencies, call 911      For other Questions or Concerns;   A.) During weekday working hours (Monday through Friday 8am to 4:30pm)   call [phone] and ask to speak to a clinical nurse specialist.     B.) At nights (after 4:30pm), on weekends, or if urgent call [phone] and   tell the   I would like to page job code [phone], the thoracic surgery   fellow on call, please.     [facility], [place]  Same-Day Surgery   Adult Discharge Orders & Instructions     For 24 hours after surgery    1. Get plenty of rest.  A responsible adult must stay with you for at least 24 hours after you leave the  [facility].   2. Do not drive or use heavy equipment.  If you have weakness or tingling, don't drive or use heavy equipment until this feeling goes away.  3. Do not drink alcohol.  4. Avoid strenuous or risky activities.  Ask for help when climbing stairs.   5. You may feel lightheaded.  IF so, sit for a few minutes before standing.  Have someone help you get up.   6. If you have nausea (feel sick to your stomach): Drink only clear liquids such as apple juice, ginger ale, broth or 7-Up.  Rest may also help.  Be sure to drink enough fluids.  Move to a regular diet as you feel able.  7. You may have a slight fever. Call the doctor if your fever is over 100 F (37.7 C) (taken under the tongue) or lasts longer than 24 hours.  8. You may have a dry mouth, a sore throat, muscle aches or trouble sleeping.  These should go away after 24 hours.  9. Do not make important or legal decisions.   Call your doctor for any of the followin.  Signs of infection (fever, growing tenderness at the surgery site, a large amount of drainage or bleeding, severe pain, foul-smelling drainage, redness, swelling).    2. It has been over 8 to 10 hours since surgery and you are still not able to urinate (pass water).    3.  Headache for over 24 hours.      To contact a doctor during clinic hours, call Dr. Sarah Bowie @ [phone] (clinic) or:        After hours: call [phone] and ask for the resident on call for Thoracic surgery (answered 24 hours a day)      Emergency Department:    [facility]: [phone]       (TTY for hearing impaired: [phone])

## 2018-12-05 NOTE — ANESTHESIA POSTPROCEDURE EVALUATION
Anesthesia POST Procedure Evaluation    Patient: Hiram Tapia   MRN:     [id number] Gender:   male   Age:    58 year old :      1960        Preoperative Diagnosis: Sarcoma, Pulmonary Nodules    Procedure(s):  Right Video Assisted Thoracoscopic Wedge Resection   Postop Comments: No value filed.       Anesthesia Type:  Not documented    Reportable Event: NO     PAIN: Uncomplicated   Sign Out status: Comfortable, Well controlled pain     PONV: No PONV   Sign Out status:  No Nausea or Vomiting     Neuro/Psych: Uneventful perioperative course   Sign Out Status: Preoperative baseline; Age appropriate mentation     Airway/Resp.: Uneventful perioperative course   Sign Out Status: Non labored breathing, age appropriate RR; Resp. Status within EXPECTED Parameters     CV: Uneventful perioperative course   Sign Out status: Appropriate BP and perfusion indices; Appropriate HR/Rhythm     Disposition:   Sign Out in:  PACU  Disposition:  Phase II; Home  Recovery Course: Uneventful  Follow-Up: Not required           Last Anesthesia Record Vitals:  CRNA VITALS  2018 0912 - 2018 1012      2018             Pulse: 79    SpO2: 95 %          Last PACU/Preop Vitals:  Vitals:    18 0950 18 1000 18 1015   BP: 132/82 129/81 118/71   Resp:  16 16   Temp: 35.7  C (96.3  F)  35.8  C (96.4  F)   SpO2: 99% 100% 100%         Electronically Signed By: Maryjane Yo MD, 2018, 11:01 AM

## 2018-12-05 NOTE — BRIEF OP NOTE
[facility], [place]    Brief Operative Note    Pre-operative diagnosis: Sarcoma, Pulmonary Nodules   Post-operative diagnosis Same  Procedure: Procedure(s):  Right Video Assisted Thoracoscopic Wedge Resection  Surgeon: Surgeon(s) and Role:     * Sarah Bowie MD - Primary     * Angelique Maria MD - Assisting  Anesthesia: General     Estimated blood loss: Minimal    Drains:  24 Italian pleural right tube  Specimens:   ID Type Source Tests Collected by Time Destination   A : Right Middle Lobe Wedge Tissue Other SURGICAL PATHOLOGY EXAM Sarah Bowie MD 12/5/2018  8:58 AM      Findings:   Multiple small lung nodules  Complications: None.  Implants: None.

## 2018-12-05 NOTE — OR NURSING
"Writer contacted Dr. Maria to advise that CXR complete. Dr. Maria stated, \"He does not have a pneumothorax. He does have subcutaneous air. He'll be admitted to  and have another chest x ray at 1600.\" This was communicated to pt and spouse, they verbalized their understanding. Admit and transfer orders in place. Await transfer to .   "

## 2018-12-05 NOTE — OR NURSING
Script sent for pain medicine at home, CXR complete. Dr. NIRMAL Dominguez paged and notified CXR is done.

## 2018-12-05 NOTE — IP AVS SNAPSHOT
Same Day Surgery [address] [address]    Phone:  [phone]                                       After Visit Summary   12/5/2018    Hiram Tapia    MRN: [id number]           After Visit Summary Signature Page     I have received my discharge instructions, and my questions have been answered. I have discussed any challenges I see with this plan with the nurse or doctor.    ..........................................................................................................................................  Patient/Patient Representative Signature      ..........................................................................................................................................  Patient Representative Print Name and Relationship to Patient    ..................................................               ................................................  Date                                   Time    ..........................................................................................................................................  Reviewed by Signature/Title    ...................................................              ..............................................  Date                                               Time          22EPIC Rev 08/18

## 2018-12-05 NOTE — OR NURSING
Chest tube pulled by Dr. NIRMAL Haile pt tolerated the procedure well. Working on deep breathing and coughing

## 2018-12-05 NOTE — PLAN OF CARE
Problem: Patient Care Overview  Goal: Plan of Care/Patient Progress Review  Outcome: No Change  Pt had right VATS today. AVSS. 96% RA. Pt c/o pain, unspecified, declines interventions. Cxray completed, awaiting read and determination if pt is staying overnight or d/c'ing tonight. Declines to order any food, wants to eat once he leaves. Declines majority of assessment, including looking at incisions, as pt is already dressed. PIV infusing @30mL/hr. Continue POC, await order from thoracic surgery if pt can discharge tonight.

## 2018-12-05 NOTE — ANESTHESIA CARE TRANSFER NOTE
Patient: iHram Tapia    Procedure(s):  Right Video Assisted Thoracoscopic Wedge Resection    Diagnosis: Sarcoma, Pulmonary Nodules   Diagnosis Additional Information: No value filed.    Anesthesia Type:   General     Note:  Airway :Face Mask  Patient transferred to:PACU  Comments: Awake in PAR, pain meds titrated after wakeup for co pain. Right chest tube to water seal in PAR PRSHandoff Report: Identifed the Patient, Identified the Reponsible Provider, Reviewed the pertinent medical history, Discussed the surgical course, Reviewed Intra-OP anesthesia mangement and issues during anesthesia, Set expectations for post-procedure period and Allowed opportunity for questions and acknowledgement of understanding      Vitals: (Last set prior to Anesthesia Care Transfer)    CRNA VITALS  12/5/2018 0912 - 12/5/2018 0957      12/5/2018             Pulse: 79    SpO2: 95 %                Electronically Signed By: WILLAM Walden CRNA  December 5, 2018  9:57 AM

## 2018-12-05 NOTE — IP AVS SNAPSHOT
MRN:[id number]                      After Visit Summary   12/5/2018    Hiram Tapia    MRN: [id number]           Thank you!     Thank you for choosing [facility] for your care. Our goal is always to provide you with excellent care. Hearing back from our patients is one way we can continue to improve our services. Please take a few minutes to complete the written survey that you may receive in the mail after you visit with us. Thank you!        Patient Information     Date Of Birth          1960        About your hospital stay     You were admitted on:  December 5, 2018 You last received care in the:  [facility] Day [facility]    You were discharged on:  December 5, 2018       Who to Call     For medical emergencies, please call 911.  For non-urgent questions about your medical care, please call your primary care provider or clinic, [phone]  For questions related to your surgery, please call your surgery clinic        Attending Provider     Provider Specialty    Sarah Bowie MD Thoracic Diseases       Primary Care Provider Office Phone # Fax #    Louisa Fry [phone]      Your next 10 appointments already scheduled     David 10, 2019  9:00 AM CST   (Arrive by 8:45 AM)   Return Visit with Sarah JAIN MD   [facility] ([facility] [place])    [address]  [address]  [place] [facility] [address] [phone]            Jan 29, 2019  9:20 AM CST   CT CHEST W/O CONTRAST with UCCT2   [facility] [place] [facility] ([facility] [place])    [address]  1st Floor  [facility] [id number] [phone]           How do I prepare for my exam? (Food and drink instructions) No Food and Drink Restrictions.  How do I prepare for my exam? (Other instructions) You do not need to do anything special to prepare for this exam. For a sinus scan: Use your nose spray (nasal decongestant spray) as directed.   What should I wear: Please wear loose clothing, such as a sweat suit or jogging clothes. Avoid snaps, zippers and other metal. We may ask you to undress and put on a hospital gown.  How long does the exam take: Most scans take less than 20 minutes.  What should I bring: Please bring any scans or X-rays taken at other hospitals, if similar tests were done. Also bring a list of your medicines, including vitamins, minerals and over-the-counter drugs. It is safest to leave personal items at home.  Do I need a : No  is needed.  What do I need to tell my doctor? Be sure to tell your doctor: * If you have any allergies. * If there s any chance you are pregnant. * If you are breastfeeding.  What should I do after the exam: No restrictions, You may resume normal activities.  What is this test: A CT (computed tomography) scan is a series of pictures that allows us to look inside your body. The scanner creates images of the body in cross sections, much like slices of bread. This helps us see any problems more clearly.  Who should I call with questions: If you have any questions, please call the Imaging Department where you will have your exam. Directions, parking instructions, and other information is available on our website, [email or web address].            Jan 31, 2019 10:00 AM CST   (Arrive by 9:45 AM)   Return Visit with Gaurang Johnson MD   [facility] ([facility])    [address] [place] [address]  [address] [place] [address] [phone]              Further instructions from your care team       THORACIC SURGERY DISCHARGE INSTRUCTIONS    DIET: Regular diet - as prior to admission.    If your plans upon discharge include prolonged periods of sitting (i.e a lengthy car or plane ride), it is highly recommended to get up and walk at least once per hour to help prevent swelling and blood clots.       You may get incision wet 2 days after operation. Do not  submerge, soak, or scrub incision or swim until seen in follow-up or after two weeks.        Activity as tolerated, no strenous activity until seen in follow-up, no lifting greater than 20 pounds for the next 2 weeks.      Stay hydrated. Take over the counter fiber (metamucil or benefiber) and stool softeners (Miralax, docusate or senna) if becoming constipated with narcotic use.        Call for fever greater than 101.5, chills, increased size of incision, red skin around incision, vision changes, muscle strength changes, sensation changes, shortness of breath, or other concerns.      No driving while taking narcotic pain medication.      Transition to ibuprofen or tylenol/acetaminophen for pain control. Do not take tylenol/acetaminophen and acetaminophen containing narcotic (e.g., percocet or vicodin) at the same time. If you have known ulcer problems, or kidney trouble (elevated creatinine) do not take the ibuprofen.      In emergencies, call 911      For other Questions or Concerns;   A.) During weekday working hours (Monday through Friday 8am to 4:30pm)   call [phone] and ask to speak to a clinical nurse specialist.     B.) At nights (after 4:30pm), on weekends, or if urgent call [phone] and   tell the   I would like to page job code [phone], the thoracic surgery   fellow on call, please.     [facility]  Same-Day Surgery   Adult Discharge Orders & Instructions     For 24 hours after surgery    1. Get plenty of rest.  A responsible adult must stay with you for at least 24 hours after you leave the hospital.   2. Do not drive or use heavy equipment.  If you have weakness or tingling, don't drive or use heavy equipment until this feeling goes away.  3. Do not drink alcohol.  4. Avoid strenuous or risky activities.  Ask for help when climbing stairs.   5. You may feel lightheaded.  IF so, sit for a few minutes before standing.  Have someone help you get up.  "  6. If you have nausea (feel sick to your stomach): Drink only clear liquids such as apple juice, ginger ale, broth or 7-Up.  Rest may also help.  Be sure to drink enough fluids.  Move to a regular diet as you feel able.  7. You may have a slight fever. Call the doctor if your fever is over 100 F (37.7 C) (taken under the tongue) or lasts longer than 24 hours.  8. You may have a dry mouth, a sore throat, muscle aches or trouble sleeping.  These should go away after 24 hours.  9. Do not make important or legal decisions.   Call your doctor for any of the followin.  Signs of infection (fever, growing tenderness at the surgery site, a large amount of drainage or bleeding, severe pain, foul-smelling drainage, redness, swelling).    2. It has been over 8 to 10 hours since surgery and you are still not able to urinate (pass water).    3.  Headache for over 24 hours.      To contact a doctor during clinic hours, call Dr. Sarah Bowie @ [phone] (clinic) or:        After hours: call [phone] and ask for the resident on call for Thoracic surgery (answered 24 hours a day)      Emergency Department:    [facility]: [phone]       (TTY for hearing impaired: [phone])      Pending Results     Date and Time Order Name Status Description    2018 1101 XR Chest Port 1 View In process     2018 0901 Surgical pathology exam Preliminary             Admission Information     Date & Time Provider Department Dept. Phone    2018 Sarah Bowie MD Same Day Surgery [facility] [phone]      Your Vitals Were     Blood Pressure Temperature Respirations Height Weight Pulse Oximetry    106/64 98.3  F (36.8  C) 16 1.778 m (5' 10\") 85.3 kg (188 lb 0.8 oz) 93%    BMI (Body Mass Index)                   26.98 kg/m2           Care EveryWhere ID     This is your Care EveryWhere ID. This could be used by other organizations to access your [facility] medical records  [id number]        Equal Access to " "Services     [facility]: Hadii aad ku hadirmabrock Sonikita, waaxda luqadaha, qaybta kaalmada cydney, mendez mcnair. So [facility] [phone].    ATENCIÓN: Si habla carmelo, tiene a sheridan disposición servicios gratuitos de asistencia lingüística. Llame al [phone].    We comply with applicable federal civil rights laws and [place] laws. We do not discriminate on the basis of race, color, national origin, age, disability, sex, sexual orientation, or gender identity.               Review of your medicines      UNREVIEWED medicines. 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 Ask your nurse if you have questions.     Bring a paper prescription for each of these medications     HYDROcodone-acetaminophen 5-325 MG tablet                Protect others around you: Learn how to safely use, store and throw away your medicines at www.disposemymeds.org.        Information about your nerve block     Today you received a block to numb the nerves near your surgery site.    This is a block using local anesthetic or \"numbing\" medication injected around the nerves to anesthetize or \"numb\" the area supplied by those nerves. This block is injected into the muscle layer near your surgical site. The type of anesthesia (Exparel) your anesthesia team used to numb " your abdomen may give you relief for up to 72 hours.     Diet: There are no diet restrictions, but you should drink plenty of fluids, unless you are on a fluid-restricted diet.     Activity: If your surgical site is an arm or leg you should be careful with your affected limb, since it is possible to injure your limb without being aware of it due to the numbing. Until full feeling returns, you should guard against bumping or hitting your limb, and avoid extreme hot or cold temperatures on the skin.    Pain Medication: As the block wears off, the feeling will return as a tingling or prickly sensation near your surgical site. You will experience more discomfort from your incisions as the feeling returns. You may want to take a pain pill (a narcotic or Tylenol if this was prescribed by your surgeon) when you start to experience mild pain, before the pain becomes more severe. If your pain medications do not control your pain, you should notify your surgeon. If you are taking narcotics for pain management, do not drink alcohol, drive a car, or perform hazardous activities.  If you have questions or concerns you may call your surgeon at the number provided with your discharge instructions.     Call your surgeon if you experience blurry vision, ringing in the ears or metallic taste in your mouth.         Information about OPIOIDS     PRESCRIPTION OPIOIDS: WHAT YOU NEED TO KNOW   We gave you an opioid (narcotic) pain medicine. It is important to manage your pain, but opioids are not always the best choice. You should first try all the other options your care team gave you. Take this medicine for as short a time (and as few doses) as possible.    Some activities can increase your pain, such as bandage changes or therapy sessions. It may help to take your pain medicine 30 to 60 minutes before these activities. Reduce your stress by getting enough sleep, working on hobbies you enjoy and practicing relaxation or meditation. Talk  to your care team about ways to manage your pain beyond prescription opioids.    These medicines have risks:    DO NOT drive when on new or higher doses of pain medicine. These medicines can affect your alertness and reaction times, and you could be arrested for driving under the influence (DUI). If you need to use opioids long-term, talk to your care team about driving.    DO NOT operate heavy machinery    DO NOT do any other dangerous activities while taking these medicines.    DO NOT drink any alcohol while taking these medicines.     If the opioid prescribed includes acetaminophen, DO NOT take with any other medicines that contain acetaminophen. Read all labels carefully. Look for the word  acetaminophen  or  Tylenol.  Ask your pharmacist if you have questions or are unsure.    You can get addicted to pain medicines, especially if you have a history of addiction (chemical, alcohol or substance dependence). Talk to your care team about ways to reduce this risk.    All opioids tend to cause constipation. Drink plenty of water and eat foods that have a lot of fiber, such as fruits, vegetables, prune juice, apple juice and high-fiber cereal. Take a laxative (Miralax, milk of magnesia, Colace, Senna) if you don t move your bowels at least every other day. Other side effects include upset stomach, sleepiness, dizziness, throwing up, tolerance (needing more of the medicine to have the same effect), physical dependence and slowed breathing.    Store your pills in a secure place, locked if possible. We will not replace any lost or stolen medicine. If you don t finish your medicine, please throw away (dispose) as directed by your pharmacist. The [place] Pollution Control Agency has more information about safe disposal: https://www.pca.[facility].mn.us/living-green/managing-unwanted-medications             Medication List: This is a list of all your medications and when to take them. Check marks below indicate your daily home  schedule. Keep this list as a reference.      Medications           Morning Afternoon Evening Bedtime As Needed    HYDROcodone-acetaminophen 5-325 MG tablet   Commonly known as:  NORCO   Take 1 tablet by mouth every 4 hours as needed for pain   Last time this was given:  1 tablet on 12/5/2018 11:37 AM                                menthol 8 MG Lozg   Commonly known as:  COUGH DROP   Take 1 lozenge by mouth every hour as needed for cough

## 2018-12-06 NOTE — OP NOTE
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      PROCEDURE PERFORMED:  Right video-assisted thoracoscopic surgery with wedge resection.      SURGEON:  Sarah Bowie MD      ASSISTANT SURGEON:  Gretchen [place] MD      OPERATIVE BLOOD LOSS:  20 mL.      OPERATIVE FINDINGS:  Multiple nodules in the right lung.  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      OPERATIVE PROCEDURE:  After obtaining informed consent, the patient was brought to the operating room and laid supine on the operating table.  After induction of general anesthesia, and introduction of a double-lumen endotracheal tube, the patient was positioned in the left lateral decubitus with the right side up.  The right chest was prepped and draped in a sterile manner.  We then proceeded with a 2-port VATS approach with a camera port in the 8th intercostal space along the anterior axillary line and a small utility port in the 5th intercostal space between the anterior and mid axillary line.  We had a wound protector in this incision.  We identified several nodules in the right lung and all the lobes.  We took a small wedge of one of the nodules in the right middle lobe using multiple loads on the stapler.  This was extracted through the utility port through the wound protector and sent off for frozen section diagnosis which was suggestive of pleomorphic sarcoma.  We then injected the intercostal spaces with liposomal bupivacaine for postoperative pain control.  A 24-Vincentian chest tube was left in the pleural cavity, secured to the skin.  The port sites were closed in layers.  The lung was reinflated.  The patient was then extubated and transferred to the recovery room in stable condition.          SHAMAR RAMOS MD             D: 2018   T: 2018   MT: TIMO      Name:     RUT CHAO   MRN:      [id number]        Account:        [id number]   :      1960           Procedure Date: 2018      Document: [id number]

## 2018-12-06 NOTE — PROGRESS NOTES
Pt left AMA, before discharge orders were placed. Pt refused to sign the AMA paper or wait for a provider to come talk with him. Thoracic surgery resident paged and notified that pt has left. AMA paper with refusal is in chart.

## 2018-12-06 NOTE — PROGRESS NOTES
"Pt called to nurses station and said \"can someone come take my IV out.\" Writer asked pt if he got the final okay from thoracic surgery to discharge and pt states \"no, but I'm leaving.\" Thoracic surgery resident paged and she said she will check into it, this was explained to pt. Pt is frustrated with waiting for results of xray.  "

## 2018-12-07 LAB — COPATH REPORT: NORMAL

## 2018-12-10 LAB
DLCOCOR-%PRED-PRE: 102 %
DLCOCOR-PRE: 29.1 ML/MIN/MMHG
DLCOUNC-%PRED-PRE: 95 %
DLCOUNC-PRE: 27.02 ML/MIN/MMHG
DLCOUNC-PRED: 28.35 ML/MIN/MMHG
ERV-%PRED-PRE: 52 %
ERV-PRE: 0.64 L
ERV-PRED: 1.21 L
EXPTIME-PRE: 7.04 SEC
FEF2575-%PRED-PRE: 151 %
FEF2575-PRE: 4.49 L/SEC
FEF2575-PRED: 2.97 L/SEC
FEFMAX-%PRED-PRE: 107 %
FEFMAX-PRE: 9.88 L/SEC
FEFMAX-PRED: 9.2 L/SEC
FEV1-%PRED-PRE: 124 %
FEV1-PRE: 4.45 L
FEV1FEV6-PRE: 82 %
FEV1FEV6-PRED: 79 %
FEV1FVC-PRE: 82 %
FEV1FVC-PRED: 76 %
FEV1SVC-PRE: 115 %
FEV1SVC-PRED: 72 %
FIFMAX-PRE: 8.17 L/SEC
FRCN2-%PRED-PRE: 123 %
FRCN2-PRE: 4.45 L
FRCN2-PRED: 3.61 L
FVC-%PRED-PRE: 117 %
FVC-PRE: 5.45 L
FVC-PRED: 4.63 L
IC-%PRED-PRE: 85 %
IC-PRE: 3.23 L
IC-PRED: 3.77 L
RVN2-%PRED-PRE: 156 %
RVN2-PRE: 3.81 L
RVN2-PRED: 2.43 L
TLCN2-%PRED-PRE: 107 %
TLCN2-PRE: 7.67 L
TLCN2-PRED: 7.13 L
VA-%PRED-PRE: 96 %
VA-PRE: 6.57 L
VC-%PRED-PRE: 77 %
VC-PRE: 3.87 L
VC-PRED: 4.98 L

## 2018-12-11 DIAGNOSIS — C49.9 SARCOMA (H): Primary | ICD-10-CM

## 2018-12-11 NOTE — PROGRESS NOTES
I called David to discuss his pathology results from recent lung surgery and to see how he is doing.   He returned to work 2 days post-op and is doing well.   He is scheduled to see Dr. Boiwe in early January and Dr. Johnson in last January.   All questions answered.

## 2018-12-27 NOTE — DISCHARGE SUMMARY
NAME: Hiram Tapia   MRN: [id number]   : 1960     DATE OF ADMISSION: 2018     PRE/POSTOPERATIVE DIAGNOSES: Lung nodules     PROCEDURES PERFORMED: Right vats wedge resection    PATHOLOGY RESULTS: pending     CULTURE RESULTS: None     INTRAOPERATIVE COMPLICATIONS: None     POSTOPERATIVE COMPLICATIONS: None     DRAINS/TUBES PRESENT AT DISCHARGE: none    DATE OF DISCHARGE:  Left ama on 18    HOSPITAL COURSE: Hiram Tapia is a 58 year old male who on 2018 underwent the above-named procedures.  He tolerated the operation well and postoperatively was transferred to the general post-surgical observation unit.  Pt left AMA prior to physician eval.  He was explained risks of post op complications prior to eval by physician.      DISCHARGE INSTRUCTIONS:  No discharge procedures on file.    DISCHARGE MEDICATIONS:   Discharge Medication List as of 2018 12:45 PM      START taking these medications    Details   HYDROcodone-acetaminophen (NORCO) 5-325 MG tablet Take 1 tablet by mouth every 4 hours as needed for pain, Disp-30 tablet, R-0, Local Print         CONTINUE these medications which have NOT CHANGED    Details   menthol (COUGH DROP) 8 MG LOZG Take 1 lozenge by mouth every hour as needed for cough, Historical

## 2019-01-01 ENCOUNTER — APPOINTMENT (OUTPATIENT)
Dept: GENERAL RADIOLOGY | Facility: CLINIC | Age: 59
End: 2019-01-01
Attending: INTERNAL MEDICINE
Payer: COMMERCIAL

## 2019-01-01 ENCOUNTER — TELEPHONE (OUTPATIENT)
Dept: SURGERY | Facility: CLINIC | Age: 59
End: 2019-01-01

## 2019-01-01 ENCOUNTER — HOSPITAL ENCOUNTER (OUTPATIENT)
Facility: CLINIC | Age: 59
Discharge: HOME | End: 2019-07-25
Attending: INTERNAL MEDICINE | Admitting: INTERNAL MEDICINE
Payer: COMMERCIAL

## 2019-01-01 ENCOUNTER — ONCOLOGY VISIT (OUTPATIENT)
Dept: ONCOLOGY | Facility: CLINIC | Age: 59
End: 2019-01-01
Attending: INTERNAL MEDICINE
Payer: COMMERCIAL

## 2019-01-01 ENCOUNTER — OFFICE VISIT (OUTPATIENT)
Dept: SURGERY | Facility: CLINIC | Age: 59
End: 2019-01-01
Attending: INTERNAL MEDICINE
Payer: COMMERCIAL

## 2019-01-01 ENCOUNTER — ONCOLOGY VISIT (OUTPATIENT)
Dept: ONCOLOGY | Facility: CLINIC | Age: 59
End: 2019-01-01
Attending: PHYSICIAN ASSISTANT
Payer: COMMERCIAL

## 2019-01-01 ENCOUNTER — APPOINTMENT (OUTPATIENT)
Dept: LAB | Facility: CLINIC | Age: 59
End: 2019-01-01
Attending: INTERNAL MEDICINE
Payer: COMMERCIAL

## 2019-01-01 ENCOUNTER — TELEPHONE (OUTPATIENT)
Dept: ONCOLOGY | Facility: CLINIC | Age: 59
End: 2019-01-01

## 2019-01-01 ENCOUNTER — ANCILLARY PROCEDURE (OUTPATIENT)
Dept: GENERAL RADIOLOGY | Facility: CLINIC | Age: 59
End: 2019-01-01
Attending: PHYSICIAN ASSISTANT
Payer: COMMERCIAL

## 2019-01-01 ENCOUNTER — ANCILLARY PROCEDURE (OUTPATIENT)
Dept: ULTRASOUND IMAGING | Facility: CLINIC | Age: 59
End: 2019-01-01
Attending: PHYSICIAN ASSISTANT
Payer: COMMERCIAL

## 2019-01-01 ENCOUNTER — ANCILLARY PROCEDURE (OUTPATIENT)
Dept: CT IMAGING | Facility: CLINIC | Age: 59
End: 2019-01-01
Attending: PHYSICIAN ASSISTANT
Payer: COMMERCIAL

## 2019-01-01 ENCOUNTER — HOSPITAL ENCOUNTER (INPATIENT)
Facility: CLINIC | Age: 59
LOS: 3 days | Discharge: HOME | DRG: 253 | End: 2019-10-18
Attending: EMERGENCY MEDICINE | Admitting: OBSTETRICS & GYNECOLOGY
Payer: COMMERCIAL

## 2019-01-01 ENCOUNTER — APPOINTMENT (OUTPATIENT)
Dept: LAB | Facility: CLINIC | Age: 59
End: 2019-01-01
Attending: PHYSICIAN ASSISTANT
Payer: COMMERCIAL

## 2019-01-01 ENCOUNTER — ONCOLOGY VISIT (OUTPATIENT)
Dept: ONCOLOGY | Facility: CLINIC | Age: 59
DRG: 253 | End: 2019-01-01
Attending: PHYSICIAN ASSISTANT
Payer: COMMERCIAL

## 2019-01-01 ENCOUNTER — HOSPITAL ENCOUNTER (INPATIENT)
Facility: CLINIC | Age: 59
Setting detail: SURGERY ADMIT
End: 2019-01-01
Attending: THORACIC SURGERY (CARDIOTHORACIC VASCULAR SURGERY) | Admitting: THORACIC SURGERY (CARDIOTHORACIC VASCULAR SURGERY)
Payer: COMMERCIAL

## 2019-01-01 ENCOUNTER — HOSPITAL ENCOUNTER (OUTPATIENT)
Facility: AMBULATORY SURGERY CENTER | Age: 59
End: 2019-07-17
Attending: PHYSICIAN ASSISTANT
Payer: COMMERCIAL

## 2019-01-01 ENCOUNTER — ANCILLARY PROCEDURE (OUTPATIENT)
Dept: CT IMAGING | Facility: CLINIC | Age: 59
End: 2019-01-01
Attending: INTERNAL MEDICINE
Payer: COMMERCIAL

## 2019-01-01 ENCOUNTER — APPOINTMENT (OUTPATIENT)
Dept: CARDIOLOGY | Facility: CLINIC | Age: 59
DRG: 253 | End: 2019-01-01
Payer: COMMERCIAL

## 2019-01-01 ENCOUNTER — PATIENT OUTREACH (OUTPATIENT)
Dept: ONCOLOGY | Facility: CLINIC | Age: 59
End: 2019-01-01

## 2019-01-01 ENCOUNTER — THERAPY VISIT (OUTPATIENT)
Dept: PHYSICAL THERAPY | Facility: CLINIC | Age: 59
End: 2019-01-01
Payer: COMMERCIAL

## 2019-01-01 ENCOUNTER — INFUSION THERAPY VISIT (OUTPATIENT)
Dept: ONCOLOGY | Facility: CLINIC | Age: 59
DRG: 253 | End: 2019-01-01
Attending: INTERNAL MEDICINE
Payer: COMMERCIAL

## 2019-01-01 ENCOUNTER — ANCILLARY PROCEDURE (OUTPATIENT)
Dept: RADIOLOGY | Facility: AMBULATORY SURGERY CENTER | Age: 59
End: 2019-01-01
Attending: INTERNAL MEDICINE
Payer: COMMERCIAL

## 2019-01-01 ENCOUNTER — DOCUMENTATION ONLY (OUTPATIENT)
Dept: ONCOLOGY | Facility: CLINIC | Age: 59
End: 2019-01-01

## 2019-01-01 ENCOUNTER — APPOINTMENT (OUTPATIENT)
Dept: INTERVENTIONAL RADIOLOGY/VASCULAR | Facility: CLINIC | Age: 59
DRG: 253 | End: 2019-01-01
Attending: NURSE PRACTITIONER
Payer: COMMERCIAL

## 2019-01-01 ENCOUNTER — APPOINTMENT (OUTPATIENT)
Dept: CARDIOLOGY | Facility: CLINIC | Age: 59
DRG: 253 | End: 2019-01-01
Attending: INTERNAL MEDICINE
Payer: COMMERCIAL

## 2019-01-01 VITALS
RESPIRATION RATE: 16 BRPM | BODY MASS INDEX: 26.93 KG/M2 | WEIGHT: 190.4 LBS | SYSTOLIC BLOOD PRESSURE: 141 MMHG | DIASTOLIC BLOOD PRESSURE: 89 MMHG | OXYGEN SATURATION: 99 % | TEMPERATURE: 98.2 F | HEART RATE: 82 BPM

## 2019-01-01 VITALS
HEIGHT: 71 IN | HEART RATE: 55 BPM | BODY MASS INDEX: 26.96 KG/M2 | DIASTOLIC BLOOD PRESSURE: 81 MMHG | OXYGEN SATURATION: 98 % | SYSTOLIC BLOOD PRESSURE: 140 MMHG | RESPIRATION RATE: 18 BRPM | WEIGHT: 192.6 LBS | TEMPERATURE: 97.7 F

## 2019-01-01 VITALS
OXYGEN SATURATION: 98 % | DIASTOLIC BLOOD PRESSURE: 77 MMHG | TEMPERATURE: 98.1 F | RESPIRATION RATE: 18 BRPM | SYSTOLIC BLOOD PRESSURE: 119 MMHG | WEIGHT: 193 LBS | BODY MASS INDEX: 27.63 KG/M2 | HEART RATE: 74 BPM | HEIGHT: 70 IN

## 2019-01-01 VITALS
SYSTOLIC BLOOD PRESSURE: 126 MMHG | DIASTOLIC BLOOD PRESSURE: 77 MMHG | HEART RATE: 80 BPM | WEIGHT: 190.1 LBS | TEMPERATURE: 98.1 F | RESPIRATION RATE: 16 BRPM | OXYGEN SATURATION: 98 % | BODY MASS INDEX: 27.28 KG/M2

## 2019-01-01 VITALS
HEART RATE: 91 BPM | OXYGEN SATURATION: 98 % | TEMPERATURE: 98.1 F | DIASTOLIC BLOOD PRESSURE: 62 MMHG | SYSTOLIC BLOOD PRESSURE: 129 MMHG | RESPIRATION RATE: 16 BRPM | WEIGHT: 195.2 LBS | BODY MASS INDEX: 27.61 KG/M2

## 2019-01-01 VITALS
HEART RATE: 99 BPM | WEIGHT: 193.1 LBS | BODY MASS INDEX: 27.31 KG/M2 | DIASTOLIC BLOOD PRESSURE: 99 MMHG | RESPIRATION RATE: 14 BRPM | TEMPERATURE: 97.7 F | OXYGEN SATURATION: 99 % | SYSTOLIC BLOOD PRESSURE: 128 MMHG

## 2019-01-01 VITALS
HEIGHT: 71 IN | WEIGHT: 185.4 LBS | OXYGEN SATURATION: 96 % | HEART RATE: 114 BPM | BODY MASS INDEX: 25.96 KG/M2 | DIASTOLIC BLOOD PRESSURE: 73 MMHG | RESPIRATION RATE: 16 BRPM | SYSTOLIC BLOOD PRESSURE: 108 MMHG | TEMPERATURE: 98.4 F

## 2019-01-01 VITALS
SYSTOLIC BLOOD PRESSURE: 124 MMHG | TEMPERATURE: 98.3 F | HEART RATE: 96 BPM | HEIGHT: 70 IN | BODY MASS INDEX: 27.17 KG/M2 | OXYGEN SATURATION: 97 % | DIASTOLIC BLOOD PRESSURE: 76 MMHG | WEIGHT: 189.8 LBS | RESPIRATION RATE: 16 BRPM

## 2019-01-01 VITALS
HEART RATE: 72 BPM | RESPIRATION RATE: 16 BRPM | DIASTOLIC BLOOD PRESSURE: 70 MMHG | TEMPERATURE: 99.3 F | OXYGEN SATURATION: 97 % | SYSTOLIC BLOOD PRESSURE: 111 MMHG

## 2019-01-01 VITALS
HEART RATE: 83 BPM | TEMPERATURE: 98.6 F | SYSTOLIC BLOOD PRESSURE: 125 MMHG | BODY MASS INDEX: 27.89 KG/M2 | DIASTOLIC BLOOD PRESSURE: 76 MMHG | RESPIRATION RATE: 16 BRPM | OXYGEN SATURATION: 98 % | WEIGHT: 194.4 LBS

## 2019-01-01 VITALS
DIASTOLIC BLOOD PRESSURE: 80 MMHG | RESPIRATION RATE: 16 BRPM | WEIGHT: 193 LBS | BODY MASS INDEX: 27.63 KG/M2 | HEIGHT: 70 IN | SYSTOLIC BLOOD PRESSURE: 119 MMHG | HEART RATE: 84 BPM | OXYGEN SATURATION: 96 % | TEMPERATURE: 98.9 F

## 2019-01-01 VITALS
WEIGHT: 196.9 LBS | DIASTOLIC BLOOD PRESSURE: 75 MMHG | TEMPERATURE: 97.7 F | HEART RATE: 91 BPM | RESPIRATION RATE: 16 BRPM | SYSTOLIC BLOOD PRESSURE: 123 MMHG | BODY MASS INDEX: 28.25 KG/M2 | OXYGEN SATURATION: 97 %

## 2019-01-01 VITALS
RESPIRATION RATE: 20 BRPM | OXYGEN SATURATION: 94 % | SYSTOLIC BLOOD PRESSURE: 115 MMHG | DIASTOLIC BLOOD PRESSURE: 73 MMHG | HEART RATE: 101 BPM

## 2019-01-01 VITALS
HEART RATE: 85 BPM | RESPIRATION RATE: 14 BRPM | HEIGHT: 70 IN | DIASTOLIC BLOOD PRESSURE: 73 MMHG | WEIGHT: 186 LBS | TEMPERATURE: 98.4 F | SYSTOLIC BLOOD PRESSURE: 124 MMHG | BODY MASS INDEX: 26.63 KG/M2 | OXYGEN SATURATION: 98 %

## 2019-01-01 VITALS
BODY MASS INDEX: 28.27 KG/M2 | RESPIRATION RATE: 16 BRPM | OXYGEN SATURATION: 98 % | HEART RATE: 84 BPM | SYSTOLIC BLOOD PRESSURE: 127 MMHG | WEIGHT: 197 LBS | DIASTOLIC BLOOD PRESSURE: 79 MMHG | TEMPERATURE: 98.1 F

## 2019-01-01 VITALS
TEMPERATURE: 98.4 F | SYSTOLIC BLOOD PRESSURE: 125 MMHG | WEIGHT: 192.6 LBS | HEART RATE: 96 BPM | DIASTOLIC BLOOD PRESSURE: 69 MMHG | RESPIRATION RATE: 16 BRPM | OXYGEN SATURATION: 98 % | BODY MASS INDEX: 27.64 KG/M2

## 2019-01-01 VITALS
DIASTOLIC BLOOD PRESSURE: 80 MMHG | BODY MASS INDEX: 27.32 KG/M2 | WEIGHT: 190.8 LBS | RESPIRATION RATE: 16 BRPM | SYSTOLIC BLOOD PRESSURE: 124 MMHG | HEIGHT: 70 IN | HEART RATE: 74 BPM | TEMPERATURE: 97.3 F | OXYGEN SATURATION: 98 %

## 2019-01-01 VITALS
WEIGHT: 192 LBS | OXYGEN SATURATION: 98 % | SYSTOLIC BLOOD PRESSURE: 125 MMHG | HEART RATE: 96 BPM | RESPIRATION RATE: 16 BRPM | BODY MASS INDEX: 27.55 KG/M2 | TEMPERATURE: 98.4 F | DIASTOLIC BLOOD PRESSURE: 69 MMHG

## 2019-01-01 VITALS
SYSTOLIC BLOOD PRESSURE: 138 MMHG | DIASTOLIC BLOOD PRESSURE: 91 MMHG | RESPIRATION RATE: 18 BRPM | TEMPERATURE: 97.6 F | HEART RATE: 75 BPM | BODY MASS INDEX: 26.59 KG/M2 | WEIGHT: 188 LBS | OXYGEN SATURATION: 98 %

## 2019-01-01 VITALS
WEIGHT: 197.7 LBS | TEMPERATURE: 98.4 F | BODY MASS INDEX: 28.3 KG/M2 | OXYGEN SATURATION: 96 % | HEART RATE: 93 BPM | SYSTOLIC BLOOD PRESSURE: 143 MMHG | DIASTOLIC BLOOD PRESSURE: 74 MMHG | RESPIRATION RATE: 16 BRPM | HEIGHT: 70 IN

## 2019-01-01 VITALS
BODY MASS INDEX: 27.85 KG/M2 | SYSTOLIC BLOOD PRESSURE: 112 MMHG | WEIGHT: 194.1 LBS | TEMPERATURE: 99.4 F | HEART RATE: 106 BPM | RESPIRATION RATE: 16 BRPM | OXYGEN SATURATION: 97 % | DIASTOLIC BLOOD PRESSURE: 73 MMHG

## 2019-01-01 VITALS
TEMPERATURE: 98.9 F | DIASTOLIC BLOOD PRESSURE: 94 MMHG | SYSTOLIC BLOOD PRESSURE: 158 MMHG | HEART RATE: 76 BPM | BODY MASS INDEX: 27.08 KG/M2 | RESPIRATION RATE: 16 BRPM | OXYGEN SATURATION: 98 % | WEIGHT: 191.5 LBS

## 2019-01-01 DIAGNOSIS — E86.0 DEHYDRATION: ICD-10-CM

## 2019-01-01 DIAGNOSIS — C49.9 SARCOMA OF SOFT TISSUE (H): ICD-10-CM

## 2019-01-01 DIAGNOSIS — Z98.890 SURGICAL PROCEDURE ON LOWER EXTREMITY WITHIN PAST 6 MONTHS: ICD-10-CM

## 2019-01-01 DIAGNOSIS — T45.1X5A CHEMOTHERAPY-INDUCED NAUSEA AND VOMITING: ICD-10-CM

## 2019-01-01 DIAGNOSIS — C49.21 SOFT TISSUE SARCOMA OF RIGHT THIGH (H): ICD-10-CM

## 2019-01-01 DIAGNOSIS — M79.604 PAIN OF RIGHT LOWER EXTREMITY: ICD-10-CM

## 2019-01-01 DIAGNOSIS — C78.02 METASTATIC SARCOMA TO LUNG, LEFT (H): ICD-10-CM

## 2019-01-01 DIAGNOSIS — R91.8 PULMONARY NODULES: ICD-10-CM

## 2019-01-01 DIAGNOSIS — K76.9 LIVER LESION: ICD-10-CM

## 2019-01-01 DIAGNOSIS — D63.0 ANEMIA IN NEOPLASTIC DISEASE: Primary | ICD-10-CM

## 2019-01-01 DIAGNOSIS — Z79.899 ENCOUNTER FOR LONG-TERM (CURRENT) USE OF MEDICATIONS: ICD-10-CM

## 2019-01-01 DIAGNOSIS — C78.01 SECONDARY MALIGNANT NEOPLASM OF RIGHT LUNG (H): ICD-10-CM

## 2019-01-01 DIAGNOSIS — Z86.711 HISTORY OF PULMONARY EMBOLISM: ICD-10-CM

## 2019-01-01 DIAGNOSIS — D63.0 ANEMIA IN NEOPLASTIC DISEASE: ICD-10-CM

## 2019-01-01 DIAGNOSIS — J90 PLEURAL EFFUSION: Primary | ICD-10-CM

## 2019-01-01 DIAGNOSIS — C49.21 SOFT TISSUE SARCOMA OF RIGHT THIGH (H): Primary | ICD-10-CM

## 2019-01-01 DIAGNOSIS — C49.9 SARCOMA OF SOFT TISSUE (H): Primary | ICD-10-CM

## 2019-01-01 DIAGNOSIS — M79.651 PAIN OF RIGHT THIGH: ICD-10-CM

## 2019-01-01 DIAGNOSIS — C79.51 BONE METASTASIS: ICD-10-CM

## 2019-01-01 DIAGNOSIS — J93.12 SECONDARY SPONTANEOUS PNEUMOTHORAX: ICD-10-CM

## 2019-01-01 DIAGNOSIS — R11.2 CHEMOTHERAPY-INDUCED NAUSEA AND VOMITING: ICD-10-CM

## 2019-01-01 DIAGNOSIS — Z87.891 PERSONAL HISTORY OF TOBACCO USE, PRESENTING HAZARDS TO HEALTH: ICD-10-CM

## 2019-01-01 DIAGNOSIS — R91.8 PULMONARY NODULES: Primary | ICD-10-CM

## 2019-01-01 DIAGNOSIS — T45.1X5A CHEMOTHERAPY-INDUCED NAUSEA AND VOMITING: Primary | ICD-10-CM

## 2019-01-01 DIAGNOSIS — J90 PLEURAL EFFUSION: ICD-10-CM

## 2019-01-01 DIAGNOSIS — M79.651 PAIN OF RIGHT THIGH: Primary | ICD-10-CM

## 2019-01-01 DIAGNOSIS — R05.9 COUGH: ICD-10-CM

## 2019-01-01 DIAGNOSIS — M10.00 IDIOPATHIC GOUT, UNSPECIFIED CHRONICITY, UNSPECIFIED SITE: ICD-10-CM

## 2019-01-01 DIAGNOSIS — C49.9 SARCOMA (H): ICD-10-CM

## 2019-01-01 DIAGNOSIS — C78.02 SECONDARY MALIGNANT NEOPLASM OF LEFT LUNG (H): ICD-10-CM

## 2019-01-01 DIAGNOSIS — I26.99 OTHER PULMONARY EMBOLISM WITHOUT ACUTE COR PULMONALE, UNSPECIFIED CHRONICITY (H): ICD-10-CM

## 2019-01-01 DIAGNOSIS — R11.2 CHEMOTHERAPY-INDUCED NAUSEA AND VOMITING: Primary | ICD-10-CM

## 2019-01-01 DIAGNOSIS — I82.4Y1 DEEP VEIN THROMBOSIS (DVT) OF PROXIMAL VEIN OF RIGHT LOWER EXTREMITY, UNSPECIFIED CHRONICITY (H): ICD-10-CM

## 2019-01-01 DIAGNOSIS — R04.2 HEMOPTYSIS: ICD-10-CM

## 2019-01-01 DIAGNOSIS — C78.02 METASTATIC SARCOMA TO LUNG, LEFT (H): Primary | ICD-10-CM

## 2019-01-01 DIAGNOSIS — R91.8 LUNG MASS: ICD-10-CM

## 2019-01-01 DIAGNOSIS — R07.9 CHEST PAIN, UNSPECIFIED TYPE: ICD-10-CM

## 2019-01-01 DIAGNOSIS — D70.2 OTHER DRUG-INDUCED NEUTROPENIA (H): Primary | ICD-10-CM

## 2019-01-01 DIAGNOSIS — Z95.828 PORT-A-CATH IN PLACE: ICD-10-CM

## 2019-01-01 DIAGNOSIS — C79.51 BONE METASTASIS: Primary | ICD-10-CM

## 2019-01-01 DIAGNOSIS — I31.39 PERICARDIAL EFFUSION: ICD-10-CM

## 2019-01-01 DIAGNOSIS — R06.00 DYSPNEA, UNSPECIFIED TYPE: ICD-10-CM

## 2019-01-01 DIAGNOSIS — M79.89 RIGHT LEG SWELLING: ICD-10-CM

## 2019-01-01 DIAGNOSIS — C49.9 SARCOMA (H): Primary | ICD-10-CM

## 2019-01-01 LAB
ABO + RH BLD: NORMAL
ABO + RH BLD: NORMAL
ACID FAST STN SPEC QL: NORMAL
ADENOSINE DEAMINASE PLR-CCNC: 13 U/L (ref 0–9.4)
ALBUMIN FLD-MCNC: 2.6 G/DL
ALBUMIN SERPL-MCNC: 2.2 G/DL (ref 3.4–5)
ALBUMIN SERPL-MCNC: 2.7 G/DL (ref 3.4–5)
ALBUMIN SERPL-MCNC: 2.7 G/DL (ref 3.4–5)
ALBUMIN SERPL-MCNC: 2.8 G/DL (ref 3.4–5)
ALBUMIN SERPL-MCNC: 2.8 G/DL (ref 3.4–5)
ALBUMIN SERPL-MCNC: 2.9 G/DL (ref 3.4–5)
ALBUMIN SERPL-MCNC: 3 G/DL (ref 3.4–5)
ALBUMIN SERPL-MCNC: 3 G/DL (ref 3.4–5)
ALBUMIN SERPL-MCNC: 3.2 G/DL (ref 3.4–5)
ALBUMIN SERPL-MCNC: 3.3 G/DL (ref 3.4–5)
ALBUMIN SERPL-MCNC: 3.4 G/DL (ref 3.4–5)
ALBUMIN SERPL-MCNC: 3.5 G/DL (ref 3.4–5)
ALBUMIN SERPL-MCNC: 3.6 G/DL (ref 3.4–5)
ALBUMIN SERPL-MCNC: 3.7 G/DL (ref 3.4–5)
ALBUMIN UR-MCNC: 10 MG/DL
ALP SERPL-CCNC: 107 U/L (ref 40–150)
ALP SERPL-CCNC: 113 U/L (ref 40–150)
ALP SERPL-CCNC: 129 U/L (ref 40–150)
ALP SERPL-CCNC: 131 U/L (ref 40–150)
ALP SERPL-CCNC: 132 U/L (ref 40–150)
ALP SERPL-CCNC: 134 U/L (ref 40–150)
ALP SERPL-CCNC: 136 U/L (ref 40–150)
ALP SERPL-CCNC: 137 U/L (ref 40–150)
ALP SERPL-CCNC: 138 U/L (ref 40–150)
ALP SERPL-CCNC: 140 U/L (ref 40–150)
ALP SERPL-CCNC: 140 U/L (ref 40–150)
ALP SERPL-CCNC: 143 U/L (ref 40–150)
ALP SERPL-CCNC: 145 U/L (ref 40–150)
ALP SERPL-CCNC: 146 U/L (ref 40–150)
ALP SERPL-CCNC: 90 U/L (ref 40–150)
ALP SERPL-CCNC: 92 U/L (ref 40–150)
ALT SERPL W P-5'-P-CCNC: 18 U/L (ref 0–70)
ALT SERPL W P-5'-P-CCNC: 20 U/L (ref 0–70)
ALT SERPL W P-5'-P-CCNC: 20 U/L (ref 0–70)
ALT SERPL W P-5'-P-CCNC: 21 U/L (ref 0–70)
ALT SERPL W P-5'-P-CCNC: 22 U/L (ref 0–70)
ALT SERPL W P-5'-P-CCNC: 22 U/L (ref 0–70)
ALT SERPL W P-5'-P-CCNC: 24 U/L (ref 0–70)
ALT SERPL W P-5'-P-CCNC: 26 U/L (ref 0–70)
ALT SERPL W P-5'-P-CCNC: 30 U/L (ref 0–70)
ALT SERPL W P-5'-P-CCNC: 30 U/L (ref 0–70)
ALT SERPL W P-5'-P-CCNC: 36 U/L (ref 0–70)
ALT SERPL W P-5'-P-CCNC: 36 U/L (ref 0–70)
ALT SERPL W P-5'-P-CCNC: 39 U/L (ref 0–70)
ALT SERPL W P-5'-P-CCNC: 56 U/L (ref 0–70)
AMYLASE FLD-CCNC: 32 U/L
ANION GAP SERPL CALCULATED.3IONS-SCNC: 11 MMOL/L (ref 3–14)
ANION GAP SERPL CALCULATED.3IONS-SCNC: 3 MMOL/L (ref 3–14)
ANION GAP SERPL CALCULATED.3IONS-SCNC: 4 MMOL/L (ref 3–14)
ANION GAP SERPL CALCULATED.3IONS-SCNC: 5 MMOL/L (ref 3–14)
ANION GAP SERPL CALCULATED.3IONS-SCNC: 6 MMOL/L (ref 3–14)
ANION GAP SERPL CALCULATED.3IONS-SCNC: 7 MMOL/L (ref 3–14)
ANION GAP SERPL CALCULATED.3IONS-SCNC: 8 MMOL/L (ref 3–14)
ANISOCYTOSIS BLD QL SMEAR: ABNORMAL
APPEARANCE FLD: NORMAL
APPEARANCE UR: CLEAR
APTT PPP: <20 SEC (ref 22–37)
AST SERPL W P-5'-P-CCNC: 13 U/L (ref 0–45)
AST SERPL W P-5'-P-CCNC: 15 U/L (ref 0–45)
AST SERPL W P-5'-P-CCNC: 16 U/L (ref 0–45)
AST SERPL W P-5'-P-CCNC: 16 U/L (ref 0–45)
AST SERPL W P-5'-P-CCNC: 17 U/L (ref 0–45)
AST SERPL W P-5'-P-CCNC: 18 U/L (ref 0–45)
AST SERPL W P-5'-P-CCNC: 18 U/L (ref 0–45)
AST SERPL W P-5'-P-CCNC: 19 U/L (ref 0–45)
AST SERPL W P-5'-P-CCNC: 20 U/L (ref 0–45)
AST SERPL W P-5'-P-CCNC: 25 U/L (ref 0–45)
AST SERPL W P-5'-P-CCNC: 29 U/L (ref 0–45)
AST SERPL W P-5'-P-CCNC: 34 U/L (ref 0–45)
AST SERPL W P-5'-P-CCNC: 36 U/L (ref 0–45)
AST SERPL W P-5'-P-CCNC: 40 U/L (ref 0–45)
BACTERIA #/AREA URNS HPF: ABNORMAL /HPF
BACTERIA SPEC CULT: NO GROWTH
BACTERIA SPEC CULT: NORMAL
BASOPHILS # BLD AUTO: 0 10E9/L (ref 0–0.2)
BASOPHILS # BLD AUTO: 0.1 10E9/L (ref 0–0.2)
BASOPHILS # BLD AUTO: 0.1 10E9/L (ref 0–0.2)
BASOPHILS NFR BLD AUTO: 0.2 %
BASOPHILS NFR BLD AUTO: 0.3 %
BASOPHILS NFR BLD AUTO: 0.4 %
BASOPHILS NFR BLD AUTO: 0.6 %
BASOPHILS NFR BLD AUTO: 0.7 %
BASOPHILS NFR BLD AUTO: 0.8 %
BASOPHILS NFR BLD AUTO: 0.8 %
BASOPHILS NFR BLD AUTO: 0.9 %
BASOPHILS NFR BLD AUTO: 0.9 %
BASOPHILS NFR BLD AUTO: 1 %
BILIRUB DIRECT SERPL-MCNC: 0.1 MG/DL (ref 0–0.2)
BILIRUB SERPL-MCNC: 0.2 MG/DL (ref 0.2–1.3)
BILIRUB SERPL-MCNC: 0.3 MG/DL (ref 0.2–1.3)
BILIRUB SERPL-MCNC: 0.4 MG/DL (ref 0.2–1.3)
BILIRUB SERPL-MCNC: 0.5 MG/DL (ref 0.2–1.3)
BILIRUB SERPL-MCNC: 0.5 MG/DL (ref 0.2–1.3)
BILIRUB SERPL-MCNC: 0.6 MG/DL (ref 0.2–1.3)
BILIRUB SERPL-MCNC: 0.7 MG/DL (ref 0.2–1.3)
BILIRUB UR QL STRIP: NEGATIVE
BLD GP AB SCN SERPL QL: NORMAL
BLD PROD TYP BPU: NORMAL
BLD UNIT ID BPU: 0
BLD UNIT ID BPU: 0
BLOOD BANK CMNT PATIENT-IMP: NORMAL
BLOOD PRODUCT CODE: NORMAL
BLOOD PRODUCT CODE: NORMAL
BPU ID: NORMAL
BPU ID: NORMAL
BUN SERPL-MCNC: 10 MG/DL (ref 7–30)
BUN SERPL-MCNC: 11 MG/DL (ref 7–30)
BUN SERPL-MCNC: 12 MG/DL (ref 7–30)
BUN SERPL-MCNC: 13 MG/DL (ref 7–30)
BUN SERPL-MCNC: 14 MG/DL (ref 7–30)
BUN SERPL-MCNC: 15 MG/DL (ref 7–30)
BUN SERPL-MCNC: 15 MG/DL (ref 7–30)
BUN SERPL-MCNC: 16 MG/DL (ref 7–30)
BUN SERPL-MCNC: 17 MG/DL (ref 7–30)
BURR CELLS BLD QL SMEAR: ABNORMAL
CALCIUM SERPL-MCNC: 8.8 MG/DL (ref 8.5–10.1)
CALCIUM SERPL-MCNC: 8.8 MG/DL (ref 8.5–10.1)
CALCIUM SERPL-MCNC: 8.9 MG/DL (ref 8.5–10.1)
CALCIUM SERPL-MCNC: 8.9 MG/DL (ref 8.5–10.1)
CALCIUM SERPL-MCNC: 9 MG/DL (ref 8.5–10.1)
CALCIUM SERPL-MCNC: 9 MG/DL (ref 8.5–10.1)
CALCIUM SERPL-MCNC: 9.1 MG/DL (ref 8.5–10.1)
CALCIUM SERPL-MCNC: 9.2 MG/DL (ref 8.5–10.1)
CALCIUM SERPL-MCNC: 9.3 MG/DL (ref 8.5–10.1)
CALCIUM SERPL-MCNC: 9.4 MG/DL (ref 8.5–10.1)
CALCIUM SERPL-MCNC: 9.5 MG/DL (ref 8.5–10.1)
CALCIUM SERPL-MCNC: 9.6 MG/DL (ref 8.5–10.1)
CALCIUM SERPL-MCNC: 9.7 MG/DL (ref 8.5–10.1)
CALCIUM SERPL-MCNC: 9.8 MG/DL (ref 8.5–10.1)
CHLORIDE SERPL-SCNC: 104 MMOL/L (ref 94–109)
CHLORIDE SERPL-SCNC: 105 MMOL/L (ref 94–109)
CHLORIDE SERPL-SCNC: 106 MMOL/L (ref 94–109)
CHLORIDE SERPL-SCNC: 107 MMOL/L (ref 94–109)
CHLORIDE SERPL-SCNC: 107 MMOL/L (ref 94–109)
CHLORIDE SERPL-SCNC: 108 MMOL/L (ref 94–109)
CHOLEST FLD-MCNC: 130 MG/DL
CHYLO FLD QL: NORMAL
CO2 SERPL-SCNC: 20 MMOL/L (ref 20–32)
CO2 SERPL-SCNC: 22 MMOL/L (ref 20–32)
CO2 SERPL-SCNC: 22 MMOL/L (ref 20–32)
CO2 SERPL-SCNC: 23 MMOL/L (ref 20–32)
CO2 SERPL-SCNC: 23 MMOL/L (ref 20–32)
CO2 SERPL-SCNC: 24 MMOL/L (ref 20–32)
CO2 SERPL-SCNC: 25 MMOL/L (ref 20–32)
CO2 SERPL-SCNC: 26 MMOL/L (ref 20–32)
CO2 SERPL-SCNC: 27 MMOL/L (ref 20–32)
CO2 SERPL-SCNC: 27 MMOL/L (ref 20–32)
CO2 SERPL-SCNC: 30 MMOL/L (ref 20–32)
COLOR FLD: NORMAL
COLOR UR AUTO: YELLOW
COPATH REPORT: NORMAL
COPATH REPORT: NORMAL
CREAT SERPL-MCNC: 0.71 MG/DL (ref 0.66–1.25)
CREAT SERPL-MCNC: 0.74 MG/DL (ref 0.66–1.25)
CREAT SERPL-MCNC: 0.75 MG/DL (ref 0.66–1.25)
CREAT SERPL-MCNC: 0.77 MG/DL (ref 0.66–1.25)
CREAT SERPL-MCNC: 0.79 MG/DL (ref 0.66–1.25)
CREAT SERPL-MCNC: 0.8 MG/DL (ref 0.66–1.25)
CREAT SERPL-MCNC: 0.8 MG/DL (ref 0.66–1.25)
CREAT SERPL-MCNC: 0.81 MG/DL (ref 0.66–1.25)
CREAT SERPL-MCNC: 0.84 MG/DL (ref 0.66–1.25)
CREAT SERPL-MCNC: 0.85 MG/DL (ref 0.66–1.25)
CREAT SERPL-MCNC: 0.86 MG/DL (ref 0.66–1.25)
CREAT SERPL-MCNC: 0.89 MG/DL (ref 0.66–1.25)
CREAT SERPL-MCNC: 0.9 MG/DL (ref 0.66–1.25)
CREAT SERPL-MCNC: 0.91 MG/DL (ref 0.66–1.25)
CREAT SERPL-MCNC: 0.95 MG/DL (ref 0.66–1.25)
CREAT SERPL-MCNC: 1.01 MG/DL (ref 0.66–1.25)
DIFFERENTIAL METHOD BLD: ABNORMAL
EOSINOPHIL # BLD AUTO: 0 10E9/L (ref 0–0.7)
EOSINOPHIL # BLD AUTO: 0.1 10E9/L (ref 0–0.7)
EOSINOPHIL # BLD AUTO: 0.2 10E9/L (ref 0–0.7)
EOSINOPHIL # BLD AUTO: 0.3 10E9/L (ref 0–0.7)
EOSINOPHIL NFR BLD AUTO: 0.1 %
EOSINOPHIL NFR BLD AUTO: 0.1 %
EOSINOPHIL NFR BLD AUTO: 0.3 %
EOSINOPHIL NFR BLD AUTO: 0.4 %
EOSINOPHIL NFR BLD AUTO: 0.5 %
EOSINOPHIL NFR BLD AUTO: 0.7 %
EOSINOPHIL NFR BLD AUTO: 0.9 %
EOSINOPHIL NFR BLD AUTO: 1 %
EOSINOPHIL NFR BLD AUTO: 1.3 %
EOSINOPHIL NFR BLD AUTO: 1.3 %
EOSINOPHIL NFR BLD AUTO: 1.6 %
EOSINOPHIL NFR BLD AUTO: 1.7 %
EOSINOPHIL NFR BLD AUTO: 1.9 %
EOSINOPHIL NFR BLD AUTO: 1.9 %
EOSINOPHIL NFR BLD AUTO: 5 %
EOSINOPHIL NFR BLD AUTO: 6.4 %
EOSINOPHIL NFR FLD MANUAL: 9 %
ERYTHROCYTE [DISTWIDTH] IN BLOOD BY AUTOMATED COUNT: 14.2 % (ref 10–15)
ERYTHROCYTE [DISTWIDTH] IN BLOOD BY AUTOMATED COUNT: 14.3 % (ref 10–15)
ERYTHROCYTE [DISTWIDTH] IN BLOOD BY AUTOMATED COUNT: 14.9 % (ref 10–15)
ERYTHROCYTE [DISTWIDTH] IN BLOOD BY AUTOMATED COUNT: 15.3 % (ref 10–15)
ERYTHROCYTE [DISTWIDTH] IN BLOOD BY AUTOMATED COUNT: 16.8 % (ref 10–15)
ERYTHROCYTE [DISTWIDTH] IN BLOOD BY AUTOMATED COUNT: 17 % (ref 10–15)
ERYTHROCYTE [DISTWIDTH] IN BLOOD BY AUTOMATED COUNT: 17.8 % (ref 10–15)
ERYTHROCYTE [DISTWIDTH] IN BLOOD BY AUTOMATED COUNT: 18.9 % (ref 10–15)
ERYTHROCYTE [DISTWIDTH] IN BLOOD BY AUTOMATED COUNT: 19.1 % (ref 10–15)
ERYTHROCYTE [DISTWIDTH] IN BLOOD BY AUTOMATED COUNT: 19.1 % (ref 10–15)
ERYTHROCYTE [DISTWIDTH] IN BLOOD BY AUTOMATED COUNT: 19.9 % (ref 10–15)
ERYTHROCYTE [DISTWIDTH] IN BLOOD BY AUTOMATED COUNT: 20.4 % (ref 10–15)
ERYTHROCYTE [DISTWIDTH] IN BLOOD BY AUTOMATED COUNT: 20.5 % (ref 10–15)
ERYTHROCYTE [DISTWIDTH] IN BLOOD BY AUTOMATED COUNT: 21.2 % (ref 10–15)
ERYTHROCYTE [DISTWIDTH] IN BLOOD BY AUTOMATED COUNT: 21.9 % (ref 10–15)
FIBRINOGEN PPP-MCNC: 607 MG/DL (ref 200–420)
FUNGUS SPEC CULT: NORMAL
GFR SERPL CREATININE-BSD FRML MDRD: 80 ML/MIN/{1.73_M2}
GFR SERPL CREATININE-BSD FRML MDRD: 86 ML/MIN/{1.73_M2}
GFR SERPL CREATININE-BSD FRML MDRD: >90 ML/MIN/{1.73_M2}
GLUCOSE FLD-MCNC: 99 MG/DL
GLUCOSE SERPL-MCNC: 100 MG/DL (ref 70–99)
GLUCOSE SERPL-MCNC: 101 MG/DL (ref 70–99)
GLUCOSE SERPL-MCNC: 101 MG/DL (ref 70–99)
GLUCOSE SERPL-MCNC: 102 MG/DL (ref 70–99)
GLUCOSE SERPL-MCNC: 104 MG/DL (ref 70–99)
GLUCOSE SERPL-MCNC: 108 MG/DL (ref 70–99)
GLUCOSE SERPL-MCNC: 126 MG/DL (ref 70–99)
GLUCOSE SERPL-MCNC: 83 MG/DL (ref 70–99)
GLUCOSE SERPL-MCNC: 85 MG/DL (ref 70–99)
GLUCOSE SERPL-MCNC: 86 MG/DL (ref 70–99)
GLUCOSE SERPL-MCNC: 94 MG/DL (ref 70–99)
GLUCOSE SERPL-MCNC: 95 MG/DL (ref 70–99)
GLUCOSE SERPL-MCNC: 98 MG/DL (ref 70–99)
GLUCOSE UR STRIP-MCNC: NEGATIVE MG/DL
GRAM STN SPEC: NORMAL
HCT VFR BLD AUTO: 24.8 % (ref 40–53)
HCT VFR BLD AUTO: 25.8 % (ref 40–53)
HCT VFR BLD AUTO: 27.2 % (ref 40–53)
HCT VFR BLD AUTO: 27.4 % (ref 40–53)
HCT VFR BLD AUTO: 27.8 % (ref 40–53)
HCT VFR BLD AUTO: 27.8 % (ref 40–53)
HCT VFR BLD AUTO: 28.7 % (ref 40–53)
HCT VFR BLD AUTO: 28.8 % (ref 40–53)
HCT VFR BLD AUTO: 29.7 % (ref 40–53)
HCT VFR BLD AUTO: 29.8 % (ref 40–53)
HCT VFR BLD AUTO: 31.6 % (ref 40–53)
HCT VFR BLD AUTO: 34.2 % (ref 40–53)
HCT VFR BLD AUTO: 34.4 % (ref 40–53)
HCT VFR BLD AUTO: 34.8 % (ref 40–53)
HCT VFR BLD AUTO: 35.5 % (ref 40–53)
HCT VFR BLD AUTO: 35.8 % (ref 40–53)
HCT VFR BLD AUTO: 36.2 % (ref 40–53)
HCT VFR BLD AUTO: 38.2 % (ref 40–53)
HCT VFR BLD AUTO: 44.9 % (ref 40–53)
HGB BLD-MCNC: 10.2 G/DL (ref 13.3–17.7)
HGB BLD-MCNC: 10.4 G/DL (ref 13.3–17.7)
HGB BLD-MCNC: 10.7 G/DL (ref 13.3–17.7)
HGB BLD-MCNC: 11.1 G/DL (ref 13.3–17.7)
HGB BLD-MCNC: 11.5 G/DL (ref 13.3–17.7)
HGB BLD-MCNC: 11.5 G/DL (ref 13.3–17.7)
HGB BLD-MCNC: 12 G/DL (ref 13.3–17.7)
HGB BLD-MCNC: 13.5 G/DL (ref 13.3–17.7)
HGB BLD-MCNC: 7.3 G/DL (ref 13.3–17.7)
HGB BLD-MCNC: 7.7 G/DL (ref 13.3–17.7)
HGB BLD-MCNC: 8.2 G/DL (ref 13.3–17.7)
HGB BLD-MCNC: 8.3 G/DL (ref 13.3–17.7)
HGB BLD-MCNC: 8.4 G/DL (ref 13.3–17.7)
HGB BLD-MCNC: 8.7 G/DL (ref 13.3–17.7)
HGB BLD-MCNC: 8.7 G/DL (ref 13.3–17.7)
HGB BLD-MCNC: 8.8 G/DL (ref 13.3–17.7)
HGB BLD-MCNC: 9.2 G/DL (ref 13.3–17.7)
HGB BLD-MCNC: 9.3 G/DL (ref 13.3–17.7)
HGB BLD-MCNC: 9.6 G/DL (ref 13.3–17.7)
HGB UR QL STRIP: NEGATIVE
IMM GRANULOCYTES # BLD: 0 10E9/L (ref 0–0.4)
IMM GRANULOCYTES # BLD: 0.1 10E9/L (ref 0–0.4)
IMM GRANULOCYTES NFR BLD: 0.2 %
IMM GRANULOCYTES NFR BLD: 0.3 %
IMM GRANULOCYTES NFR BLD: 0.4 %
IMM GRANULOCYTES NFR BLD: 0.7 %
INR PPP: 1.06 (ref 0.86–1.14)
INR PPP: 1.26 (ref 0.86–1.14)
INTERPRETATION ECG - MUSE: NORMAL
KETONES UR STRIP-MCNC: NEGATIVE MG/DL
L PNEUMO1 AG UR QL IA: NORMAL
LACTATE BLD-SCNC: 1 MMOL/L (ref 0.7–2)
LDH FLD L TO P-CCNC: 903 U/L
LDH SERPL L TO P-CCNC: 151 U/L (ref 85–227)
LDH SERPL L TO P-CCNC: 188 U/L (ref 85–227)
LEUKOCYTE ESTERASE UR QL STRIP: NEGATIVE
LIPASE FLD-CCNC: 37 U/L
LYMPHOCYTES # BLD AUTO: 0.5 10E9/L (ref 0.8–5.3)
LYMPHOCYTES # BLD AUTO: 0.8 10E9/L (ref 0.8–5.3)
LYMPHOCYTES # BLD AUTO: 1.2 10E9/L (ref 0.8–5.3)
LYMPHOCYTES # BLD AUTO: 1.2 10E9/L (ref 0.8–5.3)
LYMPHOCYTES # BLD AUTO: 1.3 10E9/L (ref 0.8–5.3)
LYMPHOCYTES # BLD AUTO: 1.3 10E9/L (ref 0.8–5.3)
LYMPHOCYTES # BLD AUTO: 1.4 10E9/L (ref 0.8–5.3)
LYMPHOCYTES # BLD AUTO: 1.4 10E9/L (ref 0.8–5.3)
LYMPHOCYTES # BLD AUTO: 1.5 10E9/L (ref 0.8–5.3)
LYMPHOCYTES # BLD AUTO: 1.6 10E9/L (ref 0.8–5.3)
LYMPHOCYTES # BLD AUTO: 1.8 10E9/L (ref 0.8–5.3)
LYMPHOCYTES NFR BLD AUTO: 10.9 %
LYMPHOCYTES NFR BLD AUTO: 16.7 %
LYMPHOCYTES NFR BLD AUTO: 19.1 %
LYMPHOCYTES NFR BLD AUTO: 21.8 %
LYMPHOCYTES NFR BLD AUTO: 22.1 %
LYMPHOCYTES NFR BLD AUTO: 23.3 %
LYMPHOCYTES NFR BLD AUTO: 23.9 %
LYMPHOCYTES NFR BLD AUTO: 24.8 %
LYMPHOCYTES NFR BLD AUTO: 25.3 %
LYMPHOCYTES NFR BLD AUTO: 27.9 %
LYMPHOCYTES NFR BLD AUTO: 29.2 %
LYMPHOCYTES NFR BLD AUTO: 32.9 %
LYMPHOCYTES NFR BLD AUTO: 34.1 %
LYMPHOCYTES NFR BLD AUTO: 42.5 %
LYMPHOCYTES NFR BLD AUTO: 46.1 %
LYMPHOCYTES NFR BLD AUTO: 9.8 %
LYMPHOCYTES NFR FLD MANUAL: 18 %
Lab: NORMAL
MAGNESIUM SERPL-MCNC: 2.1 MG/DL (ref 1.6–2.3)
MAGNESIUM SERPL-MCNC: 2.1 MG/DL (ref 1.6–2.3)
MAGNESIUM SERPL-MCNC: 2.2 MG/DL (ref 1.6–2.3)
MAGNESIUM SERPL-MCNC: 2.3 MG/DL (ref 1.6–2.3)
MAGNESIUM SERPL-MCNC: 2.4 MG/DL (ref 1.6–2.3)
MAGNESIUM SERPL-MCNC: 2.4 MG/DL (ref 1.6–2.3)
MAGNESIUM SERPL-MCNC: 2.6 MG/DL (ref 1.6–2.3)
MCH RBC QN AUTO: 22.9 PG (ref 26.5–33)
MCH RBC QN AUTO: 23 PG (ref 26.5–33)
MCH RBC QN AUTO: 23.4 PG (ref 26.5–33)
MCH RBC QN AUTO: 23.7 PG (ref 26.5–33)
MCH RBC QN AUTO: 23.7 PG (ref 26.5–33)
MCH RBC QN AUTO: 23.9 PG (ref 26.5–33)
MCH RBC QN AUTO: 24 PG (ref 26.5–33)
MCH RBC QN AUTO: 24 PG (ref 26.5–33)
MCH RBC QN AUTO: 24.2 PG (ref 26.5–33)
MCH RBC QN AUTO: 24.4 PG (ref 26.5–33)
MCH RBC QN AUTO: 24.9 PG (ref 26.5–33)
MCH RBC QN AUTO: 25.7 PG (ref 26.5–33)
MCH RBC QN AUTO: 25.8 PG (ref 26.5–33)
MCH RBC QN AUTO: 25.8 PG (ref 26.5–33)
MCH RBC QN AUTO: 25.9 PG (ref 26.5–33)
MCH RBC QN AUTO: 26.7 PG (ref 26.5–33)
MCH RBC QN AUTO: 27.4 PG (ref 26.5–33)
MCH RBC QN AUTO: 27.5 PG (ref 26.5–33)
MCH RBC QN AUTO: 27.6 PG (ref 26.5–33)
MCHC RBC AUTO-ENTMCNC: 29.4 G/DL (ref 31.5–36.5)
MCHC RBC AUTO-ENTMCNC: 29.7 G/DL (ref 31.5–36.5)
MCHC RBC AUTO-ENTMCNC: 29.8 G/DL (ref 31.5–36.5)
MCHC RBC AUTO-ENTMCNC: 29.9 G/DL (ref 31.5–36.5)
MCHC RBC AUTO-ENTMCNC: 30.1 G/DL (ref 31.5–36.5)
MCHC RBC AUTO-ENTMCNC: 30.1 G/DL (ref 31.5–36.5)
MCHC RBC AUTO-ENTMCNC: 30.2 G/DL (ref 31.5–36.5)
MCHC RBC AUTO-ENTMCNC: 30.2 G/DL (ref 31.5–36.5)
MCHC RBC AUTO-ENTMCNC: 30.4 G/DL (ref 31.5–36.5)
MCHC RBC AUTO-ENTMCNC: 30.4 G/DL (ref 31.5–36.5)
MCHC RBC AUTO-ENTMCNC: 30.5 G/DL (ref 31.5–36.5)
MCHC RBC AUTO-ENTMCNC: 30.7 G/DL (ref 31.5–36.5)
MCHC RBC AUTO-ENTMCNC: 30.9 G/DL (ref 31.5–36.5)
MCHC RBC AUTO-ENTMCNC: 31.3 G/DL (ref 31.5–36.5)
MCHC RBC AUTO-ENTMCNC: 31.3 G/DL (ref 31.5–36.5)
MCHC RBC AUTO-ENTMCNC: 31.4 G/DL (ref 31.5–36.5)
MCHC RBC AUTO-ENTMCNC: 31.8 G/DL (ref 31.5–36.5)
MCHC RBC AUTO-ENTMCNC: 31.9 G/DL (ref 31.5–36.5)
MCHC RBC AUTO-ENTMCNC: 32.1 G/DL (ref 31.5–36.5)
MCV RBC AUTO: 78 FL (ref 78–100)
MCV RBC AUTO: 79 FL (ref 78–100)
MCV RBC AUTO: 80 FL (ref 78–100)
MCV RBC AUTO: 83 FL (ref 78–100)
MCV RBC AUTO: 84 FL (ref 78–100)
MCV RBC AUTO: 84 FL (ref 78–100)
MCV RBC AUTO: 85 FL (ref 78–100)
MCV RBC AUTO: 86 FL (ref 78–100)
MCV RBC AUTO: 86 FL (ref 78–100)
MCV RBC AUTO: 88 FL (ref 78–100)
MONOCYTES # BLD AUTO: 0 10E9/L (ref 0–1.3)
MONOCYTES # BLD AUTO: 0.1 10E9/L (ref 0–1.3)
MONOCYTES # BLD AUTO: 0.3 10E9/L (ref 0–1.3)
MONOCYTES # BLD AUTO: 0.3 10E9/L (ref 0–1.3)
MONOCYTES # BLD AUTO: 0.4 10E9/L (ref 0–1.3)
MONOCYTES # BLD AUTO: 0.5 10E9/L (ref 0–1.3)
MONOCYTES # BLD AUTO: 0.6 10E9/L (ref 0–1.3)
MONOCYTES # BLD AUTO: 0.7 10E9/L (ref 0–1.3)
MONOCYTES # BLD AUTO: 0.8 10E9/L (ref 0–1.3)
MONOCYTES # BLD AUTO: 0.8 10E9/L (ref 0–1.3)
MONOCYTES # BLD AUTO: 0.9 10E9/L (ref 0–1.3)
MONOCYTES # BLD AUTO: 0.9 10E9/L (ref 0–1.3)
MONOCYTES NFR BLD AUTO: 0 %
MONOCYTES NFR BLD AUTO: 1.2 %
MONOCYTES NFR BLD AUTO: 10.8 %
MONOCYTES NFR BLD AUTO: 11 %
MONOCYTES NFR BLD AUTO: 11.4 %
MONOCYTES NFR BLD AUTO: 11.4 %
MONOCYTES NFR BLD AUTO: 12.2 %
MONOCYTES NFR BLD AUTO: 13.4 %
MONOCYTES NFR BLD AUTO: 13.5 %
MONOCYTES NFR BLD AUTO: 16.6 %
MONOCYTES NFR BLD AUTO: 8.1 %
MONOCYTES NFR BLD AUTO: 8.1 %
MONOCYTES NFR BLD AUTO: 8.5 %
MONOCYTES NFR BLD AUTO: 8.5 %
MONOCYTES NFR BLD AUTO: 8.6 %
MONOCYTES NFR BLD AUTO: 8.8 %
MUCOUS THREADS #/AREA URNS LPF: PRESENT /LPF
MYCOBACTERIUM SPEC CULT: NORMAL
MYCOBACTERIUM SPEC CULT: NORMAL
NEUTROPHILS # BLD AUTO: 1.3 10E9/L (ref 1.6–8.3)
NEUTROPHILS # BLD AUTO: 1.5 10E9/L (ref 1.6–8.3)
NEUTROPHILS # BLD AUTO: 2.3 10E9/L (ref 1.6–8.3)
NEUTROPHILS # BLD AUTO: 2.5 10E9/L (ref 1.6–8.3)
NEUTROPHILS # BLD AUTO: 2.6 10E9/L (ref 1.6–8.3)
NEUTROPHILS # BLD AUTO: 3 10E9/L (ref 1.6–8.3)
NEUTROPHILS # BLD AUTO: 3.1 10E9/L (ref 1.6–8.3)
NEUTROPHILS # BLD AUTO: 3.7 10E9/L (ref 1.6–8.3)
NEUTROPHILS # BLD AUTO: 3.8 10E9/L (ref 1.6–8.3)
NEUTROPHILS # BLD AUTO: 4 10E9/L (ref 1.6–8.3)
NEUTROPHILS # BLD AUTO: 4.2 10E9/L (ref 1.6–8.3)
NEUTROPHILS # BLD AUTO: 4.3 10E9/L (ref 1.6–8.3)
NEUTROPHILS # BLD AUTO: 4.4 10E9/L (ref 1.6–8.3)
NEUTROPHILS # BLD AUTO: 4.9 10E9/L (ref 1.6–8.3)
NEUTROPHILS # BLD AUTO: 5.7 10E9/L (ref 1.6–8.3)
NEUTROPHILS # BLD AUTO: 7.2 10E9/L (ref 1.6–8.3)
NEUTROPHILS NFR BLD AUTO: 35.9 %
NEUTROPHILS NFR BLD AUTO: 43.1 %
NEUTROPHILS NFR BLD AUTO: 50.7 %
NEUTROPHILS NFR BLD AUTO: 50.9 %
NEUTROPHILS NFR BLD AUTO: 60.2 %
NEUTROPHILS NFR BLD AUTO: 60.9 %
NEUTROPHILS NFR BLD AUTO: 61.2 %
NEUTROPHILS NFR BLD AUTO: 61.3 %
NEUTROPHILS NFR BLD AUTO: 61.5 %
NEUTROPHILS NFR BLD AUTO: 63 %
NEUTROPHILS NFR BLD AUTO: 67 %
NEUTROPHILS NFR BLD AUTO: 67.5 %
NEUTROPHILS NFR BLD AUTO: 68.1 %
NEUTROPHILS NFR BLD AUTO: 71.4 %
NEUTROPHILS NFR BLD AUTO: 87.3 %
NEUTROPHILS NFR BLD AUTO: 88.3 %
NEUTS BAND NFR FLD MANUAL: 56 %
NITRATE UR QL: NEGATIVE
NRBC # BLD AUTO: 0 10*3/UL
NRBC BLD AUTO-RTO: 0 /100
NT-PROBNP SERPL-MCNC: 1218 PG/ML (ref 0–900)
NUM BPU REQUESTED: 2
OTHER CELLS FLD MANUAL: 17 %
OVALOCYTES BLD QL SMEAR: SLIGHT
PH FLD: 7.5 PH
PH UR STRIP: 6.5 PH (ref 5–7)
PHOSPHATE SERPL-MCNC: 2.4 MG/DL (ref 2.5–4.5)
PHOSPHATE SERPL-MCNC: 2.5 MG/DL (ref 2.5–4.5)
PHOSPHATE SERPL-MCNC: 2.7 MG/DL (ref 2.5–4.5)
PHOSPHATE SERPL-MCNC: 2.8 MG/DL (ref 2.5–4.5)
PHOSPHATE SERPL-MCNC: 2.8 MG/DL (ref 2.5–4.5)
PHOSPHATE SERPL-MCNC: 2.9 MG/DL (ref 2.5–4.5)
PHOSPHATE SERPL-MCNC: 3 MG/DL (ref 2.5–4.5)
PHOSPHATE SERPL-MCNC: 3.1 MG/DL (ref 2.5–4.5)
PHOSPHATE SERPL-MCNC: 3.2 MG/DL (ref 2.5–4.5)
PHOSPHATE SERPL-MCNC: 3.2 MG/DL (ref 2.5–4.5)
PHOSPHATE SERPL-MCNC: 3.3 MG/DL (ref 2.5–4.5)
PHOSPHATE SERPL-MCNC: 3.3 MG/DL (ref 2.5–4.5)
PHOSPHATE SERPL-MCNC: 3.7 MG/DL (ref 2.5–4.5)
PLATELET # BLD AUTO: 213 10E9/L (ref 150–450)
PLATELET # BLD AUTO: 217 10E9/L (ref 150–450)
PLATELET # BLD AUTO: 219 10E9/L (ref 150–450)
PLATELET # BLD AUTO: 238 10E9/L (ref 150–450)
PLATELET # BLD AUTO: 312 10E9/L (ref 150–450)
PLATELET # BLD AUTO: 318 10E9/L (ref 150–450)
PLATELET # BLD AUTO: 336 10E9/L (ref 150–450)
PLATELET # BLD AUTO: 341 10E9/L (ref 150–450)
PLATELET # BLD AUTO: 381 10E9/L (ref 150–450)
PLATELET # BLD AUTO: 386 10E9/L (ref 150–450)
PLATELET # BLD AUTO: 413 10E9/L (ref 150–450)
PLATELET # BLD AUTO: 417 10E9/L (ref 150–450)
PLATELET # BLD AUTO: 420 10E9/L (ref 150–450)
PLATELET # BLD AUTO: 422 10E9/L (ref 150–450)
PLATELET # BLD AUTO: 447 10E9/L (ref 150–450)
PLATELET # BLD AUTO: 490 10E9/L (ref 150–450)
PLATELET # BLD AUTO: 512 10E9/L (ref 150–450)
PLATELET # BLD AUTO: 586 10E9/L (ref 150–450)
PLATELET # BLD AUTO: 650 10E9/L (ref 150–450)
PLATELET # BLD EST: ABNORMAL 10*3/UL
POIKILOCYTOSIS BLD QL SMEAR: ABNORMAL
POTASSIUM SERPL-SCNC: 3.7 MMOL/L (ref 3.4–5.3)
POTASSIUM SERPL-SCNC: 3.8 MMOL/L (ref 3.4–5.3)
POTASSIUM SERPL-SCNC: 3.8 MMOL/L (ref 3.4–5.3)
POTASSIUM SERPL-SCNC: 4 MMOL/L (ref 3.4–5.3)
POTASSIUM SERPL-SCNC: 4.1 MMOL/L (ref 3.4–5.3)
POTASSIUM SERPL-SCNC: 4.2 MMOL/L (ref 3.4–5.3)
POTASSIUM SERPL-SCNC: 4.3 MMOL/L (ref 3.4–5.3)
POTASSIUM SERPL-SCNC: 4.4 MMOL/L (ref 3.4–5.3)
POTASSIUM SERPL-SCNC: 4.5 MMOL/L (ref 3.4–5.3)
POTASSIUM SERPL-SCNC: 4.6 MMOL/L (ref 3.4–5.3)
PROT FLD-MCNC: 4.5 G/DL
PROT SERPL-MCNC: 5.9 G/DL (ref 6.8–8.8)
PROT SERPL-MCNC: 6.6 G/DL (ref 6.8–8.8)
PROT SERPL-MCNC: 6.6 G/DL (ref 6.8–8.8)
PROT SERPL-MCNC: 6.7 G/DL (ref 6.8–8.8)
PROT SERPL-MCNC: 6.7 G/DL (ref 6.8–8.8)
PROT SERPL-MCNC: 6.8 G/DL (ref 6.8–8.8)
PROT SERPL-MCNC: 6.8 G/DL (ref 6.8–8.8)
PROT SERPL-MCNC: 6.9 G/DL (ref 6.8–8.8)
PROT SERPL-MCNC: 7 G/DL (ref 6.8–8.8)
PROT SERPL-MCNC: 7 G/DL (ref 6.8–8.8)
PROT SERPL-MCNC: 7.1 G/DL (ref 6.8–8.8)
PROT SERPL-MCNC: 7.1 G/DL (ref 6.8–8.8)
PROT SERPL-MCNC: 7.2 G/DL (ref 6.8–8.8)
PROT SERPL-MCNC: 7.4 G/DL (ref 6.8–8.8)
PROT SERPL-MCNC: 7.5 G/DL (ref 6.8–8.8)
PROT SERPL-MCNC: 7.6 G/DL (ref 6.8–8.8)
RADIOLOGIST FLAGS: ABNORMAL
RBC # BLD AUTO: 3.19 10E12/L (ref 4.4–5.9)
RBC # BLD AUTO: 3.25 10E12/L (ref 4.4–5.9)
RBC # BLD AUTO: 3.36 10E12/L (ref 4.4–5.9)
RBC # BLD AUTO: 3.37 10E12/L (ref 4.4–5.9)
RBC # BLD AUTO: 3.4 10E12/L (ref 4.4–5.9)
RBC # BLD AUTO: 3.41 10E12/L (ref 4.4–5.9)
RBC # BLD AUTO: 3.56 10E12/L (ref 4.4–5.9)
RBC # BLD AUTO: 3.6 10E12/L (ref 4.4–5.9)
RBC # BLD AUTO: 3.62 10E12/L (ref 4.4–5.9)
RBC # BLD AUTO: 3.64 10E12/L (ref 4.4–5.9)
RBC # BLD AUTO: 3.74 10E12/L (ref 4.4–5.9)
RBC # BLD AUTO: 4.15 10E12/L (ref 4.4–5.9)
RBC # BLD AUTO: 4.18 10E12/L (ref 4.4–5.9)
RBC # BLD AUTO: 4.19 10E12/L (ref 4.4–5.9)
RBC # BLD AUTO: 4.35 10E12/L (ref 4.4–5.9)
RBC # BLD AUTO: 4.39 10E12/L (ref 4.4–5.9)
RBC # BLD AUTO: 4.44 10E12/L (ref 4.4–5.9)
RBC # BLD AUTO: 4.57 10E12/L (ref 4.4–5.9)
RBC # BLD AUTO: 5.66 10E12/L (ref 4.4–5.9)
RBC #/AREA URNS AUTO: 0 /HPF (ref 0–2)
RBC INCLUSIONS BLD: SLIGHT
S PNEUM AG SPEC QL: NORMAL
SODIUM SERPL-SCNC: 134 MMOL/L (ref 133–144)
SODIUM SERPL-SCNC: 135 MMOL/L (ref 133–144)
SODIUM SERPL-SCNC: 135 MMOL/L (ref 133–144)
SODIUM SERPL-SCNC: 136 MMOL/L (ref 133–144)
SODIUM SERPL-SCNC: 137 MMOL/L (ref 133–144)
SODIUM SERPL-SCNC: 138 MMOL/L (ref 133–144)
SODIUM SERPL-SCNC: 138 MMOL/L (ref 133–144)
SODIUM SERPL-SCNC: 139 MMOL/L (ref 133–144)
SODIUM SERPL-SCNC: 141 MMOL/L (ref 133–144)
SOURCE: ABNORMAL
SP GR UR STRIP: 1.02 (ref 1–1.03)
SPECIMEN EXP DATE BLD: NORMAL
SPECIMEN SOURCE FLD: NORMAL
SPECIMEN SOURCE: NORMAL
TRANSFUSION STATUS PATIENT QL: NORMAL
TRIGL FLD-MCNC: 95 MG/DL
TROPONIN I SERPL-MCNC: <0.015 UG/L (ref 0–0.04)
TSH SERPL DL<=0.005 MIU/L-ACNC: 1.21 MU/L (ref 0.4–4)
TSH SERPL DL<=0.005 MIU/L-ACNC: 1.24 MU/L (ref 0.4–4)
TSH SERPL DL<=0.005 MIU/L-ACNC: 1.45 MU/L (ref 0.4–4)
TSH SERPL DL<=0.005 MIU/L-ACNC: 1.58 MU/L (ref 0.4–4)
TSH SERPL DL<=0.005 MIU/L-ACNC: 2.34 MU/L (ref 0.4–4)
TSH SERPL DL<=0.005 MIU/L-ACNC: 2.56 MU/L (ref 0.4–4)
UROBILINOGEN UR STRIP-MCNC: NORMAL MG/DL (ref 0–2)
WBC # BLD AUTO: 3.4 10E9/L (ref 4–11)
WBC # BLD AUTO: 3.6 10E9/L (ref 4–11)
WBC # BLD AUTO: 4.2 10E9/L (ref 4–11)
WBC # BLD AUTO: 4.3 10E9/L (ref 4–11)
WBC # BLD AUTO: 4.6 10E9/L (ref 4–11)
WBC # BLD AUTO: 4.8 10E9/L (ref 4–11)
WBC # BLD AUTO: 4.9 10E9/L (ref 4–11)
WBC # BLD AUTO: 5.1 10E9/L (ref 4–11)
WBC # BLD AUTO: 5.1 10E9/L (ref 4–11)
WBC # BLD AUTO: 5.5 10E9/L (ref 4–11)
WBC # BLD AUTO: 6.2 10E9/L (ref 4–11)
WBC # BLD AUTO: 6.3 10E9/L (ref 4–11)
WBC # BLD AUTO: 6.5 10E9/L (ref 4–11)
WBC # BLD AUTO: 6.9 10E9/L (ref 4–11)
WBC # BLD AUTO: 7.2 10E9/L (ref 4–11)
WBC # BLD AUTO: 7.2 10E9/L (ref 4–11)
WBC # BLD AUTO: 8.1 10E9/L (ref 4–11)
WBC # BLD AUTO: 8.2 10E9/L (ref 4–11)
WBC # BLD AUTO: 8.3 10E9/L (ref 4–11)
WBC # FLD AUTO: 1793 /UL
WBC #/AREA URNS AUTO: 0 /HPF (ref 0–5)

## 2019-01-01 PROCEDURE — 36415 COLL VENOUS BLD VENIPUNCTURE: CPT | Performed by: INTERNAL MEDICINE

## 2019-01-01 PROCEDURE — 25000128 H RX IP 250 OP 636: Mod: ZF | Performed by: INTERNAL MEDICINE

## 2019-01-01 PROCEDURE — 36415 COLL VENOUS BLD VENIPUNCTURE: CPT

## 2019-01-01 PROCEDURE — 25800030 ZZH RX IP 258 OP 636: Mod: ZF | Performed by: INTERNAL MEDICINE

## 2019-01-01 PROCEDURE — 83735 ASSAY OF MAGNESIUM: CPT | Performed by: INTERNAL MEDICINE

## 2019-01-01 PROCEDURE — 99223 1ST HOSP IP/OBS HIGH 75: CPT | Mod: AI | Performed by: INTERNAL MEDICINE

## 2019-01-01 PROCEDURE — 80053 COMPREHEN METABOLIC PANEL: CPT | Performed by: INTERNAL MEDICINE

## 2019-01-01 PROCEDURE — 87040 BLOOD CULTURE FOR BACTERIA: CPT | Performed by: INTERNAL MEDICINE

## 2019-01-01 PROCEDURE — 99214 OFFICE O/P EST MOD 30 MIN: CPT | Mod: ZP | Performed by: PHYSICIAN ASSISTANT

## 2019-01-01 PROCEDURE — 40000893 ZZH STATISTIC PT IP EVAL DEFER

## 2019-01-01 PROCEDURE — 96375 TX/PRO/DX INJ NEW DRUG ADDON: CPT

## 2019-01-01 PROCEDURE — 88184 FLOWCYTOMETRY/ TC 1 MARKER: CPT | Performed by: INTERNAL MEDICINE

## 2019-01-01 PROCEDURE — C1880 VENA CAVA FILTER: HCPCS

## 2019-01-01 PROCEDURE — 96413 CHEMO IV INFUSION 1 HR: CPT

## 2019-01-01 PROCEDURE — 99215 OFFICE O/P EST HI 40 MIN: CPT | Mod: ZP | Performed by: PHYSICIAN ASSISTANT

## 2019-01-01 PROCEDURE — 84100 ASSAY OF PHOSPHORUS: CPT | Performed by: INTERNAL MEDICINE

## 2019-01-01 PROCEDURE — 82664 ELECTROPHORETIC TEST: CPT | Performed by: INTERNAL MEDICINE

## 2019-01-01 PROCEDURE — 85025 COMPLETE CBC W/AUTO DIFF WBC: CPT | Performed by: INTERNAL MEDICINE

## 2019-01-01 PROCEDURE — 87102 FUNGUS ISOLATION CULTURE: CPT | Performed by: INTERNAL MEDICINE

## 2019-01-01 PROCEDURE — 40000986 XR CHEST PORT 1 VW

## 2019-01-01 PROCEDURE — 96415 CHEMO IV INFUSION ADDL HR: CPT

## 2019-01-01 PROCEDURE — G0463 HOSPITAL OUTPT CLINIC VISIT: HCPCS | Mod: ZF

## 2019-01-01 PROCEDURE — 25800030 ZZH RX IP 258 OP 636: Mod: ZF | Performed by: PHYSICIAN ASSISTANT

## 2019-01-01 PROCEDURE — 40000556 ZZH STATISTIC PERIPHERAL IV START W US GUIDANCE: Mod: ZF

## 2019-01-01 PROCEDURE — 87899 AGENT NOS ASSAY W/OPTIC: CPT | Performed by: PHYSICIAN ASSISTANT

## 2019-01-01 PROCEDURE — 84311 SPECTROPHOTOMETRY: CPT | Performed by: INTERNAL MEDICINE

## 2019-01-01 PROCEDURE — 93321 DOPPLER ECHO F-UP/LMTD STD: CPT | Mod: 26 | Performed by: INTERNAL MEDICINE

## 2019-01-01 PROCEDURE — 87070 CULTURE OTHR SPECIMN AEROBIC: CPT | Performed by: INTERNAL MEDICINE

## 2019-01-01 PROCEDURE — 81001 URINALYSIS AUTO W/SCOPE: CPT | Performed by: INTERNAL MEDICINE

## 2019-01-01 PROCEDURE — 25800030 ZZH RX IP 258 OP 636: Performed by: STUDENT IN AN ORGANIZED HEALTH CARE EDUCATION/TRAINING PROGRAM

## 2019-01-01 PROCEDURE — 93306 TTE W/DOPPLER COMPLETE: CPT

## 2019-01-01 PROCEDURE — 80048 BASIC METABOLIC PNL TOTAL CA: CPT | Performed by: INTERNAL MEDICINE

## 2019-01-01 PROCEDURE — 84443 ASSAY THYROID STIM HORMONE: CPT | Performed by: INTERNAL MEDICINE

## 2019-01-01 PROCEDURE — 93010 ELECTROCARDIOGRAM REPORT: CPT | Mod: Z6 | Performed by: EMERGENCY MEDICINE

## 2019-01-01 PROCEDURE — 99221 1ST HOSP IP/OBS SF/LOW 40: CPT | Mod: 25 | Performed by: INTERNAL MEDICINE

## 2019-01-01 PROCEDURE — 00000102 ZZHCL STATISTIC CYTO WRIGHT STAIN TC: Performed by: INTERNAL MEDICINE

## 2019-01-01 PROCEDURE — 97140 MANUAL THERAPY 1/> REGIONS: CPT | Mod: GP | Performed by: PHYSICAL THERAPIST

## 2019-01-01 PROCEDURE — 99214 OFFICE O/P EST MOD 30 MIN: CPT | Mod: ZP | Performed by: INTERNAL MEDICINE

## 2019-01-01 PROCEDURE — 93010 ELECTROCARDIOGRAM REPORT: CPT | Performed by: INTERNAL MEDICINE

## 2019-01-01 PROCEDURE — P9016 RBC LEUKOCYTES REDUCED: HCPCS | Performed by: PHYSICIAN ASSISTANT

## 2019-01-01 PROCEDURE — 85025 COMPLETE CBC W/AUTO DIFF WBC: CPT | Performed by: PHYSICIAN ASSISTANT

## 2019-01-01 PROCEDURE — 84100 ASSAY OF PHOSPHORUS: CPT | Performed by: EMERGENCY MEDICINE

## 2019-01-01 PROCEDURE — 88305 TISSUE EXAM BY PATHOLOGIST: CPT | Performed by: INTERNAL MEDICINE

## 2019-01-01 PROCEDURE — 83986 ASSAY PH BODY FLUID NOS: CPT | Performed by: INTERNAL MEDICINE

## 2019-01-01 PROCEDURE — 25000128 H RX IP 250 OP 636: Performed by: INTERNAL MEDICINE

## 2019-01-01 PROCEDURE — 93306 TTE W/DOPPLER COMPLETE: CPT | Mod: 26 | Performed by: INTERNAL MEDICINE

## 2019-01-01 PROCEDURE — 99285 EMERGENCY DEPT VISIT HI MDM: CPT | Mod: 25 | Performed by: EMERGENCY MEDICINE

## 2019-01-01 PROCEDURE — C1769 GUIDE WIRE: HCPCS

## 2019-01-01 PROCEDURE — 93325 DOPPLER ECHO COLOR FLOW MAPG: CPT | Mod: 26 | Performed by: INTERNAL MEDICINE

## 2019-01-01 PROCEDURE — 87081 CULTURE SCREEN ONLY: CPT | Performed by: INTERNAL MEDICINE

## 2019-01-01 PROCEDURE — 84484 ASSAY OF TROPONIN QUANT: CPT | Performed by: PHYSICIAN ASSISTANT

## 2019-01-01 PROCEDURE — 93325 DOPPLER ECHO COLOR FLOW MAPG: CPT

## 2019-01-01 PROCEDURE — 06H03DZ INSERTION OF INTRALUMINAL DEVICE INTO INFERIOR VENA CAVA, PERCUTANEOUS APPROACH: ICD-10-PCS | Performed by: RADIOLOGY

## 2019-01-01 PROCEDURE — 84478 ASSAY OF TRIGLYCERIDES: CPT | Performed by: INTERNAL MEDICINE

## 2019-01-01 PROCEDURE — 83615 LACTATE (LD) (LDH) ENZYME: CPT | Performed by: INTERNAL MEDICINE

## 2019-01-01 PROCEDURE — 88112 CYTOPATH CELL ENHANCE TECH: CPT | Performed by: INTERNAL MEDICINE

## 2019-01-01 PROCEDURE — 84484 ASSAY OF TROPONIN QUANT: CPT | Performed by: EMERGENCY MEDICINE

## 2019-01-01 PROCEDURE — 85027 COMPLETE CBC AUTOMATED: CPT | Performed by: INTERNAL MEDICINE

## 2019-01-01 PROCEDURE — 97110 THERAPEUTIC EXERCISES: CPT | Mod: GP | Performed by: PHYSICAL THERAPIST

## 2019-01-01 PROCEDURE — 82465 ASSAY BLD/SERUM CHOLESTEROL: CPT | Performed by: INTERNAL MEDICINE

## 2019-01-01 PROCEDURE — 85610 PROTHROMBIN TIME: CPT | Performed by: PHYSICIAN ASSISTANT

## 2019-01-01 PROCEDURE — 25000128 H RX IP 250 OP 636: Performed by: PHYSICIAN ASSISTANT

## 2019-01-01 PROCEDURE — 25000125 ZZHC RX 250: Performed by: STUDENT IN AN ORGANIZED HEALTH CARE EDUCATION/TRAINING PROGRAM

## 2019-01-01 PROCEDURE — 93308 TTE F-UP OR LMTD: CPT | Mod: 26 | Performed by: INTERNAL MEDICINE

## 2019-01-01 PROCEDURE — 80053 COMPREHEN METABOLIC PANEL: CPT | Performed by: PHYSICIAN ASSISTANT

## 2019-01-01 PROCEDURE — 99152 MOD SED SAME PHYS/QHP 5/>YRS: CPT

## 2019-01-01 PROCEDURE — 12000012 ZZH R&B MS OVERFLOW UMMC

## 2019-01-01 PROCEDURE — 87116 MYCOBACTERIA CULTURE: CPT | Performed by: INTERNAL MEDICINE

## 2019-01-01 PROCEDURE — 87205 SMEAR GRAM STAIN: CPT | Performed by: PHYSICIAN ASSISTANT

## 2019-01-01 PROCEDURE — 83880 ASSAY OF NATRIURETIC PEPTIDE: CPT | Performed by: EMERGENCY MEDICINE

## 2019-01-01 PROCEDURE — 84157 ASSAY OF PROTEIN OTHER: CPT | Performed by: INTERNAL MEDICINE

## 2019-01-01 PROCEDURE — 36430 TRANSFUSION BLD/BLD COMPNT: CPT

## 2019-01-01 PROCEDURE — 93005 ELECTROCARDIOGRAM TRACING: CPT | Performed by: EMERGENCY MEDICINE

## 2019-01-01 PROCEDURE — 87206 SMEAR FLUORESCENT/ACID STAI: CPT | Performed by: INTERNAL MEDICINE

## 2019-01-01 PROCEDURE — 25000132 ZZH RX MED GY IP 250 OP 250 PS 637: Performed by: INTERNAL MEDICINE

## 2019-01-01 PROCEDURE — 85384 FIBRINOGEN ACTIVITY: CPT | Performed by: PHYSICIAN ASSISTANT

## 2019-01-01 PROCEDURE — 88185 FLOWCYTOMETRY/TC ADD-ON: CPT | Performed by: INTERNAL MEDICINE

## 2019-01-01 PROCEDURE — 80076 HEPATIC FUNCTION PANEL: CPT | Performed by: INTERNAL MEDICINE

## 2019-01-01 PROCEDURE — 84443 ASSAY THYROID STIM HORMONE: CPT | Performed by: PHYSICIAN ASSISTANT

## 2019-01-01 PROCEDURE — 00000155 ZZHCL STATISTIC H-CELL BLOCK W/STAIN: Performed by: INTERNAL MEDICINE

## 2019-01-01 PROCEDURE — 83605 ASSAY OF LACTIC ACID: CPT

## 2019-01-01 PROCEDURE — 86923 COMPATIBILITY TEST ELECTRIC: CPT | Performed by: PHYSICIAN ASSISTANT

## 2019-01-01 PROCEDURE — 27210908 ZZH NEEDLE CR4

## 2019-01-01 PROCEDURE — 27210732 ZZH ACCESSORY CR1

## 2019-01-01 PROCEDURE — 99233 SBSQ HOSP IP/OBS HIGH 50: CPT | Performed by: INTERNAL MEDICINE

## 2019-01-01 PROCEDURE — 27210738 ZZH ACCESSORY CR2

## 2019-01-01 PROCEDURE — 93005 ELECTROCARDIOGRAM TRACING: CPT

## 2019-01-01 PROCEDURE — 27210886 ZZH ACCESSORY CR5

## 2019-01-01 PROCEDURE — 83690 ASSAY OF LIPASE: CPT | Performed by: INTERNAL MEDICINE

## 2019-01-01 PROCEDURE — 87070 CULTURE OTHR SPECIMN AEROBIC: CPT | Performed by: PHYSICIAN ASSISTANT

## 2019-01-01 PROCEDURE — 85730 THROMBOPLASTIN TIME PARTIAL: CPT | Performed by: PHYSICIAN ASSISTANT

## 2019-01-01 PROCEDURE — 83735 ASSAY OF MAGNESIUM: CPT | Performed by: PHYSICIAN ASSISTANT

## 2019-01-01 PROCEDURE — 89051 BODY FLUID CELL COUNT: CPT | Performed by: INTERNAL MEDICINE

## 2019-01-01 PROCEDURE — 86901 BLOOD TYPING SEROLOGIC RH(D): CPT | Performed by: PHYSICIAN ASSISTANT

## 2019-01-01 PROCEDURE — 99215 OFFICE O/P EST HI 40 MIN: CPT | Mod: ZP | Performed by: INTERNAL MEDICINE

## 2019-01-01 PROCEDURE — 25000128 H RX IP 250 OP 636: Mod: ZF | Performed by: PHYSICIAN ASSISTANT

## 2019-01-01 PROCEDURE — 40001004 ZZHCL STATISTIC FLOW INT 9-15 ABY TC 88188: Performed by: INTERNAL MEDICINE

## 2019-01-01 PROCEDURE — 83735 ASSAY OF MAGNESIUM: CPT | Performed by: EMERGENCY MEDICINE

## 2019-01-01 PROCEDURE — 25000125 ZZHC RX 250: Mod: ZF | Performed by: INTERNAL MEDICINE

## 2019-01-01 PROCEDURE — 12000026 ZZH R&B TRANSPLANT

## 2019-01-01 PROCEDURE — 86850 RBC ANTIBODY SCREEN: CPT | Performed by: PHYSICIAN ASSISTANT

## 2019-01-01 PROCEDURE — 82150 ASSAY OF AMYLASE: CPT | Performed by: INTERNAL MEDICINE

## 2019-01-01 PROCEDURE — 82042 OTHER SOURCE ALBUMIN QUAN EA: CPT | Performed by: INTERNAL MEDICINE

## 2019-01-01 PROCEDURE — 25500064 ZZH RX 255 OP 636: Performed by: RADIOLOGY

## 2019-01-01 PROCEDURE — G0463 HOSPITAL OUTPT CLINIC VISIT: HCPCS | Mod: 25

## 2019-01-01 PROCEDURE — 82945 GLUCOSE OTHER FLUID: CPT | Performed by: INTERNAL MEDICINE

## 2019-01-01 PROCEDURE — 37191 INS ENDOVAS VENA CAVA FILTR: CPT

## 2019-01-01 PROCEDURE — 87205 SMEAR GRAM STAIN: CPT | Performed by: INTERNAL MEDICINE

## 2019-01-01 PROCEDURE — 99204 OFFICE O/P NEW MOD 45 MIN: CPT | Mod: ZP | Performed by: THORACIC SURGERY (CARDIOTHORACIC VASCULAR SURGERY)

## 2019-01-01 PROCEDURE — 36415 COLL VENOUS BLD VENIPUNCTURE: CPT | Performed by: PHYSICIAN ASSISTANT

## 2019-01-01 PROCEDURE — 25000132 ZZH RX MED GY IP 250 OP 250 PS 637: Performed by: PHYSICIAN ASSISTANT

## 2019-01-01 PROCEDURE — 25800030 ZZH RX IP 258 OP 636: Performed by: PHYSICIAN ASSISTANT

## 2019-01-01 PROCEDURE — 32554 ASPIRATE PLEURA W/O IMAGING: CPT | Performed by: INTERNAL MEDICINE

## 2019-01-01 PROCEDURE — 86900 BLOOD TYPING SEROLOGIC ABO: CPT | Performed by: PHYSICIAN ASSISTANT

## 2019-01-01 PROCEDURE — 25000128 H RX IP 250 OP 636: Performed by: STUDENT IN AN ORGANIZED HEALTH CARE EDUCATION/TRAINING PROGRAM

## 2019-01-01 PROCEDURE — 87015 SPECIMEN INFECT AGNT CONCNTJ: CPT | Performed by: INTERNAL MEDICINE

## 2019-01-01 RX ORDER — DIPHENHYDRAMINE HYDROCHLORIDE 50 MG/ML
50 INJECTION INTRAMUSCULAR; INTRAVENOUS
Status: CANCELLED
Start: 2019-01-01

## 2019-01-01 RX ORDER — EPINEPHRINE 0.3 MG/.3ML
0.3 INJECTION SUBCUTANEOUS EVERY 5 MIN PRN
Status: CANCELLED | OUTPATIENT
Start: 2019-01-01

## 2019-01-01 RX ORDER — MEPERIDINE HYDROCHLORIDE 25 MG/ML
25 INJECTION INTRAMUSCULAR; INTRAVENOUS; SUBCUTANEOUS EVERY 30 MIN PRN
Status: CANCELLED | OUTPATIENT
Start: 2019-01-01

## 2019-01-01 RX ORDER — ALBUTEROL SULFATE 0.83 MG/ML
2.5 SOLUTION RESPIRATORY (INHALATION)
Status: CANCELLED | OUTPATIENT
Start: 2019-01-01

## 2019-01-01 RX ORDER — EPINEPHRINE 1 MG/ML
0.3 INJECTION, SOLUTION INTRAMUSCULAR; SUBCUTANEOUS EVERY 5 MIN PRN
Status: CANCELLED | OUTPATIENT
Start: 2019-01-01

## 2019-01-01 RX ORDER — METHYLPREDNISOLONE SODIUM SUCCINATE 125 MG/2ML
125 INJECTION, POWDER, LYOPHILIZED, FOR SOLUTION INTRAMUSCULAR; INTRAVENOUS
Status: CANCELLED
Start: 2019-01-01

## 2019-01-01 RX ORDER — IODIXANOL 320 MG/ML
50 INJECTION, SOLUTION INTRAVASCULAR ONCE
Status: COMPLETED | OUTPATIENT
Start: 2019-01-01 | End: 2019-01-01

## 2019-01-01 RX ORDER — SODIUM CHLORIDE 9 MG/ML
1000 INJECTION, SOLUTION INTRAVENOUS CONTINUOUS PRN
Status: CANCELLED
Start: 2019-01-01

## 2019-01-01 RX ORDER — LORAZEPAM 2 MG/ML
0.5 INJECTION INTRAMUSCULAR EVERY 4 HOURS PRN
Status: CANCELLED
Start: 2019-01-01

## 2019-01-01 RX ORDER — NALOXONE HYDROCHLORIDE 0.4 MG/ML
.1-.4 INJECTION, SOLUTION INTRAMUSCULAR; INTRAVENOUS; SUBCUTANEOUS
Status: CANCELLED | OUTPATIENT
Start: 2019-01-01

## 2019-01-01 RX ORDER — ALBUTEROL SULFATE 90 UG/1
1-2 AEROSOL, METERED RESPIRATORY (INHALATION)
Status: CANCELLED
Start: 2019-01-01

## 2019-01-01 RX ORDER — ACETAMINOPHEN 325 MG/1
650 TABLET ORAL EVERY 4 HOURS PRN
Status: DISCONTINUED | OUTPATIENT
Start: 2019-01-01 | End: 2019-01-01

## 2019-01-01 RX ORDER — HEPARIN SODIUM (PORCINE) LOCK FLUSH IV SOLN 100 UNIT/ML 100 UNIT/ML
5 SOLUTION INTRAVENOUS ONCE
Status: COMPLETED | OUTPATIENT
Start: 2019-01-01 | End: 2019-01-01

## 2019-01-01 RX ORDER — IOPAMIDOL 755 MG/ML
100 INJECTION, SOLUTION INTRAVASCULAR ONCE
Status: COMPLETED | OUTPATIENT
Start: 2019-01-01 | End: 2019-01-01

## 2019-01-01 RX ORDER — GRANISETRON HYDROCHLORIDE 1 MG/ML
1 INJECTION INTRAVENOUS ONCE
Status: CANCELLED
Start: 2019-01-01

## 2019-01-01 RX ORDER — HEPARIN SODIUM (PORCINE) LOCK FLUSH IV SOLN 100 UNIT/ML 100 UNIT/ML
5 SOLUTION INTRAVENOUS ONCE
Status: CANCELLED
Start: 2019-01-01

## 2019-01-01 RX ORDER — POTASSIUM CHLORIDE 7.45 MG/ML
10 INJECTION INTRAVENOUS
Status: DISCONTINUED | OUTPATIENT
Start: 2019-01-01 | End: 2019-01-01 | Stop reason: HOSPADM

## 2019-01-01 RX ORDER — IOPAMIDOL 755 MG/ML
66 INJECTION, SOLUTION INTRAVASCULAR ONCE
Status: COMPLETED | OUTPATIENT
Start: 2019-01-01 | End: 2019-01-01

## 2019-01-01 RX ORDER — POLYETHYLENE GLYCOL 3350 17 G/17G
17 POWDER, FOR SOLUTION ORAL DAILY PRN
Status: DISCONTINUED | OUTPATIENT
Start: 2019-01-01 | End: 2019-01-01 | Stop reason: HOSPADM

## 2019-01-01 RX ORDER — PAZOPANIB 200 MG/1
800 TABLET, FILM COATED ORAL DAILY
Qty: 120 TABLET | Refills: 0 | Status: SHIPPED | OUTPATIENT
Start: 2019-01-01 | End: 2019-01-01

## 2019-01-01 RX ORDER — ONDANSETRON 4 MG/1
8 TABLET, FILM COATED ORAL EVERY 8 HOURS PRN
Status: DISCONTINUED | OUTPATIENT
Start: 2019-01-01 | End: 2019-01-01 | Stop reason: HOSPADM

## 2019-01-01 RX ORDER — HEPARIN SODIUM (PORCINE) LOCK FLUSH IV SOLN 100 UNIT/ML 100 UNIT/ML
5 SOLUTION INTRAVENOUS EVERY 8 HOURS
Status: CANCELLED | OUTPATIENT
Start: 2019-01-01

## 2019-01-01 RX ORDER — LORAZEPAM 0.5 MG/1
.5-1 TABLET ORAL EVERY 6 HOURS PRN
Status: DISCONTINUED | OUTPATIENT
Start: 2019-01-01 | End: 2019-01-01 | Stop reason: HOSPADM

## 2019-01-01 RX ORDER — POTASSIUM CL/LIDO/0.9 % NACL 10MEQ/0.1L
10 INTRAVENOUS SOLUTION, PIGGYBACK (ML) INTRAVENOUS
Status: DISCONTINUED | OUTPATIENT
Start: 2019-01-01 | End: 2019-01-01 | Stop reason: HOSPADM

## 2019-01-01 RX ORDER — ACETAMINOPHEN 500 MG
500 TABLET ORAL EVERY 6 HOURS PRN
COMMUNITY
Start: 2019-01-01

## 2019-01-01 RX ORDER — HEPARIN SODIUM 5000 [USP'U]/.5ML
5000 INJECTION, SOLUTION INTRAVENOUS; SUBCUTANEOUS
Status: CANCELLED | OUTPATIENT
Start: 2019-01-01

## 2019-01-01 RX ORDER — FLUMAZENIL 0.1 MG/ML
0.2 INJECTION, SOLUTION INTRAVENOUS
Status: DISCONTINUED | OUTPATIENT
Start: 2019-01-01 | End: 2019-01-01 | Stop reason: HOSPADM

## 2019-01-01 RX ORDER — SODIUM CHLORIDE 9 MG/ML
INJECTION, SOLUTION INTRAVENOUS CONTINUOUS
Status: DISCONTINUED | OUTPATIENT
Start: 2019-01-01 | End: 2019-01-01

## 2019-01-01 RX ORDER — IOPAMIDOL 755 MG/ML
120 INJECTION, SOLUTION INTRAVASCULAR ONCE
Status: COMPLETED | OUTPATIENT
Start: 2019-01-01 | End: 2019-01-01

## 2019-01-01 RX ORDER — HEPARIN SODIUM,PORCINE 10 UNIT/ML
5 VIAL (ML) INTRAVENOUS ONCE
Status: COMPLETED | OUTPATIENT
Start: 2019-01-01 | End: 2019-01-01

## 2019-01-01 RX ORDER — IOPAMIDOL 755 MG/ML
116 INJECTION, SOLUTION INTRAVASCULAR ONCE
Status: COMPLETED | OUTPATIENT
Start: 2019-01-01 | End: 2019-01-01

## 2019-01-01 RX ORDER — LIDOCAINE 40 MG/G
CREAM TOPICAL
Status: DISCONTINUED | OUTPATIENT
Start: 2019-01-01 | End: 2019-01-01

## 2019-01-01 RX ORDER — AMOXICILLIN 250 MG
1 CAPSULE ORAL 2 TIMES DAILY
COMMUNITY
Start: 2019-01-01 | End: 2020-01-01

## 2019-01-01 RX ORDER — PIPERACILLIN SODIUM, TAZOBACTAM SODIUM 3; .375 G/15ML; G/15ML
3.38 INJECTION, POWDER, LYOPHILIZED, FOR SOLUTION INTRAVENOUS EVERY 6 HOURS
Status: DISCONTINUED | OUTPATIENT
Start: 2019-01-01 | End: 2019-01-01

## 2019-01-01 RX ORDER — AMOXICILLIN 250 MG
2 CAPSULE ORAL 2 TIMES DAILY
Status: DISCONTINUED | OUTPATIENT
Start: 2019-01-01 | End: 2019-01-01 | Stop reason: HOSPADM

## 2019-01-01 RX ORDER — HEPARIN SOD,PORCINE/0.9 % NACL 5K/1000 ML
1000-10000 INTRAVENOUS SOLUTION INTRAVENOUS
Status: COMPLETED | OUTPATIENT
Start: 2019-01-01 | End: 2019-01-01

## 2019-01-01 RX ORDER — GRANISETRON HYDROCHLORIDE 1 MG/ML
1 INJECTION INTRAVENOUS ONCE
Status: COMPLETED | OUTPATIENT
Start: 2019-01-01 | End: 2019-01-01

## 2019-01-01 RX ORDER — CEFAZOLIN SODIUM 1 G/50ML
1 INJECTION, SOLUTION INTRAVENOUS SEE ADMIN INSTRUCTIONS
Status: CANCELLED | OUTPATIENT
Start: 2019-01-01

## 2019-01-01 RX ORDER — NALOXONE HYDROCHLORIDE 0.4 MG/ML
.1-.4 INJECTION, SOLUTION INTRAMUSCULAR; INTRAVENOUS; SUBCUTANEOUS
Status: DISCONTINUED | OUTPATIENT
Start: 2019-01-01 | End: 2019-01-01 | Stop reason: HOSPADM

## 2019-01-01 RX ORDER — AMOXICILLIN 250 MG
1 CAPSULE ORAL 2 TIMES DAILY
Status: DISCONTINUED | OUTPATIENT
Start: 2019-01-01 | End: 2019-01-01 | Stop reason: HOSPADM

## 2019-01-01 RX ORDER — MAGNESIUM SULFATE HEPTAHYDRATE 40 MG/ML
4 INJECTION, SOLUTION INTRAVENOUS EVERY 4 HOURS PRN
Status: DISCONTINUED | OUTPATIENT
Start: 2019-01-01 | End: 2019-01-01 | Stop reason: HOSPADM

## 2019-01-01 RX ORDER — LEVOFLOXACIN 750 MG/1
750 TABLET, FILM COATED ORAL DAILY
Qty: 5 TABLET | Refills: 0 | Status: SHIPPED | OUTPATIENT
Start: 2019-01-01 | End: 2019-01-01

## 2019-01-01 RX ORDER — GRANISETRON HYDROCHLORIDE 1 MG/ML
1 INJECTION INTRAVENOUS ONCE
Status: DISCONTINUED | OUTPATIENT
Start: 2019-01-01 | End: 2019-01-01 | Stop reason: HOSPADM

## 2019-01-01 RX ORDER — ONDANSETRON 2 MG/ML
8 INJECTION INTRAMUSCULAR; INTRAVENOUS EVERY 8 HOURS PRN
Status: DISCONTINUED | OUTPATIENT
Start: 2019-01-01 | End: 2019-01-01 | Stop reason: HOSPADM

## 2019-01-01 RX ORDER — PAZOPANIB HYDROCHLORIDE 200 MG/1
TABLET, FILM COATED ORAL
Qty: 120 TABLET | Refills: 0 | Status: SHIPPED | OUTPATIENT
Start: 2019-01-01 | End: 2020-01-01

## 2019-01-01 RX ORDER — HEPARIN SODIUM 10000 [USP'U]/100ML
0-3500 INJECTION, SOLUTION INTRAVENOUS CONTINUOUS
Status: DISCONTINUED | OUTPATIENT
Start: 2019-01-01 | End: 2019-01-01

## 2019-01-01 RX ORDER — EPINEPHRINE 1 MG/ML
0.3 INJECTION, SOLUTION, CONCENTRATE INTRAVENOUS EVERY 5 MIN PRN
Status: CANCELLED | OUTPATIENT
Start: 2019-01-01

## 2019-01-01 RX ORDER — POTASSIUM CHLORIDE 750 MG/1
20-40 TABLET, EXTENDED RELEASE ORAL
Status: DISCONTINUED | OUTPATIENT
Start: 2019-01-01 | End: 2019-01-01 | Stop reason: HOSPADM

## 2019-01-01 RX ORDER — POTASSIUM CHLORIDE 1.5 G/1.58G
20-40 POWDER, FOR SOLUTION ORAL
Status: DISCONTINUED | OUTPATIENT
Start: 2019-01-01 | End: 2019-01-01 | Stop reason: HOSPADM

## 2019-01-01 RX ORDER — LIDOCAINE 40 MG/G
CREAM TOPICAL
Status: DISCONTINUED | OUTPATIENT
Start: 2019-01-01 | End: 2019-01-01 | Stop reason: HOSPADM

## 2019-01-01 RX ORDER — ONDANSETRON 4 MG/1
8 TABLET, ORALLY DISINTEGRATING ORAL EVERY 8 HOURS PRN
Status: DISCONTINUED | OUTPATIENT
Start: 2019-01-01 | End: 2019-01-01 | Stop reason: HOSPADM

## 2019-01-01 RX ORDER — POTASSIUM CHLORIDE 29.8 MG/ML
20 INJECTION INTRAVENOUS
Status: DISCONTINUED | OUTPATIENT
Start: 2019-01-01 | End: 2019-01-01 | Stop reason: HOSPADM

## 2019-01-01 RX ORDER — LORAZEPAM 2 MG/ML
.5-1 INJECTION INTRAMUSCULAR EVERY 6 HOURS PRN
Status: DISCONTINUED | OUTPATIENT
Start: 2019-01-01 | End: 2019-01-01 | Stop reason: HOSPADM

## 2019-01-01 RX ORDER — LEVOFLOXACIN 250 MG/1
750 TABLET, FILM COATED ORAL DAILY
Status: DISCONTINUED | OUTPATIENT
Start: 2019-01-01 | End: 2019-01-01 | Stop reason: HOSPADM

## 2019-01-01 RX ORDER — IOPAMIDOL 755 MG/ML
118 INJECTION, SOLUTION INTRAVASCULAR ONCE
Status: COMPLETED | OUTPATIENT
Start: 2019-01-01 | End: 2019-01-01

## 2019-01-01 RX ORDER — PROCHLORPERAZINE MALEATE 5 MG
5 TABLET ORAL EVERY 6 HOURS PRN
Status: DISCONTINUED | OUTPATIENT
Start: 2019-01-01 | End: 2019-01-01 | Stop reason: HOSPADM

## 2019-01-01 RX ORDER — CEFAZOLIN SODIUM 2 G/50ML
2 SOLUTION INTRAVENOUS
Status: CANCELLED | OUTPATIENT
Start: 2019-01-01

## 2019-01-01 RX ORDER — ACETAMINOPHEN 500 MG
500 TABLET ORAL EVERY 6 HOURS PRN
Status: DISCONTINUED | OUTPATIENT
Start: 2019-01-01 | End: 2019-01-01 | Stop reason: HOSPADM

## 2019-01-01 RX ORDER — FENTANYL CITRATE 50 UG/ML
25-50 INJECTION, SOLUTION INTRAMUSCULAR; INTRAVENOUS EVERY 5 MIN PRN
Status: DISCONTINUED | OUTPATIENT
Start: 2019-01-01 | End: 2019-01-01 | Stop reason: HOSPADM

## 2019-01-01 RX ADMIN — GEMCITABINE 1800 MG: 38 INJECTION, SOLUTION INTRAVENOUS at 16:58

## 2019-01-01 RX ADMIN — LEVOFLOXACIN 750 MG: 250 TABLET, FILM COATED ORAL at 10:08

## 2019-01-01 RX ADMIN — SODIUM CHLORIDE 250 ML: 9 INJECTION, SOLUTION INTRAVENOUS at 10:14

## 2019-01-01 RX ADMIN — GEMCITABINE 1800 MG: 38 INJECTION, SOLUTION INTRAVENOUS at 15:48

## 2019-01-01 RX ADMIN — SODIUM CHLORIDE: 9 INJECTION, SOLUTION INTRAVENOUS at 08:36

## 2019-01-01 RX ADMIN — SODIUM CHLORIDE 250 ML: 9 INJECTION, SOLUTION INTRAVENOUS at 09:25

## 2019-01-01 RX ADMIN — PIPERACILLIN SODIUM AND TAZOBACTAM SODIUM 3.38 G: 3; .375 INJECTION, POWDER, LYOPHILIZED, FOR SOLUTION INTRAVENOUS at 08:29

## 2019-01-01 RX ADMIN — SODIUM CHLORIDE 250 ML: 9 INJECTION, SOLUTION INTRAVENOUS at 10:05

## 2019-01-01 RX ADMIN — SENNOSIDES AND DOCUSATE SODIUM 1 TABLET: 8.6; 5 TABLET ORAL at 08:30

## 2019-01-01 RX ADMIN — SODIUM CHLORIDE 250 ML: 9 INJECTION, SOLUTION INTRAVENOUS at 11:28

## 2019-01-01 RX ADMIN — GRANISETRON HYDROCHLORIDE 1 MG: 1 INJECTION INTRAVENOUS at 16:55

## 2019-01-01 RX ADMIN — HEPARIN SODIUM (PORCINE) LOCK FLUSH IV SOLN 100 UNIT/ML 5 ML: 100 SOLUTION at 11:56

## 2019-01-01 RX ADMIN — SODIUM CHLORIDE 500 ML: 9 INJECTION, SOLUTION INTRAVENOUS at 07:00

## 2019-01-01 RX ADMIN — GEMCITABINE 1800 MG: 38 INJECTION, SOLUTION INTRAVENOUS at 16:07

## 2019-01-01 RX ADMIN — POTASSIUM CHLORIDE 20 MEQ: 750 TABLET, EXTENDED RELEASE ORAL at 12:50

## 2019-01-01 RX ADMIN — FENTANYL CITRATE 75 MCG: 50 INJECTION, SOLUTION INTRAMUSCULAR; INTRAVENOUS at 11:03

## 2019-01-01 RX ADMIN — MIDAZOLAM 1.5 MG: 1 INJECTION INTRAMUSCULAR; INTRAVENOUS at 11:04

## 2019-01-01 RX ADMIN — IOPAMIDOL 66 ML: 755 INJECTION, SOLUTION INTRAVASCULAR at 14:20

## 2019-01-01 RX ADMIN — GEMCITABINE 1800 MG: 38 INJECTION INTRAVENOUS at 11:28

## 2019-01-01 RX ADMIN — IODIXANOL 10 ML: 320 INJECTION, SOLUTION INTRAVASCULAR at 11:56

## 2019-01-01 RX ADMIN — PIPERACILLIN SODIUM AND TAZOBACTAM SODIUM 3.38 G: 3; .375 INJECTION, POWDER, LYOPHILIZED, FOR SOLUTION INTRAVENOUS at 13:25

## 2019-01-01 RX ADMIN — PIPERACILLIN SODIUM AND TAZOBACTAM SODIUM 3.38 G: 3; .375 INJECTION, POWDER, LYOPHILIZED, FOR SOLUTION INTRAVENOUS at 20:02

## 2019-01-01 RX ADMIN — SODIUM CHLORIDE 250 ML: 9 INJECTION, SOLUTION INTRAVENOUS at 13:55

## 2019-01-01 RX ADMIN — HEPARIN SODIUM (PORCINE) LOCK FLUSH IV SOLN 100 UNIT/ML 5 ML: 100 SOLUTION at 11:23

## 2019-01-01 RX ADMIN — LIDOCAINE HYDROCHLORIDE 5 ML: 10 INJECTION, SOLUTION EPIDURAL; INFILTRATION; INTRACAUDAL; PERINEURAL at 11:02

## 2019-01-01 RX ADMIN — SENNOSIDES AND DOCUSATE SODIUM 1 TABLET: 8.6; 5 TABLET ORAL at 20:55

## 2019-01-01 RX ADMIN — HEPARIN, PORCINE (PF) 10 UNIT/ML INTRAVENOUS SYRINGE 5 ML: at 10:45

## 2019-01-01 RX ADMIN — IOPAMIDOL 66 ML: 755 INJECTION, SOLUTION INTRAVASCULAR at 16:07

## 2019-01-01 RX ADMIN — SODIUM CHLORIDE 200 MG: 9 INJECTION, SOLUTION INTRAVENOUS at 10:45

## 2019-01-01 RX ADMIN — GRANISETRON HYDROCHLORIDE 1 MG: 1 INJECTION INTRAVENOUS at 09:27

## 2019-01-01 RX ADMIN — SODIUM CHLORIDE 250 ML: 9 INJECTION, SOLUTION INTRAVENOUS at 16:07

## 2019-01-01 RX ADMIN — GRANISETRON HYDROCHLORIDE 1 MG: 1 INJECTION, SOLUTION INTRAVENOUS at 09:17

## 2019-01-01 RX ADMIN — IODIXANOL 30 ML: 320 INJECTION, SOLUTION INTRAVASCULAR at 10:53

## 2019-01-01 RX ADMIN — ACETAMINOPHEN 650 MG: 325 TABLET, FILM COATED ORAL at 05:30

## 2019-01-01 RX ADMIN — IOPAMIDOL 118 ML: 755 INJECTION, SOLUTION INTRAVASCULAR at 16:24

## 2019-01-01 RX ADMIN — HEPARIN SODIUM 5000 UNITS: 1000 INJECTION, SOLUTION INTRAVENOUS; SUBCUTANEOUS at 11:02

## 2019-01-01 RX ADMIN — PIPERACILLIN SODIUM AND TAZOBACTAM SODIUM 3.38 G: 3; .375 INJECTION, POWDER, LYOPHILIZED, FOR SOLUTION INTRAVENOUS at 01:46

## 2019-01-01 RX ADMIN — SODIUM CHLORIDE 250 ML: 9 INJECTION, SOLUTION INTRAVENOUS at 09:17

## 2019-01-01 RX ADMIN — SODIUM CHLORIDE 200 MG: 9 INJECTION, SOLUTION INTRAVENOUS at 10:05

## 2019-01-01 RX ADMIN — GEMCITABINE 1800 MG: 38 INJECTION, SOLUTION INTRAVENOUS at 13:55

## 2019-01-01 RX ADMIN — SODIUM CHLORIDE 250 ML: 9 INJECTION, SOLUTION INTRAVENOUS at 15:35

## 2019-01-01 RX ADMIN — SODIUM CHLORIDE 250 ML: 9 INJECTION, SOLUTION INTRAVENOUS at 16:53

## 2019-01-01 RX ADMIN — HEPARIN 5 ML: 100 SYRINGE at 08:21

## 2019-01-01 RX ADMIN — GEMCITABINE 1800 MG: 38 INJECTION, SOLUTION INTRAVENOUS at 09:25

## 2019-01-01 RX ADMIN — SODIUM CHLORIDE: 9 INJECTION, SOLUTION INTRAVENOUS at 11:45

## 2019-01-01 RX ADMIN — LEVOFLOXACIN 750 MG: 250 TABLET, FILM COATED ORAL at 12:50

## 2019-01-01 RX ADMIN — GEMCITABINE 1800 MG: 38 INJECTION, SOLUTION INTRAVENOUS at 16:37

## 2019-01-01 RX ADMIN — FAMOTIDINE 20 MG: 10 INJECTION INTRAVENOUS at 14:43

## 2019-01-01 RX ADMIN — GEMCITABINE 1800 MG: 38 INJECTION, SOLUTION INTRAVENOUS at 09:50

## 2019-01-01 RX ADMIN — IOPAMIDOL 116 ML: 755 INJECTION, SOLUTION INTRAVASCULAR at 08:10

## 2019-01-01 RX ADMIN — IOPAMIDOL 120 ML: 755 INJECTION, SOLUTION INTRAVASCULAR at 07:40

## 2019-01-01 ASSESSMENT — ACTIVITIES OF DAILY LIVING (ADL)
PRIOR_FUNCTIONAL_LEVEL_COMMENT: INDEPENDENT WITH ADL'S
ADLS_ACUITY_SCORE: 16
ADLS_ACUITY_SCORE: 16
TOILETING: 1-->ASSISTIVE EQUIPMENT
ADLS_ACUITY_SCORE: 16
BATHING: 1-->ASSISTIVE EQUIPMENT
ADLS_ACUITY_SCORE: 16
SWALLOWING: 0-->SWALLOWS FOODS/LIQUIDS WITHOUT DIFFICULTY
AMBULATION: 0-->INDEPENDENT
ADLS_ACUITY_SCORE: 16
ADLS_ACUITY_SCORE: 16
ADLS_ACUITY_SCORE: 17
ADLS_ACUITY_SCORE: 16
DRESS: 1-->ASSISTIVE EQUIPMENT
COGNITION: 0 - NO COGNITION ISSUES REPORTED
FALL_HISTORY_WITHIN_LAST_SIX_MONTHS: NO
ADLS_ACUITY_SCORE: 16
RETIRED_EATING: 0-->INDEPENDENT
ADLS_ACUITY_SCORE: 16
ADLS_ACUITY_SCORE: 16
RETIRED_COMMUNICATION: 0-->UNDERSTANDS/COMMUNICATES WITHOUT DIFFICULTY
TRANSFERRING: 0-->INDEPENDENT

## 2019-01-01 ASSESSMENT — ENCOUNTER SYMPTOMS
SHORTNESS OF BREATH: 0
DIFFICULTY URINATING: 0
ARTHRALGIAS: 0
FEVER: 0
ABDOMINAL PAIN: 0
HEADACHES: 0
CONFUSION: 0
FATIGUE: 1
COLOR CHANGE: 0
PALPITATIONS: 1
EYE REDNESS: 0
NECK STIFFNESS: 0

## 2019-01-01 ASSESSMENT — MIFFLIN-ST. JEOR
SCORE: 1672.18
SCORE: 1691.69
SCORE: 1681.71
SCORE: 1681.19
SCORE: 1654.94
SCORE: 1693.01
SCORE: 1660.16
SCORE: 1708.01
SCORE: 1691.69

## 2019-01-01 ASSESSMENT — PAIN SCALES - GENERAL
PAINLEVEL: NO PAIN (0)
PAINLEVEL: MILD PAIN (3)
PAINLEVEL: NO PAIN (0)
PAINLEVEL: MODERATE PAIN (4)
PAINLEVEL: NO PAIN (0)
PAINLEVEL: MILD PAIN (3)
PAINLEVEL: MILD PAIN (3)
PAINLEVEL: NO PAIN (0)
PAINLEVEL: MILD PAIN (3)

## 2019-01-10 ENCOUNTER — OFFICE VISIT (OUTPATIENT)
Dept: SURGERY | Facility: CLINIC | Age: 59
End: 2019-01-10
Attending: STUDENT IN AN ORGANIZED HEALTH CARE EDUCATION/TRAINING PROGRAM
Payer: COMMERCIAL

## 2019-01-10 ENCOUNTER — ANCILLARY PROCEDURE (OUTPATIENT)
Dept: GENERAL RADIOLOGY | Facility: CLINIC | Age: 59
End: 2019-01-10
Attending: CLINICAL NURSE SPECIALIST
Payer: COMMERCIAL

## 2019-01-10 VITALS
SYSTOLIC BLOOD PRESSURE: 117 MMHG | DIASTOLIC BLOOD PRESSURE: 80 MMHG | RESPIRATION RATE: 16 BRPM | BODY MASS INDEX: 27.77 KG/M2 | HEIGHT: 70 IN | HEART RATE: 69 BPM | OXYGEN SATURATION: 99 % | WEIGHT: 194 LBS | TEMPERATURE: 98.2 F

## 2019-01-10 DIAGNOSIS — C49.9 SARCOMA (H): Primary | ICD-10-CM

## 2019-01-10 DIAGNOSIS — C49.9 SARCOMA (H): ICD-10-CM

## 2019-01-10 PROCEDURE — G0463 HOSPITAL OUTPT CLINIC VISIT: HCPCS | Mod: ZF

## 2019-01-10 ASSESSMENT — MIFFLIN-ST. JEOR: SCORE: 1696.23

## 2019-01-10 ASSESSMENT — PAIN SCALES - GENERAL: PAINLEVEL: MILD PAIN (2)

## 2019-01-10 NOTE — NURSING NOTE
"Oncology Rooming Note    January 10, 2019 8:31 AM   Hiram Tapia is a 58 year old male who presents for:    Chief Complaint   Patient presents with     Oncology Clinic Visit     Return Sarcoma in Lung; Post op     Initial Vitals: /80   Pulse 69   Temp 98.2  F (36.8  C) (Oral)   Resp 16   Ht 1.778 m (5' 10\")   Wt 88 kg (194 lb)   SpO2 99%   BMI 27.84 kg/m   Estimated body mass index is 27.84 kg/m  as calculated from the following:    Height as of this encounter: 1.778 m (5' 10\").    Weight as of this encounter: 88 kg (194 lb). Body surface area is 2.08 meters squared.  Mild Pain (2) Comment: Data Unavailable   No LMP for male patient.  Allergies reviewed: Yes  Medications reviewed: Yes    Medications: Medication refills not needed today.  Pharmacy name entered into EPIC:    [facility] [place], [place] - [address] [place] [address] [address] [facility] [place] [address] AT [facility] [address]    Clinical concerns: post op     6 minutes for nursing intake (face to face time)     Carola Adam CMA              "

## 2019-01-10 NOTE — LETTER
1/10/2019       RE: Hiram Tapia  [address]  [place] [address]     Dear Colleague,    Thank you for referring your patient, Hiram Tapia, to the [facility]. Please see a copy of my visit note below.    THORACIC SURGERY FOLLOW UP VISIT    Dear Louisa Caballero,  I saw Mr. Tapia in follow-up today. 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     COMPLICATIONS  None    INTERVAL STUDIES  CXR 1/10: No effusion. No pneumothorax.         ETOH denies  TOB: smoked a pack a day for 10 years  BMI 27.8    SUBJECTIVE   David has been doing well since surgery. His pain has been well controlled Denies any shortness of breath. He is scheduled to see his oncologist later his week.   Incisions healed well.    From a personal perspective, he enjoyed his holiday. He has no new issues.     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 Status post right VATS wedge resection 12/5. Doing well post op.     PLAN  I spent a total of 25 minutes with Mr. Hiram Tapia, more than 50% of which were spent in counseling, coordination of care, and face-to-face time. I reviewed the plan as follows:  He will follow up with oncology to discuss future treatment plans.  He has multiple small bilateral nodules and will likely need medical rather than surgical treatment for this.    All questions were answered and the  patient and present family were in agreement with the plan.  I appreciate the opportunity to participate in the care of your patient and will keep you updated.  Sincerely,          Sarah Bowie MD

## 2019-01-10 NOTE — PROGRESS NOTES
THORACIC SURGERY FOLLOW UP VISIT    Dear Louisa Caballero,  I saw Mr. Tapia in follow-up today. 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     COMPLICATIONS  None    INTERVAL STUDIES  CXR 1/10: No effusion. No pneumothorax.         ETOH denies  TOB: smoked a pack a day for 10 years  BMI 27.8    SUBJECTIVE   David has been doing well since surgery. His pain has been well controlled Denies any shortness of breath. He is scheduled to see his oncologist later his week.   Incisions healed well.    From a personal perspective, he enjoyed his holiday. He has no new issues.     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 Status post right VATS wedge resection 12/5. Doing well post op.     PLAN  I spent a total of 25 minutes with Mr. Hiram Tapia, more than 50% of which were spent in counseling, coordination of care, and face-to-face time. I reviewed the plan as follows:  He will follow up with oncology to discuss future treatment plans.  He has multiple small bilateral nodules and will likely need medical rather than surgical treatment for this.    All questions were answered and the patient and present family were in agreement with the plan.  I appreciate the opportunity to participate in the care of your patient and will keep you updated.  Sincerely,

## 2019-01-29 ENCOUNTER — ANCILLARY PROCEDURE (OUTPATIENT)
Dept: CT IMAGING | Facility: CLINIC | Age: 59
End: 2019-01-29
Attending: INTERNAL MEDICINE
Payer: COMMERCIAL

## 2019-01-29 DIAGNOSIS — C49.9 SARCOMA (H): ICD-10-CM

## 2019-01-29 DIAGNOSIS — Z86.711 HISTORY OF PULMONARY EMBOLISM: ICD-10-CM

## 2019-01-29 DIAGNOSIS — R91.8 PULMONARY NODULES: ICD-10-CM

## 2019-01-29 DIAGNOSIS — Z87.891 PERSONAL HISTORY OF TOBACCO USE, PRESENTING HAZARDS TO HEALTH: ICD-10-CM

## 2019-01-29 DIAGNOSIS — T81.328D: ICD-10-CM

## 2019-01-29 DIAGNOSIS — M79.604 PAIN OF RIGHT LOWER EXTREMITY: ICD-10-CM

## 2019-01-29 DIAGNOSIS — M10.00 IDIOPATHIC GOUT, UNSPECIFIED CHRONICITY, UNSPECIFIED SITE: ICD-10-CM

## 2019-01-29 DIAGNOSIS — C49.21 SARCOMA OF RIGHT THIGH (H): ICD-10-CM

## 2019-01-29 NOTE — PROGRESS NOTES
"19     I saw Hiram Tapia for f/u of a sarcoma of the right thigh.      Background  In brief he has had a lot of problems with his right leg for many years including sciatica for 20 years.  He developed more discomfort in the right thigh and in 2017 hit his lateral thigh against a truck tailgate causing a lot of pain.  Subsequently there was a fair amount of swelling and stiffness.  This eventually led to some imaging showing a cystic mass.  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  At the time of the biopsy more than a liter of fluid was removed and that markedly improved his symptoms of stiffness and pain.  -  He had a colonoscopy yesterday due to activity seen on PET CTand a 6 and 10 mm polyp was removed from the hepatic flexure and a 15 mm polyp from the splenic flexure.  He needs another in 1 year as one was \"precancerous\".  -        Interval history      He began doxil/ifos 3-23-18.  He has had 4 cycles with suggestion of a response after 1 cycle.  A new PE asymptomatic was noted and he started rivaroxaban in 2018.  He is off this due to the colonoscopy.     He had preoperative XRT and resection on 18 <5% viable cells and negative margins with no LVI.    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    Not on any pain meds now.      He went deer hunting in Nov.    Aside from this problem his 10 point ROS  unremarkable.      -  Background PMH, FH, SH  Past history is not particular remarkable other than for tonsillectomy and surgery for lazy eye.  His left eye is the dominant eye.    The family history is not particularly remarkable but his father  of leukemia at age 42.    He denies any drug allergies.  He is  and has an adult somewhat autistic boy who lives with them.  He smoked a pack a day for about 10 years, stopping about , and does not abuse alcohol or other drugs.  He works as a  at " [employer].  -         On exam he appeared comfortable with normal affect.    /88   Pulse 69   Temp 97.8  F (36.6  C) (Oral)   Resp 16   Wt 89.1 kg (196 lb 6.9 oz)   SpO2 98%   BMI 28.18 kg/m    HEENT There is no icterus, Lazy eye (L dominant).   NECK:nl thyromegaly or neck mass  CHEST:  chest clear  CV: RRR w no sig murmur  ABD:  no HSMT  EXT: tr edema bilat  MS: no synovitis of hands, normal ambulation w mild limp  LYMPH: no lymphadenopathy  NEURO: Nabila mentation, Romberg, heel/toe  SKIN: no rash  PSYCH: mood fairly good  FOCUSED EXAM; wound site OK      -  No labs today.      -  I reviewed the new imaging and there is slow growth of the multiple lung nodules over 2 months.    I reviewed the images w him.      -      He had 4 cycles of chemotherapy and XRT before surgery on 9-17-18.    We discussed that given the time interval this does not exclude future treatment with more doxil or ifos, but that keytruda seems more attractive especially given the relatively slow growth rate.  We went over the typical toxicities of that.  We did discuss that he would probably benefit from the continued use of the port but if this bothers him more in the future we could think about changing it.  We also discussed the serious nature of the problem and typical life expectancy's.  He actually does not have a will get and will arrange that.  Further scheduling will be determined when we know the start date for the Keytruda.  Rubens will try to expedite that today.  All of his questions were addressed and he will call if he has others.     Gaurang Johnson M.D.  Professor  Hematology, Oncology and Transplantation

## 2019-01-31 ENCOUNTER — ONCOLOGY VISIT (OUTPATIENT)
Dept: ONCOLOGY | Facility: CLINIC | Age: 59
End: 2019-01-31
Attending: INTERNAL MEDICINE
Payer: COMMERCIAL

## 2019-01-31 VITALS
WEIGHT: 196.43 LBS | TEMPERATURE: 97.8 F | OXYGEN SATURATION: 98 % | HEART RATE: 69 BPM | SYSTOLIC BLOOD PRESSURE: 135 MMHG | DIASTOLIC BLOOD PRESSURE: 88 MMHG | BODY MASS INDEX: 28.18 KG/M2 | RESPIRATION RATE: 16 BRPM

## 2019-01-31 DIAGNOSIS — Z87.891 PERSONAL HISTORY OF TOBACCO USE, PRESENTING HAZARDS TO HEALTH: ICD-10-CM

## 2019-01-31 DIAGNOSIS — C49.21 SOFT TISSUE SARCOMA OF RIGHT THIGH (H): Primary | ICD-10-CM

## 2019-01-31 DIAGNOSIS — M10.00 IDIOPATHIC GOUT, UNSPECIFIED CHRONICITY, UNSPECIFIED SITE: ICD-10-CM

## 2019-01-31 DIAGNOSIS — Z86.711 HISTORY OF PULMONARY EMBOLISM: ICD-10-CM

## 2019-01-31 DIAGNOSIS — R91.8 PULMONARY NODULES: ICD-10-CM

## 2019-01-31 PROCEDURE — 99214 OFFICE O/P EST MOD 30 MIN: CPT | Mod: ZP | Performed by: INTERNAL MEDICINE

## 2019-01-31 PROCEDURE — G0463 HOSPITAL OUTPT CLINIC VISIT: HCPCS | Mod: ZF

## 2019-01-31 ASSESSMENT — PAIN SCALES - GENERAL: PAINLEVEL: NO PAIN (0)

## 2019-01-31 NOTE — LETTER
"2019     RE: Hiram Tapia  [address]  [place] [address]     Dear Colleague,    Thank you for referring your patient, Hiram Tapia, to the [facility]. Please see a copy of my visit note below.    19     I saw Hiram Tapia for f/u of a sarcoma of the right thigh.      Background  In brief he has had a lot of problems with his right leg for many years including sciatica for 20 years.  He developed more discomfort in the right thigh and in 2017 hit his lateral thigh against a truck tailgate causing a lot of pain.  Subsequently there was a fair amount of swelling and stiffness.  This eventually led to some imaging showing a cystic mass.  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  At the time of the biopsy more than a liter of fluid was removed and that markedly improved his symptoms of stiffness and pain.  -  He had a colonoscopy yesterday due to activity seen on PET CTand a 6 and 10 mm polyp was removed from the hepatic flexure and a 15 mm polyp from the splenic flexure.  He needs another in 1 year as one was \"precancerous\".  -        Interval history      He began doxil/ifos 3-23-18.  He has had 4 cycles with suggestion of a response after 1 cycle.  A new PE asymptomatic was noted and he started rivaroxaban in 2018.  He is off this due to the colonoscopy.     He had preoperative XRT and resection on 18 <5% viable cells and negative margins with no LVI.    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    Not on any pain meds now.      He went deer hunting in Nov.    Aside from this problem his 10 point ROS  unremarkable.      -  Background PMH, FH, SH  Past history is not particular remarkable other than for tonsillectomy and surgery for lazy eye.  His left eye is the dominant eye.    The family history is not particularly remarkable but his father  of leukemia at age 42.    He denies any drug " allergies.  He is  and has an adult somewhat autistic boy who lives with them.  He smoked a pack a day for about 10 years, stopping about 1990, and does not abuse alcohol or other drugs.  He works as a  at [employer].  -         On exam he appeared comfortable with normal affect.    /88   Pulse 69   Temp 97.8  F (36.6  C) (Oral)   Resp 16   Wt 89.1 kg (196 lb 6.9 oz)   SpO2 98%   BMI 28.18 kg/m     HEENT There is no icterus, Lazy eye (L dominant).   NECK:nl thyromegaly or neck mass  CHEST:  chest clear  CV: RRR w no sig murmur  ABD:  no HSMT  EXT: tr edema bilat  MS: no synovitis of hands, normal ambulation w mild limp  LYMPH: no lymphadenopathy  NEURO: Nabila mentation, Romberg, heel/toe  SKIN: no rash  PSYCH: mood fairly good  FOCUSED EXAM; wound site OK      -  No labs today.      -  I reviewed the new imaging and there is slow growth of the multiple lung nodules over 2 months.    I reviewed the images w him.      -      He had 4 cycles of chemotherapy and XRT before surgery on 9-17-18.    We discussed that given the time interval this does not exclude future treatment with more doxil or ifos, but that keytruda seems more attractive especially given the relatively slow growth rate.  We went over the typical toxicities of that.  We did discuss that he would probably benefit from the continued use of the port but if this bothers him more in the future we could think about changing it.  We also discussed the serious nature of the problem and typical life expectancy's.  He actually does not have a will get and will arrange that.  Further scheduling will be determined when we know the start date for the Keytruda.  Rubens will try to expedite that today.  All of his questions were addressed and he will call if he has others.     Gaurang Johnson M.D.  Professor  Hematology, Oncology and Transplantation

## 2019-01-31 NOTE — NURSING NOTE
"Oncology Rooming Note    January 31, 2019 9:50 AM   Hiram Tapia is a 58 year old male who presents for:    Chief Complaint   Patient presents with     Oncology Clinic Visit     Sarcoma , CT results      Initial Vitals: /88   Pulse 69   Temp 97.8  F (36.6  C) (Oral)   Resp 16   Wt 89.1 kg (196 lb 6.9 oz)   SpO2 98%   BMI 28.18 kg/m   Estimated body mass index is 28.18 kg/m  as calculated from the following:    Height as of 1/10/19: 1.778 m (5' 10\").    Weight as of this encounter: 89.1 kg (196 lb 6.9 oz). Body surface area is 2.1 meters squared.  No Pain (0) Comment: Data Unavailable   No LMP for male patient.  Allergies reviewed: Yes  Medications reviewed: Yes    Medications: Medication refills not needed today.  Pharmacy name entered into EPIC:    [facility] [place], [place] - [address] [place] [address] [address] [facility] [place] [address] AT [facility]  [address]    Clinical concerns: CT results  Sarcoma  was notified.    8  minutes for nursing intake (face to face time)     Paty Snyder MA              "

## 2019-02-25 DIAGNOSIS — C49.21 SOFT TISSUE SARCOMA OF RIGHT THIGH (H): Primary | ICD-10-CM

## 2019-04-02 ENCOUNTER — HOSPITAL ENCOUNTER (INPATIENT)
Facility: CLINIC | Age: 59
LOS: 3 days | Discharge: HOME | DRG: 181 | End: 2019-04-05
Attending: EMERGENCY MEDICINE | Admitting: INTERNAL MEDICINE
Payer: COMMERCIAL

## 2019-04-02 ENCOUNTER — ANCILLARY PROCEDURE (OUTPATIENT)
Dept: CT IMAGING | Facility: CLINIC | Age: 59
End: 2019-04-02
Attending: INTERNAL MEDICINE
Payer: COMMERCIAL

## 2019-04-02 ENCOUNTER — APPOINTMENT (OUTPATIENT)
Dept: GENERAL RADIOLOGY | Facility: CLINIC | Age: 59
DRG: 181 | End: 2019-04-02
Attending: EMERGENCY MEDICINE
Payer: COMMERCIAL

## 2019-04-02 VITALS
DIASTOLIC BLOOD PRESSURE: 87 MMHG | SYSTOLIC BLOOD PRESSURE: 136 MMHG | OXYGEN SATURATION: 94 % | HEART RATE: 86 BPM | RESPIRATION RATE: 18 BRPM | TEMPERATURE: 98.5 F

## 2019-04-02 DIAGNOSIS — C49.21 SOFT TISSUE SARCOMA OF RIGHT THIGH (H): ICD-10-CM

## 2019-04-02 DIAGNOSIS — Z87.891 PERSONAL HISTORY OF TOBACCO USE, PRESENTING HAZARDS TO HEALTH: ICD-10-CM

## 2019-04-02 DIAGNOSIS — C49.9 SARCOMA (H): ICD-10-CM

## 2019-04-02 DIAGNOSIS — C78.01 SECONDARY MALIGNANT NEOPLASM OF RIGHT LUNG (H): ICD-10-CM

## 2019-04-02 DIAGNOSIS — J93.12 SECONDARY SPONTANEOUS PNEUMOTHORAX: Primary | ICD-10-CM

## 2019-04-02 DIAGNOSIS — J93.9 PNEUMOTHORAX, UNSPECIFIED TYPE: ICD-10-CM

## 2019-04-02 DIAGNOSIS — J93.9 PNEUMOTHORAX ON RIGHT: ICD-10-CM

## 2019-04-02 DIAGNOSIS — C78.02 SECONDARY MALIGNANT NEOPLASM OF LEFT LUNG (H): ICD-10-CM

## 2019-04-02 LAB
ABO + RH BLD: NORMAL
ABO + RH BLD: NORMAL
ALBUMIN SERPL-MCNC: 3.9 G/DL (ref 3.4–5)
ALP SERPL-CCNC: 100 U/L (ref 40–150)
ALT SERPL W P-5'-P-CCNC: 22 U/L (ref 0–70)
ANION GAP SERPL CALCULATED.3IONS-SCNC: 6 MMOL/L (ref 3–14)
ANION GAP SERPL CALCULATED.3IONS-SCNC: 8 MMOL/L (ref 3–14)
APTT PPP: 31 SEC (ref 22–37)
AST SERPL W P-5'-P-CCNC: 14 U/L (ref 0–45)
BASOPHILS # BLD AUTO: 0 10E9/L (ref 0–0.2)
BASOPHILS # BLD AUTO: 0 10E9/L (ref 0–0.2)
BASOPHILS NFR BLD AUTO: 0.7 %
BASOPHILS NFR BLD AUTO: 0.7 %
BILIRUB SERPL-MCNC: 0.5 MG/DL (ref 0.2–1.3)
BLD GP AB SCN SERPL QL: NORMAL
BLOOD BANK CMNT PATIENT-IMP: NORMAL
BUN SERPL-MCNC: 19 MG/DL (ref 7–30)
BUN SERPL-MCNC: 20 MG/DL (ref 7–30)
CALCIUM SERPL-MCNC: 10 MG/DL (ref 8.5–10.1)
CALCIUM SERPL-MCNC: 10.2 MG/DL (ref 8.5–10.1)
CHLORIDE SERPL-SCNC: 105 MMOL/L (ref 94–109)
CHLORIDE SERPL-SCNC: 106 MMOL/L (ref 94–109)
CO2 SERPL-SCNC: 24 MMOL/L (ref 20–32)
CO2 SERPL-SCNC: 24 MMOL/L (ref 20–32)
CREAT SERPL-MCNC: 0.84 MG/DL (ref 0.66–1.25)
CREAT SERPL-MCNC: 0.91 MG/DL (ref 0.66–1.25)
DIFFERENTIAL METHOD BLD: NORMAL
DIFFERENTIAL METHOD BLD: NORMAL
EOSINOPHIL # BLD AUTO: 0.1 10E9/L (ref 0–0.7)
EOSINOPHIL # BLD AUTO: 0.1 10E9/L (ref 0–0.7)
EOSINOPHIL NFR BLD AUTO: 1.3 %
EOSINOPHIL NFR BLD AUTO: 1.8 %
ERYTHROCYTE [DISTWIDTH] IN BLOOD BY AUTOMATED COUNT: 14.4 % (ref 10–15)
ERYTHROCYTE [DISTWIDTH] IN BLOOD BY AUTOMATED COUNT: 14.5 % (ref 10–15)
GFR SERPL CREATININE-BSD FRML MDRD: >90 ML/MIN/{1.73_M2}
GFR SERPL CREATININE-BSD FRML MDRD: >90 ML/MIN/{1.73_M2}
GLUCOSE SERPL-MCNC: 102 MG/DL (ref 70–99)
GLUCOSE SERPL-MCNC: 98 MG/DL (ref 70–99)
HCT VFR BLD AUTO: 45.2 % (ref 40–53)
HCT VFR BLD AUTO: 46 % (ref 40–53)
HGB BLD-MCNC: 14.4 G/DL (ref 13.3–17.7)
HGB BLD-MCNC: 14.6 G/DL (ref 13.3–17.7)
IMM GRANULOCYTES # BLD: 0 10E9/L (ref 0–0.4)
IMM GRANULOCYTES # BLD: 0 10E9/L (ref 0–0.4)
IMM GRANULOCYTES NFR BLD: 0.2 %
IMM GRANULOCYTES NFR BLD: 0.2 %
INR PPP: 1.02 (ref 0.86–1.14)
LYMPHOCYTES # BLD AUTO: 1.4 10E9/L (ref 0.8–5.3)
LYMPHOCYTES # BLD AUTO: 1.6 10E9/L (ref 0.8–5.3)
LYMPHOCYTES NFR BLD AUTO: 24 %
LYMPHOCYTES NFR BLD AUTO: 26.3 %
MAGNESIUM SERPL-MCNC: 2.3 MG/DL (ref 1.6–2.3)
MCH RBC QN AUTO: 27 PG (ref 26.5–33)
MCH RBC QN AUTO: 27.7 PG (ref 26.5–33)
MCHC RBC AUTO-ENTMCNC: 31.7 G/DL (ref 31.5–36.5)
MCHC RBC AUTO-ENTMCNC: 31.9 G/DL (ref 31.5–36.5)
MCV RBC AUTO: 85 FL (ref 78–100)
MCV RBC AUTO: 87 FL (ref 78–100)
MONOCYTES # BLD AUTO: 0.5 10E9/L (ref 0–1.3)
MONOCYTES # BLD AUTO: 0.5 10E9/L (ref 0–1.3)
MONOCYTES NFR BLD AUTO: 8.5 %
MONOCYTES NFR BLD AUTO: 8.7 %
NEUTROPHILS # BLD AUTO: 3.7 10E9/L (ref 1.6–8.3)
NEUTROPHILS # BLD AUTO: 3.9 10E9/L (ref 1.6–8.3)
NEUTROPHILS NFR BLD AUTO: 62.5 %
NEUTROPHILS NFR BLD AUTO: 65.1 %
NRBC # BLD AUTO: 0 10*3/UL
NRBC # BLD AUTO: 0 10*3/UL
NRBC BLD AUTO-RTO: 0 /100
NRBC BLD AUTO-RTO: 0 /100
PHOSPHATE SERPL-MCNC: 3.8 MG/DL (ref 2.5–4.5)
PLATELET # BLD AUTO: 243 10E9/L (ref 150–450)
PLATELET # BLD AUTO: 276 10E9/L (ref 150–450)
POTASSIUM SERPL-SCNC: 4 MMOL/L (ref 3.4–5.3)
POTASSIUM SERPL-SCNC: 4.3 MMOL/L (ref 3.4–5.3)
PROT SERPL-MCNC: 7.4 G/DL (ref 6.8–8.8)
RADIOLOGIST FLAGS: ABNORMAL
RBC # BLD AUTO: 5.2 10E12/L (ref 4.4–5.9)
RBC # BLD AUTO: 5.41 10E12/L (ref 4.4–5.9)
SODIUM SERPL-SCNC: 136 MMOL/L (ref 133–144)
SODIUM SERPL-SCNC: 137 MMOL/L (ref 133–144)
SPECIMEN EXP DATE BLD: NORMAL
WBC # BLD AUTO: 6 10E9/L (ref 4–11)
WBC # BLD AUTO: 6 10E9/L (ref 4–11)

## 2019-04-02 PROCEDURE — 86850 RBC ANTIBODY SCREEN: CPT | Performed by: EMERGENCY MEDICINE

## 2019-04-02 PROCEDURE — 86901 BLOOD TYPING SEROLOGIC RH(D): CPT | Performed by: EMERGENCY MEDICINE

## 2019-04-02 PROCEDURE — 85025 COMPLETE CBC W/AUTO DIFF WBC: CPT | Performed by: EMERGENCY MEDICINE

## 2019-04-02 PROCEDURE — 99223 1ST HOSP IP/OBS HIGH 75: CPT | Performed by: INTERNAL MEDICINE

## 2019-04-02 PROCEDURE — 99285 EMERGENCY DEPT VISIT HI MDM: CPT | Mod: Z6 | Performed by: EMERGENCY MEDICINE

## 2019-04-02 PROCEDURE — 12000001 ZZH R&B MED SURG/OB UMMC

## 2019-04-02 PROCEDURE — 25000132 ZZH RX MED GY IP 250 OP 250 PS 637: Performed by: PHYSICIAN ASSISTANT

## 2019-04-02 PROCEDURE — 99285 EMERGENCY DEPT VISIT HI MDM: CPT | Mod: 25 | Performed by: EMERGENCY MEDICINE

## 2019-04-02 PROCEDURE — 80048 BASIC METABOLIC PNL TOTAL CA: CPT | Performed by: EMERGENCY MEDICINE

## 2019-04-02 PROCEDURE — 96374 THER/PROPH/DIAG INJ IV PUSH: CPT | Performed by: EMERGENCY MEDICINE

## 2019-04-02 PROCEDURE — 25000128 H RX IP 250 OP 636: Performed by: EMERGENCY MEDICINE

## 2019-04-02 PROCEDURE — 32551 INSERTION OF CHEST TUBE: CPT | Performed by: EMERGENCY MEDICINE

## 2019-04-02 PROCEDURE — 85730 THROMBOPLASTIN TIME PARTIAL: CPT | Performed by: EMERGENCY MEDICINE

## 2019-04-02 PROCEDURE — 85610 PROTHROMBIN TIME: CPT | Performed by: EMERGENCY MEDICINE

## 2019-04-02 PROCEDURE — 86900 BLOOD TYPING SEROLOGIC ABO: CPT | Performed by: EMERGENCY MEDICINE

## 2019-04-02 PROCEDURE — 0W9930Z DRAINAGE OF RIGHT PLEURAL CAVITY WITH DRAINAGE DEVICE, PERCUTANEOUS APPROACH: ICD-10-PCS | Performed by: PHYSICIAN ASSISTANT

## 2019-04-02 PROCEDURE — 71045 X-RAY EXAM CHEST 1 VIEW: CPT

## 2019-04-02 RX ORDER — ONDANSETRON 8 MG/1
8 TABLET, FILM COATED ORAL EVERY 8 HOURS PRN
Status: DISCONTINUED | OUTPATIENT
Start: 2019-04-02 | End: 2019-04-05 | Stop reason: HOSPADM

## 2019-04-02 RX ORDER — NALOXONE HYDROCHLORIDE 0.4 MG/ML
.1-.4 INJECTION, SOLUTION INTRAMUSCULAR; INTRAVENOUS; SUBCUTANEOUS
Status: DISCONTINUED | OUTPATIENT
Start: 2019-04-02 | End: 2019-04-05 | Stop reason: HOSPADM

## 2019-04-02 RX ORDER — AMOXICILLIN 250 MG
2 CAPSULE ORAL 2 TIMES DAILY
Status: DISCONTINUED | OUTPATIENT
Start: 2019-04-02 | End: 2019-04-05 | Stop reason: HOSPADM

## 2019-04-02 RX ORDER — IOPAMIDOL 755 MG/ML
120 INJECTION, SOLUTION INTRAVASCULAR ONCE
Status: COMPLETED | OUTPATIENT
Start: 2019-04-02 | End: 2019-04-02

## 2019-04-02 RX ORDER — OXYCODONE HYDROCHLORIDE 5 MG/1
5 TABLET ORAL EVERY 4 HOURS PRN
Status: DISCONTINUED | OUTPATIENT
Start: 2019-04-02 | End: 2019-04-05

## 2019-04-02 RX ORDER — AMOXICILLIN 250 MG
1 CAPSULE ORAL 2 TIMES DAILY
Status: DISCONTINUED | OUTPATIENT
Start: 2019-04-02 | End: 2019-04-05 | Stop reason: HOSPADM

## 2019-04-02 RX ORDER — ACETAMINOPHEN 325 MG/1
650 TABLET ORAL EVERY 4 HOURS PRN
Status: DISCONTINUED | OUTPATIENT
Start: 2019-04-02 | End: 2019-04-03

## 2019-04-02 RX ORDER — LORAZEPAM 2 MG/ML
.5-1 INJECTION INTRAMUSCULAR EVERY 6 HOURS PRN
Status: DISCONTINUED | OUTPATIENT
Start: 2019-04-02 | End: 2019-04-05 | Stop reason: HOSPADM

## 2019-04-02 RX ORDER — FENTANYL CITRATE 50 UG/ML
100 INJECTION, SOLUTION INTRAMUSCULAR; INTRAVENOUS ONCE
Status: COMPLETED | OUTPATIENT
Start: 2019-04-02 | End: 2019-04-02

## 2019-04-02 RX ORDER — LORAZEPAM 0.5 MG/1
.5-1 TABLET ORAL EVERY 6 HOURS PRN
Status: DISCONTINUED | OUTPATIENT
Start: 2019-04-02 | End: 2019-04-05 | Stop reason: HOSPADM

## 2019-04-02 RX ORDER — PROCHLORPERAZINE MALEATE 5 MG
5-10 TABLET ORAL EVERY 6 HOURS PRN
Status: DISCONTINUED | OUTPATIENT
Start: 2019-04-02 | End: 2019-04-05 | Stop reason: HOSPADM

## 2019-04-02 RX ORDER — ONDANSETRON 2 MG/ML
8 INJECTION INTRAMUSCULAR; INTRAVENOUS EVERY 8 HOURS PRN
Status: DISCONTINUED | OUTPATIENT
Start: 2019-04-02 | End: 2019-04-05 | Stop reason: HOSPADM

## 2019-04-02 RX ORDER — ONDANSETRON 8 MG/1
8 TABLET, ORALLY DISINTEGRATING ORAL EVERY 8 HOURS PRN
Status: DISCONTINUED | OUTPATIENT
Start: 2019-04-02 | End: 2019-04-05 | Stop reason: HOSPADM

## 2019-04-02 RX ADMIN — FENTANYL CITRATE 100 MCG: 50 INJECTION INTRAMUSCULAR; INTRAVENOUS at 12:38

## 2019-04-02 RX ADMIN — ACETAMINOPHEN 650 MG: 325 TABLET, FILM COATED ORAL at 16:02

## 2019-04-02 RX ADMIN — IOPAMIDOL 120 ML: 755 INJECTION, SOLUTION INTRAVASCULAR at 09:24

## 2019-04-02 RX ADMIN — ACETAMINOPHEN 650 MG: 325 TABLET, FILM COATED ORAL at 20:33

## 2019-04-02 ASSESSMENT — ENCOUNTER SYMPTOMS: SHORTNESS OF BREATH: 1

## 2019-04-02 ASSESSMENT — PAIN DESCRIPTION - DESCRIPTORS
DESCRIPTORS: ACHING
DESCRIPTORS: ACHING

## 2019-04-02 ASSESSMENT — MIFFLIN-ST. JEOR
SCORE: 1668.11
SCORE: 1678.08

## 2019-04-02 ASSESSMENT — ACTIVITIES OF DAILY LIVING (ADL)
ADLS_ACUITY_SCORE: 16
ADLS_ACUITY_SCORE: 13

## 2019-04-02 NOTE — PHARMACY-ADMISSION MEDICATION HISTORY
Admission medication history interview status for the 4/2/2019 admission is complete. See Epic admission navigator for allergy information, pharmacy, prior to admission medications and immunization status.     Medication history interview sources:  patient, [facility] ([place], [phone])    Changes made to PTA medication list (reason)  Added: none  Deleted: none  Changed: none    Additional medication history information (including reliability of information, actions taken by pharmacist):  -Patient declined being on any other prescription or over-the-counter medications; confirmed with pharmacy last time a prescription was filled was 10/2018 (both hydromorphone and Xarelto filled; completed courses for both medications-including surgeon and oncologist approval for Xarelto)    -Per chart review:   - no recent (within 30 days) antibiotic course   -no recent (within 30 days) steroid course   -no recent (within 30 days) chronic daily medications discontinued    -Patient has not received an influenza vaccine this year and is not interested in receiving one on discharge      Prior to Admission medications    Medication Sig Last Dose Taking? Auth Provider   menthol (COUGH DROP) 8 MG LOZG Take 1 lozenge by mouth every hour as needed for cough 4/2/2019 at AM Yes Unknown, Entered By History         Medication history completed by:     Rashmi Gregg, PharmD  PGY2 Infectious Diseases Pharmacy Resident  Pager: [phone]

## 2019-04-02 NOTE — PROCEDURES
Thoracic Surgery Procedure Note  Preprocedure Diagnosis: Pneumothorax   Postprocedure Diagnosis: same as above  Procedure: Right sided chest tube placement .  (14F in 4th intercostal space at mid-axillary line.)     Premed: 100mcg Fentanyl  Additional Meds: none     Hiram Tapia was prepped and draped in the usual fashion. Lidocaine was locally injected into the subcutaneous tissue as well as periostium and the pleural space. An 18 gauge needle with syringe attached was inserted into the pleural space with aspiration of air to verify placement. A guide wire was advanced into the pleural space and the needle was withdrawn. A small incision was made through the skin and the subcutaneous tissues were dilated. The chest drain was inserted into the pleural space. The tube was sutured in place and dressing applied. The patient tolerated the procedure well, and there were no complications.      A STAT CXR was ordered to confirm proper placement.     Igor Marshall PA-C  [phone]

## 2019-04-02 NOTE — ED PROVIDER NOTES
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  Per chart review, patient had a scheduled outpatient CT of the chest/abdomen/pelvis which was notable for a new large right pneumothorax and trace left apical pneumothorax.  There are also numerous bilateral metastatic pulmonology nodules that are increased in size and number since the last scan in 1/9/18.  Patient states that he first noted having shortness of breath from mid to and February this year, but states that he actually feels better today.  He also reports that since his biopsy continues to have soreness around right lateral chest wall area.  He reports that today's CT scan was to get a baseline before he starts his new chemotherapy in 2 days.  Patient otherwise currently denies chest pain.    EXAMINATION: CT CHEST/ABDOMEN/PELVIS W CONTRAST, 4/2/2019 9:36 AM  IMPRESSION:   1. New large right and trace left pneumothoraces.  2. Increased size and number of numerous metastatic pulmonary nodules,  including along a right middle lobe wedge resection staple line.  3. Increased/new centrally necrotic right external iliac chain lymph  nodes, lytic lesion with associated soft tissue mass arising from the  posterior right femoral intertrochanteric region, and centrally  necrotic soft tissue mass arising from the right iliopsoas  myotendinous junction.  4. Unchanged hemangioma in hepatic segment 6.  5. Unchanged pancreatic tail masslike lesion, which has not  demonstrated significant FDG avidity on comparison PET CTs,  Indeterminate.    PAST MEDICAL HISTORY  Past Medical History:   Diagnosis Date     Pulmonary embolism (H)      Sarcoma (H)      PAST SURGICAL HISTORY  Past Surgical History:    Procedure Laterality Date     EXCISE SOFT TISSUE TUMOR THIGH Right 3/8/2018    Procedure: EXCISE SOFT TISSUE TUMOR THIGH;  Biopsy Right Thigh Tumor;  Surgeon: Brad Betts MD;  Location: [facility] OR     EXCISE SOFT TISSUE TUMOR THIGH Right 2018    Procedure: EXCISE SOFT TISSUE TUMOR THIGH;  Removal Right Thigh Tumor ;  Surgeon: Brad Betts MD;  Location: [facility] OR     INSERT PORT VASCULAR ACCESS N/A 3/28/2018    Procedure: INSERT PORT VASCULAR ACCESS;  Vascular Access Port Insertion with C-arm;  Surgeon: Sarah Bowie MD;  Location: [facility] OR     IRRIGATION AND DEBRIDEMENT LOWER EXTREMITY, COMBINED Right 2018    Procedure: COMBINED IRRIGATION AND DEBRIDEMENT LOWER EXTREMITY;  Irrigation And Debridement Right Thigh ;  Surgeon: Brad Betts MD;  Location: [facility] OR     IRRIGATION AND DEBRIDEMENT LOWER EXTREMITY, COMBINED Right 2018    Procedure: COMBINED IRRIGATION AND DEBRIDEMENT LOWER EXTREMITY;  Irrigation And Debridement Right Thigh Wound. with Wound VAC Exchange;  Surgeon: Brad Betts MD;  Location: [facility] OR     STRABISMUS SURGERY      as a child     THORACOSCOPIC WEDGE RESECTION LUNG Right 2018    Procedure: Right Video Assisted Thoracoscopic Wedge Resection;  Surgeon: Sarah Bowie MD;  Location: [facility] OR     FAMILY HISTORY  Family History   Problem Relation Age of Onset     Leukemia Father      SOCIAL HISTORY  Social History     Tobacco Use     Smoking status: Former Smoker     Packs/day: 1.00     Years: 10.00     Pack years: 10.00     Types: Cigarettes     Start date: 1975     Last attempt to quit: 1990     Years since quittin.2     Smokeless tobacco: Never Used   Substance Use Topics     Alcohol use: Yes     Alcohol/week: 8.4 oz     Types: 14 Cans of beer per week     Comment: 1 beer a day      MEDICATIONS  No current facility-administered medications for this encounter.      Current Outpatient Medications   Medication     menthol (COUGH DROP) 8 MG LOZG  "    ALLERGIES  Allergies   Allergen Reactions     Hydrofera Blue 4\"X4\" [Wound Dressings] Dermatitis and Blisters     Patient reports that Dressing causes skin irritation, blisters, and drainage.      Tegaderm Ag Mesh [Silver] Dermatitis and Blisters     Patient reports that Dressing causes Skin irritation, blisters, and drainage.       I have reviewed the Medications, Allergies, Past Medical and Surgical History, and Social History in the Epic system.    Review of Systems   Respiratory: Positive for shortness of breath.    Cardiovascular: Negative for chest pain.   All other systems reviewed and are negative.      Physical Exam   BP: (!) 130/92  Heart Rate: 81  Temp: 98.4  F (36.9  C)  Resp: 18  Height: 177.8 cm (5' 10\")  Weight: 86.2 kg (190 lb)      Physical Exam   Constitutional: No distress.   HENT:   Head: Atraumatic.   Mouth/Throat: Oropharynx is clear and moist.   Eyes: Pupils are equal, round, and reactive to light. No scleral icterus.   Cardiovascular: Normal heart sounds and intact distal pulses.   Pulmonary/Chest: Breath sounds normal. No respiratory distress.   Abdominal: Soft. Bowel sounds are normal. There is no tenderness.   Musculoskeletal: He exhibits no edema or tenderness.   Skin: Skin is warm. No rash noted. He is not diaphoretic.       ED Course        Procedures   10:55 AM  The patient was seen and examined by Dr. Raymond in Room 14.                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  Patient referred into the ED directly from radiology department.  Upon arrival in the ED IV established, labs in " epic unremarkable with normal CBC.'s coags.  Patient placed on cardiac monitor which revealed normal sats of 97%, stable vital signs blood pressure 130/92.  X-ray of the chest was obtained here in emergency department which revealed no interval change from CT earlier in the day.  Case discussed with thoracic surgery team who had performed the procedure of the patient with the last 3 months, and they agreed to evaluate the patient the emergency department and establish a test to.  Patient has limited to thoracic surgery.    I have reviewed the nursing notes.    I have reviewed the findings, diagnosis, plan and need for follow up with the patient.       Medication List      There are no discharge medications for this visit.         Final diagnoses:   Pneumothorax on right     IMarques, am serving as a trained medical scribe to document services personally performed by Eliezer Raymond MD, based on the provider's statements to me.      Eliezer WHITTAKER MD, was physically present and have reviewed and verified the accuracy of this note documented by Marques Decker.       4/2/2019   [facility], [place], EMERGENCY DEPARTMENT     Eliezer Raymond MD  04/18/19 [id number]

## 2019-04-02 NOTE — ED NOTES
"[facility]   ED Nurse to Floor Handoff     Hiram Tapia is a 58 year old male who speaks English and lives alone,  in a home  They arrived in the ED by ambulance from clinic    ED Chief Complaint: Shortness of Breath    ED Dx;   Final diagnoses:   Pneumothorax on right         Needed?: No    Allergies:   Allergies   Allergen Reactions     Hydrofera Blue 4\"X4\" [Wound Dressings] Dermatitis and Blisters     Patient reports that Dressing causes skin irritation, blisters, and drainage.      Tegaderm Ag Mesh [Silver] Dermatitis and Blisters     Patient reports that Dressing causes Skin irritation, blisters, and drainage.   .  Past Medical Hx:   Past Medical History:   Diagnosis Date     Pulmonary embolism (H)      Sarcoma (H)       Baseline Mental status: WDL  Current Mental Status changes: at basesline    Infection present or suspected this encounter: no  Sepsis suspected: No  Isolation type: No active isolations     Activity level - Baseline/Home:  Independent  Activity Level - Current:   Stand with Assist    Bariatric equipment needed?: No    In the ED these meds were given:   Medications   fentaNYL (PF) (SUBLIMAZE) injection 100 mcg (100 mcg Intravenous Given 4/2/19 1238)       Drips running?  No    Home pump  No    Current LDAs  Port A Cath Single 03/28/18 Right Chest wall (Active)   Number of days: 370       Closed/Suction Drain 1 Right;Anterior;Distal Thigh Accordion 10 Tamazight SUTURED IN PLACE (Active)   Number of days: 197       Negative Pressure Wound Therapy Leg Right;Upper (Active)   Number of days: 361       Wound Right Thigh (Active)   Number of days:        Incision/Surgical Site 03/08/18 Right Leg (Active)   Number of days: 390       Incision/Surgical Site 03/28/18 Right Chest (Active)   Number of days: 370       Incision/Surgical Site 03/28/18 Right Neck (Active)   Number of days: 370       Incision/Surgical Site 04/06/18 Right;Lateral Thigh (Active) "   Number of days: 361       Incision/Surgical Site 09/17/18 Right;Anterior;Lateral Thigh (Active)   Number of days: 197       Incision/Surgical Site 12/05/18 Right;Lateral Chest (Active)   Number of days: 118       Incision/Surgical Site 12/05/18 Right;Lower Chest (Active)   Number of days: 118       Labs results:   Labs Ordered and Resulted from Time of ED Arrival Up to the Time of Departure from the ED   BASIC METABOLIC PANEL - Abnormal; Notable for the following components:       Result Value    Glucose 102 (*)     All other components within normal limits   CBC WITH PLATELETS DIFFERENTIAL   INR   PARTIAL THROMBOPLASTIN TIME   PERIPHERAL IV CATHETER   ABO/RH TYPE AND SCREEN       Imaging Studies:   Recent Results (from the past 24 hour(s))   CT Chest/Abdomen/Pelvis w Contrast   Result Value    Radiologist flags Pneumothoraces (Urgent)    Narrative    EXAMINATION: CT CHEST/ABDOMEN/PELVIS W CONTRAST, 4/2/2019 9:36 AM    TECHNIQUE: Helical CT images from the thoracic inlet through the  symphysis pubis were obtained with intravenous contrast. 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 Numerous bilateral metastatic pulmonary nodules are  increased in size and number since 1/29/2018, for example in the right  suprahilar region measuring 3.2 x 2.7 cm (series 6, image 65),  previously 1.0 x 0.8 cm, and a 3.4 x 2.0 cm newly cavitary lesion in  the anterior left lower lobe (series 6, image 98), previously 0.8 x  0.7 cm. Wedge resection changes along the anterior right middle lobe  with increased size of a nodule along the staple line inferiorly,  which now measures 1.6 cm (series 6, image 104), previously 0.8 cm.  Extensive soft tissue extending between the visceral surface of the  right upper lobe and the parietal pleura. Cavitary focus in the  right  middle lobe is slightly increased in size. The central  tracheobronchial tree is clear. Trace right pleural fluid and right  basilar atelectasis    Normal heart size. No pericardial effusion. Mild-to-moderate coronary  artery calcifications. Normal caliber ascending aorta and main  pulmonary artery. No central pulmonary embolism. Stable prominent  paratracheal, subcarinal, and axillary lymph nodes. Right chest wall  internal jugular Port-A-Cath tip at the cavoatrial junction.    Abdomen and pelvis: Unchanged 4.6 cm lobulated lesion in hepatic  segment 6 with peripheral nodular interrupted enhancement. No  suspicious hepatic lesion. Unchanged 1.4 x 3.2 cm pancreatic tail  lesion with a punctate calcification. Unchanged left adrenal  myelolipoma. The gallbladder, spleen, right adrenal gland, and kidneys  (with the exception of a few cortical hypodensities which are too  small to characterize but favored to represent cysts) are  unremarkable. No hydronephrosis, hydroureter, or urinary tract stone.  The bladder is within normal limits. Mildly enlarged prostate. No free  fluid or free air. No bowel obstruction or inflammation. Scattered  distal colonic diverticula. Normal appendix.     Several new or significantly increased, large centrally necrotic right  external iliac chain lymph nodes, for example measuring 2.2 cm short  axis along the right pelvic sidewall (series 3, image 536). The major  abdominal vessels are patent.    Bones and soft tissues: Partially visualized surgical changes of mass  resection along the anterior right thigh. New aggressive lytic osseous  lesion arising from the posterior aspect of the right femoral  intertrochanteric region with associated soft tissue component  measuring 4.3 x 3.1 x 3.6 cm (series 3, image 626). New  heterogeneously enhancing lobulated soft tissue mass arising from the  right iliopsoas myotendinous junction measuring 5.6 x 4.7 x 5.2 cm.  This soft tissue mass  "displaces the right common femoral artery  anteriorly (series 3, image 594). Scattered bone islands in the  pelvis. Chronic bilateral L4 pars interarticularis defects with  associated grade 1 anterolisthesis at L4-5.      Impression    IMPRESSION:   1. New large right and trace left pneumothoraces.  2. Increased size and number of numerous metastatic pulmonary nodules,  including along a right middle lobe wedge resection staple line.  3. Increased/new centrally necrotic right external iliac chain lymph  nodes, lytic lesion with associated soft tissue mass arising from the  posterior right femoral intertrochanteric region, and centrally  necrotic soft tissue mass arising from the right iliopsoas  myotendinous junction.  4. Unchanged hemangioma in hepatic segment 6.  5. Unchanged pancreatic tail masslike lesion, which has not  demonstrated significant FDG avidity on comparison PET CTs,  indeterminate.    [Urgent Result: Pneumothoraces]    Finding was identified on 4/2/2019 9:56 AM.     Ordering provider Dr. Johnson and emergency department physician Dr. Barton were contacted by Dr. Michelle at 4/2/2019 9:45 AM and verbalized  understanding of the urgent finding.     I have personally reviewed the examination and initial interpretation  and I agree with the findings.    ASHLEY BUITRAGO MD       Recent vital signs:   /83   Pulse 73   Temp 98.5  F (36.9  C) (Oral)   Resp 11   Ht 1.778 m (5' 10\")   Wt 86.2 kg (190 lb)   SpO2 97%   BMI 27.26 kg/m      Cardiac Rhythm: Normal Sinus  Pt needs tele? Yes  Skin/wound Issues: None    Code Status: Full Code    Pain control: fair    Nausea control: pt had none    Abnormal labs/tests/findings requiring intervention: none    Family present during ED course? No   Family Comments/Social Situation comments: pending Xray s/p chest tube placement     Tasks needing completion: None    Zayra Laird, RN    [phone] [facility]    "

## 2019-04-02 NOTE — PROGRESS NOTES
Pt here for CT scan, found to have pneumothorax, possibly unresolved from biopsy.  Pt to be transferred vis [facility] to [facility].  Pt is hemodynamically stable.  Called report to Teresa at ER.    MONAE Babin, RN, BSN, PHN

## 2019-04-02 NOTE — H&P
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 He is being admitted for further workup and management.      #R Pneumothorax.   Patient presented to ED with SOB. Had a scheduled outpatient CT CAP 4/2 and was found to have a new large R penumothorax as well as a trace L apical pneumothorax. Thoracic surgery evaluated the patient in the ER and placed a pigtail chest tube. Post-CXR with evidence of re-expansion.   - Thoracic surgery to follow for chest tube management, much appreciated  - Daily CXR    #Metastatic sarcoma of the right thigh  Follows with Dr. Johnson. 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 Received 4 cycles of Dox/Ifox starting 3/2018 and preoperative XRT. Underwent resection on 9/17/18 with negative margins. Slowly growing pulmonary nodules were noted and patient underwent RML wedge resection 12/5/18 with pathology revealing metastatic sarcoma, negative margins. Staging CT CAP 4/2 with increased size and number of numerous metastatic pulmonary nodules, new/increased lesions in right iliac nodules, right femoral intertrochanteric region and right iliopsoas.   - Has follow-up with Dr. Johnson scheduled for 4/4 to likely start Keytruda vs Doxil/Ifos    #H/o PE  Received Rivaroxaban April 2018-Oct 2018.     FEN  - Regular diet    PPx  - Hold VTE with recent chest tube placement    Code status: Full code    Dispo: Admission for management of pneumothorax.    Above plan discussed with staff physician, Dr. 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 Patient states that he first noted having shortness of breath from mid to and February this year, but states that he actually feels better today. No fevers at home. Has had a cough with occasional hemoptysis. Was planning to start therapy on Thursday. He is being admitted for further workup and management.    Past Medical History    I have reviewed this patient's medical history and updated it with pertinent information if needed.   Past Medical History:   Diagnosis Date     Pulmonary embolism (H)      Sarcoma (H)        Past Surgical History   I have reviewed this patient's surgical history and updated it with pertinent information if needed.  Past Surgical History:   Procedure Laterality Date     EXCISE SOFT TISSUE TUMOR THIGH Right 3/8/2018    Procedure: EXCISE SOFT TISSUE TUMOR THIGH;  Biopsy Right Thigh Tumor;  Surgeon: Brad Betts MD;  [facility]     EXCISE SOFT TISSUE TUMOR THIGH Right 9/17/2018    Procedure: EXCISE SOFT TISSUE TUMOR THIGH;  Removal Right Thigh Tumor ;  Surgeon: Brad Betts MD;  [facility]     INSERT PORT VASCULAR ACCESS N/A 3/28/2018    Procedure: INSERT PORT VASCULAR ACCESS;  Vascular Access Port Insertion with C-arm;  Surgeon: Sarah Bowie MD;  Location: [facility] OR     IRRIGATION AND DEBRIDEMENT LOWER EXTREMITY, COMBINED Right 4/6/2018    Procedure: COMBINED IRRIGATION AND DEBRIDEMENT LOWER EXTREMITY;  Irrigation And Debridement Right Thigh ;  Surgeon: Brad Betts MD;  [facility]     IRRIGATION AND DEBRIDEMENT LOWER EXTREMITY, COMBINED Right 4/9/2018    Procedure: COMBINED IRRIGATION AND DEBRIDEMENT LOWER EXTREMITY;   "Irrigation And Debridement Right Thigh Wound. with Wound VAC Exchange;  Surgeon: Brad Betts MD;  [facility]: [facility] OR     STRABISMUS SURGERY      as a child     THORACOSCOPIC WEDGE RESECTION LUNG Right 12/5/2018    Procedure: Right Video Assisted Thoracoscopic Wedge Resection;  Surgeon: Sarah Bowie MD;  Location: [facility] OR       Prior to Admission Medications   Prior to Admission Medications   Prescriptions Last Dose Informant Patient Reported? Taking?   menthol (COUGH DROP) 8 MG LOZG 4/2/2019 at AM  Yes Yes   Sig: Take 1 lozenge by mouth every hour as needed for cough      Facility-Administered Medications: None     Allergies   Allergies   Allergen Reactions     Hydrofera Blue 4\"X4\" [Wound Dressings] Dermatitis and Blisters     Patient reports that Dressing causes skin irritation, blisters, and drainage.      Tegaderm Ag Mesh [Silver] Dermatitis and Blisters     Patient reports that Dressing causes Skin irritation, blisters, and drainage.       Social History   I have reviewed this patient's social history and updated it with pertinent information if needed. Hiram Tapia  reports that he quit smoking about 29 years ago. His smoking use included cigarettes. He started smoking about 44 years ago. He has a 10.00 pack-year smoking history. he has never used smokeless tobacco. He reports that he drinks about 8.4 oz of alcohol per week. He reports that he does not use drugs.    Family History   I have reviewed this patient's family history and updated it with pertinent information if needed.   Family History   Problem Relation Age of Onset     Leukemia Father        Review of Systems   The 10 point Review of Systems is negative other than noted in the HPI or here.     Physical Exam   Temp: 98.5  F (36.9  C) Temp src: Oral BP: 129/82 Pulse: 70 Heart Rate: 72 Resp: 19 SpO2: 96 % O2 Device: Oxymask Oxygen Delivery: 10 LPM  Vital Signs with Ranges  Temp:  [98.4  F (36.9  C)-98.5  F (36.9  C)] 98.5  F (36.9 "  C)  Pulse:  [70-86] 70  Heart Rate:  [70-81] 72  Resp:  [8-20] 19  BP: (124-143)/() 129/82  SpO2:  [91 %-100 %] 96 %  190 lbs 0 oz    Constitutional: Pleasant male seen laying in bed. No apparent distress, and appears stated age.  Eyes: Lids and lashes normal, sclera clear, conjunctiva normal.  ENT: Normocephalic, oral pharynx with moist mucus membranes, tonsils without erythema or exudates, gums normal and good dentition.   Respiratory: Chest tube in place, R, c/d/i. No increased work of breathing, good air exchange, clear to auscultation bilaterally, no crackles or wheezing.   Cardiovascular: Regular rate and rhythm, normal S1 and S2, and no murmur noted.  GI: No masses or scars. +BS. Soft. No tenderness on palpation.  Skin: No bruising or bleeding, no redness, warmth, or swelling, no rashes, no lesions, no jaundice.  Extremities: There is no redness, warmth, or swelling of the joints. No lower extremity edema. No cyanosis.  Neurologic: Awake, alert, oriented to name, place and time.    Vascular access: PIV    Data   Results for orders placed or performed during the hospital encounter of 04/02/19 (from the past 24 hour(s))   ABO/Rh type and screen   Result Value Ref Range    ABO A     RH(D) Neg     Antibody Screen Neg     Test Valid Only At          [facility]    Specimen Expires 04/05/2019    Basic metabolic panel   Result Value Ref Range    Sodium 137 133 - 144 mmol/L    Potassium 4.0 3.4 - 5.3 mmol/L    Chloride 105 94 - 109 mmol/L    Carbon Dioxide 24 20 - 32 mmol/L    Anion Gap 8 3 - 14 mmol/L    Glucose 102 (H) 70 - 99 mg/dL    Urea Nitrogen 19 7 - 30 mg/dL    Creatinine 0.84 0.66 - 1.25 mg/dL    GFR Estimate >90 >60 mL/min/[1.73_m2]    GFR Estimate If Black >90 >60 mL/min/[1.73_m2]    Calcium 10.0 8.5 - 10.1 mg/dL   CBC with platelets differential   Result Value Ref Range    WBC 6.0 4.0 - 11.0 10e9/L    RBC Count 5.20 4.4 - 5.9 10e12/L    Hemoglobin 14.4  13.3 - 17.7 g/dL    Hematocrit 45.2 40.0 - 53.0 %    MCV 87 78 - 100 fl    MCH 27.7 26.5 - 33.0 pg    MCHC 31.9 31.5 - 36.5 g/dL    RDW 14.5 10.0 - 15.0 %    Platelet Count 276 150 - 450 10e9/L    Diff Method Automated Method     % Neutrophils 65.1 %    % Lymphocytes 24.0 %    % Monocytes 8.7 %    % Eosinophils 1.3 %    % Basophils 0.7 %    % Immature Granulocytes 0.2 %    Nucleated RBCs 0 0 /100    Absolute Neutrophil 3.9 1.6 - 8.3 10e9/L    Absolute Lymphocytes 1.4 0.8 - 5.3 10e9/L    Absolute Monocytes 0.5 0.0 - 1.3 10e9/L    Absolute Eosinophils 0.1 0.0 - 0.7 10e9/L    Absolute Basophils 0.0 0.0 - 0.2 10e9/L    Abs Immature Granulocytes 0.0 0 - 0.4 10e9/L    Absolute Nucleated RBC 0.0    INR   Result Value Ref Range    INR 1.02 0.86 - 1.14   Partial thromboplastin time   Result Value Ref Range    PTT 31 22 - 37 sec   Chest  XR, 1 view portable    Impression    IMPRESSION:   1. Right chest tube with tip pointed towards the right costophrenic  angle.  2. Small right pneumothorax. Decreased trace left pneumothorax.  3. Stable diffuse bilateral nodules consistent with metastatic  disease.

## 2019-04-02 NOTE — ED NOTES
Bed: IN01  Expected date:   Expected time:   Means of arrival:   Comments:  Hiram Tapia- [facility]- h/o sarcoma.  PTX

## 2019-04-02 NOTE — CONSULTS
Thoracic Surgery Consultation Note  [facility]    Hiram Tapia MRN# [id number]   Age: 58 year old YOB: 1960     Date of Admission:  4/2/2019    Reason for consult: Right pneumothorax       Requesting physician: Dr. 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         Recommendations:   Chest tube to suction  Hem-onc consult and admission for discussion of next steps for treatment of metastatic sarcoma  We will follow with daily CXR and try to get chest tube out soon so he can pursue chemo as planned asap.    Staffed with Dr. Watson.    Farzaneh Taylor MD  Cardiothoracic surgery fellow            History of Present Illness:   CC: Told to come from radiology with collapsed lung     History is obtained from the patient and electronic health record    58 year old male with history of right thigh sarcoma with known bilateral lung metastases (s/p R lung wedge resection 12/2018 for diagnosis) who presents after scheduled outpatient CT of the chest/abdomen/pelvis which was notable for a new large right pneumothorax and trace left apical pneumothorax.  There are also numerous bilateral metastatic pulmonology nodules that are increased in size and number since the last scan in 1/9/18.  Patient states that he first noted having shortness of breath from mid to and February this year, but states that he actually feels better today.      He is on a non-rebreather but when placed on room air, is %. He appears comfortable and does not exhibit any kind of visible tension physiology.             Past Medical History:     Past Medical History:   Diagnosis Date     Pulmonary embolism (H)      Sarcoma (H)              Past Surgical  History:     Past Surgical History:   Procedure Laterality Date     EXCISE SOFT TISSUE TUMOR THIGH Right 3/8/2018    Procedure: EXCISE SOFT TISSUE TUMOR THIGH;  Biopsy Right Thigh Tumor;  Surgeon: Brad Betts MD;  [facility]     EXCISE SOFT TISSUE TUMOR THIGH Right 9/17/2018    Procedure: EXCISE SOFT TISSUE TUMOR THIGH;  Removal Right Thigh Tumor ;  Surgeon: Brad Betts MD;  [facility]     INSERT PORT VASCULAR ACCESS N/A 3/28/2018    Procedure: INSERT PORT VASCULAR ACCESS;  Vascular Access Port Insertion with C-arm;  Surgeon: Sarah Bowie MD;  [facility] OR     IRRIGATION AND DEBRIDEMENT LOWER EXTREMITY, COMBINED Right 4/6/2018    Procedure: COMBINED IRRIGATION AND DEBRIDEMENT LOWER EXTREMITY;  Irrigation And Debridement Right Thigh ;  Surgeon: Brad Betts MD;  [facility]     IRRIGATION AND DEBRIDEMENT LOWER EXTREMITY, COMBINED Right 4/9/2018    Procedure: COMBINED IRRIGATION AND DEBRIDEMENT LOWER EXTREMITY;  Irrigation And Debridement Right Thigh Wound. with Wound VAC Exchange;  Surgeon: Brad Betts MD;  [facility] OR     STRABISMUS SURGERY      as a child     THORACOSCOPIC WEDGE RESECTION LUNG Right 12/5/2018    Procedure: Right Video Assisted Thoracoscopic Wedge Resection;  Surgeon: Sarah Bowie MD;  Location: [facility] OR             Social History:     Social History     Socioeconomic History     Marital status:      Spouse name: Not on file     Number of children: Not on file     Years of education: Not on file     Highest education level: Not on file   Occupational History     Not on file   Social Needs     Financial resource strain: Not on file     Food insecurity:     Worry: Not on file     Inability: Not on file     Transportation needs:     Medical: Not on file     Non-medical: Not on file   Tobacco Use     Smoking status: Former Smoker     Packs/day: 1.00     Years: 10.00     Pack years: 10.00     Types: Cigarettes     Start date: 1/1/1975  "    Last attempt to quit: 1990     Years since quittin.2     Smokeless tobacco: Never Used   Substance and Sexual Activity     Alcohol use: Yes     Alcohol/week: 8.4 oz     Types: 14 Cans of beer per week     Comment: 1 beer a day      Drug use: No     Sexual activity: Not Currently     Partners: Female   Lifestyle     Physical activity:     Days per week: Not on file     Minutes per session: Not on file     Stress: Not on file   Relationships     Social connections:     Talks on phone: Not on file     Gets together: Not on file     Attends [employer] service: Not on file     Active member of club or organization: Not on file     Attends meetings of clubs or organizations: Not on file     Relationship status: Not on file     Intimate partner violence:     Fear of current or ex partner: Not on file     Emotionally abused: Not on file     Physically abused: Not on file     Forced sexual activity: Not on file   Other Topics Concern     Parent/sibling w/ CABG, MI or angioplasty before 65F 55M? Not Asked   Social History Narrative     Not on file             Family History:     Family History   Problem Relation Age of Onset     Leukemia Father              Allergies:      Allergies   Allergen Reactions     Hydrofera Blue 4\"X4\" [Wound Dressings] Dermatitis and Blisters     Patient reports that Dressing causes skin irritation, blisters, and drainage.      Tegaderm Ag Mesh [Silver] Dermatitis and Blisters     Patient reports that Dressing causes Skin irritation, blisters, and drainage.             Medications:     Current Facility-Administered Medications on File Prior to Encounter:  [COMPLETED] iopamidol (ISOVUE-370) solution 120 mL     Current Outpatient Medications on File Prior to Encounter:  menthol (COUGH DROP) 8 MG LOZG Take 1 lozenge by mouth every hour as needed for cough             Review of Systems:      All other review of systems negative, except for what is mentioned above        Physical Exam:   BP " "135/83   Pulse 73   Temp 98.5  F (36.9  C) (Oral)   Resp 11   Ht 1.778 m (5' 10\")   Wt 86.2 kg (190 lb)   SpO2 97%   BMI 27.26 kg/m    General: Alert, interactive, NAD  Resp: CTAB, no crackles or wheezes  Cardiac: RRR, NS1,S2, No m/r/g  Abdomen: Soft, nontender, nondistended. +BS.  No HSM or masses, no rebound or guarding.  Extremities: No LE edema or obvious joint abnormalities  Skin: Warm and dry, no jaundice or rash  Neuro: A&Ox3, CN 2-12 intact, MUHAMMAD            Data:   All laboratory data reviewed  All imaging studies reviewed by me.         Farzaneh Taylor  Cardiothoracic surgery fellow         "

## 2019-04-02 NOTE — LETTER
Transition Communication Hand-off for Care Transitions to Next Level of Care Provider    Name: Hiram Tapia  : 1960  MRN #: [id number]  Primary Care Provider: Louisa Fry     Primary Clinic: [facility] [address]  [facility] [address]     Reason for Hospitalization:  Pneumothorax on right [J93.9]  Admit Date/Time: 2019 10:42 AM  Discharge Date: 19  Payor Source: Payor: MEDICA / Plan: MEDICA CHOICE / Product Type: Indemnity /       Hiram Tapia is a 58 year old male with history of sarcoma with lung metastases (s/p R lung wedge resection), and PE who presented to the ED with SOB and found to have a new large R pneumothorax. He is being admitted for further workup and management.  Patient had a chest tube placed which as been now removed. Received TALC pleurodesis on 4/3.        Discharge Plan: Home with follow-up in clinic as recommended.        Follow-up plan:    Future Appointments   Date Time Provider Department Center   2019  6:15 AM  [facility] LAB DRAW [facility]   2019  7:00 AM Gaurang Johnson MD [facility]   2019  7:30 AM  ONCOLOGY INFUSION [facility]     Michelle Broderick, RN, BSN, PHN  Care Coordinator       AVS/Discharge Summary is the source of truth; this is a helpful guide for improved communication of patient story

## 2019-04-02 NOTE — DISCHARGE INSTRUCTIONS

## 2019-04-03 ENCOUNTER — APPOINTMENT (OUTPATIENT)
Dept: GENERAL RADIOLOGY | Facility: CLINIC | Age: 59
DRG: 181 | End: 2019-04-03
Attending: STUDENT IN AN ORGANIZED HEALTH CARE EDUCATION/TRAINING PROGRAM
Payer: COMMERCIAL

## 2019-04-03 LAB
ANION GAP SERPL CALCULATED.3IONS-SCNC: 8 MMOL/L (ref 3–14)
BUN SERPL-MCNC: 10 MG/DL (ref 7–30)
CALCIUM SERPL-MCNC: 9.5 MG/DL (ref 8.5–10.1)
CHLORIDE SERPL-SCNC: 108 MMOL/L (ref 94–109)
CO2 SERPL-SCNC: 23 MMOL/L (ref 20–32)
CREAT SERPL-MCNC: 0.81 MG/DL (ref 0.66–1.25)
ERYTHROCYTE [DISTWIDTH] IN BLOOD BY AUTOMATED COUNT: 14.4 % (ref 10–15)
GFR SERPL CREATININE-BSD FRML MDRD: >90 ML/MIN/{1.73_M2}
GLUCOSE SERPL-MCNC: 100 MG/DL (ref 70–99)
HCT VFR BLD AUTO: 44.5 % (ref 40–53)
HGB BLD-MCNC: 14.1 G/DL (ref 13.3–17.7)
MCH RBC QN AUTO: 27.3 PG (ref 26.5–33)
MCHC RBC AUTO-ENTMCNC: 31.7 G/DL (ref 31.5–36.5)
MCV RBC AUTO: 86 FL (ref 78–100)
PLATELET # BLD AUTO: 231 10E9/L (ref 150–450)
POTASSIUM SERPL-SCNC: 4 MMOL/L (ref 3.4–5.3)
RBC # BLD AUTO: 5.16 10E12/L (ref 4.4–5.9)
SODIUM SERPL-SCNC: 140 MMOL/L (ref 133–144)
WBC # BLD AUTO: 4.8 10E9/L (ref 4–11)

## 2019-04-03 PROCEDURE — 80048 BASIC METABOLIC PNL TOTAL CA: CPT | Performed by: PHYSICIAN ASSISTANT

## 2019-04-03 PROCEDURE — 25000132 ZZH RX MED GY IP 250 OP 250 PS 637: Performed by: PHYSICIAN ASSISTANT

## 2019-04-03 PROCEDURE — 12000001 ZZH R&B MED SURG/OB UMMC

## 2019-04-03 PROCEDURE — 71046 X-RAY EXAM CHEST 2 VIEWS: CPT

## 2019-04-03 PROCEDURE — 25800030 ZZH RX IP 258 OP 636: Performed by: PHYSICIAN ASSISTANT

## 2019-04-03 PROCEDURE — 99233 SBSQ HOSP IP/OBS HIGH 50: CPT | Performed by: INTERNAL MEDICINE

## 2019-04-03 PROCEDURE — 25000125 ZZHC RX 250: Performed by: PHYSICIAN ASSISTANT

## 2019-04-03 PROCEDURE — 85027 COMPLETE CBC AUTOMATED: CPT | Performed by: PHYSICIAN ASSISTANT

## 2019-04-03 PROCEDURE — 25000128 H RX IP 250 OP 636: Performed by: PHYSICIAN ASSISTANT

## 2019-04-03 PROCEDURE — 36415 COLL VENOUS BLD VENIPUNCTURE: CPT | Performed by: PHYSICIAN ASSISTANT

## 2019-04-03 PROCEDURE — 3E0L3GC INTRODUCTION OF OTHER THERAPEUTIC SUBSTANCE INTO PLEURAL CAVITY, PERCUTANEOUS APPROACH: ICD-10-PCS | Performed by: PHYSICIAN ASSISTANT

## 2019-04-03 RX ORDER — NALOXONE HYDROCHLORIDE 0.4 MG/ML
.1-.4 INJECTION, SOLUTION INTRAMUSCULAR; INTRAVENOUS; SUBCUTANEOUS
Status: DISCONTINUED | OUTPATIENT
Start: 2019-04-03 | End: 2019-04-03

## 2019-04-03 RX ORDER — SODIUM CHLORIDE 9 MG/ML
INJECTION, SOLUTION INTRAVENOUS CONTINUOUS
Status: DISCONTINUED | OUTPATIENT
Start: 2019-04-03 | End: 2019-04-05 | Stop reason: HOSPADM

## 2019-04-03 RX ORDER — HYDROMORPHONE HYDROCHLORIDE 1 MG/ML
.3-.5 INJECTION, SOLUTION INTRAMUSCULAR; INTRAVENOUS; SUBCUTANEOUS
Status: DISCONTINUED | OUTPATIENT
Start: 2019-04-03 | End: 2019-04-03

## 2019-04-03 RX ORDER — LIDOCAINE HYDROCHLORIDE 10 MG/ML
30 INJECTION, SOLUTION INFILTRATION; PERINEURAL ONCE
Status: COMPLETED | OUTPATIENT
Start: 2019-04-03 | End: 2019-04-03

## 2019-04-03 RX ORDER — HYDROMORPHONE HYDROCHLORIDE 1 MG/ML
.3-.5 INJECTION, SOLUTION INTRAMUSCULAR; INTRAVENOUS; SUBCUTANEOUS
Status: DISCONTINUED | OUTPATIENT
Start: 2019-04-03 | End: 2019-04-05

## 2019-04-03 RX ADMIN — LIDOCAINE HYDROCHLORIDE 30 ML: 10 INJECTION, SOLUTION INFILTRATION; PERINEURAL at 14:20

## 2019-04-03 RX ADMIN — Medication 4 G: at 14:37

## 2019-04-03 RX ADMIN — ACETAMINOPHEN 650 MG: 325 TABLET, FILM COATED ORAL at 00:53

## 2019-04-03 RX ADMIN — SODIUM CHLORIDE: 9 INJECTION, SOLUTION INTRAVENOUS at 13:11

## 2019-04-03 RX ADMIN — ACETAMINOPHEN 650 MG: 325 TABLET, FILM COATED ORAL at 08:00

## 2019-04-03 RX ADMIN — Medication: at 13:12

## 2019-04-03 RX ADMIN — SENNOSIDES AND DOCUSATE SODIUM 1 TABLET: 8.6; 5 TABLET ORAL at 21:19

## 2019-04-03 ASSESSMENT — ACTIVITIES OF DAILY LIVING (ADL)
ADLS_ACUITY_SCORE: 13

## 2019-04-03 ASSESSMENT — PAIN DESCRIPTION - DESCRIPTORS: DESCRIPTORS: OTHER (COMMENT)

## 2019-04-03 ASSESSMENT — MIFFLIN-ST. JEOR: SCORE: 1651.32

## 2019-04-03 NOTE — PROGRESS NOTES
"THORACIC & FOREGUT SURGERY    S:  N overnight events.  Pt seen at bedside resting comfortably. States his breathing has improved since chest tube placement yesterday.     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  -Cares per primary  -Leave CT to suction until tomorrow  -CXR in AM, if stable will likely place CT to w/s    Igor Marshall PA-C  Thoracic Surgery    "

## 2019-04-03 NOTE — PROGRESS NOTES
[facility], [place]    Hematology / Oncology Progress Note    Date of Service (when I saw the patient): 04/03/2019     Assessment & Plan   Hiram Tapia is a 58 year old male with history of sarcoma with lung metastases (s/p R lung wedge resection), and PE who presented to the ED with SOB and found to have a new large R pneumothorax. He is being admitted for further workup and management.       #R Pneumothorax.   Patient presented to ED with SOB. Had a scheduled outpatient CT CAP 4/2 and was found to have a new large R penumothorax as well as a trace L apical pneumothorax. Thoracic surgery evaluated the patient in the ER and placed a pigtail chest tube. Post-CXR with evidence of re-expansion.   - Thoracic surgery following for chest tube management, much appreciated. Considering TALC pleurodesis. Keeping chest tube to suction for 4/3.  - Daily CXR     #Metastatic sarcoma of the right thigh  Follows with Dr. Johnson. 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 Received 4 cycles of Dox/Ifox starting 3/2018 and preoperative XRT. Underwent resection on 9/17/18 with negative margins. Slowly growing pulmonary nodules were noted and patient underwent RML wedge resection 12/5/18 with pathology revealing metastatic sarcoma, negative margins. Staging CT CAP 4/2 with increased size and number of numerous metastatic pulmonary nodules, new/increased lesions in right iliac nodules, right femoral intertrochanteric region and right iliopsoas.   - Planned to see Dr. Johnson on 4/4 to likely start Keytruda vs Doxil/Ifos. Rescheduled for 4/9.     #H/o PE  Received Rivaroxaban April 2018-Oct 2018.      FEN  - Regular diet     PPx  - Hold VTE with recent chest tube placement     Code status: Full code     Dispo: Anticipate discharge to home in 2 days pending thoracic recs.  Follow-up: Rescheduled Elizabeth/labs/infusion for 4/9.     Above plan discussed with  staff physician, Dr. Skip Joseph PA-C  Hematology/Oncology  Pager: [phone]    Interval History   Doing well this morning, asking when he will go home. Still having some pain with inspiration and pain around the chest tube site. Is hoping to discharge asap so he can meet with Dr. Johnson and start therapy. Afebrile and HD stable.    Physical Exam   Temp: 97.1  F (36.2  C) Temp src: Oral BP: 121/80 Pulse: 74 Heart Rate: 69 Resp: 16 SpO2: 95 % O2 Device: None (Room air)    Vitals:    04/02/19 1034 04/02/19 1533 04/03/19 0749   Weight: 86.2 kg (190 lb) 85.2 kg (187 lb 12.8 oz) 83.5 kg (184 lb 1.6 oz)     Vital Signs with Ranges  Temp:  [96.8  F (36  C)-97.8  F (36.6  C)] 97.1  F (36.2  C)  Pulse:  [70-84] 74  Heart Rate:  [69-85] 69  Resp:  [8-20] 16  BP: (117-146)/() 121/80  SpO2:  [91 %-99 %] 95 %  I/O last 3 completed shifts:  In: 240 [P.O.:240]  Out: 0     Constitutional: Pleasant male seen laying in bed. No apparent distress, and appears stated age.  Eyes: Lids and lashes normal, sclera clear, conjunctiva normal.  ENT: Normocephalic, oral pharynx with moist mucus membranes, tonsils without erythema or exudates, gums normal and good dentition.   Respiratory: Chest tube in place, R, c/d/i. No increased work of breathing, good air exchange, clear to auscultation bilaterally, no crackles or wheezing.   Cardiovascular: Regular rate and rhythm, normal S1 and S2, and no murmur noted.  GI: No masses or scars. +BS. Soft. No tenderness on palpation.  Skin: No bruising or bleeding, no redness, warmth, or swelling, no rashes, no lesions, no jaundice.  Extremities: There is no redness, warmth, or swelling of the joints. No lower extremity edema. No cyanosis.  Neurologic: Awake, alert, oriented to name, place and time.    Vascular access: PIV    Medications     - MEDICATION INSTRUCTIONS -         senna-docusate  1 tablet Oral BID    Or     senna-docusate  2 tablet Oral BID       Data   ROUTINE IP  LABS (Last four results)  BMP  Recent Labs   Lab 04/03/19  0600 04/02/19  1040 04/02/19  0857    137 136   POTASSIUM 4.0 4.0 4.3   CHLORIDE 108 105 106   JAYESH 9.5 10.0 10.2*   CO2 23 24 24   BUN 10 19 20   CR 0.81 0.84 0.91   * 102* 98     CBC  Recent Labs   Lab 04/03/19  0600 04/02/19  1040 04/02/19  0857   WBC 4.8 6.0 6.0   RBC 5.16 5.20 5.41   HGB 14.1 14.4 14.6   HCT 44.5 45.2 46.0   MCV 86 87 85   MCH 27.3 27.7 27.0   MCHC 31.7 31.9 31.7   RDW 14.4 14.5 14.4    276 243     INR  Recent Labs   Lab 04/02/19  1040   INR 1.02

## 2019-04-03 NOTE — PROCEDURES
Thoracic Surgery Procedure Note  Preprocedure Diagnosis:  Persistent right pneumothorax  Postprocedure Diagnosis: Same  Procedure: Bedside chemical pleurodesis (talc)  Medications administered: Dilaudid PCA    A pain control plan was arranged prior to the procedure.  After informed consent was obtained, 30ml of 1% lidocaine without epinephrine was injected into the existing right sided chest tube.  After allowing adequate time to ensure maximal anaesthesia, the talc slurry (4g talc in 150cc of normal saline) was instilled into the tube, flushed with 60cc of sterile NS, and was clamped distally to encourage movement of the slurry into the pleural space.  The tube remained clamped, with nursing instructions to unclamp the tube and place it to suction in 2 hours. The tube will remain to continuous suction for 48-72 hours, after which time thoracic surgery will reevaluate the possibility of removal.    The patient tolerated the procedure well, and there were no complications.     Dr. Loera was available for the entirety of the procedure.    Igor Marshall PA-C  [phone]

## 2019-04-03 NOTE — PLAN OF CARE
Vital signs stable.  Patient reports some chest discomfort at chest tube site, tylenol given once this morning then discontinued per provider.  Started dilaudid PCA and then talc pleurodesis done at bedside by thoracic surgery.  Patient followed instructions with every 15 minute position changes x2 hours and tube unclamped at 1630.  Has had a total of 21cc output for the day.  Patient requested PCA be disconnected, currently saline locked.  No other complaints, continue with POC.    Adult Inpatient Plan of Care  Plan of Care Review  4/3/2019 1846 - No Change by Farzaneh Guzman, RN     Pain Acute  Optimal Pain Control  4/3/2019 1846 - No Change by Farzaneh Guzman, RN

## 2019-04-03 NOTE — PROGRESS NOTES
Nursing Focus: Admission  D: Arrived at 1500 from ED via liter. Patient accompanied by self. Admitted for SOB and found to have a new large, right pneumothorax. PMHx: Sarcoma with lung metastases s/p right lung wedge resection.     I: Admission process began.  Patient oriented to room, enviroment, call light.  MD notified of patient's arrival on unit.     A: VSS. Afebrile. C/o pain at the site of the chest tube (right, upper chest). PRN PO Tylenol given x2 with relief. No SOB or KONG. Denied nausea and vomiting. Chest tube to -20 cm H2O suction with <1 mL of serosanguinous output. UOP adequate. LBM today. Appetite good. Full code. Up with SBA due to chest tube.  Chest XR scheduled for tomorrow.     P: Implement plan of care when available. Continue to monitor patient. Nursing interventions as appropriate. Notify MD with changes in pt status.

## 2019-04-03 NOTE — PLAN OF CARE
PT afebrile, VSS, SaO2 96% on RA. Pt reported pain in chest tube insertion site and shoulder, tylenol administered. Pt slept throughout the night.

## 2019-04-04 ENCOUNTER — APPOINTMENT (OUTPATIENT)
Dept: GENERAL RADIOLOGY | Facility: CLINIC | Age: 59
DRG: 181 | End: 2019-04-04
Attending: STUDENT IN AN ORGANIZED HEALTH CARE EDUCATION/TRAINING PROGRAM
Payer: COMMERCIAL

## 2019-04-04 LAB
ANION GAP SERPL CALCULATED.3IONS-SCNC: 7 MMOL/L (ref 3–14)
BUN SERPL-MCNC: 12 MG/DL (ref 7–30)
CALCIUM SERPL-MCNC: 9.5 MG/DL (ref 8.5–10.1)
CHLORIDE SERPL-SCNC: 104 MMOL/L (ref 94–109)
CO2 SERPL-SCNC: 25 MMOL/L (ref 20–32)
CREAT SERPL-MCNC: 0.82 MG/DL (ref 0.66–1.25)
ERYTHROCYTE [DISTWIDTH] IN BLOOD BY AUTOMATED COUNT: 14.3 % (ref 10–15)
GFR SERPL CREATININE-BSD FRML MDRD: >90 ML/MIN/{1.73_M2}
GLUCOSE SERPL-MCNC: 102 MG/DL (ref 70–99)
HCT VFR BLD AUTO: 44.3 % (ref 40–53)
HGB BLD-MCNC: 14.2 G/DL (ref 13.3–17.7)
MCH RBC QN AUTO: 27.6 PG (ref 26.5–33)
MCHC RBC AUTO-ENTMCNC: 32.1 G/DL (ref 31.5–36.5)
MCV RBC AUTO: 86 FL (ref 78–100)
PLATELET # BLD AUTO: 233 10E9/L (ref 150–450)
POTASSIUM SERPL-SCNC: 4.1 MMOL/L (ref 3.4–5.3)
RBC # BLD AUTO: 5.15 10E12/L (ref 4.4–5.9)
SODIUM SERPL-SCNC: 136 MMOL/L (ref 133–144)
WBC # BLD AUTO: 7 10E9/L (ref 4–11)

## 2019-04-04 PROCEDURE — 36415 COLL VENOUS BLD VENIPUNCTURE: CPT | Performed by: PHYSICIAN ASSISTANT

## 2019-04-04 PROCEDURE — 12000001 ZZH R&B MED SURG/OB UMMC

## 2019-04-04 PROCEDURE — 99232 SBSQ HOSP IP/OBS MODERATE 35: CPT | Performed by: INTERNAL MEDICINE

## 2019-04-04 PROCEDURE — 71046 X-RAY EXAM CHEST 2 VIEWS: CPT

## 2019-04-04 PROCEDURE — 25000131 ZZH RX MED GY IP 250 OP 636 PS 637: Performed by: PHYSICIAN ASSISTANT

## 2019-04-04 PROCEDURE — 25000128 H RX IP 250 OP 636: Performed by: INTERNAL MEDICINE

## 2019-04-04 PROCEDURE — 85027 COMPLETE CBC AUTOMATED: CPT | Performed by: PHYSICIAN ASSISTANT

## 2019-04-04 PROCEDURE — 80048 BASIC METABOLIC PNL TOTAL CA: CPT | Performed by: PHYSICIAN ASSISTANT

## 2019-04-04 PROCEDURE — 25000132 ZZH RX MED GY IP 250 OP 250 PS 637: Performed by: PHYSICIAN ASSISTANT

## 2019-04-04 RX ORDER — LIDOCAINE 40 MG/G
CREAM TOPICAL
Status: DISCONTINUED | OUTPATIENT
Start: 2019-04-04 | End: 2019-04-05 | Stop reason: HOSPADM

## 2019-04-04 RX ADMIN — PROCHLORPERAZINE MALEATE 10 MG: 5 TABLET, FILM COATED ORAL at 14:07

## 2019-04-04 RX ADMIN — Medication 0.5 MG: at 23:55

## 2019-04-04 RX ADMIN — Medication 0.5 MG: at 13:50

## 2019-04-04 RX ADMIN — ONDANSETRON 8 MG: 8 TABLET, ORALLY DISINTEGRATING ORAL at 09:14

## 2019-04-04 RX ADMIN — Medication 0.5 MG: at 21:38

## 2019-04-04 RX ADMIN — Medication 0.5 MG: at 09:10

## 2019-04-04 RX ADMIN — ONDANSETRON 8 MG: 8 TABLET, ORALLY DISINTEGRATING ORAL at 19:08

## 2019-04-04 RX ADMIN — SENNOSIDES AND DOCUSATE SODIUM 1 TABLET: 8.6; 5 TABLET ORAL at 19:08

## 2019-04-04 RX ADMIN — OXYCODONE HYDROCHLORIDE 5 MG: 5 TABLET ORAL at 15:41

## 2019-04-04 RX ADMIN — OXYCODONE HYDROCHLORIDE 5 MG: 5 TABLET ORAL at 08:33

## 2019-04-04 RX ADMIN — SENNOSIDES AND DOCUSATE SODIUM 1 TABLET: 8.6; 5 TABLET ORAL at 09:11

## 2019-04-04 RX ADMIN — OXYCODONE HYDROCHLORIDE 5 MG: 5 TABLET ORAL at 19:41

## 2019-04-04 RX ADMIN — Medication 0.5 MG: at 19:08

## 2019-04-04 ASSESSMENT — ACTIVITIES OF DAILY LIVING (ADL)
ADLS_ACUITY_SCORE: 15
ADLS_ACUITY_SCORE: 13
ADLS_ACUITY_SCORE: 13
ADLS_ACUITY_SCORE: 15
ADLS_ACUITY_SCORE: 15
ADLS_ACUITY_SCORE: 13

## 2019-04-04 ASSESSMENT — PAIN DESCRIPTION - DESCRIPTORS
DESCRIPTORS: ACHING
DESCRIPTORS: CONSTANT;SHARP
DESCRIPTORS: SHARP
DESCRIPTORS: ACHING

## 2019-04-04 ASSESSMENT — MIFFLIN-ST. JEOR: SCORE: 1649.96

## 2019-04-04 NOTE — PLAN OF CARE
Pt A&Ox4.  AVSS on RA.  Pt denies SOB at rest, but c/o pain with breathing, coughing, and movement.  Chest tube hooked up to -20 mmHg suction and draining serous fluid.  No air leak or crepitus noted.  Dressing on right upper chest C/D/I.  No orders for when dressing should be changed.  MD paged; awaiting orders.  Pt amenable to taking pain meds this morning d/t increasing pain. Oxycodone given 1x with no relief noted.  Dilaudid given 2x with some relief noted to keep pain at a manageable level.  Plan to keep chest tube hooked up to suction until tomorrow- MD will assess if tube can be taken out at that time.  Appetite poor.  Pt refused breakfast and lunch this morning.  Pt did dry heave today.  Zofran ODT and compazine given with relief noted.  Aroma therapy also given.  No BM noted, but pt passing gas.  Up with 1 assist.  Calls appropriately.        Improving  Adult Inpatient Plan of Care  Optimal Comfort and Wellbeing  4/4/2019 1228 - Improving by Farzaneh Marsh RN  Pain Acute  Optimal Pain Control  4/4/2019 1228 - Improving by Farzaneh Marsh RN     No Change  Adult Inpatient Plan of Care  Plan of Care Review  4/4/2019 1228 - No Change by Farzaneh Marsh RN  Patient-Specific Goal (Individualization)  4/4/2019 1228 - No Change by Farzaneh Marsh RN  Absence of Hospital-Acquired Illness or Injury  4/4/2019 1228 - No Change by Farzaneh Marsh, RN  Readiness for Transition of Care  4/4/2019 1228 - No Change by Farzaneh Marsh RN  Rounds/Family Conference  4/4/2019 1228 - No Change by Farzaneh Marsh RN  Respiratory Compromise  Optimal Oxygenation and Ventilation  4/4/2019 1228 - No Change by Farzaneh Marsh, DALILA

## 2019-04-04 NOTE — PROGRESS NOTES
[facility], [place]    Hematology / Oncology Progress Note    Date of Service (when I saw the patient): 04/04/2019     Assessment & Plan   Hiram Tapia is a 58 year old male with history of sarcoma with lung metastases (s/p R lung wedge resection), and PE who presented to the ED with SOB and found to have a new large R pneumothorax. He is being admitted for further workup and management.       #R Pneumothorax.   Patient presented to ED with SOB. Had a scheduled outpatient CT CAP 4/2 and was found to have a new large R penumothorax as well as a trace L apical pneumothorax. Thoracic surgery evaluated the patient in the ER and placed a pigtail chest tube. Post-CXR with evidence of re-expansion.   - Thoracic surgery following for chest tube management, much appreciated.   - S/p TALC pleurodesis 4/3. Keeping chest tube to suction for 48 hrs post TALC.  - Daily CXR  - Oxycodone and Dilaudid for pain control     #Metastatic sarcoma of the right thigh  Follows with Dr. Johnson. 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 Received 4 cycles of Dox/Ifox starting 3/2018 and preoperative XRT. Underwent resection on 9/17/18 with negative margins. Slowly growing pulmonary nodules were noted and patient underwent RML wedge resection 12/5/18 with pathology revealing metastatic sarcoma, negative margins. Staging CT CAP 4/2 with increased size and number of numerous metastatic pulmonary nodules, new/increased lesions in right iliac nodules, right femoral intertrochanteric region and right iliopsoas.   - Planned to see Dr. Johnson on 4/4 to likely start Keytruda vs Doxil/Ifos. Rescheduled for 4/9.     #H/o PE  Received Rivaroxaban April 2018-Oct 2018.      FEN  - Regular diet     PPx  - Hold VTE with recent chest tube placement     Code status: Full code     Dispo: Anticipate discharge to home in 1-2 days pending thoracic recs and pain control.  Follow-up:  Rescheduled Skubitz/labs/infusion for 4/9.     Above plan discussed with staff physician, Dr. Skip Joseph PA-C  Hematology/Oncology  Pager: [phone]    Interval History   In much more pain this morning. Located around the lateral aspect of his right chest. Pain occurs with movement, breathing. Took a single dose of Oxycodone. Afebrile and HD stable. Discussed possible discharge tomorrow pending pain control and removal of chest tube.    Physical Exam   Temp: 98.8  F (37.1  C) Temp src: Oral BP: 113/77   Heart Rate: 98 Resp: 18 SpO2: 93 % O2 Device: None (Room air)    Vitals:    04/02/19 1533 04/03/19 0749 04/04/19 0840   Weight: 85.2 kg (187 lb 12.8 oz) 83.5 kg (184 lb 1.6 oz) 83.4 kg (183 lb 12.8 oz)     Vital Signs with Ranges  Temp:  [96.7  F (35.9  C)-99.4  F (37.4  C)] 98.8  F (37.1  C)  Heart Rate:  [74-98] 98  Resp:  [18] 18  BP: ()/(62-84) 113/77  SpO2:  [93 %-95 %] 93 %  I/O last 3 completed shifts:  In: 240 [P.O.:240]  Out: 21 [Chest Tube:21]    Constitutional: Pleasant male seen laying in bed. No apparent distress, and appears stated age.  Eyes: Lids and lashes normal, sclera clear, conjunctiva normal.  ENT: Normocephalic, oral pharynx with moist mucus membranes, tonsils without erythema or exudates, gums normal and good dentition.   Respiratory: Chest tube in place, R, c/d/i. No increased work of breathing, good air exchange, clear to auscultation bilaterally, no crackles or wheezing.   Cardiovascular: Regular rate and rhythm, normal S1 and S2, and no murmur noted.  GI: No masses or scars. +BS. Soft. No tenderness on palpation.  Skin: No bruising or bleeding, no redness, warmth, or swelling, no rashes, no lesions, no jaundice.  Extremities: There is no redness, warmth, or swelling of the joints. No lower extremity edema. No cyanosis.  Neurologic: Awake, alert, oriented to name, place and time.    Vascular access: PIV    Medications     - MEDICATION INSTRUCTIONS -       sodium  chloride 10 mL/hr at 04/03/19 1311       senna-docusate  1 tablet Oral BID    Or     senna-docusate  2 tablet Oral BID     sodium chloride (PF)  3 mL Intracatheter Q8H       Data   ROUTINE IP LABS (Last four results)  BMP  Recent Labs   Lab 04/04/19  0455 04/03/19  0600 04/02/19  1040 04/02/19  0857    140 137 136   POTASSIUM 4.1 4.0 4.0 4.3   CHLORIDE 104 108 105 106   JAYESH 9.5 9.5 10.0 10.2*   CO2 25 23 24 24   BUN 12 10 19 20   CR 0.82 0.81 0.84 0.91   * 100* 102* 98     CBC  Recent Labs   Lab 04/04/19  0455 04/03/19  0600 04/02/19  1040 04/02/19  0857   WBC 7.0 4.8 6.0 6.0   RBC 5.15 5.16 5.20 5.41   HGB 14.2 14.1 14.4 14.6   HCT 44.3 44.5 45.2 46.0   MCV 86 86 87 85   MCH 27.6 27.3 27.7 27.0   MCHC 32.1 31.7 31.9 31.7   RDW 14.3 14.4 14.5 14.4    231 276 243     INR  Recent Labs   Lab 04/02/19  1040   INR 1.02

## 2019-04-04 NOTE — PROGRESS NOTES
STAFF ADDENDUM:  I saw and evaluated Mr. Tapia and agree with the resident s findings and plan of care as documented in the resident s note and edited by me, as applicable.      In summary, Mr. Tapia is s/p talc pleurodesis  No airleak  Keep tube to suction    Sarah Bowie MD

## 2019-04-04 NOTE — PROGRESS NOTES
THORACIC & FOREGUT SURGERY    S:  No acute overnight events.  Pt seen at bedside resting  comfortably.  Pain controlled well.     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  Admitted for incidental finding of R PTX on CT scan.  CT placed in ED by thoracic surgery.  Bedside talc pleurodesis on 4/3/19.    -Continue tube to suction  -Tube appears kinked on CXR, will address this AM  -Thoracic to follow    Zhao Ambrose PA-C  Thoracic Surgery

## 2019-04-05 ENCOUNTER — APPOINTMENT (OUTPATIENT)
Dept: GENERAL RADIOLOGY | Facility: CLINIC | Age: 59
DRG: 181 | End: 2019-04-05
Attending: STUDENT IN AN ORGANIZED HEALTH CARE EDUCATION/TRAINING PROGRAM
Payer: COMMERCIAL

## 2019-04-05 ENCOUNTER — APPOINTMENT (OUTPATIENT)
Dept: GENERAL RADIOLOGY | Facility: CLINIC | Age: 59
DRG: 181 | End: 2019-04-05
Attending: PHYSICIAN ASSISTANT
Payer: COMMERCIAL

## 2019-04-05 VITALS
TEMPERATURE: 97.2 F | WEIGHT: 186.6 LBS | RESPIRATION RATE: 18 BRPM | SYSTOLIC BLOOD PRESSURE: 111 MMHG | HEIGHT: 70 IN | BODY MASS INDEX: 26.71 KG/M2 | OXYGEN SATURATION: 94 % | DIASTOLIC BLOOD PRESSURE: 72 MMHG | HEART RATE: 74 BPM

## 2019-04-05 LAB
ANION GAP SERPL CALCULATED.3IONS-SCNC: 8 MMOL/L (ref 3–14)
BUN SERPL-MCNC: 14 MG/DL (ref 7–30)
CALCIUM SERPL-MCNC: 9.5 MG/DL (ref 8.5–10.1)
CHLORIDE SERPL-SCNC: 99 MMOL/L (ref 94–109)
CO2 SERPL-SCNC: 26 MMOL/L (ref 20–32)
CREAT SERPL-MCNC: 1.08 MG/DL (ref 0.66–1.25)
ERYTHROCYTE [DISTWIDTH] IN BLOOD BY AUTOMATED COUNT: 14.6 % (ref 10–15)
GFR SERPL CREATININE-BSD FRML MDRD: 74 ML/MIN/{1.73_M2}
GLUCOSE SERPL-MCNC: 97 MG/DL (ref 70–99)
HCT VFR BLD AUTO: 45.1 % (ref 40–53)
HGB BLD-MCNC: 14.4 G/DL (ref 13.3–17.7)
MCH RBC QN AUTO: 27.5 PG (ref 26.5–33)
MCHC RBC AUTO-ENTMCNC: 31.9 G/DL (ref 31.5–36.5)
MCV RBC AUTO: 86 FL (ref 78–100)
PLATELET # BLD AUTO: 219 10E9/L (ref 150–450)
POTASSIUM SERPL-SCNC: 4.4 MMOL/L (ref 3.4–5.3)
RADIOLOGIST FLAGS: ABNORMAL
RBC # BLD AUTO: 5.23 10E12/L (ref 4.4–5.9)
SODIUM SERPL-SCNC: 133 MMOL/L (ref 133–144)
WBC # BLD AUTO: 8.4 10E9/L (ref 4–11)

## 2019-04-05 PROCEDURE — 25000128 H RX IP 250 OP 636: Performed by: INTERNAL MEDICINE

## 2019-04-05 PROCEDURE — 71046 X-RAY EXAM CHEST 2 VIEWS: CPT

## 2019-04-05 PROCEDURE — 36415 COLL VENOUS BLD VENIPUNCTURE: CPT | Performed by: PHYSICIAN ASSISTANT

## 2019-04-05 PROCEDURE — 80048 BASIC METABOLIC PNL TOTAL CA: CPT | Performed by: PHYSICIAN ASSISTANT

## 2019-04-05 PROCEDURE — 25000132 ZZH RX MED GY IP 250 OP 250 PS 637: Performed by: PHYSICIAN ASSISTANT

## 2019-04-05 PROCEDURE — 85027 COMPLETE CBC AUTOMATED: CPT | Performed by: PHYSICIAN ASSISTANT

## 2019-04-05 PROCEDURE — 99239 HOSP IP/OBS DSCHRG MGMT >30: CPT | Performed by: INTERNAL MEDICINE

## 2019-04-05 PROCEDURE — 71046 X-RAY EXAM CHEST 2 VIEWS: CPT | Mod: 77

## 2019-04-05 RX ORDER — OXYCODONE HYDROCHLORIDE 5 MG/1
5-10 TABLET ORAL EVERY 4 HOURS PRN
Status: DISCONTINUED | OUTPATIENT
Start: 2019-04-05 | End: 2019-04-05 | Stop reason: HOSPADM

## 2019-04-05 RX ORDER — OXYCODONE HYDROCHLORIDE 5 MG/1
5-10 TABLET ORAL EVERY 4 HOURS PRN
Qty: 30 TABLET | Refills: 0 | Status: SHIPPED | OUTPATIENT
Start: 2019-04-05 | End: 2019-04-09

## 2019-04-05 RX ADMIN — OXYCODONE HYDROCHLORIDE 5 MG: 5 TABLET ORAL at 00:55

## 2019-04-05 RX ADMIN — Medication 0.5 MG: at 07:38

## 2019-04-05 RX ADMIN — OXYCODONE HYDROCHLORIDE 5 MG: 5 TABLET ORAL at 05:17

## 2019-04-05 RX ADMIN — Medication 0.5 MG: at 04:22

## 2019-04-05 RX ADMIN — SENNOSIDES AND DOCUSATE SODIUM 1 TABLET: 8.6; 5 TABLET ORAL at 09:15

## 2019-04-05 RX ADMIN — OXYCODONE HYDROCHLORIDE 5 MG: 5 TABLET ORAL at 09:21

## 2019-04-05 RX ADMIN — Medication 0.5 MG: at 02:14

## 2019-04-05 ASSESSMENT — ACTIVITIES OF DAILY LIVING (ADL)
ADLS_ACUITY_SCORE: 15

## 2019-04-05 ASSESSMENT — PAIN DESCRIPTION - DESCRIPTORS
DESCRIPTORS: ACHING

## 2019-04-05 ASSESSMENT — MIFFLIN-ST. JEOR: SCORE: 1662.66

## 2019-04-05 NOTE — PLAN OF CARE
. OVSS. C/O R chest pain. Educated pt on splinting with cough. IV Dilaudid 0.5mg given x 2 and Oxycodone PO given x 2 w/ relief. Zofran ODT given w/ Dilaudid to prevent nausea. Poor appetite. Pt ate around 1500 but did not eat dinner. 1mL out of chest tube all shift. Resting comfortably. Continue POC.

## 2019-04-05 NOTE — PROGRESS NOTES
THORACIC & FOREGUT SURGERY    S:  No acute overnight events.  Pt seen at bedside resting  comfortably.  Pain controlled well.  Denies SOB.    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  Admitted for incidental finding of R PTX on CT scan.  CT placed in ED by thoracic surgery.  Bedside talc pleurodesis on 4/3/19.      -Chest tube removed this AM  -Follow up CXR @ 11am  -Thoracic to sign off if post pull film stable    JENNY BusbyC  Thoracic Surgery

## 2019-04-05 NOTE — PLAN OF CARE
[id number]:   Pt admitted 4/2 for pneumothorax  Hx of sarcoma with lung metastases    Pt is AOx4, AVSS on 1.5L NC, and SBA. PIV SL. CT to -20 suction. Minimal output. Container marker. CT dressing CDI. Pain controlled with PRN dilaudid given x3 and PRN oxy given x2. Pt slept most of the night. No significant changes.     Possibly removal of CT today. Continue to monitor.

## 2019-04-05 NOTE — DISCHARGE SUMMARY
[facility] [place]    Discharge Summary  Hematology / Oncology    Date of Admission:  4/2/2019  Date of Discharge:  4/5/2019  Discharging Provider: Farzaneh Joseph  Date of Service (when I saw the patient): 04/05/19    Discharge Diagnoses   Secondary right spontaneous pneumothorax  Soft tissue sarcoma of right thigh    History of Present Illness   Hiram Tapia is a 58 year old male with history of sarcoma with lung metastases (s/p R lung wedge resection), and PE who presented to the ED with SOB and found to have a new large R pneumothorax. He was admitted for further workup and management.      Please see H&P for further detail of history.    Hospital Course   Hiram Tapia was admitted on 4/2/2019.  The following problems were addressed during his hospitalization:     #R Pneumothorax.   Patient presented to ED with SOB. Had a scheduled outpatient CT CAP 4/2 and was found to have a new large R penumothorax as well as a trace L apical pneumothorax. Thoracic surgery evaluated the patient in the ER and placed a pigtail chest tube. Post-CXR with evidence of re-expansion. Thoracic surgery performed a TALC pleurodesis 4/3. Daily CXR showed continued expansion of lung. Chest tube was removed on 4/5 with post-CXR showing no pneumothorax on the right (stable, small on left). Discussed with thoracic team and safe to discharge home.  - Oxycodone 5-10 mg q4hrs prn for pain control     #Metastatic sarcoma of the right thigh  Follows with Dr. Johnson. 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 Received 4 cycles of Dox/Ifox starting 3/2018 and preoperative XRT. Underwent resection on 9/17/18 with negative margins. Slowly growing pulmonary nodules were noted and patient underwent RML wedge resection 12/5/18 with pathology revealing metastatic sarcoma, negative margins. Staging CT CAP 4/2 with increased size and number of numerous metastatic  pulmonary nodules, new/increased lesions in right iliac nodules, right femoral intertrochanteric region and right iliopsoas.   - Planned to see Dr. Johnson on 4/4 to likely start Keytruda vs Doxil/Ifos. Rescheduled for 4/9.     #H/o PE  Received Rivaroxaban April 2018-Oct 2018.     Dispo: Discharged to home on 4/5.  Follow-up: Rescheduled Skubitz/labs/infusion for 4/9.     Above plan discussed with staff physician, Dr. Skip Joseph PA-C  Hematology/Oncology  Pager: [phone]    Significant Results and Procedures   Results for orders placed or performed during the hospital encounter of 04/02/19   Chest  XR, 1 view portable    Narrative    EXAM: XR CHEST PORT 1 VW  4/2/2019 1:06 PM     HISTORY:  s/p chest tube placement       COMPARISON:  CT 4/2/2018, radiograph 1/10/2019    FINDINGS: AP radiograph of the chest. Right IJ Port-A-Cath tip  projects at expected location of the right atrium. Right chest tube  with tip pointed towards the right costophrenic angle. Postsurgical  changes of wide resection.    The trachea is midline. The mediastinal border, cardiac silhouette,  and pulmonary vasculature are within normal limits. Diffuse bilateral  nodules, unchanged. Small right pneumothorax. Decreased trace left  pneumothorax.      Impression    IMPRESSION:   1. Right chest tube with tip pointed towards the right costophrenic  angle.  2. Small right pneumothorax. Decreased trace left pneumothorax.  3. Stable diffuse bilateral nodules consistent with metastatic  disease.    I have personally reviewed the examination and initial interpretation  and I agree with the findings.    TEETEE NICOLAS MD   XR Chest 2 Views    Narrative    EXAM: XR CHEST 2 VW  4/3/2019 8:16 AM     HISTORY:  Eval right sided pneumotx with pigtail   58 year old male  with metastatic thigh sarcoma to bilateral lungs and history of  pulmonary emboli who presents to the ED with an asymptomatic large  right pneumothorax, now s/p pigtail  chest tube placement and  re-expansion.    COMPARISON:  4/2/2019    FINDINGS: PA and lateral radiographs of the chest. Right Port-A-Cath  tip projects over the expected location of the high right atrium.  Repositioning of the right chest tube with tip facing the anterior  chest wall. Postsurgical changes of wide resection.    The trachea is midline. The mediastinal border, cardiac silhouette,  and pulmonary vasculature are within normal limits. Diffuse bilateral  nodules, stable. Decreased, now trace right pneumothorax. No pleural  effusion.      Impression    IMPRESSION:   1. Decreased, now trace right pneumothorax with chest tube tip  repositioned and facing the anterior chest wall.  2. No left pneumothorax visualized.  3. Stable diffuse bilateral nodules consistent with metastatic  disease.    I have personally reviewed the examination and initial interpretation  and I agree with the findings.    TEETEE NICOLAS MD   XR Chest 2 Views    Narrative    EXAM: XR CHEST 2 VW  4/4/2019 8:50 AM     HISTORY:  Eval right sided pneumotx with pigtail   58 year old  male?with history of sarcoma with lung metastases (s/p R lung wedge  resection),?and?PE?who?presented to the ED?with SOB and found to have  a new large R pneumothorax    COMPARISON:  4/3/2019    FINDINGS: PA and lateral radiographs of the chest. Right Port-A-Cath  tip projects at expected location of the cavoatrial junction. Right  hemithorax pigtail catheter in grossly similar position. Postsurgical  changes of wide resection.    The trachea is midline. Cardiomediastinal silhouette unchanged. Stable  diffuse nodules. No pneumothorax. Small bilateral pleural effusions.      Impression    IMPRESSION:   1. No pneumothorax visualized.  2. Small bilateral pleural effusions.  3. Stable bilateral nodules.    I have personally reviewed the examination and initial interpretation  and I agree with the findings.    ROBERTA LAMB MD   XR Chest 2 Views    Narrative    EXAM:  XR CHEST 2 VW  4/5/2019 8:19 AM     HISTORY:  Eval right sided pneumotx with pigtail   sarcoma with lung  metastases.    COMPARISON:  4/4/2019    FINDINGS: PA and lateral radiographs of the chest. Right Port-A-Cath  tip projects over the high right atrium. Right chest tube in similar  position. Postsurgical changes of left wedge resection.    The trachea is midline. Cardiomediastinal silhouette unchanged.  Unchanged diffuse nodules. Increased right basilar opacities. No  pneumothorax. Small bilateral pleural effusions.      Impression    IMPRESSION:   1. No pneumothorax visualized.  2. Stable small bilateral pleural effusions with associated  atelectasis/consolidation, slightly increased on the right.  3. Stable bilateral nodules.    I have personally reviewed the examination and initial interpretation  and I agree with the findings.    ROBERTA LAMB MD   XR Chest 2 Views     Value    Radiologist flags (Urgent)     No right sided pneumothorax. Stable small left-sided    Narrative    EXAM: XR CHEST 2 VW  4/5/2019 11:29 AM     HISTORY:  s/p CT removal       COMPARISON:  4/5/2018    FINDINGS: PA and lateral radiographs of the chest. Removal of right  chest tube. Right Port-A-Cath tip projects at the high right atrium.    The trachea is midline. Cardiomediastinal silhouette unchanged. Stable  small bilateral pleural effusions with associated bibasilar opacities.  Unchanged bilateral nodules. Small left pneumothorax, stable.      Impression    IMPRESSION:   1. Removal of right chest tube without new right pneumothorax.  2. Small left pneumothorax unchanged.  3. Stable bilateral pleural effusions with associated  atelectasis/consolidation.    [Access Center: No right sided pneumothorax. Stable small left-sided  pneumothorax.]    This report will be copied to the [facility] to ensure a  provider acknowledges the finding. Access Center is available Monday  through Friday 8am-3:30 pm.     I have personally  reviewed the examination and initial interpretation  and I agree with the findings.    ROBERTA LAMB MD     Pending Results   None    Code Status   Full Code    Primary Care Physician   Louisa Fry    Physical Exam   Temp: 97.2  F (36.2  C) Temp src: Oral BP: 111/72   Heart Rate: 87 Resp: 18 SpO2: 94 % O2 Device: None (Room air) Oxygen Delivery: 1.5 LPM  Vitals:    04/03/19 0749 04/04/19 0840 04/05/19 1129   Weight: 83.5 kg (184 lb 1.6 oz) 83.4 kg (183 lb 12.8 oz) 84.6 kg (186 lb 9.6 oz)     Vital Signs with Ranges  Temp:  [96.6  F (35.9  C)-98.1  F (36.7  C)] 97.2  F (36.2  C)  Heart Rate:  [79-98] 87  Resp:  [18-20] 18  BP: (106-116)/(64-72) 111/72  SpO2:  [90 %-95 %] 94 %  I/O last 3 completed shifts:  In: 1000 [P.O.:1000]  Out: 2 [Chest Tube:2]    Time Spent on this Encounter   I, Farzaneh Joseph, personally saw the patient today and spent greater than 30 minutes discharging this patient.    Discharge Disposition   Discharged to home  Condition at discharge: Stable    Consultations This Hospital Stay   MEDICATION HISTORY IP PHARMACY CONSULT    Discharge Orders      CBC with platelets differential    Last Lab Result: Hemoglobin (g/dL)       Date                     Value                 04/05/2019               14.4             ----------     Comprehensive metabolic panel     Reason for your hospital stay    You were admitted with a collapsed right lung (pneumothorax). A chest tube was placed and a procedure called TALC pleurodesis was performed to help your lung re-expand. The chest tube has been removed and there is no further evidence of a collapsed lung. You will be sent home with pain medication.     Adult [facility] Follow-up and recommended labs and tests    - You have follow-up with Dr. Johnson on Tuesday (4/9).     Appointments on [place] and/or [facility] (with [facility] provider or service). Call [phone] if you haven't heard regarding these appointments within 7 days of  "discharge.    See details of appointments below:     Activity    Your activity upon discharge: Activity as tolerated.     When to contact your care team    Call the [facility] 24-hour triage line at [phone] or [phone] for temp >100.4, uncontrolled nausea/vomiting/diarrhea/constipation, unrelieved pain, bleeding not relieved with pressure, dizziness, chest pain, shortness of breath, loss of consciousness, and any new or concerning symptoms.     Call [phone] and ask for the care coordinator that works with your oncologist if you have questions about scans, appointments, hospital follow-up, or other concerns.     Discharge Instructions    - Oxycodone has been prescribed for your pain. Do not drive while on this medication. You can take 1-2 tabs every 4 hours as needed for pain. Avoid Tylenol and Ibuprofen.     Diet    Follow this diet upon discharge: Regular diet as tolerated.     Discharge Medications   Current Discharge Medication List      START taking these medications    Details   oxyCODONE (ROXICODONE) 5 MG tablet Take 1-2 tablets (5-10 mg) by mouth every 4 hours as needed for moderate to severe pain  Qty: 30 tablet, Refills: 0    Associated Diagnoses: Secondary spontaneous pneumothorax         CONTINUE these medications which have NOT CHANGED    Details   menthol (COUGH DROP) 8 MG LOZG Take 1 lozenge by mouth every hour as needed for cough    Associated Diagnoses: Sarcoma (H); Lung nodule; Pre-operative general physical examination           Allergies   Allergies   Allergen Reactions     Hydrofera Blue 4\"X4\" [Wound Dressings] Dermatitis and Blisters     Patient reports that Dressing causes skin irritation, blisters, and drainage.      Tegaderm Ag Mesh [Silver] Dermatitis and Blisters     Patient reports that Dressing causes Skin irritation, blisters, and drainage.     Data   ROUTINE IP LABS (Last four results)  BMP  Recent Labs   Lab 04/05/19  0443 04/04/19  0455 04/03/19  0600 " 04/02/19  1040    136 140 137   POTASSIUM 4.4 4.1 4.0 4.0   CHLORIDE 99 104 108 105   JAYESH 9.5 9.5 9.5 10.0   CO2 26 25 23 24   BUN 14 12 10 19   CR 1.08 0.82 0.81 0.84   GLC 97 102* 100* 102*     CBC  Recent Labs   Lab 04/05/19  0443 04/04/19  0455 04/03/19  0600 04/02/19  1040   WBC 8.4 7.0 4.8 6.0   RBC 5.23 5.15 5.16 5.20   HGB 14.4 14.2 14.1 14.4   HCT 45.1 44.3 44.5 45.2   MCV 86 86 86 87   MCH 27.5 27.6 27.3 27.7   MCHC 31.9 32.1 31.7 31.9   RDW 14.6 14.3 14.4 14.5    233 231 276     INR  Recent Labs   Lab 04/02/19  1040   INR 1.02

## 2019-04-05 NOTE — PROGRESS NOTES
Nursing Focus: Discharge    D: Patient discharged to home at 1500 hours. Patient was afebrile, vitally stable and pain was managed prior to discharge.  All belongings sent with the patient. Chest tube was removed this morning by thoracic team around 1000 hours. Site was clean, dry and intact prior to discharge.    I: Discharge prescriptions sent to discharge pharmacy to be filled. All discharge medications and instructions reviewed with David. Patient instructed to call clinic triage nurse if he experiences a fever >100.4, uncontrolled nausea, vomiting, diarrhea, or pain; or experiences any signs or symptoms of bleeding. Other phone numbers to call with questions or concerns after discharge reviewed with David. Follow-up outpatient appointment scheduled reviewed with David.  PIV removed. Education completed.  Care Plan goals met and adequate for discharge.    A: David verbalized understanding of discharge medications and instructions. Patient will  medications at discharge pharmacy. Patient was instructed on chest wound care after chest tube removal. Patient was able to verbalize in his own words the discharge instructions for this.     P: Patient to follow-up in clinic with Dr. Balderas in [facility] on April 9th with labs and follow up infusions.

## 2019-04-08 ENCOUNTER — CARE COORDINATION (OUTPATIENT)
Dept: ONCOLOGY | Facility: CLINIC | Age: 59
End: 2019-04-08

## 2019-04-09 ENCOUNTER — ONCOLOGY VISIT (OUTPATIENT)
Dept: ONCOLOGY | Facility: CLINIC | Age: 59
End: 2019-04-09
Attending: INTERNAL MEDICINE
Payer: COMMERCIAL

## 2019-04-09 ENCOUNTER — OFFICE VISIT (OUTPATIENT)
Dept: ORTHOPEDICS | Facility: CLINIC | Age: 59
End: 2019-04-09
Attending: INTERNAL MEDICINE
Payer: COMMERCIAL

## 2019-04-09 ENCOUNTER — ANCILLARY PROCEDURE (OUTPATIENT)
Dept: CT IMAGING | Facility: CLINIC | Age: 59
End: 2019-04-09
Attending: INTERNAL MEDICINE
Payer: COMMERCIAL

## 2019-04-09 ENCOUNTER — APPOINTMENT (OUTPATIENT)
Dept: LAB | Facility: CLINIC | Age: 59
End: 2019-04-09
Attending: INTERNAL MEDICINE
Payer: COMMERCIAL

## 2019-04-09 ENCOUNTER — PRE VISIT (OUTPATIENT)
Dept: ORTHOPEDICS | Facility: CLINIC | Age: 59
End: 2019-04-09

## 2019-04-09 ENCOUNTER — ANCILLARY PROCEDURE (OUTPATIENT)
Dept: GENERAL RADIOLOGY | Facility: CLINIC | Age: 59
End: 2019-04-09
Attending: ORTHOPAEDIC SURGERY
Payer: COMMERCIAL

## 2019-04-09 VITALS — HEIGHT: 70 IN | WEIGHT: 187.9 LBS | BODY MASS INDEX: 26.9 KG/M2

## 2019-04-09 VITALS
BODY MASS INDEX: 26.96 KG/M2 | WEIGHT: 187.9 LBS | TEMPERATURE: 98.2 F | HEART RATE: 110 BPM | SYSTOLIC BLOOD PRESSURE: 147 MMHG | RESPIRATION RATE: 18 BRPM | OXYGEN SATURATION: 96 % | DIASTOLIC BLOOD PRESSURE: 81 MMHG

## 2019-04-09 DIAGNOSIS — T45.1X5A CHEMOTHERAPY-INDUCED NAUSEA AND VOMITING: ICD-10-CM

## 2019-04-09 DIAGNOSIS — C79.51 BONE METASTASIS: ICD-10-CM

## 2019-04-09 DIAGNOSIS — R11.2 CHEMOTHERAPY-INDUCED NAUSEA AND VOMITING: ICD-10-CM

## 2019-04-09 DIAGNOSIS — C49.21 SOFT TISSUE SARCOMA OF RIGHT THIGH (H): ICD-10-CM

## 2019-04-09 DIAGNOSIS — C49.9 SARCOMA OF SOFT TISSUE (H): ICD-10-CM

## 2019-04-09 DIAGNOSIS — M10.00 IDIOPATHIC GOUT, UNSPECIFIED CHRONICITY, UNSPECIFIED SITE: ICD-10-CM

## 2019-04-09 DIAGNOSIS — E86.0 DEHYDRATION: ICD-10-CM

## 2019-04-09 DIAGNOSIS — C49.9 SARCOMA (H): Primary | ICD-10-CM

## 2019-04-09 DIAGNOSIS — M79.651 PAIN OF RIGHT THIGH: ICD-10-CM

## 2019-04-09 DIAGNOSIS — I26.99 OTHER PULMONARY EMBOLISM WITHOUT ACUTE COR PULMONALE, UNSPECIFIED CHRONICITY (H): ICD-10-CM

## 2019-04-09 DIAGNOSIS — M79.651 PAIN OF RIGHT THIGH: Primary | ICD-10-CM

## 2019-04-09 DIAGNOSIS — C49.21 SOFT TISSUE SARCOMA OF RIGHT THIGH (H): Primary | ICD-10-CM

## 2019-04-09 DIAGNOSIS — J93.12 SECONDARY SPONTANEOUS PNEUMOTHORAX: ICD-10-CM

## 2019-04-09 DIAGNOSIS — G89.3 CANCER ASSOCIATED PAIN: ICD-10-CM

## 2019-04-09 DIAGNOSIS — D63.0 ANEMIA IN NEOPLASTIC DISEASE: ICD-10-CM

## 2019-04-09 DIAGNOSIS — R91.8 PULMONARY NODULES: ICD-10-CM

## 2019-04-09 DIAGNOSIS — C49.9 SARCOMA (H): ICD-10-CM

## 2019-04-09 LAB
ALBUMIN SERPL-MCNC: 3.2 G/DL (ref 3.4–5)
ALBUMIN SERPL-MCNC: 3.3 G/DL (ref 3.4–5)
ALP SERPL-CCNC: 131 U/L (ref 40–150)
ALP SERPL-CCNC: 136 U/L (ref 40–150)
ALT SERPL W P-5'-P-CCNC: 28 U/L (ref 0–70)
ALT SERPL W P-5'-P-CCNC: 28 U/L (ref 0–70)
ANION GAP SERPL CALCULATED.3IONS-SCNC: 6 MMOL/L (ref 3–14)
ANION GAP SERPL CALCULATED.3IONS-SCNC: 8 MMOL/L (ref 3–14)
AST SERPL W P-5'-P-CCNC: 23 U/L (ref 0–45)
AST SERPL W P-5'-P-CCNC: 25 U/L (ref 0–45)
BASOPHILS # BLD AUTO: 0 10E9/L (ref 0–0.2)
BASOPHILS # BLD AUTO: 0 10E9/L (ref 0–0.2)
BASOPHILS NFR BLD AUTO: 0.5 %
BASOPHILS NFR BLD AUTO: 0.6 %
BILIRUB SERPL-MCNC: 0.4 MG/DL (ref 0.2–1.3)
BILIRUB SERPL-MCNC: 0.4 MG/DL (ref 0.2–1.3)
BUN SERPL-MCNC: 12 MG/DL (ref 7–30)
BUN SERPL-MCNC: 14 MG/DL (ref 7–30)
CALCIUM SERPL-MCNC: 10.4 MG/DL (ref 8.5–10.1)
CALCIUM SERPL-MCNC: 9.9 MG/DL (ref 8.5–10.1)
CHLORIDE SERPL-SCNC: 101 MMOL/L (ref 94–109)
CHLORIDE SERPL-SCNC: 103 MMOL/L (ref 94–109)
CO2 SERPL-SCNC: 24 MMOL/L (ref 20–32)
CO2 SERPL-SCNC: 27 MMOL/L (ref 20–32)
CREAT SERPL-MCNC: 0.77 MG/DL (ref 0.66–1.25)
CREAT SERPL-MCNC: 0.78 MG/DL (ref 0.66–1.25)
DIFFERENTIAL METHOD BLD: ABNORMAL
DIFFERENTIAL METHOD BLD: NORMAL
EOSINOPHIL # BLD AUTO: 0.1 10E9/L (ref 0–0.7)
EOSINOPHIL # BLD AUTO: 0.1 10E9/L (ref 0–0.7)
EOSINOPHIL NFR BLD AUTO: 1.1 %
EOSINOPHIL NFR BLD AUTO: 1.5 %
ERYTHROCYTE [DISTWIDTH] IN BLOOD BY AUTOMATED COUNT: 14 % (ref 10–15)
ERYTHROCYTE [DISTWIDTH] IN BLOOD BY AUTOMATED COUNT: 14.2 % (ref 10–15)
GFR SERPL CREATININE-BSD FRML MDRD: >90 ML/MIN/{1.73_M2}
GFR SERPL CREATININE-BSD FRML MDRD: >90 ML/MIN/{1.73_M2}
GLUCOSE SERPL-MCNC: 100 MG/DL (ref 70–99)
GLUCOSE SERPL-MCNC: 104 MG/DL (ref 70–99)
HCT VFR BLD AUTO: 42.2 % (ref 40–53)
HCT VFR BLD AUTO: 44.6 % (ref 40–53)
HGB BLD-MCNC: 13.8 G/DL (ref 13.3–17.7)
HGB BLD-MCNC: 14 G/DL (ref 13.3–17.7)
IMM GRANULOCYTES # BLD: 0 10E9/L (ref 0–0.4)
IMM GRANULOCYTES # BLD: 0 10E9/L (ref 0–0.4)
IMM GRANULOCYTES NFR BLD: 0.2 %
IMM GRANULOCYTES NFR BLD: 0.3 %
LYMPHOCYTES # BLD AUTO: 1.5 10E9/L (ref 0.8–5.3)
LYMPHOCYTES # BLD AUTO: 1.5 10E9/L (ref 0.8–5.3)
LYMPHOCYTES NFR BLD AUTO: 22.2 %
LYMPHOCYTES NFR BLD AUTO: 29.3 %
MAGNESIUM SERPL-MCNC: 2.2 MG/DL (ref 1.6–2.3)
MCH RBC QN AUTO: 26.8 PG (ref 26.5–33)
MCH RBC QN AUTO: 27.4 PG (ref 26.5–33)
MCHC RBC AUTO-ENTMCNC: 31.4 G/DL (ref 31.5–36.5)
MCHC RBC AUTO-ENTMCNC: 32.7 G/DL (ref 31.5–36.5)
MCV RBC AUTO: 84 FL (ref 78–100)
MCV RBC AUTO: 85 FL (ref 78–100)
MONOCYTES # BLD AUTO: 0.6 10E9/L (ref 0–1.3)
MONOCYTES # BLD AUTO: 0.7 10E9/L (ref 0–1.3)
MONOCYTES NFR BLD AUTO: 10 %
MONOCYTES NFR BLD AUTO: 10.5 %
NEUTROPHILS # BLD AUTO: 3.1 10E9/L (ref 1.6–8.3)
NEUTROPHILS # BLD AUTO: 4.3 10E9/L (ref 1.6–8.3)
NEUTROPHILS NFR BLD AUTO: 58.3 %
NEUTROPHILS NFR BLD AUTO: 65.5 %
NRBC # BLD AUTO: 0 10*3/UL
NRBC # BLD AUTO: 0 10*3/UL
NRBC BLD AUTO-RTO: 0 /100
NRBC BLD AUTO-RTO: 0 /100
PHOSPHATE SERPL-MCNC: 2.8 MG/DL (ref 2.5–4.5)
PLATELET # BLD AUTO: 319 10E9/L (ref 150–450)
PLATELET # BLD AUTO: 322 10E9/L (ref 150–450)
POTASSIUM SERPL-SCNC: 3.8 MMOL/L (ref 3.4–5.3)
POTASSIUM SERPL-SCNC: 4.3 MMOL/L (ref 3.4–5.3)
PROT SERPL-MCNC: 7.4 G/DL (ref 6.8–8.8)
PROT SERPL-MCNC: 7.7 G/DL (ref 6.8–8.8)
RADIOLOGIST FLAGS: ABNORMAL
RBC # BLD AUTO: 5.03 10E12/L (ref 4.4–5.9)
RBC # BLD AUTO: 5.23 10E12/L (ref 4.4–5.9)
SODIUM SERPL-SCNC: 134 MMOL/L (ref 133–144)
SODIUM SERPL-SCNC: 134 MMOL/L (ref 133–144)
TSH SERPL DL<=0.005 MIU/L-ACNC: 1.88 MU/L (ref 0.4–4)
WBC # BLD AUTO: 5.2 10E9/L (ref 4–11)
WBC # BLD AUTO: 6.6 10E9/L (ref 4–11)

## 2019-04-09 PROCEDURE — 36415 COLL VENOUS BLD VENIPUNCTURE: CPT

## 2019-04-09 PROCEDURE — 84100 ASSAY OF PHOSPHORUS: CPT | Performed by: INTERNAL MEDICINE

## 2019-04-09 PROCEDURE — 83735 ASSAY OF MAGNESIUM: CPT | Performed by: INTERNAL MEDICINE

## 2019-04-09 PROCEDURE — 25000128 H RX IP 250 OP 636: Mod: ZF | Performed by: INTERNAL MEDICINE

## 2019-04-09 PROCEDURE — 96413 CHEMO IV INFUSION 1 HR: CPT

## 2019-04-09 PROCEDURE — 36593 DECLOT VASCULAR DEVICE: CPT

## 2019-04-09 PROCEDURE — 99215 OFFICE O/P EST HI 40 MIN: CPT | Mod: ZP | Performed by: INTERNAL MEDICINE

## 2019-04-09 PROCEDURE — 80053 COMPREHEN METABOLIC PANEL: CPT | Performed by: INTERNAL MEDICINE

## 2019-04-09 PROCEDURE — 36415 COLL VENOUS BLD VENIPUNCTURE: CPT | Performed by: INTERNAL MEDICINE

## 2019-04-09 PROCEDURE — 85025 COMPLETE CBC W/AUTO DIFF WBC: CPT | Performed by: INTERNAL MEDICINE

## 2019-04-09 PROCEDURE — G0463 HOSPITAL OUTPT CLINIC VISIT: HCPCS | Mod: ZF

## 2019-04-09 PROCEDURE — 25800030 ZZH RX IP 258 OP 636: Mod: ZF | Performed by: INTERNAL MEDICINE

## 2019-04-09 PROCEDURE — 84443 ASSAY THYROID STIM HORMONE: CPT | Performed by: INTERNAL MEDICINE

## 2019-04-09 RX ORDER — SODIUM CHLORIDE 9 MG/ML
1000 INJECTION, SOLUTION INTRAVENOUS CONTINUOUS PRN
Status: CANCELLED
Start: 2019-04-09

## 2019-04-09 RX ORDER — OXYCODONE HYDROCHLORIDE 5 MG/1
CAPSULE ORAL
Qty: 60 CAPSULE | Refills: 0 | Status: ON HOLD | OUTPATIENT
Start: 2019-04-09 | End: 2019-04-19

## 2019-04-09 RX ORDER — EPINEPHRINE 1 MG/ML
0.3 INJECTION, SOLUTION INTRAMUSCULAR; SUBCUTANEOUS EVERY 5 MIN PRN
Status: CANCELLED | OUTPATIENT
Start: 2019-04-30

## 2019-04-09 RX ORDER — METHYLPREDNISOLONE SODIUM SUCCINATE 125 MG/2ML
125 INJECTION, POWDER, LYOPHILIZED, FOR SOLUTION INTRAMUSCULAR; INTRAVENOUS
Status: CANCELLED
Start: 2019-04-09

## 2019-04-09 RX ORDER — EPINEPHRINE 0.3 MG/.3ML
0.3 INJECTION SUBCUTANEOUS EVERY 5 MIN PRN
Status: CANCELLED | OUTPATIENT
Start: 2019-04-09

## 2019-04-09 RX ORDER — DIPHENHYDRAMINE HYDROCHLORIDE 50 MG/ML
50 INJECTION INTRAMUSCULAR; INTRAVENOUS
Status: CANCELLED
Start: 2019-04-30

## 2019-04-09 RX ORDER — METHYLPREDNISOLONE SODIUM SUCCINATE 125 MG/2ML
125 INJECTION, POWDER, LYOPHILIZED, FOR SOLUTION INTRAMUSCULAR; INTRAVENOUS
Status: CANCELLED
Start: 2019-04-30

## 2019-04-09 RX ORDER — ALBUTEROL SULFATE 90 UG/1
1-2 AEROSOL, METERED RESPIRATORY (INHALATION)
Status: CANCELLED
Start: 2019-04-09

## 2019-04-09 RX ORDER — SODIUM CHLORIDE 9 MG/ML
1000 INJECTION, SOLUTION INTRAVENOUS CONTINUOUS PRN
Status: CANCELLED
Start: 2019-04-30

## 2019-04-09 RX ORDER — HEPARIN SODIUM (PORCINE) LOCK FLUSH IV SOLN 100 UNIT/ML 100 UNIT/ML
5 SOLUTION INTRAVENOUS ONCE
Status: COMPLETED | OUTPATIENT
Start: 2019-04-09 | End: 2019-04-09

## 2019-04-09 RX ORDER — HEPARIN SODIUM (PORCINE) LOCK FLUSH IV SOLN 100 UNIT/ML 100 UNIT/ML
5 SOLUTION INTRAVENOUS EVERY 8 HOURS PRN
Status: DISCONTINUED | OUTPATIENT
Start: 2019-04-09 | End: 2019-04-09 | Stop reason: HOSPADM

## 2019-04-09 RX ORDER — LORAZEPAM 2 MG/ML
0.5 INJECTION INTRAMUSCULAR EVERY 4 HOURS PRN
Status: CANCELLED
Start: 2019-04-09

## 2019-04-09 RX ORDER — EPINEPHRINE 1 MG/ML
0.3 INJECTION, SOLUTION INTRAMUSCULAR; SUBCUTANEOUS EVERY 5 MIN PRN
Status: CANCELLED | OUTPATIENT
Start: 2019-04-09

## 2019-04-09 RX ORDER — ALBUTEROL SULFATE 0.83 MG/ML
2.5 SOLUTION RESPIRATORY (INHALATION)
Status: CANCELLED | OUTPATIENT
Start: 2019-04-30

## 2019-04-09 RX ORDER — MEPERIDINE HYDROCHLORIDE 25 MG/ML
25 INJECTION INTRAMUSCULAR; INTRAVENOUS; SUBCUTANEOUS EVERY 30 MIN PRN
Status: CANCELLED | OUTPATIENT
Start: 2019-04-09

## 2019-04-09 RX ORDER — LORAZEPAM 2 MG/ML
0.5 INJECTION INTRAMUSCULAR EVERY 4 HOURS PRN
Status: CANCELLED
Start: 2019-04-30

## 2019-04-09 RX ORDER — EPINEPHRINE 0.3 MG/.3ML
0.3 INJECTION SUBCUTANEOUS EVERY 5 MIN PRN
Status: CANCELLED | OUTPATIENT
Start: 2019-04-30

## 2019-04-09 RX ORDER — MEPERIDINE HYDROCHLORIDE 25 MG/ML
25 INJECTION INTRAMUSCULAR; INTRAVENOUS; SUBCUTANEOUS EVERY 30 MIN PRN
Status: CANCELLED | OUTPATIENT
Start: 2019-04-30

## 2019-04-09 RX ORDER — ALBUTEROL SULFATE 90 UG/1
1-2 AEROSOL, METERED RESPIRATORY (INHALATION)
Status: CANCELLED
Start: 2019-04-30

## 2019-04-09 RX ORDER — DIPHENHYDRAMINE HYDROCHLORIDE 50 MG/ML
50 INJECTION INTRAMUSCULAR; INTRAVENOUS
Status: CANCELLED
Start: 2019-04-09

## 2019-04-09 RX ORDER — ALBUTEROL SULFATE 0.83 MG/ML
2.5 SOLUTION RESPIRATORY (INHALATION)
Status: CANCELLED | OUTPATIENT
Start: 2019-04-09

## 2019-04-09 RX ADMIN — HEPARIN SODIUM (PORCINE) LOCK FLUSH IV SOLN 100 UNIT/ML 5 ML: 100 SOLUTION at 06:26

## 2019-04-09 RX ADMIN — SODIUM CHLORIDE 200 MG: 900 INJECTION, SOLUTION INTRAVENOUS at 08:02

## 2019-04-09 RX ADMIN — ALTEPLASE 2 MG: 2.2 INJECTION, POWDER, LYOPHILIZED, FOR SOLUTION INTRAVENOUS at 07:49

## 2019-04-09 RX ADMIN — ALTEPLASE 2 MG: 2.2 INJECTION, POWDER, LYOPHILIZED, FOR SOLUTION INTRAVENOUS at 09:53

## 2019-04-09 RX ADMIN — HEPARIN SODIUM (PORCINE) LOCK FLUSH IV SOLN 100 UNIT/ML 5 ML: 100 SOLUTION at 11:42

## 2019-04-09 RX ADMIN — SODIUM CHLORIDE 250 ML: 9 INJECTION, SOLUTION INTRAVENOUS at 08:02

## 2019-04-09 ASSESSMENT — ENCOUNTER SYMPTOMS
SPUTUM PRODUCTION: 1
COUGH: 1
MYALGIAS: 1
COUGH DISTURBING SLEEP: 1
JOINT SWELLING: 0
WHEEZING: 0
SHORTNESS OF BREATH: 1
ARTHRALGIAS: 0
NECK PAIN: 0
MUSCLE CRAMPS: 0
DYSPNEA ON EXERTION: 1
STIFFNESS: 1
SNORES LOUDLY: 0
HEMOPTYSIS: 1
POSTURAL DYSPNEA: 1
BACK PAIN: 0
MUSCLE WEAKNESS: 0

## 2019-04-09 ASSESSMENT — MIFFLIN-ST. JEOR: SCORE: 1668.56

## 2019-04-09 ASSESSMENT — PAIN SCALES - GENERAL: PAINLEVEL: MODERATE PAIN (5)

## 2019-04-09 NOTE — NURSING NOTE
Teaching Flowsheet   Relevant Diagnosis: Lesion right femur  Teaching Topic: Pre-op:  Gamma nail placement right femur     Person(s) involved in teaching:   Patient and Wife     Motivation Level:  Asks Questions: Yes  Eager to Learn: Yes  Cooperative: Yes  Receptive (willing/able to accept information): Yes  Any cultural factors/Anabaptist beliefs that may influence understanding or compliance? No       Patient and Family demonstrates understanding of the following:  Reason for the appointment, diagnosis and treatment plan: Yes  Knowledge of proper use of medications and conditions for which they are ordered (with special attention to potential side effects or drug interactions): Yes  Which situations necessitate calling provider and whom to contact: Yes       Teaching Concerns Addressed: Patient was just discharged from the hospital on 4/5/19 and was seen in Oncology clinic today - had physical and labs.       Proper use and care of  (medical equip, care aids, etc.): Yes  Nutritional needs and diet plan: Yes  Pain management techniques: Yes  Wound Care: Yes  How and/when to access community resources: NA     Instructional Materials Used/Given: Surgery folder     Time spent with patient: 15 minutes.

## 2019-04-09 NOTE — TELEPHONE ENCOUNTER
RECORDS RECEIVED FROM: [facility]  DATE RECEIVED: 04/09/2019    OFFICE NOTE from referring provider: Josette Johnson, 04/09/2019    OFFICE NOTE from other specialist:    DISCHARGE SUMMARY from hospital: Josette Bowie 12/05/2018    DISCHARGE REPORT from the ER: Josette Valdivia 04/02/2019    OPERATIVE REPORT:    MEDICATION LIST: Epic (no corticosteroids 04/09/2019 while receiving pembrolizumab infusions)    IMPLANT RECORD/STICKER:    CBC/DIFF: 04/09/2019    CULTURES: Epic fungus culture 04/06/2018    INJECTIONS DONE IN RADIOLOGY:    MRI: Epic 09/04/2018    CT SCAN: Epic 06/26/2018    XRAYS (IMAGES & REPORTS):      TUMOR: Epic Lactate Dehydrogenase 06/20/2018    PATHOLOGY  Slides & report: Epic 12/05/2018

## 2019-04-09 NOTE — NURSING NOTE
"Chief Complaint   Patient presents with     RECHECK     F/u for Same day Chest CT/Labs/Onc. Apt  AND Increased Pain that started a couple weeks ago S/p Removal Right Thigh Tumor DOS: 09/17/2018     Hospital F/U     Pt. states that he recently started Keytruda and was also Inpatient for a Colapsed Right lung.        58 year old  1960    Ht 1.778 m (5' 10\")   Wt 85.2 kg (187 lb 14.4 oz)   BMI 26.96 kg/m         [address]    [facility] [address] [place] [address] [address] AT [facility] [address]  [address]    Allergies   Allergen Reactions     Hydrofera Blue 4\"X4\" [Wound Dressings] Dermatitis and Blisters     Patient reports that Dressing causes skin irritation, blisters, and drainage.      Tegaderm Ag Mesh [Silver] Dermatitis and Blisters     Patient reports that Dressing causes Skin irritation, blisters, and drainage.       Current Outpatient Medications   Medication     menthol (COUGH DROP) 8 MG LOZG     oxyCODONE (OXY-IR) 5 MG capsule     No current facility-administered medications for this visit.                    "

## 2019-04-09 NOTE — LETTER
4/9/2019       RE: Hiram Tapia  [address]  [place] [address]     Dear Colleague,    Thank you for referring your patient, Hiram Tapia, [facility]. Please see a copy of my visit note below.    Diagnosis:   1. High-grade soft tissue sarcoma right thigh  2. Delayed post op wound infection  3. Pulmonary embolis      Treatment:   1. Neoadjuvant chemotherapy. Started March 26, 2018.  2. Pre op radiation.   3. Surgical resection with negative margin and >95% necrosis, 9/17/2018.  4. I and D, antibiotics  5. Anticoagulation    Patient is seen today with Dr. Johnson request.  He is noted increasing right distal thigh pain for the past 2-1/2 weeks.  The pain feels like the tumor has recurred to his impression and the pain requires the use of oxycodone.  He also describes some right medial thigh pain.  The pain increases going from a sitting to standing position and it increases during walking when he extends the right hip.    On exam there is no palpable masses in the right thigh or femur.  The no radiation and other treatment changes are evident.    His CT scan which was obtained earlier today was reviewed that shows a destructive lesion posterior to the lesser trochanter in the right proximal femur which is associated soft tissue mass.  This appears to be a metastatic lesion arising from the right femur.  X-rays of the femur were taken today as well these actually did not show the lesion very well but also did not show any other findings within the distal femur.    Impression: Symptomatic presumed sarcoma metastasis in a patient with progressive and of his sarcomatous disease just started today and Keytruda.    Plan: 1.  I reviewed with the patient the options of observation with medical management of his pain and continuation of his Keytruda versus surgical stabilization as the next step.  Risk and benefits of each approach  were discussed the patient clearly understands these.  He is chosen to proceed with surgical stabilization.  2.  We will schedule placement of proximal distal interlock gamma nail early next week.    Again, thank you for allowing me to participate in the care of your patient.      Sincerely,    Brad Betts MD

## 2019-04-09 NOTE — PROGRESS NOTES
Diagnosis:   1. High-grade soft tissue sarcoma right thigh  2. Delayed post op wound infection  3. Pulmonary embolis      Treatment:   1. Neoadjuvant chemotherapy. Started March 26, 2018.  2. Pre op radiation.   3. Surgical resection with negative margin and >95% necrosis, 9/17/2018.  4. I and D, antibiotics  5. Anticoagulation    Patient is seen today with Dr. Johnson request.  He is noted increasing right distal thigh pain for the past 2-1/2 weeks.  The pain feels like the tumor has recurred to his impression and the pain requires the use of oxycodone.  He also describes some right medial thigh pain.  The pain increases going from a sitting to standing position and it increases during walking when he extends the right hip.    On exam there is no palpable masses in the right thigh or femur.  The no radiation and other treatment changes are evident.    His CT scan which was obtained earlier today was reviewed that shows a destructive lesion posterior to the lesser trochanter in the right proximal femur which is associated soft tissue mass.  This appears to be a metastatic lesion arising from the right femur.  X-rays of the femur were taken today as well these actually did not show the lesion very well but also did not show any other findings within the distal femur.    Impression: Symptomatic presumed sarcoma metastasis in a patient with progressive and of his sarcomatous disease just started today and Keytruda.    Plan: 1.  I reviewed with the patient the options of observation with medical management of his pain and continuation of his Keytruda versus surgical stabilization as the next step.  Risk and benefits of each approach were discussed the patient clearly understands these.  He is chosen to proceed with surgical stabilization.  2.  We will schedule placement of proximal distal interlock gamma nail early next week.

## 2019-04-09 NOTE — PROGRESS NOTES
Infusion Nursing Note:  Hiram Tapia presents today for cycle 1, day 1 keytruda.    Patient seen by provider today: Yes: Dr Johnson   present during visit today: Not Applicable.    Note:   -Keytruda is new for patient today.  Patient given a handout from [employer] and basic side effects reviewed including the potential for autoimmune reactions and the need to avoid steroids while on treatment.    -Patient reports pain 5/10 today.  Dr Johnson provided patient with a prescription for oxycodone and he is seeing ortho later today.    Intravenous Access:  Peripheral IV placed for infusion  Implanted Port had no blood return on arrival, flushed with ease.  TPA instilled for 120 minutes with no blood return.  Dose repeated for another 110 minutes.  After the second dose of TPA RN was able to get brisk blood return from patient's port. It was flushed per protocol and de-accessed prior to discharge    Treatment Conditions:  Lab Results   Component Value Date    HGB 13.8 04/09/2019     Lab Results   Component Value Date    WBC 6.6 04/09/2019      Lab Results   Component Value Date    ANEU 4.3 04/09/2019     Lab Results   Component Value Date     04/09/2019      Lab Results   Component Value Date     04/09/2019                   Lab Results   Component Value Date    POTASSIUM 3.8 04/09/2019           Lab Results   Component Value Date    MAG 2.2 04/09/2019            Lab Results   Component Value Date    CR 0.78 04/09/2019                   Lab Results   Component Value Date    JAYESH 9.9 04/09/2019                Lab Results   Component Value Date    BILITOTAL 0.4 04/09/2019           Lab Results   Component Value Date    ALBUMIN 3.2 04/09/2019                    Lab Results   Component Value Date    ALT 28 04/09/2019           Lab Results   Component Value Date    AST 25 04/09/2019       Results reviewed, labs MET treatment parameters, ok to proceed with treatment.      Post Infusion  Assessment:  Patient tolerated infusion without incident.  Blood return noted pre and post infusion.  Site patent and intact, free from redness, edema or discomfort.  No evidence of extravasations.  Access discontinued per protocol.       Discharge Plan:   Prescription refills given for oxycodone.  Discharge instructions reviewed with: Patient.  Patient and/or family verbalized understanding of discharge instructions and all questions answered.  Copy of AVS reviewed with patient and/or family.  Patient will return 4/30 for next appointment.  Patient discharged in stable condition accompanied by: self and wife.  Departure Mode: Ambulatory.  Face to Face time: 0.    Cleopatra Harp RN

## 2019-04-09 NOTE — LETTER
"4/9/2019       RE: Hiram Tapia  [address]  [place] [address]     Dear Colleague,    Thank you for referring your patient, Hiram Tapia, to the [facility]. Please see a copy of my visit note below.            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      Background  In brief he has had a lot of problems with his right leg for many years including sciatica for 20 years.  He developed more discomfort in the right thigh and in October 2017 hit his lateral thigh against a truck tailgate causing a lot of pain.  Subsequently there was a fair amount of swelling and stiffness.  This eventually led to some imaging showing a cystic mass.  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  At the time of the biopsy more than a liter of fluid was removed and that markedly improved his symptoms of stiffness and pain.  -  He had a colonoscopy yesterday due to activity seen on PET CTand a 6 and 10 mm polyp was removed from the hepatic flexure and a 15 mm polyp from the splenic flexure.  He needs another in 1 year as one was \"precancerous\".  -        Interval history      He began doxil/ifos 3-23-18.  He has had 4 cycles with suggestion of a response after 1 cycle.  A new PE asymptomatic was noted and he started rivaroxaban in April 2018.  He is off this due to the colonoscopy.     He had preoperative XRT and resection on 9-17-18 <5% viable cells and negative margins with no LVI.     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     About the end of March he noted the onset of intermittent sharp R thigh pain not increased by weight but is increased by knee extnesion or hip flexion.  He is sleeping in a recliner due to that.    Mood is irritable but \"normal\"    He is taking some oxycodone 10 mg that helped the knee.       He is set to start keytruda.    He was found to have a pneumothorax last week 4-3-19 and had a " TALC pleurodesis.  He was discharged 19.  He notes a bit of limitation on really deep inspiration.  Goes up stairs OK. Mild AM cough.    He had been back at work until the PTX.    Aside from this problem his 10 point ROS  unremarkable.      -  Background PMH, FH, SH  Past history is not particular remarkable other than for tonsillectomy and surgery for lazy eye.  His left eye is the dominant eye.    The family history is not particularly remarkable but his father  of leukemia at age 42.    He denies any drug allergies.  He is  and has an adult somewhat autistic boy who lives with them.  He smoked a pack a day for about 10 years, stopping about , and does not abuse alcohol or other drugs.  He works as a  at [employer].  -         On exam he appeared comfortable with normal affect.    /81 (BP Location: Right arm, Patient Position: Sitting, Cuff Size: Adult Regular)   Pulse 110   Temp 98.2  F (36.8  C) (Oral)   Resp 18   Wt 85.2 kg (187 lb 14.4 oz)   SpO2 96%   BMI 26.96 kg/m     HEENT There is no icterus, Lazy eye (L dominant).   NECK:nl thyromegaly or neck mass  CHEST:  chest clear  CV: RRR w no sig murmur  ABD:  no HSMT  LYMPH: no lymphadenopathy  EXT: tr edema bilat  MS: no synovitis of hands, normal ambulation w mild limp  NEURO: Normal mentation, Romberg, cant heel/toe due to ROM limitation and pain  SKIN: no rash  PSYCH: mood fairly good  FOCUSED EXAM; no clear mass or tenderness of R thigh      -  CBC OK LFT, GNE OK.      -  I reviewed the new imaging and there is slow growth of the multiple lung nodules over 2 months.  In addition there is a lytic lesion of the R femur and a soft tissue mass proximal to that.  He did not want to see the images today.      -      He had 4 cycles of chemotherapy and XRT before surgery on 18.     We discussed that given the time interval this does not exclude future treatment with more doxil or ifos, but that keytruda seems more  attractive especially given the relatively slow growth rate.   We went over the typical toxicities of that again.    The port is not working so  We will see if the TPA fixes that.  We discussed in the past the serious nature of the problem and typical life expectancy's.    He actually still does not have a will get and will arrange that.  I suggested a POA as well.  We will give oxycodone for the pain and refer to palliative.  We will refer to Dr Betts to evaluate the lytic lesion and he will avoid wt bearing until then. XRT may be useful here.  We will get a new chest CT today to get a baseline with the lung expanded.  All of his questions were addressed and he will call if he has others.     Gaurang Johnson M.D.  Professor  Hematology, Oncology and Transplantation

## 2019-04-09 NOTE — NURSING NOTE
"Oncology Rooming Note    April 9, 2019 6:55 AM   Hiram Tapia is a 58 year old male who presents for:    Chief Complaint   Patient presents with     Blood Draw     Labs drawn via  by RN in lab. VS taken.     Port Draw     No blood return from port, TPA ordered, infusion nurse informed. Line flushed and hep locked by RN in lab.     Oncology Clinic Visit     Sarcoma, Labs, Tx     Initial Vitals: /81 (BP Location: Right arm, Patient Position: Sitting, Cuff Size: Adult Regular)   Pulse 110   Temp 98.2  F (36.8  C) (Oral)   Resp 18   Wt 85.2 kg (187 lb 14.4 oz)   SpO2 96%   BMI 26.96 kg/m   Estimated body mass index is 26.96 kg/m  as calculated from the following:    Height as of 4/2/19: 1.778 m (5' 10\").    Weight as of this encounter: 85.2 kg (187 lb 14.4 oz). Body surface area is 2.05 meters squared.  Moderate Pain (5) Comment: Data Unavailable   No LMP for male patient.  Allergies reviewed: Yes  Medications reviewed: Yes    Medications: MEDICATION REFILLS NEEDED TODAY. Provider was notified.  Pharmacy name entered into [facility]:    [facility] [place], [place] - [address] [place] [address] [phone]  [facility] [address] [place] [address] [address] AT [facility] [address]  [address]    Clinical concerns: Poss med refill  Elizabeth was notified.      Paty Snyder MA              "

## 2019-04-09 NOTE — PATIENT INSTRUCTIONS
Contact Numbers    [facility] Main Line: [phone]  [facility] Triage and after hours / weekends / holidays:  [phone]      Please call the triage or after hours line if you experience a temperature greater than or equal to 100.5, shaking chills, have uncontrolled nausea, vomiting and/or diarrhea, dizziness, shortness of breath, chest pain, bleeding, unexplained bruising, or if you have any other new/concerning symptoms, questions or concerns.      If you are having any concerning symptoms or wish to speak to a provider before your next infusion visit, please call your care coordinator or triage to notify them so we can adequately serve you.     If you need a refill on a narcotic prescription or other medication, please call before your infusion appointment.           April 2019 Sunday Monday Tuesday Wednesday Thursday Friday Saturday        1     2    CT CHEST ABDOMEN PELVIS WWO   9:00 AM   (20 min.)   CT1   [facility] CT    LAB   9:30 AM   (15 min.)    LAB   [facility] Lab    Admission  10:42 AM   Rosy Valdivia MD   [address] [facility]   (Discharge: 4/5/2019)    XR CHEST PORT 1 VIEW  12:55 PM   (20 min.)   [id number]   [facility] [place],  Radiology 3    XR CHEST 2 VIEWS   7:50 AM   (15 min.)   UUXR1   [facility] [place],  Radiology 4    XR CHEST 2 VIEWS   8:15 AM   (15 min.)   UUXR1   [facility] [place],  Radiology 5    XR CHEST 2 VIEWS   7:40 AM   (15 min.)   UUXR1   [facility],  Radiology    XR CHEST 2 VIEWS  10:35 AM   (15 min.)   UUXR1   [facility],  Radiology 6       7     8     9    [facility] LAB DRAW   6:15 AM   (15 min.)    [facility] LAB DRAW   [facility] Lab Draw    [facility] RETURN   6:45 AM   (30 min.)   Gaurang Johnson MD   [facility] ONC INFUSION 60   7:30 AM   (60 min.)    ONCOLOGY INFUSION   [facility] NEW   2:15 PM   (30 min.)   Igor Elizondo MD   [facility] Medicine    CT CHEST WO   8:00 PM   (20 min.)    UCCT2   [facility] CT 10     11     12     13       14     15     16     17    [facility] NEW   2:30 PM   (75 min.)   Marcelo Sanchez MD   [facility] 18     19     20       21     22     23     24     25     26     27       28     29     30    [facility] DRAW  12:45 PM   (15 min.)   [facility] DRAW   [facility] Lab Draw    [facility] RETURN   1:05 PM   (50 min.)   Ignacio Ramirez PA   [facility]    [facility] ONC INFUSION 60   2:30 PM   (60 min.)   [facility] ONCOLOGY INFUSION   [facility]                                 May 2019      Tyler Monday Tuesday Wednesday Thursday Friday Saturday                  1     2     3     4       5     6     7     8     9     10     11       12     13     14     15     16     17     18       19     20     21    [facility] DRAW  10:00 AM   (15 min.)    [facility] LAB DRAW   [facility] Lab Draw    [facility] RETURN  10:45 AM   (30 min.)   Gaurang Johnson MD   [facility]    [facility] ONC INFUSION 60  11:30 AM   (60 min.)   [facility] ONCOLOGY INFUSION   [facility] 22     23     24     25       26     27     28     29     30     31                         Recent Results (from the past 24 hour(s))   Phosphorus    Collection Time: 04/09/19  6:34 AM   Result Value Ref Range    Phosphorus 2.8 2.5 - 4.5 mg/dL   Magnesium    Collection Time: 04/09/19  6:34 AM   Result Value Ref Range    Magnesium 2.2 1.6 - 2.3 mg/dL   Comprehensive metabolic panel    Collection Time: 04/09/19  6:34 AM   Result Value Ref Range    Sodium 134 133 - 144 mmol/L    Potassium 3.8 3.4 - 5.3 mmol/L    Chloride 103 94 - 109 mmol/L    Carbon Dioxide 24 20 - 32 mmol/L    Anion Gap 8 3 - 14 mmol/L    Glucose 100 (H) 70 - 99 mg/dL    Urea Nitrogen 14 7 - 30 mg/dL    Creatinine 0.78 0.66 - 1.25 mg/dL    GFR Estimate >90 >60 mL/min/[1.73_m2]    GFR Estimate If Black >90 >60 mL/min/[1.73_m2]    Calcium 9.9 8.5  - 10.1 mg/dL    Bilirubin Total 0.4 0.2 - 1.3 mg/dL    Albumin 3.2 (L) 3.4 - 5.0 g/dL    Protein Total 7.4 6.8 - 8.8 g/dL    Alkaline Phosphatase 131 40 - 150 U/L    ALT 28 0 - 70 U/L    AST 25 0 - 45 U/L   *CBC with platelets differential    Collection Time: 04/09/19  6:34 AM   Result Value Ref Range    WBC 6.6 4.0 - 11.0 10e9/L    RBC Count 5.03 4.4 - 5.9 10e12/L    Hemoglobin 13.8 13.3 - 17.7 g/dL    Hematocrit 42.2 40.0 - 53.0 %    MCV 84 78 - 100 fl    MCH 27.4 26.5 - 33.0 pg    MCHC 32.7 31.5 - 36.5 g/dL    RDW 14.2 10.0 - 15.0 %    Platelet Count 322 150 - 450 10e9/L    Diff Method Automated Method     % Neutrophils 65.5 %    % Lymphocytes 22.2 %    % Monocytes 10.0 %    % Eosinophils 1.5 %    % Basophils 0.5 %    % Immature Granulocytes 0.3 %    Nucleated RBCs 0 0 /100    Absolute Neutrophil 4.3 1.6 - 8.3 10e9/L    Absolute Lymphocytes 1.5 0.8 - 5.3 10e9/L    Absolute Monocytes 0.7 0.0 - 1.3 10e9/L    Absolute Eosinophils 0.1 0.0 - 0.7 10e9/L    Absolute Basophils 0.0 0.0 - 0.2 10e9/L    Abs Immature Granulocytes 0.0 0 - 0.4 10e9/L    Absolute Nucleated RBC 0.0    TSH with free T4 reflex    Collection Time: 04/09/19  6:34 AM   Result Value Ref Range    TSH 1.88 0.40 - 4.00 mU/L

## 2019-04-09 NOTE — PROGRESS NOTES
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      Background  In brief he has had a lot of problems with his right leg for many years including sciatica for 20 years.  He developed more discomfort in the right thigh and in October 2017 hit his lateral thigh against a truck tailgate causing a lot of pain.  Subsequently there was a fair amount of swelling and stiffness.  This eventually led to some imaging showing a cystic mass.  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  At the time of the biopsy more than a liter of fluid was removed and that markedly improved his symptoms of stiffness and pain.  -  He had a colonoscopy yesterday due to activity seen on PET CTand a 6 and 10 mm polyp was removed from the hepatic flexure and a 15 mm polyp from the splenic flexure.  He needs another in 1 year as one was \"precancerous\".  -        Interval history      He began doxil/ifos 3-23-18.  He has had 4 cycles with suggestion of a response after 1 cycle.  A new PE asymptomatic was noted and he started rivaroxaban in April 2018.  He is off this due to the colonoscopy.     He had preoperative XRT and resection on 9-17-18 <5% viable cells and negative margins with no LVI.     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     About the end of March he noted the onset of intermittent sharp R thigh pain not increased by weight but is increased by knee extnesion or hip flexion.  He is sleeping in a recliner due to that.    Mood is irritable but \"normal\"    He is taking some oxycodone 10 mg that helped the knee.       He is set to start keytruda.    He was found to have a pneumothorax last week 4-3-19 and had a TALC pleurodesis.  He was discharged 4-5-19.  He notes a bit of limitation on really deep inspiration.  Goes up stairs OK. Mild AM cough.    He had been back at work until the PTX.    Aside from this problem his 10 point ROS  unremarkable. "      -  Background PMH, FH, SH  Past history is not particular remarkable other than for tonsillectomy and surgery for lazy eye.  His left eye is the dominant eye.    The family history is not particularly remarkable but his father  of leukemia at age 42.    He denies any drug allergies.  He is  and has an adult somewhat autistic boy who lives with them.  He smoked a pack a day for about 10 years, stopping about , and does not abuse alcohol or other drugs.  He works as a  at [employer].  -         On exam he appeared comfortable with normal affect.    /81 (BP Location: Right arm, Patient Position: Sitting, Cuff Size: Adult Regular)   Pulse 110   Temp 98.2  F (36.8  C) (Oral)   Resp 18   Wt 85.2 kg (187 lb 14.4 oz)   SpO2 96%   BMI 26.96 kg/m    HEENT There is no icterus, Lazy eye (L dominant).   NECK:nl thyromegaly or neck mass  CHEST:  chest clear  CV: RRR w no sig murmur  ABD:  no HSMT  LYMPH: no lymphadenopathy  EXT: tr edema bilat  MS: no synovitis of hands, normal ambulation w mild limp  NEURO: Normal mentation, Romberg, cant heel/toe due to ROM limitation and pain  SKIN: no rash  PSYCH: mood fairly good  FOCUSED EXAM; no clear mass or tenderness of R thigh      -  CBC OK LFT, GNE OK.      -  I reviewed the new imaging and there is slow growth of the multiple lung nodules over 2 months.  In addition there is a lytic lesion of the R femur and a soft tissue mass proximal to that.  He did not want to see the images today.      -      He had 4 cycles of chemotherapy and XRT before surgery on 18.     We discussed that given the time interval this does not exclude future treatment with more doxil or ifos, but that keytruda seems more attractive especially given the relatively slow growth rate.   We went over the typical toxicities of that again.    The port is not working so  We will see if the TPA fixes that.  We discussed in the past the serious nature of the problem  and typical life expectancy's.    He actually still does not have a will get and will arrange that.  I suggested a POA as well.  We will give oxycodone for the pain and refer to palliative.  We will refer to Dr Betts to evaluate the lytic lesion and he will avoid wt bearing until then. XRT may be useful here.  We will get a new chest CT today to get a baseline with the lung expanded.  All of his questions were addressed and he will call if he has others.     Gaurang Johnson M.D.  Professor  Hematology, Oncology and Transplantation

## 2019-04-09 NOTE — NURSING NOTE
Chief Complaint   Patient presents with     Blood Draw     Labs drawn via  by RN in lab. VS taken.     Port Draw     No blood return from port, TPA ordered, infusion nurse informed. Line flushed and hep locked by RN in lab.     Sarahi Trujillo RN

## 2019-04-10 ENCOUNTER — HOSPITAL ENCOUNTER (INPATIENT)
Facility: CLINIC | Age: 59
Setting detail: SURGERY ADMIT
End: 2019-04-10
Attending: ORTHOPAEDIC SURGERY | Admitting: ORTHOPAEDIC SURGERY
Payer: COMMERCIAL

## 2019-04-11 ENCOUNTER — CARE COORDINATION (OUTPATIENT)
Dept: ONCOLOGY | Facility: CLINIC | Age: 59
End: 2019-04-11

## 2019-04-11 NOTE — PROGRESS NOTES
Spoke with David and Lea to let them know that Dr.Maddy Calderon has seen his scans and recommends that he go to the ER to get the pneumothorax looked at and potentially get a pigtail placed.  He is not having any shortness of breath, chest pain, or any other pain.   Also spoke with  who stated that he could wait until tomorrow due to the weather.  He will not be able to have his surgery on Monday unless he gets the pneumothorax gets taken care of.  Both Lea and David agreed with and verbalized understanding of the plan of care.

## 2019-04-12 ENCOUNTER — ANESTHESIA EVENT (OUTPATIENT)
Dept: MEDSURG UNIT | Facility: CLINIC | Age: 59
End: 2019-04-12

## 2019-04-12 ENCOUNTER — APPOINTMENT (OUTPATIENT)
Dept: GENERAL RADIOLOGY | Facility: CLINIC | Age: 59
DRG: 982 | End: 2019-04-12
Attending: EMERGENCY MEDICINE
Payer: COMMERCIAL

## 2019-04-12 ENCOUNTER — APPOINTMENT (OUTPATIENT)
Dept: INTERVENTIONAL RADIOLOGY/VASCULAR | Facility: CLINIC | Age: 59
DRG: 982 | End: 2019-04-12
Attending: PHYSICIAN ASSISTANT
Payer: COMMERCIAL

## 2019-04-12 ENCOUNTER — HOSPITAL ENCOUNTER (INPATIENT)
Facility: CLINIC | Age: 59
LOS: 7 days | Discharge: HOME | DRG: 982 | End: 2019-04-19
Attending: EMERGENCY MEDICINE | Admitting: INTERNAL MEDICINE
Payer: COMMERCIAL

## 2019-04-12 DIAGNOSIS — C49.9 SARCOMA (H): ICD-10-CM

## 2019-04-12 DIAGNOSIS — Z87.891 PERSONAL HISTORY OF TOBACCO USE, PRESENTING HAZARDS TO HEALTH: ICD-10-CM

## 2019-04-12 DIAGNOSIS — D21.9 BENIGN NEOPLASM OF SOFT TISSUES: ICD-10-CM

## 2019-04-12 DIAGNOSIS — C78.02 SECONDARY MALIGNANT NEOPLASM OF LEFT LUNG (H): ICD-10-CM

## 2019-04-12 DIAGNOSIS — M89.9 LYTIC BONE LESION OF RIGHT FEMUR: ICD-10-CM

## 2019-04-12 DIAGNOSIS — C78.01 SECONDARY MALIGNANT NEOPLASM OF RIGHT LUNG (H): ICD-10-CM

## 2019-04-12 DIAGNOSIS — C49.9 SARCOMA OF SOFT TISSUE (H): Primary | ICD-10-CM

## 2019-04-12 DIAGNOSIS — J93.9 PNEUMOTHORAX ON RIGHT: ICD-10-CM

## 2019-04-12 LAB
ALBUMIN SERPL-MCNC: 3.1 G/DL (ref 3.4–5)
ALP SERPL-CCNC: 123 U/L (ref 40–150)
ALT SERPL W P-5'-P-CCNC: 27 U/L (ref 0–70)
ANION GAP SERPL CALCULATED.3IONS-SCNC: 8 MMOL/L (ref 3–14)
AST SERPL W P-5'-P-CCNC: 16 U/L (ref 0–45)
BASOPHILS # BLD AUTO: 0 10E9/L (ref 0–0.2)
BASOPHILS NFR BLD AUTO: 0.5 %
BILIRUB SERPL-MCNC: 0.4 MG/DL (ref 0.2–1.3)
BUN SERPL-MCNC: 14 MG/DL (ref 7–30)
CALCIUM SERPL-MCNC: 9.5 MG/DL (ref 8.5–10.1)
CHLORIDE SERPL-SCNC: 101 MMOL/L (ref 94–109)
CO2 SERPL-SCNC: 26 MMOL/L (ref 20–32)
CREAT SERPL-MCNC: 0.87 MG/DL (ref 0.66–1.25)
DIFFERENTIAL METHOD BLD: NORMAL
EOSINOPHIL # BLD AUTO: 0.1 10E9/L (ref 0–0.7)
EOSINOPHIL NFR BLD AUTO: 1.2 %
ERYTHROCYTE [DISTWIDTH] IN BLOOD BY AUTOMATED COUNT: 13.9 % (ref 10–15)
GFR SERPL CREATININE-BSD FRML MDRD: >90 ML/MIN/{1.73_M2}
GLUCOSE SERPL-MCNC: 95 MG/DL (ref 70–99)
HCT VFR BLD AUTO: 43.5 % (ref 40–53)
HGB BLD-MCNC: 13.8 G/DL (ref 13.3–17.7)
IMM GRANULOCYTES # BLD: 0 10E9/L (ref 0–0.4)
IMM GRANULOCYTES NFR BLD: 0.2 %
INR PPP: 1.11 (ref 0.86–1.14)
LYMPHOCYTES # BLD AUTO: 1.2 10E9/L (ref 0.8–5.3)
LYMPHOCYTES NFR BLD AUTO: 19.2 %
MCH RBC QN AUTO: 27.5 PG (ref 26.5–33)
MCHC RBC AUTO-ENTMCNC: 31.7 G/DL (ref 31.5–36.5)
MCV RBC AUTO: 87 FL (ref 78–100)
MONOCYTES # BLD AUTO: 0.7 10E9/L (ref 0–1.3)
MONOCYTES NFR BLD AUTO: 10.4 %
NEUTROPHILS # BLD AUTO: 4.4 10E9/L (ref 1.6–8.3)
NEUTROPHILS NFR BLD AUTO: 68.5 %
NRBC # BLD AUTO: 0 10*3/UL
NRBC BLD AUTO-RTO: 0 /100
PLATELET # BLD AUTO: 413 10E9/L (ref 150–450)
POTASSIUM SERPL-SCNC: 4 MMOL/L (ref 3.4–5.3)
PROT SERPL-MCNC: 7.3 G/DL (ref 6.8–8.8)
RBC # BLD AUTO: 5.02 10E12/L (ref 4.4–5.9)
SODIUM SERPL-SCNC: 135 MMOL/L (ref 133–144)
WBC # BLD AUTO: 6.5 10E9/L (ref 4–11)

## 2019-04-12 PROCEDURE — 25000128 H RX IP 250 OP 636: Performed by: PHYSICIAN ASSISTANT

## 2019-04-12 PROCEDURE — 25000125 ZZHC RX 250: Performed by: PHYSICIAN ASSISTANT

## 2019-04-12 PROCEDURE — 27210886 ZZH ACCESSORY CR5

## 2019-04-12 PROCEDURE — 99285 EMERGENCY DEPT VISIT HI MDM: CPT | Mod: 25 | Performed by: EMERGENCY MEDICINE

## 2019-04-12 PROCEDURE — 99285 EMERGENCY DEPT VISIT HI MDM: CPT | Mod: Z6 | Performed by: EMERGENCY MEDICINE

## 2019-04-12 PROCEDURE — 80053 COMPREHEN METABOLIC PANEL: CPT | Performed by: EMERGENCY MEDICINE

## 2019-04-12 PROCEDURE — 85610 PROTHROMBIN TIME: CPT | Performed by: EMERGENCY MEDICINE

## 2019-04-12 PROCEDURE — 32557 INSERT CATH PLEURA W/ IMAGE: CPT

## 2019-04-12 PROCEDURE — 27210738 ZZH ACCESSORY CR2

## 2019-04-12 PROCEDURE — 71046 X-RAY EXAM CHEST 2 VIEWS: CPT

## 2019-04-12 PROCEDURE — C1729 CATH, DRAINAGE: HCPCS

## 2019-04-12 PROCEDURE — 0W9930Z DRAINAGE OF RIGHT PLEURAL CAVITY WITH DRAINAGE DEVICE, PERCUTANEOUS APPROACH: ICD-10-PCS | Performed by: RADIOLOGY

## 2019-04-12 PROCEDURE — 27211039 ZZH NEEDLE CR2

## 2019-04-12 PROCEDURE — 25000132 ZZH RX MED GY IP 250 OP 250 PS 637: Performed by: INTERNAL MEDICINE

## 2019-04-12 PROCEDURE — C1769 GUIDE WIRE: HCPCS

## 2019-04-12 PROCEDURE — 27210903 ZZH KIT CR5

## 2019-04-12 PROCEDURE — 27210732 ZZH ACCESSORY CR1

## 2019-04-12 PROCEDURE — 85025 COMPLETE CBC W/AUTO DIFF WBC: CPT | Performed by: EMERGENCY MEDICINE

## 2019-04-12 PROCEDURE — 12000001 ZZH R&B MED SURG/OB UMMC

## 2019-04-12 RX ORDER — NALOXONE HYDROCHLORIDE 0.4 MG/ML
.1-.4 INJECTION, SOLUTION INTRAMUSCULAR; INTRAVENOUS; SUBCUTANEOUS
Status: DISCONTINUED | OUTPATIENT
Start: 2019-04-12 | End: 2019-04-12 | Stop reason: HOSPADM

## 2019-04-12 RX ORDER — NALOXONE HYDROCHLORIDE 0.4 MG/ML
.1-.4 INJECTION, SOLUTION INTRAMUSCULAR; INTRAVENOUS; SUBCUTANEOUS
Status: DISCONTINUED | OUTPATIENT
Start: 2019-04-12 | End: 2019-04-16

## 2019-04-12 RX ORDER — ONDANSETRON 8 MG/1
8 TABLET, ORALLY DISINTEGRATING ORAL EVERY 8 HOURS PRN
Status: DISCONTINUED | OUTPATIENT
Start: 2019-04-12 | End: 2019-04-12

## 2019-04-12 RX ORDER — POLYETHYLENE GLYCOL 3350 17 G/17G
17 POWDER, FOR SOLUTION ORAL DAILY PRN
Status: DISCONTINUED | OUTPATIENT
Start: 2019-04-12 | End: 2019-04-19 | Stop reason: HOSPADM

## 2019-04-12 RX ORDER — OXYCODONE HYDROCHLORIDE 5 MG/1
10-15 TABLET ORAL EVERY 4 HOURS PRN
Status: DISCONTINUED | OUTPATIENT
Start: 2019-04-12 | End: 2019-04-16

## 2019-04-12 RX ORDER — FENTANYL CITRATE 50 UG/ML
25-50 INJECTION, SOLUTION INTRAMUSCULAR; INTRAVENOUS EVERY 5 MIN PRN
Status: DISCONTINUED | OUTPATIENT
Start: 2019-04-12 | End: 2019-04-12 | Stop reason: HOSPADM

## 2019-04-12 RX ORDER — ONDANSETRON 8 MG/1
8 TABLET, FILM COATED ORAL EVERY 8 HOURS PRN
Status: DISCONTINUED | OUTPATIENT
Start: 2019-04-12 | End: 2019-04-12

## 2019-04-12 RX ORDER — ACETAMINOPHEN 325 MG/1
650 TABLET ORAL EVERY 4 HOURS PRN
Status: DISCONTINUED | OUTPATIENT
Start: 2019-04-12 | End: 2019-04-19 | Stop reason: HOSPADM

## 2019-04-12 RX ORDER — DOCUSATE SODIUM 100 MG/1
100 CAPSULE, LIQUID FILLED ORAL 2 TIMES DAILY PRN
Status: DISCONTINUED | OUTPATIENT
Start: 2019-04-12 | End: 2019-04-12

## 2019-04-12 RX ORDER — HYDROMORPHONE HYDROCHLORIDE 1 MG/ML
.3-.5 INJECTION, SOLUTION INTRAMUSCULAR; INTRAVENOUS; SUBCUTANEOUS
Status: DISCONTINUED | OUTPATIENT
Start: 2019-04-12 | End: 2019-04-16

## 2019-04-12 RX ORDER — DOCUSATE SODIUM 100 MG/1
100 CAPSULE, LIQUID FILLED ORAL 2 TIMES DAILY PRN
COMMUNITY
End: 2019-01-01

## 2019-04-12 RX ORDER — SENNOSIDES 8.6 MG
8.6 TABLET ORAL 2 TIMES DAILY PRN
Status: DISCONTINUED | OUTPATIENT
Start: 2019-04-12 | End: 2019-04-18

## 2019-04-12 RX ORDER — DOCUSATE SODIUM 100 MG/1
100-200 CAPSULE, LIQUID FILLED ORAL 2 TIMES DAILY PRN
Status: DISCONTINUED | OUTPATIENT
Start: 2019-04-12 | End: 2019-04-19 | Stop reason: HOSPADM

## 2019-04-12 RX ORDER — PROCHLORPERAZINE MALEATE 5 MG
5-10 TABLET ORAL EVERY 6 HOURS PRN
Status: DISCONTINUED | OUTPATIENT
Start: 2019-04-12 | End: 2019-04-12

## 2019-04-12 RX ORDER — OXYCODONE HYDROCHLORIDE 5 MG/1
10-20 CAPSULE ORAL EVERY 4 HOURS PRN
Status: DISCONTINUED | OUTPATIENT
Start: 2019-04-12 | End: 2019-04-12

## 2019-04-12 RX ORDER — CEFAZOLIN SODIUM 2 G/100ML
2 INJECTION, SOLUTION INTRAVENOUS
Status: CANCELLED | OUTPATIENT
Start: 2019-04-15

## 2019-04-12 RX ORDER — ONDANSETRON 2 MG/ML
8 INJECTION INTRAMUSCULAR; INTRAVENOUS EVERY 8 HOURS PRN
Status: DISCONTINUED | OUTPATIENT
Start: 2019-04-12 | End: 2019-04-12

## 2019-04-12 RX ADMIN — LIDOCAINE HYDROCHLORIDE 30 ML: 10 INJECTION, SOLUTION EPIDURAL; INFILTRATION; INTRACAUDAL; PERINEURAL at 15:41

## 2019-04-12 RX ADMIN — OXYCODONE HYDROCHLORIDE 5 MG: 5 TABLET ORAL at 21:07

## 2019-04-12 RX ADMIN — OXYCODONE HYDROCHLORIDE 10 MG: 5 TABLET ORAL at 16:57

## 2019-04-12 RX ADMIN — DOCUSATE SODIUM 200 MG: 100 CAPSULE, LIQUID FILLED ORAL at 16:59

## 2019-04-12 RX ADMIN — FENTANYL CITRATE 150 MCG: 50 INJECTION, SOLUTION INTRAMUSCULAR; INTRAVENOUS at 15:41

## 2019-04-12 RX ADMIN — DOCUSATE SODIUM 200 MG: 100 CAPSULE, LIQUID FILLED ORAL at 21:03

## 2019-04-12 RX ADMIN — OXYCODONE HYDROCHLORIDE 10 MG: 5 TABLET ORAL at 21:03

## 2019-04-12 ASSESSMENT — ACTIVITIES OF DAILY LIVING (ADL): ADLS_ACUITY_SCORE: 21

## 2019-04-12 ASSESSMENT — ENCOUNTER SYMPTOMS
COUGH: 1
NAUSEA: 0
FEVER: 0
VOMITING: 0
SHORTNESS OF BREATH: 0

## 2019-04-12 NOTE — PROGRESS NOTES
Interventional Radiology Pre-Procedure Sedation Assessment   Time of Assessment: 2:57 PM    Expected Level: Minimal Sedation    Indication: Sedation is required for the following type of Procedure: Drain    Sedation and procedural consent: Risks, benefits and alternatives were discussed with Patient    PO Intake: Appropriately NPO for procedure    ASA Class: Class 2 - MILD SYSTEMIC DISEASE, NO ACUTE PROBLEMS, NO FUNCTIONAL LIMITATIONS.    Mallampati: Grade 2:  Soft palate, base of uvula, tonsillar pillars, and portion of posterior pharyngeal wall visible    Lungs: RIGHT BS ABSENT, LEFT NL    Heart: Normal heart sounds and rate    History and physical reviewed and no updates needed. I have reviewed the lab findings, diagnostic data, medications, and the plan for sedation. I have determined this patient to be an appropriate candidate for the planned sedation and procedure and have reassessed the patient IMMEDIATELY PRIOR to sedation and procedure.    Jacky Dumont PA-C

## 2019-04-12 NOTE — CONSULTS
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 Dr. Bowie recommended admission prior to planned orthopedic procedure on 4/15 which will require intubation and the positive pressure will likely worsen pneumothorax so IR has been consulted for right apical chest tube placement.    Patient is on IR schedule this afternoon for a CT-guided right apical chest tube placement. Labs WNL for procedure. Keep NPO. Consent will be done prior to procedure.     Discussed with SENAIT Velazquez from cardiothoracic and IR attending Dr. Whitley.    Luis Florez PA-C  Interventional Radiology  [phone]

## 2019-04-12 NOTE — PROCEDURES
Interventional Radiology Brief Post Procedure Note    Procedure: CT CHEST TUBE WITH CATH PLACEMENT    Proceduralist: Jayda Francis MD    Assistant: Nely Allison RT(R)    Time Out: Prior to the start of the procedure and with procedural staff participation, I verbally confirmed the patient s identity using two indicators, relevant allergies, that the procedure was appropriate and matched the consent or emergent situation, and that the correct equipment/implants were available. Immediately prior to starting the procedure I conducted the Time Out with the procedural staff and re-confirmed the patient s name, procedure, and site/side. (The Joint Commission universal protocol was followed.)  Yes    Sedation: IR Nurse Monitored Care   Post Procedure Summary:  Prior to the start of the procedure and with procedural staff participation, I verbally confirmed the patient s identity using two indicators, relevant allergies, that the procedure was appropriate and matched the consent or emergent situation, and that the correct equipment/implants were available. Immediately prior to starting the procedure I conducted the Time Out with the procedural staff and re-confirmed the patient s name, procedure, and site/side. (The Joint Commission universal protocol was followed.)  Yes       Sedatives: Fentanyl    Vital signs, airway and pulse oximetry were monitored and remained stable throughout the procedure and sedation was maintained until the procedure was complete.  The patient was monitored by staff until sedation discharge criteria were met.    Patient tolerance: Patient tolerated the procedure well with no immediate complications.    Time of sedation in minutes: 30 minutes from beginning to end of physician one to one monitoring.    Findings: Completed CT-guided 14 Fr right apical chest tube placement. Return of air.     Estimated Blood Loss: Minimal    Fluoroscopy Time: N/a    Specimens: None    Complications: 1. None      Condition: Stable    Plan: Chest tube to suction under water-seal. Follow-up per primary team.    Comments: See dictated procedure note for full details.    Luis Florez PA-C  Interventional Radiology  [phone]

## 2019-04-12 NOTE — PROGRESS NOTES
Nursing Focus: Admission  D: Arrived at 1600 from IR via cart. Patient accompanied by wife. Admitted for new chest tube placement.     I: Admission process began. Patient oriented to room, enviroment, call light. MD aware of patient's arrival on unit.     A: Vital signs stable, afebrile. Patient stable at this time. Reports pain at new chest tube site, PRN Oxy given.      P: Implement plan of care when available. Continue to monitor patient. Nursing interventions as appropriate. Notify MD with changes in pt status.

## 2019-04-12 NOTE — PROGRESS NOTES
Patient Name: Hiram Tapia  Medical Record Number: [id number]  Today's Date: 4/12/2019    Procedure: Right chest tube placement  Proceduralist: Dr. Francis    Sedation start time: 1500  Sedation medications administered: Fentanyl 150 mcg    Procedure start time: 1515    Report given to: 7D RN    Other Notes: Pt arrived to CT 2  from ED. Consent reviewed. Pt denies any questions or concerns regarding procedure. Pt requesting some pain meds, mostly for his right thigh pain from lying in CT scanner.Pt positioned supine and monitored per protocol. Pt tolerated procedure without any noted complications. Pt transferred to 7D post procedure.    Javon CONNER

## 2019-04-12 NOTE — ED PROVIDER NOTES
[facility] ([facility])  April 12, 2019    History     Chief Complaint   Patient presents with     Cough     The history is provided by the patient and medical records.     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  Patient reports that he was told to come to ER due to his recent chest CT on 4/9/19 and upcoming procedure on 4/15/19 for R femur gamma nail.  Patient states that his cough has improved from a week ago and denies shortness of breath, fevers, nausea, or vomiting.  He states he feels fine.     Per chart review, patient was hospitalized from 4/2-4/5/19 for R pneumothorax, and Thoracic surgery placed a pigtail chest tube. Post chest x-ray showed evidence of re-expansion, so TALC pleurodesis on 4/3 and chest tube was removed on 4/5.  Patient also just had cycle 1 day 1 of keytruda on 4/9/19. He also had a chest CT which showed decreased but continued moderate R pneumothorax, new small right pleural effusion with overlying atelectasis, and bilateral pulmonary nodules/masses.      EXAMINATION: Chest CT  4/9/2019 1:00 PM   IMPRESSION:   1. Status post right pleurodesis. Decreased but continued moderate  right pneumothorax. Resolved trace left pneumothorax.  2. Bilateral pulmonary nodules/masses.  3. New small right pleural effusion with overlying  Atelectasis/consolidation.    PAST MEDICAL HISTORY  Past Medical History:   Diagnosis Date     Pulmonary embolism (H)      Sarcoma (H)      PAST SURGICAL HISTORY  Past Surgical History:   Procedure Laterality Date     EXCISE SOFT TISSUE TUMOR THIGH Right 3/8/2018    Procedure: EXCISE SOFT TISSUE TUMOR THIGH;  Biopsy Right Thigh Tumor;  Surgeon: Brad Betts MD;  Location: [facility] OR     EXCISE SOFT TISSUE TUMOR THIGH Right 9/17/2018    Procedure: EXCISE  "SOFT TISSUE TUMOR THIGH;  Removal Right Thigh Tumor ;  Surgeon: Brad Betts MD;  Location: [facility] OR     INSERT PORT VASCULAR ACCESS N/A 3/28/2018    Procedure: INSERT PORT VASCULAR ACCESS;  Vascular Access Port Insertion with C-arm;  Surgeon: Sarah Bowie MD;  Location: [facility] OR     IRRIGATION AND DEBRIDEMENT LOWER EXTREMITY, COMBINED Right 2018    Procedure: COMBINED IRRIGATION AND DEBRIDEMENT LOWER EXTREMITY;  Irrigation And Debridement Right Thigh ;  Surgeon: Brad Betts MD;  Location: [facility] OR     IRRIGATION AND DEBRIDEMENT LOWER EXTREMITY, COMBINED Right 2018    Procedure: COMBINED IRRIGATION AND DEBRIDEMENT LOWER EXTREMITY;  Irrigation And Debridement Right Thigh Wound. with Wound VAC Exchange;  Surgeon: Brad Betts MD;  Location: [facility] OR     STRABISMUS SURGERY      as a child     THORACOSCOPIC WEDGE RESECTION LUNG Right 2018    Procedure: Right Video Assisted Thoracoscopic Wedge Resection;  Surgeon: Sarah Bowie MD;  Location: [facility] OR     FAMILY HISTORY  Family History   Problem Relation Age of Onset     Leukemia Father      SOCIAL HISTORY  Social History     Tobacco Use     Smoking status: Former Smoker     Packs/day: 1.00     Years: 10.00     Pack years: 10.00     Types: Cigarettes     Start date: 1975     Last attempt to quit: 1990     Years since quittin.2     Smokeless tobacco: Never Used   Substance Use Topics     Alcohol use: Yes     Alcohol/week: 8.4 oz     Comment: 1 beer a day      MEDICATIONS  No current facility-administered medications for this encounter.      Current Outpatient Medications   Medication     docusate sodium (COLACE) 100 MG capsule     oxyCODONE (OXY-IR) 5 MG capsule     ALLERGIES  Allergies   Allergen Reactions     Hydrofera Blue 4\"X4\" [Wound Dressings] Dermatitis and Blisters     Patient reports that Dressing causes skin irritation, blisters, and drainage.      Tegaderm Ag Mesh [Silver] Dermatitis and Blisters     Patient reports " "that Dressing causes Skin irritation, blisters, and drainage.       I have reviewed the Medications, Allergies, Past Medical and Surgical History, and Social History in the Epic system.    Review of Systems   Constitutional: Negative for fever.   Respiratory: Positive for cough (improved). Negative for shortness of breath.    Gastrointestinal: Negative for nausea and vomiting.   All other systems reviewed and are negative.      Physical Exam   BP: 132/87  Pulse: 107  Temp: 98.2  F (36.8  C)  Resp: 16  Height: 177.8 cm (5' 10\")  SpO2: 98 %      Physical Exam   Constitutional: He is oriented to person, place, and time. He appears well-developed and well-nourished.   HENT:   Head: Normocephalic and atraumatic.   Neck: Normal range of motion. Neck supple.   Cardiovascular: Normal rate, regular rhythm and normal heart sounds.   Pulmonary/Chest: Effort normal. No respiratory distress. He has no wheezes.   Decreased breath sounds right lower lobe.  Sats 98% on room air.  No increased work of breathing.  No conversational dyspnea.   Abdominal: Soft. He exhibits no distension. There is no tenderness. There is no rebound.   Neurological: He is alert and oriented to person, place, and time.   Skin: Skin is warm.   Psychiatric: He has a normal mood and affect. His behavior is normal. Thought content normal.       ED Course        Procedures   10:24 AM  The patient was seen and examined by Dr. Wolfe in Room 20.                Critical Care time:  none         Results for orders placed or performed during the hospital encounter of 04/12/19   XR Chest 2 Views    Narrative    EXAMINATION: XR CHEST 2 VW, 4/12/2019 11:18 AM    INDICATION: evaluate right sided pneumothorax.    COMPARISON: CT for comparison 4/9/2018, plain film 4/5/2018.    FINDINGS: PA and lateral upright views of the chest. Right Port-A-Cath  tip projects over the cavoatrial junction. Trachea is midline. The  cardiomediastinal silhouette is within normals. Small " right pleural  effusion. Bibasilar atelectasis. Right lower lobe fibroatelectasis.  Prominent pulmonary vasculature. Interstitial opacities. Unchanged  bilateral pulmonary nodules. Slight reduction in right pneumothorax.     Upper abdomen is unremarkable.       Impression    IMPRESSION:   1. Trace right pleural effusion. Resolution of left pleural effusion.  Mild bibasilar atelectasis. Platelike atelectasis in the middle right  lobe.  2. Small reduction in right pneumothorax.   3. Unchanged pulmonary nodules/masses.    I have personally reviewed the examination and initial interpretation  and I agree with the findings.    ROBERTA LAMB MD   CT Chest Tube with Cath Placement    Narrative    PROCEDURES 4/12/2019:  1. CT guided right chest tube placement for recurrent pneumothorax.     Clinical History: Sarcoma, status post previous chest tube for large  pneumothorax. Patient presented to the emergency room today was noted  to have a small to moderate-sized pneumothorax that had increased in  size compared to previous imaging. Replacement of chest tube  requested..    Comparisons: Radiograph 4/12/2019, CT 4/9/2019    Staff Radiologist: Jayda Francis M.D., interventional radiology  staff. I, Jayda Francis, performed the entire procedure.    Medications: The patient was placed on continuous monitoring. No  sedation administered. The patient remained stable throughout the  procedure.    Dose: 501 mGy*cm.    PROCEDURE: The patient understood the limitations, alternatives, and  risks of the procedure and requested the procedure be performed. Both  written and oral consent were obtained.    Targeted pre-procedural scan performed demonstrated small residual  pneumothorax anteriorly.    The right anterior chest was prepped and draped in the usual sterile  fashion. 1% lidocaine was used for local anesthesia.     Under CT guidance, a 5 Turkish MailLift centesis needle was advanced into  the residual pneumothorax via a  inferior approach due to the presence  of the right chest port. Air was easily aspirated. Wire coiled in  pleural space, confirmed with CT. Tract serially dilated, and a 14  Burkinan Flexima nonlocking catheter was advanced over the wire  positioned in the apical pleural space. Tube noted to be well  functioning. Tube secured with Ethilon suture, attached to Pleur-evac  chamber and sterile dressing applied.     No immediate competition.      Impression    IMPRESSION:   CT-guided chest tube placement. There is a 14 Burkinan nonlocking chest  tube in place.    RITESH DEWITT MD   CBC with platelets differential   Result Value Ref Range    WBC 6.5 4.0 - 11.0 10e9/L    RBC Count 5.02 4.4 - 5.9 10e12/L    Hemoglobin 13.8 13.3 - 17.7 g/dL    Hematocrit 43.5 40.0 - 53.0 %    MCV 87 78 - 100 fl    MCH 27.5 26.5 - 33.0 pg    MCHC 31.7 31.5 - 36.5 g/dL    RDW 13.9 10.0 - 15.0 %    Platelet Count 413 150 - 450 10e9/L    Diff Method Automated Method     % Neutrophils 68.5 %    % Lymphocytes 19.2 %    % Monocytes 10.4 %    % Eosinophils 1.2 %    % Basophils 0.5 %    % Immature Granulocytes 0.2 %    Nucleated RBCs 0 0 /100    Absolute Neutrophil 4.4 1.6 - 8.3 10e9/L    Absolute Lymphocytes 1.2 0.8 - 5.3 10e9/L    Absolute Monocytes 0.7 0.0 - 1.3 10e9/L    Absolute Eosinophils 0.1 0.0 - 0.7 10e9/L    Absolute Basophils 0.0 0.0 - 0.2 10e9/L    Abs Immature Granulocytes 0.0 0 - 0.4 10e9/L    Absolute Nucleated RBC 0.0    INR   Result Value Ref Range    INR 1.11 0.86 - 1.14   Comprehensive metabolic panel   Result Value Ref Range    Sodium 135 133 - 144 mmol/L    Potassium 4.0 3.4 - 5.3 mmol/L    Chloride 101 94 - 109 mmol/L    Carbon Dioxide 26 20 - 32 mmol/L    Anion Gap 8 3 - 14 mmol/L    Glucose 95 70 - 99 mg/dL    Urea Nitrogen 14 7 - 30 mg/dL    Creatinine 0.87 0.66 - 1.25 mg/dL    GFR Estimate >90 >60 mL/min/[1.73_m2]    GFR Estimate If Black >90 >60 mL/min/[1.73_m2]    Calcium 9.5 8.5 - 10.1 mg/dL    Bilirubin Total 0.4 0.2 -  1.3 mg/dL    Albumin 3.1 (L) 3.4 - 5.0 g/dL    Protein Total 7.3 6.8 - 8.8 g/dL    Alkaline Phosphatase 123 40 - 150 U/L    ALT 27 0 - 70 U/L    AST 16 0 - 45 U/L   Interventional Radiology Adult/Peds IP Consult: Patient to be seen: Routine within 24 hours; Call back #: [phone]; R ptx, pigtail placement; Requesting provider? Attending physician; Name: Maynor Brannon PA-C     4/12/2019  1:57 PM  INTERVENTIONAL RADIOLOGY CONSULT    Hiram Tapia is a 58 year old male with sarcoma to bilateral   lungs s/p right lung resection with recent admission for right   pneumothorax which was treated with cardiothoracic placing chest   tube and TALC pleurodesis on 4/2 now with follow-up CT showing   increased right pneumothorax. Dr. Bowie recommended admission prior   to planned orthopedic procedure on 4/15 which will require   intubation and the positive pressure will likely worsen   pneumothorax so IR has been consulted for right apical chest tube   placement.    Patient is on IR schedule this afternoon for a CT-guided right   apical chest tube placement. Labs WNL for procedure. Keep NPO.   Consent will be done prior to procedure.     Discussed with SENAIT Velazquez from cardiothoracic and IR attending Dr. Whitley.    Luis Florez PA-C  Interventional Radiology  [phone]     Interventional Radiology Adult/Peds IP Consult: Patient to be seen: Routine within 24 hours; Call back #: [phone]; right sided pneumothorax; Requesting provider? Attending physician    Maynor Teixeira PA-C     4/12/2019  1:57 PM  INTERVENTIONAL RADIOLOGY CONSULT    Hiram Tapia is a 58 year old male with sarcoma to bilateral   lungs s/p right lung resection with recent admission for right   pneumothorax which was treated with cardiothoracic placing chest   tube and TALC pleurodesis on 4/2 now with follow-up CT showing   increased right pneumothorax. Dr. Bowie recommended admission prior   to planned orthopedic  procedure on 4/15 which will require   intubation and the positive pressure will likely worsen   pneumothorax so IR has been consulted for right apical chest tube   placement.    Patient is on IR schedule this afternoon for a CT-guided right   apical chest tube placement. Labs WNL for procedure. Keep NPO.   Consent will be done prior to procedure.     Discussed with SENAIT Velazquez from cardiothoracic and IR attending Dr. Whitley.    Luis Florez PA-C  Interventional Radiology  [phone]       Medications   acetaminophen (TYLENOL) tablet 650 mg (has no administration in time range)   polyethylene glycol (MIRALAX/GLYCOLAX) Packet 17 g (has no administration in time range)   docusate sodium (COLACE) capsule 100-200 mg (200 mg Oral Given 4/12/19 1659)   sennosides (SENOKOT) tablet 8.6 mg (has no administration in time range)   oxyCODONE (ROXICODONE) tablet 10-15 mg (10 mg Oral Given 4/12/19 1657)   HYDROmorphone (PF) (DILAUDID) injection 0.3-0.5 mg (has no administration in time range)   naloxone (NARCAN) injection 0.1-0.4 mg (has no administration in time range)   lidocaine 1 % 1-30 mL (30 mLs Intradermal Given by Other 4/12/19 7051)            Labs Ordered and Resulted from Time of ED Arrival Up to the Time of Departure from the ED   COMPREHENSIVE METABOLIC PANEL - Abnormal; Notable for the following components:       Result Value    Albumin 3.1 (*)     All other components within normal limits   CBC WITH PLATELETS DIFFERENTIAL   INR       Consults  Surgery: Responded(thoracic) (04/12/19 4375)    Assessments & Plan (with Medical Decision Making)  Mr. Tapia is a 58-year-old male with a history of sarcoma who was recently discharged from the hospital 1 week prior who returns back to the ER due to CT 4 days prior showing recurrent right-sided pneumothorax.  I have reviewed the images with the patient.  I initially talked to Thoracic Surgery and had to talk to them on 3 different occasions to get a full plan.  Ultimately the  decision was made to admit the patient for an IR-guided pigtail catheter chest tube placement.  Patient is scheduled to go to the operating room on Monday for a right femur gamma knife nail procedure.  Due to the patient needing this procedure we feel that fixing the pneumothorax is appropriate.  Patient will be admitted to the Oncology service.  I gave report to Dr. Meyer who is the Oncology staff on call.        This part of the document was transcribed by Kaycee Decker Medical Scribe.      I have reviewed the nursing notes.    I have reviewed the findings, diagnosis, plan and need for follow up with the patient.       Medication List      There are no discharge medications for this visit.         Final diagnoses:   Pneumothorax on right   Sarcoma (H)     IMarques, am serving as a trained medical scribe to document services personally performed by Erin Wolfe MD, based on the provider's statements to me.      Erin WHITTAKER MD, was physically present and have reviewed and verified the accuracy of this note documented by Marques Decker.     4/12/2019   [facility], [place], EMERGENCY DEPARTMENT     Erin Wolfe MD  04/12/19 1035

## 2019-04-12 NOTE — PHARMACY-ADMISSION MEDICATION HISTORY
Admission medication history interview status for the 4/12/2019 admission is complete. See Epic admission navigator for allergy information, pharmacy, prior to admission medications and immunization status.     Medication history interview sources:  Patient     Changes made to PTA medication list (reason)  Added: Colace 100 mg tablet with directions for  2 tablets by mouth daily as needed for constipation.   Deleted: none  Changed: none    Additional medication history information (including reliability of information, actions taken by pharmacist):    Patient stated that he usually take the colace whenever he takes an opoid product to prevent constipation.       Prior to Admission medications    Medication Sig Last Dose Taking? Auth Provider   docusate sodium (COLACE) 100 MG capsule Take 100 mg by mouth 2 times daily as needed for constipation 4/11/2019 at Unknown time Yes Unknown, Entered By History   oxyCODONE (OXY-IR) 5 MG capsule Take 1-5 capsules every 4 hours as needed for pain 4/9/2019 at Unknown time Yes Gaurang Johnson MD         Medication history completed by:   Macario Lozano P4   Pharmacy Student

## 2019-04-12 NOTE — PROGRESS NOTES
Thoracic surgery consult note to follow    Briefly, 58 yoM with metastatic sarcoma to bilateral lungs with recurrent right pneumothoraces, back in ED with asympatomatic right pneumo.    Discussed with hem onc Dr. Johnson and Dr. Bowie yesterday in clinic and again today.    Recommendations:  -- IR guided pigtail due to septations and previous pigtail at bedside failed to go above and apically  -- admit to hem onc  -- thoracic surgery will follow and manage pneumothorax    Call with any questions.  Farzaneh Taylor MD  Cardiothoracic surgery fellow  [facility] [phone]

## 2019-04-12 NOTE — PLAN OF CARE
"/75 (BP Location: Right arm)   Pulse 80   Temp 98.7  F (37.1  C) (Oral)   Resp 14   Ht 1.778 m (5' 10\")   SpO2 100%   BMI 26.96 kg/m       Patient arrived to unit @ 1600 from IR with new chest tube placement. Chest tube to -20 H20 cont. Suction. No output this shift, a few serosanguineous drops seen in tubing. He reports pain at surgical site, PRN Oxy semi-effective for temporary relief per pt. Declined PRN Dilaudid for breakthrough pain this shift. He denies nausea. Pt reports breathing with ease on RA, sats resting @ %. PIV is SL, pt requesting for Port not to be accessed at this time. Pt is a SBA, uses crutches r/t R upper thigh pain/hx. Wife present on admission, went home for the evening once pt was settled.    Will continue to monitor/assess. Resting in room between cares.  "

## 2019-04-12 NOTE — H&P
[facility], [place]    Hematology / Oncology  Admission History & Physical     Date of Admission:  04/12/19  Date of Service: 04/12/19  Primary Hematologist/Oncologist: Dr. 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 He was recently hospitalized for SOB and found to have a large right-sided pneumothorax (s/p chest tube and talc pleurodesis on 4/3/19). Now being worked-up for a lytic lesion involving the right femur, which will require a nailing procedure. Admitted for evaluation/treatment of persistent asymptomatic R pneumothorax.    Persistent asymptomatic right-sided pneumothorax.  Recently hospitalized 4/2-4/5/19 for evaluation of SOB and found to have a large right-sided pneumothorax. He had a chest tube placed by thoracic surgery, and underwent talc pleurodesis on 4/3/19. Chest tube was removed on 4/5/19 and he was discharged to home after CXR showed resolution of the right-sided pneumothorax. Now re-admitted after CXR obtained in pre-op work-up on 4/12/19 revealed persistent right-sided pneumothorax. He underwent CT-guided pigtail catheter placement by IR on 4/12/19 and is admitted for monitoring prior to the orthopedic procedure.  - Chest tube management per IR: will maintain on -20 cm H2O suction for now.  - Pain control with home oxycodone 10-15 mg PO Q4H PRN, with hydromorphone 0.3-0.5 mg IV Q2H PRN for breakthrough pain.    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  Follows with Dr. Johnson. Had a biopsy of the right thigh on 3/8/18 that revealed undifferentiated pleomorphic sarcoma. Received 4 cycles of Dox/Ifos starting 3/2018 and pre-operative XRT. Underwent resection on 9/17/18 with negative margins, >95% necrosis and no LVI. Has  been noted to have slowly growing pulmonary nodules and underwent RML wedge resection 12/5/18 with pathology revealing metastatic sarcoma, negative margins. Re-staging CT CAP on 4/2/19 revealed progressive disease, and he started Keytruda on 4/9/19.  - Follow-up with Dr. Johnson after discharge.   - Next cycle of Keytruda will be due on 4/20/19.    Lytic lesion of the right femur.  Re-staging CT CAP on 4/2/19 revealed increased size and number of numerous metastatic pulmonary nodules, new/increased lesions in multiple LN regions, as well as a lytic lesion with an associated soft tissue mass arising from the posterior right femoral intertrochanteric region. He saw Dr. Betts of orthopedic surgery on 4/9/19 and is planned for right femur Gamma nail on 4/15/19. He will need to be intubated for this procedure, and as such, is admitted for chest-tube placement.  - Unclear if the surgery is planned for the [facility] or the [facility], and if transfer will need to be arranged prior to the surgery date. Will need to clarify the plan with the orthopedic team.  - NWB RLE per ortho. Patient has crutches he brought from home.      FEN: regular diet, no mIVF, replete lytes PRN  Prophylaxis: holding pending procedures, will start prophylactic enoxaparin tomorrow  Code: FULL - Discussed with patient and wife at the time of admission.  Disposition: Admitted to the inpatient oncology service for evaluation after chest tube placement. Anticipate will need to stay admitted until his orthopedic procedure, which is scheduled for 4/15/19. Unclear if this will be done on the [facility] or the [facility] (will need to clarify prior to Monday).    To be formally staffed with Dr. Meyer in the AM.    Shanon Dean MD/PhD  Heme/Onc Fellow    Chief Complaint   Asymptomatic right sided pneumothorax requiring chest tube placement prior to orthopedic procedure.  - History obtained from the patient and through chart review.    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 Due to slowly growing small lung nodules, he had a biopsy that showed metastatic sarcoma in December 2018. He ultimately underwent right middle lobe wedge resection on 12/5/18. He presented to the ED on 4/2/18 for evaluation of SOB and was found to have a new large right-sided pneumothorax as well as a trace left-sided apical pneumothorax. He was admitted and thoracic surgery placed a pigtail chest tube and performed talc pleurodesis on 4/3/19. Chest tube was removed on 4/5/19 and he was discharged home. He was seen by Dr. Johnson in clinic on 4/9/19. At that time he complained of a several week history of worsening right thigh pain, similar to pain he experienced when the sarcoma was initially diagnosed. CT chest/abdomen/pelvis on 4/2/19 had previously revealed a lytic lesion involving the right femur as well as a soft tissue mass proximal to the lytic lesion. Pain is worse when he extends his knee or flexes his hip. He has been using oxycodone for the pain. He was referred to orthopedics to evaluate the lytic lesion on 4/9/19, and opted to pursue surgical stabilization. He is scheduled for placement of a proximal interlock gamma nail early next week. In preparation for the procedure CXR was obtained to evaluate the pneumothorax. He was recommended to seek care in the ED after the x-ray was read and showed persistent right-sided pneumothorax. It is anticipated that he will require intubation for the orthopedic procedure, and IR was consulted for placement of a right apical chest tube.    He was taken for chest tube placement by IR on 4/12/19, and was placed on suction. Upon arrival to the floor, he was afebrile and hemodynamically stable. He denies any SOB or  difficulty breathing, but has new pain at the chest tube insertion site. He has persistent pain in his right thigh as well. Prior to the procedure today, he was in his prior state of health and felt fairly well overall. Pain is currently well controlled. Discussed plan for admission and upcoming procedure.    Heme/Onc History (adapted from most recent clinic note):  David notes a long history of right leg pain for many years. He developed more discomfort in the right thigh and in October 2017 he hit his lateral thigh against a truck tailgate which caused a lot of pain, swelling and stiffness. This eventually led to imaging which demonstrated a cystic mass. Biopsy on 3/8/18 revealed an undifferentiated pleomorphic sarcoma. At the time of the biopsy, more than a liter of fluid was removed, and that markedly improved his symptoms of stiffness and pain.     He started Doxil/ifosfamide on 3/23/18.  He completed 4 cycles with evidence of response after 1 cycle.     He had preoperative radiation and resection on 9/17/18 with less than 5% viable cells and negative margin with no lymphovascular invasion.      Due to slowly growing small lung nodules, he had a lung biopsy that showed metastatic sarcoma and he underwent right middle lobe wedge resection on 12/5/18.    He started Keytruda on 4/9/19.    Past Medical History    I have reviewed this patient's medical history and updated it with pertinent information if needed.   Past Medical History:   Diagnosis Date     Pulmonary embolism (H)      Sarcoma (H)        Past Surgical History   I have reviewed this patient's surgical history and updated it with pertinent information if needed.  Past Surgical History:   Procedure Laterality Date     EXCISE SOFT TISSUE TUMOR THIGH Right 3/8/2018    Procedure: EXCISE SOFT TISSUE TUMOR THIGH;  Biopsy Right Thigh Tumor;  Surgeon: Brad Betts MD;  [facility]: [facility]     EXCISE SOFT TISSUE TUMOR THIGH Right 9/17/2018    Procedure:  "EXCISE SOFT TISSUE TUMOR THIGH;  Removal Right Thigh Tumor ;  Surgeon: Brad Betts MD;  Location: [facility] OR     INSERT PORT VASCULAR ACCESS N/A 3/28/2018    Procedure: INSERT PORT VASCULAR ACCESS;  Vascular Access Port Insertion with C-arm;  Surgeon: Sarah Bowie MD;  Location: [facility] OR     IRRIGATION AND DEBRIDEMENT LOWER EXTREMITY, COMBINED Right 4/6/2018    Procedure: COMBINED IRRIGATION AND DEBRIDEMENT LOWER EXTREMITY;  Irrigation And Debridement Right Thigh ;  Surgeon: Brad Betts MD;  Location: [facility] OR     IRRIGATION AND DEBRIDEMENT LOWER EXTREMITY, COMBINED Right 4/9/2018    Procedure: COMBINED IRRIGATION AND DEBRIDEMENT LOWER EXTREMITY;  Irrigation And Debridement Right Thigh Wound. with Wound VAC Exchange;  Surgeon: Brad Betts MD;  Location: [facility] OR     STRABISMUS SURGERY      as a child     THORACOSCOPIC WEDGE RESECTION LUNG Right 12/5/2018    Procedure: Right Video Assisted Thoracoscopic Wedge Resection;  Surgeon: Sarah Bowie MD;  Location: [facility] OR       Prior to Admission Medications   Prior to Admission Medications   Prescriptions Last Dose Informant Patient Reported? Taking?   docusate sodium (COLACE) 100 MG capsule 4/11/2019 at Unknown time  Yes Yes   Sig: Take 100 mg by mouth 2 times daily as needed for constipation   oxyCODONE (OXY-IR) 5 MG capsule 4/9/2019 at Unknown time  No Yes   Sig: Take 1-5 capsules every 4 hours as needed for pain      Facility-Administered Medications: None     Allergies   Allergies   Allergen Reactions     Hydrofera Blue 4\"X4\" [Wound Dressings] Dermatitis and Blisters     Patient reports that Dressing causes skin irritation, blisters, and drainage.      Tegaderm Ag Mesh [Silver] Dermatitis and Blisters     Patient reports that Dressing causes Skin irritation, blisters, and drainage.       Social History   Social History     Socioeconomic History     Marital status:      Spouse name: Not on file     Number of children: Not on file     Years of " education: Not on file     Highest education level: Not on file   Occupational History     Not on file   Social Needs     Financial resource strain: Not on file     Food insecurity:     Worry: Not on file     Inability: Not on file     Transportation needs:     Medical: Not on file     Non-medical: Not on file   Tobacco Use     Smoking status: Former Smoker     Packs/day: 1.00     Years: 10.00     Pack years: 10.00     Types: Cigarettes     Start date: 1975     Last attempt to quit: 1990     Years since quittin.2     Smokeless tobacco: Never Used   Substance and Sexual Activity     Alcohol use: Yes     Alcohol/week: 8.4 oz     Comment: 1 beer a day      Drug use: No     Sexual activity: Not Currently     Partners: Female   Lifestyle     Physical activity:     Days per week: Not on file     Minutes per session: Not on file     Stress: Not on file   Relationships     Social connections:     Talks on phone: Not on file     Gets together: Not on file     Attends [employer] service: Not on file     Active member of club or organization: Not on file     Attends meetings of clubs or organizations: Not on file     Relationship status: Not on file     Intimate partner violence:     Fear of current or ex partner: Not on file     Emotionally abused: Not on file     Physically abused: Not on file     Forced sexual activity: Not on file   Other Topics Concern     Parent/sibling w/ CABG, MI or angioplasty before 65F 55M? Not Asked   Social History Narrative     Not on file       Family History   I have reviewed this patient's family history and updated it with pertinent information if needed.   Family History   Problem Relation Age of Onset     Leukemia Father        Review of Systems   A comprehensive ROS was performed with the patient and was found to be negative or non-contributory with the exception of that noted in the HPI above.    Physical Exam   Temp:  [98.2  F (36.8  C)] 98.2  F (36.8  C)  Pulse:  []  79  Resp:  [16] 16  BP: (112-132)/(80-87) 124/84  SpO2:  [98 %-100 %] 98 %  CONSTITUTIONAL: Alert and interactive. Lying in bed in no acute distress.  HEENT: NC/AT, EOMI, anicteric sclera, oropharynx is pink and moist.  NECK: Supple, full ROM.  CARDIOVASCULAR: RRR. No murmurs. 2+ equal and bilateral radial and pedal pulses.   RESPIRATORY: Decreased at the right base. No crackles or wheezes appreciated. Normal respiratory effort on ambient air. Chest tube in right upper anterior chest, with dressing c/d/i.  GASTROINTESTINAL: Soft, non-tender, non-distended, normoactive bowel sounds.  MUSCULOSKELETAL: No joint swelling or tenderness.  EXTREMITIES: No lower extremity edema.   SKIN: Warm and intact. No concerning lesions or rashes on exposed skin surfaces. No jaundice.  NEUROLOGIC: Alert and fully oriented, appropriately responsive during interview.     Data   I have personally reviewed the following labs/imaging:  Results for orders placed or performed during the hospital encounter of 04/12/19 (from the past 24 hour(s))   CBC with platelets differential   Result Value Ref Range    WBC 6.5 4.0 - 11.0 10e9/L    RBC Count 5.02 4.4 - 5.9 10e12/L    Hemoglobin 13.8 13.3 - 17.7 g/dL    Hematocrit 43.5 40.0 - 53.0 %    MCV 87 78 - 100 fl    MCH 27.5 26.5 - 33.0 pg    MCHC 31.7 31.5 - 36.5 g/dL    RDW 13.9 10.0 - 15.0 %    Platelet Count 413 150 - 450 10e9/L    Diff Method Automated Method     % Neutrophils 68.5 %    % Lymphocytes 19.2 %    % Monocytes 10.4 %    % Eosinophils 1.2 %    % Basophils 0.5 %    % Immature Granulocytes 0.2 %    Nucleated RBCs 0 0 /100    Absolute Neutrophil 4.4 1.6 - 8.3 10e9/L    Absolute Lymphocytes 1.2 0.8 - 5.3 10e9/L    Absolute Monocytes 0.7 0.0 - 1.3 10e9/L    Absolute Eosinophils 0.1 0.0 - 0.7 10e9/L    Absolute Basophils 0.0 0.0 - 0.2 10e9/L    Abs Immature Granulocytes 0.0 0 - 0.4 10e9/L    Absolute Nucleated RBC 0.0    INR   Result Value Ref Range    INR 1.11 0.86 - 1.14   Comprehensive  metabolic panel   Result Value Ref Range    Sodium 135 133 - 144 mmol/L    Potassium 4.0 3.4 - 5.3 mmol/L    Chloride 101 94 - 109 mmol/L    Carbon Dioxide 26 20 - 32 mmol/L    Anion Gap 8 3 - 14 mmol/L    Glucose 95 70 - 99 mg/dL    Urea Nitrogen 14 7 - 30 mg/dL    Creatinine 0.87 0.66 - 1.25 mg/dL    GFR Estimate >90 >60 mL/min/[1.73_m2]    GFR Estimate If Black >90 >60 mL/min/[1.73_m2]    Calcium 9.5 8.5 - 10.1 mg/dL    Bilirubin Total 0.4 0.2 - 1.3 mg/dL    Albumin 3.1 (L) 3.4 - 5.0 g/dL    Protein Total 7.3 6.8 - 8.8 g/dL    Alkaline Phosphatase 123 40 - 150 U/L    ALT 27 0 - 70 U/L    AST 16 0 - 45 U/L   XR Chest 2 Views    Narrative    EXAMINATION: XR CHEST 2 VW, 4/12/2019 11:18 AM    INDICATION: evaluate right sided pneumothorax.    COMPARISON: CT for comparison 4/9/2018, plain film 4/5/2018.    FINDINGS: PA and lateral upright views of the chest. Right Port-A-Cath  tip projects over the cavoatrial junction. Trachea is midline. The  cardiomediastinal silhouette is within normals. Small right pleural  effusion. Bibasilar atelectasis. Right lower lobe fibroatelectasis.  Prominent pulmonary vasculature. Interstitial opacities. Unchanged  bilateral pulmonary nodules. Slight reduction in right pneumothorax.     Upper abdomen is unremarkable.       Impression    IMPRESSION:   1. Trace right pleural effusion. Resolution of left pleural effusion.  Mild bibasilar atelectasis. Platelike atelectasis in the middle right  lobe.  2. Small reduction in right pneumothorax.   3. Unchanged pulmonary nodules/masses.    I have personally reviewed the examination and initial interpretation  and I agree with the findings.    ROBERTA LAMB MD   Interventional Radiology Adult/Peds IP Consult: Patient to be seen: Routine within 24 hours; Call back #: [phone]; R ptx, pigtail placement; Requesting provider? Attending physician; Name: Maynor Brannon PA-C     4/12/2019  1:57 PM  INTERVENTIONAL RADIOLOGY  CONSULT    Hiram Tapia is a 58 year old male with sarcoma to bilateral   lungs s/p right lung resection with recent admission for right   pneumothorax which was treated with cardiothoracic placing chest   tube and TALC pleurodesis on 4/2 now with follow-up CT showing   increased right pneumothorax. Dr. Bowie recommended admission prior   to planned orthopedic procedure on 4/15 which will require   intubation and the positive pressure will likely worsen   pneumothorax so IR has been consulted for right apical chest tube   placement.    Patient is on IR schedule this afternoon for a CT-guided right   apical chest tube placement. Labs WNL for procedure. Keep NPO.   Consent will be done prior to procedure.     Discussed with SENAIT Velazquez from cardiothoracic and IR attending Dr. Whitley.    Luis Florez PA-C  Interventional Radiology  [phone]     Interventional Radiology Adult/Peds IP Consult: Patient to be seen: Routine within 24 hours; Call back #: [phone]; right sided pneumothorax; Requesting provider? Attending physician    Maynor Teixeira PA-C     4/12/2019  1:57 PM  INTERVENTIONAL RADIOLOGY CONSULT    Hiram Tapia is a 58 year old male with sarcoma to bilateral   lungs s/p right lung resection with recent admission for right   pneumothorax which was treated with cardiothoracic placing chest   tube and TALC pleurodesis on 4/2 now with follow-up CT showing   increased right pneumothorax. Dr. Bowie recommended admission prior   to planned orthopedic procedure on 4/15 which will require   intubation and the positive pressure will likely worsen   pneumothorax so IR has been consulted for right apical chest tube   placement.    Patient is on IR schedule this afternoon for a CT-guided right   apical chest tube placement. Labs WNL for procedure. Keep NPO.   Consent will be done prior to procedure.     Discussed with SENAIT Velazquez from cardiothoracic and IR attending Dr. Whitley.    Luis Florez  WANG  Interventional Radiology  [phone]

## 2019-04-12 NOTE — LETTER
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 He was recently hospitalized for SOB and found to have a large right-sided pneumothorax (s/p chest tube and talc pleurodesis on 4/3/19). Now being worked-up for a lytic lesion involving the right femur, which will require a nailing procedure. He was admitted for evaluation/treatment of persistent asymptomatic R pneumothorax.      Discharge Plan: Home with OP PT and clinic follow-up as recommended.       Discharge Needs Assessment:  Needs      Most Recent Value   Equipment Currently Used at Home  crutches, grab bar, tub/shower, tub bench          Follow-up plan:    Future Appointments   Date Time Provider Department Center   2019  6:00 AM [facility] PT OVERFLOW [facility]   2019 12:45 PM  [facility] DRAW [facility]   2019  1:20 PM Ignacio Ramirez PA [facility]   2019  2:30 PM  ONCOLOGY INFUSION [facility]   5/3/2019  9:30 AM [facility]   2019  9:00 AM Bertha Ortiz MD [facility]   2019 10:00 AM  [facility] DRAW [facility]   2019 11:00 AM Gaurang Johnson MD [facility]   2019 11:30 AM  ONCOLOGY INFUSION [facility]   2019  8:00 AM  [facility] DRAW [facility]   2019  9:00 AM Elizabeth  Gaurang Ly MD [facility]   6/11/2019  9:30 AM  ONCOLOGY INFUSION [facility]   7/2/2019  8:00 AM  [facility] DRAW [facility]   7/2/2019  9:00 AM Gaurang Johnson MD [facility]   7/2/2019  9:30 AM  ONCOLOGY INFUSION [facility]       Any outstanding tests or procedures:        Referrals     Future Labs/Procedures    PHYSICAL THERAPY REFERRAL     Process Instructions:    Work Related Injury: Functional Capacity and Work Conditioning are only offered at [facility] (service can be provided by PT or OT).    *This therapy referral will be filtered to a centralized scheduling office at [facility] and the patient will receive a call to schedule an appointment at a [place] location most convenient for them. *    Comments:    If you have not heard from the scheduling office within 2 business days, please call [phone] for all locations, with the exception of [place], please call [phone] and [place], please call [phone].    Please be aware that coverage of these services is subject to the terms and limitations of your health insurance plan.  Call member services at your health plan with any benefit or coverage questions.            Michelle Broderick, RN, BSN, PHN  Care Coordinator       AVS/Discharge Summary is the source of truth; this is a helpful guide for improved communication of patient story

## 2019-04-12 NOTE — ED TRIAGE NOTES
Pt presents ambulatory (with crutches) to triage with wife. Pt reports he was discharged on Friday for a collapsed lung, was seen in clinic on Tuesday for f/u and was instructed to come to ED for eval.

## 2019-04-12 NOTE — ED NOTES
"[facility]   ED Nurse to Floor Handoff     Hiram Tapia is a 58 year old male who speaks English and lives with a spouse,  in a home  They arrived in the ED by car from home    ED Chief Complaint: Cough    ED Dx;   Final diagnoses:   Pneumothorax on right   Sarcoma (H)         Needed?: No    Allergies:   Allergies   Allergen Reactions     Hydrofera Blue 4\"X4\" [Wound Dressings] Dermatitis and Blisters     Patient reports that Dressing causes skin irritation, blisters, and drainage.      Tegaderm Ag Mesh [Silver] Dermatitis and Blisters     Patient reports that Dressing causes Skin irritation, blisters, and drainage.   .  Past Medical Hx:   Past Medical History:   Diagnosis Date     Pulmonary embolism (H)      Sarcoma (H)       Baseline Mental status: WDL  Current Mental Status changes: at basesline    Infection present or suspected this encounter: no  Sepsis suspected: No  Isolation type: No active isolations     Activity level - Baseline/Home:  Independent  Activity Level - Current:   Independent    Bariatric equipment needed?: No    In the ED these meds were given: Medications - No data to display    Drips running?  No    Home pump  No    Current LDAs  Peripheral IV 04/12/19 Right Upper arm (Active)   Site Assessment WDL 4/12/2019 10:48 AM   Line Status Saline locked 4/12/2019 10:48 AM   Phlebitis Scale 0-->no symptoms 4/12/2019 10:48 AM   Number of days: 0       Port A Cath Single 03/28/18 Right Chest wall (Active)   Number of days: 380       Closed/Suction Drain 1 Right;Anterior;Distal Thigh Accordion 10 Chinese SUTURED IN PLACE (Active)   Number of days: 207       Negative Pressure Wound Therapy Leg Right;Upper (Active)   Number of days: 371       Wound Right Thigh (Active)   Number of days:        Incision/Surgical Site 03/08/18 Right Leg (Active)   Number of days: 400       Incision/Surgical Site 03/28/18 Right Chest (Active)   Number of days: 380     "   Incision/Surgical Site 03/28/18 Right Neck (Active)   Number of days: 380       Incision/Surgical Site 04/06/18 Right;Lateral Thigh (Active)   Number of days: 371       Incision/Surgical Site 09/17/18 Right;Anterior;Lateral Thigh (Active)   Number of days: 207       Incision/Surgical Site 12/05/18 Right;Lateral Chest (Active)   Number of days: 128       Incision/Surgical Site 12/05/18 Right;Lower Chest (Active)   Number of days: 128       Labs results:   Labs Ordered and Resulted from Time of ED Arrival Up to the Time of Departure from the ED   COMPREHENSIVE METABOLIC PANEL - Abnormal; Notable for the following components:       Result Value    Albumin 3.1 (*)     All other components within normal limits   CBC WITH PLATELETS DIFFERENTIAL   INR       Imaging Studies:   Recent Results (from the past 24 hour(s))   XR Chest 2 Views    Narrative    EXAMINATION: XR CHEST 2 VW, 4/12/2019 11:18 AM    INDICATION: evaluate right sided pneumothorax.    COMPARISON: CT for comparison 4/9/2018, plain film 4/5/2018.    FINDINGS: PA and lateral upright views of the chest. Right Port-A-Cath  tip projects over the cavoatrial junction. Trachea is midline. The  cardiomediastinal silhouette is within normals. Small right pleural  effusion. Bibasilar atelectasis. Right lower lobe fibroatelectasis.  Prominent pulmonary vasculature. Interstitial opacities. Unchanged  bilateral pulmonary nodules. Slight reduction in right pneumothorax.     Upper abdomen is unremarkable.       Impression    IMPRESSION:   1. Trace right pleural effusion. Resolution of left pleural effusion.  Mild bibasilar atelectasis. Platelike atelectasis in the middle right  lobe.  2. Small reduction in right pneumothorax.   3. Unchanged pulmonary nodules/masses.    I have personally reviewed the examination and initial interpretation  and I agree with the findings.    ROBERTA LAMB MD       Recent vital signs:   /84   Pulse 79   Temp 98.2  F (36.8  C) (Oral)   " Resp 16   Ht 1.778 m (5' 10\")   SpO2 98%   BMI 26.96 kg/m      Cardiac Rhythm: Normal Sinus  Pt needs tele? No  Skin/wound Issues: None    Code Status: Full Code    Pain control: pt had none    Nausea control: pt had none    Abnormal labs/tests/findings requiring intervention: Pneumothorax    Family present during ED course? Yes   Family Comments/Social Situation comments: Wife at bedside and supportive    Tasks needing completion: None    Lou Hurst RN  [facility]-- [phone] [place] ED  [phone] [facility] ED    "

## 2019-04-13 ENCOUNTER — APPOINTMENT (OUTPATIENT)
Dept: GENERAL RADIOLOGY | Facility: CLINIC | Age: 59
DRG: 982 | End: 2019-04-13
Payer: COMMERCIAL

## 2019-04-13 LAB
ANION GAP SERPL CALCULATED.3IONS-SCNC: 7 MMOL/L (ref 3–14)
BUN SERPL-MCNC: 13 MG/DL (ref 7–30)
CALCIUM SERPL-MCNC: 9.5 MG/DL (ref 8.5–10.1)
CHLORIDE SERPL-SCNC: 102 MMOL/L (ref 94–109)
CO2 SERPL-SCNC: 24 MMOL/L (ref 20–32)
CREAT SERPL-MCNC: 0.88 MG/DL (ref 0.66–1.25)
ERYTHROCYTE [DISTWIDTH] IN BLOOD BY AUTOMATED COUNT: 13.8 % (ref 10–15)
GFR SERPL CREATININE-BSD FRML MDRD: >90 ML/MIN/{1.73_M2}
GLUCOSE SERPL-MCNC: 91 MG/DL (ref 70–99)
HCT VFR BLD AUTO: 41.5 % (ref 40–53)
HGB BLD-MCNC: 13.1 G/DL (ref 13.3–17.7)
MCH RBC QN AUTO: 27.8 PG (ref 26.5–33)
MCHC RBC AUTO-ENTMCNC: 31.6 G/DL (ref 31.5–36.5)
MCV RBC AUTO: 88 FL (ref 78–100)
PLATELET # BLD AUTO: 358 10E9/L (ref 150–450)
POTASSIUM SERPL-SCNC: 4.2 MMOL/L (ref 3.4–5.3)
RBC # BLD AUTO: 4.72 10E12/L (ref 4.4–5.9)
SODIUM SERPL-SCNC: 134 MMOL/L (ref 133–144)
WBC # BLD AUTO: 5.3 10E9/L (ref 4–11)

## 2019-04-13 PROCEDURE — 36415 COLL VENOUS BLD VENIPUNCTURE: CPT | Performed by: INTERNAL MEDICINE

## 2019-04-13 PROCEDURE — 71046 X-RAY EXAM CHEST 2 VIEWS: CPT

## 2019-04-13 PROCEDURE — 12000001 ZZH R&B MED SURG/OB UMMC

## 2019-04-13 PROCEDURE — 71046 X-RAY EXAM CHEST 2 VIEWS: CPT | Mod: 76

## 2019-04-13 PROCEDURE — 85027 COMPLETE CBC AUTOMATED: CPT | Performed by: INTERNAL MEDICINE

## 2019-04-13 PROCEDURE — 80048 BASIC METABOLIC PNL TOTAL CA: CPT | Performed by: INTERNAL MEDICINE

## 2019-04-13 PROCEDURE — 25000132 ZZH RX MED GY IP 250 OP 250 PS 637: Performed by: INTERNAL MEDICINE

## 2019-04-13 PROCEDURE — 99233 SBSQ HOSP IP/OBS HIGH 50: CPT | Performed by: INTERNAL MEDICINE

## 2019-04-13 PROCEDURE — 25000128 H RX IP 250 OP 636: Performed by: INTERNAL MEDICINE

## 2019-04-13 RX ADMIN — ENOXAPARIN SODIUM 30 MG: 30 INJECTION SUBCUTANEOUS at 09:40

## 2019-04-13 RX ADMIN — Medication 0.5 MG: at 06:07

## 2019-04-13 RX ADMIN — OXYCODONE HYDROCHLORIDE 10 MG: 5 TABLET ORAL at 03:04

## 2019-04-13 RX ADMIN — OXYCODONE HYDROCHLORIDE 10 MG: 5 TABLET ORAL at 07:05

## 2019-04-13 RX ADMIN — SENNOSIDES 8.6 MG: 8.6 TABLET, FILM COATED ORAL at 16:21

## 2019-04-13 RX ADMIN — Medication 0.5 MG: at 01:57

## 2019-04-13 RX ADMIN — SENNOSIDES 8.6 MG: 8.6 TABLET, FILM COATED ORAL at 07:05

## 2019-04-13 ASSESSMENT — ACTIVITIES OF DAILY LIVING (ADL)
ADLS_ACUITY_SCORE: 19
ADLS_ACUITY_SCORE: 21

## 2019-04-13 ASSESSMENT — MIFFLIN-ST. JEOR: SCORE: 1636.81

## 2019-04-13 ASSESSMENT — PAIN DESCRIPTION - DESCRIPTORS: DESCRIPTORS: ACHING

## 2019-04-13 NOTE — PLAN OF CARE
Neuro: A&Ox4.   Cardiac: VSS.   Respiratory: Sating >92% on RA.  Denies SOB, tolerating minimal activity.  CT to -20 cm H20 suction to start day, to water seal at 1000, repeat CXR completed at 1500.  CLS throughout.  GI/: Adequate urine output. Senna + Colace for BM assist.  Last BM 4/10/19.  Diet/appetite: Tolerating regular diet. Eating well.  Activity:  Up to bathroom with SBA and crutches.  NWB to RLE.  PT consult entered.   Pain: At acceptable level on current regimen.  Has prn IV dilaudid and po oxycodone but has declined all shift, reports major improvement in pain.   Skin: No new deficits noted.    LDA's:  CT to R chest and X2 PIV    Plan: Continue with POC. Notify primary team with changes.

## 2019-04-13 NOTE — PLAN OF CARE
"/70 (BP Location: Right arm)   Pulse 84   Temp 98  F (36.7  C) (Oral)   Resp 16   Ht 1.778 m (5' 10\")   Wt 82.1 kg (180 lb 14.4 oz)   SpO2 97%   BMI 25.96 kg/m       Chest tube to water seal. No output this shift. Pt reports discomfort at surgical site, declines PRN interventions at this time. No BM this shift, PRN stool softeners given per pt request. He denies nausea and SOB. PIV Sl. Pt is a SBA, uses crutches.     Will continue to monitor/assess. Resting in room between cares.    "

## 2019-04-13 NOTE — PLAN OF CARE
00:00-07:00 am   AF with stable VS  Good O2 saturation on room air  Continue c/o right sided chest pain at chest tube site requiring Dilaudid 0.5 mg IV x 2 and Oxycodone 10 mg x 2 overnight with adequate relief between doses  Denied SOB  Chest tube to -20 cm H2O cont. suction and no output for night  No nausea/vomiting  Up independently, using crutches  Voiding well per pt and not saving urine  Pt o/w resting/sleeping comfortably well between cares  Continue w/POC

## 2019-04-13 NOTE — PROGRESS NOTES
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 He was recently hospitalized for SOB and found to have a large right-sided pneumothorax (s/p chest tube and talc pleurodesis on 4/3/19). Now being worked-up for a lytic lesion involving the right femur, which will require a nailing procedure. Admitted for evaluation/treatment of persistent asymptomatic R pneumothorax.     Persistent asymptomatic right-sided pneumothorax.  Recently hospitalized 4/2-4/5/19 for evaluation of SOB and found to have a large right-sided pneumothorax. He had a chest tube placed by thoracic surgery, and underwent talc pleurodesis on 4/3/19. Chest tube was removed on 4/5/19 and he was discharged to home after CXR showed resolution of the right-sided pneumothorax. Now re-admitted after CXR obtained in pre-op work-up on 4/12/19 revealed persistent right-sided pneumothorax. He underwent CT-guided pigtail catheter placement by IR on 4/12/19 and is admitted for monitoring prior to the orthopedic procedure.  - Chest tube management per thoracic surgery: will maintain on -20 cm H2O suction for now, daily CXR ordered.  - Pain control with home oxycodone 10-15 mg PO Q4H PRN, with hydromorphone 0.3-0.5 mg IV Q2H PRN for breakthrough pain.     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  Follows with Dr. Johnson. Had a biopsy of the right thigh on 3/8/18 that revealed undifferentiated pleomorphic sarcoma. Received 4 cycles of Dox/Ifos starting 3/2018 and pre-operative XRT. Underwent resection on 9/17/18 with negative margins, >95% necrosis and no LVI. Has been  noted to have slowly growing pulmonary nodules and underwent RML wedge resection 12/5/18 with pathology revealing metastatic sarcoma, negative margins. Re-staging CT CAP on 4/2/19 revealed progressive disease, and he started Keytruda on 4/9/19.  - Follow-up with Dr. Johnson after discharge.   - Next cycle of Keytruda will be due on 4/20/19.     Lytic lesion of the right femur.  Re-staging CT CAP on 4/2/19 revealed increased size and number of numerous metastatic pulmonary nodules, new/increased lesions in multiple LN regions, as well as a lytic lesion with an associated soft tissue mass arising from the posterior right femoral intertrochanteric region. He saw Dr. Betts of orthopedic surgery on 4/9/19 and is planned for right femur Gamma nail on 4/15/19. He will need to be intubated for this procedure, and as such, is admitted for chest-tube placement.  - Unclear if the surgery is planned for the [facility] or the [facility], and if transfer will need to be arranged prior to the surgery date. Will need to clarify the plan with the orthopedic team.  - NWB RLE per ortho. Patient has crutches he brought from home.        FEN: regular diet, no mIVF, replete lytes PRN  Prophylaxis: enoxaparin ordered for Sat/Sun, none on Monday pending orthopedic procedure  Code: FULL - Discussed with patient and wife at the time of admission.  Disposition: Admitted to the inpatient oncology service for evaluation after chest tube placement. Anticipate will need to stay admitted until his orthopedic procedure, which is scheduled for 4/15/19. Unclear if this will be done on the [facility] or the [facility] (will need to clarify prior to Monday).    Plan of care was discussed with attending physician Dr. Meyer.    Shanon Dean MD/PhD  Heme/Onc Fellow  ___________________________________________________________________    Subjective & Interval History:    No acute events noted overnight. Still having pretty significant pain related  "to the right sided chest tube. Using oxycodone and hydromorphone for breakthrough pain. Nothing much draining from the chest tube. No fevers or chills. Not requiring supplemental O2. Doing OK otherwise.    Physical Exam:    Blood pressure 125/83, pulse 80, temperature 96.8  F (36  C), temperature source Oral, resp. rate 16, height 1.778 m (5' 10\"), SpO2 96 %.    General: alert and cooperative, lying in bed, no acute distress  HEENT: sclera anicteric, EOMI, MMM  Neck: supple, normal ROM  CV: RRR, normal S1/S2, no m/r/g  Resp: Decreased at the right base. No crackles or wheezes appreciated. Normal respiratory effort on ambient air. Chest tube in right upper anterior chest, with dressing c/d/i  GI: soft, non-tender, non-distended, bowel sounds present and normoactive  MSK: warm and well-perfused, no edema  Skin: no rashes on limited exam, no jaundice  Neuro: AOx3, moves all extremities equally, no focal deficits  Vascular: Port in right upper chest, not accessed per patient preference    Labs & Studies: I personally reviewed the following studies:  ROUTINE LABS (Last four results):  CMP  Recent Labs   Lab 04/13/19 0522 04/12/19 1047 04/09/19 1215 04/09/19  0634    135 134 134   POTASSIUM 4.2 4.0 4.3 3.8   CHLORIDE 102 101 101 103   CO2 24 26 27 24   ANIONGAP 7 8 6 8   GLC 91 95 104* 100*   BUN 13 14 12 14   CR 0.88 0.87 0.77 0.78   GFRESTIMATED >90 >90 >90 >90   GFRESTBLACK >90 >90 >90 >90   JAYESH 9.5 9.5 10.4* 9.9   MAG  --   --   --  2.2   PHOS  --   --   --  2.8   PROTTOTAL  --  7.3 7.7 7.4   ALBUMIN  --  3.1* 3.3* 3.2*   BILITOTAL  --  0.4 0.4 0.4   ALKPHOS  --  123 136 131   AST  --  16 23 25   ALT  --  27 28 28     CBC  Recent Labs   Lab 04/13/19 0522 04/12/19 1047 04/09/19  1215 04/09/19  0634   WBC 5.3 6.5 5.2 6.6   RBC 4.72 5.02 5.23 5.03   HGB 13.1* 13.8 14.0 13.8   HCT 41.5 43.5 44.6 42.2   MCV 88 87 85 84   MCH 27.8 27.5 26.8 27.4   MCHC 31.6 31.7 31.4* 32.7   RDW 13.8 13.9 14.0 14.2    413 " 319 322     INR  Recent Labs   Lab 04/12/19  1047   INR 1.11     XR CHEST 2 VW, 4/12/2019 11:18 AM  INDICATION: evaluate right sided pneumothorax.  COMPARISON: CT for comparison 4/9/2018, plain film 4/5/2018.  IMPRESSION:   1. Trace right pleural effusion. Resolution of left pleural effusion.  Mild bibasilar atelectasis. Platelike atelectasis in the middle right  lobe.  2. Small reduction in right pneumothorax.   3. Unchanged pulmonary nodules/masses.    Medications list for reference:  Current Facility-Administered Medications   Medication     acetaminophen (TYLENOL) tablet 650 mg     docusate sodium (COLACE) capsule 100-200 mg     enoxaparin (LOVENOX) injection 30 mg     HYDROmorphone (PF) (DILAUDID) injection 0.3-0.5 mg     naloxone (NARCAN) injection 0.1-0.4 mg     oxyCODONE (ROXICODONE) tablet 10-15 mg     polyethylene glycol (MIRALAX/GLYCOLAX) Packet 17 g     sennosides (SENOKOT) tablet 8.6 mg

## 2019-04-13 NOTE — PROGRESS NOTES
THORACIC & FOREGUT SURGERY    S:  No acute overnight events.  Pt seen at bedside resting comfortably.  Slept well overnight. No other concerns at this time    O:  Vitals:    04/12/19 2222 04/13/19 0307 04/13/19 0741 04/13/19 0900   BP: 108/63 109/69 125/83    BP Location: Right arm Right arm     Pulse:       Resp: 16 18 16    Temp: 97.6  F (36.4  C) 95.5  F (35.3  C) 96.8  F (36  C)    TempSrc: Oral Oral Oral    SpO2: 96% 94% 96%    Weight:    82.1 kg (180 lb 14.4 oz)   Height:           A&Ox3, NAD  Breathing non-labored on RA  RRR  Soft, NDNT  Distal extremities warm.   No calf tenderness.    A/P: Hiram Tapia is a 58 year old male with known metastatic sarcoma to bilateral legs with recurrent right pneumothoraces s/p IR placed chest tube    -Chest tube to water seal  -CXR in AM  -Further cares per Oncology Team  -Thoracic surgery will continue to follow    Patient discussed with fellow and will be discussed with staff    Alexsander Thomas, PGY1  General Surgery

## 2019-04-14 ENCOUNTER — ANESTHESIA EVENT (OUTPATIENT)
Dept: SURGERY | Facility: CLINIC | Age: 59
DRG: 982 | End: 2019-04-14
Payer: COMMERCIAL

## 2019-04-14 ENCOUNTER — APPOINTMENT (OUTPATIENT)
Dept: GENERAL RADIOLOGY | Facility: CLINIC | Age: 59
DRG: 982 | End: 2019-04-14
Payer: COMMERCIAL

## 2019-04-14 PROCEDURE — 12000001 ZZH R&B MED SURG/OB UMMC

## 2019-04-14 PROCEDURE — 99233 SBSQ HOSP IP/OBS HIGH 50: CPT | Performed by: INTERNAL MEDICINE

## 2019-04-14 PROCEDURE — 71046 X-RAY EXAM CHEST 2 VIEWS: CPT

## 2019-04-14 ASSESSMENT — MIFFLIN-ST. JEOR: SCORE: 1621.38

## 2019-04-14 ASSESSMENT — ACTIVITIES OF DAILY LIVING (ADL)
ADLS_ACUITY_SCORE: 19

## 2019-04-14 NOTE — PROGRESS NOTES
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 He was recently hospitalized for SOB and found to have a large right-sided pneumothorax (s/p chest tube and talc pleurodesis on 4/3/19). Now being worked-up for a lytic lesion involving the right femur, which will require a nailing procedure. Admitted for evaluation/treatment of persistent asymptomatic R pneumothorax.     Persistent asymptomatic right-sided pneumothorax.  Recently hospitalized 4/2-4/5/19 for evaluation of SOB and found to have a large right-sided pneumothorax. He had a chest tube placed by thoracic surgery, and underwent talc pleurodesis on 4/3/19. Chest tube was removed on 4/5/19 and he was discharged to home after CXR showed resolution of the right-sided pneumothorax. Now re-admitted after CXR obtained in pre-op work-up on 4/12/19 revealed persistent right-sided pneumothorax. He underwent CT-guided pigtail catheter placement by IR on 4/12/19 and is admitted for monitoring prior to the orthopedic procedure.  - Chest tube management per thoracic surgery: will maintain on water seal until his surgery tomorrow.   - Pain control with home oxycodone 10-15 mg PO Q4H PRN, with hydromorphone 0.3-0.5 mg IV Q2H PRN for breakthrough pain.     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  Follows with Dr. Johnson. Had a biopsy of the right thigh on 3/8/18 that revealed undifferentiated pleomorphic sarcoma. Received 4 cycles of Dox/Ifos starting 3/2018 and pre-operative XRT. Underwent resection on 9/17/18 with negative margins, >95% necrosis and no LVI. Has been noted  to have slowly growing pulmonary nodules and underwent RML wedge resection 12/5/18 with pathology revealing metastatic sarcoma, negative margins. Re-staging CT CAP on 4/2/19 revealed progressive disease, and he started Keytruda on 4/9/19.  - Follow-up with Dr. Johnson after discharge as scheduled.   - Next cycle of Keytruda will be due on 4/20/19.     Lytic lesion of the right femur.  Re-staging CT CAP on 4/2/19 revealed increased size and number of numerous metastatic pulmonary nodules, new/increased lesions in multiple LN regions, as well as a lytic lesion with an associated soft tissue mass arising from the posterior right femoral intertrochanteric region. He saw Dr. Betts of orthopedic surgery on 4/9/19 and is planned for right femur Gamma nail on 4/15/19. He will need to be intubated for this procedure, and as such, is admitted for chest-tube placement.  - Scheduled for orthopedic procedure tomorrow afternoon, now happening on this campus. He will no longer need transport, and will stay admitted to the oncology service until his surgery.  - NWB RLE per ortho. Patient has crutches he brought from home.        FEN: regular diet, no mIVF, replete lytes PRN  Prophylaxis: holding pending surgery tomorrow  Code: FULL - Discussed with patient and wife at the time of admission.  Disposition: Admitted to the inpatient oncology service for evaluation after chest tube placement. Surgery is Monday afternoon and will be done on the [place].    Plan of care was discussed with attending physician Dr. Meyer.    Shanon Dean MD/PhD  Heme/Onc Fellow  ___________________________________________________________________    Subjective & Interval History:    No acute events noted overnight. No significant pain related to the right sided chest tube. Not using pain relievers very frequently. Nothing much draining from the chest tube. No fevers or chills. Not requiring supplemental O2. Doing OK otherwise. Planning  "transfer to [facility] tomorrow.    Physical Exam:    Blood pressure 114/74, pulse 71, temperature 98.5  F (36.9  C), temperature source Oral, resp. rate 16, height 1.778 m (5' 10\"), weight 80.5 kg (177 lb 8 oz), SpO2 99 %.    General: alert and cooperative, lying in bed, no acute distress  HEENT: sclera anicteric, EOMI, MMM  Neck: supple, normal ROM  CV: RRR, normal S1/S2, no m/r/g  Resp: Decreased at the right base. No crackles or wheezes appreciated. Normal respiratory effort on ambient air. Chest tube in right upper anterior chest, with dressing c/d/i  GI: soft, non-tender, non-distended, bowel sounds present and normoactive  MSK: warm and well-perfused, no edema  Skin: no rashes on limited exam, no jaundice  Neuro: AOx3, moves all extremities equally, no focal deficits  Vascular: Port in right upper chest, not accessed per patient preference    Labs & Studies: I personally reviewed the following studies:  ROUTINE LABS (Last four results):  CMP  Recent Labs   Lab 04/13/19  0522 04/12/19  1047 04/09/19  1215 04/09/19  0634    135 134 134   POTASSIUM 4.2 4.0 4.3 3.8   CHLORIDE 102 101 101 103   CO2 24 26 27 24   ANIONGAP 7 8 6 8   GLC 91 95 104* 100*   BUN 13 14 12 14   CR 0.88 0.87 0.77 0.78   GFRESTIMATED >90 >90 >90 >90   GFRESTBLACK >90 >90 >90 >90   JAYESH 9.5 9.5 10.4* 9.9   MAG  --   --   --  2.2   PHOS  --   --   --  2.8   PROTTOTAL  --  7.3 7.7 7.4   ALBUMIN  --  3.1* 3.3* 3.2*   BILITOTAL  --  0.4 0.4 0.4   ALKPHOS  --  123 136 131   AST  --  16 23 25   ALT  --  27 28 28     CBC  Recent Labs   Lab 04/13/19  0522 04/12/19  1047 04/09/19  1215 04/09/19  0634   WBC 5.3 6.5 5.2 6.6   RBC 4.72 5.02 5.23 5.03   HGB 13.1* 13.8 14.0 13.8   HCT 41.5 43.5 44.6 42.2   MCV 88 87 85 84   MCH 27.8 27.5 26.8 27.4   MCHC 31.6 31.7 31.4* 32.7   RDW 13.8 13.9 14.0 14.2    413 319 322     INR  Recent Labs   Lab 04/12/19  1047   INR 1.11     XR CHEST 2 VW, 4/12/2019 11:18 AM  INDICATION: evaluate right sided " pneumothorax.  COMPARISON: CT for comparison 4/9/2018, plain film 4/5/2018.  IMPRESSION:   1. Trace right pleural effusion. Resolution of left pleural effusion.  Mild bibasilar atelectasis. Platelike atelectasis in the middle right  lobe.  2. Small reduction in right pneumothorax.   3. Unchanged pulmonary nodules/masses.    XR CHEST 2 VW, 4/14/2019 8:31 AM  Indication: Interval CXR  Comparison: 4/13/2019  Impression:   1. Stable trace right apical pneumothorax. Chest tube in stable  position.  2. Bilateral nodules, stable. Better demonstrated on CT chest  4/9/2019.  3. Small bilateral pleural effusions.    Medications list for reference:  Current Facility-Administered Medications   Medication     acetaminophen (TYLENOL) tablet 650 mg     docusate sodium (COLACE) capsule 100-200 mg     HYDROmorphone (PF) (DILAUDID) injection 0.3-0.5 mg     naloxone (NARCAN) injection 0.1-0.4 mg     oxyCODONE (ROXICODONE) tablet 10-15 mg     polyethylene glycol (MIRALAX/GLYCOLAX) Packet 17 g     sennosides (SENOKOT) tablet 8.6 mg

## 2019-04-14 NOTE — PLAN OF CARE
"/71 (BP Location: Right arm)   Pulse 71   Temp 97.5  F (36.4  C) (Oral)   Resp 18   Ht 1.778 m (5' 10\")   Wt 80.5 kg (177 lb 8 oz)   SpO2 95%   BMI 25.47 kg/m       Chest tube to water seal. No output. No BM this shift. He denies nausea, pain and SOB. PIV Sl. Pt is a SBA, uses crutches.    On RA, sats @ 95%.    Plan is to continue with surgery tomorrow AM here @ [facility], time unknown.  Will continue to monitor/assess. Resting in room between cares.    "

## 2019-04-14 NOTE — PROGRESS NOTES
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  Treatment:   1. Neoadjuvant chemotherapy 3/2018  2.  Preop radiation  3.  Surgical resection with negative margin and greater than 95% necrosis, 9/17/2018.  4.  Patient required irrigation and debridement with antibiotics for a delayed wound infection.  5.  Wedge resection of pulmonary nodules 12/5/2018 with pathology revealing metastatic sarcoma  6.  Restarted Keytruda chemotherapy on 4/9/2019    Subjective: The patient was hospitalized from 4/2-4/5/19 for shortness of breath and was found to have a large right-sided pneumothorax.  He had a chest tube placed by thoracic surgery and underwent pleurodesis on 4/3/2019.  Patient was discharged home after resolution of his pneumothorax.  He was recently seen by Dr. Johnson and  and his chemotherapy was restarted.  He was found to have a lytic lesion in his right femur after complaining of right hip and thigh pain.  Intramedullary nail to the right femur was recommended.  During preoperative workup, the patient was found to have a persistent right-sided pneumothorax.  He was admitted to the oncology service, and underwent CT-guided pigtail catheter placement by IR on 4/12/2019.    The patient is currently doing well from a respiratory standpoint.  His chest tubes on waterseal.  He has not requiring any supplementary oxygen.  He denies feeling short of breath.    The patient's orthopedic surgery is scheduled for tomorrow 4/15/2019.  Orthopedics was asked to see the patient to facilitate his surgery.    Objective: /74   Pulse 71   Temp 98.5  F (36.9  C) (Oral)   Resp 16   Ht 1.778 m (5' 10\")   Wt 80.5 kg (177 lb 8 oz)   SpO2 99%   BMI 25.47 kg/m      General: NAD, alert and oriented, cooperative with exam.   Cardio: RRR, extremities wwp.   Respiratory: Non-labored breathing.  MSK: RLE: Toes wwp, DP 2+, bcr in all toes.  " +EHL/FHL/GSC/TA with 5/5 strength. SILT SP/DP/Sa/Mayorga/T.  Mild pain with hip logrolling    Labs:  Hemoglobin   Date Value Ref Range Status   04/13/2019 13.1 (L) 13.3 - 17.7 g/dL Final       Assessment and Plan: Hiram Tapia is a 58 year old male with PMH including UPS right thigh with the above history.  Has symptomatic sarcoma metastasis/recurrence in the right femur with intramedullary nail to the right femur planned.  Currently admitted to the oncology service for recurrent right-sided pneumothorax, status post chest tube placement.    Plan for OR: Given the patient is currently admitted to the oncology service, we will plan on completing the patient's surgery tomorrow 4/15/2019 on the [employer].  Oncology and thoracic surgery have cleared the patient for his surgery.  Activity: Up with assist .  Weight bearing status: Touchdown weightbearing right lower extremity.  Pain management: per primary  Antibiotics: Perioperative ordered  Diet: N.p.o. at midnight for OR tomorrow.  DVT prophylaxis: Hold for OR  Follow-up: 2 weeks postop with   Disposition: Pending operative intervention    The patient was discussed with staff Dr. Betts who was in agreement with the above.    Sandor Monge MD  Orthopaedic Surgery Resident, PGY-4  Pager: [phone]

## 2019-04-14 NOTE — PROGRESS NOTES
THORACIC & FOREGUT SURGERY    S:  No acute overnight events.  Pt seen at bedside resting comfortably.  Slept well overnight. Getting ready to order breakfast    O:  Vitals:    04/13/19 2022 04/13/19 2217 04/14/19 0456 04/14/19 0802   BP: 106/66 108/63 107/68 111/69   BP Location: Right arm Right arm Right arm    Pulse: 77 76 71    Resp: 20 16 20 16   Temp: 97.8  F (36.6  C) 98.2  F (36.8  C) 98.2  F (36.8  C) 97.5  F (36.4  C)   TempSrc: Oral Oral Oral Oral   SpO2: 98% 99% 95% 98%   Weight:    80.5 kg (177 lb 8 oz)   Height:           A&Ox3, NAD  Breathing non-labored on RA  RRR  Soft, NDNT  Distal extremities warm.   No calf tenderness.    A/P: Hiram Tapia is a 58 year old male with known metastatic sarcoma to bilateral legs with recurrent right pneumothoraces s/p IR placed chest tube    -Chest tube to water seal, switch to atrium today prior to discharge   -Chest tube to remain on  water seal post op from Ortho surgery  -Further cares per Oncology Team  -Ok to discharge from Thoracic perpective    Patient discussed with fellow and will be discussed with staff    Alexsander Thomas, PGY1  General Surgery

## 2019-04-14 NOTE — PLAN OF CARE
00:00-07:00 am   AF with stable VS  Chest tube to water seal  No output  Reports some discomfort at site but declined need for pain med.  No nausea/vomiting  Denied SOB  Up independently using crutches  Pt is stable and sleeping comfortably  Continue w/POC

## 2019-04-14 NOTE — PLAN OF CARE
Shift:   VS: Temp: 98.5  F (36.9  C) Temp src: Oral BP: 114/74 Pulse: 71 Heart Rate: 83 Resp: 16 SpO2: 99 % O2 Device: None (Room air)    Pain: Denies pain/nausea  Neuro: A&Ox4, calls appropriately  Cardiac:   WNL, denies chest pain  Respiratory: Tolerating RA. No SOB reported  GI/Diet/Appetite:Adequate oral intake, Deneis N/V  :  Adequate UO  LDA's: PIV SL  Skin: No new deficit noted  Activity: Up with SBA  Tests/Procedures:   Pertinent Labs/Lab Collection:      Plan: Ortho surgery plan for tomorrow, pt to remain NPO after midnight. Continue with cares.

## 2019-04-15 ENCOUNTER — APPOINTMENT (OUTPATIENT)
Dept: GENERAL RADIOLOGY | Facility: CLINIC | Age: 59
DRG: 982 | End: 2019-04-15
Payer: COMMERCIAL

## 2019-04-15 ENCOUNTER — ANESTHESIA (OUTPATIENT)
Dept: SURGERY | Facility: CLINIC | Age: 59
DRG: 982 | End: 2019-04-15
Payer: COMMERCIAL

## 2019-04-15 ENCOUNTER — APPOINTMENT (OUTPATIENT)
Dept: GENERAL RADIOLOGY | Facility: CLINIC | Age: 59
DRG: 982 | End: 2019-04-15
Attending: INTERNAL MEDICINE
Payer: COMMERCIAL

## 2019-04-15 ENCOUNTER — ANESTHESIA (OUTPATIENT)
Dept: MEDSURG UNIT | Facility: CLINIC | Age: 59
End: 2019-04-15

## 2019-04-15 LAB
ABO + RH BLD: NORMAL
ABO + RH BLD: NORMAL
ANION GAP SERPL CALCULATED.3IONS-SCNC: 8 MMOL/L (ref 3–14)
BLD GP AB SCN SERPL QL: NORMAL
BLOOD BANK CMNT PATIENT-IMP: NORMAL
BUN SERPL-MCNC: 14 MG/DL (ref 7–30)
CALCIUM SERPL-MCNC: 9.3 MG/DL (ref 8.5–10.1)
CHLORIDE SERPL-SCNC: 106 MMOL/L (ref 94–109)
CO2 SERPL-SCNC: 24 MMOL/L (ref 20–32)
CREAT SERPL-MCNC: 0.83 MG/DL (ref 0.66–1.25)
ERYTHROCYTE [DISTWIDTH] IN BLOOD BY AUTOMATED COUNT: 13.7 % (ref 10–15)
GFR SERPL CREATININE-BSD FRML MDRD: >90 ML/MIN/{1.73_M2}
GLUCOSE SERPL-MCNC: 98 MG/DL (ref 70–99)
HCT VFR BLD AUTO: 41.9 % (ref 40–53)
HGB BLD-MCNC: 13.3 G/DL (ref 13.3–17.7)
INR PPP: 1.1 (ref 0.86–1.14)
MCH RBC QN AUTO: 27.5 PG (ref 26.5–33)
MCHC RBC AUTO-ENTMCNC: 31.7 G/DL (ref 31.5–36.5)
MCV RBC AUTO: 87 FL (ref 78–100)
PLATELET # BLD AUTO: 403 10E9/L (ref 150–450)
POTASSIUM SERPL-SCNC: 4 MMOL/L (ref 3.4–5.3)
RBC # BLD AUTO: 4.84 10E12/L (ref 4.4–5.9)
SODIUM SERPL-SCNC: 138 MMOL/L (ref 133–144)
SPECIMEN EXP DATE BLD: NORMAL
WBC # BLD AUTO: 5.3 10E9/L (ref 4–11)

## 2019-04-15 PROCEDURE — 25000128 H RX IP 250 OP 636: Performed by: ORTHOPAEDIC SURGERY

## 2019-04-15 PROCEDURE — 25000566 ZZH SEVOFLURANE, EA 15 MIN: Performed by: ORTHOPAEDIC SURGERY

## 2019-04-15 PROCEDURE — 40000277 XR SURGERY CARM FLUORO LESS THAN 5 MIN W STILLS: Mod: TC

## 2019-04-15 PROCEDURE — 85610 PROTHROMBIN TIME: CPT | Performed by: INTERNAL MEDICINE

## 2019-04-15 PROCEDURE — 25000125 ZZHC RX 250: Performed by: NURSE ANESTHETIST, CERTIFIED REGISTERED

## 2019-04-15 PROCEDURE — 27210794 ZZH OR GENERAL SUPPLY STERILE: Performed by: ORTHOPAEDIC SURGERY

## 2019-04-15 PROCEDURE — 71000014 ZZH RECOVERY PHASE 1 LEVEL 2 FIRST HR: Performed by: ORTHOPAEDIC SURGERY

## 2019-04-15 PROCEDURE — 36000064 ZZH SURGERY LEVEL 4 EA 15 ADDTL MIN - UMMC: Performed by: ORTHOPAEDIC SURGERY

## 2019-04-15 PROCEDURE — 86850 RBC ANTIBODY SCREEN: CPT | Performed by: STUDENT IN AN ORGANIZED HEALTH CARE EDUCATION/TRAINING PROGRAM

## 2019-04-15 PROCEDURE — 25000128 H RX IP 250 OP 636: Performed by: NURSE ANESTHETIST, CERTIFIED REGISTERED

## 2019-04-15 PROCEDURE — 36000066 ZZH SURGERY LEVEL 4 W FLUORO 1ST 30 MIN - UMMC: Performed by: ORTHOPAEDIC SURGERY

## 2019-04-15 PROCEDURE — 80048 BASIC METABOLIC PNL TOTAL CA: CPT | Performed by: INTERNAL MEDICINE

## 2019-04-15 PROCEDURE — 86900 BLOOD TYPING SEROLOGIC ABO: CPT | Performed by: STUDENT IN AN ORGANIZED HEALTH CARE EDUCATION/TRAINING PROGRAM

## 2019-04-15 PROCEDURE — 25000132 ZZH RX MED GY IP 250 OP 250 PS 637: Performed by: INTERNAL MEDICINE

## 2019-04-15 PROCEDURE — 25000125 ZZHC RX 250: Performed by: ORTHOPAEDIC SURGERY

## 2019-04-15 PROCEDURE — 85027 COMPLETE CBC AUTOMATED: CPT | Performed by: INTERNAL MEDICINE

## 2019-04-15 PROCEDURE — 25000128 H RX IP 250 OP 636: Performed by: INTERNAL MEDICINE

## 2019-04-15 PROCEDURE — 0QH636Z INSERTION OF INTRAMEDULLARY INTERNAL FIXATION DEVICE INTO RIGHT UPPER FEMUR, PERCUTANEOUS APPROACH: ICD-10-PCS | Performed by: ORTHOPAEDIC SURGERY

## 2019-04-15 PROCEDURE — 37000009 ZZH ANESTHESIA TECHNICAL FEE, EACH ADDTL 15 MIN: Performed by: ORTHOPAEDIC SURGERY

## 2019-04-15 PROCEDURE — 25000132 ZZH RX MED GY IP 250 OP 250 PS 637: Performed by: ANESTHESIOLOGY

## 2019-04-15 PROCEDURE — 71046 X-RAY EXAM CHEST 2 VIEWS: CPT

## 2019-04-15 PROCEDURE — C1713 ANCHOR/SCREW BN/BN,TIS/BN: HCPCS | Performed by: ORTHOPAEDIC SURGERY

## 2019-04-15 PROCEDURE — 86901 BLOOD TYPING SEROLOGIC RH(D): CPT | Performed by: STUDENT IN AN ORGANIZED HEALTH CARE EDUCATION/TRAINING PROGRAM

## 2019-04-15 PROCEDURE — 37000008 ZZH ANESTHESIA TECHNICAL FEE, 1ST 30 MIN: Performed by: ORTHOPAEDIC SURGERY

## 2019-04-15 PROCEDURE — 27211024 ZZHC OR SUPPLY OTHER OPNP: Performed by: ORTHOPAEDIC SURGERY

## 2019-04-15 PROCEDURE — 71000015 ZZH RECOVERY PHASE 1 LEVEL 2 EA ADDTL HR: Performed by: ORTHOPAEDIC SURGERY

## 2019-04-15 PROCEDURE — 25800030 ZZH RX IP 258 OP 636: Performed by: ANESTHESIOLOGY

## 2019-04-15 PROCEDURE — 25000125 ZZHC RX 250: Performed by: STUDENT IN AN ORGANIZED HEALTH CARE EDUCATION/TRAINING PROGRAM

## 2019-04-15 PROCEDURE — 25000128 H RX IP 250 OP 636: Performed by: STUDENT IN AN ORGANIZED HEALTH CARE EDUCATION/TRAINING PROGRAM

## 2019-04-15 PROCEDURE — 25800030 ZZH RX IP 258 OP 636: Performed by: STUDENT IN AN ORGANIZED HEALTH CARE EDUCATION/TRAINING PROGRAM

## 2019-04-15 PROCEDURE — 99233 SBSQ HOSP IP/OBS HIGH 50: CPT | Performed by: INTERNAL MEDICINE

## 2019-04-15 PROCEDURE — 40000171 ZZH STATISTIC PRE-PROCEDURE ASSESSMENT III: Performed by: ORTHOPAEDIC SURGERY

## 2019-04-15 PROCEDURE — 25000128 H RX IP 250 OP 636: Performed by: ANESTHESIOLOGY

## 2019-04-15 PROCEDURE — 12000001 ZZH R&B MED SURG/OB UMMC

## 2019-04-15 DEVICE — IMP SCR STRK LOCK 5.0X55MM FT 1896-5055S: Type: IMPLANTABLE DEVICE | Site: FEMUR | Status: FUNCTIONAL

## 2019-04-15 DEVICE — IMP SCR BONE STRK G3 LAG 10.5X100MM TI 3060-0100S: Type: IMPLANTABLE DEVICE | Site: FEMUR | Status: FUNCTIONAL

## 2019-04-15 DEVICE — IMPLANTABLE DEVICE: Type: IMPLANTABLE DEVICE | Site: FEMUR | Status: FUNCTIONAL

## 2019-04-15 RX ORDER — LABETALOL 20 MG/4 ML (5 MG/ML) INTRAVENOUS SYRINGE
10
Status: DISCONTINUED | OUTPATIENT
Start: 2019-04-15 | End: 2019-04-15 | Stop reason: HOSPADM

## 2019-04-15 RX ORDER — LIDOCAINE HYDROCHLORIDE 20 MG/ML
INJECTION, SOLUTION INFILTRATION; PERINEURAL PRN
Status: DISCONTINUED | OUTPATIENT
Start: 2019-04-15 | End: 2019-04-15

## 2019-04-15 RX ORDER — SODIUM CHLORIDE, SODIUM LACTATE, POTASSIUM CHLORIDE, CALCIUM CHLORIDE 600; 310; 30; 20 MG/100ML; MG/100ML; MG/100ML; MG/100ML
INJECTION, SOLUTION INTRAVENOUS CONTINUOUS
Status: DISCONTINUED | OUTPATIENT
Start: 2019-04-15 | End: 2019-04-15 | Stop reason: HOSPADM

## 2019-04-15 RX ORDER — ONDANSETRON 2 MG/ML
INJECTION INTRAMUSCULAR; INTRAVENOUS PRN
Status: DISCONTINUED | OUTPATIENT
Start: 2019-04-15 | End: 2019-04-15

## 2019-04-15 RX ORDER — FENTANYL CITRATE 50 UG/ML
25-50 INJECTION, SOLUTION INTRAMUSCULAR; INTRAVENOUS
Status: DISCONTINUED | OUTPATIENT
Start: 2019-04-15 | End: 2019-04-15 | Stop reason: HOSPADM

## 2019-04-15 RX ORDER — BUPIVACAINE HYDROCHLORIDE AND EPINEPHRINE 5; 5 MG/ML; UG/ML
INJECTION, SOLUTION PERINEURAL PRN
Status: DISCONTINUED | OUTPATIENT
Start: 2019-04-15 | End: 2019-04-15 | Stop reason: HOSPADM

## 2019-04-15 RX ORDER — SODIUM CHLORIDE, SODIUM LACTATE, POTASSIUM CHLORIDE, CALCIUM CHLORIDE 600; 310; 30; 20 MG/100ML; MG/100ML; MG/100ML; MG/100ML
INJECTION, SOLUTION INTRAVENOUS CONTINUOUS PRN
Status: DISCONTINUED | OUTPATIENT
Start: 2019-04-15 | End: 2019-04-15

## 2019-04-15 RX ORDER — BUPIVACAINE HYDROCHLORIDE 2.5 MG/ML
INJECTION, SOLUTION EPIDURAL; INFILTRATION; INTRACAUDAL PRN
Status: DISCONTINUED | OUTPATIENT
Start: 2019-04-15 | End: 2019-04-15

## 2019-04-15 RX ORDER — HYDROMORPHONE HYDROCHLORIDE 1 MG/ML
.3-.5 INJECTION, SOLUTION INTRAMUSCULAR; INTRAVENOUS; SUBCUTANEOUS EVERY 5 MIN PRN
Status: DISCONTINUED | OUTPATIENT
Start: 2019-04-15 | End: 2019-04-15 | Stop reason: HOSPADM

## 2019-04-15 RX ORDER — EPHEDRINE SULFATE 50 MG/ML
INJECTION, SOLUTION INTRAMUSCULAR; INTRAVENOUS; SUBCUTANEOUS PRN
Status: DISCONTINUED | OUTPATIENT
Start: 2019-04-15 | End: 2019-04-15

## 2019-04-15 RX ORDER — BUPIVACAINE HYDROCHLORIDE AND EPINEPHRINE 2.5; 5 MG/ML; UG/ML
INJECTION, SOLUTION INFILTRATION; PERINEURAL PRN
Status: DISCONTINUED | OUTPATIENT
Start: 2019-04-15 | End: 2019-04-15 | Stop reason: HOSPADM

## 2019-04-15 RX ORDER — ONDANSETRON 2 MG/ML
4 INJECTION INTRAMUSCULAR; INTRAVENOUS EVERY 30 MIN PRN
Status: DISCONTINUED | OUTPATIENT
Start: 2019-04-15 | End: 2019-04-15 | Stop reason: HOSPADM

## 2019-04-15 RX ORDER — PROPOFOL 10 MG/ML
INJECTION, EMULSION INTRAVENOUS PRN
Status: DISCONTINUED | OUTPATIENT
Start: 2019-04-15 | End: 2019-04-15

## 2019-04-15 RX ORDER — FLUMAZENIL 0.1 MG/ML
0.2 INJECTION, SOLUTION INTRAVENOUS
Status: DISCONTINUED | OUTPATIENT
Start: 2019-04-15 | End: 2019-04-15 | Stop reason: HOSPADM

## 2019-04-15 RX ORDER — FENTANYL CITRATE 50 UG/ML
INJECTION, SOLUTION INTRAMUSCULAR; INTRAVENOUS PRN
Status: DISCONTINUED | OUTPATIENT
Start: 2019-04-15 | End: 2019-04-15

## 2019-04-15 RX ORDER — CEFAZOLIN SODIUM 2 G/100ML
2 INJECTION, SOLUTION INTRAVENOUS
Status: COMPLETED | OUTPATIENT
Start: 2019-04-15 | End: 2019-04-15

## 2019-04-15 RX ORDER — ACETAMINOPHEN 325 MG/1
975 TABLET ORAL ONCE
Status: COMPLETED | OUTPATIENT
Start: 2019-04-15 | End: 2019-04-15

## 2019-04-15 RX ORDER — GABAPENTIN 300 MG/1
300 CAPSULE ORAL ONCE
Status: CANCELLED | OUTPATIENT
Start: 2019-04-15 | End: 2019-04-15

## 2019-04-15 RX ORDER — CEFAZOLIN SODIUM 1 G/3ML
1 INJECTION, POWDER, FOR SOLUTION INTRAMUSCULAR; INTRAVENOUS EVERY 8 HOURS
Status: COMPLETED | OUTPATIENT
Start: 2019-04-15 | End: 2019-04-16

## 2019-04-15 RX ORDER — ACETAMINOPHEN 325 MG/1
975 TABLET ORAL ONCE
Status: CANCELLED | OUTPATIENT
Start: 2019-04-15 | End: 2019-04-15

## 2019-04-15 RX ORDER — ONDANSETRON 4 MG/1
4 TABLET, ORALLY DISINTEGRATING ORAL EVERY 30 MIN PRN
Status: DISCONTINUED | OUTPATIENT
Start: 2019-04-15 | End: 2019-04-15 | Stop reason: HOSPADM

## 2019-04-15 RX ORDER — GABAPENTIN 300 MG/1
300 CAPSULE ORAL ONCE
Status: COMPLETED | OUTPATIENT
Start: 2019-04-15 | End: 2019-04-15

## 2019-04-15 RX ORDER — NALOXONE HYDROCHLORIDE 0.4 MG/ML
.1-.4 INJECTION, SOLUTION INTRAMUSCULAR; INTRAVENOUS; SUBCUTANEOUS
Status: DISCONTINUED | OUTPATIENT
Start: 2019-04-15 | End: 2019-04-16

## 2019-04-15 RX ORDER — NALOXONE HYDROCHLORIDE 0.4 MG/ML
.1-.4 INJECTION, SOLUTION INTRAMUSCULAR; INTRAVENOUS; SUBCUTANEOUS
Status: DISCONTINUED | OUTPATIENT
Start: 2019-04-15 | End: 2019-04-15 | Stop reason: HOSPADM

## 2019-04-15 RX ADMIN — DOCUSATE SODIUM 200 MG: 100 CAPSULE, LIQUID FILLED ORAL at 23:39

## 2019-04-15 RX ADMIN — Medication 5 MG: at 14:38

## 2019-04-15 RX ADMIN — SODIUM CHLORIDE, POTASSIUM CHLORIDE, SODIUM LACTATE AND CALCIUM CHLORIDE: 600; 310; 30; 20 INJECTION, SOLUTION INTRAVENOUS at 16:57

## 2019-04-15 RX ADMIN — ACETAMINOPHEN 975 MG: 325 TABLET, FILM COATED ORAL at 13:10

## 2019-04-15 RX ADMIN — FENTANYL CITRATE 150 MCG: 50 INJECTION, SOLUTION INTRAMUSCULAR; INTRAVENOUS at 14:00

## 2019-04-15 RX ADMIN — FENTANYL CITRATE 50 MCG: 50 INJECTION INTRAMUSCULAR; INTRAVENOUS at 13:37

## 2019-04-15 RX ADMIN — PHENYLEPHRINE HYDROCHLORIDE 100 MCG: 10 INJECTION, SOLUTION INTRAMUSCULAR; INTRAVENOUS; SUBCUTANEOUS at 14:48

## 2019-04-15 RX ADMIN — PROPOFOL 100 MG: 10 INJECTION, EMULSION INTRAVENOUS at 14:00

## 2019-04-15 RX ADMIN — Medication 0.3 MG: at 16:49

## 2019-04-15 RX ADMIN — LIDOCAINE HYDROCHLORIDE 100 MG: 20 INJECTION, SOLUTION INFILTRATION; PERINEURAL at 14:00

## 2019-04-15 RX ADMIN — FENTANYL CITRATE 50 MCG: 50 INJECTION, SOLUTION INTRAMUSCULAR; INTRAVENOUS at 15:58

## 2019-04-15 RX ADMIN — FENTANYL CITRATE 50 MCG: 50 INJECTION INTRAMUSCULAR; INTRAVENOUS at 17:00

## 2019-04-15 RX ADMIN — Medication 0.2 MG: at 16:56

## 2019-04-15 RX ADMIN — FENTANYL CITRATE 50 MCG: 50 INJECTION, SOLUTION INTRAMUSCULAR; INTRAVENOUS at 16:21

## 2019-04-15 RX ADMIN — Medication 0.5 MG: at 22:14

## 2019-04-15 RX ADMIN — PHENYLEPHRINE HYDROCHLORIDE 100 MCG: 10 INJECTION, SOLUTION INTRAMUSCULAR; INTRAVENOUS; SUBCUTANEOUS at 14:19

## 2019-04-15 RX ADMIN — GABAPENTIN 300 MG: 300 CAPSULE ORAL at 13:09

## 2019-04-15 RX ADMIN — OXYCODONE HYDROCHLORIDE 15 MG: 5 TABLET ORAL at 23:36

## 2019-04-15 RX ADMIN — DOCUSATE SODIUM 200 MG: 100 CAPSULE, LIQUID FILLED ORAL at 19:01

## 2019-04-15 RX ADMIN — PHENYLEPHRINE HYDROCHLORIDE 100 MCG: 10 INJECTION, SOLUTION INTRAMUSCULAR; INTRAVENOUS; SUBCUTANEOUS at 14:26

## 2019-04-15 RX ADMIN — CEFAZOLIN SODIUM 1 G: 2 INJECTION, SOLUTION INTRAVENOUS at 16:15

## 2019-04-15 RX ADMIN — BUPIVACAINE HYDROCHLORIDE 30 ML: 2.5 INJECTION, SOLUTION EPIDURAL; INFILTRATION; INTRACAUDAL; PERINEURAL at 13:35

## 2019-04-15 RX ADMIN — HYDROMORPHONE HYDROCHLORIDE 0.5 MG: 1 INJECTION, SOLUTION INTRAMUSCULAR; INTRAVENOUS; SUBCUTANEOUS at 15:35

## 2019-04-15 RX ADMIN — FENTANYL CITRATE 50 MCG: 50 INJECTION INTRAMUSCULAR; INTRAVENOUS at 16:44

## 2019-04-15 RX ADMIN — Medication 0.5 MG: at 17:23

## 2019-04-15 RX ADMIN — MIDAZOLAM 1 MG: 1 INJECTION INTRAMUSCULAR; INTRAVENOUS at 13:37

## 2019-04-15 RX ADMIN — ROCURONIUM BROMIDE 10 MG: 10 INJECTION INTRAVENOUS at 14:59

## 2019-04-15 RX ADMIN — FENTANYL CITRATE 25 MCG: 50 INJECTION INTRAMUSCULAR; INTRAVENOUS at 16:32

## 2019-04-15 RX ADMIN — SODIUM CHLORIDE, POTASSIUM CHLORIDE, SODIUM LACTATE AND CALCIUM CHLORIDE: 600; 310; 30; 20 INJECTION, SOLUTION INTRAVENOUS at 13:48

## 2019-04-15 RX ADMIN — FENTANYL CITRATE 25 MCG: 50 INJECTION INTRAMUSCULAR; INTRAVENOUS at 16:37

## 2019-04-15 RX ADMIN — CEFAZOLIN 1 G: 1 INJECTION, POWDER, FOR SOLUTION INTRAMUSCULAR; INTRAVENOUS at 21:15

## 2019-04-15 RX ADMIN — MIDAZOLAM 2 MG: 1 INJECTION INTRAMUSCULAR; INTRAVENOUS at 13:48

## 2019-04-15 RX ADMIN — Medication 0.5 MG: at 17:09

## 2019-04-15 RX ADMIN — ONDANSETRON 4 MG: 2 INJECTION INTRAMUSCULAR; INTRAVENOUS at 14:25

## 2019-04-15 RX ADMIN — CEFAZOLIN SODIUM 2 G: 2 INJECTION, SOLUTION INTRAVENOUS at 14:15

## 2019-04-15 RX ADMIN — PHENYLEPHRINE HYDROCHLORIDE 200 MCG: 10 INJECTION, SOLUTION INTRAMUSCULAR; INTRAVENOUS; SUBCUTANEOUS at 14:32

## 2019-04-15 RX ADMIN — FENTANYL CITRATE 50 MCG: 50 INJECTION, SOLUTION INTRAMUSCULAR; INTRAVENOUS at 15:02

## 2019-04-15 RX ADMIN — ROCURONIUM BROMIDE 50 MG: 10 INJECTION INTRAVENOUS at 14:00

## 2019-04-15 RX ADMIN — SUGAMMADEX 200 MG: 100 INJECTION, SOLUTION INTRAVENOUS at 15:58

## 2019-04-15 RX ADMIN — OXYCODONE HYDROCHLORIDE 15 MG: 5 TABLET ORAL at 19:01

## 2019-04-15 ASSESSMENT — ACTIVITIES OF DAILY LIVING (ADL)
ADLS_ACUITY_SCORE: 19
ADLS_ACUITY_SCORE: 20

## 2019-04-15 ASSESSMENT — LIFESTYLE VARIABLES: TOBACCO_USE: 1

## 2019-04-15 ASSESSMENT — PAIN DESCRIPTION - DESCRIPTORS
DESCRIPTORS: ACHING
DESCRIPTORS: ACHING;CONSTANT

## 2019-04-15 ASSESSMENT — MIFFLIN-ST. JEOR: SCORE: 1615.94

## 2019-04-15 NOTE — ANESTHESIA PROCEDURE NOTES
Peripheral Nerve Block Procedure Note    Staff:     Anesthesiologist:  Jayden Martinez MD    Resident/CRNA:  Ravindra Rose MD    Block performed by resident/CRNA in the presence of a teaching physician    Location: Pre-op  Procedure Start/Stop TImes:      4/15/2019 1:30 PM     4/15/2019 1:40 PM    patient identified, IV checked, site marked, risks and benefits discussed, informed consent, monitors and equipment checked, pre-op evaluation, at physician/surgeon's request and post-op pain management      Correct Patient: Yes      Correct Position: Yes      Correct Site: Yes      Correct Procedure: Yes      Correct Laterality:  Yes    Site Marked:  Yes  Procedure details:     Procedure:  Fascia iliaca    ASA:  3    Diagnosis:  Post operative pain    Laterality:  Right    Position:  Sitting    Sterile Prep: chloraprep, mask and sterile gloves      Needle:  Short bevel    Needle gauge:  21    Needle length (inches):  3.13    Ultrasound: Yes      Ultrasound used to identify targeted nerve, plexus, or vascular structure and placed a needle adjacent to it      Permanent Image entered into patiient's record      Abnormal pain on injection: No      Blood Aspirated: No      Paresthesias:  No    Bleeding at site: No      Bolus via:  Needle    Infusion Method:  Single Shot    Complications:  None

## 2019-04-15 NOTE — OP NOTE
Preop diagnosis: Metastatic sarcoma with impending fracture right femur    Postoperative diagnosis: Same    Procedure performed: Placement of prophylactic right femoral gamma nail with proximal and distal interlocking screws.    Pathology submitted: None    Estimated blood loss: 150 cc    Surgeons: Dwain Betts and Grzegorz Monge.    David was interviewed in the preoperative area.  He been hospitalized over the weekend for a pneumothorax.  His surgery was scheduled to be performed at the [facility] rather than the [facility].  In the preoperative area informed consent was obtained risk and benefits were again reinforced.  These have been discussed in the clinic last week.  Preoperative brief was performed.  Patient was taken the operating room and received a general anesthetic in a supine position was placed on the fracture table.  X-ray was used to confirm adequate visualization of the proximal femur and the right lower extremity was prepped and draped sterilely.  Surgical timeout was performed.    In a standard fashion with the assistance of C-arm guide pin was placed in the proximal femur.  After the proximal femur was drilled a long curved curette was placed in the medullary canal to determine if tumor was present along the cortex that could be created.  It was determined that this was not the case. Femur was then reamed to 13.  An 11 mm nail was placed uneventfully.  In a standard fashion the proximal interlocking screw was placed.  This measured 100 mm.  Again in a standard fashion 2 distal interlocks were placed.  Adequate placement of all screws was determined on biplanar x-ray images.  The wounds were irrigated and closed with subcutaneous and skin layer.    Postoperative day brief was performed.    Postoperative plan: 1.  Weightbearing as tolerated.  2.  Return to clinic in 3 weeks for suture removal by resident or physician assistant Brittany Santos.  3.  We will discussed the role of  anticoagulation postoperatively.  We would recommend such in some form.

## 2019-04-15 NOTE — PROGRESS NOTES
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 He was recently hospitalized for SOB and found to have a large right-sided pneumothorax (s/p chest tube and talc pleurodesis on 4/3/19). Now being worked-up for a lytic lesion involving the right femur, which will require a nailing procedure. Admitted for evaluation/treatment of persistent asymptomatic R pneumothorax.     Persistent asymptomatic right-sided pneumothorax.  Recently hospitalized 4/2-4/5/19 for evaluation of SOB and found to have a large right-sided pneumothorax. He had a chest tube placed by thoracic surgery, and underwent talc pleurodesis on 4/3/19. Chest tube was removed on 4/5/19 and he was discharged to home after CXR showed resolution of the right-sided pneumothorax. Now re-admitted after CXR obtained in pre-op work-up on 4/12/19 revealed persistent right-sided pneumothorax. He underwent CT-guided pigtail catheter placement by IR on 4/12/19 and is admitted for monitoring prior to the orthopedic procedure.  - Chest tube management per thoracic surgery: will maintain on water seal through surgery 4/15  - Pain control with home oxycodone 10-15 mg PO Q4H PRN, with hydromorphone 0.3-0.5 mg IV Q2H PRN for breakthrough pain.     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  Follows with Dr. Johnson. Had a biopsy of the right thigh on 3/8/18 that revealed undifferentiated pleomorphic sarcoma. Received 4 cycles of Dox/Ifos starting 3/2018 and pre-operative XRT. Underwent resection on 9/17/18 with negative margins, >95% necrosis and no LVI. Has been noted to have slowly growing pulmonary  nodules and underwent RML wedge resection 12/5/18 with pathology revealing metastatic sarcoma, negative margins. Re-staging CT CAP on 4/2/19 revealed progressive disease, and he started Keytruda on 4/9/19.  - Follow-up with Dr. Johnson after discharge as scheduled.   - Next cycle of Keytruda will be due on 4/20/19.     Lytic lesion of the right femur.  Re-staging CT CAP on 4/2/19 revealed increased size and number of numerous metastatic pulmonary nodules, new/increased lesions in multiple LN regions, as well as a lytic lesion with an associated soft tissue mass arising from the posterior right femoral intertrochanteric region. He saw Dr. Betts of orthopedic surgery on 4/9/19 and is planned for right femur Gamma nail on 4/15/19. He will need to be intubated for this procedure, and as such, is admitted for chest-tube placement.   - Scheduled for right femur intramedullary pinning on 4/15 on [facility].   - NWB RLE per ortho. Patient has crutches he brought from home.     FEN: NPO for procedure, no mIVF, replete lytes PRN  Prophylaxis: holding pending surgery  Code: FULL - Discussed with patient and wife at the time of admission.  Disposition: Admitted to the inpatient oncology service for evaluation after chest tube placement. Surgery is Monday afternoon and will be done on the [facility]. Possible discharge to home on 4/16.      Plan of care was discussed with attending physician Dr. Meyer.    Farzaneh Joseph PA-C  Hematology/Oncology  Pager: [phone]    Interval History   Feeling the same this morning. Has right sided chest pain with inspiration. Chronic right leg pain. Does not take pain medication for either. No SOB. Afebrile. Using crutches to get to and from the bathroom. Anticipating surgery today.    Physical Exam   Temp: 97.5  F (36.4  C) Temp src: Oral BP: 110/67   Heart Rate: 69 Resp: 18 SpO2: 95 % O2 Device: None (Room air)    Vitals:    04/13/19 0900 04/14/19 0802 04/15/19 0745   Weight: 82.1 kg  (180 lb 14.4 oz) 80.5 kg (177 lb 8 oz) 80 kg (176 lb 4.8 oz)     Vital Signs with Ranges  Temp:  [97.5  F (36.4  C)-99.1  F (37.3  C)] 97.5  F (36.4  C)  Heart Rate:  [69-75] 69  Resp:  [16-20] 18  BP: (104-114)/(63-71) 110/67  SpO2:  [93 %-96 %] 95 %  I/O last 3 completed shifts:  In: 200 [P.O.:200]  Out: 0     Constitutional: Pleasant male seen laying in bed. No apparent distress, and appears stated age.  Eyes: Lids and lashes normal, sclera clear, conjunctiva normal.  ENT: Normocephalic, oral pharynx with moist mucus membranes, tonsils without erythema or exudates, gums normal and good dentition.   Respiratory: Chest tube in place, R, c/d/i. No increased work of breathing, good air exchange, clear to auscultation bilaterally, no crackles or wheezing.   Cardiovascular: Regular rate and rhythm, normal S1 and S2, and no murmur noted.  GI: No masses or scars. +BS. Soft. No tenderness on palpation.  Skin: No bruising or bleeding, no redness, warmth, or swelling, no rashes, no lesions, no jaundice.  Extremities: There is no redness, warmth, or swelling of the joints. No lower extremity edema. No cyanosis.  Neurologic: Awake, alert, oriented to name, place and time.    Vascular access: PIV    Medications       acetaminophen  975 mg Oral Once     ceFAZolin  2 g Intravenous Pre-Op/Pre-procedure x 1 dose     gabapentin  300 mg Oral Once       Data   ROUTINE IP LABS (Last four results)  BMP  Recent Labs   Lab 04/15/19  0628 04/13/19  0522 04/12/19  1047 04/09/19  1215    134 135 134   POTASSIUM 4.0 4.2 4.0 4.3   CHLORIDE 106 102 101 101   JAYESH 9.3 9.5 9.5 10.4*   CO2 24 24 26 27   BUN 14 13 14 12   CR 0.83 0.88 0.87 0.77   GLC 98 91 95 104*     CBC  Recent Labs   Lab 04/15/19  0628 04/13/19  0522 04/12/19  1047 04/09/19  1215   WBC 5.3 5.3 6.5 5.2   RBC 4.84 4.72 5.02 5.23   HGB 13.3 13.1* 13.8 14.0   HCT 41.9 41.5 43.5 44.6   MCV 87 88 87 85   MCH 27.5 27.8 27.5 26.8   MCHC 31.7 31.6 31.7 31.4*   RDW 13.7 13.8 13.9  14.0    358 413 319     INR  Recent Labs   Lab 04/15/19  0628 04/12/19  1047   INR 1.10 1.11

## 2019-04-15 NOTE — ANESTHESIA PREPROCEDURE EVALUATION
Anesthesia Evaluation     . Pt has had prior anesthetic. Type: General    No history of anesthetic complications          ROS/MED HX    ENT/Pulmonary:     (+)tobacco use, Past use 10 pack years: quit > 30 yrs ago packs/day  , . Other pulmonary disease (History of PE, on Rivaroxaban.  Asymptomatic at time of diagnosis also with PTX chesttube in place) provoked on xarelto since March 2018-due to stop today.    Neurologic:  - neg neurologic ROS     Cardiovascular:  - neg cardiovascular ROS   (+) ----. : . . . :. . Previous cardiac testing Echodate:3/23/18results:Procedure  Echocardiogram with two-dimensional, color and spectral Doppler performed.  Contrast Optison. Optison (NDC #4137-1682-29) given intravenously. Patient was  given 6 ml mixture of 3 ml Optison and 6 ml saline. 3 ml wasted.  _____________________________________________________________________________  __        Interpretation Summary  Global and regional left ventricular function is normal with an EF of 60-65%.  Left ventricular diastolic function is normal.  Global right ventricular function is normal.  The inferior vena cava was normal in size.  No significant valvular dysfunction noted.  No pericardial effusion is present.  _____________________________________________________________________________  __        Left Ventricle  Left ventricular wall thickness is normal. Left ventricular size is normal.  Global and regional left ventricular function is normal with an EF of 60-65%.  Biplane LVEF measured at 66%. Left ventricular diastolic function is normal.     Right Ventricle  Global right ventricular function is normal. The right ventricle is normal  size.     Atria  The right atria appears normal. The left atrium appears normal. The atrial  septum is intact as assessed by color Doppler .     Mitral Valve  Trace to mild mitral insufficiency is present.        Aortic Valve  Aortic valve is normal in structure and function. The aortic valve  is  tricuspid. No aortic regurgitation is present.     Tricuspid Valve  Trace tricuspid insufficiency is present. The peak velocity of the tricuspid  regurgitant jet is not obtainable. Pulmonary artery systolic pressure cannot  be assessed.     Pulmonic Valve  The pulmonic valve is normal. Trace pulmonic insufficiency is present.     Vessels  The aorta root is normal. The inferior vena cava was normal in size with  preserved respiratory variability. Estimated mean right atrial pressure is 3  mmHg.     Pericardium  No pericardial effusion is present.        Compared to Previous Study  Previous study not available for comparison.     Attestation  I have personally viewed the imaging and agree with the interpretation and  report as documented by the fellow, Wallace Lee, and/or edited by me.date: results:ECG reviewed date:3/28/18 results:Sinus rhythm  Cannot rule out Inferior infarct , age undetermined  Abnormal ECG  No previous ECGs available date: results:         (-) taking anticoagulants/antiplatelets   METS/Exercise Tolerance:  1 - Eating, dressing   Hematologic:     (+) History of blood clots pt is not anticoagulated, Anemia, History of Transfusion no previous transfusion reaction Other Hematologic Disorder-leukopenia      Musculoskeletal:   (+)  other musculoskeletal- 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      GI/Hepatic:  - neg GI/hepatic ROS       Renal/Genitourinary:  - ROS Renal section negative       Endo:  - neg endo ROS       Psychiatric:  - neg psychiatric ROS       Infectious Disease:  - neg infectious disease ROS       Malignancy:   (+) Malignancy History of Other  Other CA soft tissue sarcoma, post op wound infection status post Surgery         Other:    (+) No chance of pregnancy no H/O Chronic Pain,no other significant disability                    Physical Exam      Airway   Mallampati: II  TM distance: >3 FB  Neck ROM: full    Dental   (+) chipped  Comment: Right upper tooth chipped  and with caries    Cardiovascular   Rhythm and rate: regular and normal      Pulmonary    breath sounds clear to auscultation    Other findings: Chest tube in place for PTX           PAC Discussion and Assessment    ASA Classification:   Case is suitable for:   Anesthetic techniques and relevant risks discussed:   Invasive monitoring and risk discussed:   Types:   Possibility and Risk of blood transfusion discussed:   NPO instructions given:   Additional anesthetic preparation and risks discussed:   Needs early admission to pre-op area:   Other:     PAC Resident/NP Anesthesia Assessment:        Mid-Level Provider/Resident:   Date: 11/29/18  Time: 12:50PM    Attending Anesthesiologist Anesthesia Assessment:        Anesthesiologist:   Date:   Time:   Pass/Fail:   Disposition:     PAC Pharmacist Assessment:        Pharmacist:   Date:   Time:      Anesthesia Plan      History & Physical Review  History and physical reviewed and following examination; no interval change.    ASA Status:  3 .    NPO Status:  > 8 hours    Plan for General and ETT with Intravenous induction. Maintenance will be Balanced.    PONV prophylaxis:  Ondansetron (or other 5HT-3)  Additional equipment: 2nd IV      Postoperative Care  Postoperative pain management:  IV analgesics.      Consents  Anesthetic plan, risks, benefits and alternatives discussed with:  Patient..          Anesthesia attending addendum    Prior to anesthetic I have examined the patient, reviewed the medical history, and discussed the ssessment and plan with the anesthesia and surgery teams.  58 year old male who  has a past medical history of Pulmonary embolism (H) and Sarcoma (H). to OR for Procedure(s):  Intramedullary Nail of Right Femur  NPO status adequate.  No results found for: HCGQUANT  Hemoglobin   Date Value Ref Range Status   04/15/2019 13.3 13.3 - 17.7 g/dL Final     Potassium   Date Value Ref Range Status   04/15/2019 4.0 3.4 - 5.3 mmol/L Final         ECG results  from 06/26/18   EKG 12-lead, tracing only     Value    Interpretation ECG Click View Image link to view waveform and result     No results found for this or any previous visit (from the past 8760 hour(s)).      Plan for GA with [place], standard monitors, large bore IVs, PONV prophylaxis.  Antibiotics per primary service. Anesthetic risks discussed with the patient, consent in chart.    Maryjane Yo MD  12/05/18                .        PHYSICAL EXAM:   Mental Status/Neuro: A/A/O   Airway: Facies: Feasible  Mallampati: I  Mouth/Opening: Full  TM distance: > 6 cm  Neck ROM: Full   Respiratory: Auscultation: CTAB     Resp. Rate: Normal     Resp. Effort: Normal      CV: Rhythm: Regular  Rate: Age appropriate  Heart: Normal Sounds   Comments:      Dental: Normal                  Assessment:   ASA SCORE: 3    NPO Status: > 6 hours since completed [employer]   Documentation: Preop Testing complete; Consents complete; Consults complete; H&P complete   Proceeding: Proceed without further delay  Tobacco Use:  NO Active use of Tobacco/UNKNOWN Tobacco use status     Plan:   Anes. Type:  General; Regional     RA Details:  FOR POSTOP PAIN CONTROL     RA-Location/Type: Plane Block   Pre-Induction: Midazolam IV; Acetaminophen PO; Gabapentin PO   Induction:  IV (Standard); Propofol; Rocuronium   Airway: Oral ETT   Access/Monitoring: PIV; 2nd PIV   Maintenance: Balanced; Inhalational   Emergence: Procedure Site   Logistics: Same Day Surgery     Postop Pain/Sedation Strategy:  Standard-Options: Opioids PRN     PONV Management:  Adult Risk Factors:, Non-Smoker, Postop Opioids  Prevention: Ondansetron; Dexamethasone     CONSENT: Direct conversation   Plan and risks discussed with: Patient

## 2019-04-15 NOTE — PLAN OF CARE
"David continues with pain in right hip/leg but declines pain medication.  LS decreased right side and chest tube is in place with no drainage out.  TO OR this afternoon for right femur pinning.  His yellow ring was placed in narc cabinet in 7D medication room.  Pt has DELILAH gonzalez declines to have it accessed because \"half the time it doesn't work.\"   "

## 2019-04-15 NOTE — PLAN OF CARE
00:00-07:00 am  AF with stable VS  NPO at [facility] for surgery today  Scrub done per pre-op order  Chest tube to water seal  No output  No nausea/vomiting  Chest tube site discomfort manageable  and declined need for pain med  Denied chest pain or SOB  Up independently, using crutch  Voiding spontaneously well and reports LBM 4/14  No new issues overnight  Continue w/POC

## 2019-04-15 NOTE — BRIEF OP NOTE
[facility] [place]    Brief Operative Note    Pre-operative diagnosis: Right Femur Tumor  Post-operative diagnosis Same  Procedure: Procedure(s):  Intramedullary Nail of Right Femur  Surgeon: Surgeon(s) and Role:     * Brad Betts MD - Primary     * Sandor Monge MD - Resident - Assisting  Anesthesia: General   Estimated blood loss: 150 cc  Drains: None  Specimens: * No specimens in log *  Findings:   None.  Complications: None.  Implants:    Implant Name Type Inv. Item Serial No.  Lot No. LRB No. Used   GAMMA3 SYSTEM--LONG NAIL RIGHT    [employer] [id number] Right 1   IMP SCR BONE STRK G3 LAG [id number] TI [id number] Metallic Hardware/[place] IMP SCR BONE STRK G3 LAG [id number] TI 3060-0100S  HADLEYP[employer]riika [id number] Right 1   IMP SCR STRK LOCK 5.0X50MM FT [id number] Metallic Hardware/[place] IMP SCR STRK LOCK 5.0X50MM FT [id number]  [employer] [id number] Right 1   IMP SCR STRK LOCK 5.0X50MM FT [id number] Metallic Hardware/[place] IMP SCR STRK LOCK 5.0X50MM FT [id number]  [employer] [id number] Right 1   IMP SCR STRK LOCK 5.0X55MM FT [id number] Metallic Hardware/[place] IMP SCR STRK LOCK 5.0X55MM FT [id number]  HDALEYP[employer]riika [id number] Right 1        Assessment and Plan: Hiram Tapia is a 58 year old male with PMH including UPS right thigh with recurrent right femur lytic lesion causing pain now s/p prophylactic IMN right femur on 4/15 with Dr. Betts.     Heme/Onc Primary  Activity: Up with assist until independent.  Weight bearing status: WBAT RLE  Pain management: Transition from IV to PO as tolerated.    Antibiotics: Ancef x 24 hours.  Diet: Begin with clear fluids and progress diet as tolerated.   DVT prophylaxis: resume home rivaroxaban POD#1 if bandage dry  Imaging: none further needed.  Labs: Hgb POD#1-2.  Dressings: Keep clean, dry and intact x 5 days.   Elevation: Elevate RLE on pillows to keep above the level of the heart as much as  possible.   Physical Therapy/Occupational Therapy: Eval and treat.  Follow-up: Clinic with PA-C or resident in 3 weeks  no x-rays needed.   Disposition: Pending progress with therapies, pain control on orals, and medical stability, chest tube anticipate discharge to home on POD #1-2.    Sandor Monge MD  Orthopaedic Surgery Resident, PGY-4  Pager: [phone]

## 2019-04-15 NOTE — ANESTHESIA CARE TRANSFER NOTE
Patient: Hiram Tapia    Procedure(s):  Intramedullary Nail of Right Femur    Diagnosis: Right Femur Tumor  Diagnosis Additional Information: No value filed.    Anesthesia Type:   General, ETT     Note:  Airway :Face Mask  Patient transferred to:PACU  Comments: Anesthesia Care Transfer Note      Transfer to:  PACU    Patient Vital Signs:  Stable    Airway:  None    Patient transported to PACU with supplemental O2.  Patient alert and breathing comfortably.  VSS.  Care transferred with report to PACU RN.    Zurdo Crump CRNAHandoff Report: Identifed the Patient, Identified the Reponsible Provider, Reviewed the pertinent medical history, Discussed the surgical course, Reviewed Intra-OP anesthesia mangement and issues during anesthesia, Set expectations for post-procedure period and Allowed opportunity for questions and acknowledgement of understanding      Vitals: (Last set prior to Anesthesia Care Transfer)    CRNA VITALS  4/15/2019 1547 - 4/15/2019 1622      4/15/2019             Resp Rate (observed):  22                Electronically Signed By: WILLAM Mccollum CRNA  April 15, 2019  4:22 PM

## 2019-04-16 ENCOUNTER — APPOINTMENT (OUTPATIENT)
Dept: GENERAL RADIOLOGY | Facility: CLINIC | Age: 59
DRG: 982 | End: 2019-04-16
Payer: COMMERCIAL

## 2019-04-16 ENCOUNTER — APPOINTMENT (OUTPATIENT)
Dept: PHYSICAL THERAPY | Facility: CLINIC | Age: 59
DRG: 982 | End: 2019-04-16
Attending: PHYSICIAN ASSISTANT
Payer: COMMERCIAL

## 2019-04-16 LAB
ANION GAP SERPL CALCULATED.3IONS-SCNC: 7 MMOL/L (ref 3–14)
BUN SERPL-MCNC: 12 MG/DL (ref 7–30)
CALCIUM SERPL-MCNC: 9.2 MG/DL (ref 8.5–10.1)
CHLORIDE SERPL-SCNC: 103 MMOL/L (ref 94–109)
CO2 SERPL-SCNC: 26 MMOL/L (ref 20–32)
CREAT SERPL-MCNC: 0.9 MG/DL (ref 0.66–1.25)
ERYTHROCYTE [DISTWIDTH] IN BLOOD BY AUTOMATED COUNT: 13.8 % (ref 10–15)
GFR SERPL CREATININE-BSD FRML MDRD: >90 ML/MIN/{1.73_M2}
GLUCOSE SERPL-MCNC: 95 MG/DL (ref 70–99)
HCT VFR BLD AUTO: 38.8 % (ref 40–53)
HGB BLD-MCNC: 12.3 G/DL (ref 13.3–17.7)
MCH RBC QN AUTO: 27.8 PG (ref 26.5–33)
MCHC RBC AUTO-ENTMCNC: 31.7 G/DL (ref 31.5–36.5)
MCV RBC AUTO: 88 FL (ref 78–100)
PLATELET # BLD AUTO: 331 10E9/L (ref 150–450)
POTASSIUM SERPL-SCNC: 4 MMOL/L (ref 3.4–5.3)
RBC # BLD AUTO: 4.43 10E12/L (ref 4.4–5.9)
SODIUM SERPL-SCNC: 136 MMOL/L (ref 133–144)
WBC # BLD AUTO: 5.5 10E9/L (ref 4–11)

## 2019-04-16 PROCEDURE — 36415 COLL VENOUS BLD VENIPUNCTURE: CPT | Performed by: PHYSICIAN ASSISTANT

## 2019-04-16 PROCEDURE — 80048 BASIC METABOLIC PNL TOTAL CA: CPT | Performed by: PHYSICIAN ASSISTANT

## 2019-04-16 PROCEDURE — 25000125 ZZHC RX 250: Performed by: PHYSICIAN ASSISTANT

## 2019-04-16 PROCEDURE — 12000001 ZZH R&B MED SURG/OB UMMC

## 2019-04-16 PROCEDURE — 25800030 ZZH RX IP 258 OP 636: Performed by: PHYSICIAN ASSISTANT

## 2019-04-16 PROCEDURE — 97110 THERAPEUTIC EXERCISES: CPT | Mod: GP

## 2019-04-16 PROCEDURE — 99233 SBSQ HOSP IP/OBS HIGH 50: CPT | Performed by: INTERNAL MEDICINE

## 2019-04-16 PROCEDURE — 3E0L3GC INTRODUCTION OF OTHER THERAPEUTIC SUBSTANCE INTO PLEURAL CAVITY, PERCUTANEOUS APPROACH: ICD-10-PCS | Performed by: PHYSICIAN ASSISTANT

## 2019-04-16 PROCEDURE — 25000128 H RX IP 250 OP 636: Performed by: PHYSICIAN ASSISTANT

## 2019-04-16 PROCEDURE — 25000128 H RX IP 250 OP 636: Performed by: STUDENT IN AN ORGANIZED HEALTH CARE EDUCATION/TRAINING PROGRAM

## 2019-04-16 PROCEDURE — 25000128 H RX IP 250 OP 636

## 2019-04-16 PROCEDURE — 71046 X-RAY EXAM CHEST 2 VIEWS: CPT

## 2019-04-16 PROCEDURE — 85027 COMPLETE CBC AUTOMATED: CPT | Performed by: PHYSICIAN ASSISTANT

## 2019-04-16 PROCEDURE — 25000132 ZZH RX MED GY IP 250 OP 250 PS 637: Performed by: INTERNAL MEDICINE

## 2019-04-16 PROCEDURE — 25000128 H RX IP 250 OP 636: Performed by: INTERNAL MEDICINE

## 2019-04-16 PROCEDURE — 97530 THERAPEUTIC ACTIVITIES: CPT | Mod: GP

## 2019-04-16 PROCEDURE — 97161 PT EVAL LOW COMPLEX 20 MIN: CPT | Mod: GP

## 2019-04-16 RX ORDER — HYDROMORPHONE HYDROCHLORIDE 1 MG/ML
.5-1 INJECTION, SOLUTION INTRAMUSCULAR; INTRAVENOUS; SUBCUTANEOUS
Status: COMPLETED | OUTPATIENT
Start: 2019-04-16 | End: 2019-04-16

## 2019-04-16 RX ORDER — NALOXONE HYDROCHLORIDE 0.4 MG/ML
.1-.4 INJECTION, SOLUTION INTRAMUSCULAR; INTRAVENOUS; SUBCUTANEOUS
Status: DISCONTINUED | OUTPATIENT
Start: 2019-04-16 | End: 2019-04-19 | Stop reason: HOSPADM

## 2019-04-16 RX ORDER — HYDROMORPHONE HYDROCHLORIDE 1 MG/ML
INJECTION, SOLUTION INTRAMUSCULAR; INTRAVENOUS; SUBCUTANEOUS
Status: COMPLETED
Start: 2019-04-16 | End: 2019-04-16

## 2019-04-16 RX ORDER — LIDOCAINE HYDROCHLORIDE 10 MG/ML
30 INJECTION, SOLUTION INFILTRATION; PERINEURAL ONCE
Status: COMPLETED | OUTPATIENT
Start: 2019-04-16 | End: 2019-04-16

## 2019-04-16 RX ORDER — SODIUM CHLORIDE 9 MG/ML
INJECTION, SOLUTION INTRAVENOUS CONTINUOUS
Status: DISCONTINUED | OUTPATIENT
Start: 2019-04-16 | End: 2019-04-19 | Stop reason: HOSPADM

## 2019-04-16 RX ADMIN — DOCUSATE SODIUM 200 MG: 100 CAPSULE, LIQUID FILLED ORAL at 07:49

## 2019-04-16 RX ADMIN — Medication 0.5 MG: at 06:36

## 2019-04-16 RX ADMIN — Medication: at 13:52

## 2019-04-16 RX ADMIN — Medication 0.3 MG: at 15:17

## 2019-04-16 RX ADMIN — HYDROMORPHONE HYDROCHLORIDE 0.3 MG: 1 INJECTION, SOLUTION INTRAMUSCULAR; INTRAVENOUS; SUBCUTANEOUS at 15:17

## 2019-04-16 RX ADMIN — Medication 0.5 MG: at 10:39

## 2019-04-16 RX ADMIN — OXYCODONE HYDROCHLORIDE 15 MG: 5 TABLET ORAL at 07:46

## 2019-04-16 RX ADMIN — LIDOCAINE HYDROCHLORIDE 30 ML: 10 INJECTION, SOLUTION INFILTRATION; PERINEURAL at 14:50

## 2019-04-16 RX ADMIN — Medication 0.5 MG: at 02:28

## 2019-04-16 RX ADMIN — SENNOSIDES 8.6 MG: 8.6 TABLET, FILM COATED ORAL at 03:47

## 2019-04-16 RX ADMIN — OXYCODONE HYDROCHLORIDE 15 MG: 5 TABLET ORAL at 03:44

## 2019-04-16 RX ADMIN — SODIUM CHLORIDE: 9 INJECTION, SOLUTION INTRAVENOUS at 13:52

## 2019-04-16 RX ADMIN — Medication 4 G: at 15:30

## 2019-04-16 RX ADMIN — DOCUSATE SODIUM 200 MG: 100 CAPSULE, LIQUID FILLED ORAL at 21:59

## 2019-04-16 RX ADMIN — CEFAZOLIN 1 G: 1 INJECTION, POWDER, FOR SOLUTION INTRAMUSCULAR; INTRAVENOUS at 06:36

## 2019-04-16 RX ADMIN — ENOXAPARIN SODIUM 40 MG: 40 INJECTION SUBCUTANEOUS at 10:39

## 2019-04-16 RX ADMIN — OXYCODONE HYDROCHLORIDE 15 MG: 5 TABLET ORAL at 11:44

## 2019-04-16 ASSESSMENT — PAIN DESCRIPTION - DESCRIPTORS
DESCRIPTORS: ACHING
DESCRIPTORS: ACHING;SORE

## 2019-04-16 ASSESSMENT — ACTIVITIES OF DAILY LIVING (ADL)
ADLS_ACUITY_SCORE: 20
ADLS_ACUITY_SCORE: 19
ADLS_ACUITY_SCORE: 20
ADLS_ACUITY_SCORE: 19
ADLS_ACUITY_SCORE: 20
ADLS_ACUITY_SCORE: 19

## 2019-04-16 ASSESSMENT — MIFFLIN-ST. JEOR: SCORE: 1632.27

## 2019-04-16 NOTE — PROCEDURES
Thoracic Surgery Procedure Note  Preprocedure Diagnosis:  Persistent right pneumothorax  Postprocedure Diagnosis: Same  Procedure: Bedside chemical pleurodesis (talc)  Medications administered: 0.3 dilaudid, dilaudid PCA    A pain control plan was arranged prior to the procedure.  After informed consent was obtained, 30ml of 1% lidocaine without epinephrine was injected into the existing right sided chest tube.  After allowing adequate time to ensure maximal anaesthesia, the talc slurry (4g talc in 150cc of normal saline) was instilled into the tube followed by 20cc of sterile saline to encourage movement of the slurry into the pleural space.  The tube was then clamped, with nursing instructions to unclamp the tube and place it to suction in 2 hours. The tube will remain to continuous suction for 48-72 hours, after which time thoracic surgery will reevaluate the possibility of removal.    The patient tolerated the procedure well, and there were no complications.     Dr. Loera was available for the entirety of the procedure.    Igor Marshall PA-C  [phone]

## 2019-04-16 NOTE — PLAN OF CARE
VSS, afebrile. C/o incisional pain, relief w/ PRN dilaudid and oxycodone, both given twice. Right leg dressings CDI. Ambulated to the bathroom assist of 1 and crutches. Urinated and passing gas. Drinking clear liquids, just advanced diet, planning to order cereal once abx done. Has Chest tube in with 0 output.

## 2019-04-16 NOTE — PLAN OF CARE
Discharge Planner PT  PT 7D  Patient plan for discharge: Home with spouse  Current status: PT order for evaluation and treatment acknowledged. PT evaluation completed and treatment initiated. Right water seal chest tube in place. Pt reports pain primarily in right buttock/lateral hip. Right knee AROM chronically limited to ~ 40 degrees. Pt completed supine right LE AROM/AAROM exercises. Pt transfers independently supine to sitting and sit to stand. Pt ambulated with 2 axillary crutches ~ 12', WBAT on right .  Barriers to return to prior living situation: Medical condition, decreased functional mobility and endurance  Recommendations for discharge: Home with spouse. HH PT vs OP PT may be needed depending on pt's progress during hospitalization.  Rationale for recommendations: Anticipate that pt will progress to independence with crutch ambulation and functional mobility for safe return home.       Entered by: Eric Mata 04/16/2019 2:55 PM

## 2019-04-16 NOTE — ANESTHESIA POSTPROCEDURE EVALUATION
Anesthesia POST Procedure Evaluation    Patient: Hiram Tapia   MRN:     [id number] Gender:   male   Age:    58 year old :      1960        Preoperative Diagnosis: Right Femur Tumor   Procedure(s):  Intramedullary Nail of Right Femur   Postop Comments: No value filed.       Anesthesia Type:  General, Regional    Reportable Event: NO     PAIN: Uncomplicated   Sign Out status: Comfortable, Well controlled pain     PONV: No PONV   Sign Out status:  No Nausea or Vomiting     Neuro/Psych: Uneventful perioperative course   Sign Out Status: Preoperative baseline; Age appropriate mentation     Airway/Resp.: Uneventful perioperative course   Sign Out Status: Non labored breathing, age appropriate RR; Resp. Status within EXPECTED Parameters     CV: Uneventful perioperative course   Sign Out status: Appropriate BP and perfusion indices; Appropriate HR/Rhythm     Disposition:   Sign Out in:  PACU  Disposition:  Floor  Recovery Course: Uneventful  Follow-Up: Not required           Last Anesthesia Record Vitals:  CRNA VITALS  4/15/2019 1547 - 4/15/2019 1647      4/15/2019             Resp Rate (observed):  22          Last PACU Vitals:  Vitals Value Taken Time   /78 4/15/2019  5:40 PM   Temp 36.5  C (97.7  F) 4/15/2019  5:30 PM   Pulse 81 4/15/2019  5:40 PM   Resp 15 4/15/2019  5:30 PM   SpO2 97 % 4/15/2019  5:41 PM   Temp src     NIBP     Pulse     SpO2     Resp     Temp     Ht Rate     Temp 2     Vitals shown include unvalidated device data.      Electronically Signed By: Giovana Barnes MD, April 15, 2019, 7:14 PM

## 2019-04-16 NOTE — PROGRESS NOTES
"Orthopaedic Surgery Progress Note   April 16, 2019    Subjective: No acute events overnight. Pain well controlled. WAs up to restroom overnight. Tolerating diet. Voiding spontaneously. Denies fever or chills, CP, SOB, numbness or tingling, motor dysfunction or weakness.     Objective: /65   Pulse 75   Temp 98.1  F (36.7  C) (Oral)   Resp 17   Ht 1.778 m (5' 10\")   Wt 80 kg (176 lb 4.8 oz)   SpO2 97%   BMI 25.30 kg/m      General: NAD, alert and oriented, cooperative with exam.   Cardio: RRR, extremities wwp.   Respiratory: Non-labored breathing.  MSK: RLE: Toes wwp, DP 2+, bcr in all toes.  +EHL/FHL/GSC/TA with 5/5 strength. SILT SP/DP/Sa/Mayorga/T. Dressing c/d/i, small spot of strikethrough over cephalomedullary nail incision.    Labs:  Hemoglobin   Date Value Ref Range Status   04/16/2019 12.3 (L) 13.3 - 17.7 g/dL Final       Assessment and Plan: Hiram Tapia is a 58 year old male with PMH including UPS right thigh with recurrent right femur lytic lesion causing pain now s/p prophylactic IMN right femur on 4/15 with Dr. Betts. Doing well post op.      Heme/Onc Primary  Thoracic managing right chest tube.   Activity: Up with assist until independent.  Weight bearing status: WBAT RLE  Pain management: Transition from IV to PO as tolerated.    Antibiotics: Ancef x 24 hours.  Diet: Begin with clear fluids and progress diet as tolerated.   DVT prophylaxis: recommend DVT prophylaxis begin today, defer to Heme/Onc on agent.   Imaging: none further needed.  Labs: Hgb POD#1-2.  Dressings: ortho resident will change on POD#2.   Elevation: Elevate RLE on pillows to keep above the level of the heart as much as possible.   Physical Therapy/Occupational Therapy: Eval and treat.  Follow-up: Clinic with PA-C or resident in 3 weeks  no x-rays needed.   Disposition: Pending progress with therapies, pain control on orals, and medical stability, chest tube anticipate discharge to home on POD #1-2.      Sandor Monge, " MD  Orthopaedic Surgery Resident, PGY-4  Pager: [phone]

## 2019-04-16 NOTE — PLAN OF CARE
Pt alert and oriented x4. Pt VSS. Pt came up from OR @0615. Pt reporting pain in right leg. Pt given oxycodone x1 and IV dilaudid x1. Pt has capnography and pulse oximeter on. Pt receiving 1L via NC sating 93-96. Pt denied nausea. Pt on clear liquid diet and tolerating well. Pt's right leg sites c/d/I. Pt's chest tube site had no output. Chest tube to water seal. Pt receiving IV antibiotics.,

## 2019-04-16 NOTE — PLAN OF CARE
VSS, afebrile. Continues w/ R leg pain, relieved by oxycodone x2 and IV dilaudid x1. No nausea, tolerating regular diet. Chest tube to water seal, no output. Chest XR complete. Thoracic to do Talc procedure this afternoon, PCA initiated for pain control during procedure, 0.2mg dilaudid Q10min. R femur pinned in OR yesterday, dressings CDI, per order ortho will change dressing tomorrow (POD 2). Pt up w/ 1-assist and crutches. Voiding spontaneously, colace given per pt request. Continue to monitor and w/ POC.

## 2019-04-16 NOTE — PROGRESS NOTES
04/16/19 1334   Quick Adds   Type of Visit Initial PT Evaluation   Living Environment   Lives With spouse;child(izzy), adult   Living Arrangements house   Home Accessibility stairs to enter home;stairs within home   Number of Stairs, Main Entrance 3   Stair Railings, Main Entrance railing on left side (ascending)   Number of Stairs, Within Home, Primary other (see comments)  (30)   Stair Railings, Within Home, Primary railing on right side (ascending)   Transportation Anticipated family or friend will provide   Living Environment Comment Tub/shower, suction grab bar in tub   Self-Care   Usual Activity Tolerance good   Current Activity Tolerance fair   Regular Exercise No   Equipment Currently Used at Home crutches;grab bar, tub/shower;tub bench   Functional Level Prior   Ambulation 1-->assistive equipment   Transferring 1-->assistive equipment   Toileting 1-->assistive equipment   Bathing 1-->assistive equipment   Communication 0-->understands/communicates without difficulty   Swallowing 0-->swallows foods/liquids without difficulty   Cognition 0 - no cognition issues reported   Fall history within last six months no   Which of the above functional risks had a recent onset or change? 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 He was recently hospitalized for SOB and found to have a large right-sided pneumothorax (s/p chest tube and talc pleurodesis on 4/3/19). Now being worked-up for a lytic lesion involving the right femur,  which will require a nailing procedure. Admitted for evaluation/treatment of persistent asymptomatic R pneumothorax. Pt had placement of prophylactic right femoral gamma nail with proximal and distal interlocking screws on 4/15/19.   Precautions/Limitations right hip precautions   Weight-Bearing Status - RLE weight-bearing as tolerated   General Observations Chest tube in place   General Info Comments Activity: up ad camelia   Cognitive Status Examination   Orientation orientation to person, place and time   Level of Consciousness alert   Follows Commands and Answers Questions 100% of the time   Personal Safety and Judgment intact   Memory intact   Pain Assessment   Patient Currently in Pain Yes, see Vital Sign flowsheet   Integumentary/Edema   Integumentary/Edema no deficits were identifed   Posture    Posture Not impaired   Range of Motion (ROM)   ROM Comment Right knee limited to ~ 40 degrees flexion and is a chronic limitation. Right hip assisted motion to 80 degrees flexion and 25 degrees abduction. Ankles and left LE ROM is normal.    Strength   Strength Comments Pain limits right hip and knee motion. Right hip flexion 3-/5, abduction 3/5. Remainder of LE strength is within normal limits.   Bed Mobility   Bed Mobility Comments Independent    Transfer Skills   Transfer Comments Pt transfers supine to sitting with SBA of 1 to manage lines; sit to stand with SBA of 1 to assist with crutch placement.   Gait   Gait Comments Pt ambulated with 2 axillary crutches 10-12' with SBA of 1, WBAT on right. Good balance and control of movement.   Balance   Balance Comments Good   General Therapy Interventions   Planned Therapy Interventions gait training;ROM;strengthening;home program guidelines;progressive activity/exercise   Clinical Impression   Criteria for Skilled Therapeutic Intervention yes, treatment indicated   PT Diagnosis Difficulty ambulating   Influenced by the following impairments Right femoral sarcoma; s/p right  "femoral pin placement   Functional limitations due to impairments Ambulation   Clinical Presentation Evolving/Changing   Clinical Presentation Rationale Pain limits mobility now but anticipate continuing improvement as pain management improves.   Clinical Decision Making (Complexity) Low complexity   Therapy Frequency` daily   Predicted Duration of Therapy Intervention (days/wks) 1 week   Anticipated Equipment Needs at Discharge crutches   Anticipated Discharge Disposition Home with Assist   Risk & Benefits of therapy have been explained Yes   Patient, Family & other staff in agreement with plan of care Yes   [facility] \"6 Clicks\"   2016, Trustees of [facility], under license to [employer].  All rights reserved.   6 Clicks Short Forms Basic Mobility Inpatient Short Form   [facility] AM-PAC  \"6 Clicks\" V.2 Basic Mobility Inpatient Short Form   1. Turning from your back to your side while in a flat bed without using bedrails? 4 - None   2. Moving from lying on your back to sitting on the side of a flat bed without using bedrails? 4 - None   3. Moving to and from a bed to a chair (including a wheelchair)? 3 - A Little   4. Standing up from a chair using your arms (e.g., wheelchair, or bedside chair)? 3 - A Little   5. To walk in hospital room? 3 - A Little   6. Climbing 3-5 steps with a railing? 3 - A Little   Basic Mobility Raw Score (Score out of 24.Lower scores equate to lower levels of function) 20   Total Evaluation Time   Total Evaluation Time (Minutes) 10     "

## 2019-04-16 NOTE — PROGRESS NOTES
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 He was recently hospitalized for SOB and found to have a large right-sided pneumothorax (s/p chest tube and talc pleurodesis on 4/3/19). Now being worked-up for a lytic lesion involving the right femur, which will require a nailing procedure. Admitted for evaluation/treatment of persistent asymptomatic R pneumothorax.     Persistent asymptomatic right-sided pneumothorax.  Recently hospitalized 4/2-4/5/19 for evaluation of SOB and found to have a large right-sided pneumothorax. He had a chest tube placed by thoracic surgery, and underwent talc pleurodesis on 4/3/19. Chest tube was removed on 4/5/19 and he was discharged to home after CXR showed resolution of the right-sided pneumothorax. Now re-admitted after CXR obtained in pre-op work-up on 4/12/19 revealed persistent right-sided pneumothorax. He underwent CT-guided pigtail catheter placement by IR on 4/12/19 and is admitted for monitoring prior to the orthopedic procedure.  - Thoracic surgery to repeat TALC pleurodesis 4/16. Will require 48 hrs continuous suction post-procedure.   - Initiate Dilaudid PCA for TALC procedure     Metastatic undifferentiated pleomorphic sarcoma of the right thigh.  Follows with Dr. Johnson. Had a biopsy of the right thigh on 3/8/18 that revealed undifferentiated pleomorphic sarcoma. Received 4 cycles of Dox/Ifos starting 3/2018 and pre-operative XRT. Underwent resection on 9/17/18 with negative margins, >95% necrosis and no LVI. Has been noted to have slowly growing pulmonary nodules and underwent RML wedge resection 12/5/18 with pathology  revealing metastatic sarcoma, negative margins. Re-staging CT CAP on 4/2/19 revealed progressive disease, and he started Keytruda on 4/9/19.  - Follow-up with Dr. Johnson after discharge as scheduled.   - Next cycle of Keytruda will be due on 4/30/19.     Lytic lesion of the right femur.  Re-staging CT CAP on 4/2/19 revealed increased size and number of numerous metastatic pulmonary nodules, new/increased lesions in multiple LN regions, as well as a lytic lesion with an associated soft tissue mass arising from the posterior right femoral intertrochanteric region. He saw Dr. Betts of orthopedic surgery on 4/9/19 and is planned for right femur Gamma nail on 4/15/19. He will need to be intubated for this procedure, and as such, is admitted for chest-tube placement.   - S/p right femur intramedullary pinning on 4/15 by Dr. Betts. POD#1.   - WBAT  - F/u in 3 weeks for suture removal with resident or KIKE  - Anticoagulation ppx for 4-6 weeks. Started Lovenox 40 mg daily on 4/16. Will likely discharge with  mg daily as patient unable to give injections.     FEN: Regular diet, no mIVF, replete lytes PRN  Prophylaxis: Lovenox 40 mg daily  Code: FULL - Discussed with patient and wife at the time of admission.  Disposition: Anticipate discharge to home in 3 days pending resolution of pneumothorax.     Plan of care was discussed with attending physician Dr. Meyer.    Farzaneh Joseph PA-C  Hematology/Oncology  Pager: [phone]    Interval History   Feeling well today. Post-op pain is controlled with Oxy and Dilaudid. No SOB. Afebrile. Using crutches to get to and from the bathroom. Discussed plan for TALC, which he is agreeable to.    Physical Exam   Temp: 98.2  F (36.8  C) Temp src: Oral BP: 111/72 Pulse: 75 Heart Rate: 82 Resp: 18 SpO2: 99 % O2 Device: None (Room air) Oxygen Delivery: 2 LPM  Vitals:    04/14/19 0802 04/15/19 0745 04/16/19 1000   Weight: 80.5 kg (177 lb 8 oz) 80 kg (176 lb 4.8 oz) 81.6 kg (179  lb 14.4 oz)     Vital Signs with Ranges  Temp:  [96  F (35.6  C)-98.2  F (36.8  C)] 98.2  F (36.8  C)  Pulse:  [74-92] 75  Heart Rate:  [72-92] 82  Resp:  [8-26] 18  BP: (100-149)/(65-94) 111/72  SpO2:  [95 %-100 %] 99 %  I/O last 3 completed shifts:  In: 1070 [P.O.:220; I.V.:850]  Out: 100 [Blood:100]    Constitutional: Pleasant male seen laying in bed. No apparent distress, and appears stated age.  Eyes: Lids and lashes normal, sclera clear, conjunctiva normal.  ENT: Normocephalic, oral pharynx with moist mucus membranes, tonsils without erythema or exudates, gums normal and good dentition.   Respiratory: Chest tube in place, R, c/d/i. No increased work of breathing, good air exchange, clear to auscultation bilaterally, no crackles or wheezing.   Cardiovascular: Regular rate and rhythm, normal S1 and S2, and no murmur noted.  GI: No masses or scars. +BS. Soft. No tenderness on palpation.  Skin: No bruising or bleeding, no redness, warmth, or swelling, no rashes, no lesions, no jaundice.  Extremities: There is no redness, warmth, or swelling of the joints. No lower extremity edema. No cyanosis.  Neurologic: Awake, alert, oriented to name, place and time.    Vascular access: PIV    Medications     HYDROmorphone       sodium chloride         enoxaparin  40 mg Subcutaneous Q24H     lidocaine  30 mL Other Once       Data   ROUTINE IP LABS (Last four results)  BMP  Recent Labs   Lab 04/16/19  0803 04/15/19  0628 04/13/19 0522 04/12/19  1047    138 134 135   POTASSIUM 4.0 4.0 4.2 4.0   CHLORIDE 103 106 102 101   JAYESH 9.2 9.3 9.5 9.5   CO2 26 24 24 26   BUN 12 14 13 14   CR 0.90 0.83 0.88 0.87   GLC 95 98 91 95     CBC  Recent Labs   Lab 04/16/19  0803 04/15/19  0628 04/13/19  0522 04/12/19  1047   WBC 5.5 5.3 5.3 6.5   RBC 4.43 4.84 4.72 5.02   HGB 12.3* 13.3 13.1* 13.8   HCT 38.8* 41.9 41.5 43.5   MCV 88 87 88 87   MCH 27.8 27.5 27.8 27.5   MCHC 31.7 31.7 31.6 31.7   RDW 13.8 13.7 13.8 13.9    403 358 413      INR  Recent Labs   Lab 04/15/19  0628 04/12/19  1047   INR 1.10 1.11

## 2019-04-17 ENCOUNTER — APPOINTMENT (OUTPATIENT)
Dept: GENERAL RADIOLOGY | Facility: CLINIC | Age: 59
DRG: 982 | End: 2019-04-17
Payer: COMMERCIAL

## 2019-04-17 ENCOUNTER — TELEPHONE (OUTPATIENT)
Dept: ORTHOPEDICS | Facility: CLINIC | Age: 59
End: 2019-04-17

## 2019-04-17 LAB
ANION GAP SERPL CALCULATED.3IONS-SCNC: 8 MMOL/L (ref 3–14)
BUN SERPL-MCNC: 11 MG/DL (ref 7–30)
CALCIUM SERPL-MCNC: 8.9 MG/DL (ref 8.5–10.1)
CHLORIDE SERPL-SCNC: 101 MMOL/L (ref 94–109)
CO2 SERPL-SCNC: 26 MMOL/L (ref 20–32)
CREAT SERPL-MCNC: 0.83 MG/DL (ref 0.66–1.25)
ERYTHROCYTE [DISTWIDTH] IN BLOOD BY AUTOMATED COUNT: 13.7 % (ref 10–15)
GFR SERPL CREATININE-BSD FRML MDRD: >90 ML/MIN/{1.73_M2}
GLUCOSE SERPL-MCNC: 103 MG/DL (ref 70–99)
HCT VFR BLD AUTO: 35.8 % (ref 40–53)
HGB BLD-MCNC: 11.1 G/DL (ref 13.3–17.7)
MCH RBC QN AUTO: 26.7 PG (ref 26.5–33)
MCHC RBC AUTO-ENTMCNC: 31 G/DL (ref 31.5–36.5)
MCV RBC AUTO: 86 FL (ref 78–100)
PLATELET # BLD AUTO: 317 10E9/L (ref 150–450)
POTASSIUM SERPL-SCNC: 3.9 MMOL/L (ref 3.4–5.3)
RBC # BLD AUTO: 4.15 10E12/L (ref 4.4–5.9)
SODIUM SERPL-SCNC: 135 MMOL/L (ref 133–144)
WBC # BLD AUTO: 6.2 10E9/L (ref 4–11)

## 2019-04-17 PROCEDURE — 36415 COLL VENOUS BLD VENIPUNCTURE: CPT | Performed by: INTERNAL MEDICINE

## 2019-04-17 PROCEDURE — 87040 BLOOD CULTURE FOR BACTERIA: CPT | Performed by: INTERNAL MEDICINE

## 2019-04-17 PROCEDURE — 80048 BASIC METABOLIC PNL TOTAL CA: CPT | Performed by: PHYSICIAN ASSISTANT

## 2019-04-17 PROCEDURE — 99232 SBSQ HOSP IP/OBS MODERATE 35: CPT | Performed by: INTERNAL MEDICINE

## 2019-04-17 PROCEDURE — 71046 X-RAY EXAM CHEST 2 VIEWS: CPT

## 2019-04-17 PROCEDURE — 85027 COMPLETE CBC AUTOMATED: CPT | Performed by: PHYSICIAN ASSISTANT

## 2019-04-17 PROCEDURE — 25000128 H RX IP 250 OP 636: Performed by: INTERNAL MEDICINE

## 2019-04-17 PROCEDURE — 36415 COLL VENOUS BLD VENIPUNCTURE: CPT | Performed by: PHYSICIAN ASSISTANT

## 2019-04-17 PROCEDURE — 25000132 ZZH RX MED GY IP 250 OP 250 PS 637: Performed by: INTERNAL MEDICINE

## 2019-04-17 PROCEDURE — 25000128 H RX IP 250 OP 636: Performed by: PHYSICIAN ASSISTANT

## 2019-04-17 PROCEDURE — 12000001 ZZH R&B MED SURG/OB UMMC

## 2019-04-17 RX ORDER — HYDROMORPHONE HYDROCHLORIDE 1 MG/ML
0.5 INJECTION, SOLUTION INTRAMUSCULAR; INTRAVENOUS; SUBCUTANEOUS ONCE
Status: COMPLETED | OUTPATIENT
Start: 2019-04-17 | End: 2019-04-17

## 2019-04-17 RX ORDER — LEVOFLOXACIN 5 MG/ML
750 INJECTION, SOLUTION INTRAVENOUS EVERY 24 HOURS
Status: DISCONTINUED | OUTPATIENT
Start: 2019-04-17 | End: 2019-04-18

## 2019-04-17 RX ADMIN — SENNOSIDES 8.6 MG: 8.6 TABLET, FILM COATED ORAL at 07:49

## 2019-04-17 RX ADMIN — DOCUSATE SODIUM 200 MG: 100 CAPSULE, LIQUID FILLED ORAL at 17:18

## 2019-04-17 RX ADMIN — LEVOFLOXACIN 750 MG: 5 INJECTION, SOLUTION INTRAVENOUS at 19:00

## 2019-04-17 RX ADMIN — Medication 0.5 MG: at 10:38

## 2019-04-17 RX ADMIN — ENOXAPARIN SODIUM 40 MG: 40 INJECTION SUBCUTANEOUS at 12:32

## 2019-04-17 ASSESSMENT — MIFFLIN-ST. JEOR: SCORE: 1637.26

## 2019-04-17 ASSESSMENT — ACTIVITIES OF DAILY LIVING (ADL)
ADLS_ACUITY_SCORE: 19

## 2019-04-17 ASSESSMENT — PAIN DESCRIPTION - DESCRIPTORS
DESCRIPTORS: ACHING;CONSTANT
DESCRIPTORS: ACHING
DESCRIPTORS: ACHING;CONSTANT
DESCRIPTORS: ACHING

## 2019-04-17 NOTE — PLAN OF CARE
VSS, afebrile. C/o incisional right leg pain, moderate relief with PCA. Assist of 1 + crutches to bathroom. Weight bearing as tolerated. Incisional dressings CDI. Chest tube to continuous suction. Good urine output.

## 2019-04-17 NOTE — PROGRESS NOTES
THORACIC & FOREGUT SURGERY    S:  No overnight events.  Pt seen at bedside resting comfortably.       O:  Vitals:    04/16/19 1918 04/16/19 2245 04/17/19 0225 04/17/19 0731   BP: 106/65 105/60 105/60 105/61   BP Location: Right arm Right arm Right arm    Pulse:       Resp: 20 16 14 18   Temp: 98.7  F (37.1  C) 98.4  F (36.9  C) 99.2  F (37.3  C) 99.2  F (37.3  C)   TempSrc: Oral Oral Oral Oral   SpO2: 92% 93% 92% 96%   Weight:    82.1 kg (181 lb)   Height:           A&O, NAD  Breathing non-labored  Distal extremities warm    A/P: Hiram Tapia is a 58 year old male with metastatic sarcoma of the right thigh. He was recently hospitalized for incidentally foundd right-sided pneumothorax, which was treated with pigtail chest tube and talc pleurodesis on 4/3/19. Now readmitted for evaluation/treatment of persistent asymptomatic R pneumothorax.  Bedside talc pleurodesis repeated on 4/16.    -Daily CXR  -48hrs of suction after pleurodesis  -Thoracic to follow    Zhao Ambrose PA-C  Thoracic Surgery

## 2019-04-17 NOTE — PROGRESS NOTES
"Orthopaedic Surgery Progress Note   April 17, 2019    Subjective: No acute events overnight. Pain moderately well controlled. Repeat pleurodesis with thoracic yesterday. Tolerating diet. Voiding spontaneously. Denies fever or chills, CP, SOB, numbness or tingling, motor dysfunction or weakness.     Objective: /61   Pulse 75   Temp (!) 32.2  F (0.1  C) (Oral)   Resp 18   Ht 1.778 m (5' 10\")   Wt 82.1 kg (181 lb)   SpO2 96%   BMI 25.97 kg/m      General: NAD, alert and oriented, cooperative with exam.   Cardio: RRR, extremities wwp.   Respiratory: Non-labored breathing.  MSK: RLE: Toes wwp, DP 2+, bcr in all toes.  +EHL/FHL/GSC/TA with 5/5 strength. SILT SP/DP/Sa/Mayorga/T. Dressing c/d/i, small spot of strikethrough over cephalomedullary nail incision. Taken down and incisions all well approximated without erythema, dehiscence, or drainage. Dry dressings replaced.       Labs:  Hemoglobin   Date Value Ref Range Status   04/17/2019 11.1 (L) 13.3 - 17.7 g/dL Final       Assessment and Plan: Hiram Tapia is a 58 year old male with PMH including UPS right thigh with recurrent right femur lytic lesion causing pain now s/p prophylactic IMN right femur on 4/15 with Dr. Betts. Doing well post op.      Heme/Onc Primary  Thoracic managing right chest tube.   Activity: Up with assist until independent.  Weight bearing status: WBAT RLE  Antibiotics: Ancef x 24 hours - complete   Diet: regular   DVT prophylaxis: currently lovenox 40 mg daily, recommend 4 weeks. Ok with low dose aspirin for prophylaxis at discharge but defer to Heme/onc on final plan.   Imaging: none further needed.  Labs: Hgb POD#1-2.  Dressings: changed on 4/17 AM. Nursing to change every other day or as needed.   Elevation: Elevate RLE on pillows to keep above the level of the heart as much as possible.   Physical Therapy/Occupational Therapy: Eval and treat.  Follow-up: Clinic with PA-C or resident in 3 weeks no x-rays needed.   Disposition: Per " primary    Ortho will sign off and follow peripherally at this time, please page with questions.     Sandor Monge MD  Orthopaedic Surgery Resident, PGY-4  Pager: [phone]

## 2019-04-17 NOTE — PLAN OF CARE
VSS, afebrile. Continues w/ R leg pain, POD#2 R femur pinning. Ortho changed dressings this AM, next due 4/19. Dilaudid PCA 0.2mg Q10min, also given 0.5mg IV dilaudid x1 for breakthrough discomfort after moving around. Chest XR complete, pleurodesis done yesterday. Chest tube to -20 suction, no output. Not as much PO intake today, voiding spontaneously. Senna x1 for constipation. Up w/ 1-assist and crutches. Continue to monitor and w/ POC.

## 2019-04-17 NOTE — PLAN OF CARE
VSS, afebrile. Continues w/ R leg pain, managed with PCA, dose 0.2mg dilaudid q 10 min. No nausea, tolerating regular diet. Constipation continues; prn colace given. Chest tube unclamped at 1755, two hours post Talc, put to continuous suction as per orders, 7mL output this shift--container amount 8mL. R femur pinned in OR yesterday, dressings CDI, per order ortho will change dressing tomorrow (POD 2). Pt up w/ 1-assist and crutches. Voiding spontaneously. Continue to monitor and w/ POC.

## 2019-04-17 NOTE — PROGRESS NOTES
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 He was recently hospitalized for SOB and found to have a large right-sided pneumothorax (s/p chest tube and talc pleurodesis on 4/3/19). Now being worked-up for a lytic lesion involving the right femur, which will require a nailing procedure. Admitted for evaluation/treatment of persistent asymptomatic R pneumothorax.     Persistent asymptomatic right-sided pneumothorax.  Recently hospitalized 4/2-4/5/19 for evaluation of SOB and found to have a large right-sided pneumothorax. He had a chest tube placed by thoracic surgery, and underwent talc pleurodesis on 4/3/19. Chest tube was removed on 4/5/19 and he was discharged to home after CXR showed resolution of the right-sided pneumothorax. Now re-admitted after CXR obtained in pre-op work-up on 4/12/19 revealed persistent right-sided pneumothorax. He underwent CT-guided pigtail catheter placement by IR on 4/12/19 and is admitted for monitoring prior to the orthopedic procedure.  - Thoracic surgery following. S/p TALC pleurodesis 4/16. Will require 48 hrs continuous suction post-procedure.   - Continue Dilaudid PCA post TALC procedure     Metastatic undifferentiated pleomorphic sarcoma of the right thigh.  Follows with Dr. Johnson. Had a biopsy of the right thigh on 3/8/18 that revealed undifferentiated pleomorphic sarcoma. Received 4 cycles of Dox/Ifos starting 3/2018 and pre-operative XRT. Underwent resection on 9/17/18 with negative margins, >95% necrosis and no LVI. Has been noted to have slowly growing pulmonary nodules and underwent RML wedge resection 12/5/18 with  pathology revealing metastatic sarcoma, negative margins. Re-staging CT CAP on 4/2/19 revealed progressive disease, and he started Keytruda on 4/9/19.  - Follow-up with Dr. Johnson after discharge as scheduled.   - Next cycle of Keytruda will be due on 4/30/19.     Lytic lesion of the right femur.  Re-staging CT CAP on 4/2/19 revealed increased size and number of numerous metastatic pulmonary nodules, new/increased lesions in multiple LN regions, as well as a lytic lesion with an associated soft tissue mass arising from the posterior right femoral intertrochanteric region. He saw Dr. Betts of orthopedic surgery on 4/9/19 and is planned for right femur Gamma nail on 4/15/19. He will need to be intubated for this procedure, and as such, is admitted for chest-tube placement.   - S/p right femur intramedullary pinning on 4/15 by Dr. Betts. POD#2.   - WBAT  - F/u in 3 weeks for suture removal with resident or KIKE  - Anticoagulation ppx for 4-6 weeks. Started Lovenox 40 mg daily on 4/16. Will likely discharge with  mg daily as patient unable to give injections.     FEN: Regular diet, no mIVF, replete lytes PRN  Prophylaxis: Lovenox 40 mg daily  Code: FULL - Discussed with patient and wife at the time of admission.  Disposition: Anticipate discharge to home 4/19 pending resolution of pneumothorax.     Plan of care was discussed with attending physician Dr. Meyer.    Farzaneh Joseph PA-C  Hematology/Oncology  Pager: [phone]    Interval History    In more pain today, right chest mostly. Pain is controlled with Dilaudid PCA, but is pressing bumps as much as possible. No SOB. Afebrile. Using crutches to get to and from the bathroom.    Physical Exam   Temp: 99.2  F (37.3  C) Temp src: Oral BP: 105/61   Heart Rate: 84 Resp: 18 SpO2: 96 % O2 Device: None (Room air)    Vitals:    04/15/19 0745 04/16/19 1000 04/17/19 0731   Weight: 80 kg (176 lb 4.8 oz) 81.6 kg (179 lb 14.4 oz) 82.1 kg (181 lb)     Vital Signs  with Ranges  Temp:  [98.2  F (36.8  C)-99.4  F (37.4  C)] 99.2  F (37.3  C)  Heart Rate:  [76-90] 84  Resp:  [14-20] 18  BP: (100-111)/(58-72) 105/61  SpO2:  [92 %-99 %] 96 %  I/O last 3 completed shifts:  In: 1080 [P.O.:1080]  Out: 7 [Chest Tube:7]    Constitutional: Pleasant male seen laying in bed. No apparent distress, and appears stated age.  Eyes: Lids and lashes normal, sclera clear, conjunctiva normal.  ENT: Normocephalic, oral pharynx with moist mucus membranes, tonsils without erythema or exudates, gums normal and good dentition.   Respiratory: Chest tube in place, R, c/d/i. No increased work of breathing, good air exchange, clear to auscultation bilaterally, no crackles or wheezing.   Cardiovascular: Regular rate and rhythm, normal S1 and S2, and no murmur noted.  GI: No masses or scars. +BS. Soft. No tenderness on palpation.  Skin: No bruising or bleeding, no redness, warmth, or swelling, no rashes, no lesions, no jaundice.  Extremities: There is no redness, warmth, or swelling of the joints. No lower extremity edema. No cyanosis.  Neurologic: Awake, alert, oriented to name, place and time.    Vascular access: PIV    Medications     HYDROmorphone       sodium chloride 20 mL/hr at 04/16/19 1352       enoxaparin  40 mg Subcutaneous Q24H       Data   ROUTINE IP LABS (Last four results)  BMP  Recent Labs   Lab 04/17/19 0530 04/16/19  0803 04/15/19  0628 04/13/19  0522    136 138 134   POTASSIUM 3.9 4.0 4.0 4.2   CHLORIDE 101 103 106 102   JAYESH 8.9 9.2 9.3 9.5   CO2 26 26 24 24   BUN 11 12 14 13   CR 0.83 0.90 0.83 0.88   * 95 98 91     CBC  Recent Labs   Lab 04/17/19  0530 04/16/19  0803 04/15/19  0628 04/13/19  0522   WBC 6.2 5.5 5.3 5.3   RBC 4.15* 4.43 4.84 4.72   HGB 11.1* 12.3* 13.3 13.1*   HCT 35.8* 38.8* 41.9 41.5   MCV 86 88 87 88   MCH 26.7 27.8 27.5 27.8   MCHC 31.0* 31.7 31.7 31.6   RDW 13.7 13.8 13.7 13.8    331 403 358     INR  Recent Labs   Lab 04/15/19  0628 04/12/19  1047    INR 1.10 1.11

## 2019-04-17 NOTE — PLAN OF CARE
[id number]: Tmax 101.0, asymptomatic. BC x2 drawn, chest x-ray already completed today. MD notified and ordered UA/UC, started on IV levaquin. Other VSS on RA. RLE pain managed adequately while awake on dilaudid PCA, 0.2 mg available q10 min. Pt has concerns about adequate management while asleep and unable to push button. Encouraged use each time awake overnight as needed. Denies nausea, appetite fair. CT site C/D/I, continues on -20cm suction; no output this shift. Voiding adequately, still need urine sample. No BM, 200 mg colace given per pt request. Up with 1A + crutch, calling appropriately. Continue with POC.

## 2019-04-17 NOTE — TELEPHONE ENCOUNTER
---- Message from Rashmi Santos PA-C sent at 4/17/2019 10:26 AM CDT -----  930 would be great.  ThanksBrittany    ----- Message -----  From: Consuelo Zuniga RN  Sent: 4/17/2019   7:59 AM  To: WANG Orr, You don't have a schedule that day.  What time would you like this to occur?  ----- Message -----  From: Sandor Monge MD  Sent: 4/17/2019   7:53 AM  To: [facility]:     Patient had surgery with Dr. Betts on 4/15. He would like the patient to see Brittany Santos on Friday 5/3/2019. Could you please make this appointment for the patient? Thanks!    Sandor Monge MD  PGY-4  Orthopaedic Surgery  [phone]

## 2019-04-18 ENCOUNTER — APPOINTMENT (OUTPATIENT)
Dept: GENERAL RADIOLOGY | Facility: CLINIC | Age: 59
DRG: 982 | End: 2019-04-18
Payer: COMMERCIAL

## 2019-04-18 ENCOUNTER — APPOINTMENT (OUTPATIENT)
Dept: PHYSICAL THERAPY | Facility: CLINIC | Age: 59
DRG: 982 | End: 2019-04-18
Payer: COMMERCIAL

## 2019-04-18 LAB
ALBUMIN UR-MCNC: NEGATIVE MG/DL
ANION GAP SERPL CALCULATED.3IONS-SCNC: 7 MMOL/L (ref 3–14)
APPEARANCE UR: CLEAR
BILIRUB UR QL STRIP: NEGATIVE
BUN SERPL-MCNC: 12 MG/DL (ref 7–30)
CALCIUM SERPL-MCNC: 9.4 MG/DL (ref 8.5–10.1)
CHLORIDE SERPL-SCNC: 100 MMOL/L (ref 94–109)
CO2 SERPL-SCNC: 25 MMOL/L (ref 20–32)
COLOR UR AUTO: YELLOW
CREAT SERPL-MCNC: 0.76 MG/DL (ref 0.66–1.25)
ERYTHROCYTE [DISTWIDTH] IN BLOOD BY AUTOMATED COUNT: 13.5 % (ref 10–15)
GFR SERPL CREATININE-BSD FRML MDRD: >90 ML/MIN/{1.73_M2}
GLUCOSE SERPL-MCNC: 99 MG/DL (ref 70–99)
GLUCOSE UR STRIP-MCNC: NEGATIVE MG/DL
HCT VFR BLD AUTO: 36.4 % (ref 40–53)
HGB BLD-MCNC: 11.5 G/DL (ref 13.3–17.7)
HGB UR QL STRIP: NEGATIVE
KETONES UR STRIP-MCNC: NEGATIVE MG/DL
LEUKOCYTE ESTERASE UR QL STRIP: NEGATIVE
MCH RBC QN AUTO: 26.9 PG (ref 26.5–33)
MCHC RBC AUTO-ENTMCNC: 31.6 G/DL (ref 31.5–36.5)
MCV RBC AUTO: 85 FL (ref 78–100)
MUCOUS THREADS #/AREA URNS LPF: PRESENT /LPF
NITRATE UR QL: NEGATIVE
PH UR STRIP: 5.5 PH (ref 5–7)
PLATELET # BLD AUTO: 329 10E9/L (ref 150–450)
POTASSIUM SERPL-SCNC: 4 MMOL/L (ref 3.4–5.3)
RBC # BLD AUTO: 4.27 10E12/L (ref 4.4–5.9)
RBC #/AREA URNS AUTO: 0 /HPF (ref 0–2)
SODIUM SERPL-SCNC: 132 MMOL/L (ref 133–144)
SOURCE: ABNORMAL
SP GR UR STRIP: 1.01 (ref 1–1.03)
UROBILINOGEN UR STRIP-MCNC: NORMAL MG/DL (ref 0–2)
WBC # BLD AUTO: 9.9 10E9/L (ref 4–11)
WBC #/AREA URNS AUTO: 0 /HPF (ref 0–5)

## 2019-04-18 PROCEDURE — 97116 GAIT TRAINING THERAPY: CPT | Mod: GP

## 2019-04-18 PROCEDURE — 71046 X-RAY EXAM CHEST 2 VIEWS: CPT

## 2019-04-18 PROCEDURE — 87040 BLOOD CULTURE FOR BACTERIA: CPT | Performed by: INTERNAL MEDICINE

## 2019-04-18 PROCEDURE — 36415 COLL VENOUS BLD VENIPUNCTURE: CPT | Performed by: PHYSICIAN ASSISTANT

## 2019-04-18 PROCEDURE — 81001 URINALYSIS AUTO W/SCOPE: CPT | Performed by: INTERNAL MEDICINE

## 2019-04-18 PROCEDURE — 85027 COMPLETE CBC AUTOMATED: CPT | Performed by: PHYSICIAN ASSISTANT

## 2019-04-18 PROCEDURE — 97110 THERAPEUTIC EXERCISES: CPT | Mod: GP

## 2019-04-18 PROCEDURE — 80048 BASIC METABOLIC PNL TOTAL CA: CPT | Performed by: PHYSICIAN ASSISTANT

## 2019-04-18 PROCEDURE — 25000132 ZZH RX MED GY IP 250 OP 250 PS 637: Performed by: PHYSICIAN ASSISTANT

## 2019-04-18 PROCEDURE — 36415 COLL VENOUS BLD VENIPUNCTURE: CPT | Performed by: INTERNAL MEDICINE

## 2019-04-18 PROCEDURE — 12000001 ZZH R&B MED SURG/OB UMMC

## 2019-04-18 PROCEDURE — 99233 SBSQ HOSP IP/OBS HIGH 50: CPT | Performed by: INTERNAL MEDICINE

## 2019-04-18 PROCEDURE — 25000128 H RX IP 250 OP 636: Performed by: PHYSICIAN ASSISTANT

## 2019-04-18 PROCEDURE — 25000132 ZZH RX MED GY IP 250 OP 250 PS 637: Performed by: INTERNAL MEDICINE

## 2019-04-18 RX ORDER — SENNOSIDES 8.6 MG
8.6 TABLET ORAL 2 TIMES DAILY
Status: DISCONTINUED | OUTPATIENT
Start: 2019-04-18 | End: 2019-04-19 | Stop reason: HOSPADM

## 2019-04-18 RX ORDER — POLYETHYLENE GLYCOL 3350 17 G/17G
8.5 POWDER, FOR SOLUTION ORAL ONCE
Status: COMPLETED | OUTPATIENT
Start: 2019-04-18 | End: 2019-04-18

## 2019-04-18 RX ORDER — OXYCODONE HYDROCHLORIDE 10 MG/1
10-20 TABLET ORAL EVERY 4 HOURS PRN
Status: DISCONTINUED | OUTPATIENT
Start: 2019-04-18 | End: 2019-04-19 | Stop reason: HOSPADM

## 2019-04-18 RX ADMIN — OXYCODONE HYDROCHLORIDE 10 MG: 10 TABLET ORAL at 09:17

## 2019-04-18 RX ADMIN — POLYETHYLENE GLYCOL 3350 8.5 G: 17 POWDER, FOR SOLUTION ORAL at 13:26

## 2019-04-18 RX ADMIN — DOCUSATE SODIUM 200 MG: 100 CAPSULE, LIQUID FILLED ORAL at 17:36

## 2019-04-18 RX ADMIN — SENNOSIDES 8.6 MG: 8.6 TABLET, FILM COATED ORAL at 19:20

## 2019-04-18 RX ADMIN — ACETAMINOPHEN 650 MG: 325 TABLET, FILM COATED ORAL at 19:45

## 2019-04-18 RX ADMIN — ENOXAPARIN SODIUM 40 MG: 40 INJECTION SUBCUTANEOUS at 09:17

## 2019-04-18 RX ADMIN — DOCUSATE SODIUM 200 MG: 100 CAPSULE, LIQUID FILLED ORAL at 09:19

## 2019-04-18 RX ADMIN — OXYCODONE HYDROCHLORIDE 10 MG: 10 TABLET ORAL at 13:22

## 2019-04-18 RX ADMIN — OXYCODONE HYDROCHLORIDE 10 MG: 10 TABLET ORAL at 17:36

## 2019-04-18 ASSESSMENT — ACTIVITIES OF DAILY LIVING (ADL)
ADLS_ACUITY_SCORE: 19

## 2019-04-18 ASSESSMENT — PAIN DESCRIPTION - DESCRIPTORS
DESCRIPTORS: ACHING;CONSTANT
DESCRIPTORS: ACHING

## 2019-04-18 ASSESSMENT — MIFFLIN-ST. JEOR: SCORE: 1655.4

## 2019-04-18 NOTE — PLAN OF CARE
Discharge Planner PT  PT 7D  Patient plan for discharge: Home with spouse  Current status: Pt with chest tube on suction. Pt completed supine right LE exercises. Pt transfers independently. Chest tube changed to water seal for ambulatory activity. Pt ambulated ~ 275' x 2 with 2 crutches, WBAT on right. Pt able to weight bear 50-75%. Pt ascended/descended 12 stairs with 1 handrail and 1 crutch with good balance. Chest tube placed back on suction at end of session.  Barriers to return to prior living situation: Medical condition.  Recommendations for discharge: Home with spouse and home exercise program  Rationale for recommendations: Functional mobility is improved and pt is safe on stairs. HEP needed for pt to improve and maintain right LE ROM and strength.       Entered by: Eric Mata 04/18/2019 10:24 AM

## 2019-04-18 NOTE — PROGRESS NOTES
STAFF ADDENDUM:  I saw and evaluated Mr. Tapia and agree with the resident s findings and plan of care as documented in the resident s note and edited by me, as applicable.      In summary, Mr. Tapia is s/p bedside pleurodesis. No air leak.  Keep chest tube to suction till tmrw.    I spent a total of 20 minutes with Mr. Tapia, 15 of which were spent in counseling and coordination of care. The patient had all questions answered and was in agreement with the plan.    Sarah Bowie MD

## 2019-04-18 NOTE — PLAN OF CARE
Pt alert and oriented x4. Pt TMAX 100.5. Doctor notified. Tylenol given. Blood cultures taken. Pt denied nausea. Pt continues to have pain in right femur and right chest tube site. Pt given oxycodone PO 10mg x1. Pt reported good appetite and good U/O. Pt reports constipation. Pt given senna and colace during shift. Pt to possibly have chest tube taken out tomorrow. Chest tube currently hooked up to -20 suction.

## 2019-04-18 NOTE — PROGRESS NOTES
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 He was recently hospitalized for SOB and found to have a large right-sided pneumothorax (s/p chest tube and talc pleurodesis on 4/3/19). Now being worked-up for a lytic lesion involving the right femur, which will require a nailing procedure. Admitted for evaluation/treatment of persistent asymptomatic R pneumothorax.     Persistent asymptomatic right-sided pneumothorax.  Recently hospitalized 4/2-4/5/19 for evaluation of SOB and found to have a large right-sided pneumothorax. He had a chest tube placed by thoracic surgery, and underwent talc pleurodesis on 4/3/19. Chest tube was removed on 4/5/19 and he was discharged to home after CXR showed resolution of the right-sided pneumothorax. Now re-admitted after CXR obtained in pre-op work-up on 4/12/19 revealed persistent right-sided pneumothorax. He underwent CT-guided pigtail catheter placement by IR on 4/12/19 and is admitted for monitoring prior to the orthopedic procedure.  - Thoracic surgery following. S/p TALC pleurodesis 4/16. Will require 48 hrs continuous suction post-procedure. Plan to clamp 4/19 with hopeful subsequent removal of chest tube.  - Continue Dilaudid PCA post TALC procedure    Fever  Tmax 101.0 on 4/17. Asymptomatic. Suspect is post-procedural. Received single dose of Levofloxacin on 4/17.  - Stop Levofloxacin and monitor     Metastatic undifferentiated pleomorphic sarcoma of the right thigh.  Follows with Dr. Johnson. Had a biopsy of the right thigh on 3/8/18 that revealed undifferentiated pleomorphic sarcoma. Received 4 cycles of Dox/Ifos starting  3/2018 and pre-operative XRT. Underwent resection on 9/17/18 with negative margins, >95% necrosis and no LVI. Has been noted to have slowly growing pulmonary nodules and underwent RML wedge resection 12/5/18 with pathology revealing metastatic sarcoma, negative margins. Re-staging CT CAP on 4/2/19 revealed progressive disease, and he started Keytruda on 4/9/19.  - Follow-up with Dr. Johnson after discharge as scheduled.   - Next cycle of Keytruda will be due on 4/30/19.     Lytic lesion of the right femur.  Re-staging CT CAP on 4/2/19 revealed increased size and number of numerous metastatic pulmonary nodules, new/increased lesions in multiple LN regions, as well as a lytic lesion with an associated soft tissue mass arising from the posterior right femoral intertrochanteric region. He saw Dr. Betts of orthopedic surgery on 4/9/19 and is planned for right femur Gamma nail on 4/15/19. He will need to be intubated for this procedure, and as such, is admitted for chest-tube placement.   - S/p right femur intramedullary pinning on 4/15 by Dr. Betts. POD#3.   - WBAT  - F/u in 3 weeks for suture removal with resident or KIKE  - Anticoagulation ppx for 4-6 weeks. Started Lovenox 40 mg daily on 4/16. Will likely discharge with  mg daily as patient unable to give injections.     FEN: Regular diet, no mIVF, replete lytes PRN  Prophylaxis: Lovenox 40 mg daily  Code: FULL - Discussed with patient and wife at the time of admission.  Disposition: Anticipate discharge to home 4/19 pending resolution of pneumothorax.     Plan of care was discussed with attending physician Dr. Meyer.    Farzaneh Joseph PA-C  Hematology/Oncology  Pager: [phone]    Interval History    Feeling better today. Requested to stop PCA and transition to PO Oxycodone. Pain is manageable, but not completely resolved. No SOB. Febrile to 101.0 overnight, but did not know he was febrile. Using crutches to get to and from the  bathroom.    Physical Exam   Temp: 98.1  F (36.7  C) Temp src: Oral BP: 99/63 Pulse: 78 Heart Rate: 83 Resp: 16 SpO2: 92 % O2 Device: None (Room air) Oxygen Delivery: 2 LPM  Vitals:    04/16/19 1000 04/17/19 0731 04/18/19 1158   Weight: 81.6 kg (179 lb 14.4 oz) 82.1 kg (181 lb) 83.9 kg (185 lb)     Vital Signs with Ranges  Temp:  [98.1  F (36.7  C)-101  F (38.3  C)] 98.1  F (36.7  C)  Pulse:  [] 78  Heart Rate:  [83-84] 83  Resp:  [16-18] 16  BP: ()/(52-67) 99/63  SpO2:  [90 %-96 %] 92 %  I/O last 3 completed shifts:  In: 1570 [P.O.:1420; I.V.:150]  Out: 12 [Chest Tube:12]    Constitutional: Pleasant male seen laying in bed. No apparent distress, and appears stated age.  Eyes: Lids and lashes normal, sclera clear, conjunctiva normal.  ENT: Normocephalic, oral pharynx with moist mucus membranes, tonsils without erythema or exudates, gums normal and good dentition.   Respiratory: Chest tube in place, R, c/d/i. No increased work of breathing, good air exchange, clear to auscultation bilaterally, no crackles or wheezing.   Cardiovascular: Regular rate and rhythm, normal S1 and S2, and no murmur noted.  GI: No masses or scars. +BS. Soft. No tenderness on palpation.  Skin: No bruising or bleeding, no redness, warmth, or swelling, no rashes, no lesions, no jaundice.  Extremities: There is no redness, warmth, or swelling of the joints. No lower extremity edema. No cyanosis.  Neurologic: Awake, alert, oriented to name, place and time.    Vascular access: PIV    Medications     sodium chloride 20 mL/hr at 04/16/19 1352       enoxaparin  40 mg Subcutaneous Q24H     polyethylene glycol  8.5 g Oral Once     sennosides  8.6 mg Oral BID       Data   ROUTINE IP LABS (Last four results)  BMP  Recent Labs   Lab 04/18/19  0602 04/17/19  0530 04/16/19  0803 04/15/19  0628   * 135 136 138   POTASSIUM 4.0 3.9 4.0 4.0   CHLORIDE 100 101 103 106   JAYESH 9.4 8.9 9.2 9.3   CO2 25 26 26 24   BUN 12 11 12 14   CR 0.76 0.83 0.90  0.83   GLC 99 103* 95 98     CBC  Recent Labs   Lab 04/18/19  0602 04/17/19  0530 04/16/19  0803 04/15/19  0628   WBC 9.9 6.2 5.5 5.3   RBC 4.27* 4.15* 4.43 4.84   HGB 11.5* 11.1* 12.3* 13.3   HCT 36.4* 35.8* 38.8* 41.9   MCV 85 86 88 87   MCH 26.9 26.7 27.8 27.5   MCHC 31.6 31.0* 31.7 31.7   RDW 13.5 13.7 13.8 13.7    317 331 403     INR  Recent Labs   Lab 04/15/19  0628 04/12/19  1047   INR 1.10 1.11

## 2019-04-18 NOTE — PLAN OF CARE
VS stable, afebrile. Pt c/o pain - PCA shift total at 2200 was 6mg, PCA shift total at 0630 was 5.4mg. Pt stated they would prefer to be on their older pain medication regimen - PRN IV dilaudid and PRN PO oxy. Pt said this treatment plan helped relieve his pain more than what he is experiencing now. UA sent to lab - result pending. Chest tube drainage 20ml this shift. Pt up to bathroom few times last night - weight bearing as tolerated, requires 1 assist. No significant events this shift, continue plan of care.

## 2019-04-18 NOTE — PLAN OF CARE
VSS, afebrile. Continues w/ R chest pain and R leg pain, dilaudid PCA d/c'ed, 10-20mg oxycodone available Q4hrs. 10mg oxy given x2. Denies nausea, good PO intake. Colace and miralax given for constipation. POD #3 R femur pinning, dressings CDI, due to be changed tomorrow. Up w/ crutches and SBA, weight bearing as tolerated. Chest tube to -20 suction, no output. Chest XR complete. Plan to possibly pull chest tube tomorrow AM, pt hopeful to discharge home soon. Continue to monitor and w/ POC.

## 2019-04-18 NOTE — PROGRESS NOTES
THORACIC & FOREGUT SURGERY    S:  No overnight events.  Pt seen at bedside resting comfortably.  Eager for dispo.     O:  Vitals:    04/18/19 0819 04/18/19 0933 04/18/19 1158 04/18/19 1531   BP: 93/60 110/67 99/63 97/63   BP Location: Right arm Right arm Right arm Right arm   Pulse:       Resp: 18 16 18   Temp: 98.4  F (36.9  C)  98.1  F (36.7  C) 99.3  F (37.4  C)   TempSrc: Oral  Oral Oral   SpO2: 93% 94% 92% 92%   Weight:   83.9 kg (185 lb)    Height:           A&O, NAD  Breathing non-labored  Distal extremities warm    A/P: Hiram Tapia is a 58 year old male with metastatic sarcoma of the right thigh. He was recently hospitalized for incidentally foundd right-sided pneumothorax, which was treated with pigtail chest tube and talc pleurodesis on 4/3/19. Now readmitted for evaluation/treatment of persistent asymptomatic R pneumothorax.  Bedside talc pleurodesis repeated on 4/16.    -Daily CXR  -Likely water seal tomorrow +/- clamp trial and chest tube removal  -Thoracic to follow     Zhao Ambrose PA-C  Thoracic Surgery

## 2019-04-18 NOTE — PLAN OF CARE
PT: Attempted 2x this afternoon to see pt, first attempt RN and lab needed to see pt, second attempt pt eating and agitated with PT attempting to check in with pt even though has been agreeable to PT checking back.

## 2019-04-19 ENCOUNTER — APPOINTMENT (OUTPATIENT)
Dept: PHYSICAL THERAPY | Facility: CLINIC | Age: 59
DRG: 982 | End: 2019-04-19
Payer: COMMERCIAL

## 2019-04-19 ENCOUNTER — APPOINTMENT (OUTPATIENT)
Dept: GENERAL RADIOLOGY | Facility: CLINIC | Age: 59
DRG: 982 | End: 2019-04-19
Payer: COMMERCIAL

## 2019-04-19 VITALS
OXYGEN SATURATION: 99 % | SYSTOLIC BLOOD PRESSURE: 102 MMHG | BODY MASS INDEX: 25.87 KG/M2 | HEART RATE: 78 BPM | HEIGHT: 70 IN | DIASTOLIC BLOOD PRESSURE: 68 MMHG | TEMPERATURE: 96 F | RESPIRATION RATE: 16 BRPM | WEIGHT: 180.7 LBS

## 2019-04-19 LAB
ANION GAP SERPL CALCULATED.3IONS-SCNC: 6 MMOL/L (ref 3–14)
BUN SERPL-MCNC: 10 MG/DL (ref 7–30)
CALCIUM SERPL-MCNC: 9 MG/DL (ref 8.5–10.1)
CHLORIDE SERPL-SCNC: 102 MMOL/L (ref 94–109)
CO2 SERPL-SCNC: 26 MMOL/L (ref 20–32)
CREAT SERPL-MCNC: 0.8 MG/DL (ref 0.66–1.25)
ERYTHROCYTE [DISTWIDTH] IN BLOOD BY AUTOMATED COUNT: 13.5 % (ref 10–15)
GFR SERPL CREATININE-BSD FRML MDRD: >90 ML/MIN/{1.73_M2}
GLUCOSE SERPL-MCNC: 95 MG/DL (ref 70–99)
HCT VFR BLD AUTO: 33.9 % (ref 40–53)
HGB BLD-MCNC: 10.7 G/DL (ref 13.3–17.7)
MCH RBC QN AUTO: 27.1 PG (ref 26.5–33)
MCHC RBC AUTO-ENTMCNC: 31.6 G/DL (ref 31.5–36.5)
MCV RBC AUTO: 86 FL (ref 78–100)
PLATELET # BLD AUTO: 319 10E9/L (ref 150–450)
POTASSIUM SERPL-SCNC: 3.8 MMOL/L (ref 3.4–5.3)
RBC # BLD AUTO: 3.95 10E12/L (ref 4.4–5.9)
SODIUM SERPL-SCNC: 134 MMOL/L (ref 133–144)
WBC # BLD AUTO: 6.3 10E9/L (ref 4–11)

## 2019-04-19 PROCEDURE — 71046 X-RAY EXAM CHEST 2 VIEWS: CPT | Mod: 76

## 2019-04-19 PROCEDURE — 25000128 H RX IP 250 OP 636: Performed by: PHYSICIAN ASSISTANT

## 2019-04-19 PROCEDURE — 36415 COLL VENOUS BLD VENIPUNCTURE: CPT | Performed by: PHYSICIAN ASSISTANT

## 2019-04-19 PROCEDURE — 85027 COMPLETE CBC AUTOMATED: CPT | Performed by: PHYSICIAN ASSISTANT

## 2019-04-19 PROCEDURE — 25000132 ZZH RX MED GY IP 250 OP 250 PS 637: Performed by: INTERNAL MEDICINE

## 2019-04-19 PROCEDURE — 71046 X-RAY EXAM CHEST 2 VIEWS: CPT

## 2019-04-19 PROCEDURE — 99239 HOSP IP/OBS DSCHRG MGMT >30: CPT | Performed by: INTERNAL MEDICINE

## 2019-04-19 PROCEDURE — 25000132 ZZH RX MED GY IP 250 OP 250 PS 637: Performed by: PHYSICIAN ASSISTANT

## 2019-04-19 PROCEDURE — 97110 THERAPEUTIC EXERCISES: CPT | Mod: GP

## 2019-04-19 PROCEDURE — 80048 BASIC METABOLIC PNL TOTAL CA: CPT | Performed by: PHYSICIAN ASSISTANT

## 2019-04-19 PROCEDURE — 97116 GAIT TRAINING THERAPY: CPT | Mod: GP

## 2019-04-19 RX ORDER — OXYCODONE HYDROCHLORIDE 5 MG/1
5 TABLET ORAL EVERY 4 HOURS PRN
Qty: 30 TABLET | Refills: 0 | Status: SHIPPED | OUTPATIENT
Start: 2019-04-19 | End: 2019-01-01

## 2019-04-19 RX ORDER — ASPIRIN 325 MG
325 TABLET ORAL DAILY
Qty: 45 TABLET | Refills: 0 | COMMUNITY
Start: 2019-04-19 | End: 2019-01-01

## 2019-04-19 RX ORDER — POLYETHYLENE GLYCOL 3350 17 G/17G
17 POWDER, FOR SOLUTION ORAL DAILY PRN
COMMUNITY
Start: 2019-04-19 | End: 2019-01-01

## 2019-04-19 RX ADMIN — ENOXAPARIN SODIUM 40 MG: 40 INJECTION SUBCUTANEOUS at 09:38

## 2019-04-19 RX ADMIN — DOCUSATE SODIUM 200 MG: 100 CAPSULE, LIQUID FILLED ORAL at 07:32

## 2019-04-19 RX ADMIN — OXYCODONE HYDROCHLORIDE 10 MG: 10 TABLET ORAL at 08:18

## 2019-04-19 RX ADMIN — SENNOSIDES 8.6 MG: 8.6 TABLET, FILM COATED ORAL at 09:37

## 2019-04-19 RX ADMIN — ACETAMINOPHEN 650 MG: 325 TABLET, FILM COATED ORAL at 07:32

## 2019-04-19 RX ADMIN — OXYCODONE HYDROCHLORIDE 10 MG: 10 TABLET ORAL at 16:05

## 2019-04-19 ASSESSMENT — MIFFLIN-ST. JEOR: SCORE: 1635.9

## 2019-04-19 ASSESSMENT — PAIN DESCRIPTION - DESCRIPTORS
DESCRIPTORS: ACHING
DESCRIPTORS: ACHING

## 2019-04-19 ASSESSMENT — ACTIVITIES OF DAILY LIVING (ADL)
ADLS_ACUITY_SCORE: 19

## 2019-04-19 NOTE — DISCHARGE SUMMARY
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 He was recently hospitalized for SOB and found to have a large right-sided pneumothorax (s/p chest tube and talc pleurodesis on 4/3/19). Now being worked-up for a lytic lesion involving the right femur, which will require a nailing procedure. He was admitted for evaluation/treatment of persistent asymptomatic R pneumothorax.    Please see H&P for further detail of history.    Hospital Course   Hiram Tapia was admitted on 4/12/2019.  The following problems were addressed during his hospitalization:    Persistent asymptomatic right-sided pneumothorax.  Recently hospitalized 4/2-4/5/19 for evaluation of SOB and found to have a large right-sided pneumothorax. He had a chest tube placed by thoracic surgery, and underwent talc pleurodesis on 4/3/19. Chest tube was removed on 4/5/19 and he was discharged to home after CXR showed resolution of the right-sided pneumothorax. Re-admitted on 4/12 after CXR obtained in pre-op work-up on 4/12/19 revealed persistent right-sided pneumothorax. He underwent CT-guided pigtail catheter placement by IR on 4/12/19 and is admitted for monitoring prior to the orthopedic procedure. Thoracic surgery performed a repeat TALC pleurodesis on 4/16. CXR post chest tube removal showed no pneumothorax. Per thoracic surgery, does not need repeat imaging in the future unless patient is  asymptomatic.   - Prescribed Oxycodone 5 mg q4hrs prn, #30 tabs     Fever  Tmax 101.0 on 4/17. Asymptomatic. Suspect is post-procedural. Received single dose of Levofloxacin on 4/17.     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  Follows with Dr. Johnson. Had a biopsy of the right thigh on 3/8/18 that revealed undifferentiated pleomorphic sarcoma. Received 4 cycles of Dox/Ifos starting 3/2018 and pre-operative XRT. Underwent resection on 9/17/18 with negative margins, >95% necrosis and no LVI. Has been noted to have slowly growing pulmonary nodules and underwent RML wedge resection 12/5/18 with pathology revealing metastatic sarcoma, negative margins. Re-staging CT CAP on 4/2/19 revealed progressive disease, and he started Keytruda on 4/9/19.  - Next cycle of Keytruda will be due on 4/30/19. Has KIKE visit prior.  - Rescheduled palliative care visit for 5/21/19.     Lytic lesion of the right femur.  Re-staging CT CAP on 4/2/19 revealed increased size and number of numerous metastatic pulmonary nodules, new/increased lesions in multiple LN regions, as well as a lytic lesion with an associated soft tissue mass arising from the posterior right femoral intertrochanteric region. He saw Dr. Betts of orthopedic surgery on 4/9/19 and is planned for right femur Gamma nail on 4/15/19. He will need to be intubated for this procedure, and as such, is admitted for chest-tube placement. S/p right femur intramedullary pinning on 4/15 by Dr. Betts. POD#4 on discharge.   - WBAT   - F/u in 3 weeks for suture removal with resident or KIKE - scheduled for 5/3  - Anticoagulation ppx for 4-6 weeks. Discharged with  mg daily as patient unable to give injections.     Disposition: Discharged to home 4/19.     Plan of care was discussed with attending physician Dr. Meyer.     Farzaneh Joseph PA-C  Hematology/Oncology  Pager: [phone]    Significant Results and Procedures   Results for orders placed  or performed during the hospital encounter of 04/12/19   XR Chest 2 Views    Narrative    EXAMINATION: XR CHEST 2 VW, 4/12/2019 11:18 AM    INDICATION: evaluate right sided pneumothorax.    COMPARISON: CT for comparison 4/9/2018, plain film 4/5/2018.    FINDINGS: PA and lateral upright views of the chest. Right Port-A-Cath  tip projects over the cavoatrial junction. Trachea is midline. The  cardiomediastinal silhouette is within normals. Small right pleural  effusion. Bibasilar atelectasis. Right lower lobe fibroatelectasis.  Prominent pulmonary vasculature. Interstitial opacities. Unchanged  bilateral pulmonary nodules. Slight reduction in right pneumothorax.     Upper abdomen is unremarkable.       Impression    IMPRESSION:   1. Trace right pleural effusion. Resolution of left pleural effusion.  Mild bibasilar atelectasis. Platelike atelectasis in the middle right  lobe.  2. Small reduction in right pneumothorax.   3. Unchanged pulmonary nodules/masses.    I have personally reviewed the examination and initial interpretation  and I agree with the findings.    ROBERTA LAMB MD   CT Chest Tube with Cath Placement    Narrative    PROCEDURES 4/12/2019:  1. CT guided right chest tube placement for recurrent pneumothorax.     Clinical History: Sarcoma, status post previous chest tube for large  pneumothorax. Patient presented to the emergency room today was noted  to have a small to moderate-sized pneumothorax that had increased in  size compared to previous imaging. Replacement of chest tube  requested..    Comparisons: Radiograph 4/12/2019, CT 4/9/2019    Staff Radiologist: Jayda Francis M.D., interventional radiology  staff. I, Jayda Francis, performed the entire procedure.    Medications: The patient was placed on continuous monitoring. No  sedation administered. The patient remained stable throughout the  procedure.    Dose: 501 mGy*cm.    PROCEDURE: The patient understood the limitations, alternatives,  and  risks of the procedure and requested the procedure be performed. Both  written and oral consent were obtained.    Targeted pre-procedural scan performed demonstrated small residual  pneumothorax anteriorly.    The right anterior chest was prepped and draped in the usual sterile  fashion. 1% lidocaine was used for local anesthesia.     Under CT guidance, a 5 Swazi Yueh centesis needle was advanced into  the residual pneumothorax via a inferior approach due to the presence  of the right chest port. Air was easily aspirated. Wire coiled in  pleural space, confirmed with CT. Tract serially dilated, and a 14  Swazi Flexima nonlocking catheter was advanced over the wire  positioned in the apical pleural space. Tube noted to be well  functioning. Tube secured with Ethilon suture, attached to Pleur-evac  chamber and sterile dressing applied.     No immediate competition.      Impression    IMPRESSION:   CT-guided chest tube placement. There is a 14 Swazi nonlocking chest  tube in place.    RITESH DEWITT MD   XR Chest 2 Views    Narrative    Exam: XR CHEST 2 VW, 4/13/2019 9:24 AM    Indication: Interval CXR    Additional information: 58-year-old woman with metastatic  undifferentiated pleomorphic sarcoma of the right thigh. Now admitted  for large right-sided pneumothorax status post chest tube and talc  pleurodesis on 4/3/2018.    Comparison: CT and radiograph dated 4/12/2018    Findings:   AP and lateral views of the chest. Right IJ approach port catheter  with the tip projecting over the right atrium. Anterior approach chest  tube with the tip projecting over the anterior right apex.    Patchy/nodular airspace opacities in the right middle and right lower  lobe. There is an unchanged area of linear atelectasis in the right  upper lobe. Additional unchanged nodular density projecting over the  left lateral mid thorax. No new focal consolidation. Decreased  right-sided pneumothorax compared to prior exam. Small  volume pleural  effusion persists on the right. Moderate colonic stool burden in the  upper abdomen. No acute osseous findings.      Impression    Impression:   1. Right-sided chest tube in place with decreased volume of a  right-sided pneumothorax.  2. Small volume right-sided pleural effusion again seen.  3. Multiple patchy nodular airspace opacities similar to prior exam.  No new focal consolidation.    I have personally reviewed the examination and initial interpretation  and I agree with the findings.    KALPANA ZAZUETA MD   XR Chest 2 Views    Narrative    XR CHEST 2 VW  4/13/2019 2:56 PM      HISTORY: Follow up interval CXR    COMPARISON: Chest x-ray same day 0908 hours    TECHNIQUE: Upright frontal and lateral views of the chest.    FINDINGS: Stable trace right apical pneumothorax with chest tube in  place. Stable nodular opacities bilaterally with largest measuring 2.0  cm the right lower lobe. Smaller pleural effusion.  Again demonstrated  nodular opacities. Streaky right midlung zone opacity. Cardiac  mediastinal silhouette is within normal limits. Trachea is midline.  Right-sided Port-A-Cath with tip projecting over the superior  cavoatrial junction. The upper abdomen is unremarkable. No suspicious  osseous lesion. Degenerative changes of the right acromioclavicular  joint.      Impression    IMPRESSION: Trace right pneumothorax. Small right pleural effusion,  slightly increased.    I have personally reviewed the examination and initial interpretation  and I agree with the findings.    SHAI FRANCO MD   XR Chest 2 Views    Narrative    Exam: XR CHEST 2 VW, 4/14/2019 8:31 AM    Indication: Interval CXR    Comparison: 4/13/2019    Findings: Single AP chest radiograph. Right chest tube in stable  position. Right-sided Port-A-Cath terminating over the low SVC.  Cardiomediastinal silhouette within normal limits. Small bilateral  pleural effusions. Stable, trace right apical pneumothorax.  Multiple  nodular opacities, predominantly involving the right lung. Upper  abdomen is unremarkable.      Impression    Impression:   1. Stable trace right apical pneumothorax. Chest tube in stable  position.  2. Bilateral nodules, stable. Better demonstrated on CT chest  4/9/2019.  3. Small bilateral pleural effusions.    I have personally reviewed the examination and initial interpretation  and I agree with the findings.    DOYLE WALKER MD   XR Chest 2 Views    Narrative    EXAMINATION: XR CHEST 2 VW, 4/15/2019 9:38 AM    INDICATION: Interval CXR    COMPARISON: 4/14/2019    FINDINGS: PA and lateral radiographs of the chest. Stable positioning  of right apical chest tube. Right-sided Port-A-Cath terminating over  the low SVC. Cardiomediastinal silhouette is within normal limits.  Small unchanged bilateral pleural effusions. Stable trace right apical  pneumothorax. Unchanged multiple nodules, primarily involving the  right lung. Upper abdomen is unremarkable.      Impression    IMPRESSION:   1. Stable trace right apical pneumothorax. Chest tube is stable in  positioning.  2. Stable bilateral pulmonary nodules, most prominent in the right  lung. Better appreciated on CT chest 4/9/2019.  3. Stable small bilateral pleural effusions.    I have personally reviewed the examination and initial interpretation  and I agree with the findings.    ROBERTA LAMB MD   XR Surgery RAPHAEL Fluoro L/T 5 Min w Stills    Narrative    This exam was marked as non-reportable because it will not be read by a   radiologist or a [facility] non-radiologist provider.             POC US Guidance Needle Placement    Impression    R Fascia iliaca   XR Chest 2 Views    Narrative    EXAMINATION: XR CHEST 2 VW, 4/16/2019 9:48 AM    INDICATION: Interval CXR    COMPARISON: 4/15/2019    FINDINGS: PA and lateral radiograph of the chest. Right-sided  Port-A-Cath tip projects over the region of the cavoatrial junction.  Stable positioning of  right-sided chest tube. Trachea is midline. The  cardiomediastinal silhouette is within normal limits. Trace bilateral  pleural effusions with overlying atelectasis. Scattered bilateral  pulmonary nodules most prominent within the right lung. Streaky  opacities along the mid right lung, likely atelectasis. No  pneumothorax.    Partially visualized upper abdomen is grossly unremarkable. No acute  bony abnormalities. Soft tissues unremarkable.       Impression    IMPRESSION:   1. Resolution of right apical pneumothorax. Chest tube is stable in  positioning.  2. Stable bilateral scattered pulmonary nodules, better appreciated on  4/9/2019 CT.  3. Stable small bilateral pleural effusions overlying atelectasis.  Linear streaky opacities within the right midlung likely representing  atelectasis.    I have personally reviewed the examination and initial interpretation  and I agree with the findings.    ROBERTA LAMB MD   XR Chest 2 Views    Narrative    EXAMINATION: XR CHEST 2 VW, 4/17/2019 9:30 AM    INDICATION: Interval CXR    COMPARISON: 4/16/2019    FINDINGS: PA and lateral radiograph of the chest. Right-sided  Port-A-Cath tip projects over the region of the cavoatrial junction.  Stable positioning of right-sided apically oriented chest tube.  Trachea is midline. The cardiomediastinal silhouette is within normal  limits.Small bilateral pleural effusions. Minimal bibasilar  atelectasis. Redemonstration of multiple pulmonary nodules no new  nodules. Streaky opacities along the right midlung, likely  atelectasis. No appreciable pneumothorax.  Moderate stool within the partially visualized upper abdomen. No acute  osseous abnormalities. Soft tissues unremarkable.       Impression    IMPRESSION:  1. Stable small bilateral pleural effusions with overlying  atelectasis. Linear streaky opacities in the right midlung likely  representing atelectasis.  2. Stable bilateral scattered pulmonary nodules, better appreciated on  CT  dated 4/12/2019.  3. No appreciable pneumothorax with chest tube in place.    I have personally reviewed the examination and initial interpretation  and I agree with the findings.    KALPANA ZAZUETA MD   XR Chest 2 Views    Narrative    EXAMINATION: XR CHEST 2 VW, 4/18/2019 9:06 AM    INDICATION: Interval CXR    COMPARISON: 4/17/2019    FINDINGS: PA and lateral radiograph of the chest. Stable positioning  of right-sided chest tube. Stable positioning of right-sided  Port-A-Cath with tip projecting over the level of the cavoatrial  junction. Trachea is midline. Cardiomediastinal silhouette is within  normal limits. Small right pleural effusion. Resolution of left  pleural effusion. Bibasilar atelectasis. Redemonstration of multiple  pulmonary nodules no new nodules. Stable streaky opacities within the  right midlung, likely atelectasis. No appreciable pneumothorax.  Moderate amount of stool within the partially visualized upper  abdomen. No acute osseous abnormalities. Soft tissue is unremarkable.       Impression    IMPRESSION:   1. Small right pleural effusion and resolution of left pleural  effusion. Bibasilar atelectasis. Streaky linear opacities in the right  midlung, likely representing atelectasis.  2. Stable bilateral scattered pulmonary nodules, better appreciated on  CT dated 4/12/2019.  3. Stable positioning of right-sided chest tube. No appreciable  pneumothorax.    I have personally reviewed the examination and initial interpretation  and I agree with the findings.    ROBERTA LAMB MD   XR Chest 2 Views    Narrative    EXAMINATION: XR CHEST 2 VW, 4/19/2019 10:40 AM    INDICATION: Interval CXR    COMPARISON: 4/18/2019    FINDINGS: PA and lateral radiograph of the chest. Trachea is midline.  The cardiomediastinal silhouette is within normal limits. Small right  pleural effusion with overlying atelectasis. No left pleural effusion.  Left retrocardiac and basilar atelectasis. No  pneumothorax.  Redemonstration of multiple pulmonary nodules, no new nodules. Stable  streaky opacity in the right midlung, likely atelectasis. Moderate  amount of stool within the visualized upper abdomen. No acute osseous  abnormalities. Soft tissue unremarkable. Stable positioning of right  apical chest tube. Right-sided Port-A-Cath tip projects over the  region of the cavoatrial junction.      Impression    IMPRESSION:  1. Small right pleural effusion with overlying atelectasis. Left  retrocardiac and basilar atelectasis.  2. Fibroatelectasis  of the right midlung.  3. Stable positioning of right apical chest tube and Port-A-Cath.  4. Stable bilateral scattered pulmonary nodules, better appreciated on  CT dated 4/12/2019.     Pending Results   None    Code Status   Full Code    Primary Care Physician   Louisa Fry    Physical Exam   Temp: 96  F (35.6  C) Temp src: Oral BP: 102/68   Heart Rate: 69 Resp: 16 SpO2: 99 % O2 Device: None (Room air)    Vitals:    04/17/19 0731 04/18/19 1158 04/19/19 0819   Weight: 82.1 kg (181 lb) 83.9 kg (185 lb) 82 kg (180 lb 11.2 oz)     Vital Signs with Ranges  Temp:  [96  F (35.6  C)-100.5  F (38.1  C)] 96  F (35.6  C)  Heart Rate:  [64-84] 69  Resp:  [14-18] 16  BP: ()/(49-68) 102/68  SpO2:  [92 %-99 %] 99 %  I/O last 3 completed shifts:  In: 1300 [P.O.:1300]  Out: 400 [Urine:400]    Time Spent on this Encounter   IFarzaneh, personally saw the patient today and spent greater than 30 minutes discharging this patient.    Discharge Disposition   Discharged to home  Condition at discharge: Stable    Consultations This Hospital Stay   INTERVENTIONAL RADIOLOGY ADULT/PEDS IP CONSULT  INTERVENTIONAL RADIOLOGY ADULT/PEDS IP CONSULT  MEDICATION HISTORY IP PHARMACY CONSULT  PHYSICAL THERAPY ADULT IP CONSULT    Discharge Orders      CBC with platelets differential    Last Lab Result: Hemoglobin (g/dL)       Date                     Value                 04/19/2019                10.7 (L)         ----------     Comprehensive metabolic panel     PHYSICAL THERAPY REFERRAL      Adult [facility] Follow-up and recommended labs and tests    Follow up with Dr. Betts or WANG , at (location with clinic name or city) [facility], within 3 weeks  to evaluate after surgery. No follow up labs or test are needed.    Appointments on [place] and/or [facility] (with [facility] or [employer] provider or service). Call [phone] if you haven't heard regarding these appointments within 7 days of discharge.     Wound care and dressings    Instructions to care for your right thigh wounds at home:   Keep a dry dressing in place at all times until follow up. If your dressings are dry, you can change them every other day. Nursing please supply primipore island dressings for home. Ok to shower on 4/20/2019. Do not submerge your incisions in water until the stitches are removed.     Activity    Weight bearing as tolerated right lower extremity.     Reason for your hospital stay    You were admitted with a recurrent pneumothorax and had a chest tube and TALC procedure done again. You also had a right leg procedure done by the ortho team. Your pneumothorax is resolved after removal of your chest tube and you are safe to discharge home.     Adult [facility]/[employer] Follow-up and recommended labs and tests    - Follow-up with Ignacio on 4/30. You will receive Keytruda on this day as well.  - Orthopedics has scheduled a follow-up for you.  - I have requested that your palliative care appointment be rescheduled.    Appointments on [place] and/or [facility] (with [facility] or [employer] provider or service). Call [phone] if you haven't heard regarding these appointments within 7 days of discharge.     Activity    Your activity upon discharge: Regular activity as tolerated.     When to contact your care team    Call the [facility] 24-hour triage line at [phone] or [phone] for temp >100.4, uncontrolled  "nausea/vomiting/diarrhea/constipation, unrelieved pain, bleeding not relieved with pressure, dizziness, chest pain, shortness of breath, loss of consciousness, and any new or concerning symptoms.     Call [phone] and ask for the care coordinator that works with your oncologist if you have questions about scans, appointments, hospital follow-up, or other concerns.     Discharge Instructions    - Please avoid additional anti-inflammatory medications (Tylenol, Ibuprofen) other than your daily Aspirin.     Diet    Follow this diet upon discharge: Regular diet as tolerated.     Discharge Medications   Current Discharge Medication List      START taking these medications    Details   aspirin (ASA) 325 MG tablet Take 1 tablet (325 mg) by mouth daily . Take for 6 weeks after your right leg surgery.  Qty: 45 tablet, Refills: 0    Associated Diagnoses: Sarcoma of soft tissue (H)      oxyCODONE IR (ROXICODONE) 5 MG tablet Take 1 tablet (5 mg) by mouth every 4 hours as needed for moderate to severe pain  Qty: 30 tablet, Refills: 0    Associated Diagnoses: Sarcoma of soft tissue (H)      polyethylene glycol (MIRALAX/GLYCOLAX) packet Take 17 g by mouth daily as needed for constipation    Associated Diagnoses: Sarcoma of soft tissue (H)         CONTINUE these medications which have NOT CHANGED    Details   docusate sodium (COLACE) 100 MG capsule Take 100 mg by mouth 2 times daily as needed for constipation         STOP taking these medications       oxyCODONE (OXY-IR) 5 MG capsule Comments:   Reason for Stopping:             Allergies   Allergies   Allergen Reactions     Hydrofera Blue 4\"X4\" [Wound Dressings] Dermatitis and Blisters     Patient reports that Dressing causes skin irritation, blisters, and drainage.      Tegaderm Ag Mesh [Silver] Dermatitis and Blisters     Patient reports that Dressing causes Skin irritation, blisters, and drainage.     Data   ROUTINE IP LABS (Last four results)  BMP  Recent Labs   Lab " 04/19/19  0549 04/18/19  0602 04/17/19  0530 04/16/19  0803    132* 135 136   POTASSIUM 3.8 4.0 3.9 4.0   CHLORIDE 102 100 101 103   JAYESH 9.0 9.4 8.9 9.2   CO2 26 25 26 26   BUN 10 12 11 12   CR 0.80 0.76 0.83 0.90   GLC 95 99 103* 95     CBC  Recent Labs   Lab 04/19/19  0549 04/18/19  0602 04/17/19  0530 04/16/19  0803   WBC 6.3 9.9 6.2 5.5   RBC 3.95* 4.27* 4.15* 4.43   HGB 10.7* 11.5* 11.1* 12.3*   HCT 33.9* 36.4* 35.8* 38.8*   MCV 86 85 86 88   MCH 27.1 26.9 26.7 27.8   MCHC 31.6 31.6 31.0* 31.7   RDW 13.5 13.5 13.7 13.8    329 317 331     INR  Recent Labs   Lab 04/15/19  0628   INR 1.10

## 2019-04-19 NOTE — PLAN OF CARE
VSS, afebrile. Up ad camelia. C/o mild incisional pain, refuses PRN pain meds. Incision dressing CDI. No chest tube output this shift. Up ad camelia with crutches, weight bearing as tolerated. Voiding spontaneously. Sleeping throughout shift. Hopes to discharge today.

## 2019-04-19 NOTE — PLAN OF CARE
Discharge Planner PT  7D  Patient plan for discharge: home with A  Current status: Progressed gait endurance and pattern with B crutches to [place]id for ~250ft x 2 during session. Negotiated stairs with 1 crutch and 1 railing with a step to pattern, mod I. Initiated and provided handout on LE HEP. Discussed OP PT - pt agreed to this. Pain tolerable. Encouraged pt to complete LE HEP 2-3x/day for strengthening and flexibility, and perform walks as tolerated with WBAT + B crutches. Pt has met all IP PT goals therefore, PT will complete orders.     Nursing: SBA with chest tube, when chest tube is taken out then ambulate in unit IND with B crutches.    Barriers to return to prior living situation: medical status  Recommendations for discharge: home with A + B crutches + OP PT  Rationale for recommendations: Pt is safe discharge home when medically appropriate. Pt would benefit from further skilled OP PT to progress strength, endurance, and balance.       Entered by: Rosey Felder 04/19/2019 9:34 AM

## 2019-04-19 NOTE — PROGRESS NOTES
"THORACIC & FOREGUT SURGERY    S:  No overnight events.  Pt seen at bedside resting comfortably and still sleepy.     O:  /62 (BP Location: Right arm)   Pulse 78   Temp 98.3  F (36.8  C) (Oral)   Resp 16   Ht 1.778 m (5' 10\")   Wt 82 kg (180 lb 11.2 oz)   SpO2 97%   BMI 25.93 kg/m       A&O, NAD  Breathing non-labored,   Distal extremities warm    I/O last 3 completed shifts:  In: 1300 [P.O.:1300]  Out: 400 [Urine:400]  Chest Tube 12 cc last 24 hrs    CXR: Pending this AM    A/P: Hiram Tapia is a 58 year old male with metastatic sarcoma of the right thigh. He was recently hospitalized for incidentally foundd right-sided pneumothorax, which was treated with pigtail chest tube and talc pleurodesis on 4/3/19. Now readmitted for evaluation/treatment of persistent asymptomatic R pneumothorax.  Bedside talc pleurodesis repeated on 4/16.    - Daily CXR  - CT to water seal this morning. Will pull chest tube if CXR stable  - Patient ok to discharge from a Thoracic stand point after chest tube out today    Patient seen and discussed with fellow who will discuss with staff    Alexsander Thomas, PGY1  General Surgery    "

## 2019-04-19 NOTE — PROGRESS NOTES
Nursing Focus: Discharge    D: Patient discharged to home at 1530.  All belongings sent with patient and wife.    I: Discharge prescriptions sent to discharge pharmacy to be filled. All discharge medications and instructions reviewed with patient. Patient instructed to call clinic triage nurse if he experiences a fever >100.4, uncontrolled nausea, vomiting, diarrhea, or pain; or experiences any signs or symptoms of bleeding. Other phone numbers to call with questions or concerns after discharge reviewed with patient. Follow-up outpatient appointment scheduled reviewed with patient.  L fa piv removed. Education completed.  Care Plan goals met and adequate for discharge.    A: patient verbalized understanding of discharge medications and instructions. Patient will  medications at discharge pharmacy. Sent with primapore dressings for surgical incisions as ordered. Wound care education done using teach back method.     P: Patient to follow-up in clinic on 4/22

## 2019-04-22 NOTE — PLAN OF CARE
Physical Therapy Discharge Summary    Reason for therapy discharge:    Discharged to home with outpatient therapy.    Progress towards therapy goal(s). See goals on Care Plan in [facility] electronic health record for goal details.  Goals met    Therapy recommendation(s):    Continued therapy is recommended.  Rationale/Recommendations:  Progress with gait training and right LE exercises.

## 2019-04-23 LAB
BACTERIA SPEC CULT: NO GROWTH
BACTERIA SPEC CULT: NO GROWTH
Lab: NORMAL
Lab: NORMAL
SPECIMEN SOURCE: NORMAL
SPECIMEN SOURCE: NORMAL

## 2019-04-24 LAB
BACTERIA SPEC CULT: NO GROWTH
Lab: NORMAL
SPECIMEN SOURCE: NORMAL

## 2019-04-30 ENCOUNTER — ONCOLOGY VISIT (OUTPATIENT)
Dept: ONCOLOGY | Facility: CLINIC | Age: 59
End: 2019-04-30
Attending: INTERNAL MEDICINE
Payer: COMMERCIAL

## 2019-04-30 ENCOUNTER — APPOINTMENT (OUTPATIENT)
Dept: LAB | Facility: CLINIC | Age: 59
End: 2019-04-30
Attending: INTERNAL MEDICINE
Payer: COMMERCIAL

## 2019-04-30 VITALS
DIASTOLIC BLOOD PRESSURE: 73 MMHG | WEIGHT: 185 LBS | RESPIRATION RATE: 16 BRPM | OXYGEN SATURATION: 98 % | BODY MASS INDEX: 26.48 KG/M2 | TEMPERATURE: 97.9 F | SYSTOLIC BLOOD PRESSURE: 111 MMHG | HEART RATE: 78 BPM | HEIGHT: 70 IN

## 2019-04-30 DIAGNOSIS — R11.2 CHEMOTHERAPY-INDUCED NAUSEA AND VOMITING: ICD-10-CM

## 2019-04-30 DIAGNOSIS — Z95.828 PORT-A-CATH IN PLACE: ICD-10-CM

## 2019-04-30 DIAGNOSIS — E86.0 DEHYDRATION: ICD-10-CM

## 2019-04-30 DIAGNOSIS — C49.9 SARCOMA (H): Primary | ICD-10-CM

## 2019-04-30 DIAGNOSIS — C49.9 SARCOMA OF SOFT TISSUE (H): ICD-10-CM

## 2019-04-30 DIAGNOSIS — R91.8 PULMONARY NODULES: ICD-10-CM

## 2019-04-30 DIAGNOSIS — D63.0 ANEMIA IN NEOPLASTIC DISEASE: ICD-10-CM

## 2019-04-30 DIAGNOSIS — T45.1X5A CHEMOTHERAPY-INDUCED NAUSEA AND VOMITING: ICD-10-CM

## 2019-04-30 DIAGNOSIS — C49.21 SOFT TISSUE SARCOMA OF RIGHT THIGH (H): Primary | ICD-10-CM

## 2019-04-30 LAB
ALBUMIN SERPL-MCNC: 3.4 G/DL (ref 3.4–5)
ALP SERPL-CCNC: 130 U/L (ref 40–150)
ALT SERPL W P-5'-P-CCNC: 36 U/L (ref 0–70)
ANION GAP SERPL CALCULATED.3IONS-SCNC: 6 MMOL/L (ref 3–14)
AST SERPL W P-5'-P-CCNC: 24 U/L (ref 0–45)
BASOPHILS # BLD AUTO: 0 10E9/L (ref 0–0.2)
BASOPHILS NFR BLD AUTO: 0.6 %
BILIRUB SERPL-MCNC: 0.4 MG/DL (ref 0.2–1.3)
BUN SERPL-MCNC: 15 MG/DL (ref 7–30)
CALCIUM SERPL-MCNC: 9.8 MG/DL (ref 8.5–10.1)
CHLORIDE SERPL-SCNC: 106 MMOL/L (ref 94–109)
CO2 SERPL-SCNC: 26 MMOL/L (ref 20–32)
CREAT SERPL-MCNC: 0.79 MG/DL (ref 0.66–1.25)
DIFFERENTIAL METHOD BLD: ABNORMAL
EOSINOPHIL # BLD AUTO: 0.1 10E9/L (ref 0–0.7)
EOSINOPHIL NFR BLD AUTO: 1.8 %
ERYTHROCYTE [DISTWIDTH] IN BLOOD BY AUTOMATED COUNT: 14.1 % (ref 10–15)
GFR SERPL CREATININE-BSD FRML MDRD: >90 ML/MIN/{1.73_M2}
GLUCOSE SERPL-MCNC: 95 MG/DL (ref 70–99)
HCT VFR BLD AUTO: 38.9 % (ref 40–53)
HGB BLD-MCNC: 12.7 G/DL (ref 13.3–17.7)
IMM GRANULOCYTES # BLD: 0 10E9/L (ref 0–0.4)
IMM GRANULOCYTES NFR BLD: 0.3 %
LYMPHOCYTES # BLD AUTO: 1.6 10E9/L (ref 0.8–5.3)
LYMPHOCYTES NFR BLD AUTO: 26.5 %
MAGNESIUM SERPL-MCNC: 2.3 MG/DL (ref 1.6–2.3)
MCH RBC QN AUTO: 27.2 PG (ref 26.5–33)
MCHC RBC AUTO-ENTMCNC: 32.6 G/DL (ref 31.5–36.5)
MCV RBC AUTO: 83 FL (ref 78–100)
MONOCYTES # BLD AUTO: 0.4 10E9/L (ref 0–1.3)
MONOCYTES NFR BLD AUTO: 6 %
NEUTROPHILS # BLD AUTO: 4 10E9/L (ref 1.6–8.3)
NEUTROPHILS NFR BLD AUTO: 64.8 %
NRBC # BLD AUTO: 0 10*3/UL
NRBC BLD AUTO-RTO: 0 /100
PHOSPHATE SERPL-MCNC: 3.2 MG/DL (ref 2.5–4.5)
PLATELET # BLD AUTO: 370 10E9/L (ref 150–450)
POTASSIUM SERPL-SCNC: 3.9 MMOL/L (ref 3.4–5.3)
PROT SERPL-MCNC: 7.1 G/DL (ref 6.8–8.8)
RBC # BLD AUTO: 4.67 10E12/L (ref 4.4–5.9)
SODIUM SERPL-SCNC: 137 MMOL/L (ref 133–144)
TSH SERPL DL<=0.005 MIU/L-ACNC: 1.28 MU/L (ref 0.4–4)
WBC # BLD AUTO: 6.2 10E9/L (ref 4–11)

## 2019-04-30 PROCEDURE — 25800030 ZZH RX IP 258 OP 636: Mod: ZF | Performed by: INTERNAL MEDICINE

## 2019-04-30 PROCEDURE — 36415 COLL VENOUS BLD VENIPUNCTURE: CPT

## 2019-04-30 PROCEDURE — 25000128 H RX IP 250 OP 636: Mod: ZF | Performed by: PHYSICIAN ASSISTANT

## 2019-04-30 PROCEDURE — 84100 ASSAY OF PHOSPHORUS: CPT | Performed by: INTERNAL MEDICINE

## 2019-04-30 PROCEDURE — 85025 COMPLETE CBC W/AUTO DIFF WBC: CPT | Performed by: INTERNAL MEDICINE

## 2019-04-30 PROCEDURE — 96413 CHEMO IV INFUSION 1 HR: CPT

## 2019-04-30 PROCEDURE — 80053 COMPREHEN METABOLIC PANEL: CPT | Performed by: INTERNAL MEDICINE

## 2019-04-30 PROCEDURE — 25000128 H RX IP 250 OP 636: Mod: ZF | Performed by: INTERNAL MEDICINE

## 2019-04-30 PROCEDURE — 84443 ASSAY THYROID STIM HORMONE: CPT | Performed by: INTERNAL MEDICINE

## 2019-04-30 PROCEDURE — G0463 HOSPITAL OUTPT CLINIC VISIT: HCPCS | Mod: ZF

## 2019-04-30 PROCEDURE — 83735 ASSAY OF MAGNESIUM: CPT | Performed by: INTERNAL MEDICINE

## 2019-04-30 PROCEDURE — 99214 OFFICE O/P EST MOD 30 MIN: CPT | Mod: ZP | Performed by: PHYSICIAN ASSISTANT

## 2019-04-30 RX ORDER — HEPARIN SODIUM (PORCINE) LOCK FLUSH IV SOLN 100 UNIT/ML 100 UNIT/ML
5 SOLUTION INTRAVENOUS ONCE
Status: DISCONTINUED | OUTPATIENT
Start: 2019-04-30 | End: 2019-05-01 | Stop reason: HOSPADM

## 2019-04-30 RX ADMIN — SODIUM CHLORIDE 250 ML: 9 INJECTION, SOLUTION INTRAVENOUS at 14:39

## 2019-04-30 RX ADMIN — SODIUM CHLORIDE 200 MG: 9 INJECTION, SOLUTION INTRAVENOUS at 14:39

## 2019-04-30 ASSESSMENT — PAIN SCALES - GENERAL: PAINLEVEL: MODERATE PAIN (4)

## 2019-04-30 ASSESSMENT — MIFFLIN-ST. JEOR: SCORE: 1655.4

## 2019-04-30 NOTE — NURSING NOTE
Chief Complaint   Patient presents with     Port Draw     Labs drawn by RN from placed IV. VS taken.      PT. States he did not want his port accessed today as it doesn't work very well. Pt. Requested IV placement.       KAREN FOSS RN

## 2019-04-30 NOTE — Clinical Note
4/30/2019       RE: Hiram Tapia  [address]  [place] [address]     Dear Colleague,    Thank you for referring your patient, Hiram Tapia, to the [facility]. Please see a copy of my visit note below.    Oncology/Hematology Visit Note  Apr 30, 2019    Reason for Visit: Follow up of UPS of the right thigh    History of Present Illness: Hiram Tapia is a 58 year old male with no significant PMH with UPS of the right thigh. He has a history of right leg pain with sciatica x 20 years. In October 2017 he had more pain and injured his leg on a truck which eventually led to imaging that revealed a mass. 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     He was started on Doxil/Ifos 3/23/18 and completed 4 cycles with positive response to treatment. Of not he did complete 6 months of Xarelto during this time for incidental PE. He then underwent preoperative XRT. He underwent resection 9/17/18 with <5% viable cells on path and negative margins with no LVI.    Then on surveillance imaging October 2018 he was noted to have lung nodules. He underwent biopsy December 2018 that revealed metastatic sarcoma. Dr. Johnson recommended treatment with Keytruda in February 2019 however it is unclear to me why this was never started.    He underwent restaging CT 4/2/19. This revealed large right and trace left pneumothoraces along with worsening metastatic disease with increased lung nodules, increased lymphadenopathy, and new lytic lesion with associated soft tissue mass in posterior right femoral intertrochanteric region. He was admitted through the ED. Thoracic placed a pigtail but he had re-expansion. Thoracic surgery performed talc pleurodesis 4/3/19. He was discharged home 4/5/19.    He was started on Keytruda 4/9/19. He saw ortho for worsening right thigh pain thought 2/2 metastatic lesion in right femur and they discussed nailing. During his pre-op  work-up CXR revealed persistent right sided pneumothorax for which he was admitted 4/12/19. Repeat talc pleurodesis performed 4/16/19. He underwent right intramedually pinning 4/15/19.    He returns today for hospital follow-up and his next Keytruda infusion.     Interval History:      Review of Systems:  Patient denies fevers, chills, night sweats, unexplained weight changes, headaches, dizziness, vision or hearing changes, new lumps or bumps, chest pain, shortness of breath, cough, abdominal pain, nausea, vomiting, changes to bowel or bladder, swelling of extremities, bleeding issues, or rash.    Current Outpatient Medications   Medication Sig Dispense Refill     aspirin (ASA) 325 MG tablet Take 1 tablet (325 mg) by mouth daily . Take for 6 weeks after your right leg surgery. 45 tablet 0     docusate sodium (COLACE) 100 MG capsule Take 100 mg by mouth 2 times daily as needed for constipation       oxyCODONE IR (ROXICODONE) 5 MG tablet Take 1 tablet (5 mg) by mouth every 4 hours as needed for moderate to severe pain 30 tablet 0     polyethylene glycol (MIRALAX/GLYCOLAX) packet Take 17 g by mouth daily as needed for constipation         Physical Examination:  There were no vitals taken for this visit.  Wt Readings from Last 10 Encounters:   04/19/19 82 kg (180 lb 11.2 oz)   04/09/19 85.2 kg (187 lb 14.4 oz)   04/09/19 85.2 kg (187 lb 14.4 oz)   04/05/19 84.6 kg (186 lb 9.6 oz)   01/31/19 89.1 kg (196 lb 6.9 oz)   01/10/19 88 kg (194 lb)   12/05/18 85.3 kg (188 lb 0.8 oz)   11/29/18 85.8 kg (189 lb 1.6 oz)   11/29/18 85.8 kg (189 lb 1.6 oz)   11/27/18 84.4 kg (186 lb)     Constitutional: Well-appearing male in no acute distress.  Eyes: EOMI, PERRL. No scleral icterus.  ENT: Oral mucosa is moist without lesions or thrush.   Lymphatic: Neck is supple without cervical or supraclavicular lymphadenopathy. No axillary lymphadenopathy.  Cardiovascular: Regular rate and rhythm. No murmurs, gallops, or rubs. No peripheral  edema.  Respiratory: Clear to auscultation bilaterally. No wheezes or crackles.  Gastrointestinal: Bowel sounds present. Abdomen soft, non-tender. No palpable hepatosplenomegaly or masses.   Neurologic: Cranial nerves II through XII are grossly intact.  Skin: No rashes, petechiae, or bruising noted on exposed skin.    Laboratory Data:        Assessment and Plan:          Ignacio Ramirez PA-C  [address] [address]  [phone]      Again, thank you for allowing me to participate in the care of your patient.      Sincerely,    SENAIT Carter

## 2019-04-30 NOTE — PROGRESS NOTES
Oncology/Hematology Visit Note  Apr 30, 2019    Reason for Visit: Follow up of UPS of the right thigh    History of Present Illness: Hiram Tapia is a 58 year old male with no significant PMH with UPS of the right thigh. He has a history of right leg pain with sciatica x 20 years. In October 2017 he had more pain and injured his leg on a truck which eventually led to imaging that revealed a mass. 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     He was started on Doxil/Ifos 3/23/18 and completed 4 cycles with positive response to treatment. Of not he did complete 6 months of Xarelto during this time for incidental PE. He then underwent preoperative XRT. He underwent resection 9/17/18 with <5% viable cells on path and negative margins with no LVI.    Then on surveillance imaging October 2018 he was noted to have lung nodules. He underwent biopsy December 2018 that revealed metastatic sarcoma. Dr. Johnson recommended treatment with Keytruda in February 2019 however it is unclear to me why this was never started.    He underwent restaging CT 4/2/19. This revealed large right and trace left pneumothoraces along with worsening metastatic disease with increased lung nodules, increased lymphadenopathy, and new lytic lesion with associated soft tissue mass in posterior right femoral intertrochanteric region. He was admitted through the ED. Thoracic placed a pigtail but he had re-expansion. Thoracic surgery performed talc pleurodesis 4/3/19. He was discharged home 4/5/19.    He was started on Keytruda 4/9/19. He saw ortho for worsening right thigh pain thought 2/2 metastatic lesion in right femur and they discussed nailing. During his pre-op work-up CXR revealed persistent right sided pneumothorax for which he was admitted 4/12/19. Repeat talc pleurodesis performed 4/16/19. He underwent right intramedually pinning 4/15/19.    He returns today for hospital follow-up and his next Keytruda  "infusion.     Interval History:  David returns to clinic today with his wife. He overall is feeling well. He admits his right leg is still a bit sore but he denies any concerns with his incision sites and he is not needing any pain medication. He is up and moving as tolerated. He was confused about the ASA instructions so didn't start this. He denies any breathing concerns and his pleuritic chest pain continues to improve after his last pleurodesis. He does still have a mild cough with intermittent specks of blood that has been present since his initial hospitalization for pneumothorax. No elvis hemoptysis. He otherwise feels well and complete ROS negative. He feels he tolerated the first Keytruda infusion well. Of note he has been having ongoing issues with his port not having a blood return.     Review of Systems:  Patient denies fevers, chills, night sweats, unexplained weight changes, headaches, dizziness, vision or hearing changes, new lumps or bumps, chest pain, shortness of breath, abdominal pain, nausea, vomiting, changes to bowel or bladder, swelling of extremities, bleeding issues, or rash.    Current Outpatient Medications   Medication Sig Dispense Refill     aspirin (ASA) 325 MG tablet Take 1 tablet (325 mg) by mouth daily . Take for 6 weeks after your right leg surgery. 45 tablet 0     docusate sodium (COLACE) 100 MG capsule Take 100 mg by mouth 2 times daily as needed for constipation       oxyCODONE IR (ROXICODONE) 5 MG tablet Take 1 tablet (5 mg) by mouth every 4 hours as needed for moderate to severe pain 30 tablet 0     polyethylene glycol (MIRALAX/GLYCOLAX) packet Take 17 g by mouth daily as needed for constipation         Physical Examination:  /73   Pulse 78   Temp 97.9  F (36.6  C)   Resp 16   Ht 1.778 m (5' 10\")   Wt 83.9 kg (185 lb)   SpO2 98%   BMI 26.54 kg/m    Wt Readings from Last 10 Encounters:   04/19/19 82 kg (180 lb 11.2 oz)   04/09/19 85.2 kg (187 lb 14.4 oz)   04/09/19 " 85.2 kg (187 lb 14.4 oz)   04/05/19 84.6 kg (186 lb 9.6 oz)   01/31/19 89.1 kg (196 lb 6.9 oz)   01/10/19 88 kg (194 lb)   12/05/18 85.3 kg (188 lb 0.8 oz)   11/29/18 85.8 kg (189 lb 1.6 oz)   11/29/18 85.8 kg (189 lb 1.6 oz)   11/27/18 84.4 kg (186 lb)     Constitutional: Well-appearing male in no acute distress.  Eyes: EOMI, PERRL. No scleral icterus.  ENT: Oral mucosa is moist without lesions or thrush.   Lymphatic: Neck is supple without cervical or supraclavicular lymphadenopathy.   Cardiovascular: Regular rate and rhythm. No murmurs, gallops, or rubs. No peripheral edema.  Respiratory: Clear to auscultation bilaterally, slightly diminished on right. No wheezes or crackles.  Gastrointestinal: Bowel sounds present. Abdomen soft, non-tender. No palpable hepatosplenomegaly or masses.   Neurologic: Cranial nerves II through XII are grossly intact.  Skin: Well healed small incisions in left thigh with sutures in place and no surrounding erythema or tenderness. No rashes, petechiae, or bruising noted on exposed skin.    Laboratory Data:  Results for JAYLENE CHAO (MRN [id number]) as of 5/1/2019 10:04   4/30/2019 13:09   Sodium 137   Potassium 3.9   Chloride 106   Carbon Dioxide 26   Urea Nitrogen 15   Creatinine 0.79   GFR Estimate >90   GFR Estimate If Black >90   Calcium 9.8   Anion Gap 6   Magnesium 2.3   Phosphorus 3.2   Albumin 3.4   Protein Total 7.1   Bilirubin Total 0.4   Alkaline Phosphatase 130   ALT 36   AST 24   TSH 1.28   Glucose 95   WBC 6.2   Hemoglobin 12.7 (L)   Hematocrit 38.9 (L)   Platelet Count 370   RBC Count 4.67   MCV 83   MCH 27.2   MCHC 32.6   RDW 14.1   Diff Method Automated Method   % Neutrophils 64.8   % Lymphocytes 26.5   % Monocytes 6.0   % Eosinophils 1.8   % Basophils 0.6   % Immature Granulocytes 0.3   Nucleated RBCs 0   Absolute Neutrophil 4.0   Absolute Lymphocytes 1.6   Absolute Monocytes 0.4   Absolute Eosinophils 0.1   Absolute Basophils 0.0   Abs Immature Granulocytes 0.0    Absolute Nucleated RBC 0.0       Assessment and Plan:  1. 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 Biopsy December 2018 confirmed metastatic sarcoma. Due to unclear reasons there was a delay in starting Keytruda, finally able to receive 4/9/19 and baseline CT did confirm progressive disease.    Tolerated first cycle of Keytruda well. Returns today for cycle 2. Feeling well and labs all within treatment parameters to move forward. Will continue treatment every 3 weeks. Likely repeat CT imaging in July but will have patient discuss with Dr. Johnson at future visit.    Will schedule IR port check for ongoing port issues.    2. Pulm  Recurrent right sided pneumothorax s/p 2 hospitalizations and TALC pleurodesis 4/3 and again 4/16. Per thoracic no need for repeat imaging unless he has worsening symptoms. Does have intermittent scant hemoptysis, hgb stable. This should improve with time and we will continue to monitor. Of course asked him to call if symptoms worsen and go to ED with elvis hemoptysis. Otherwise no breathing concerns and pleuritic pain improving. Will continue to closely monitor.    3. MSK  Restaging CT with lytic lesion with associated soft tissue mass arising from the posterior right femoral intertrochanteric region. He underwent right femur intramedullary pinning 4/15/19 by Dr. Betts. Minimal pain concerns, weight bearing as tolerated, and incisions healing well. Has ortho follow-up for suture removal later this week.    Per discharge note he should be doing ASA 325mg daily and he will start this.    Ignacio Ramirez PA-C  [facility]  [address] [place] [address] [id number] [phone]

## 2019-04-30 NOTE — PATIENT INSTRUCTIONS
[facility] Main Line: [phone]    Call triage nurse with chills and/or temperature greater than or equal to 100.4, uncontrolled nausea/vomiting, diarrhea, constipation, dizziness, shortness of breath, chest pain, bleeding, unexplained bruising, or any new/concerning symptoms, questions/concerns.     If you are having any concerning symptoms or wish to speak to a provider before your next infusion visit, please call your care coordinator or triage to notify them so we can adequately serve you.     For triage nurse, after hours, weekends, and holidays: [phone]

## 2019-04-30 NOTE — LETTER
4/30/2019      RE: Hiram Tapia  [address]  [place] [address]       Oncology/Hematology Visit Note  Apr 30, 2019    Reason for Visit: Follow up of UPS of the right thigh    History of Present Illness: Hiram Tapia is a 58 year old male with no significant PMH with UPS of the right thigh. He has a history of right leg pain with sciatica x 20 years. In October 2017 he had more pain and injured his leg on a truck which eventually led to imaging that revealed a mass. 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     He was started on Doxil/Ifos 3/23/18 and completed 4 cycles with positive response to treatment. Of not he did complete 6 months of Xarelto during this time for incidental PE. He then underwent preoperative XRT. He underwent resection 9/17/18 with <5% viable cells on path and negative margins with no LVI.    Then on surveillance imaging October 2018 he was noted to have lung nodules. He underwent biopsy December 2018 that revealed metastatic sarcoma. Dr. Johnson recommended treatment with Keytruda in February 2019 however it is unclear to me why this was never started.    He underwent restaging CT 4/2/19. This revealed large right and trace left pneumothoraces along with worsening metastatic disease with increased lung nodules, increased lymphadenopathy, and new lytic lesion with associated soft tissue mass in posterior right femoral intertrochanteric region. He was admitted through the ED. Thoracic placed a pigtail but he had re-expansion. Thoracic surgery performed talc pleurodesis 4/3/19. He was discharged home 4/5/19.    He was started on Keytruda 4/9/19. He saw ortho for worsening right thigh pain thought 2/2 metastatic lesion in right femur and they discussed nailing. During his pre-op work-up CXR revealed persistent right sided pneumothorax for which he was admitted 4/12/19. Repeat talc pleurodesis performed 4/16/19. He underwent right  "intramedually pinning 4/15/19.    He returns today for hospital follow-up and his next Keytruda infusion.     Interval History:  David returns to clinic today with his wife. He overall is feeling well. He admits his right leg is still a bit sore but he denies any concerns with his incision sites and he is not needing any pain medication. He is up and moving as tolerated. He was confused about the ASA instructions so didn't start this. He denies any breathing concerns and his pleuritic chest pain continues to improve after his last pleurodesis. He does still have a mild cough with intermittent specks of blood that has been present since his initial hospitalization for pneumothorax. No elvis hemoptysis. He otherwise feels well and complete ROS negative. He feels he tolerated the first Keytruda infusion well. Of note he has been having ongoing issues with his port not having a blood return.     Review of Systems:  Patient denies fevers, chills, night sweats, unexplained weight changes, headaches, dizziness, vision or hearing changes, new lumps or bumps, chest pain, shortness of breath, abdominal pain, nausea, vomiting, changes to bowel or bladder, swelling of extremities, bleeding issues, or rash.    Current Outpatient Medications   Medication Sig Dispense Refill     aspirin (ASA) 325 MG tablet Take 1 tablet (325 mg) by mouth daily . Take for 6 weeks after your right leg surgery. 45 tablet 0     docusate sodium (COLACE) 100 MG capsule Take 100 mg by mouth 2 times daily as needed for constipation       oxyCODONE IR (ROXICODONE) 5 MG tablet Take 1 tablet (5 mg) by mouth every 4 hours as needed for moderate to severe pain 30 tablet 0     polyethylene glycol (MIRALAX/GLYCOLAX) packet Take 17 g by mouth daily as needed for constipation         Physical Examination:  /73   Pulse 78   Temp 97.9  F (36.6  C)   Resp 16   Ht 1.778 m (5' 10\")   Wt 83.9 kg (185 lb)   SpO2 98%   BMI 26.54 kg/m     Wt Readings from Last " 10 Encounters:   04/19/19 82 kg (180 lb 11.2 oz)   04/09/19 85.2 kg (187 lb 14.4 oz)   04/09/19 85.2 kg (187 lb 14.4 oz)   04/05/19 84.6 kg (186 lb 9.6 oz)   01/31/19 89.1 kg (196 lb 6.9 oz)   01/10/19 88 kg (194 lb)   12/05/18 85.3 kg (188 lb 0.8 oz)   11/29/18 85.8 kg (189 lb 1.6 oz)   11/29/18 85.8 kg (189 lb 1.6 oz)   11/27/18 84.4 kg (186 lb)     Constitutional: Well-appearing male in no acute distress.  Eyes: EOMI, PERRL. No scleral icterus.  ENT: Oral mucosa is moist without lesions or thrush.   Lymphatic: Neck is supple without cervical or supraclavicular lymphadenopathy.   Cardiovascular: Regular rate and rhythm. No murmurs, gallops, or rubs. No peripheral edema.  Respiratory: Clear to auscultation bilaterally, slightly diminished on right. No wheezes or crackles.  Gastrointestinal: Bowel sounds present. Abdomen soft, non-tender. No palpable hepatosplenomegaly or masses.   Neurologic: Cranial nerves II through XII are grossly intact.  Skin: Well healed small incisions in left thigh with sutures in place and no surrounding erythema or tenderness. No rashes, petechiae, or bruising noted on exposed skin.    Laboratory Data:  Results for JAYLENE CHAO (MRN [id number]) as of 5/1/2019 10:04   4/30/2019 13:09   Sodium 137   Potassium 3.9   Chloride 106   Carbon Dioxide 26   Urea Nitrogen 15   Creatinine 0.79   GFR Estimate >90   GFR Estimate If Black >90   Calcium 9.8   Anion Gap 6   Magnesium 2.3   Phosphorus 3.2   Albumin 3.4   Protein Total 7.1   Bilirubin Total 0.4   Alkaline Phosphatase 130   ALT 36   AST 24   TSH 1.28   Glucose 95   WBC 6.2   Hemoglobin 12.7 (L)   Hematocrit 38.9 (L)   Platelet Count 370   RBC Count 4.67   MCV 83   MCH 27.2   MCHC 32.6   RDW 14.1   Diff Method Automated Method   % Neutrophils 64.8   % Lymphocytes 26.5   % Monocytes 6.0   % Eosinophils 1.8   % Basophils 0.6   % Immature Granulocytes 0.3   Nucleated RBCs 0   Absolute Neutrophil 4.0   Absolute Lymphocytes 1.6   Absolute  Monocytes 0.4   Absolute Eosinophils 0.1   Absolute Basophils 0.0   Abs Immature Granulocytes 0.0   Absolute Nucleated RBC 0.0       Assessment and Plan:  1. 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 Biopsy December 2018 confirmed metastatic sarcoma. Due to unclear reasons there was a delay in starting Keytruda, finally able to receive 4/9/19 and baseline CT did confirm progressive disease.    Tolerated first cycle of Keytruda well. Returns today for cycle 2. Feeling well and labs all within treatment parameters to move forward. Will continue treatment every 3 weeks. Likely repeat CT imaging in July but will have patient discuss with Dr. Johnson at future visit.    Will schedule IR port check for ongoing port issues.    2. Pulm  Recurrent right sided pneumothorax s/p 2 hospitalizations and TALC pleurodesis 4/3 and again 4/16. Per thoracic no need for repeat imaging unless he has worsening symptoms. Does have intermittent scant hemoptysis, hgb stable. This should improve with time and we will continue to monitor. Of course asked him to call if symptoms worsen and go to ED with elvis hemoptysis. Otherwise no breathing concerns and pleuritic pain improving. Will continue to closely monitor.    3. MSK  Restaging CT with lytic lesion with associated soft tissue mass arising from the posterior right femoral intertrochanteric region. He underwent right femur intramedullary pinning 4/15/19 by Dr. Betts. Minimal pain concerns, weight bearing as tolerated, and incisions healing well. Has ortho follow-up for suture removal later this week.    Per discharge note he should be doing ASA 325mg daily and he will start this.    Ignacio Ramirez PA-C  [facility]  [address] [place]  [address]  [phone]

## 2019-04-30 NOTE — NURSING NOTE
"Oncology Rooming Note    April 30, 2019 1:15 PM   Hiram Tapia is a 58 year old male who presents for:    Chief Complaint   Patient presents with     Port Draw     Labs drawn by RN from placed IV. VS taken.      Oncology Clinic Visit     Sarcoma     Initial Vitals: /73   Pulse 78   Temp 97.9  F (36.6  C)   Resp 16   Ht 1.778 m (5' 10\")   Wt 83.9 kg (185 lb)   SpO2 98%   BMI 26.54 kg/m   Estimated body mass index is 26.54 kg/m  as calculated from the following:    Height as of this encounter: 1.778 m (5' 10\").    Weight as of this encounter: 83.9 kg (185 lb). Body surface area is 2.04 meters squared.  Moderate Pain (4) Comment: Data Unavailable   No LMP for male patient.  Allergies reviewed: Yes  Medications reviewed: Yes    Medications: Medication refills not needed today.  Pharmacy name entered into EPIC:    [facility] [place], [place] - [address] [place] [address] [phone]  [facility] [address] [place] [address] [address] [place] [facility] [place]  [address]    Clinical concerns: No New Concerns    JOHNNIE Washington  "

## 2019-04-30 NOTE — PROGRESS NOTES
Infusion Nursing Note:  Hiram Tapia presents today for Cycle 2 Day 1 Keytruda.     Patient seen by provider today: Yes: Ignacio Ramirez PA-C   present during visit today: Not Applicable.    Note: David presents to infusion following his clinic visit. He offers no new complaints at this time.    Intravenous Access:  Peripheral IV placed due to difficulty with port    Treatment Conditions:  Lab Results   Component Value Date    HGB 12.7 04/30/2019     Lab Results   Component Value Date    WBC 6.2 04/30/2019      Lab Results   Component Value Date    ANEU 4.0 04/30/2019     Lab Results   Component Value Date     04/30/2019      Lab Results   Component Value Date     04/30/2019                   Lab Results   Component Value Date    POTASSIUM 3.9 04/30/2019           Lab Results   Component Value Date    MAG 2.3 04/30/2019            Lab Results   Component Value Date    CR 0.79 04/30/2019                   Lab Results   Component Value Date    JAYESH 9.8 04/30/2019                Lab Results   Component Value Date    BILITOTAL 0.4 04/30/2019           Lab Results   Component Value Date    ALBUMIN 3.4 04/30/2019                    Lab Results   Component Value Date    ALT 36 04/30/2019           Lab Results   Component Value Date    AST 24 04/30/2019     Results reviewed, labs MET treatment parameters, ok to proceed with treatment.    Post Infusion Assessment:  Patient tolerated infusion without incident.  Blood return noted pre and post infusion.  Site patent and intact, free from redness, edema or discomfort.  No evidence of extravasations.  Access discontinued per protocol.     Discharge Plan:   Patient declined prescription refills.  Patient and/or family verbalized understanding of discharge instructions and all questions answered.  Copy of AVS reviewed with patient and/or family.  Patient will return 5/21/2019 for next appointment.  Patient discharged in stable condition.  Departure  Mode: Ambulatory.    Martin Granado RN

## 2019-05-03 ENCOUNTER — OFFICE VISIT (OUTPATIENT)
Dept: ORTHOPEDICS | Facility: CLINIC | Age: 59
End: 2019-05-03
Payer: COMMERCIAL

## 2019-05-03 DIAGNOSIS — C49.21 SARCOMA OF RIGHT THIGH (H): Primary | ICD-10-CM

## 2019-05-03 NOTE — LETTER
5/3/2019       RE: Hiram Tapia  [address]  [place] [address]     Dear Colleague,    Thank you for referring your patient, Hiram Tapia, to the [facility]. Please see a copy of my visit note below.    Chief Complaint: Right thigh wound check  Preop diagnosis: Metastatic sarcoma with impending fracture right femur     4/15/19 Procedure performed: Placement of prophylactic right femoral gamma nail with proximal and distal interlocking screws.    HPI: David is a 58-year-old male who is here 2-1/2 weeks status post right femur IM nailing due to metastatic bone lesion.  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  He had a recent collapsed lung, but that is improving.  He is full weightbearing with one crutch.  He reports he still has some pain in the thigh but it is much improved from before the surgery.  He has some tenderness around the incisions.  He denies any redness or drainage.  He denies any fever or chills.  He is being seen weekly by oncology and having Keytruda infusions.  He has not started physical therapy.  He is trying to work on exercise at home.  He is not able to do stairs at this point.  He is not taking any pain medicine.  No other concerns.    Physical Exam: David is a pleasant 58-year-old male is alert and oriented no apparent distress.  He has an antalgic gait with crutch today.  The dressings from the right thigh were removed.  Sutures are in place.  There is no erythema or drainage.  The incisions are well-healed.  He has mild to moderate swelling of the right thigh.  No calf tenderness.  He has diffuse tenderness to palpation.  He only has approximately 70 degrees of active knee range of motion due to tightness of the right thigh.  He is neurovascularly intact distally but some decreased sensation around the right lateral thigh incision.    Impression: 58-year-old  male with metastatic undifferentiated pleomorphic sarcoma doing well to nap weeks status post IM nailing of the right femur for impending fracture    Plan: Sutures were removed today.  He does not need any dressings on the wound.  He can shower or bathe in the tub.  He needs to work on gentle range of motion of the knee.  He should start physical therapy.  He can continue to progress his weightbearing as tolerated.  I would like to see him back in 3 to 4 weeks for clinical check of his progress and an x-ray of the right femur.  He will continue with infusion.  He can call us with any concerns.  All questions answered.    Again, thank you for allowing me to participate in the care of your patient.      Sincerely,    Rashmi Santos PA-C

## 2019-05-03 NOTE — NURSING NOTE
"Reason For Visit:   Chief Complaint   Patient presents with     Wound Check     Intramedullary Nail of Right Femur - Right, DOS: 4-.        Pain Assessment  Patient Currently in Pain: Yes  0-10 Pain Scale: 4  Primary Pain Location: Leg(Right )  Pain Descriptors: Aching, Sore(Stiffness.)        Allergies   Allergen Reactions     Hydrofera Blue 4\"X4\" [Wound Dressings] Dermatitis and Blisters     Patient reports that Dressing causes skin irritation, blisters, and drainage.      Tegaderm Ag Mesh [Silver] Dermatitis and Blisters     Patient reports that Dressing causes Skin irritation, blisters, and drainage.           Lupis Reaves LPN    "

## 2019-05-03 NOTE — PROGRESS NOTES
Chief Complaint: Right thigh wound check  Preop diagnosis: Metastatic sarcoma with impending fracture right femur     4/15/19 Procedure performed: Placement of prophylactic right femoral gamma nail with proximal and distal interlocking screws.    HPI: David is a 58-year-old male who is here 2-1/2 weeks status post right femur IM nailing due to metastatic bone lesion.  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  He had a recent collapsed lung, but that is improving.  He is full weightbearing with one crutch.  He reports he still has some pain in the thigh but it is much improved from before the surgery.  He has some tenderness around the incisions.  He denies any redness or drainage.  He denies any fever or chills.  He is being seen weekly by oncology and having Keytruda infusions.  He has not started physical therapy.  He is trying to work on exercise at home.  He is not able to do stairs at this point.  He is not taking any pain medicine.  No other concerns.    Physical Exam: David is a pleasant 58-year-old male is alert and oriented no apparent distress.  He has an antalgic gait with crutch today.  The dressings from the right thigh were removed.  Sutures are in place.  There is no erythema or drainage.  The incisions are well-healed.  He has mild to moderate swelling of the right thigh.  No calf tenderness.  He has diffuse tenderness to palpation.  He only has approximately 70 degrees of active knee range of motion due to tightness of the right thigh.  He is neurovascularly intact distally but some decreased sensation around the right lateral thigh incision.    Impression: 58-year-old male with metastatic undifferentiated pleomorphic sarcoma doing well to nap weeks status post IM nailing of the right femur for impending fracture    Plan: Sutures were removed today.  He does not need any dressings on the wound.  He can shower or bathe in the tub.  He needs to work on  gentle range of motion of the knee.  He should start physical therapy.  He can continue to progress his weightbearing as tolerated.  I would like to see him back in 3 to 4 weeks for clinical check of his progress and an x-ray of the right femur.  He will continue with infusion.  He can call us with any concerns.  All questions answered.

## 2019-05-19 NOTE — PROGRESS NOTES
Palliative Care Outpatient Clinic Consultation Note    Patient:  Hiram Tapia 58 year old male who is presenting to the palliative medicine clinic today at the request of Dr. Johnson for support with sarcoma of thigh.  He was also seen by palliative care team while in the hospital in 6/2018.      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 He completed 4 cycles of doxil/fos, pre-op XRT and resection on 9/17/18. Metastatic dz diagnosed in 12/2018 with lung lesions and lytic lesions. He started on Keytruda on 4/9/19, received talc pleurodesis on 4/16/19 and right intramedually pinning 4/15/19. He continues on Keytruda infusions with plans for restaging likely in July. He feels he is tolerating the infusions well but is frustrated that his leg is not improving more quickly. He has pain in his leg that he never had before terrence was placed.      Initially sig pain in right leg and while in hospital used oxycodone but only because he feels that he was forced to use it and he never wanted to use it. Not taking any oxycodone or any pain medication now despite feeling like doctors were forcing him to use pain medications. The more he pushes himself, the more pain he has in the leg. Sitting he has no leg pain. He slipped yesterday getting into his truck and then the leg hurt a lot but pain went away.     He denies nausea, vomiting or decreased appetite. Feels he is sleeping better and better as leg heals. Has good energy.    Constipation while on oxycodone but none now.     Review of Systems:  ROS: 10 point ROS neg other than the symptoms noted above in the HPI       Patient's Disease Understanding: He knows that there cancer spread to his lungs and he feels the goal of Keytruda is to deal  "with the cancer in the lungs and to \"teach the body to fight cancer\".  He tells me that he will continue to get keytruda and then repeat scans in July.     Functional Status: independent of all ADLs    Social History  Living Situation: lives with his wife and 32 yr old son who has a form of autism. Son started a new job this week - book keeping type of job   Support System: wife and son very support. Not spiritual or Yazdanism. Has friends; work very supportive of allowing him to take whatever time he needs off and still working full time.   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     Substance Use/History of misuse: no  Opioid Risk Tool (ORT):    Family History of Substance Abuse:        Alcohol = 0 pt (no)       Illegal Drugs = 0 pt (no)       Prescription Drugs = 0 pt (no)      Personal History of Substance Abuse:       Alcohol = 0 pt (no)       Illegal Drugs = 0 pt (no)       Prescription Drugs = 0 pt (no)        Age: 0 pt (age < 16; age > 45)      History of trauma as a child: 0 pt (no)      Psychological Disease: 0 pt (none)      ORT Score = 0        0-3: Low risk for opioid abuse       4-7: Moderate risk for opioid abuse       >/= 8: High risk for opioid abuse    Family History  Family History   Problem Relation Age of Onset     Leukemia Father          Advance Care Planning:  Advance Directive:    None in epic  Code status full    Allergies   Allergen Reactions     Hydrofera Blue 4\"X4\" [Wound Dressings] Dermatitis and Blisters     Patient reports that Dressing causes skin irritation, blisters, and drainage.      Tegaderm Ag Mesh [Silver] Dermatitis and Blisters     Patient reports that Dressing causes Skin irritation, blisters, and drainage.     Current Outpatient Medications   Medication Sig Dispense Refill     aspirin (ASA) 325 MG tablet Take 1 tablet (325 mg) by mouth daily . Take for 6 weeks after your right leg " surgery. 45 tablet 0     docusate sodium (COLACE) 100 MG capsule Take 100 mg by mouth 2 times daily as needed for constipation       oxyCODONE IR (ROXICODONE) 5 MG tablet Take 1 tablet (5 mg) by mouth every 4 hours as needed for moderate to severe pain (Patient not taking: Reported on 5/3/2019) 30 tablet 0     polyethylene glycol (MIRALAX/GLYCOLAX) packet Take 17 g by mouth daily as needed for constipation       Past Medical History:   Diagnosis Date     Pulmonary embolism (H)      Sarcoma (H)      Past Surgical History:   Procedure Laterality Date     EXCISE SOFT TISSUE TUMOR THIGH Right 3/8/2018    Procedure: EXCISE SOFT TISSUE TUMOR THIGH;  Biopsy Right Thigh Tumor;  Surgeon: Brad Betts MD;  Location: [facility]     EXCISE SOFT TISSUE TUMOR THIGH Right 9/17/2018    Procedure: EXCISE SOFT TISSUE TUMOR THIGH;  Removal Right Thigh Tumor ;  Surgeon: Brad Betts MD;  Location: [facility] OR     INSERT PORT VASCULAR ACCESS N/A 3/28/2018    Procedure: INSERT PORT VASCULAR ACCESS;  Vascular Access Port Insertion with C-arm;  Surgeon: Sarah Bowie MD;  Location: [facility] OR     IRRIGATION AND DEBRIDEMENT LOWER EXTREMITY, COMBINED Right 4/6/2018    Procedure: COMBINED IRRIGATION AND DEBRIDEMENT LOWER EXTREMITY;  Irrigation And Debridement Right Thigh ;  Surgeon: Brad Betts MD;  Location: [facility] OR     IRRIGATION AND DEBRIDEMENT LOWER EXTREMITY, COMBINED Right 4/9/2018    Procedure: COMBINED IRRIGATION AND DEBRIDEMENT LOWER EXTREMITY;  Irrigation And Debridement Right Thigh Wound. with Wound VAC Exchange;  Surgeon: Brad Betts MD;  Location: [facility] OR     OPEN REDUCTION INTERNAL FIXATION RODDING INTRAMEDULLARY FEMUR Right 4/15/2019    Procedure: Intramedullary Nail of Right Femur;  Surgeon: Brad Betts MD;  Location: [facility] OR     STRABISMUS SURGERY      as a child     THORACOSCOPIC WEDGE RESECTION LUNG Right 12/5/2018    Procedure: Right Video Assisted Thoracoscopic Wedge Resection;  Surgeon: Jamir  "Sarah JAIN MD;  Location: [facility] OR           Vital signs:    ,Estimated body mass index is 26.54 kg/m  as calculated from the following:    Height as of 4/30/19: 1.778 m (5' 10\").    Weight as of 4/30/19: 83.9 kg (185 lb).   /87   Pulse 77   Temp 97.6  F (36.4  C) (Oral)   Resp 16   Ht 1.778 m (5' 10\")   Wt 86.6 kg (191 lb)   SpO2 98%   BMI 27.41 kg/m      General: alert, well groomed, well nourished, appears stated age, in NAD  Eyes: EOMI, sclera clear  HEENT: NC/AT; mucous membranes moist  Lungs: no increased work of breathing, speaking full sentences, CTA b/l  Heart: RRR  Ext: no lower ext edema  Neuro: A&O x 3; CN II-XII grossly intact; gait - uses single crutch for support, trying not to favor rt leg but occasionally walks with limp  Neuropsych: alert, engaged, affect full; sensorium intact      Data Reviewed:  CT scan 4/2/19:  IMPRESSION:   1. New large right and trace left pneumothoraces.  2. Increased size and number of numerous metastatic pulmonary nodules, including along a right middle lobe wedge resection staple line.  3. Increased/new centrally necrotic right external iliac chain lymph nodes, lytic lesion with associated soft tissue mass arising from the posterior right femoral intertrochanteric region, and centrally necrotic soft tissue mass arising from the right iliopsoas myotendinous junction.  4. Unchanged hemangioma in hepatic segment 6.  5. Unchanged pancreatic tail masslike lesion, which has not demonstrated significant FDG avidity on comparison PET CTs, indeterminate.    [facility] Outpatient Palliative Care Opioid Prescribing Safety Plan    Opioid Risk Assessment: Performed by Bertha Ortiz on 05/19/2019.  ORT score of 0.   Mood Disorder Assessment: Performed by Bertha Ortiz on 05/19/2019.     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     Recommendations & Counseling:    Right leg pain due to sarcoma and terrence placement:   - continue physical therapy and weight bearing as directed by orthopedics  - pain managed without medications at this time    Keytruda infusions - tolerating well without side effects currently.    David will follow up as needed and did not want to schedule a follow up at this time.      Thank you for involving us in the patient's care. 60 minutes face time over half spent counseling.  Bertha Ortiz MD / Palliative Medicine / Pager [phone] / After-Hours Answering Service [phone] / Main Palliative Clinic - [phone] Inpatient Team Consult Pager [phone] (answered 8am-430pm M-F)    Answers for HPI/ROS submitted by the patient on 5/21/2019   General Symptoms: No  Skin Symptoms: No  HENT Symptoms: No  EYE SYMPTOMS: No  HEART SYMPTOMS: No  LUNG SYMPTOMS: Yes  INTESTINAL SYMPTOMS: No  URINARY SYMPTOMS: No  REPRODUCTIVE SYMPTOMS: No  SKELETAL SYMPTOMS: Yes  BLOOD SYMPTOMS: No  NERVOUS SYSTEM SYMPTOMS: No  MENTAL HEALTH SYMPTOMS: No  Cough: Yes  Sputum or phlegm: No  Coughing up blood: Yes  Difficulty breating or shortness of breath: No  Snoring: No  Wheezing: No  Difficulty breathing on exertion: No  Nighttime Cough: No  Difficulty breathing when lying flat: No  Back pain: No  Muscle aches: Yes  Neck pain: No  Swollen joints: No  Joint pain: No  Bone pain: Yes  Muscle cramps: No  Muscle weakness: Yes  Joint stiffness: No  Bone fracture: No

## 2019-05-21 ENCOUNTER — ANCILLARY PROCEDURE (OUTPATIENT)
Dept: GENERAL RADIOLOGY | Facility: CLINIC | Age: 59
End: 2019-05-21
Attending: PHYSICIAN ASSISTANT
Payer: COMMERCIAL

## 2019-05-21 ENCOUNTER — APPOINTMENT (OUTPATIENT)
Dept: LAB | Facility: CLINIC | Age: 59
End: 2019-05-21
Attending: INTERNAL MEDICINE
Payer: COMMERCIAL

## 2019-05-21 ENCOUNTER — INFUSION THERAPY VISIT (OUTPATIENT)
Dept: ONCOLOGY | Facility: CLINIC | Age: 59
End: 2019-05-21
Attending: INTERNAL MEDICINE
Payer: COMMERCIAL

## 2019-05-21 ENCOUNTER — OFFICE VISIT (OUTPATIENT)
Dept: PALLIATIVE CARE | Facility: CLINIC | Age: 59
End: 2019-05-21
Attending: INTERNAL MEDICINE
Payer: COMMERCIAL

## 2019-05-21 ENCOUNTER — OFFICE VISIT (OUTPATIENT)
Dept: ORTHOPEDICS | Facility: CLINIC | Age: 59
End: 2019-05-21
Payer: COMMERCIAL

## 2019-05-21 VITALS
DIASTOLIC BLOOD PRESSURE: 87 MMHG | SYSTOLIC BLOOD PRESSURE: 125 MMHG | WEIGHT: 190.92 LBS | OXYGEN SATURATION: 98 % | HEIGHT: 70 IN | RESPIRATION RATE: 16 BRPM | BODY MASS INDEX: 27.33 KG/M2 | TEMPERATURE: 97.6 F | HEART RATE: 77 BPM

## 2019-05-21 VITALS
OXYGEN SATURATION: 98 % | DIASTOLIC BLOOD PRESSURE: 87 MMHG | HEART RATE: 77 BPM | WEIGHT: 191 LBS | SYSTOLIC BLOOD PRESSURE: 125 MMHG | TEMPERATURE: 97.6 F | RESPIRATION RATE: 16 BRPM | BODY MASS INDEX: 27.35 KG/M2 | HEIGHT: 70 IN

## 2019-05-21 DIAGNOSIS — T45.1X5A CHEMOTHERAPY-INDUCED NAUSEA AND VOMITING: ICD-10-CM

## 2019-05-21 DIAGNOSIS — C49.9 SARCOMA (H): Primary | ICD-10-CM

## 2019-05-21 DIAGNOSIS — J93.12 SECONDARY SPONTANEOUS PNEUMOTHORAX: ICD-10-CM

## 2019-05-21 DIAGNOSIS — R11.2 CHEMOTHERAPY-INDUCED NAUSEA AND VOMITING: Primary | ICD-10-CM

## 2019-05-21 DIAGNOSIS — G89.3 CANCER ASSOCIATED PAIN: ICD-10-CM

## 2019-05-21 DIAGNOSIS — D63.0 ANEMIA IN NEOPLASTIC DISEASE: ICD-10-CM

## 2019-05-21 DIAGNOSIS — R91.8 PULMONARY NODULES: ICD-10-CM

## 2019-05-21 DIAGNOSIS — M79.604 PAIN OF RIGHT LOWER EXTREMITY: ICD-10-CM

## 2019-05-21 DIAGNOSIS — C49.21 SOFT TISSUE SARCOMA OF RIGHT THIGH (H): ICD-10-CM

## 2019-05-21 DIAGNOSIS — E86.0 DEHYDRATION: ICD-10-CM

## 2019-05-21 DIAGNOSIS — R11.2 CHEMOTHERAPY-INDUCED NAUSEA AND VOMITING: ICD-10-CM

## 2019-05-21 DIAGNOSIS — I26.99 OTHER PULMONARY EMBOLISM WITHOUT ACUTE COR PULMONALE, UNSPECIFIED CHRONICITY (H): ICD-10-CM

## 2019-05-21 DIAGNOSIS — T45.1X5A CHEMOTHERAPY-INDUCED NAUSEA AND VOMITING: Primary | ICD-10-CM

## 2019-05-21 DIAGNOSIS — C49.9 SARCOMA OF SOFT TISSUE (H): ICD-10-CM

## 2019-05-21 DIAGNOSIS — C49.21 SARCOMA OF RIGHT THIGH (H): Primary | ICD-10-CM

## 2019-05-21 DIAGNOSIS — C49.21 SARCOMA OF RIGHT THIGH (H): ICD-10-CM

## 2019-05-21 DIAGNOSIS — M10.00 IDIOPATHIC GOUT, UNSPECIFIED CHRONICITY, UNSPECIFIED SITE: ICD-10-CM

## 2019-05-21 DIAGNOSIS — M79.651 PAIN OF RIGHT THIGH: ICD-10-CM

## 2019-05-21 LAB
ALBUMIN SERPL-MCNC: 3.8 G/DL (ref 3.4–5)
ALP SERPL-CCNC: 100 U/L (ref 40–150)
ALT SERPL W P-5'-P-CCNC: 31 U/L (ref 0–70)
ANION GAP SERPL CALCULATED.3IONS-SCNC: 7 MMOL/L (ref 3–14)
AST SERPL W P-5'-P-CCNC: 23 U/L (ref 0–45)
BASOPHILS # BLD AUTO: 0.1 10E9/L (ref 0–0.2)
BASOPHILS NFR BLD AUTO: 1 %
BILIRUB SERPL-MCNC: 0.4 MG/DL (ref 0.2–1.3)
BUN SERPL-MCNC: 14 MG/DL (ref 7–30)
CALCIUM SERPL-MCNC: 10.1 MG/DL (ref 8.5–10.1)
CHLORIDE SERPL-SCNC: 109 MMOL/L (ref 94–109)
CO2 SERPL-SCNC: 24 MMOL/L (ref 20–32)
CREAT SERPL-MCNC: 0.94 MG/DL (ref 0.66–1.25)
DIFFERENTIAL METHOD BLD: ABNORMAL
EOSINOPHIL # BLD AUTO: 0.4 10E9/L (ref 0–0.7)
EOSINOPHIL NFR BLD AUTO: 7.5 %
ERYTHROCYTE [DISTWIDTH] IN BLOOD BY AUTOMATED COUNT: 15.5 % (ref 10–15)
GFR SERPL CREATININE-BSD FRML MDRD: 87 ML/MIN/{1.73_M2}
GLUCOSE SERPL-MCNC: 103 MG/DL (ref 70–99)
HCT VFR BLD AUTO: 38.4 % (ref 40–53)
HGB BLD-MCNC: 12.5 G/DL (ref 13.3–17.7)
IMM GRANULOCYTES # BLD: 0 10E9/L (ref 0–0.4)
IMM GRANULOCYTES NFR BLD: 0.2 %
LYMPHOCYTES # BLD AUTO: 1.4 10E9/L (ref 0.8–5.3)
LYMPHOCYTES NFR BLD AUTO: 26.9 %
MAGNESIUM SERPL-MCNC: 2.4 MG/DL (ref 1.6–2.3)
MCH RBC QN AUTO: 27.4 PG (ref 26.5–33)
MCHC RBC AUTO-ENTMCNC: 32.6 G/DL (ref 31.5–36.5)
MCV RBC AUTO: 84 FL (ref 78–100)
MONOCYTES # BLD AUTO: 0.4 10E9/L (ref 0–1.3)
MONOCYTES NFR BLD AUTO: 7.7 %
NEUTROPHILS # BLD AUTO: 3 10E9/L (ref 1.6–8.3)
NEUTROPHILS NFR BLD AUTO: 56.7 %
NRBC # BLD AUTO: 0 10*3/UL
NRBC BLD AUTO-RTO: 0 /100
PHOSPHATE SERPL-MCNC: 3.7 MG/DL (ref 2.5–4.5)
PLATELET # BLD AUTO: 267 10E9/L (ref 150–450)
POTASSIUM SERPL-SCNC: 4.1 MMOL/L (ref 3.4–5.3)
PROT SERPL-MCNC: 7.3 G/DL (ref 6.8–8.8)
RBC # BLD AUTO: 4.57 10E12/L (ref 4.4–5.9)
SODIUM SERPL-SCNC: 140 MMOL/L (ref 133–144)
TSH SERPL DL<=0.005 MIU/L-ACNC: 1.34 MU/L (ref 0.4–4)
WBC # BLD AUTO: 5.2 10E9/L (ref 4–11)

## 2019-05-21 PROCEDURE — 83735 ASSAY OF MAGNESIUM: CPT | Performed by: PHYSICIAN ASSISTANT

## 2019-05-21 PROCEDURE — 96413 CHEMO IV INFUSION 1 HR: CPT

## 2019-05-21 PROCEDURE — 84443 ASSAY THYROID STIM HORMONE: CPT | Performed by: PHYSICIAN ASSISTANT

## 2019-05-21 PROCEDURE — 99214 OFFICE O/P EST MOD 30 MIN: CPT | Mod: GC | Performed by: INTERNAL MEDICINE

## 2019-05-21 PROCEDURE — 25800030 ZZH RX IP 258 OP 636: Mod: ZF | Performed by: INTERNAL MEDICINE

## 2019-05-21 PROCEDURE — 85025 COMPLETE CBC W/AUTO DIFF WBC: CPT | Performed by: PHYSICIAN ASSISTANT

## 2019-05-21 PROCEDURE — G0463 HOSPITAL OUTPT CLINIC VISIT: HCPCS | Mod: ZF

## 2019-05-21 PROCEDURE — 84100 ASSAY OF PHOSPHORUS: CPT | Performed by: PHYSICIAN ASSISTANT

## 2019-05-21 PROCEDURE — 25000128 H RX IP 250 OP 636: Mod: ZF | Performed by: INTERNAL MEDICINE

## 2019-05-21 PROCEDURE — 80053 COMPREHEN METABOLIC PANEL: CPT | Performed by: PHYSICIAN ASSISTANT

## 2019-05-21 PROCEDURE — G0463 HOSPITAL OUTPT CLINIC VISIT: HCPCS | Mod: ZF,25

## 2019-05-21 PROCEDURE — 99213 OFFICE O/P EST LOW 20 MIN: CPT | Performed by: INTERNAL MEDICINE

## 2019-05-21 RX ORDER — EPINEPHRINE 0.3 MG/.3ML
0.3 INJECTION SUBCUTANEOUS EVERY 5 MIN PRN
Status: CANCELLED | OUTPATIENT
Start: 2019-05-21

## 2019-05-21 RX ORDER — MEPERIDINE HYDROCHLORIDE 25 MG/ML
25 INJECTION INTRAMUSCULAR; INTRAVENOUS; SUBCUTANEOUS EVERY 30 MIN PRN
Status: CANCELLED | OUTPATIENT
Start: 2019-05-21

## 2019-05-21 RX ORDER — ALBUTEROL SULFATE 0.83 MG/ML
2.5 SOLUTION RESPIRATORY (INHALATION)
Status: CANCELLED | OUTPATIENT
Start: 2019-05-21

## 2019-05-21 RX ORDER — LORAZEPAM 2 MG/ML
0.5 INJECTION INTRAMUSCULAR EVERY 4 HOURS PRN
Status: CANCELLED
Start: 2019-05-21

## 2019-05-21 RX ORDER — SODIUM CHLORIDE 9 MG/ML
1000 INJECTION, SOLUTION INTRAVENOUS CONTINUOUS PRN
Status: CANCELLED
Start: 2019-05-21

## 2019-05-21 RX ORDER — EPINEPHRINE 1 MG/ML
0.3 INJECTION, SOLUTION INTRAMUSCULAR; SUBCUTANEOUS EVERY 5 MIN PRN
Status: CANCELLED | OUTPATIENT
Start: 2019-05-21

## 2019-05-21 RX ORDER — DIPHENHYDRAMINE HYDROCHLORIDE 50 MG/ML
50 INJECTION INTRAMUSCULAR; INTRAVENOUS
Status: CANCELLED
Start: 2019-05-21

## 2019-05-21 RX ORDER — METHYLPREDNISOLONE SODIUM SUCCINATE 125 MG/2ML
125 INJECTION, POWDER, LYOPHILIZED, FOR SOLUTION INTRAMUSCULAR; INTRAVENOUS
Status: CANCELLED
Start: 2019-05-21

## 2019-05-21 RX ORDER — ALBUTEROL SULFATE 90 UG/1
1-2 AEROSOL, METERED RESPIRATORY (INHALATION)
Status: CANCELLED
Start: 2019-05-21

## 2019-05-21 RX ADMIN — SODIUM CHLORIDE 200 MG: 9 INJECTION, SOLUTION INTRAVENOUS at 13:00

## 2019-05-21 RX ADMIN — SODIUM CHLORIDE 250 ML: 9 INJECTION, SOLUTION INTRAVENOUS at 12:58

## 2019-05-21 ASSESSMENT — ENCOUNTER SYMPTOMS
ARTHRALGIAS: 0
MYALGIAS: 1
MUSCLE CRAMPS: 0
STIFFNESS: 0
MUSCLE WEAKNESS: 1
SHORTNESS OF BREATH: 0
DYSPNEA ON EXERTION: 0
HEMOPTYSIS: 1
JOINT SWELLING: 0
SNORES LOUDLY: 0
COUGH DISTURBING SLEEP: 0
POSTURAL DYSPNEA: 0
DYSPNEA ON EXERTION: 0
SPUTUM PRODUCTION: 0
JOINT SWELLING: 0
COUGH: 1
NECK PAIN: 0
MYALGIAS: 1
SNORES LOUDLY: 0
BACK PAIN: 0
WHEEZING: 0
MUSCLE WEAKNESS: 1
MUSCLE CRAMPS: 0
SHORTNESS OF BREATH: 0
WHEEZING: 0
COUGH: 1
HEMOPTYSIS: 1
NECK PAIN: 0
STIFFNESS: 0
ARTHRALGIAS: 0
COUGH DISTURBING SLEEP: 0
SPUTUM PRODUCTION: 0
POSTURAL DYSPNEA: 0
BACK PAIN: 0

## 2019-05-21 ASSESSMENT — PAIN SCALES - GENERAL
PAINLEVEL: MODERATE PAIN (4)
PAINLEVEL: MODERATE PAIN (4)

## 2019-05-21 ASSESSMENT — MIFFLIN-ST. JEOR
SCORE: 1682.25
SCORE: 1682.62

## 2019-05-21 NOTE — PROGRESS NOTES
Infusion Nursing Note:  Hiram Tapia presents today for Cycle 3 Day 1 Keytruda.    Patient seen by provider today: Yes: Dr. Johnson   present during visit today: Not Applicable.    Note: No complaints of pain or other acute concerns.    Intravenous Access:  Peripheral IV placed.    Treatment Conditions:  Lab Results   Component Value Date    HGB 12.5 05/21/2019     Lab Results   Component Value Date    WBC 5.2 05/21/2019      Lab Results   Component Value Date    ANEU 3.0 05/21/2019     Lab Results   Component Value Date     05/21/2019      Lab Results   Component Value Date     05/21/2019                   Lab Results   Component Value Date    POTASSIUM 4.1 05/21/2019           Lab Results   Component Value Date    MAG 2.4 05/21/2019            Lab Results   Component Value Date    CR 0.94 05/21/2019                   Lab Results   Component Value Date    JAYESH 10.1 05/21/2019                Lab Results   Component Value Date    BILITOTAL 0.4 05/21/2019           Lab Results   Component Value Date    ALBUMIN 3.8 05/21/2019                    Lab Results   Component Value Date    ALT 31 05/21/2019           Lab Results   Component Value Date    AST 23 05/21/2019       Results reviewed, labs MET treatment parameters, ok to proceed with treatment.    Post Infusion Assessment:  Patient tolerated infusion without incident.  Blood return noted pre and post infusion.  Site patent and intact, free from redness, edema or discomfort.  No evidence of extravasations.  Access discontinued per protocol.       Discharge Plan:   Patient declined prescription refills.  Discharge instructions reviewed with: Patient.  Patient and/or family verbalized understanding of discharge instructions and all questions answered.  Copy of AVS reviewed with patient and/or family.  Patient will return 6/11 for next appointment.  Patient discharged in stable condition accompanied by: self.  Departure Mode:  Ambulatory.    Aylin Wang RN

## 2019-05-21 NOTE — LETTER
5/21/2019     RE: Hiram Tapia  [address]  [place] [address]     Dear Colleague,    Thank you for referring your patient, Hiram Tapia, to the [facility]. Please see a copy of my visit note below.    Oncology/Hematology Visit Note  May 21, 2019    Reason for Visit: Follow up of UPS of the right thigh    History of Present Illness: Hiram Tapia is a 58 year old male with no significant PMH with UPS of the right thigh. He has a history of right leg pain with sciatica x 20 years. In October 2017 he had more pain and injured his leg on a truck which eventually led to imaging that revealed a mass. 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     He was started on Doxil/Ifos 3/23/18 and completed 4 cycles with positive response to treatment. Of not he did complete 6 months of Xarelto during this time for incidental PE. He then underwent preoperative XRT. He underwent resection 9/17/18 with <5% viable cells on path and negative margins with no LVI.    Then on surveillance imaging October 2018 he was noted to have lung nodules. He underwent biopsy December 2018 that revealed metastatic sarcoma. Dr. Johnson recommended treatment with Keytruda in February 2019 however it is unclear to me why this was never started.    He underwent restaging CT 4/2/19. This revealed large right and trace left pneumothoraces along with worsening metastatic disease with increased lung nodules, increased lymphadenopathy, and new lytic lesion with associated soft tissue mass in posterior right femoral intertrochanteric region. He was admitted through the ED. Thoracic placed a pigtail but he had re-expansion. Thoracic surgery performed talc pleurodesis 4/3/19. He was discharged home 4/5/19.    He was started on Keytruda 4/9/19. He saw ortho for worsening right thigh pain thought 2/2 metastatic lesion in right femur and they discussed nailing. During his pre-op work-up  "CXR revealed persistent right sided pneumothorax for which he was admitted 4/12/19. Repeat talc pleurodesis performed 4/16/19. He underwent right intramedually pinning 4/15/19.    He returns today for his C3 Keytruda infusion.     Interval History:  David returns to clinic today by himself. He overall is feeling well. He admits his right leg is still a bit sore but he denies any concerns with his incision sites and he is not needing any pain medication. He is up and moving as tolerated. He denies any breathing concerns and his pleuritic chest pain continues to improve after his last pleurodesis. He does still have a mild cough with intermittent specks of blood that is improving. No elvis hemoptysis. He otherwise feels well and complete ROS negative. He feels he tolerated the first Keytruda infusion well.     Review of Systems:  Patient denies fevers, chills, night sweats, unexplained weight changes, headaches, dizziness, vision or hearing changes, new lumps or bumps, chest pain, shortness of breath, abdominal pain, nausea, vomiting, changes to bowel or bladder, swelling of extremities, bleeding issues, or rash.    Current Outpatient Medications   Medication Sig Dispense Refill     docusate sodium (COLACE) 100 MG capsule Take 100 mg by mouth 2 times daily as needed for constipation       polyethylene glycol (MIRALAX/GLYCOLAX) packet Take 17 g by mouth daily as needed for constipation       aspirin (ASA) 325 MG tablet Take 1 tablet (325 mg) by mouth daily . Take for 6 weeks after your right leg surgery. (Patient not taking: Reported on 5/21/2019) 45 tablet 0     oxyCODONE IR (ROXICODONE) 5 MG tablet Take 1 tablet (5 mg) by mouth every 4 hours as needed for moderate to severe pain (Patient not taking: Reported on 5/3/2019) 30 tablet 0       Physical Examination:  /87   Pulse 77   Temp 97.6  F (36.4  C) (Oral)   Resp 16   Ht 1.778 m (5' 10\")   Wt 86.6 kg (190 lb 14.7 oz)   SpO2 98%   BMI 27.39 kg/m     Wt " Readings from Last 10 Encounters:   05/21/19 86.6 kg (190 lb 14.7 oz)   05/21/19 86.6 kg (191 lb)   04/30/19 83.9 kg (185 lb)   04/19/19 82 kg (180 lb 11.2 oz)   04/09/19 85.2 kg (187 lb 14.4 oz)   04/09/19 85.2 kg (187 lb 14.4 oz)   04/05/19 84.6 kg (186 lb 9.6 oz)   01/31/19 89.1 kg (196 lb 6.9 oz)   01/10/19 88 kg (194 lb)   12/05/18 85.3 kg (188 lb 0.8 oz)     Constitutional: Well-appearing male in no acute distress.  Eyes: EOMI, PERRL. No scleral icterus.  ENT: Oral mucosa is moist without lesions or thrush.   Lymphatic: Neck is supple without cervical or supraclavicular lymphadenopathy.   Cardiovascular: Regular rate and rhythm. No murmurs, gallops, or rubs. No peripheral edema.  Respiratory: Clear to auscultation bilaterally, slightly diminished on right. No wheezes or crackles.  Gastrointestinal: Bowel sounds present. Abdomen soft, non-tender. No palpable hepatosplenomegaly or masses.   Neurologic: Cranial nerves II through XII are grossly intact.  Skin: Well healed small incisions in left thigh and no surrounding erythema or tenderness. No rashes, petechiae, or bruising noted on exposed skin.    Laboratory Data:  CBC RESULTS:   Recent Labs   Lab Test 05/21/19  1026   WBC 5.2   RBC 4.57   HGB 12.5*   HCT 38.4*   MCV 84   MCH 27.4   MCHC 32.6   RDW 15.5*        Last Comprehensive Metabolic Panel:  Sodium   Date Value Ref Range Status   05/21/2019 140 133 - 144 mmol/L Final     Potassium   Date Value Ref Range Status   05/21/2019 4.1 3.4 - 5.3 mmol/L Final     Chloride   Date Value Ref Range Status   05/21/2019 109 94 - 109 mmol/L Final     Carbon Dioxide   Date Value Ref Range Status   05/21/2019 24 20 - 32 mmol/L Final     Anion Gap   Date Value Ref Range Status   05/21/2019 7 3 - 14 mmol/L Final     Glucose   Date Value Ref Range Status   05/21/2019 103 (H) 70 - 99 mg/dL Final     Urea Nitrogen   Date Value Ref Range Status   05/21/2019 14 7 - 30 mg/dL Final     Creatinine   Date Value Ref Range  Status   05/21/2019 0.94 0.66 - 1.25 mg/dL Final     GFR Estimate   Date Value Ref Range Status   05/21/2019 87 >60 mL/min/[1.73_m2] Final     Comment:     Non  GFR Calc  Starting 12/18/2018, serum creatinine based estimated GFR (eGFR) will be   calculated using the Chronic Kidney Disease Epidemiology Collaboration   (CKD-EPI) equation.       Calcium   Date Value Ref Range Status   05/21/2019 10.1 8.5 - 10.1 mg/dL Final     Bilirubin Total   Date Value Ref Range Status   05/21/2019 0.4 0.2 - 1.3 mg/dL Final     Alkaline Phosphatase   Date Value Ref Range Status   05/21/2019 100 40 - 150 U/L Final     ALT   Date Value Ref Range Status   05/21/2019 31 0 - 70 U/L Final     AST   Date Value Ref Range Status   05/21/2019 23 0 - 45 U/L Final       Assessment and Plan:  1. 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 Biopsy December 2018 confirmed metastatic sarcoma. Due to unclear reasons there was a delay in starting Keytruda, finally able to receive 4/9/19 and baseline CT did confirm progressive disease.    Tolerated two cycles of Keytruda well. Returns today for cycle 3. Feeling well and labs all within treatment parameters to move forward. Will continue treatment every 3 weeks. Likely repeat CT imaging in early July .    2. Pulm  Recurrent right sided pneumothorax s/p 2 hospitalizations and TALC pleurodesis 4/3 and again 4/16. Per thoracic no need for repeat imaging unless he has worsening symptoms. Does have intermittent scant hemoptysis, hgb stable. This should improve with time and we will continue to monitor. Of course asked him to call if symptoms worsen and go to ED with elvis hemoptysis. Otherwise no breathing concerns and pleuritic pain improving. Will continue to closely monitor.    3. MSK  Restaging CT  with lytic lesion with associated soft tissue mass arising from the posterior right femoral intertrochanteric region. He underwent right femur intramedullary pinning 4/15/19 by Dr. Betts. Minimal pain concerns, weight bearing as tolerated, and incisions healing well. Sutures removed last week. Has ortho follow-up today . He has been advised physical therapy for strength and ROM. He is using crutches.     Staffed with Dr. Elizabeth Christianson MD  Hematology Oncology fellow  [phone]      I independently examined this patient and my assessment is in agreement with the above note.  I reviewed all tests and past medical history and my evaluation agrees with the findings in the note. He is tolerating Rx well.  I reviewed his imaging and we will plan to restage 6-28 and get the 7-2 labs on 6-28 as well.    Gaurang Johnson M.D.  Professor  Hematology, Oncology and Transplantation

## 2019-05-21 NOTE — PATIENT INSTRUCTIONS
Contact Numbers    [facility] Main Line: [phone]  [facility] Triage and After Hours Nurse Line:  [phone]    Please call the [facility] nurse triage or the after hours nurse line if you experience a temperature greater than or equal to 100.5, shaking chills, have uncontrolled nausea, vomiting and/or diarrhea, dizziness, lightheadedness, shortness of breath, chest pain, bleeding, unexplained bruising, or if you have any other new/concerning symptoms, questions or concerns.     If you are having any concerning symptoms or wish to speak to a provider before your next infusion visit, please call your care coordinator or triage to notify them so we can adequately serve you.     If you need a refill on a narcotic prescription or other medication, please call triage before your infusion appointment.       May 2019      Tyler Monday Tuesday Wednesday Thursday Friday Saturday                  1     2     3    [facility] POST-OP   9:15 AM   (15 min.)   Rashmi Santos PA-C   [facility] 4       5     6     7     8     9     10     11       12     13     14     15     16     17     18       19     20     21    [facility] POST-OP   8:00 AM   (15 min.)   Rsahmi Santos PA-C   [facility]    XR FEMUR RIGHT 2 VIEWS   8:20 AM   (20 min.)   [id number]   [facility] XRay    [facility] NEW   8:45 AM   (60 min.)   Bertha Ortiz MD   [facility] DRAW  10:00 AM   (15 min.)   [facility] D[facility]   [facility] Lab Draw    [facility] RETURN  10:45 AM   (30 min.)   Gaurang Johnson MD   [facility] ONC INFUSION 60  11:30 AM   (60 min.)    ONCOLOGY INFUSION   [facility] 22     23     24     25       26     27     28     29     30     31 June 2019 Sunday Monday Tuesday Wednesday Thursday Friday Saturday                                 1       2     3     4     5     6     7     8        9     10     11    [facility]   8:00 AM   (15 min.)   [facility] LAB DRAW   [facility] Lab Draw    [facility] RETURN   8:45 AM   (30 min.)   Gaurang Johnson MD   [facility] ONC INFUSION 60   9:30 AM   (60 min.)    ONCOLOGY INFUSION   [facility]    IR PORT CHECK RIGHT  11:30 AM   (30 min.)   UCXR3   [facility] Xray 12     13     14     15       16     17     18     19     20     21     22       23     24     25     26     27     28    [facility] LAB DRAW  11:30 AM   (15 min.)   [facility] LAB DRAW   [facility] Lab Draw    CT CHEST WO  12:20 PM   (20 min.)   UCCT2   [facility] CT 29       30                                                   Lab Results:  Recent Results (from the past 12 hour(s))   *CBC with platelets differential    Collection Time: 05/21/19 10:26 AM   Result Value Ref Range    WBC 5.2 4.0 - 11.0 10e9/L    RBC Count 4.57 4.4 - 5.9 10e12/L    Hemoglobin 12.5 (L) 13.3 - 17.7 g/dL    Hematocrit 38.4 (L) 40.0 - 53.0 %    MCV 84 78 - 100 fl    MCH 27.4 26.5 - 33.0 pg    MCHC 32.6 31.5 - 36.5 g/dL    RDW 15.5 (H) 10.0 - 15.0 %    Platelet Count 267 150 - 450 10e9/L    Diff Method Automated Method     % Neutrophils 56.7 %    % Lymphocytes 26.9 %    % Monocytes 7.7 %    % Eosinophils 7.5 %    % Basophils 1.0 %    % Immature Granulocytes 0.2 %    Nucleated RBCs 0 0 /100    Absolute Neutrophil 3.0 1.6 - 8.3 10e9/L    Absolute Lymphocytes 1.4 0.8 - 5.3 10e9/L    Absolute Monocytes 0.4 0.0 - 1.3 10e9/L    Absolute Eosinophils 0.4 0.0 - 0.7 10e9/L    Absolute Basophils 0.1 0.0 - 0.2 10e9/L    Abs Immature Granulocytes 0.0 0 - 0.4 10e9/L    Absolute Nucleated RBC 0.0    Comprehensive metabolic panel    Collection Time: 05/21/19 10:26 AM   Result Value Ref Range    Sodium 140 133 - 144 mmol/L    Potassium 4.1 3.4 - 5.3 mmol/L    Chloride 109 94 - 109 mmol/L    Carbon Dioxide 24 20 - 32 mmol/L    Anion Gap 7 3 - 14  mmol/L    Glucose 103 (H) 70 - 99 mg/dL    Urea Nitrogen 14 7 - 30 mg/dL    Creatinine 0.94 0.66 - 1.25 mg/dL    GFR Estimate 87 >60 mL/min/[1.73_m2]    GFR Estimate If Black >90 >60 mL/min/[1.73_m2]    Calcium 10.1 8.5 - 10.1 mg/dL    Bilirubin Total 0.4 0.2 - 1.3 mg/dL    Albumin 3.8 3.4 - 5.0 g/dL    Protein Total 7.3 6.8 - 8.8 g/dL    Alkaline Phosphatase 100 40 - 150 U/L    ALT 31 0 - 70 U/L    AST 23 0 - 45 U/L   TSH with free T4 reflex    Collection Time: 05/21/19 10:26 AM   Result Value Ref Range    TSH 1.34 0.40 - 4.00 mU/L   Phosphorus    Collection Time: 05/21/19 10:26 AM   Result Value Ref Range    Phosphorus 3.7 2.5 - 4.5 mg/dL   Magnesium    Collection Time: 05/21/19 10:26 AM   Result Value Ref Range    Magnesium 2.4 (H) 1.6 - 2.3 mg/dL

## 2019-05-21 NOTE — NURSING NOTE
"Oncology Rooming Note    May 21, 2019 9:50 AM   Hiram Tapia is a 58 year old male who presents for:    Chief Complaint   Patient presents with     Oncology Clinic Visit     RETURN VISIT; HIGH GRADE SARCOMA 3 WEEK FOLLOW UP; VITALS COMPLETED BY [facility]      Initial Vitals: /87   Pulse 77   Temp 97.6  F (36.4  C) (Oral)   Resp 16   Ht 1.778 m (5' 10\")   Wt 86.6 kg (190 lb 14.7 oz)   SpO2 98%   BMI 27.39 kg/m   Estimated body mass index is 27.39 kg/m  as calculated from the following:    Height as of this encounter: 1.778 m (5' 10\").    Weight as of this encounter: 86.6 kg (190 lb 14.7 oz). Body surface area is 2.07 meters squared.  Moderate Pain (4) Comment: Data Unavailable   No LMP for male patient.  Allergies reviewed: Yes  Medications reviewed: Yes    Medications: Medication refills not needed today.  Pharmacy name entered into [employer]:    [facility] [place], [place] - [address] [place] [address] [phone]  [facility] [address] [place] [address] - [facility] [address] AT [facility] [address]  [address]    Clinical concerns: No new concerns today  Dr. Johnson was notified.      Maryse Singh              "

## 2019-05-21 NOTE — PROGRESS NOTES
Oncology/Hematology Visit Note  May 21, 2019    Reason for Visit: Follow up of UPS of the right thigh    History of Present Illness: Hiram Tapia is a 58 year old male with no significant PMH with UPS of the right thigh. He has a history of right leg pain with sciatica x 20 years. In October 2017 he had more pain and injured his leg on a truck which eventually led to imaging that revealed a mass. 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     He was started on Doxil/Ifos 3/23/18 and completed 4 cycles with positive response to treatment. Of not he did complete 6 months of Xarelto during this time for incidental PE. He then underwent preoperative XRT. He underwent resection 9/17/18 with <5% viable cells on path and negative margins with no LVI.    Then on surveillance imaging October 2018 he was noted to have lung nodules. He underwent biopsy December 2018 that revealed metastatic sarcoma. Dr. Johnson recommended treatment with Keytruda in February 2019 however it is unclear to me why this was never started.    He underwent restaging CT 4/2/19. This revealed large right and trace left pneumothoraces along with worsening metastatic disease with increased lung nodules, increased lymphadenopathy, and new lytic lesion with associated soft tissue mass in posterior right femoral intertrochanteric region. He was admitted through the ED. Thoracic placed a pigtail but he had re-expansion. Thoracic surgery performed talc pleurodesis 4/3/19. He was discharged home 4/5/19.    He was started on Keytruda 4/9/19. He saw ortho for worsening right thigh pain thought 2/2 metastatic lesion in right femur and they discussed nailing. During his pre-op work-up CXR revealed persistent right sided pneumothorax for which he was admitted 4/12/19. Repeat talc pleurodesis performed 4/16/19. He underwent right intramedually pinning 4/15/19.    He returns today for his C3 Keytruda infusion.     Interval  "History:  David returns to clinic today by himself. He overall is feeling well. He admits his right leg is still a bit sore but he denies any concerns with his incision sites and he is not needing any pain medication. He is up and moving as tolerated. He denies any breathing concerns and his pleuritic chest pain continues to improve after his last pleurodesis. He does still have a mild cough with intermittent specks of blood that is improving. No elvis hemoptysis. He otherwise feels well and complete ROS negative. He feels he tolerated the first Keytruda infusion well.     Review of Systems:  Patient denies fevers, chills, night sweats, unexplained weight changes, headaches, dizziness, vision or hearing changes, new lumps or bumps, chest pain, shortness of breath, abdominal pain, nausea, vomiting, changes to bowel or bladder, swelling of extremities, bleeding issues, or rash.    Current Outpatient Medications   Medication Sig Dispense Refill     docusate sodium (COLACE) 100 MG capsule Take 100 mg by mouth 2 times daily as needed for constipation       polyethylene glycol (MIRALAX/GLYCOLAX) packet Take 17 g by mouth daily as needed for constipation       aspirin (ASA) 325 MG tablet Take 1 tablet (325 mg) by mouth daily . Take for 6 weeks after your right leg surgery. (Patient not taking: Reported on 5/21/2019) 45 tablet 0     oxyCODONE IR (ROXICODONE) 5 MG tablet Take 1 tablet (5 mg) by mouth every 4 hours as needed for moderate to severe pain (Patient not taking: Reported on 5/3/2019) 30 tablet 0       Physical Examination:  /87   Pulse 77   Temp 97.6  F (36.4  C) (Oral)   Resp 16   Ht 1.778 m (5' 10\")   Wt 86.6 kg (190 lb 14.7 oz)   SpO2 98%   BMI 27.39 kg/m    Wt Readings from Last 10 Encounters:   05/21/19 86.6 kg (190 lb 14.7 oz)   05/21/19 86.6 kg (191 lb)   04/30/19 83.9 kg (185 lb)   04/19/19 82 kg (180 lb 11.2 oz)   04/09/19 85.2 kg (187 lb 14.4 oz)   04/09/19 85.2 kg (187 lb 14.4 oz)   04/05/19 " 84.6 kg (186 lb 9.6 oz)   01/31/19 89.1 kg (196 lb 6.9 oz)   01/10/19 88 kg (194 lb)   12/05/18 85.3 kg (188 lb 0.8 oz)     Constitutional: Well-appearing male in no acute distress.  Eyes: EOMI, PERRL. No scleral icterus.  ENT: Oral mucosa is moist without lesions or thrush.   Lymphatic: Neck is supple without cervical or supraclavicular lymphadenopathy.   Cardiovascular: Regular rate and rhythm. No murmurs, gallops, or rubs. No peripheral edema.  Respiratory: Clear to auscultation bilaterally, slightly diminished on right. No wheezes or crackles.  Gastrointestinal: Bowel sounds present. Abdomen soft, non-tender. No palpable hepatosplenomegaly or masses.   Neurologic: Cranial nerves II through XII are grossly intact.  Skin: Well healed small incisions in left thigh and no surrounding erythema or tenderness. No rashes, petechiae, or bruising noted on exposed skin.    Laboratory Data:  CBC RESULTS:   Recent Labs   Lab Test 05/21/19  1026   WBC 5.2   RBC 4.57   HGB 12.5*   HCT 38.4*   MCV 84   MCH 27.4   MCHC 32.6   RDW 15.5*        Last Comprehensive Metabolic Panel:  Sodium   Date Value Ref Range Status   05/21/2019 140 133 - 144 mmol/L Final     Potassium   Date Value Ref Range Status   05/21/2019 4.1 3.4 - 5.3 mmol/L Final     Chloride   Date Value Ref Range Status   05/21/2019 109 94 - 109 mmol/L Final     Carbon Dioxide   Date Value Ref Range Status   05/21/2019 24 20 - 32 mmol/L Final     Anion Gap   Date Value Ref Range Status   05/21/2019 7 3 - 14 mmol/L Final     Glucose   Date Value Ref Range Status   05/21/2019 103 (H) 70 - 99 mg/dL Final     Urea Nitrogen   Date Value Ref Range Status   05/21/2019 14 7 - 30 mg/dL Final     Creatinine   Date Value Ref Range Status   05/21/2019 0.94 0.66 - 1.25 mg/dL Final     GFR Estimate   Date Value Ref Range Status   05/21/2019 87 >60 mL/min/[1.73_m2] Final     Comment:     Non  GFR Calc  Starting 12/18/2018, serum creatinine based estimated GFR  (eGFR) will be   calculated using the Chronic Kidney Disease Epidemiology Collaboration   (CKD-EPI) equation.       Calcium   Date Value Ref Range Status   05/21/2019 10.1 8.5 - 10.1 mg/dL Final     Bilirubin Total   Date Value Ref Range Status   05/21/2019 0.4 0.2 - 1.3 mg/dL Final     Alkaline Phosphatase   Date Value Ref Range Status   05/21/2019 100 40 - 150 U/L Final     ALT   Date Value Ref Range Status   05/21/2019 31 0 - 70 U/L Final     AST   Date Value Ref Range Status   05/21/2019 23 0 - 45 U/L Final       Assessment and Plan:  1. 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 Biopsy December 2018 confirmed metastatic sarcoma. Due to unclear reasons there was a delay in starting Keytruda, finally able to receive 4/9/19 and baseline CT did confirm progressive disease.    Tolerated two cycles of Keytruda well. Returns today for cycle 3. Feeling well and labs all within treatment parameters to move forward. Will continue treatment every 3 weeks. Likely repeat CT imaging in early July .    2. Pulm  Recurrent right sided pneumothorax s/p 2 hospitalizations and TALC pleurodesis 4/3 and again 4/16. Per thoracic no need for repeat imaging unless he has worsening symptoms. Does have intermittent scant hemoptysis, hgb stable. This should improve with time and we will continue to monitor. Of course asked him to call if symptoms worsen and go to ED with elvis hemoptysis. Otherwise no breathing concerns and pleuritic pain improving. Will continue to closely monitor.    3. MSK  Restaging CT with lytic lesion with associated soft tissue mass arising from the posterior right femoral intertrochanteric region. He underwent right femur intramedullary pinning 4/15/19 by Dr. Betts. Minimal pain concerns, weight bearing as tolerated, and  incisions healing well. Sutures removed last week. Has ortho follow-up today . He has been advised physical therapy for strength and ROM. He is using crutches.     Staffed with Dr. Elizabeth Christianson MD  Hematology Oncology fellow  [phone]      I independently examined this patient and my assessment is in agreement with the above note.  I reviewed all tests and past medical history and my evaluation agrees with the findings in the note. He is tolerating Rx well.  I reviewed his imaging and we will plan to restage 6-28 and get the 7-2 labs on 6-28 as well.    Gaurang Johnson M.D.  Professor  Hematology, Oncology and Transplantation

## 2019-05-21 NOTE — NURSING NOTE
Chief Complaint   Patient presents with     Blood Draw     Labs drawn via PIV placed by RN in lab. Line flushed with saline.     Sonia Pyane RN

## 2019-05-21 NOTE — NURSING NOTE
"Oncology Rooming Note    May 21, 2019 9:07 AM   Hiram Tapia is a 58 year old male who presents for:    Chief Complaint   Patient presents with     New Patient     NEW PT; HIGH GRADE SARCOMA; VITALS COMPLETED BY CMA      Initial Vitals: /87   Pulse 77   Temp 97.6  F (36.4  C) (Oral)   Resp 16   Ht 1.778 m (5' 10\")   Wt 86.6 kg (191 lb)   SpO2 98%   BMI 27.41 kg/m   Estimated body mass index is 27.41 kg/m  as calculated from the following:    Height as of this encounter: 1.778 m (5' 10\").    Weight as of this encounter: 86.6 kg (191 lb). Body surface area is 2.07 meters squared.  Moderate Pain (4) Comment: Data Unavailable   No LMP for male patient.  Allergies reviewed: Yes  Medications reviewed: Yes    Medications: Medication refills not needed today.  Pharmacy name entered into EPIC:    [facility] [place], [place] - [address] [place] [address] [phone]  [facility] [address] [place] [address] - [facility] [address] AT [facility] [address]    Clinical concerns: No new concerns today  Dr. Ortiz was notified.      Maryse Singh              "

## 2019-05-21 NOTE — PROGRESS NOTES
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    HPI: David is a 59 y/o male here today for a check of his progress after prophylactic nail of the right femur.  He reports he still has pain with WB, but it is improving.  His pain is in the area of the greater trochanter and lateral knee, as well as some diffuse anterior thigh pain.  He denies any swelling or redness.  He is not taking anything for pain.  He was told he cannot take NSAIDs, including tylenol, because of his recent collapsed lung.    He reports he is currently working full time.  He is using one crutch for ambulation.  He has difficulty getting in and out of his car, going up and down stairs and getting off and on the toilet due to lack of motion in his right knee.  He is not attending PT.  No other concerns.  He is having his Keytruda infusion today.      Physical Exam:  David is a pleasant 58 yr old male who is alert and oriented in NAD.  He has a limp with one crutch today.  His right knee ROM is 5 to 50 deg.  His right thigh is firm and woody but non-tender to palpation.  He has some tenderness over the greater trochanter on the right, and down along the IT band to the knee. Resisted SLR 3+/5.  Resisted knee extension 3+/5. He is NV intact distally.    Imaging: AP/Lat xray of the right femur were ordered and obtained today.  This shows an IM nail in the right femur with no sign of hardware failure in excellent alignment.  No sign of fracture and no new callus formation noted.  Previous periosteal thickening midshaft is unchanged.    Impression: 58 yr old male with h/o right thigh sarcoma with metastasis to the lung and decreased right knee ROM    Plan: Patient's xrays looks stable, no concerns. I  explained to David that it is very important that we work on his knee ROM soon.  Because of the radiation, it is going to be difficult to get motion back. He will likely never have normal ROM, but we should aim for 95 degrees of knee flexion for functional activities such as stairs, sitting in a chair and driving. I need him to attend PT at least 2x/wk to work on strength and ROM of the right leg/knee.  I talked to him about a Dynasplint for flexion.  I think this would be great for him.  He would like to wait 3 weeks and see what progress he makes with PT and his own HEP.  If he is not over 60 degrees of flexion by the time we see him back in 3 weeks, I would like to order the Dynasplint.  In the meantime, I gave him some exercises to work on - straight leg raises, mini squats and using a foam roller or rolling pin on the right thigh to help loosen it up.  I stressed the importance of his therapy and that if he can't do it, then he should consider taking time off work to make sure he is getting the exercises in on a daily basis.  He understands and agrees with the plan of care.  I will see him back in 3 wks.  All questions answered.

## 2019-05-21 NOTE — LETTER
5/21/2019       RE: Hiram Tapia  [address]  [place] [address]     Dear Colleague,    Thank you for referring your patient, Hiram Tapia, to the [facility]. Please see a copy of my visit note below.    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    HPI: David is a 59 y/o male here today for a check of his progress after prophylactic nail of the right femur.  He reports he still has pain with WB, but it is improving.  His pain is in the area of the greater trochanter and lateral knee, as well as some diffuse anterior thigh pain.  He denies any swelling or redness.  He is not taking anything for pain.  He was told he cannot take NSAIDs, including tylenol, because of his recent collapsed lung.    He reports he is currently working full time.  He is using one crutch for ambulation.  He has difficulty getting in and out of his car, going up and down stairs and getting off and on the toilet due to lack of motion in his right knee.  He is not attending PT.  No other concerns.  He is having his Keytruda infusion today.      Physical Exam:  David is a pleasant 58 yr old male who is alert and oriented in NAD.  He has a limp with one crutch today.  His right knee ROM is 5 to 50 deg.  His right thigh is firm and woody but non-tender to palpation.  He has some tenderness over the greater trochanter on the right, and down along the IT band to the knee. Resisted SLR 3+/5.  Resisted knee extension 3+/5. He is NV intact distally.    Imaging: AP/Lat xray of the right femur were ordered and obtained today.  This shows an IM nail in the right femur with no sign of hardware failure in excellent  alignment.  No sign of fracture and no new callus formation noted.  Previous periosteal thickening midshaft is unchanged.    Impression: 58 yr old male with h/o right thigh sarcoma with metastasis to the lung and decreased right knee ROM    Plan: Patient's xrays looks stable, no concerns. I explained to David that it is very important that we work on his knee ROM soon.  Because of the radiation, it is going to be difficult to get motion back. He will likely never have normal ROM, but we should aim for 95 degrees of knee flexion for functional activities such as stairs, sitting in a chair and driving. I need him to attend PT at least 2x/wk to work on strength and ROM of the right leg/knee.  I talked to him about a Dynasplint for flexion.  I think this would be great for him.  He would like to wait 3 weeks and see what progress he makes with PT and his own HEP.  If he is not over 60 degrees of flexion by the time we see him back in 3 weeks, I would like to order the Dynasplint.  In the meantime, I gave him some exercises to work on - straight leg raises, mini squats and using a foam roller or rolling pin on the right thigh to help loosen it up.  I stressed the importance of his therapy and that if he can't do it, then he should consider taking time off work to make sure he is getting the exercises in on a daily basis.  He understands and agrees with the plan of care.  I will see him back in 3 wks.  All questions answered.      Again, thank you for allowing me to participate in the care of your patient.      Sincerely,    Rashmi Santos PA-C

## 2019-05-21 NOTE — LETTER
5/21/2019       RE: Hiram Tapia  [address]  [place] [address]     Dear Colleague,    Thank you for referring your patient, Hiram Tapia, to the [facility] at [facility]. Please see a copy of my visit note below.    Palliative Care Outpatient Clinic Consultation Note    Patient:  Hiram Tapia 58 year old male who is presenting to the palliative medicine clinic today at the request of Dr. Johnson for support with sarcoma of thigh.  He was also seen by palliative care team while in the hospital in 6/2018.      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 He completed 4 cycles of doxil/fos, pre-op XRT and resection on 9/17/18. Metastatic dz diagnosed in 12/2018 with lung lesions and lytic lesions. He started on Keytruda on 4/9/19, received talc pleurodesis on 4/16/19 and right intramedually pinning 4/15/19. He continues on Keytruda infusions with plans for restaging likely in July. He feels he is tolerating the infusions well but is frustrated that his leg is not improving more quickly. He has pain in his leg that he never had before terrence was placed.      Initially sig pain in right leg and while in hospital used oxycodone but only because he feels that he was forced to use it and he never wanted to use it. Not taking any oxycodone or any pain medication now despite feeling like doctors were forcing him to use pain medications. The more he pushes himself, the more pain he has in the leg. Sitting he has no leg pain. He slipped yesterday getting into his truck and then the leg hurt a lot but pain went away.     He denies nausea, vomiting or decreased appetite. Feels he is sleeping better and better as leg  "heals. Has good energy.    Constipation while on oxycodone but none now.     Review of Systems:  ROS: 10 point ROS neg other than the symptoms noted above in the HPI     Patient's Disease Understanding: He knows that there cancer spread to his lungs and he feels the goal of Keytruda is to deal with the cancer in the lungs and to \"teach the body to fight cancer\".  He tells me that he will continue to get keytruda and then repeat scans in July.     Functional Status: independent of all ADLs    Social History  Living Situation: lives with his wife and 32 yr old son who has a form of autism. Son started a new job this week - book keeping type of job   Support System: wife and son very support. Not spiritual or Mandaen. Has friends; work very supportive of allowing him to take whatever time he needs off and still working full time.   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     Substance Use/History of misuse: no  Opioid Risk Tool (ORT):    Family History of Substance Abuse:        Alcohol = 0 pt (no)       Illegal Drugs = 0 pt (no)       Prescription Drugs = 0 pt (no)      Personal History of Substance Abuse:       Alcohol = 0 pt (no)       Illegal Drugs = 0 pt (no)       Prescription Drugs = 0 pt (no)        Age: 0 pt (age < 16; age > 45)      History of trauma as a child: 0 pt (no)      Psychological Disease: 0 pt (none)      ORT Score = 0        0-3: Low risk for opioid abuse       4-7: Moderate risk for opioid abuse       >/= 8: High risk for opioid abuse    Family History  Family History   Problem Relation Age of Onset     Leukemia Father        Advance Care Planning:  Advance Directive:    None in epic  Code status full    Allergies   Allergen Reactions     Hydrofera Blue 4\"X4\" [Wound Dressings] Dermatitis and Blisters     Patient reports that Dressing causes skin irritation, blisters, and drainage.      Tegaderm Ag Mesh " [Silver] Dermatitis and Blisters     Patient reports that Dressing causes Skin irritation, blisters, and drainage.     Current Outpatient Medications   Medication Sig Dispense Refill     aspirin (ASA) 325 MG tablet Take 1 tablet (325 mg) by mouth daily . Take for 6 weeks after your right leg surgery. 45 tablet 0     docusate sodium (COLACE) 100 MG capsule Take 100 mg by mouth 2 times daily as needed for constipation       oxyCODONE IR (ROXICODONE) 5 MG tablet Take 1 tablet (5 mg) by mouth every 4 hours as needed for moderate to severe pain (Patient not taking: Reported on 5/3/2019) 30 tablet 0     polyethylene glycol (MIRALAX/GLYCOLAX) packet Take 17 g by mouth daily as needed for constipation       Past Medical History:   Diagnosis Date     Pulmonary embolism (H)      Sarcoma (H)      Past Surgical History:   Procedure Laterality Date     EXCISE SOFT TISSUE TUMOR THIGH Right 3/8/2018    Procedure: EXCISE SOFT TISSUE TUMOR THIGH;  Biopsy Right Thigh Tumor;  Surgeon: Brad Betts MD;  [facility]     EXCISE SOFT TISSUE TUMOR THIGH Right 9/17/2018    Procedure: EXCISE SOFT TISSUE TUMOR THIGH;  Removal Right Thigh Tumor ;  Surgeon: Brad Betts MD;  Location: [facility] OR     INSERT PORT VASCULAR ACCESS N/A 3/28/2018    Procedure: INSERT PORT VASCULAR ACCESS;  Vascular Access Port Insertion with C-arm;  Surgeon: Sarah Bowie MD;  Location: [facility] OR     IRRIGATION AND DEBRIDEMENT LOWER EXTREMITY, COMBINED Right 4/6/2018    Procedure: COMBINED IRRIGATION AND DEBRIDEMENT LOWER EXTREMITY;  Irrigation And Debridement Right Thigh ;  Surgeon: Brad Betts MD;  [facility]     IRRIGATION AND DEBRIDEMENT LOWER EXTREMITY, COMBINED Right 4/9/2018    Procedure: COMBINED IRRIGATION AND DEBRIDEMENT LOWER EXTREMITY;  Irrigation And Debridement Right Thigh Wound. with Wound VAC Exchange;  Surgeon: Brad Betts MD;  Location[facility] OR     OPEN REDUCTION INTERNAL FIXATION RODDING INTRAMEDULLARY FEMUR  "Right 4/15/2019    Procedure: Intramedullary Nail of Right Femur;  Surgeon: Brad Betts MD;  Location: [facility] OR     STRABISMUS SURGERY      as a child     THORACOSCOPIC WEDGE RESECTION LUNG Right 12/5/2018    Procedure: Right Video Assisted Thoracoscopic Wedge Resection;  Surgeon: Sarah Bowie MD;  Location: [facility] OR         Vital signs:    ,Estimated body mass index is 26.54 kg/m  as calculated from the following:    Height as of 4/30/19: 1.778 m (5' 10\").    Weight as of 4/30/19: 83.9 kg (185 lb).   /87   Pulse 77   Temp 97.6  F (36.4  C) (Oral)   Resp 16   Ht 1.778 m (5' 10\")   Wt 86.6 kg (191 lb)   SpO2 98%   BMI 27.41 kg/m       General: alert, well groomed, well nourished, appears stated age, in NAD  Eyes: EOMI, sclera clear  HEENT: NC/AT; mucous membranes moist  Lungs: no increased work of breathing, speaking full sentences, CTA b/l  Heart: RRR  Ext: no lower ext edema  Neuro: A&O x 3; CN II-XII grossly intact; gait - uses single crutch for support, trying not to favor rt leg but occasionally walks with limp  Neuropsych: alert, engaged, affect full; sensorium intact    Data Reviewed:  CT scan 4/2/19:  IMPRESSION:   1. New large right and trace left pneumothoraces.  2. Increased size and number of numerous metastatic pulmonary nodules, including along a right middle lobe wedge resection staple line.  3. Increased/new centrally necrotic right external iliac chain lymph nodes, lytic lesion with associated soft tissue mass arising from the posterior right femoral intertrochanteric region, and centrally necrotic soft tissue mass arising from the right iliopsoas myotendinous junction.  4. Unchanged hemangioma in hepatic segment 6.  5. Unchanged pancreatic tail masslike lesion, which has not demonstrated significant FDG avidity on comparison PET CTs, indeterminate.    [facility] Outpatient Palliative Care Opioid Prescribing Safety Plan    Opioid Risk Assessment: Performed by Bertha Ortiz on " 05/19/2019.  ORT score of 0.   Mood Disorder Assessment: Performed by Bertha Ortiz on 05/19/2019.     reviewed with every prescription, last reviewed by Bertha Ortiz on 05/19/2019     Expected prognosis >1 year  Risk: Low (ORT<4)  Indication for Opioid Use: Moderate or Strong  Baseline UDT performed on: not performed - pt not on opioids and does not want to be taking opioids  Naloxone Script provided if patient also on benzodiazepine: na  Indications for Tapering reviewed:  na - not on opioids    Impressions:    Metastatic undifferentiated pleomorphic sarcoma of the right thigh with lung mets, receiving keytruda and tolerating it well.     Recommendations & Counseling:    Right leg pain due to sarcoma and terrence placement:   - continue physical therapy and weight bearing as directed by orthopedics  - pain managed without medications at this time    Keytruda infusions - tolerating well without side effects currently.    David will follow up as needed and did not want to schedule a follow up at this time.      Thank you for involving us in the patient's care. 60 minutes face time over half spent counseling.  Bertha Ortiz MD / Palliative Medicine / Pager [phone] / After-Hours Answering Service [phone] / Main Palliative Clinic - [phone] Inpatient Team Consult Pager [phone] (answered 8am-430pm M-F)

## 2019-05-24 ENCOUNTER — THERAPY VISIT (OUTPATIENT)
Dept: PHYSICAL THERAPY | Facility: CLINIC | Age: 59
End: 2019-05-24
Payer: COMMERCIAL

## 2019-05-24 DIAGNOSIS — C49.21 SARCOMA OF RIGHT THIGH (H): ICD-10-CM

## 2019-05-24 DIAGNOSIS — Z98.890 SURGICAL PROCEDURE ON LOWER EXTREMITY WITHIN PAST 6 MONTHS: ICD-10-CM

## 2019-05-24 DIAGNOSIS — M79.604 PAIN OF RIGHT LOWER EXTREMITY: Primary | ICD-10-CM

## 2019-05-24 PROCEDURE — 97161 PT EVAL LOW COMPLEX 20 MIN: CPT | Mod: GP

## 2019-05-24 PROCEDURE — 97110 THERAPEUTIC EXERCISES: CPT | Mod: GP

## 2019-05-24 NOTE — PROGRESS NOTES
[facility] Initial Evaluation  Subjective:    Hiram Tapia is a 58 year old male with a right hip condition.  Condition occurred with:  Other reason.  Condition occurred: other.  This is a new condition  Onset: s/p sarcoma excision September 2018 followed by terrence placement April 15, 2019. CC: pain with wt bearing    IM nailing Right femur, s/p right thigh sarcoma excision    Diagnosis right thigh sarcoma   Date of Surgery 4/15/2019   Precautions/Restrictions: none   Evaluate and Treat Yes   Therapeutic Exercise: AAROM    AROM    PROM    Strengthening    Home/Gym program instruction   Neuromuscular Re-education Other (See Comments) Gait training  Modalities: Other (See Comments) Modalities as indicated  Special Instructions: Patient has had radiation to the right thigh, ROM will be difficult   Visit Frequency 2x/wk   Visit Duration (wks) 4   Return to MD by: 3 wks     .      Radiates to:  Hip and knee.  Pain is described as aching and sharp and is intermittent and reported as 3/10.  Associated symptoms:  Loss of strength and loss of motion/stiffness.   Symptoms are exacerbated by activity and walking and relieved by rest.  Since onset symptoms are gradually improving.                                                      Objective:    Gait:  Reduced knee flexio  Gait Type:  Antalgic   Weight Bearing Status:  WBAT   Assistive Devices:  Crutches                                                   Hip Evaluation    Hip Strength:    Flexion:   Right: 4/5   ++  Pain:                    Extension:  Right: 4/5    +  Pain:    Abduction:  Right: 4-/5   ++   Pain:                           Knee Evaluation:  ROM:    AROM    Hyperextension: Left:     Right: 0  Extension: Left:    Right:  0  Flexion: Left:   Right: 50        Strength:     Extension:  Right: 4/5   Pain:            Palpation:  Palpation of knee: Pt notes  a lot of numbness along thigh;       Edema:  Edema of the knee: moderate edema present  proximal knee/distal thigh.            General     ROS    Assessment/Plan:    Patient is a 58 year old male with right side knee complaints.    Patient has the following significant findings with corresponding treatment plan.                Diagnosis 1:    Pain -  self management, education and home program  Decreased ROM/flexibility - manual therapy and therapeutic exercise  Decreased strength - therapeutic exercise and therapeutic activities  Impaired muscle performance - neuro re-education  Decreased function - therapeutic activities  IM nailing Right femur, s/p right thigh sarcoma excision     Diagnosis right thigh sarcoma    Date of Surgery 4/15/2019    Precautions/Restrictions: none    Evaluate and Treat Yes    Therapeutic Exercise: AAROM     AROM     PROM     Strengthening     Home/Gym program instruction    Neuromuscular Re-education Other (See Comments) Gait training   Modalities: Other (See Comments) Modalities as indicated   Special Instructions: Patient has had radiation to the right thigh, ROM will be difficult    Visit Frequency 2x/wk    Visit Duration (wks) 4    Return to MD by: 3 wks          Therapy Evaluation Codes:     Previous and current functional limitations:  (See Goal Flow Sheet for this information)    Short term and Long term goals: (See Goal Flow Sheet for this information)     Communication ability:  Patient appears to be able to clearly communicate and understand verbal and written communication and follow directions correctly.  Treatment Explanation - The following has been discussed with the patient:   RX ordered/plan of care  Anticipated outcomes  Possible risks and side effects  This patient would benefit from PT intervention to resume normal activities.   Rehab potential is fair.    Frequency:  2 X week, once daily  Duration:  for 4 weeks  Discharge Plan:  Achieve all LTG.  Independent in home treatment program.  Reach maximal therapeutic benefit.    Please refer to the daily  flowsheet for treatment today, total treatment time and time spent performing 1:1 timed codes.

## 2019-05-28 NOTE — PROGRESS NOTES
[facility] Initial Evaluation  Subjective:                                       Pertinent medical history includes:  Cancer.        Current occupation is   .    Primary job tasks include:  Prolonged sitting, prolonged standing and other (Computer work ).                                Objective:  System    Physical Exam  General     ROS    Assessment/Plan:

## 2019-05-31 ENCOUNTER — THERAPY VISIT (OUTPATIENT)
Dept: PHYSICAL THERAPY | Facility: CLINIC | Age: 59
End: 2019-05-31
Payer: COMMERCIAL

## 2019-05-31 DIAGNOSIS — M79.604 PAIN OF RIGHT LOWER EXTREMITY: ICD-10-CM

## 2019-05-31 DIAGNOSIS — Z98.890 SURGICAL PROCEDURE ON LOWER EXTREMITY WITHIN PAST 6 MONTHS: ICD-10-CM

## 2019-05-31 PROCEDURE — 97110 THERAPEUTIC EXERCISES: CPT | Mod: GP

## 2019-06-11 NOTE — NURSING NOTE
"Oncology Rooming Note    June 11, 2019 8:33 AM   Hiram Tapia is a 58 year old male who presents for:    Chief Complaint   Patient presents with     Oncology Clinic Visit     Return visit related to High Grade Sarcoma     Port Draw     Labs drawn via port by RN in lab. VS taken. Patient checked in for next appt.     Initial Vitals: /80 (BP Location: Right arm, Patient Position: Sitting, Cuff Size: Adult Regular)   Pulse 74   Temp 97.3  F (36.3  C) (Oral)   Resp 16   Ht 1.778 m (5' 10\")   Wt 86.5 kg (190 lb 12.8 oz)   SpO2 98%   BMI 27.38 kg/m   Estimated body mass index is 27.38 kg/m  as calculated from the following:    Height as of this encounter: 1.778 m (5' 10\").    Weight as of this encounter: 86.5 kg (190 lb 12.8 oz). Body surface area is 2.07 meters squared.  Mild Pain (3) Comment: Data Unavailable   No LMP for male patient.  Allergies reviewed: Yes  Medications reviewed: Yes    Medications: Medication refills not needed today.  Pharmacy name entered into [facility]:    [facility] [place], [place] - [address] [place] [address] [phone]  [facility] [address] [place] [address] - [facility] [address] AT [facility] [address]    Clinical concerns: No new concerns. Provider was notified.      Christelle Ray LPN            "

## 2019-06-11 NOTE — NURSING NOTE
Chief Complaint   Patient presents with     Oncology Clinic Visit     Return visit related to High Grade Sarcoma     Port Draw     Labs drawn via port by RN in lab. VS taken. Patient checked in for next appt.     Port accessed with 20 gauge flat needle by RN, labs collected, line flushed with saline and heparin.  Vitals taken. Pt checked in for appointment.    Jaclyn Alexander RN

## 2019-06-11 NOTE — PROGRESS NOTES
Infusion Nursing Note:  Hiram Tapia presents today for cycle 4, day 1 keytruda.    Patient seen by provider today: Yes: Dr Johnson   present during visit today: Not Applicable.    Note: Patient rates pain 3/10 today. He states that it is improving and declined the need for any intervention during infusion today.    Intravenous Access:  Implanted Port.    Treatment Conditions:  Lab Results   Component Value Date    HGB 11.5 06/11/2019     Lab Results   Component Value Date    WBC 4.9 06/11/2019      Lab Results   Component Value Date    ANEU 2.5 06/11/2019     Lab Results   Component Value Date     06/11/2019      Lab Results   Component Value Date     06/11/2019                   Lab Results   Component Value Date    POTASSIUM 4.1 06/11/2019           Lab Results   Component Value Date    MAG 2.3 06/11/2019            Lab Results   Component Value Date    CR 0.74 06/11/2019                   Lab Results   Component Value Date    JAYESH 9.0 06/11/2019                Lab Results   Component Value Date    BILITOTAL 0.4 06/11/2019           Lab Results   Component Value Date    ALBUMIN 3.5 06/11/2019                    Lab Results   Component Value Date    ALT 24 06/11/2019           Lab Results   Component Value Date    AST 20 06/11/2019       Results reviewed, labs MET treatment parameters, ok to proceed with treatment.      Post Infusion Assessment:  Patient tolerated infusion without incident.  Blood return noted pre and post infusion.  Site patent and intact, free from redness, edema or discomfort.  No evidence of extravasations.  Port flushed and left accessed at the end of infusion for radiology appointment to evaluate port (history of no blood return).       Discharge Plan:   Patient declined prescription refills.  Discharge instructions reviewed with: Patient.  Patient and/or family verbalized understanding of discharge instructions and all questions answered.  Copy of AVS reviewed with  patient and/or family.  Patient will return 7/2 for next infusion appointment.  Patient discharged in stable condition accompanied by: self and wife.  Departure Mode: Ambulatory.  Face to Face time: 0.    Cleopatra Harp RN

## 2019-06-11 NOTE — PATIENT INSTRUCTIONS
Contact Numbers    [facility] Main Line: [phone]  [facility] Triage and after hours / weekends / holidays:  [phone]      Please call the triage or after hours line if you experience a temperature greater than or equal to 100.5, shaking chills, have uncontrolled nausea, vomiting and/or diarrhea, dizziness, shortness of breath, chest pain, bleeding, unexplained bruising, or if you have any other new/concerning symptoms, questions or concerns.      If you are having any concerning symptoms or wish to speak to a provider before your next infusion visit, please call your care coordinator or triage to notify them so we can adequately serve you.     If you need a refill on a narcotic prescription or other medication, please call before your infusion appointment.           June 2019 Sunday Monday Tuesday Wednesday Thursday Friday Saturday                                 1       2     3     4     5     6     7     8       9     10     11    [facility] DRAW   8:00 AM   (15 min.)    [facility]   [facility]    [facility] RETURN   8:45 AM   (30 min.)   Gaurang Johnson MD   [facility]    UMP ONC INFUSION 60   9:30 AM   (60 min.)    ONCOLOGY INFUSION   [facility]    IR PORT CHECK RIGHT  11:30 AM   (30 min.)   UCXR3   [facility] Xray 12     13     14     15       16     17     18     19     20     21    EXTREMITY FOLLOW UP   4:20 PM   (40 min.)   Maxim Gan, YULIYA   [place] [facility] 22       23     24     25     26     27     28    [facility] DRAW  11:30 AM   (15 min.)    [facility] DRAW   [facility]    CT CHEST WO  12:20 PM   (20 min.)   UCCT2   [facility] CT 29 30 July 2019 Sunday Monday Tuesday Wednesday Thursday Friday Saturday        1     2    [facility] RETURN   8:45 AM   (30 min.)   Gaurang Johnson  MD   [facility]    [facility] ONC INFUSION 60   9:30 AM   (60 min.)   UC ONCOLOGY INFUSION   [facility] 3     4     5     6       7     8     9     10     11     12     13       14     15     16     17     18     19     20       21  Happy Birthday!     22     23     24     25     26     27       28     29     30     31                                   Recent Results (from the past 24 hour(s))   Comprehensive metabolic panel    Collection Time: 06/11/19  8:28 AM   Result Value Ref Range    Sodium 138 133 - 144 mmol/L    Potassium 4.1 3.4 - 5.3 mmol/L    Chloride 108 94 - 109 mmol/L    Carbon Dioxide 24 20 - 32 mmol/L    Anion Gap 6 3 - 14 mmol/L    Glucose 98 70 - 99 mg/dL    Urea Nitrogen 16 7 - 30 mg/dL    Creatinine 0.74 0.66 - 1.25 mg/dL    GFR Estimate >90 >60 mL/min/[1.73_m2]    GFR Estimate If Black >90 >60 mL/min/[1.73_m2]    Calcium 9.0 8.5 - 10.1 mg/dL    Bilirubin Total 0.4 0.2 - 1.3 mg/dL    Albumin 3.5 3.4 - 5.0 g/dL    Protein Total 6.6 (L) 6.8 - 8.8 g/dL    Alkaline Phosphatase 92 40 - 150 U/L    ALT 24 0 - 70 U/L    AST 20 0 - 45 U/L   TSH with free T4 reflex    Collection Time: 06/11/19  8:28 AM   Result Value Ref Range    TSH 1.45 0.40 - 4.00 mU/L   Phosphorus    Collection Time: 06/11/19  8:28 AM   Result Value Ref Range    Phosphorus 3.3 2.5 - 4.5 mg/dL   Magnesium    Collection Time: 06/11/19  8:28 AM   Result Value Ref Range    Magnesium 2.3 1.6 - 2.3 mg/dL   *CBC with platelets differential    Collection Time: 06/11/19  8:29 AM   Result Value Ref Range    WBC 4.9 4.0 - 11.0 10e9/L    RBC Count 4.18 (L) 4.4 - 5.9 10e12/L    Hemoglobin 11.5 (L) 13.3 - 17.7 g/dL    Hematocrit 35.8 (L) 40.0 - 53.0 %    MCV 86 78 - 100 fl    MCH 27.5 26.5 - 33.0 pg    MCHC 32.1 31.5 - 36.5 g/dL    RDW 15.3 (H) 10.0 - 15.0 %    Platelet Count 217 150 - 450 10e9/L    Diff Method Automated Method     % Neutrophils 50.9 %    % Lymphocytes 32.9 %    % Monocytes 8.6 %    % Eosinophils 6.4  %    % Basophils 0.8 %    % Immature Granulocytes 0.4 %    Nucleated RBCs 0 0 /100    Absolute Neutrophil 2.5 1.6 - 8.3 10e9/L    Absolute Lymphocytes 1.6 0.8 - 5.3 10e9/L    Absolute Monocytes 0.4 0.0 - 1.3 10e9/L    Absolute Eosinophils 0.3 0.0 - 0.7 10e9/L    Absolute Basophils 0.0 0.0 - 0.2 10e9/L    Abs Immature Granulocytes 0.0 0 - 0.4 10e9/L    Absolute Nucleated RBC 0.0

## 2019-06-11 NOTE — PROGRESS NOTES
"PRE-PROCEDURE DIAGNOSIS:  Portacatheter dysfunction    POST-PROCEDURE DIAGNOSIS: Same    PROCEDURE: IR Port check right  IMPRESSION: Completed right chest port check showing acute angle at right internal jugular venotomy possibly contributing to intermittent function (poor aspiration). The catheter tip is in the low right atrium. The port functions well with traction of the port reservoir inferiorly. There is no evidence of clot or fibrin sheath at catheter tip on contrast injection. Oblique projection at the venotomy does not show kink of catheter.    PLAN: Port is ready for immediate use. IR recommends single attempt at accessing the port reservoir in the future and retraction of the port reservoir inferiorly to facilitate aspiration of blood for labs. Dysfunction is unlikely related to accessing the reservoir and more likely due to acute angle at the neck and catheter tip possibly being too long. The patient's medication regimen does not currently necessitate central venous chest port and could be removed in the near future and replaced at a later date if his condition worsens requiring medications that must be administered through chest port. The patient is leaning towards port removal in the near future.    ----------    CLINICAL HISTORY: Patient with history of sarcoma status post chest port placement 3/28/2018 by cardiothoracic surgery. The patient reports intermittent dysfunction \"since day 1.\" At the beginning of his treatment he was receiving medications that needed to be administered via chest port, however now he is only on every 6 week Keytruda which she elects to receive through peripheral IV because of port dysfunction. He reports requiring multiple attempts at accessing port reservoir sometimes without any blood return so he will demand to place peripheral IV instead. However, on lab appointment this morning, port was accessed and worked well so he presents for port check with his port currently " accessed.    COMPARISON: Chest x-ray 4/19/2019    PERFORMED BY:  Luis Florez PA-C    CONSENT:  Continuation of care     MEDICATIONS: None, the port catheter was heparin locked with 100 units/mL    FLUOROSCOPY TIME: 0.4 minutes    CONTRAST AMOUNT:  3 mL    DESCRIPTION:  Patient was placed in the supine position on the fluoroscopy table. The area overlying subcutaneous port was prepped and draped in the usual sterile fashion. There was no erythema, fluctuance, tenderness, swelling, or discharge at the port pocket or along the catheter tract.  A  film revealed the port to be properly positioned within the chest, with the catheter tip located in the low right atrium. The course of the catheter has an abrupt acute angle at the venotomy, however on oblique view it is not kinked. The port aspirated and flushed freely, and was flushed with normal saline. Injection of 5 ml of contrast revealed the port to fill properly without filling defects. The catheter was patent and without leaks.  There was no evidence of fibrin sheath or occlusion from the wall of the vein or right atrium. Contrast was aspirated and the port and catheter were flushed with normal saline 5 cc of heparin, 100 units/cc. The port was deaccessed and the site was dressed with a sterile dressing. Images were saved throughout the procedure. The procedure was well tolerated, with no immediate complications.    COMPLICATIONS:  No immediate concerns; the patient remained stable throughout the procedure and tolerated it well.    ESTIMATED BLOOD LOSS:  None    SPECIMENS:  None    JULIET FLOREZ PA-C

## 2019-06-11 NOTE — LETTER
6/11/2019       RE: Hiram Tapia  [address]  [place] [address]     Dear Colleague,    Thank you for referring your patient, Hiram Tapia, to the [facility]. Please see a copy of my visit note below.    6-11-19    I saw Hiram Tapia for follow up of UPS of the right thigh    Background  He had no significant PMH but was found to have a UPS of the right thigh. He has a history of right leg pain with sciatica x 20 years. In October 2017 he had more pain and injured his leg on a truck which eventually led to imaging that revealed a mass. 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      He was started on Doxil/Ifos 3/23/18 and completed 4 cycles with positive response to treatment. Of not he did complete 6 months of Xarelto during this time for incidental PE. He then underwent preoperative XRT. He underwent resection 9/17/18 with <5% viable cells on path and negative margins with no LVI.     Then on surveillance imaging October 2018 he was noted to have lung nodules. He underwent biopsy December 2018 that revealed metastatic sarcoma.    Restaging on 4/2/19  revealed large right and trace left pneumothoraces along with worsening metastatic disease with increased lung nodules, increased lymphadenopathy, and new lytic lesion with associated soft tissue mass in posterior right femoral intertrochanteric region. He was admitted through the ED. Thoracic placed a pigtail but he had re-expansion. Thoracic surgery performed talc pleurodesis 4/3/19. He was discharged home 4/5/19.     He was started Keytruda 4/9/19. He saw ortho for worsening right thigh pain thought 2/2 metastatic lesion in right femur and they discussed nailing. During his pre-op work-up CXR revealed persistent right sided pneumothorax for which he was admitted 4/12/19. Repeat talc pleurodesis performed 4/16/19. He underwent right intramedually pinning 4/15/19.  -       Interval  "History:    David returns to clinic today  with his wife.     He overall is feeling well, definitely as well as 3 weeks ago.     He is however having hemoptysis but less than he had.  He does not note \"lung congestion\" but has more cough when he lies down.    No KONG and does yard work and moved his boat; doing PT, gets out daily.    He is working full time.    His right leg is still a bit sore but is improving and he is not needing any pain medication. He is up and moving as tolerated.    He denies any breathing concerns and his pleuritic chest pain continues to improve after his last pleurodesis.     He otherwise feels well and complete ROS negative. He feels he tolerated the first Keytruda infusion well.     10-point ROS o/w neg.       On exam he appeared comfortable w normal affect  /80 (BP Location: Right arm, Patient Position: Sitting, Cuff Size: Adult Regular)   Pulse 74   Temp 97.3  F (36.3  C) (Oral)   Resp 16   Ht 1.778 m (5' 10\")   Wt 86.5 kg (190 lb 12.8 oz)   SpO2 98%   BMI 27.38 kg/m     Constitutional: Well-appearing male in no acute distress.  Eyes: No scleral icterus.  ENT: Without lesions or thrush.   NECK: No thyromegaly or mass  Lymphatic: No lymphadenopathy.   Cardiovascular: RRR. No murmurs, gallops, or rubs. No peripheral edema.  Respiratory: Some crackles L base.  ABD:  Abdomen soft, non-tender. No HSMT.   Neurologic: Normal Romberg  Skin: Mild sun effect arms.  PSYCH: Mood good.     -  CBC OK  Other labs pending.    -  No new imaging.    -       Assessment:  1. 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 Biopsy December 2018 confirmed metastatic sarcoma. He started Keytruda 4/9/19 and baseline CT did confirm progressive disease.     Tolerated  3 cycles of Keytruda well. Returns today for " cycle 4.      2. Pulm  Recurrent right sided pneumothorax s/p 2 hospitalizations and TALC pleurodesis 4/3 and again 4/16. Per thoracic no need for repeat imaging unless he has worsening symptoms. Does have intermittent scant hemoptysis, hgb stable.      3. MSK  Restaging CT with lytic lesion with associated soft tissue mass arising from the posterior right femoral intertrochanteric region. He underwent right femur intramedullary pinning 4/15/19 by Dr. Betts. Minimal pain concerns, weight bearing as tolerated, and incisions healing well.  He is using crutches.      He is tolerating Rx well.  I reviewed his imaging and we will plan to restage 6-28 and get the 7-2 labs on 6-28 as well.    Gaurang Johnson M.D.  Professor  Hematology, Oncology and Transplantation

## 2019-06-28 NOTE — NURSING NOTE
Chief Complaint   Patient presents with     Blood Draw     Labs drawn via  by RN in lab. Lab appt only.      Sonia Payne RN

## 2019-07-01 NOTE — PROGRESS NOTES
7-2-19     I saw Hiram Tapia for follow up of UPS of the right thigh     Background  He had no significant PMH but was found to have a UPS of the right thigh. He has a history of right leg pain with sciatica x 20 years. In October 2017 he had more pain and injured his leg on a truck which eventually led to imaging that revealed a mass. 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      He was started on Doxil/Ifos 3/23/18 and completed 4 cycles with positive response to treatment. Of not he did complete 6 months of Xarelto during this time for incidental PE. He then underwent preoperative XRT. He underwent resection 9/17/18 with <5% viable cells on path and negative margins with no LVI.     Then on surveillance imaging October 2018 he was noted to have lung nodules. He underwent biopsy December 2018 that revealed metastatic sarcoma.     Restaging on 4/2/19  revealed large right and trace left pneumothoraces along with worsening metastatic disease with increased lung nodules, increased lymphadenopathy, and new lytic lesion with associated soft tissue mass in posterior right femoral intertrochanteric region. He was admitted through the ED. Thoracic placed a pigtail but he had re-expansion. Thoracic surgery performed talc pleurodesis 4/3/19. He was discharged home 4/5/19.     He started Keytruda 4/9/19. He saw ortho for worsening right thigh pain thought 2/2 metastatic lesion in right femur and they discussed nailing. During his pre-op work-up CXR revealed persistent right sided pneumothorax for which he was admitted 4/12/19. Repeat talc pleurodesis performed 4/16/19. He underwent right intramedually pinning 4/15/19.  -          Interval History:     He was started Keytruda 4/9/19    He overall is feeling well, with the only issue being a cough when he lies down that's not productive along with a gurgling sensation.   He is however having hemoptysis but not much.    No KONG and does  "yard work and moved his boat; doing PT, gets out daily.  He goes up a floor w no lung sx though the leg bothers him.    He is working full time.    His right leg is still a bit sore but he is not needing any pain medication.   He otherwise feels well and complete ROS negative.     10-point ROS o/w neg.        On exam he appeared comfortable w normal affect  /77 (BP Location: Right arm, Patient Position: Sitting, Cuff Size: Adult Large)   Pulse 74   Temp 98.1  F (36.7  C) (Oral)   Resp 18   Ht 1.778 m (5' 10\")   Wt 87.5 kg (193 lb)   SpO2 98%   BMI 27.69 kg/m    Constitutional: Well-appearing male in no acute distress.  Eyes: No icterus.  ENT: Without lesions or thrush.   NECK: No thyromegaly or mass  Respiratory: Some crackles L base.  Cardiovascular: RRR. No murmurs   EXT: No peripheral edema.  Lymphatic: No lymphadenopathy.   ABD:  No HSMT.   MSK: No synovitis  Neurologic: Normal Romberg  Skin: Mild sun effect arms.  PSYCH: Mood good.      -  Labs OK for Rx on 6-28     -  I reviewed the new imaging and its fairly complicated. Many nodules are smaller but notably the LLL mass region is significantly larger.  I also reveiwed these images w Dr Kulkarni.    I reviewed the images with him showing changes c/w April.   -        Assessment:  1. 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 He started Keytruda 4/9/19 and baseline CT did confirm progressive disease.    He is doing well in keytruda but the imaging shows a mixed response.  He has only been on it 3 months and the issue of pseudoprogression has to be considered but selective resistance is also high on the list.  We decided to continue and restage in 6 weeks.  Rx will depend on the degree of localized disease but gemzar would be the next " chemotherapy.     2. Pulm  Recurrent right sided pneumothorax s/p 2 hospitalizations and TALC pleurodesis 4/3 and again 4/16.  Does have intermittent scant hemoptysis, hgb stable.     3. MSK  Restaging CT with lytic lesion with associated soft tissue mass arising from the posterior right femoral intertrochanteric region. He underwent right femur intramedullary pinning 4/15/19 by Dr. Betts. Minimal pain concerns, weight bearing as tolerated, and incisions healing well.  He is using crutches most of the time.      Gaurang Johnson M.D.  Professor  Hematology, Oncology and Transplantation

## 2019-07-02 NOTE — PATIENT INSTRUCTIONS
Contact Numbers    [facility] Main Line: [phone]  [facility] Triage and After Hours Nurse Line:  [phone]    Please call the [facility] nurse triage or the after hours nurse line if you experience a temperature greater than or equal to 100.5, shaking chills, have uncontrolled nausea, vomiting and/or diarrhea, dizziness, lightheadedness, shortness of breath, chest pain, bleeding, unexplained bruising, or if you have any other new/concerning symptoms, questions or concerns.     If you are having any concerning symptoms or wish to speak to a provider before your next infusion visit, please call your care coordinator or triage to notify them so we can adequately serve you.     If you need a refill on a narcotic prescription or other medication, please call triage before your infusion appointment.       July 2019 Sunday Monday Tuesday Wednesday Thursday Friday Saturday        1     2    [facility] RETURN   8:45 AM   (30 min.)   Gaurang Johnson MD   [facility] ONC INFUSION 60   9:30 AM   (60 min.)    ONCOLOGY INFUSION   [facility] 3    EXTREMITY FOLLOW UP   4:30 PM   (40 min.)   Eric Billy PT   [place] [facility] 4     5     6       7     8     9     10     11     12     13       14     15     16     17     18     19     20       21  Happy Birthday!     22     23     24    [facility] DRAW   1:00 PM   (15 min.)    [facility] LAB DRAW   [facility] Lab Draw    UMP RETURN   1:35 PM   (50 min.)   Ignacio Ramirez PA   [facility] ONC INFUSION 60   3:00 PM   (60 min.)    ONCOLOGY INFUSION   [facility] 25     26     27       28     29     30     31 August 2019 Sunday Monday Tuesday Wednesday Thursday Friday Saturday                       1     2     3       4     5     6     7     8     9     10       11     12    P [facility] DRAW    7:45 AM   (15 min.)   [facility] LAB DRAW   [facility] Lab Draw    CT CHEST/ABDOMEN/PELVIS W   8:20 AM   (20 min.)   UCCT1   [facility] CT 13    [facility] RETURN   7:45 AM   (30 min.)   Gaurang Johnson MD   [facility] ONC INFUSION 60   8:30 AM   (60 min.)    ONCOLOGY INFUSION   [facility] 14     15     16     17       18     19     20     21     22     23     24       25     26     27     28     29     30     31                     Lab Results:  No results found for this or any previous visit (from the past 12 hour(s)).

## 2019-07-02 NOTE — PROGRESS NOTES
Infusion Nursing Note:  Hiram Tapia presents today for Cycle 5 Day 1 Keytruda.    Patient seen by provider today: Yes: Dr. Johnson   present during visit today: Not Applicable.    Note: Patient has no further concerns/complaints following visit with MD. States he received mostly good news on his last scan.    Intravenous Access:  Implanted port with positive blood return noted.    Treatment Conditions:  Lab Results   Component Value Date    HGB 12.0 06/28/2019     Lab Results   Component Value Date    WBC 5.1 06/28/2019      Lab Results   Component Value Date    ANEU 2.5 06/11/2019     Lab Results   Component Value Date     06/28/2019      Lab Results   Component Value Date     06/28/2019                   Lab Results   Component Value Date    POTASSIUM 4.5 06/28/2019           Lab Results   Component Value Date    MAG 2.3 06/11/2019            Lab Results   Component Value Date    CR 0.77 06/28/2019                   Lab Results   Component Value Date    JAYESH 9.7 06/28/2019                Lab Results   Component Value Date    BILITOTAL 0.5 06/28/2019           Lab Results   Component Value Date    ALBUMIN 3.7 06/28/2019                    Lab Results   Component Value Date    ALT 26 06/28/2019           Lab Results   Component Value Date    AST 19 06/28/2019       Results reviewed, labs MET treatment parameters, ok to proceed with treatment.      Post Infusion Assessment:  Patient tolerated infusion without incident.  Blood return noted pre and post infusion.  Site patent and intact, free from redness, edema or discomfort.  No evidence of extravasations.  Access discontinued per protocol.       Discharge Plan:   Patient declined prescription refills.  Discharge instructions reviewed with: Patient.  Patient and/or family verbalized understanding of discharge instructions and all questions answered.  Copy of AVS reviewed with patient and/or family.  Patient will return 7/24 for next  appointment.  Patient discharged in stable condition accompanied by: self.  Departure Mode: Ambulatory.    Aylin Wang RN

## 2019-07-02 NOTE — NURSING NOTE
"Oncology Rooming Note    July 2, 2019 8:40 AM   Hiram Tapia is a 58 year old male who presents for:    Chief Complaint   Patient presents with     Oncology Clinic Visit     Return visit related to high Grade Sarcoma     Initial Vitals: Resp 18   Ht 1.778 m (5' 10\")   Wt 87.5 kg (193 lb)   BMI 27.69 kg/m   Estimated body mass index is 27.69 kg/m  as calculated from the following:    Height as of this encounter: 1.778 m (5' 10\").    Weight as of this encounter: 87.5 kg (193 lb). Body surface area is 2.08 meters squared.  Mild Pain (3) Comment: Data Unavailable   No LMP for male patient.  Allergies reviewed: Yes  Medications reviewed: Yes    Medications: Medication refills not needed today.  Pharmacy name entered into EPIC:    [facility] [place], [place] - [address] [place] [address] [phone]  [facility] [address] [place] [address] - [facility] [address] AT [facility] [address]    Clinical concerns: No new concerns. Provider was notified.      Christelle Ray LPN            "

## 2019-07-02 NOTE — LETTER
7/2/2019       RE: Hiram Tapia  [address]  [place] [address]     Dear Colleague,    Thank you for referring your patient, Hiram Tapia, to the [facility]. Please see a copy of my visit note below.    7-2-19     I saw Hiram Tapia for follow up of UPS of the right thigh     Background  He had no significant PMH but was found to have a UPS of the right thigh. He has a history of right leg pain with sciatica x 20 years. In October 2017 he had more pain and injured his leg on a truck which eventually led to imaging that revealed a mass. 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      He was started on Doxil/Ifos 3/23/18 and completed 4 cycles with positive response to treatment. Of not he did complete 6 months of Xarelto during this time for incidental PE. He then underwent preoperative XRT. He underwent resection 9/17/18 with <5% viable cells on path and negative margins with no LVI.     Then on surveillance imaging October 2018 he was noted to have lung nodules. He underwent biopsy December 2018 that revealed metastatic sarcoma.     Restaging on 4/2/19  revealed large right and trace left pneumothoraces along with worsening metastatic disease with increased lung nodules, increased lymphadenopathy, and new lytic lesion with associated soft tissue mass in posterior right femoral intertrochanteric region. He was admitted through the ED. Thoracic placed a pigtail but he had re-expansion. Thoracic surgery performed talc pleurodesis 4/3/19. He was discharged home 4/5/19.     He started Keytruda 4/9/19. He saw ortho for worsening right thigh pain thought 2/2 metastatic lesion in right femur and they discussed nailing. During his pre-op work-up CXR revealed persistent right sided pneumothorax for which he was admitted 4/12/19. Repeat talc pleurodesis performed 4/16/19. He underwent right intramedually pinning 4/15/19.  -          Interval  "History:     He was started Keytruda 4/9/19    He overall is feeling well, with the only issue being a cough when he lies down that's not productive along with a gurgling sensation.   He is however having hemoptysis but not much.    No KONG and does yard work and moved his boat; doing PT, gets out daily.  He goes up a floor w no lung sx though the leg bothers him.    He is working full time.    His right leg is still a bit sore but he is not needing any pain medication.   He otherwise feels well and complete ROS negative.     10-point ROS o/w neg.        On exam he appeared comfortable w normal affect  /77 (BP Location: Right arm, Patient Position: Sitting, Cuff Size: Adult Large)   Pulse 74   Temp 98.1  F (36.7  C) (Oral)   Resp 18   Ht 1.778 m (5' 10\")   Wt 87.5 kg (193 lb)   SpO2 98%   BMI 27.69 kg/m     Constitutional: Well-appearing male in no acute distress.  Eyes: No icterus.  ENT: Without lesions or thrush.   NECK: No thyromegaly or mass  Respiratory: Some crackles L base.  Cardiovascular: RRR. No murmurs   EXT: No peripheral edema.  Lymphatic: No lymphadenopathy.   ABD:  No HSMT.   MSK: No synovitis  Neurologic: Normal Romberg  Skin: Mild sun effect arms.  PSYCH: Mood good.      -  Labs OK for Rx on 6-28     -  I reviewed the new imaging and its fairly complicated. Many nodules are smaller but notably the LLL mass region is significantly larger.  I also reveiwed these images w Dr Kulkarni.    I reviewed the images with him showing changes c/w April.   -        Assessment:  1. 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 He started Keytruda 4/9/19 and baseline CT did confirm progressive disease.    He is doing well in keytruda but the imaging shows a mixed response.  He has only been " on it 3 months and the issue of pseudoprogression has to be considered but selective resistance is also high on the list.  We decided to continue and restage in 6 weeks.  Rx will depend on the degree of localized disease but gemzar would be the next chemotherapy.     2. Pulm  Recurrent right sided pneumothorax s/p 2 hospitalizations and TALC pleurodesis 4/3 and again 4/16.  Does have intermittent scant hemoptysis, hgb stable.     3. MSK  Restaging CT with lytic lesion with associated soft tissue mass arising from the posterior right femoral intertrochanteric region. He underwent right femur intramedullary pinning 4/15/19 by Dr. Betts. Minimal pain concerns, weight bearing as tolerated, and incisions healing well.  He is using crutches most of the time.      Gaurang Johnson M.D.  Professor  Hematology, Oncology and Transplantation

## 2019-07-17 NOTE — OR NURSING
Patient eager to discharge upon arrival to phase 2 because he had to get to work.  Refused to stay for 45 minutes post-op per the provider's order. Instructed patient to check incision site frequently and look for bruising or bleeding under the skin.  If noted, to call the IR department right away via the contact information in the discharge instructions.  Patient verbalized understanding.

## 2019-07-17 NOTE — DISCHARGE INSTRUCTIONS
A collaboration between [facility] and [facility]  Experts in minimally invasive, targeted treatments performed using imaging guidance    Venous Access Device, Port Catheter or Tunneled Central Line Removal    Today you had your existing venous access device removed, either because it was no longer needed or because there was malfunction or infection issues.    One of our Radiology PAs performed this procedure for you today:  ? Adrien Herbert, [facility], PAWaleskaC  ? ROMULO Reid, PA-C  ? Luis Florez PA-C  ? Anny Ahumada MS, PA-C  ? Gus Kumar PA-C    After you go home:  - Drink plenty of fluids.  Generally 6-8 (8 ounce) glasses a day is recommended.  - Resume your regular diet unless otherwise ordered by a medical provider.  - Keep any applied tape/gauze dressings clean and dry.  Change tape/gauze dressings if they get wet or soiled.  - You may shower the following day after procedure, however cover and protect from moisture any tape/gauze dressings.  You may let water hit and run over dried skin glue, but do not scrub.  Pat the area dry after showering.  - Port removal incisions are closed with absorbable suture, meaning they do not need to be removed at a later date, and a topical skin adhesive (skin glue).  This glue will wear off in 7-14 days.  Do not remove before this time.  If 14 days have passed and residual glue is present, you may gently remove it.  - You may remove tape/gauze dressings after 5 days if the site looks closed and in the process of healing.  - Do not apply gels, lotions, or ointments to the glue site for the first 10 days as this may cause the glue to prematurely soften and fail.  - Do not perform strenuous activities or lift greater than 10 pounds for the next three days.  - If there is bleeding or oozing from the procedure site, apply firm pressure to the area for 5-10 minutes.  If the bleeding continues seek medical advice at the  numbers below.  - Mild procedure site discomfort can be treated with an ice pack and over-the-counter pain relievers.              For 24 hours after any sedation used:  - Relax and take it easy.  No strenuous activities.  - Do not drive or operate machines at home or at work.  - No alcohol consumption.  - Do not make any important or legal decisions.    Call our Interventional Radiology (IR) service if:  - If you start bleeding from the procedure site.  If you do start to bleed from the site, lie down and hold some pressure on the site.  Our radiology provider can help you decide if you need to return to the hospital.  - If you have new or worsening pain related to the procedure.  - If you have concerning swelling at the procedure site.  - If you develop persistent nausea or vomiting.  - If you develop hives or a rash or any unexplained itching.  - If you have a fever of greater than 100.5  F and chills in the first 5 days after procedure.  - Any other concerns related to your procedure.      [facility]  Interventional Radiology (IR)  [address] [place] [address]    Contact Number:  [phone]  (IR control desk)  - Monday - Friday 8:00 am - 4:30 pm    After hours for urgent concerns:  [phone]  - After 4:30 pm Monday - Friday, Weekends and Holidays.   - Ask for Interventional Radiology on-call.  Someone is available 24 hours a day.  - [facility] toll free number:  [phone]

## 2019-07-17 NOTE — PROCEDURES
Interventional Radiology Brief Post Procedure Note    Procedure:  IR PORT REMOVAL RIGHT [[id number] (Type: Custom)]    Proceduralist: ROMULO Reid PA-C    Assistant: None    Time Out: Prior to the start of the procedure and with procedural staff participation, I verbally confirmed the patient s identity using two indicators, relevant allergies, that the procedure was appropriate and matched the consent or emergent situation, and that the correct equipment/implants were available. Immediately prior to starting the procedure I conducted the Time Out with the procedural staff and re-confirmed the patient s name, procedure, and site/side. (The Joint Commission universal protocol was followed.)  Yes        Sedation: None. Local Anesthestic used    Findings: Request for right chest port removal.  Patient has a surgically placed right chest port and no longer desires this venous access.  Completed removal of single lumen venous chest port via right chest. Dx: Soft tissue sarcoma of right thigh; no longer needed. [facility] rm 4 LOCAL    Estimated Blood Loss: Minimal    Fluoroscopy Time:  minute(s)    SPECIMENS: None    Complications: 1. None     Condition: Stable    Plan:     Comments: See dictated procedure note for full details.    Jacky Dumont PA-C

## 2019-07-23 NOTE — PROGRESS NOTES
Oncology/Hematology Visit Note  Jul 24, 2019    Reason for Visit: Follow up of UPS of the right thigh    History of Present Illness: Hiram Tapia is a 59 year old male with no significant PMH with UPS of the right thigh. He has a history of right leg pain with sciatica x 20 years. In October 2017 he had more pain and injured his leg on a truck which eventually led to imaging that revealed a mass. 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     He was started on Doxil/Ifos 3/23/18 and completed 4 cycles with positive response to treatment. Of not he did complete 6 months of Xarelto during this time for incidental PE. He then underwent preoperative XRT. He underwent resection 9/17/18 with <5% viable cells on path and negative margins with no LVI.    Then on surveillance imaging October 2018 he was noted to have lung nodules. He underwent biopsy December 2018 that revealed metastatic sarcoma. Dr. Johnson recommended treatment with Keytruda in February 2019 however it is unclear to me why this was never started.    He underwent restaging CT 4/2/19. This revealed large right and trace left pneumothoraces along with worsening metastatic disease with increased lung nodules, increased lymphadenopathy, and new lytic lesion with associated soft tissue mass in posterior right femoral intertrochanteric region. He was admitted through the ED. Thoracic placed a pigtail but he had re-expansion. Thoracic surgery performed talc pleurodesis 4/3/19. He was discharged home 4/5/19.    He was started on Keytruda 4/9/19. He saw ortho for worsening right thigh pain thought 2/2 metastatic lesion in right femur and they discussed nailing. During his pre-op work-up CXR revealed persistent right sided pneumothorax for which he was admitted 4/12/19. Repeat talc pleurodesis performed 4/16/19. He underwent right intramedually pinning 4/15/19.    He has continued on Keytrua after surgery. Restaging CT 6/28/19  "with mostly an increase in pulmonary nodules and a right middle lobe cavitary lesion. Overall Dr. Johnson felt it was a mixed response so plan on continuing for 2 more cycles and then repeat CT. Of note port was removed 7/17/19.    He returns today for his next Keytruda infusion and oncology follow-up, with plan to repeat imaging in August.    Interval History:  Mr. Tapia returns to clinic today unaccompanied. He notes that since he saw Dr. Johnson last he has been having more issues with pleuritic pain. He also has KONG with stairs and feels his breathing is more tight. He is also having episodes of hemoptysis 4-5 times per day. He states the secretions are almost entirely blood, whereas before they were blood tinged. He notes today he feels more fatigued and had nausea/dyspepsia this morning that is slightly improved now but still present.    He denies fevers, chills, headaches, dizziness, nausea, vomiting, bowel or urinary issues, rashes. He still has hip pain and plans to follow-up with ortho for this, not needing any pain meds. He notes his right leg swells during the day and then improves over night. He is eating and drinking well. He is not taking any medications.     No current outpatient medications on file.       Physical Examination:  /74 (BP Location: Right arm, Patient Position: Sitting, Cuff Size: Adult Regular)   Pulse 93   Temp 98.4  F (36.9  C) (Oral)   Resp 16   Ht 1.778 m (5' 10\")   Wt 89.7 kg (197 lb 11.2 oz)   SpO2 96%   BMI 28.37 kg/m    Wt Readings from Last 10 Encounters:   07/17/19 87.5 kg (193 lb)   07/02/19 87.5 kg (193 lb)   06/11/19 86.5 kg (190 lb 12.8 oz)   05/21/19 86.6 kg (190 lb 14.7 oz)   05/21/19 86.6 kg (191 lb)   04/30/19 83.9 kg (185 lb)   04/19/19 82 kg (180 lb 11.2 oz)   04/09/19 85.2 kg (187 lb 14.4 oz)   04/09/19 85.2 kg (187 lb 14.4 oz)   04/05/19 84.6 kg (186 lb 9.6 oz)     Constitutional: Well-appearing male in no acute distress.  Eyes: EOMI, PERRL. No " scleral icterus.  ENT: Oral mucosa is moist without lesions or thrush.   Lymphatic: Neck is supple without cervical or supraclavicular lymphadenopathy.   Cardiovascular: Regular rate and rhythm. No murmurs, gallops, or rubs. Right leg 1+ peripheral edema, left leg trace edema.  Respiratory: Clear on right. Lung sounds diminished in left lung base. No wheezes or crackles.  Gastrointestinal: Bowel sounds present. Abdomen soft, non-tender. No palpable hepatosplenomegaly or masses.   Neurologic: Cranial nerves II through XII are grossly intact.  Skin: No rashes, petechiae, or bruising noted on exposed skin.    Laboratory Data:  Results for JAYLENE CHAO (MRN [id number]) as of 7/24/2019 18:42   7/24/2019 13:08   Sodium 137   Potassium 4.0   Chloride 106   Carbon Dioxide 27   Urea Nitrogen 17   Creatinine 0.91   GFR Estimate >90   GFR Estimate If Black >90   Calcium 9.5   Anion Gap 5   Magnesium 2.3   Albumin 3.3 (L)   Protein Total 7.0   Bilirubin Total 0.3   Alkaline Phosphatase 107   ALT 20   AST 19   Troponin I ES <0.015   TSH 1.21   Glucose 108 (H)   WBC 6.2   Hemoglobin 11.1 (L)   Hematocrit 34.8 (L)   Platelet Count 312   RBC Count 4.15 (L)   MCV 84   MCH 26.7   MCHC 31.9   RDW 14.3   Diff Method Automated Method   % Neutrophils 67.5   % Lymphocytes 21.8   % Monocytes 8.5   % Eosinophils 1.6   % Basophils 0.3   % Immature Granulocytes 0.3   Nucleated RBCs 0   Absolute Neutrophil 4.2   Absolute Lymphocytes 1.3   Absolute Monocytes 0.5   Absolute Eosinophils 0.1   Absolute Basophils 0.0   Abs Immature Granulocytes 0.0   Absolute Nucleated RBC 0.0     EKG NSR    CT PE 7/24/19  Findings:   The contrast bolus is adequate.  There is no pulmonary embolism. Large  left-sided pleural effusion with collapse of the left lower lobe.  Redemonstrated cavitary pulmonary mass the left lower lobe measuring  approximately 7.5 x 7.8 x 5.6 cm somewhat increased from previous  having measured 7.6 x 4.9 (series 11 image 153). Multiple  additional  bilateral pulmonary nodules and masses which are increased in size  since 6/28/2019, particularly in the left paramediastinal region.  Right pleural calcifications. Wedge resection changes in the right  middle lobe.     Mediastinum: Normal thyroid. Normal configuration of the great  vessels. Normal size heart, aorta, and pulmonary artery. Multiple  prominent mediastinal lymph nodes the largest pretracheal lymph node  measuring 8 mm in short axis.     Upper abdomen: Soft tissue density mass in the right cardiophrenic  recess measuring 6.2 x 4.3 and the largest axial dimension which is  increased since 6/28/2019. Unchanged left adrenal adenoma. Lobulated  hypoattenuating region in the posterior right hepatic lobe  corresponding to a hemangioma better characterized on prior CT.  Unchanged hyperattenuating lesion in the pancreatic tail. Otherwise,  the appearance of the upper abdomen is unremarkable.     Bones and soft tissues: No concerning osseous findings.   Soft tissue  density overlying the right sternomanubrial junction measuring 2.6 x  1.6 cm (series 11 image 65 potentially representing seroma following  removal of right-sided Port-A-Cath versus new metastasis.                                                                      Impression:  1. No pulmonary embolism identified.  2. Large left-sided pleural effusion with left lower lobe collapse,  new since 6/28/2019.  3. Increased size of numerous metastatic pulmonary and cardiophrenic  nodule/masses.     [Urgent Result: Pleural effusion]      Assessment and Plan:  1. 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 Biopsy December 2018 confirmed metastatic sarcoma. Due to unclear reasons there was a delay in starting Keytruda, finally able to receive 4/9/19 and  baseline CT did confirm progressive disease.    Has received 5 cycles of Keytruda with unclear progression vs psuedoprogression on early July CT. He symptomatically feels worse today with more pleuritic chest pain and hemoptysis. Will get CT chest today to assess for disease progression.    CT Chest with disease progression along with new pleural effusion. Discussed with Dr. Johnson. Will stop Keytruda and switch to Gemzar day 1 and day 8. Able to start treatment today. Discussed toxicities of treatment including myelosuppression, fatigue, flu-like symptoms, nausea, vomiting, skin toxicity, pulmonary toxicity, edema. Plan to bring him back for day 8 in one week. Per Dr. Johnson will keep CT scheduled as is.     2. Pulm  Recurrent right sided pneumothorax s/p 2 hospitalizations and TALC pleurodesis 4/3 and again 4/16. Per thoracic no need for repeat imaging unless he has worsening symptoms.     Chest pain and hemoptysis worsening. He had been having scant hemoptysis that has now progressed to elvis hemoptysis per patient. Hgb and plt stable. More pleurtic chest pain as well.    Did get EKG which revealed NSR. Troponin pending. Will get CT PE to r/o PE with history along with recurrent pneumothorax vs other etiology of pain and hemoptysis.     CT PE negative for pulmonary embolism but does have new large left sided pleural effusion. Discussed with interventional pulm and will do therapeutic and diagnostic thoracentesis tomorrow. Instructed patient to go to ED with any worsening symptoms overnight. Will continue to monitor hemoptysis given labs are stable.     3. MSK  Restaging CT with lytic lesion with associated soft tissue mass arising from the posterior right femoral intertrochanteric region. He underwent right femur intramedullary pinning 4/15/19 by Dr. Betts. Needs ortho follow-up scheduled next week. Does have mild right hip pain but not needing any meds.    Intermittent right lower extremity edema. Will  closely monitor.    4. GI  New dyspepsia this morning. Will try Pepcid in infusion and monitor for improvement. Discussed antiemetics for Gemzar and he has some at home.    Greater than 40 min was spent with the patient with >50% of the time spent in counseling and coordinating care.     Ignacio Ramirez PA-C  [facility]  [address], [place] [address] [phone]

## 2019-07-24 NOTE — LETTER
7/24/2019      RE: Hiram Tapia  [address]  [facility] [address]       Oncology/Hematology Visit Note  Jul 24, 2019    Reason for Visit: Follow up of UPS of the right thigh    History of Present Illness: Hiram Tapia is a 59 year old male with no significant PMH with UPS of the right thigh. He has a history of right leg pain with sciatica x 20 years. In October 2017 he had more pain and injured his leg on a truck which eventually led to imaging that revealed a mass. 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     He was started on Doxil/Ifos 3/23/18 and completed 4 cycles with positive response to treatment. Of not he did complete 6 months of Xarelto during this time for incidental PE. He then underwent preoperative XRT. He underwent resection 9/17/18 with <5% viable cells on path and negative margins with no LVI.    Then on surveillance imaging October 2018 he was noted to have lung nodules. He underwent biopsy December 2018 that revealed metastatic sarcoma. Dr. Johnson recommended treatment with Keytruda in February 2019 however it is unclear to me why this was never started.    He underwent restaging CT 4/2/19. This revealed large right and trace left pneumothoraces along with worsening metastatic disease with increased lung nodules, increased lymphadenopathy, and new lytic lesion with associated soft tissue mass in posterior right femoral intertrochanteric region. He was admitted through the ED. Thoracic placed a pigtail but he had re-expansion. Thoracic surgery performed talc pleurodesis 4/3/19. He was discharged home 4/5/19.    He was started on Keytruda 4/9/19. He saw ortho for worsening right thigh pain thought 2/2 metastatic lesion in right femur and they discussed nailing. During his pre-op work-up CXR revealed persistent right sided pneumothorax for which he was admitted 4/12/19. Repeat talc pleurodesis performed 4/16/19. He underwent right  "intramedually pinning 4/15/19.    He has continued on Keytrua after surgery. Restaging CT 6/28/19 with mostly an increase in pulmonary nodules and a right middle lobe cavitary lesion. Overall Dr. Johnson felt it was a mixed response so plan on continuing for 2 more cycles and then repeat CT. Of note port was removed 7/17/19.    He returns today for his next Keytruda infusion and oncology follow-up, with plan to repeat imaging in August.    Interval History:  Mr. Tapia returns to clinic today unaccompanied. He notes that since he saw Dr. Johnson last he has been having more issues with pleuritic pain. He also has KONG with stairs and feels his breathing is more tight. He is also having episodes of hemoptysis 4-5 times per day. He states the secretions are almost entirely blood, whereas before they were blood tinged. He notes today he feels more fatigued and had nausea/dyspepsia this morning that is slightly improved now but still present.    He denies fevers, chills, headaches, dizziness, nausea, vomiting, bowel or urinary issues, rashes. He still has hip pain and plans to follow-up with ortho for this, not needing any pain meds. He notes his right leg swells during the day and then improves over night. He is eating and drinking well. He is not taking any medications.     No current outpatient medications on file.       Physical Examination:  /74 (BP Location: Right arm, Patient Position: Sitting, Cuff Size: Adult Regular)   Pulse 93   Temp 98.4  F (36.9  C) (Oral)   Resp 16   Ht 1.778 m (5' 10\")   Wt 89.7 kg (197 lb 11.2 oz)   SpO2 96%   BMI 28.37 kg/m     Wt Readings from Last 10 Encounters:   07/17/19 87.5 kg (193 lb)   07/02/19 87.5 kg (193 lb)   06/11/19 86.5 kg (190 lb 12.8 oz)   05/21/19 86.6 kg (190 lb 14.7 oz)   05/21/19 86.6 kg (191 lb)   04/30/19 83.9 kg (185 lb)   04/19/19 82 kg (180 lb 11.2 oz)   04/09/19 85.2 kg (187 lb 14.4 oz)   04/09/19 85.2 kg (187 lb 14.4 oz)   04/05/19 84.6 kg (186 lb " 9.6 oz)     Constitutional: Well-appearing male in no acute distress.  Eyes: EOMI, PERRL. No scleral icterus.  ENT: Oral mucosa is moist without lesions or thrush.   Lymphatic: Neck is supple without cervical or supraclavicular lymphadenopathy.   Cardiovascular: Regular rate and rhythm. No murmurs, gallops, or rubs. Right leg 1+ peripheral edema, left leg trace edema.  Respiratory: Clear on right. Lung sounds diminished in left lung base. No wheezes or crackles.  Gastrointestinal: Bowel sounds present. Abdomen soft, non-tender. No palpable hepatosplenomegaly or masses.   Neurologic: Cranial nerves II through XII are grossly intact.  Skin: No rashes, petechiae, or bruising noted on exposed skin.    Laboratory Data:  Results for JAYLENE CHAO (MRN [id number]) as of 7/24/2019 18:42   7/24/2019 13:08   Sodium 137   Potassium 4.0   Chloride 106   Carbon Dioxide 27   Urea Nitrogen 17   Creatinine 0.91   GFR Estimate >90   GFR Estimate If Black >90   Calcium 9.5   Anion Gap 5   Magnesium 2.3   Albumin 3.3 (L)   Protein Total 7.0   Bilirubin Total 0.3   Alkaline Phosphatase 107   ALT 20   AST 19   Troponin I ES <0.015   TSH 1.21   Glucose 108 (H)   WBC 6.2   Hemoglobin 11.1 (L)   Hematocrit 34.8 (L)   Platelet Count 312   RBC Count 4.15 (L)   MCV 84   MCH 26.7   MCHC 31.9   RDW 14.3   Diff Method Automated Method   % Neutrophils 67.5   % Lymphocytes 21.8   % Monocytes 8.5   % Eosinophils 1.6   % Basophils 0.3   % Immature Granulocytes 0.3   Nucleated RBCs 0   Absolute Neutrophil 4.2   Absolute Lymphocytes 1.3   Absolute Monocytes 0.5   Absolute Eosinophils 0.1   Absolute Basophils 0.0   Abs Immature Granulocytes 0.0   Absolute Nucleated RBC 0.0     EKG NSR    CT PE 7/24/19  Findings:   The contrast bolus is adequate.  There is no pulmonary embolism. Large  left-sided pleural effusion with collapse of the left lower lobe.  Redemonstrated cavitary pulmonary mass the left lower lobe measuring  approximately 7.5 x 7.8 x 5.6 cm  somewhat increased from previous  having measured 7.6 x 4.9 (series 11 image 153). Multiple additional  bilateral pulmonary nodules and masses which are increased in size  since 6/28/2019, particularly in the left paramediastinal region.  Right pleural calcifications. Wedge resection changes in the right  middle lobe.     Mediastinum: Normal thyroid. Normal configuration of the great  vessels. Normal size heart, aorta, and pulmonary artery. Multiple  prominent mediastinal lymph nodes the largest pretracheal lymph node  measuring 8 mm in short axis.     Upper abdomen: Soft tissue density mass in the right cardiophrenic  recess measuring 6.2 x 4.3 and the largest axial dimension which is  increased since 6/28/2019. Unchanged left adrenal adenoma. Lobulated  hypoattenuating region in the posterior right hepatic lobe  corresponding to a hemangioma better characterized on prior CT.  Unchanged hyperattenuating lesion in the pancreatic tail. Otherwise,  the appearance of the upper abdomen is unremarkable.     Bones and soft tissues: No concerning osseous findings.   Soft tissue  density overlying the right sternomanubrial junction measuring 2.6 x  1.6 cm (series 11 image 65 potentially representing seroma following  removal of right-sided Port-A-Cath versus new metastasis.                                                                      Impression:  1. No pulmonary embolism identified.  2. Large left-sided pleural effusion with left lower lobe collapse,  new since 6/28/2019.  3. Increased size of numerous metastatic pulmonary and cardiophrenic  nodule/masses.     [Urgent Result: Pleural effusion]      Assessment and Plan:  1. 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 Biopsy December 2018 confirmed metastatic sarcoma. Due to  unclear reasons there was a delay in starting Keytruda, finally able to receive 4/9/19 and baseline CT did confirm progressive disease.    Has received 5 cycles of Keytruda with unclear progression vs psuedoprogression on early July CT. He symptomatically feels worse today with more pleuritic chest pain and hemoptysis. Will get CT chest today to assess for disease progression.    CT Chest with disease progression along with new pleural effusion. Discussed with Dr. Johnson. Will stop Keytruda and switch to Gemzar day 1 and day 8. Able to start treatment today. Discussed toxicities of treatment including myelosuppression, fatigue, flu-like symptoms, nausea, vomiting, skin toxicity, pulmonary toxicity, edema. Plan to bring him back for day 8 in one week. Per Dr. Johnson will keep CT scheduled as is.     2. Pulm  Recurrent right sided pneumothorax s/p 2 hospitalizations and TALC pleurodesis 4/3 and again 4/16. Per thoracic no need for repeat imaging unless he has worsening symptoms.     Chest pain and hemoptysis worsening. He had been having scant hemoptysis that has now progressed to elvis hemoptysis per patient. Hgb and plt stable. More pleurtic chest pain as well.    Did get EKG which revealed NSR. Troponin pending. Will get CT PE to r/o PE with history along with recurrent pneumothorax vs other etiology of pain and hemoptysis.     CT PE negative for pulmonary embolism but does have new large left sided pleural effusion. Discussed with interventional pulm and will do therapeutic and diagnostic thoracentesis tomorrow. Instructed patient to go to ED with any worsening symptoms overnight. Will continue to monitor hemoptysis given labs are stable.     3. MSK  Restaging CT with lytic lesion with associated soft tissue mass arising from the posterior right femoral intertrochanteric region. He underwent right femur intramedullary pinning 4/15/19 by Dr. Betts. Needs ortho follow-up scheduled next week. Does have mild right  hip pain but not needing any meds.    Intermittent right lower extremity edema. Will closely monitor.    4. GI  New dyspepsia this morning. Will try Pepcid in infusion and monitor for improvement. Discussed antiemetics for Gemzar and he has some at home.    Greater than 40 min was spent with the patient with >50% of the time spent in counseling and coordinating care.     Ignacio Ramirez PA-C  [facility]  [address], [place] [address] [phone]

## 2019-07-24 NOTE — PATIENT INSTRUCTIONS
THORACENTESIS (procedure to drain fluid on lung)  -11:00 AM Thursday 7/25/19  -1st Floor [facility], Endoscopy  -No eating or drinking after midnight tonight    NEW CHEMOTHERAPY: GEMCITABINE  -Mild flu like symptoms. Can use Tylenol if needed  -Nausea: Can use Ondansetron, Granisetron, or Prochlorperazine as needed. Call if you need any of these medications  -Can get mild skin rash  -Watch for swelling in legs and worsening shortness of breath  -Treatment is 2 weeks on, 1 week off

## 2019-07-24 NOTE — NURSING NOTE
"Oncology Rooming Note    July 24, 2019 1:19 PM   Hiram Tapia is a 59 year old male who presents for:    Chief Complaint   Patient presents with     Oncology Clinic Visit     [facility] RETURN- HIGH GRADE SARCOMA     Blood Draw     Labs drawn via PIV placed by RN in lab. VS taken. Pt checked in for next appt     Initial Vitals: /74 (BP Location: Right arm, Patient Position: Sitting, Cuff Size: Adult Regular)   Pulse 93   Temp 98.4  F (36.9  C) (Oral)   Resp 16   Ht 1.778 m (5' 10\")   Wt 89.7 kg (197 lb 11.2 oz)   SpO2 96%   BMI 28.37 kg/m   Estimated body mass index is 28.37 kg/m  as calculated from the following:    Height as of this encounter: 1.778 m (5' 10\").    Weight as of this encounter: 89.7 kg (197 lb 11.2 oz). Body surface area is 2.1 meters squared.  Moderate Pain (4) Comment: Data Unavailable   No LMP for male patient.  Allergies reviewed: Yes  Medications reviewed: Yes    Medications: Medication refills not needed today.  Pharmacy name entered into [facility]:    [facility] [place], [place] - [address] [place] [address] [id number]  [facility] [address] [place] [address] - [facility] [address] AT [facility] [address]  [address]    Clinical concerns: No new concerns. Ignacio was notified.      Zen Hernández LPN            "

## 2019-07-24 NOTE — NURSING NOTE
Chief Complaint   Patient presents with     Oncology Clinic Visit     [facility] RETURN- HIGH GRADE SARCOMA     Blood Draw     Labs drawn via PIV placed by RN in lab. VS taken. Pt checked in for next appt     Labs drawn from PIV placed by RN. Line flushed with saline. Vitals taken. Pt checked in for appointment(s).    Sulema AYON RN PHN BSN  BMT/Oncology Lab

## 2019-07-24 NOTE — DISCHARGE INSTRUCTIONS

## 2019-07-24 NOTE — PROGRESS NOTES
Infusion Nursing Note:  Hiram Tapia presents today for Cycle 1, day 1 Gemzar--this is a change in regimen  Patient seen by provider today: Yes: SENAIT Carter    Note: Pt presented to clinic with no questions.  CT revealed large L p effusion for which he will receive thoracentesis tomorrow.  Change of regimen to Gemzar today, teaching completed.  Pt did not request or require any intervention for pain today.    TORB 7/24/2019 SENAIT Hernandez/ELVIA Harris RN: Administer 20mg IV Pepcid ONCE    Intravenous Access:  Peripheral IV placed.    Treatment Conditions:  Lab Results   Component Value Date    HGB 11.1 07/24/2019     Lab Results   Component Value Date    WBC 6.2 07/24/2019      Lab Results   Component Value Date    ANEU 4.2 07/24/2019     Lab Results   Component Value Date     07/24/2019      Lab Results   Component Value Date     07/24/2019                   Lab Results   Component Value Date    POTASSIUM 4.0 07/24/2019           Lab Results   Component Value Date    MAG 2.3 07/24/2019            Lab Results   Component Value Date    CR 0.91 07/24/2019                   Lab Results   Component Value Date    JAYESH 9.5 07/24/2019                Lab Results   Component Value Date    BILITOTAL 0.3 07/24/2019           Lab Results   Component Value Date    ALBUMIN 3.3 07/24/2019                    Lab Results   Component Value Date    ALT 20 07/24/2019           Lab Results   Component Value Date    AST 19 07/24/2019     Results reviewed, labs MET treatment parameters, ok to proceed with treatment.    Post Infusion Assessment:  Patient tolerated infusion yet to be approved and given; report given to evening RN.  Blood return noted pre and post infusion.  Site patent and intact, free from redness, edema or discomfort.  No evidence of extravasations.  Access discontinued per protocol.    Discharge Plan:   Patient declined prescription refills.  Discharge instructions reviewed with:  Patient.  Patient and/or family verbalized understanding of discharge instructions and all questions answered.  Copy of AVS reviewed with patient and/or family.  Patient will return 7/31/2019 for next appointment--yet to be scheduled; will be by time of discharge.  Patient discharged in stable condition accompanied by: self.  Departure Mode: Ambulatory.      Mini Harris RN        Addendum:  Patient received first dose of Gemzar without complications. Chemo teach was provided by SENAIT Marroquin, prior to infusion. Teaching was reiterated by writer. Still awaiting scheduling for Day 8 of Gemzar next week. IB was sent to schedulers by previous RN. RN verified that patient had antiemetics at home, and patient was able to verify that he has Zofran, Kytril, compazine and ativan available to him at home as needed. Patient departed the infusion area unaccompanied, by ambulatory status.     Veronica Alford RN BSN OCN

## 2019-07-24 NOTE — PATIENT INSTRUCTIONS
Contact Numbers  [facility] Nurse Triage: [phone]  After Hours Nurse Line:  [phone]     Please call the [facility] Triage line if you experience a temperature greater than or equal to 100.5, shaking chills, have uncontrolled nausea, vomiting and/or diarrhea, dizziness, shortness of breath, chest pain, bleeding, unexplained bruising, or if you have any other new/concerning symptoms, questions or concerns.      If it is after hours, weekends, or holidays, please call either the after hours nurse line listed above.      If you are having any concerning symptoms or wish to speak to a provider before your next infusion visit, please call your care coordinator or triage to notify them so we can adequately serve you.      If you need a refill on a narcotic prescription or other medication, please call triage before your infusion appointment.       July 2019 Sunday Monday Tuesday Wednesday Thursday Friday Saturday        1     2    [facility] RETURN   8:45 AM   (30 min.)   Gaurang Johnson MD   [facility] ONC INFUSION 60   9:30 AM   (60 min.)    ONCOLOGY INFUSION   [facility] 3     4     5     6       7     8     9     10    EXTREMITY FOLLOW UP   5:10 PM   (40 min.)   Eric Billy, PT   [facility] 11     12     13       14     15     16     17    IR PORT REMOVAL RIGHT   9:30 AM   (75 min.)   [id number]   [facility]    Outpatient Visit   9:40 AM   [facility]    REMOVAL, VASCULAR ACCESS PORT  10:30 AM   Jacky Dumont PA-C    OR 18     19     20       21  Happy Birthday!     22     23     24    [facility] DRAW   1:00 PM   (15 min.)    [facility]    [facility] RETURN   1:35 PM   (50 min.)   Ignacio Ramirez [facility]   [facility]   3:00 PM   (60 min.)    ONCOLOGY INFUSION     [facility]    CT CHEST PULMONARY EMBOLISM W   4:00 PM   (20 min.)   [address] [place] [address] CT 25    THORACENTESIS  12:00 PM   Ashish Gaines MD   [facility] 26     27       28     29    EXTREMITY FOLLOW UP   4:30 PM   (40 min.)   Eric Billy, PT   [facility] 30 31 August 2019 Sunday Monday Tuesday Wednesday Thursday Friday Saturday                       1     2     3       4     5     6     7     8     9     10       11     12    [facility] LAB DRAW   7:45 AM   (15 min.)    [facility] LAB DRAW   [facility] Lab Draw    CT CHEST/ABDOMEN/PELVIS W   8:20 AM   (20 min.)   [address] [place] [address] CT 13    [facility] RETURN   7:45 AM   (30 min.)   Gaurang Johnosn MD   [facility] 60   8:30 AM   (60 min.)    ONCOLOGY INFUSION   [facility] 14     15     16     17       18     19     20     21     22     23     24       25     26     27     28     29     30     31                     Lab Results:  Recent Results (from the past 12 hour(s))   Comprehensive metabolic panel    Collection Time: 07/24/19  1:08 PM   Result Value Ref Range    Sodium 137 133 - 144 mmol/L    Potassium 4.0 3.4 - 5.3 mmol/L    Chloride 106 94 - 109 mmol/L    Carbon Dioxide 27 20 - 32 mmol/L    Anion Gap 5 3 - 14 mmol/L    Glucose 108 (H) 70 - 99 mg/dL    Urea Nitrogen 17 7 - 30 mg/dL    Creatinine 0.91 0.66 - 1.25 mg/dL    GFR Estimate >90 >60 mL/min/[1.73_m2]    GFR Estimate If Black >90 >60 mL/min/[1.73_m2]    Calcium 9.5 8.5 - 10.1 mg/dL    Bilirubin Total 0.3 0.2 - 1.3 mg/dL    Albumin 3.3 (L) 3.4 - 5.0 g/dL    Protein Total 7.0 6.8 - 8.8 g/dL    Alkaline Phosphatase 107 40 - 150 U/L    ALT 20 0 - 70 U/L    AST 19 0 - 45 U/L   TSH with free T4 reflex    Collection Time: 07/24/19  1:08 PM   Result Value Ref Range    TSH 1.21 0.40 - 4.00 mU/L   *CBC with  platelets differential    Collection Time: 07/24/19  1:08 PM   Result Value Ref Range    WBC 6.2 4.0 - 11.0 10e9/L    RBC Count 4.15 (L) 4.4 - 5.9 10e12/L    Hemoglobin 11.1 (L) 13.3 - 17.7 g/dL    Hematocrit 34.8 (L) 40.0 - 53.0 %    MCV 84 78 - 100 fl    MCH 26.7 26.5 - 33.0 pg    MCHC 31.9 31.5 - 36.5 g/dL    RDW 14.3 10.0 - 15.0 %    Platelet Count 312 150 - 450 10e9/L    Diff Method Automated Method     % Neutrophils 67.5 %    % Lymphocytes 21.8 %    % Monocytes 8.5 %    % Eosinophils 1.6 %    % Basophils 0.3 %    % Immature Granulocytes 0.3 %    Nucleated RBCs 0 0 /100    Absolute Neutrophil 4.2 1.6 - 8.3 10e9/L    Absolute Lymphocytes 1.3 0.8 - 5.3 10e9/L    Absolute Monocytes 0.5 0.0 - 1.3 10e9/L    Absolute Eosinophils 0.1 0.0 - 0.7 10e9/L    Absolute Basophils 0.0 0.0 - 0.2 10e9/L    Abs Immature Granulocytes 0.0 0 - 0.4 10e9/L    Absolute Nucleated RBC 0.0    Magnesium    Collection Time: 07/24/19  1:08 PM   Result Value Ref Range    Magnesium 2.3 1.6 - 2.3 mg/dL   Troponin I    Collection Time: 07/24/19  1:08 PM   Result Value Ref Range    Troponin I ES <0.015 0.000 - 0.045 ug/L   EKG 12-lead complete w/read - Clinics    Collection Time: 07/24/19  1:40 PM   Result Value Ref Range    Interpretation ECG Click View Image link to view waveform and result    CT Chest Pulmonary Embolism w Contrast    Collection Time: 07/24/19  2:08 PM   Result Value Ref Range    Radiologist flags Pleural effusion (Urgent)

## 2019-07-25 NOTE — DISCHARGE INSTRUCTIONS
Thoracentesis:   1.  No lifting greater than 15 pounds for the next 72 hours and avoid heavy lifting for one week.  2.  Avoid strenuous exercise for the next 1 week.  3.  Monitor for increasing shortness of breath, chest pressure, or chest pain.   4.  Leave your dressing on for the next 24 hours.  After removal monitor the site for changes in skin color, swelling, increased drainage, or increasing pain.  5.  No soaking in water / tub for 72 hours.  6.  Showering is okay after 24 hours.  7.  No air travel for 7 days.  8.  If you have any concerns there is a pulmonologist on call 24 hours a day at [phone].   9.  During business hours and if there is no emergency you can also call the Thoracic [facility] Office at [phone].

## 2019-07-25 NOTE — PROCEDURES
INTERVENTIONAL PULMONOLOGY       Procedure(s):    Thoracentesis (left chest)    Indication:  Pleural effusion    Attending of Record:  Ashish Gaines MD     Interventional Pulmonary Fellow   None    Trainees Present:   None    Medications:    None    Sedation Time:   None    Time Out:  Performed    The patient's medical record has been reviewed.  The indication for the procedure was reviewed.  The necessary history and physical examination was performed and reviewed.  The risks, benefits and alternatives of the procedure were discussed with the the patient in detail and he had the opportunity to ask questions.  I discussed in particular the potential complications including risks of minor or life-threatening bleeding and/or infection, respiratory failure, vocal cord trauma / paralysis, pneumothorax, and discomfort. Sedation risks were also discussed including abnormal heart rhythms, low blood pressure, and respiratory failure. All questions were answered to the best of my ability.  Verbal and written informed consent was obtained.  The proposed procedure and the patient's identification were verified prior to the procedure by the physician and the nurse, respiratory therapist.    The patient was assessed for the adequacy for the procedure and to receive medications.   Mental Status:  Alert and oriented x 3  Airway examination:  Class I (Complete visualization of soft palate)  Pulmonary: decreased BS L>R  CV:  RRR, no murmurs or gallops  ASA Grade:  (III)  Severe systemic disease    After clinical evaluation and reviewing the indication, risks, alternatives and benefits of the procedure the patient was deemed to be in satisfactory condition to undergo the procedure.      A Tuberculosis risk assessment was performed:  The patient has no known RISK of Tuberculosis    The procedure was performed in a negative airflow room: Yes    Maneuvers / Procedure:      Thoracentesis: Once the site was marked using US, A 8F Arrow  thoracentesis catheter was used to aspirate fluid. The patient's left chest was prepped in sterile fashion. A total of 10 cc 1%lidocaine was used to anesthetize the area. A finder needle was used to aspirate fluid. The tube was then advanced into the pleural space while aspirating pleural fluid. The fluid was red in color/consistency. A total of 3250 fluid was removed. Fluid was sent to micro and cytology for analysis     Any disposable equipment was visually inspected and deemed to be intact immediately post procedure.      Recommendations:     -->  Successful diagnostic/therapeutic left thoracentesis   -->  Post-op CXR   -->  If recurrent effusion, recommend pleurx placement      Ashish Gaines MD   of Medicine  Interventional Pulmonary  Department of Pulmonary, Allergy, Critical Care and Sleep Medicine   [facility]  Pager: [phone]   Office: [phone]  Email: [email or web address]    BRIAN Jimenez  Clinical Nurse Specialist  Department of Thoracic Surgery  Office: [phone]  Email: [email or web address]    Nicole Gomez  Interventional Pulmonology Surgery Scheduler  Office: [phone]  Email: [email or web address]

## 2019-07-31 NOTE — PROGRESS NOTES
Oncology/Hematology Visit Note  Jul 31, 2019    Reason for Visit: Follow up of UPS of the right thigh    History of Present Illness: Hiram Tapia is a 59 year old male with no significant PMH with UPS of the right thigh. He has a history of right leg pain with sciatica x 20 years. In October 2017 he had more pain and injured his leg on a truck which eventually led to imaging that revealed a mass. 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     He was started on Doxil/Ifos 3/23/18 and completed 4 cycles with positive response to treatment. Of not he did complete 6 months of Xarelto during this time for incidental PE. He then underwent preoperative XRT. He underwent resection 9/17/18 with <5% viable cells on path and negative margins with no LVI.    Then on surveillance imaging October 2018 he was noted to have lung nodules. He underwent biopsy December 2018 that revealed metastatic sarcoma. Dr. Johnson recommended treatment with Keytruda in February 2019 however it is unclear to me why this was never started.    He underwent restaging CT 4/2/19. This revealed large right and trace left pneumothoraces along with worsening metastatic disease with increased lung nodules, increased lymphadenopathy, and new lytic lesion with associated soft tissue mass in posterior right femoral intertrochanteric region. He was admitted through the ED. Thoracic placed a pigtail but he had re-expansion. Thoracic surgery performed talc pleurodesis 4/3/19. He was discharged home 4/5/19.    He was started on Keytruda 4/9/19. He saw ortho for worsening right thigh pain thought 2/2 metastatic lesion in right femur and they discussed nailing. During his pre-op work-up CXR revealed persistent right sided pneumothorax for which he was admitted 4/12/19. Repeat talc pleurodesis performed 4/16/19. He underwent right intramedually pinning 4/15/19.    He has continued on Keytrua after surgery. Restaging CT 6/28/19  "with mostly an increase in pulmonary nodules and a right middle lobe cavitary lesion. Overall Dr. Johnson felt it was a mixed response so plan on continuing for 2 more cycles and then repeat CT. Of note port was removed 7/17/19.    He was found to have disease progression in lungs and therapy was changed to gemcitabine on 7/24/19.     Interval History:  Mr. Tapia returns to clinic today unaccompanied. He is feeling better today. After the thoracentesis last week his CW pain improved and his breathing is better. He still has KONG with walking a few minutes or climbing a flight of stairs. No orthopnea. His hemoptysis is not as thick--lighter in color and is maybe a little less. He continues to have an intermittent cough. No fevers/chills. It was hard to tell how he tolerated chemotherapy because he was feeling poorly to begin with. He took an antiemetic in the morning for the first 3-4 days following chemo and this controlled nausea. Bowel movements were slower but he was also not eating as much. They are improving now. Weight is down a couple of pounds. He has been eating more the past few days and hydration is great. No diarrhea, mucositis, rash. Right hip pain is about the same. Edema is the same as well. He had ortho follow-up and cancelled because he was not feeling well. He is not currently taking any pain medication. ROS otherwise negative.     No current outpatient medications on file.     Physical Examination:  /76 (BP Location: Right arm, Patient Position: Sitting, Cuff Size: Adult Regular)   Pulse 96   Temp 98.3  F (36.8  C) (Oral)   Resp 16   Ht 1.778 m (5' 10\")   Wt 86.1 kg (189 lb 12.8 oz)   SpO2 97%   BMI 27.23 kg/m    Wt Readings from Last 10 Encounters:   07/31/19 86.1 kg (189 lb 12.8 oz)   07/24/19 89.7 kg (197 lb 11.2 oz)   07/17/19 87.5 kg (193 lb)   07/02/19 87.5 kg (193 lb)   06/11/19 86.5 kg (190 lb 12.8 oz)   05/21/19 86.6 kg (190 lb 14.7 oz)   05/21/19 86.6 kg (191 lb)   04/30/19 " 83.9 kg (185 lb)   04/19/19 82 kg (180 lb 11.2 oz)   04/09/19 85.2 kg (187 lb 14.4 oz)     Constitutional: Well-appearing male in no acute distress.  Eyes: EOMI, PERRL. No scleral icterus.  ENT: Oral mucosa is moist without lesions or thrush.   Lymphatic: Neck is supple without cervical or supraclavicular lymphadenopathy.   Cardiovascular: Regular rate and rhythm. No murmurs, gallops, or rubs. Right leg 1+ peripheral edema, left leg trace edema.  Respiratory: Clear on right. Clear 1/2 of L upper lung, diminished sounds 1/2 lower lung   Gastrointestinal: Bowel sounds present. Abdomen soft, non-tender. No palpable hepatosplenomegaly or masses.   Neurologic: Cranial nerves II through XII are grossly intact.  Skin: No rashes, petechiae, or bruising noted on exposed skin.    Laboratory Data:   7/31/2019 07:55   Sodium 138   Potassium 4.0   Chloride 106   Carbon Dioxide 26   Urea Nitrogen 13   Creatinine 0.81   GFR Estimate >90   GFR Estimate If Black >90   Calcium 9.0   Anion Gap 6   Magnesium 2.3   Phosphorus 2.9   Albumin 2.7 (L)   Protein Total 6.5 (L)   Bilirubin Total 0.3   Alkaline Phosphatase 121   ALT 21   AST 19   Glucose 101 (H)   WBC 4.1   Hemoglobin 10.5 (L)   Hematocrit 33.2 (L)   Platelet Count 213   RBC Count 3.98 (L)   MCV 83   MCH 26.4 (L)   MCHC 31.6   RDW 13.7   Diff Method Automated Method   % Neutrophils 65.7   % Lymphocytes 22.5   % Monocytes 11.1   % Eosinophils 0.0   % Basophils 0.5   % Immature Granulocytes 0.2   Nucleated RBCs 0   Absolute Neutrophil 2.7   Absolute Lymphocytes 0.9   Absolute Monocytes 0.5   Absolute Eosinophils 0.0   Absolute Basophils 0.0   Abs Immature Granulocytes 0.0   Absolute Nucleated RBC 0.0         Assessment and Plan:  1. 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 Biopsy  December 2018 confirmed metastatic sarcoma. Due to unclear reasons there was a delay in starting Keytruda, finally able to receive 4/9/19 and baseline CT did confirm progressive disease. Had clearly progressive disease on Keytruda last week and started on gemcitabine. S/p 1 dose which he tolerated fairly well with possible fatigue and anorexia. Will have a better idea of how he tolerates after this dose because he is feeling better in general now. Will proceed with day 8 of treatment today.     He will return in 2 weeks for cycle 2. Dr. Johnson had wanted to keep restaging on 8/12 and see him on 8/13 with cycle 2 of gemcitabine.     2. Pulm  Recurrent right sided pneumothorax s/p 2 hospitalizations and TALC pleurodesis 4/3 and again 4/16. Per thoracic no need for repeat imaging unless he has worsening symptoms.     New large L pleural effusion noted last week s/p partial therapeutic thoracentesis. Symptoms have improved, no re accumulation based on repeat CXR today. Will defer repeat thoracentesis today until he is more symptomatic.     3. MSK  Restaging CT with lytic lesion with associated soft tissue mass arising from the posterior right femoral intertrochanteric region. He underwent right femur intramedullary pinning 4/15/19 by Dr. Betts. Needs to reschedule ortho appt. Does have mild right hip pain but not needing any meds.    Intermittent right lower extremity edema is stable.     4. GI  Continue ppx antiemetics days 2-4 on gemcitabine. Dyspepsia symptoms have resolved.     5. Weight loss  Discussed working on protein supplements and regaining or at least stabilizing weight prior to next visit.     Lynda Cabello PA-C    [facility] [address] [address]  [phone]

## 2019-07-31 NOTE — PROGRESS NOTES
Infusion Nursing Note:  Hiram Tapia presents today for Cycle 1 Day 8 Gemzar.    Patient seen by provider today: Yes: Lynda SAPP   present during visit today: Not Applicable.      Intravenous Access:  Peripheral IV placed in lab    Treatment Conditions:  Lab Results   Component Value Date    HGB 10.5 07/31/2019     Lab Results   Component Value Date    WBC 4.1 07/31/2019      Lab Results   Component Value Date    ANEU 2.7 07/31/2019     Lab Results   Component Value Date     07/31/2019      Lab Results   Component Value Date     07/31/2019                   Lab Results   Component Value Date    POTASSIUM 4.0 07/31/2019           Lab Results   Component Value Date    MAG 2.3 07/31/2019            Lab Results   Component Value Date    CR 0.81 07/31/2019                   Lab Results   Component Value Date    JAYESH 9.0 07/31/2019                Lab Results   Component Value Date    BILITOTAL 0.3 07/31/2019           Lab Results   Component Value Date    ALBUMIN 2.7 07/31/2019                    Lab Results   Component Value Date    ALT 21 07/31/2019           Lab Results   Component Value Date    AST 19 07/31/2019       Results reviewed, labs MET treatment parameters, ok to proceed with treatment.      Post Infusion Assessment:  Patient tolerated infusion without incident.  Blood return noted pre and post infusion.  Site patent and intact, free from redness, edema or discomfort.  No evidence of extravasations.  Access discontinued per protocol.       Discharge Plan:   Patient declined prescription refills.  Copy of AVS reviewed with patient and/or family.  Patient will return 8/12/19 labs/CT, 8/13 /chemo for next appointment.  Patient discharged in stable condition accompanied by: self.  Departure Mode: Ambulatory.  Face to Face time: 0.    Dora James RN

## 2019-07-31 NOTE — LETTER
7/31/2019      RE: Hiram Tapia  [address]  [facility] [address]       Oncology/Hematology Visit Note  Jul 31, 2019    Reason for Visit: Follow up of UPS of the right thigh    History of Present Illness: Hiram Tapia is a 59 year old male with no significant PMH with UPS of the right thigh. He has a history of right leg pain with sciatica x 20 years. In October 2017 he had more pain and injured his leg on a truck which eventually led to imaging that revealed a mass. 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     He was started on Doxil/Ifos 3/23/18 and completed 4 cycles with positive response to treatment. Of not he did complete 6 months of Xarelto during this time for incidental PE. He then underwent preoperative XRT. He underwent resection 9/17/18 with <5% viable cells on path and negative margins with no LVI.    Then on surveillance imaging October 2018 he was noted to have lung nodules. He underwent biopsy December 2018 that revealed metastatic sarcoma. Dr. Johnson recommended treatment with Keytruda in February 2019 however it is unclear to me why this was never started.    He underwent restaging CT 4/2/19. This revealed large right and trace left pneumothoraces along with worsening metastatic disease with increased lung nodules, increased lymphadenopathy, and new lytic lesion with associated soft tissue mass in posterior right femoral intertrochanteric region. He was admitted through the ED. Thoracic placed a pigtail but he had re-expansion. Thoracic surgery performed talc pleurodesis 4/3/19. He was discharged home 4/5/19.    He was started on Keytruda 4/9/19. He saw ortho for worsening right thigh pain thought 2/2 metastatic lesion in right femur and they discussed nailing. During his pre-op work-up CXR revealed persistent right sided pneumothorax for which he was admitted 4/12/19. Repeat talc pleurodesis performed 4/16/19. He underwent right  "intramedually pinning 4/15/19.    He has continued on Keytrua after surgery. Restaging CT 6/28/19 with mostly an increase in pulmonary nodules and a right middle lobe cavitary lesion. Overall Dr. Johnson felt it was a mixed response so plan on continuing for 2 more cycles and then repeat CT. Of note port was removed 7/17/19.    He was found to have disease progression in lungs and therapy was changed to gemcitabine on 7/24/19.     Interval History:  Mr. Tapia returns to clinic today unaccompanied. He is feeling better today. After the thoracentesis last week his CW pain improved and his breathing is better. He still has KONG with walking a few minutes or climbing a flight of stairs. No orthopnea. His hemoptysis is not as thick--lighter in color and is maybe a little less. He continues to have an intermittent cough. No fevers/chills. It was hard to tell how he tolerated chemotherapy because he was feeling poorly to begin with. He took an antiemetic in the morning for the first 3-4 days following chemo and this controlled nausea. Bowel movements were slower but he was also not eating as much. They are improving now. Weight is down a couple of pounds. He has been eating more the past few days and hydration is great. No diarrhea, mucositis, rash. Right hip pain is about the same. Edema is the same as well. He had ortho follow-up and cancelled because he was not feeling well. He is not currently taking any pain medication. ROS otherwise negative.     No current outpatient medications on file.     Physical Examination:  /76 (BP Location: Right arm, Patient Position: Sitting, Cuff Size: Adult Regular)   Pulse 96   Temp 98.3  F (36.8  C) (Oral)   Resp 16   Ht 1.778 m (5' 10\")   Wt 86.1 kg (189 lb 12.8 oz)   SpO2 97%   BMI 27.23 kg/m     Wt Readings from Last 10 Encounters:   07/31/19 86.1 kg (189 lb 12.8 oz)   07/24/19 89.7 kg (197 lb 11.2 oz)   07/17/19 87.5 kg (193 lb)   07/02/19 87.5 kg (193 lb)   06/11/19 " 86.5 kg (190 lb 12.8 oz)   05/21/19 86.6 kg (190 lb 14.7 oz)   05/21/19 86.6 kg (191 lb)   04/30/19 83.9 kg (185 lb)   04/19/19 82 kg (180 lb 11.2 oz)   04/09/19 85.2 kg (187 lb 14.4 oz)     Constitutional: Well-appearing male in no acute distress.  Eyes: EOMI, PERRL. No scleral icterus.  ENT: Oral mucosa is moist without lesions or thrush.   Lymphatic: Neck is supple without cervical or supraclavicular lymphadenopathy.   Cardiovascular: Regular rate and rhythm. No murmurs, gallops, or rubs. Right leg 1+ peripheral edema, left leg trace edema.  Respiratory: Clear on right. Clear 1/2 of L upper lung, diminished sounds 1/2 lower lung   Gastrointestinal: Bowel sounds present. Abdomen soft, non-tender. No palpable hepatosplenomegaly or masses.   Neurologic: Cranial nerves II through XII are grossly intact.  Skin: No rashes, petechiae, or bruising noted on exposed skin.    Laboratory Data:   7/31/2019 07:55   Sodium 138   Potassium 4.0   Chloride 106   Carbon Dioxide 26   Urea Nitrogen 13   Creatinine 0.81   GFR Estimate >90   GFR Estimate If Black >90   Calcium 9.0   Anion Gap 6   Magnesium 2.3   Phosphorus 2.9   Albumin 2.7 (L)   Protein Total 6.5 (L)   Bilirubin Total 0.3   Alkaline Phosphatase 121   ALT 21   AST 19   Glucose 101 (H)   WBC 4.1   Hemoglobin 10.5 (L)   Hematocrit 33.2 (L)   Platelet Count 213   RBC Count 3.98 (L)   MCV 83   MCH 26.4 (L)   MCHC 31.6   RDW 13.7   Diff Method Automated Method   % Neutrophils 65.7   % Lymphocytes 22.5   % Monocytes 11.1   % Eosinophils 0.0   % Basophils 0.5   % Immature Granulocytes 0.2   Nucleated RBCs 0   Absolute Neutrophil 2.7   Absolute Lymphocytes 0.9   Absolute Monocytes 0.5   Absolute Eosinophils 0.0   Absolute Basophils 0.0   Abs Immature Granulocytes 0.0   Absolute Nucleated RBC 0.0         Assessment and Plan:  1. 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 Biopsy December 2018 confirmed metastatic sarcoma. Due to unclear reasons there was a delay in starting Keytruda, finally able to receive 4/9/19 and baseline CT did confirm progressive disease. Had clearly progressive disease on Keytruda last week and started on gemcitabine. S/p 1 dose which he tolerated fairly well with possible fatigue and anorexia. Will have a better idea of how he tolerates after this dose because he is feeling better in general now. Will proceed with day 8 of treatment today.     He will return in 2 weeks for cycle 2. Dr. Johnson had wanted to keep restaging on 8/12 and see him on 8/13 with cycle 2 of gemcitabine.     2. Pulm  Recurrent right sided pneumothorax s/p 2 hospitalizations and TALC pleurodesis 4/3 and again 4/16. Per thoracic no need for repeat imaging unless he has worsening symptoms.     New large L pleural effusion noted last week s/p partial therapeutic thoracentesis. Symptoms have improved, no re accumulation based on repeat CXR today. Will defer repeat thoracentesis today until he is more symptomatic.     3. MSK  Restaging CT with lytic lesion with associated soft tissue mass arising from the posterior right femoral intertrochanteric region. He underwent right femur intramedullary pinning 4/15/19 by Dr. Betts. Needs to reschedule ortho appt. Does have mild right hip pain but not needing any meds.    Intermittent right lower extremity edema is stable.     4. GI  Continue ppx antiemetics days 2-4 on gemcitabine. Dyspepsia symptoms have resolved.     5. Weight loss  Discussed working on protein supplements and regaining or at least stabilizing weight prior to next visit.     Lynda Cabello PA-C    [facility]  [address] [place] [id number]  [phone]

## 2019-07-31 NOTE — NURSING NOTE
Chief Complaint   Patient presents with     Blood Draw     labs drawn via piv start by rn. vs taken      Labs drawn via PIV start by rn in lab. Flushed with saline. Vitals taken. Pt checked in for next appointment.   Lily Palacios RN

## 2019-07-31 NOTE — NURSING NOTE
"Oncology Rooming Note    July 31, 2019 8:08 AM   Hiram Tapia is a 59 year old male who presents for:    Chief Complaint   Patient presents with     Blood Draw     labs drawn via piv start by rn. vs taken      Oncology Clinic Visit     RETURN VISIT; SARCOMA FOLLOW UP      Initial Vitals: /76 (BP Location: Right arm, Patient Position: Sitting, Cuff Size: Adult Regular)   Pulse 96   Temp 98.3  F (36.8  C) (Oral)   Resp 16   Ht 1.778 m (5' 10\")   Wt 86.1 kg (189 lb 12.8 oz)   SpO2 97%   BMI 27.23 kg/m   Estimated body mass index is 27.23 kg/m  as calculated from the following:    Height as of this encounter: 1.778 m (5' 10\").    Weight as of this encounter: 86.1 kg (189 lb 12.8 oz). Body surface area is 2.06 meters squared.  No Pain (0) Comment: Data Unavailable   No LMP for male patient.  Allergies reviewed: Yes  Medications reviewed: Yes    Medications: Medication refills not needed today.  Pharmacy name entered into EPIC:    [facility] [place], [place] - [address] [place] [address] [phone]  [id number] [place], [place] - [facility] [address] AT [facility] OF  [address]    Clinical concerns: No new concerns today  Lynda Cabello  was notified.      Maryse Singh              "

## 2019-08-12 NOTE — DISCHARGE INSTRUCTIONS

## 2019-08-13 NOTE — NURSING NOTE
"Oncology Rooming Note    August 13, 2019 7:43 AM   Hiram Tapia is a 59 year old male who presents for:    Chief Complaint   Patient presents with     Oncology Clinic Visit     Return Visit for Sarcoma     Initial Vitals: /73 (BP Location: Left arm, Patient Position: Chair, Cuff Size: Adult Regular)   Pulse 85   Temp 98.4  F (36.9  C) (Oral)   Resp 14   Ht 1.778 m (5' 10\")   Wt 84.4 kg (186 lb)   SpO2 98%   BMI 26.69 kg/m   Estimated body mass index is 26.69 kg/m  as calculated from the following:    Height as of this encounter: 1.778 m (5' 10\").    Weight as of this encounter: 84.4 kg (186 lb). Body surface area is 2.04 meters squared.  No Pain (0) Comment: Data Unavailable   No LMP for male patient.  Allergies reviewed: Yes  Medications reviewed: Yes    Medications: Medication refills not needed today.  Pharmacy name entered into BetterLesson:    [facility] [place], [place] - [address] [place] [address] [phone]  [facility] #[id number] [place], [place] - [facility] [address] AT [facility] [address]  [address]    Clinical concerns: Patient is content.  He has no new questions or concerns to bring forward today.   Aidan was notified.      Elma Escalera, RN, MSN                "

## 2019-08-13 NOTE — PATIENT INSTRUCTIONS
[facility] Triage and after hours / weekends / holidays:  [phone]    Please call the triage or after hours line if you experience a temperature greater than or equal to 100.5, shaking chills, have uncontrolled nausea, vomiting and/or diarrhea, dizziness, shortness of breath, chest pain, bleeding, unexplained bruising, or if you have any other new/concerning symptoms, questions or concerns.      If you are having any concerning symptoms or wish to speak to a provider before your next infusion visit, please call your care coordinator or triage to notify them so we can adequately serve you.     If you need a refill on a narcotic prescription or other medication, please call before your infusion appointment.           August 2019 Sunday Monday Tuesday Wednesday Thursday Friday Saturday                       1     2     3       4     5     6     7     8     9     10       11     12    [facility]   7:45 AM   (15 min.)    [facility] D[facility]   [facility] Lab Draw    CT CHEST/ABDOMEN/PELVIS W   8:20 AM   (20 min.)   UCCT1   [facility] CT 13    [facility] RETURN   7:45 AM   (30 min.)   Gaurang Johnson MD   [facility] ONC INFUSION 120   8:30 AM   (120 min.)    ONCOLOGY INFUSION   [facility] 14     15     16     17       18     19     20    [facility] D[facility]   2:30 PM   (15 min.)    [facility] D[facility]   [facility] Lab Draw    [facility] RETURN   3:15 PM   (60 min.)   Ewelina Kumar PA-C   [facility] ONC INFUSION 120   3:30 PM   (120 min.)    ONCOLOGY INFUSION   [facility] 21     22     23     24       25     26     27     28     29     30     31 September 2019 Sunday Monday Tuesday Wednesday Thursday Friday Saturday   1     2     3     4    [facility] D[facility]  11:00 AM   (15 min.)    [facility] D[facility]   [facility] Lab Draw    [facility] RETURN  11:45 AM   (30  min.)   Gaurang Johnson MD   [facility] ONC INFUSION 120   1:30 PM   (120 min.)    ONCOLOGY INFUSION   [facility] 5     6     7       8     9     10     11     12     13     14       15     16     17     18     19     20     21       22     23     24    [facility] LAB DRAW   9:00 AM   (15 min.)   [facility] LAB DRAW   [facility] Lab Draw    [facility] RETURN   9:45 AM   (30 min.)   Gaurang Johnson MD   [facility] ONC INFUSION 120  10:30 AM   (120 min.)    ONCOLOGY INFUSION   [facility] 25     26     27     28       29     30                                           No results found for this or any previous visit (from the past 24 hour(s)).

## 2019-08-13 NOTE — PROGRESS NOTES
8-13-19     I saw Hiram Tapia for follow up of UPS of the right thigh     Background  He had no significant PMH but was found to have a UPS of the right thigh. He has a history of right leg pain with sciatica x 20 years. In October 2017 he had more pain and injured his leg on a truck which eventually led to imaging that revealed a mass. 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      He was started on Doxil/Ifos 3/23/18 and completed 4 cycles with positive response to treatment. Of not he did complete 6 months of Xarelto during this time for incidental PE. He then underwent preoperative XRT. He underwent resection 9/17/18 with <5% viable cells on path and negative margins with no LVI.     Then on surveillance imaging October 2018 he was noted to have lung nodules. He underwent biopsy December 2018 that revealed metastatic sarcoma.     Restaging on 4/2/19  revealed large right and trace left pneumothoraces along with worsening metastatic disease with increased lung nodules, increased lymphadenopathy, and new lytic lesion with associated soft tissue mass in posterior right femoral intertrochanteric region. He was admitted through the ED. Thoracic placed a pigtail but he had re-expansion. Thoracic surgery performed talc pleurodesis 4/3/19. He was discharged home 4/5/19.     He started Keytruda 4/9/19. He saw ortho for worsening right thigh pain thought 2/2 metastatic lesion in right femur and they discussed nailing. During his pre-op work-up CXR revealed persistent right sided pneumothorax for which he was admitted 4/12/19. Repeat talc pleurodesis performed 4/16/19. He underwent right intramedually pinning 4/15/19.  -           Interval History:     He was started Keytruda 4/9/19     He started gemzar on 7-24 due to PD, and had a thoracentesis 7-25.    He overall is feeling well, with the only issue being a cough when he lies down that's not productive. he has some wheezing when he  "lies down.  He is however having hemoptysis but less.      Hes working full time.  No KONG usually, gets out daily.  He goes up a floor and does note some KONG w that but less than 3 weeks ago; the leg bothers him; not using a crutch this week though and was fishing a lot last weekend.     His right leg is still a bit sore but he is not needing any pain medication. He notes some chest discomfort w a deep breath.    He otherwise feels well and complete ROS negative.      10-point ROS o/w neg.        On exam he appeared comfortable w normal affect  /73 (BP Location: Left arm, Patient Position: Chair, Cuff Size: Adult Regular)   Pulse 85   Temp 98.4  F (36.9  C) (Oral)   Resp 14   Ht 1.778 m (5' 10\")   Wt 84.4 kg (186 lb)   SpO2 98%   BMI 26.69 kg/m    Constitutional: Well-appearing male in no acute distress.  Eyes: No icterus.  ENT: Without lesions or thrush.   NECK: No thyromegaly or mass  Respiratory: Some decreased BS L base.  Cardiovascular: RRR. No murmurs   ABD:  No HSMT.   MSK: No synovitis  Lymphatic: No lymphadenopathy.   Neurologic: Normal Romberg and heel walk  EXT: 1+ edema R foot.  Skin: Mild sun effect arms.  PSYCH: Mood good.      -  CBC, LFT, GNE OK    -  I reviewed the new imaging and its fairly complicated but there is a clear response of many nodules c/w July images.    I reviewed the images with him showing changes.   -        Assessment:  1. 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 He started Keytruda 4/9/19 till July 2019.     He is doing well and imaging shows response.  Its not possible to exclude a keytruda effect but gemzar seems most likely the active agent and it will continue.       2. Pulm  Recurrent right sided pneumothorax s/p 2 hospitalizations and TALC " pleurodesis 4/3 and again 4/16.  Does have intermittent scant hemoptysis, hgb stable.     3. MSK  Restaging CT with lytic lesion with associated soft tissue mass arising from the posterior right femoral intertrochanteric region. He underwent right femur intramedullary pinning 4/15/19 by Dr. Betts. Minimal pain concerns, weight bearing as tolerated, and incisions healing well.  He is using crutches most of the time.     Next imaging will depend some on symptoms but likely about 6 weeks or so.    He will call if other questions arise.    Gaurang Johnson M.D.  Professor  Hematology, Oncology and Transplantation

## 2019-08-13 NOTE — PROGRESS NOTES
Infusion Nursing Note:  Hiram W Willie presents today for cycle 2, day 1 gemzar.    Patient seen by provider today: Yes: Dr Johnson   present during visit today: Not Applicable.    Note: N/A.    Intravenous Access:  Peripheral IV placed.    Treatment Conditions:  Lab Results   Component Value Date    HGB 9.6 08/12/2019     Lab Results   Component Value Date    WBC 4.8 08/12/2019      Lab Results   Component Value Date    ANEU 3.0 08/12/2019     Lab Results   Component Value Date     08/12/2019      Lab Results   Component Value Date     08/12/2019                   Lab Results   Component Value Date    POTASSIUM 4.3 08/12/2019           Lab Results   Component Value Date    MAG 2.3 07/31/2019            Lab Results   Component Value Date    CR 0.95 08/12/2019                   Lab Results   Component Value Date    JAYESH 9.6 08/12/2019                Lab Results   Component Value Date    BILITOTAL 0.3 08/12/2019           Lab Results   Component Value Date    ALBUMIN 2.9 08/12/2019                    Lab Results   Component Value Date    ALT 22 08/12/2019           Lab Results   Component Value Date    AST 17 08/12/2019       Results reviewed, labs MET treatment parameters, ok to proceed with treatment.      Post Infusion Assessment:  Patient tolerated infusion without incident.  Blood return noted pre and post infusion.  Site patent and intact, free from redness, edema or discomfort.  No evidence of extravasations.  Access discontinued per protocol.       Discharge Plan:   Patient declined prescription refills.  Discharge instructions reviewed with: Patient.  Patient and/or family verbalized understanding of discharge instructions and all questions answered.  Copy of AVS reviewed with patient and/or family.  Patient will return 8/20 for next appointment.  Patient discharged in stable condition accompanied by: self.  Departure Mode: Ambulatory.  Face to Face time: 0.    Cleopatra Harp,  RN

## 2019-08-20 NOTE — PROGRESS NOTES
Oncology visit note  August 20, 2019  Care team: Dr. Johnson, Ignacio and Rubens     Reason for visit: Follow-up metastatic sarcoma, prior to C2D8 gemzar     Cancer history (copied from Dr. Johnson's note):  He had no significant PMH but was found to have a UPS of the right thigh. He has a history of right leg pain with sciatica x 20 years. In October 2017 he had more pain and injured his leg on a truck which eventually led to imaging that revealed a mass. 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      He was started on Doxil/Ifos 3/23/18 and completed 4 cycles with positive response to treatment. Of not he did complete 6 months of Xarelto during this time for incidental PE. He then underwent preoperative XRT. He underwent resection 9/17/18 with <5% viable cells on path and negative margins with no LVI.     Then on surveillance imaging October 2018 he was noted to have lung nodules. He underwent biopsy December 2018 that revealed metastatic sarcoma.     Restaging on 4/2/19  revealed large right and trace left pneumothoraces along with worsening metastatic disease with increased lung nodules, increased lymphadenopathy, and new lytic lesion with associated soft tissue mass in posterior right femoral intertrochanteric region. He was admitted through the ED. Thoracic placed a pigtail but he had re-expansion. Thoracic surgery performed talc pleurodesis 4/3/19. He was discharged home 4/5/19.     He started Keytruda 4/9/19. He saw ortho for worsening right thigh pain thought 2/2 metastatic lesion in right femur and they discussed nailing. During his pre-op work-up CXR revealed persistent right sided pneumothorax for which he was admitted 4/12/19. Repeat talc pleurodesis performed 4/16/19. He underwent right intramedually pinning 4/15/19.    He was started Keytruda 4/9/19     He started gemzar on 7-24 due to PD, and had a thoracentesis 7-25. Imaging after 1 cycle of gem was a mixed response  but more overall improvement.     Interval history/ROS:     He overall is feeling well, with the only issue being a ongoing cough when he lies down that's not productive. He has some wheezing when he lies down. He is however having hemoptysis but less.    He goes up a floor and does note some KONG w that but less than before starting gemzar. He has stable JUAN DANIEL in the right leg. He feels a little tired with the gemzar but denies any fevers, chills, nausea, emesis, diarrhea. He is constipated when he takes kytril and wants to go without it today. He's working full time.    He otherwise feels well and complete ROS negative.     Physical exam  On exam he appeared comfortable w normal affect  /77 (BP Location: Right arm, Patient Position: Sitting, Cuff Size: Adult Regular)   Pulse 80   Temp 98.1  F (36.7  C) (Oral)   Resp 16   Wt 86.2 kg (190 lb 1.6 oz)   SpO2 98%   BMI 27.28 kg/m    Constitutional: Well-appearing male in no acute distress.  Eyes: No icterus.  ENT: Without lesions or thrush.   NECK: No thyromegaly or mass  Respiratory: Some decreased BS L base with slight crackles  Cardiovascular: RRR. No murmurs   ABD:  No HSMT.   Lymphatic: No lymphadenopathy.   EXT: 1+ edema R foot.  Skin: Mild sun effect arms.  PSYCH: Mood good.     Labs:  Results for JAYLENE CHAO (MRN [id number]) as of 8/20/2019 16:50   Ref. Range 8/20/2019 14:26   Sodium Latest Ref Range: 133 - 144 mmol/L 137   Potassium Latest Ref Range: 3.4 - 5.3 mmol/L 4.1   Chloride Latest Ref Range: 94 - 109 mmol/L 106   Carbon Dioxide Latest Ref Range: 20 - 32 mmol/L 25   Urea Nitrogen Latest Ref Range: 7 - 30 mg/dL 14   Creatinine Latest Ref Range: 0.66 - 1.25 mg/dL 0.75   GFR Estimate Latest Ref Range: >60 mL/min/1.73_m2 >90   GFR Estimate If Black Latest Ref Range: >60 mL/min/1.73_m2 >90   Calcium Latest Ref Range: 8.5 - 10.1 mg/dL 8.9   Anion Gap Latest Ref Range: 3 - 14 mmol/L 7   Magnesium Latest Ref Range: 1.6 - 2.3 mg/dL 2.4 (H)    Phosphorus Latest Ref Range: 2.5 - 4.5 mg/dL 3.0   Albumin Latest Ref Range: 3.4 - 5.0 g/dL 3.0 (L)   Protein Total Latest Ref Range: 6.8 - 8.8 g/dL 7.1   Bilirubin Total Latest Ref Range: 0.2 - 1.3 mg/dL 0.3   Alkaline Phosphatase Latest Ref Range: 40 - 150 U/L 136   ALT Latest Ref Range: 0 - 70 U/L 39   AST Latest Ref Range: 0 - 45 U/L 36   Glucose Latest Ref Range: 70 - 99 mg/dL 86   WBC Latest Ref Range: 4.0 - 11.0 10e9/L 3.6 (L)   Hemoglobin Latest Ref Range: 13.3 - 17.7 g/dL 9.3 (L)   Hematocrit Latest Ref Range: 40.0 - 53.0 % 29.7 (L)   Platelet Count Latest Ref Range: 150 - 450 10e9/L 386   RBC Count Latest Ref Range: 4.4 - 5.9 10e12/L 3.60 (L)   MCV Latest Ref Range: 78 - 100 fl 83   MCH Latest Ref Range: 26.5 - 33.0 pg 25.8 (L)   MCHC Latest Ref Range: 31.5 - 36.5 g/dL 31.3 (L)   RDW Latest Ref Range: 10.0 - 15.0 % 14.3   Diff Method Unknown Automated Method   % Neutrophils Latest Units: % 35.9   % Lymphocytes Latest Units: % 46.1   % Monocytes Latest Units: % 16.6   % Eosinophils Latest Units: % 0.3   % Basophils Latest Units: % 0.8   % Immature Granulocytes Latest Units: % 0.3   Nucleated RBCs Latest Ref Range: 0 /100 0   Absolute Neutrophil Latest Ref Range: 1.6 - 8.3 10e9/L 1.3 (L)   Absolute Lymphocytes Latest Ref Range: 0.8 - 5.3 10e9/L 1.6   Absolute Monocytes Latest Ref Range: 0.0 - 1.3 10e9/L 0.6   Absolute Eosinophils Latest Ref Range: 0.0 - 0.7 10e9/L 0.0   Absolute Basophils Latest Ref Range: 0.0 - 0.2 10e9/L 0.0   Abs Immature Granulocytes Latest Ref Range: 0 - 0.4 10e9/L 0.0   Absolute Nucleated RBC Unknown 0.0       Assessment:  1. 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 He started Keytruda 4/9/19 till July 2019 when he started Gemzar due to PD.      He is doing well  and imaging shows response.  Its not possible to exclude a keytruda effect but gemzar seems most likely the active agent and it will continue.      He is just under ANC parameters today and after risk/benefit discussion with patient, he elected to proceed. Discussed neutropenic precautions. Will d/w Dr. Johnson if he prefers adding neulasta.     David wanted to try chemo without Kytril premed today. We will see how that goes. He has prn antiemetics at home.     Next imaging will depend some on symptoms but likely about 6 weeks or so. He will see Dr. Johnson prior to his next cycle of chemo.      2. Pulm  Recurrent right sided pneumothorax s/p 2 hospitalizations and TALC pleurodesis 4/3 and again 4/16.  Does have intermittent scant hemoptysis, hgb overall stable.     3. MSK  Restaging CT with lytic lesion with associated soft tissue mass arising from the posterior right femoral intertrochanteric region. He underwent right femur intramedullary pinning 4/15/19 by Dr. Betts. No acute concerns today. He has chronic edema in JUAN DANIEL.     Over 50% of 25 min visit was spent counseling and coordinating care    Ewelina Kumar PA-C

## 2019-08-20 NOTE — PATIENT INSTRUCTIONS
[facility] Triage and after hours / weekends / holidays:  [phone]    Please call the triage or after hours line if you experience a temperature greater than or equal to 100.5, shaking chills, have uncontrolled nausea, vomiting and/or diarrhea, dizziness, shortness of breath, chest pain, bleeding, unexplained bruising, or if you have any other new/concerning symptoms, questions or concerns.      If you are having any concerning symptoms or wish to speak to a provider before your next infusion visit, please call your care coordinator or triage to notify them so we can adequately serve you.     If you need a refill on a narcotic prescription or other medication, please call before your infusion appointment.                 August 2019 Sunday Monday Tuesday Wednesday Thursday Friday Saturday                       1     2     3       4     5     6     7     8     9     10       11     12    [facility] D[facility]   7:45 AM   (15 min.)    [facility] DRAW   [facility] Lab Draw    CT CHEST/ABDOMEN/PELVIS W   8:20 AM   (20 min.)   UCCT1   [facility] CT 13    [facility] RETURN   7:45 AM   (30 min.)   Gaurang Johnson MD   [facility] ONC INFUSION 120   8:30 AM   (120 min.)    ONCOLOGY INFUSION   [facility] 14     15     16     17       18     19     20    [facility] DRAW   2:30 PM   (15 min.)    [facility] DRAW   [facility] Lab Draw    [facility] RETURN   3:15 PM   (60 min.)   Ewelina Kumar PA-C   [facility] ONC INFUSION 120   3:30 PM   (120 min.)    ONCOLOGY INFUSION   [facility] 21     22     23     24       25     26     27     28     29     30     31 September 2019 Sunday Monday Tuesday Wednesday Thursday Friday Saturday   1     2     3     4    [facility] DRAW  11:00 AM   (15 min.)    [facility] DRAW   [facility] Lab Draw    [facility] RETURN  11:45 AM   (30  min.)   Gaurang Johnson MD   [facility] ONC INFUSION 120   1:30 PM   (120 min.)    ONCOLOGY INFUSION   [facility] 5     6     7       8     9     10     11     12     13     14       15     16     17     18     19     20     21       22     23     24    [facility] DRAW   9:00 AM   (15 min.)   [facility] LAB DRAW   [facility] Lab Draw    [facility] RETURN   9:45 AM   (30 min.)   Gaurang Johnson MD   [facility] ONC INFUSION 120  10:30 AM   (120 min.)    ONCOLOGY INFUSION   [facility] 25     26     27     28       29     30                                             Recent Results (from the past 24 hour(s))   Comprehensive metabolic panel    Collection Time: 08/20/19  2:26 PM   Result Value Ref Range    Sodium 137 133 - 144 mmol/L    Potassium 4.1 3.4 - 5.3 mmol/L    Chloride 106 94 - 109 mmol/L    Carbon Dioxide 25 20 - 32 mmol/L    Anion Gap 7 3 - 14 mmol/L    Glucose 86 70 - 99 mg/dL    Urea Nitrogen 14 7 - 30 mg/dL    Creatinine 0.75 0.66 - 1.25 mg/dL    GFR Estimate >90 >60 mL/min/[1.73_m2]    GFR Estimate If Black >90 >60 mL/min/[1.73_m2]    Calcium 8.9 8.5 - 10.1 mg/dL    Bilirubin Total 0.3 0.2 - 1.3 mg/dL    Albumin 3.0 (L) 3.4 - 5.0 g/dL    Protein Total 7.1 6.8 - 8.8 g/dL    Alkaline Phosphatase 136 40 - 150 U/L    ALT 39 0 - 70 U/L    AST 36 0 - 45 U/L   CBC with platelets differential    Collection Time: 08/20/19  2:26 PM   Result Value Ref Range    WBC 3.6 (L) 4.0 - 11.0 10e9/L    RBC Count 3.60 (L) 4.4 - 5.9 10e12/L    Hemoglobin 9.3 (L) 13.3 - 17.7 g/dL    Hematocrit 29.7 (L) 40.0 - 53.0 %    MCV 83 78 - 100 fl    MCH 25.8 (L) 26.5 - 33.0 pg    MCHC 31.3 (L) 31.5 - 36.5 g/dL    RDW 14.3 10.0 - 15.0 %    Platelet Count 386 150 - 450 10e9/L    Diff Method Automated Method     % Neutrophils 35.9 %    % Lymphocytes 46.1 %    % Monocytes 16.6 %    % Eosinophils 0.3 %    % Basophils 0.8 %     % Immature Granulocytes 0.3 %    Nucleated RBCs 0 0 /100    Absolute Neutrophil 1.3 (L) 1.6 - 8.3 10e9/L    Absolute Lymphocytes 1.6 0.8 - 5.3 10e9/L    Absolute Monocytes 0.6 0.0 - 1.3 10e9/L    Absolute Eosinophils 0.0 0.0 - 0.7 10e9/L    Absolute Basophils 0.0 0.0 - 0.2 10e9/L    Abs Immature Granulocytes 0.0 0 - 0.4 10e9/L    Absolute Nucleated RBC 0.0    Magnesium    Collection Time: 08/20/19  2:26 PM   Result Value Ref Range    Magnesium 2.4 (H) 1.6 - 2.3 mg/dL   Phosphorus    Collection Time: 08/20/19  2:26 PM   Result Value Ref Range    Phosphorus 3.0 2.5 - 4.5 mg/dL

## 2019-08-20 NOTE — PROGRESS NOTES
Infusion Nursing Note:  Hiram Tapia presents today for Cycle 2 Day 8 Gemzar.    Patient seen by provider today: Yes: Ewelina SAPP   present during visit today: Not Applicable.    Note: Patient want to try his infusion today without Kytril. Reports having antiemetics at home to use if needed. Ewelina Kumar aware.     Intravenous Access:  Peripheral IV placed in lab.    Treatment Conditions:  Lab Results   Component Value Date    HGB 9.3 08/20/2019     Lab Results   Component Value Date    WBC 3.6 08/20/2019      Lab Results   Component Value Date    ANEU 1.3 08/20/2019     Lab Results   Component Value Date     08/20/2019      Lab Results   Component Value Date     08/20/2019                   Lab Results   Component Value Date    POTASSIUM 4.1 08/20/2019           Lab Results   Component Value Date    MAG 2.4 08/20/2019            Lab Results   Component Value Date    CR 0.75 08/20/2019                   Lab Results   Component Value Date    JAYESH 8.9 08/20/2019                Lab Results   Component Value Date    BILITOTAL 0.3 08/20/2019           Lab Results   Component Value Date    ALBUMIN 3.0 08/20/2019                    Lab Results   Component Value Date    ALT 39 08/20/2019           Lab Results   Component Value Date    AST 36 08/20/2019       Results reviewed, labs did NOT meet treatment parameters: see TORB in treatment plan.  ANC 1.3      Post Infusion Assessment:  Patient tolerated infusion without incident. Warm packs applied to arm during gemzar.  Blood return noted pre and post infusion.  No evidence of extravasations.  Access discontinued per protocol.       Discharge Plan:   Patient declined prescription refills.  Copy of AVS reviewed with patient and/or family.  Patient will return 9/4 for next appointment.  Patient discharged in stable condition accompanied by: self.  Departure Mode: Ambulatory.  Face to Face time: 0.    Rosy Hodgson, RN, RN

## 2019-08-29 PROBLEM — Z98.890: Status: RESOLVED | Noted: 2019-05-24 | Resolved: 2019-01-01

## 2019-08-29 PROBLEM — M79.604 PAIN OF RIGHT LOWER EXTREMITY: Status: RESOLVED | Noted: 2018-03-23 | Resolved: 2019-01-01

## 2019-08-29 NOTE — PROGRESS NOTES
Discharge Note    Progress reporting period is from last progress note on   to Jul 10, 2019.    Hiram failed to follow up and current status is unknown.  Please see information below for last relevant information on current status.  Patient seen for 3 visits.    SUBJECTIVE  Subjective changes noted by patient:  Returns to PT today. Continues to note limited flexion in R knee.  .  Current pain level is  .     Previous pain level was   .   Changes in function:  Yes (See Goal flowsheet attached for changes in current functional level)  Adverse reaction to treatment or activity: None    OBJECTIVE  Changes noted in objective findings: Continued with AAROM, PROM and strengthening per flow.     ASSESSMENT/PLAN  Diagnosis: R sarcoma removal, terrence placement   Updated problem list and treatment plan:   Pain - HEP  STG/LTGs have been met or progress has been made towards goals:  Yes, please see goal flowsheet for most current information  Assessment of Progress: current status is unknown.    Last current status: Pt is progressing as expected   Self Management Plans:  HEP  I have re-evaluated this patient and find that the nature, scope, duration and intensity of the therapy is appropriate for the medical condition of the patient.  Hiram continues to require the following intervention to meet STG and LTG's:  HEP.    Recommendations:  Discharge with current home program.  Patient to follow up with MD as needed.    Please refer to the daily flowsheet for treatment today, total treatment time and time spent performing 1:1 timed codes.

## 2019-09-03 NOTE — PROGRESS NOTES
9-4-19     I saw Hiram Tapia for follow up of UPS of the right thigh     Background  He had no significant PMH but was found to have a UPS of the right thigh. He has a history of right leg pain with sciatica x 20 years. In October 2017 he had more pain and injured his leg on a truck which eventually led to imaging that revealed a mass. 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      He was started on Doxil/Ifos 3/23/18 and completed 4 cycles with positive response to treatment. Of not he did complete 6 months of Xarelto during this time for incidental PE. He then underwent preoperative XRT. He underwent resection 9/17/18 with <5% viable cells on path and negative margins with no LVI.     Then on surveillance imaging October 2018 he was noted to have lung nodules. He underwent biopsy December 2018 that revealed metastatic sarcoma.     Restaging on 4/2/19  revealed large right and trace left pneumothoraces along with worsening metastatic disease with increased lung nodules, increased lymphadenopathy, and new lytic lesion with associated soft tissue mass in posterior right femoral intertrochanteric region. He was admitted through the ED. Thoracic placed a pigtail but he had re-expansion. Thoracic surgery performed talc pleurodesis 4/3/19. He was discharged home 4/5/19.     He started Keytruda 4/9/19. He saw ortho for worsening right thigh pain thought 2/2 metastatic lesion in right femur and they discussed nailing. During his pre-op work-up CXR revealed persistent right sided pneumothorax for which he was admitted 4/12/19. Repeat talc pleurodesis performed 4/16/19. He underwent right intramedually pinning 4/15/19.  -           Interval History:     He was started Keytruda 4/9/19     He started gemzar on 7-24-19 due to PD, and had a thoracentesis 7-25.     He overall is feeling well, with the only issue being a cough when he lies down that's not productive. he has some wheezing when  he lies down.  He is however having hemoptysis but not as frequent.  Cough occ wakes him up.     Hes working full time.    No KONG usually, gets out daily.  He goes up a floor and does note some KONG w that but better as leg is getting stronger.  The leg does still bother him; not using a crutch was lawn mowing.     Nooooo pain medication. Some chest discomfort w a deep breath but much better than 3 weeks ago.     He otherwise feels well and complete ROS negative.     Sleeping is as usual; but occ a sweat; gets up 4x to urinate.    10-point ROS o/w neg.        On exam he appeared comfortable w normal affect  /76 (BP Location: Right arm, Patient Position: Sitting, Cuff Size: Adult Regular)   Pulse 83   Temp 98.6  F (37  C) (Oral)   Resp 16   Wt 88.2 kg (194 lb 6.4 oz)   SpO2 98%   BMI 27.89 kg/m    Constitutional: Well-appearing male in no acute distress.  Eyes: No icterus.  ENT: Without lesions or thrush.   NECK: No thyromegaly or mass  Respiratory: Decreased BS L base.  Lymphatic: No lymphadenopathy.   Cardiovascular: RRR. No murmurs   ABD:  No HSMT.   MSK: No synovitis  Neurologic: Normal Romberg and heel walk  EXT: 1+ edema R foot.  Skin: OK  PSYCH: Mood good.      -  CBC OK LFT, GNE OK though alb 3.2     -  Last imaging while fairly complicated showed a clear response of many nodules c/w July images.       -        Assessment:  1. 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 He started Keytruda 4/9/19. He started gemzar 7-24.     He is doing well and last imaging showed response.  Its not possible to exclude a keytruda effect but gemzar seems most likely the active agent and it will continue.       2. Pulm  Recurrent right sided pneumothorax s/p 2 hospitalizations and TALC pleurodesis  4/3 and again 4/16.  Does have intermittent scant hemoptysis, hgb stable.      3. MSK  Restaging CT with lytic lesion with associated soft tissue mass arising from the posterior right femoral intertrochanteric region. He underwent right femur intramedullary pinning 4/15/19 by Dr. Betts. Minimal pain concerns, weight bearing as tolerated, and incisions healing well.       Next imaging will depend some on symptoms but likely about 3 weeks to be sure we know how things are going.     He will call if other questions arise.     Gaurang Johnson M.D.  Professor  Hematology, Oncology and Transplantation

## 2019-09-04 NOTE — NURSING NOTE
"Oncology Rooming Note    September 4, 2019 12:07 PM   Hiram Tapia is a 59 year old male who presents for:    Chief Complaint   Patient presents with     Blood Draw     Labs drawn via PIV placed by RN in lab. VS taken. Pt checked in for next appt     Oncology Clinic Visit     Sarcoma , labs     Initial Vitals: /76 (BP Location: Right arm, Patient Position: Sitting, Cuff Size: Adult Regular)   Pulse 83   Temp 98.6  F (37  C) (Oral)   Resp 16   Wt 88.2 kg (194 lb 6.4 oz)   SpO2 98%   BMI 27.89 kg/m   Estimated body mass index is 27.89 kg/m  as calculated from the following:    Height as of 8/13/19: 1.778 m (5' 10\").    Weight as of this encounter: 88.2 kg (194 lb 6.4 oz). Body surface area is 2.09 meters squared.  No Pain (0) Comment: Data Unavailable   No LMP for male patient.  Allergies reviewed: Yes  Medications reviewed: Yes    Medications: Medication refills not needed today.  Pharmacy name entered into [facility]:    [facility] [place], [place] - [address] [place] [address] [phone]  [id number] [place], [place] - [facility] [address] [place] [facility] [place]  [address]    Clinical concerns: no concerns  Elizabeth was notified.      Paty Snyder MA              "

## 2019-09-04 NOTE — LETTER
9/4/2019       RE: Hiram Tapia  [address]  [place] [address]     Dear Colleague,    Thank you for referring your patient, Hiram Tapia, to the [facility]. Please see a copy of my visit note below.    9-4-19     I saw Hiram Tapia for follow up of UPS of the right thigh     Background  He had no significant PMH but was found to have a UPS of the right thigh. He has a history of right leg pain with sciatica x 20 years. In October 2017 he had more pain and injured his leg on a truck which eventually led to imaging that revealed a mass. 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      He was started on Doxil/Ifos 3/23/18 and completed 4 cycles with positive response to treatment. Of not he did complete 6 months of Xarelto during this time for incidental PE. He then underwent preoperative XRT. He underwent resection 9/17/18 with <5% viable cells on path and negative margins with no LVI.     Then on surveillance imaging October 2018 he was noted to have lung nodules. He underwent biopsy December 2018 that revealed metastatic sarcoma.     Restaging on 4/2/19  revealed large right and trace left pneumothoraces along with worsening metastatic disease with increased lung nodules, increased lymphadenopathy, and new lytic lesion with associated soft tissue mass in posterior right femoral intertrochanteric region. He was admitted through the ED. Thoracic placed a pigtail but he had re-expansion. Thoracic surgery performed talc pleurodesis 4/3/19. He was discharged home 4/5/19.     He started Keytruda 4/9/19. He saw ortho for worsening right thigh pain thought 2/2 metastatic lesion in right femur and they discussed nailing. During his pre-op work-up CXR revealed persistent right sided pneumothorax for which he was admitted 4/12/19. Repeat talc pleurodesis performed 4/16/19. He underwent right intramedually pinning 4/15/19.  -        Interval  History:     He was started Keytruda 4/9/19     He started gemzar on 7-24-19 due to PD, and had a thoracentesis 7-25.     He overall is feeling well, with the only issue being a cough when he lies down that's not productive. he has some wheezing when he lies down.  He is however having hemoptysis but not as frequent.  Cough occ wakes him up.     Hes working full time.    No KONG usually, gets out daily.  He goes up a floor and does note some KONG w that but better as leg is getting stronger.  The leg does still bother him; not using a crutch was lawn mowing.     Nooooo pain medication.  Some chest discomfort w a deep breath but much better than 3 weeks ago.     He otherwise feels well and complete ROS negative.     Sleeping is as usual; but occ a sweat; gets up 4x to urinate.    10-point ROS o/w neg.        On exam he appeared comfortable w normal affect  /76 (BP Location: Right arm, Patient Position: Sitting, Cuff Size: Adult Regular)   Pulse 83   Temp 98.6  F (37  C) (Oral)   Resp 16   Wt 88.2 kg (194 lb 6.4 oz)   SpO2 98%   BMI 27.89 kg/m     Constitutional: Well-appearing male in no acute distress.  Eyes: No icterus.  ENT: Without lesions or thrush.   NECK: No thyromegaly or mass  Respiratory: Decreased BS L base.  Lymphatic: No lymphadenopathy.   Cardiovascular: RRR. No murmurs   ABD:  No HSMT.   MSK: No synovitis  Neurologic: Normal Romberg and heel walk  EXT: 1+ edema R foot.  Skin: OK  PSYCH: Mood good.      -  CBC OK LFT, GNE OK though alb 3.2     -  Last imaging while fairly complicated  showed a clear response of many nodules c/w July images.       -     Assessment:  1. 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 He started Keytruda 4/9/19. He started gemzar 7-24.     He is  doing well and  last imaging showed response.  Its not possible to exclude a keytruda effect but gemzar seems most likely the active agent and it will continue.       2. Pulm  Recurrent right sided pneumothorax s/p 2 hospitalizations and TALC pleurodesis 4/3 and again 4/16.  Does have intermittent scant hemoptysis, hgb stable.      3. MSK  Restaging CT with lytic lesion with associated soft tissue mass arising from the posterior right femoral intertrochanteric region. He underwent right femur intramedullary pinning 4/15/19 by Dr. Betts. Minimal pain concerns, weight bearing as tolerated, and incisions healing well.       Next imaging will depend some on symptoms but likely about 3 weeks to be sure we know how things are going.     He will call if other questions arise.     Gaurang Johnson M.D.  Professor  Hematology, Oncology and Transplantation

## 2019-09-04 NOTE — PATIENT INSTRUCTIONS
[facility] Triage and after hours / weekends / holidays:  [phone]    Please call the triage or after hours line if you experience a temperature greater than or equal to 100.5, shaking chills, have uncontrolled nausea, vomiting and/or diarrhea, dizziness, shortness of breath, chest pain, bleeding, unexplained bruising, or if you have any other new/concerning symptoms, questions or concerns.      If you are having any concerning symptoms or wish to speak to a provider before your next infusion visit, please call your care coordinator or triage to notify them so we can adequately serve you.     If you need a refill on a narcotic prescription or other medication, please call before your infusion appointment.                 September 2019 Sunday Monday Tuesday Wednesday Thursday Friday Saturday   1     2     3     4    [facility] D[facility]  11:00 AM   (15 min.)    [facility] DRAW   [facility] Lab Draw    [facility] RETURN  11:45 AM   (30 min.)   Gaurang Johnson MD   [facility] ONC INFUSION 120   1:30 PM   (120 min.)    ONCOLOGY INFUSION   [facility] 5     6     7       8     9     10     11     12    [facility] DRAW   2:00 PM   (15 min.)   [facility]   [facility] Lab Draw    [facility] ONC INFUSION 120   2:30 PM   (120 min.)    ONCOLOGY INFUSION   [facility] 13     14       15     16     17     18     19     20     21       22     23    [facility] DRAW   7:00 AM   (15 min.)    [facility] DRAW   [facility] Lab Draw    CT CHEST/ABDOMEN/PELVIS W   8:00 AM   (20 min.)   [id number]   [place], [place] 24    [facility] D[facility]   9:00 AM   (15 min.)    [facility] DRAW   [facility] Lab Draw    P RETURN   9:45 AM   (30 min.)   Gaurang Johnson MD   [facility] ONC INFUSION 120  10:30 AM   (120 min.)    ONCOLOGY INFUSION   [facility] 25     26      27 28 29 30 October 2019 Sunday Monday Tuesday Wednesday Thursday Friday Saturday             1     2     3     4     5       6     7     8     9     10     11     12       13     14     15    [facility] DRAW   9:00 AM   (15 min.)    [facility]   [facility] Lab Draw    [facility] RETURN   9:45 AM   (50 min.)   Ignacio Ramirez PA   [facility] ONC INFUSION 120  11:00 AM   (120 min.)    ONCOLOGY INFUSION   [facility] 16     17     18     19       20     21     22     23     24     25     26       27     28     29     30     31                              Recent Results (from the past 24 hour(s))   *CBC with platelets differential    Collection Time: 09/04/19 11:30 AM   Result Value Ref Range    WBC 6.5 4.0 - 11.0 10e9/L    RBC Count 3.56 (L) 4.4 - 5.9 10e12/L    Hemoglobin 9.2 (L) 13.3 - 17.7 g/dL    Hematocrit 29.8 (L) 40.0 - 53.0 %    MCV 84 78 - 100 fl    MCH 25.8 (L) 26.5 - 33.0 pg    MCHC 30.9 (L) 31.5 - 36.5 g/dL    RDW 17.0 (H) 10.0 - 15.0 %    Platelet Count 586 (H) 150 - 450 10e9/L    Diff Method Automated Method     % Neutrophils 61.3 %    % Lymphocytes 25.3 %    % Monocytes 10.8 %    % Eosinophils 1.7 %    % Basophils 0.6 %    % Immature Granulocytes 0.3 %    Nucleated RBCs 0 0 /100    Absolute Neutrophil 4.0 1.6 - 8.3 10e9/L    Absolute Lymphocytes 1.6 0.8 - 5.3 10e9/L    Absolute Monocytes 0.7 0.0 - 1.3 10e9/L    Absolute Eosinophils 0.1 0.0 - 0.7 10e9/L    Absolute Basophils 0.0 0.0 - 0.2 10e9/L    Abs Immature Granulocytes 0.0 0 - 0.4 10e9/L    Absolute Nucleated RBC 0.0    Comprehensive metabolic panel    Collection Time: 09/04/19 11:30 AM   Result Value Ref Range    Sodium 137 133 - 144 mmol/L    Potassium 4.1 3.4 - 5.3 mmol/L    Chloride 106 94 - 109 mmol/L    Carbon Dioxide 25 20 - 32 mmol/L    Anion Gap 7 3 - 14 mmol/L    Glucose 95 70 - 99 mg/dL    Urea Nitrogen 14 7 - 30  mg/dL    Creatinine 0.85 0.66 - 1.25 mg/dL    GFR Estimate >90 >60 mL/min/[1.73_m2]    GFR Estimate If Black >90 >60 mL/min/[1.73_m2]    Calcium 9.5 8.5 - 10.1 mg/dL    Bilirubin Total 0.4 0.2 - 1.3 mg/dL    Albumin 3.2 (L) 3.4 - 5.0 g/dL    Protein Total 7.2 6.8 - 8.8 g/dL    Alkaline Phosphatase 131 40 - 150 U/L    ALT 21 0 - 70 U/L    AST 16 0 - 45 U/L   Magnesium    Collection Time: 09/04/19 11:30 AM   Result Value Ref Range    Magnesium 2.6 (H) 1.6 - 2.3 mg/dL   Phosphorus    Collection Time: 09/04/19 11:30 AM   Result Value Ref Range    Phosphorus 3.3 2.5 - 4.5 mg/dL

## 2019-09-04 NOTE — PROGRESS NOTES
Infusion Nursing Note:    Patient presents today for Cycle 3 Day 1 Gemzar.    Patient met with Dr. Johnson prior to infusion.    Lab Results   Component Value Date    HGB 9.2 09/04/2019     Lab Results   Component Value Date    WBC 6.5 09/04/2019      Lab Results   Component Value Date    ANEU 4.0 09/04/2019     Lab Results   Component Value Date     09/04/2019      Lab Results   Component Value Date     09/04/2019                   Lab Results   Component Value Date    POTASSIUM 4.1 09/04/2019           Lab Results   Component Value Date    MAG 2.6 09/04/2019            Lab Results   Component Value Date    CR 0.85 09/04/2019                   Lab Results   Component Value Date    JAYESH 9.5 09/04/2019                Lab Results   Component Value Date    BILITOTAL 0.4 09/04/2019           Lab Results   Component Value Date    ALBUMIN 3.2 09/04/2019                    Lab Results   Component Value Date    ALT 21 09/04/2019           Lab Results   Component Value Date    AST 16 09/04/2019       Results reviewed, labs MET treatment parameters, ok to proceed with treatment.    Note: Patient declines IV kytril again today. Reports no trouble with nausea last cycle.     Intravenous Access:  Peripheral IV placed in lab.  Warm compress used during gemzar infusion.     Post Infusion Assessment:  Patient tolerated infusion without incident.  Blood return noted pre and post infusion.  Access discontinued per protocol.    Discharge Plan:   Patient declined prescription refills.  Discharge instructions reviewed with: Patient.  Copy of AVS reviewed with patient and/or family.  Patient will return 9/12 for next appointment. IB sent to Dr. Johnson/scheduling to see if patient will need a provider appointment next week.   Patient discharged in stable condition accompanied by: self.  Departure Mode: Ambulatory.  Face to Face time: 0.

## 2019-09-04 NOTE — NURSING NOTE
Chief Complaint   Patient presents with     Blood Draw     Labs drawn via PIV placed by RN in lab. VS taken. Pt checked in for next appt     Labs drawn from PIV placed by RN. Line flushed with saline. Vitals taken. Pt checked in for appointment(s).    Sulema AYON RN PHN BSN  BMT/Oncology Lab

## 2019-09-12 NOTE — PROGRESS NOTES
"Infusion Nursing Note:  Hiram Tapia presents today for cycle 3 day 8 Gemzar.    Patient seen by provider today: No   present during visit today: Not Applicable.    Note: Pt complains of a \"knot\" in the center of his chest. Discomfort started this morning and has been constant, possibly dissipating as the day goes on. Rates the discomfort at a 3/10. Denies any trouble breathing, chest tightness, shortness of breath, trouble swallowing, or acid reflux.  TORB: Morenita SAPP/Maryse Sheldon RN  - pain may be from his tumor. Ok for patient to monitor. If pain gets worse, patient should call the clinic. Pt will see a provider and have a scan prior to his next infusion. OK to go ahead with treatment today.  Above relayed to patient who stated understanding.    Intravenous Access:  Peripheral IV placed.    Treatment Conditions:  Lab Results   Component Value Date    HGB 8.7 09/12/2019     Lab Results   Component Value Date    WBC 7.2 09/12/2019      Lab Results   Component Value Date    ANEU 4.9 09/12/2019     Lab Results   Component Value Date     09/12/2019      Lab Results   Component Value Date     09/12/2019                   Lab Results   Component Value Date    POTASSIUM 3.8 09/12/2019           Lab Results   Component Value Date    MAG 2.6 09/04/2019            Lab Results   Component Value Date    CR 0.81 09/12/2019                   Lab Results   Component Value Date    JAYESH 9.2 09/12/2019                Lab Results   Component Value Date    BILITOTAL 0.3 09/12/2019           Lab Results   Component Value Date    ALBUMIN 3.4 09/12/2019                    Lab Results   Component Value Date    ALT 24 09/12/2019           Lab Results   Component Value Date    AST 16 09/12/2019       Results reviewed, labs MET treatment parameters, ok to proceed with treatment.      Post Infusion Assessment:  Patient tolerated infusion without incident.  Blood return noted pre and post infusion.  Site " patent and intact, free from redness, edema or discomfort.  No evidence of extravasations.  Access discontinued per protocol.       Discharge Plan:   Patient declined prescription refills.  Discharge instructions reviewed with: Patient.  Patient and/or family verbalized understanding of discharge instructions and all questions answered.  Copy of AVS reviewed with patient and/or family.  Patient will return 9/24/19 for next appointment.  Patient discharged in stable condition accompanied by: self.  Departure Mode: Ambulatory.    Maryse Sheldon RN, RN

## 2019-09-12 NOTE — PATIENT INSTRUCTIONS
Contact Numbers:     [facility] [place] [phone]  [facility] [place]: [phone]    Call triage to speak with triage if you are experiencing chills and/or temperature greater than or equal to 100.5, uncontrolled nausea/vomiting, diarrhea, constipation, dizziness, shortness of breath, chest pain, bleeding, unexplained bruising, or any new/concerning symptoms, questions/concerns.     If you are having any concerning symptoms or wish to speak to a provider before your next infusion visit, please call your care coordinator or triage to notify them so we can adequately serve you.     If you need a refill on a narcotic prescription, please call triage or your care coordinator before your infusion appointment.         September 2019 Sunday Monday Tuesday Wednesday Thursday Friday Saturday   1     2     3     4    [facility]  11:00 AM   (15 min.)    [facility]    [facility] RETURN  11:45 AM   (30 min.)   Gaurang Johnson MD   [facility] ONC INFUSION 120   1:30 PM   (120 min.)    ONCOLOGY INFUSION   [facility] 5     6     7       8     9     10     11     12    [facility] DRAW   2:00 PM   (15 min.)    [facility]   [facility]    [facility] ONC INFUSION 120   2:30 PM   (120 min.)    ONCOLOGY INFUSION   [facility] 13     14       15     16     17     18     19     20     21       22     23    [facility] DRAW   7:00 AM   (15 min.)    [facility]   [facility]    CT CHEST/ABDOMEN/PELVIS W   8:00 AM   (20 min.)   [id number]   [place], [place] 24    [facility] DRAW   9:00 AM   (15 min.)    [facility]   [facility]    [facility] RETURN   9:45 AM   (30 min.)   Gaurang Johnson MD   [facility] ONC INFUSION 120  10:30 AM   (120 min.)    ONCOLOGY INFUSION   [facility] 25     26     27     28        29 30 October 2019 Sunday Monday Tuesday Wednesday Thursday Friday Saturday             1     2     3     4     5       6     7     8     9     10     11     12       13     14     15    [facility] DRAW   9:00 AM   (15 min.)    [facility] DRAW   [facility] Lab Draw    [facility] RETURN   9:45 AM   (50 min.)   Ignacio Ramirez PA   [facility] ONC INFUSION 120  11:00 AM   (120 min.)    ONCOLOGY INFUSION   formerly [facility] 16     17     18     19       20     21     22     23     24     25     26       27     28     29     30     31                               Lab Results:  Recent Results (from the past 12 hour(s))   Comprehensive metabolic panel    Collection Time: 09/12/19  1:43 PM   Result Value Ref Range    Sodium 136 133 - 144 mmol/L    Potassium 3.8 3.4 - 5.3 mmol/L    Chloride 104 94 - 109 mmol/L    Carbon Dioxide 25 20 - 32 mmol/L    Anion Gap 6 3 - 14 mmol/L    Glucose 102 (H) 70 - 99 mg/dL    Urea Nitrogen 16 7 - 30 mg/dL    Creatinine 0.81 0.66 - 1.25 mg/dL    GFR Estimate >90 >60 mL/min/[1.73_m2]    GFR Estimate If Black >90 >60 mL/min/[1.73_m2]    Calcium 9.2 8.5 - 10.1 mg/dL    Bilirubin Total 0.3 0.2 - 1.3 mg/dL    Albumin 3.4 3.4 - 5.0 g/dL    Protein Total 7.1 6.8 - 8.8 g/dL    Alkaline Phosphatase 129 40 - 150 U/L    ALT 24 0 - 70 U/L    AST 16 0 - 45 U/L   CBC with platelets differential    Collection Time: 09/12/19  1:43 PM   Result Value Ref Range    WBC 7.2 4.0 - 11.0 10e9/L    RBC Count 3.36 (L) 4.4 - 5.9 10e12/L    Hemoglobin 8.7 (L) 13.3 - 17.7 g/dL    Hematocrit 27.8 (L) 40.0 - 53.0 %    MCV 83 78 - 100 fl    MCH 25.9 (L) 26.5 - 33.0 pg    MCHC 31.3 (L) 31.5 - 36.5 g/dL    RDW 16.8 (H) 10.0 - 15.0 %    Platelet Count 336 150 - 450 10e9/L    Diff Method Automated Method     % Neutrophils 68.1 %    % Lymphocytes 19.1 %    % Monocytes 11.4 %    % Eosinophils 0.4 %    % Basophils 0.6 %    %  Immature Granulocytes 0.4 %    Nucleated RBCs 0 0 /100    Absolute Neutrophil 4.9 1.6 - 8.3 10e9/L    Absolute Lymphocytes 1.4 0.8 - 5.3 10e9/L    Absolute Monocytes 0.8 0.0 - 1.3 10e9/L    Absolute Eosinophils 0.0 0.0 - 0.7 10e9/L    Absolute Basophils 0.0 0.0 - 0.2 10e9/L    Abs Immature Granulocytes 0.0 0 - 0.4 10e9/L    Absolute Nucleated RBC 0.0

## 2019-09-23 NOTE — PROGRESS NOTES
9-24-19     I saw Hiram Tapia for follow up of UPS of the right thigh     Background  He had no significant PMH but was found to have a UPS of the right thigh. He has a history of right leg pain with sciatica x 20 years. In October 2017 he had more pain and injured his leg on a truck which eventually led to imaging that revealed a mass. 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      He was started on Doxil/Ifos 3/23/18 and completed 4 cycles with positive response to treatment. Of not he did complete 6 months of Xarelto during this time for incidental PE. He then underwent preoperative XRT. He underwent resection 9/17/18 with <5% viable cells on path and negative margins with no LVI.     Then on surveillance imaging October 2018 he was noted to have lung nodules. He underwent biopsy December 2018 that revealed metastatic sarcoma.     Restaging on 4/2/19  revealed large right and trace left pneumothoraces along with worsening metastatic disease with increased lung nodules, increased lymphadenopathy, and new lytic lesion with associated soft tissue mass in posterior right femoral intertrochanteric region. He was admitted through the ED. Thoracic placed a pigtail but he had re-expansion. Thoracic surgery performed talc pleurodesis 4/3/19. He was discharged home 4/5/19.     He started Keytruda 4/9/19. He saw ortho for worsening right thigh pain thought 2/2 metastatic lesion in right femur and they discussed nailing. During his pre-op work-up CXR revealed persistent right sided pneumothorax for which he was admitted 4/12/19. Repeat talc pleurodesis performed 4/16/19. He underwent right intramedually pinning 4/15/19.  -           Interval History:     He was started Keytruda 4/9/19     He started gemzar on 7-24-19 due to PD, and had a thoracentesis 7-25.    He has not been taking the kytril w the gemzar and not taking any antiemetic at home.     During the last 3 weeks he has been  going down hill w more coughing, some hemoptysis, much more KONG.  He can only walk 70 yards before KONG limits him.     Cough seems to be waking him up and its hard to get to sleep w cough.  No F, C, S.    Hes working full time.    Gets out most days but its a challenge now.  He can go up a floor without stopping but has a lot of KONG.    No pain medication.    Some constipation.    Interestingly today his symptoms re KONG and cough are not so bad, but were bad last weekend.    He otherwise feels well and 10-point ROS negative.     10-point ROS o/w neg.        On exam he appeared comfortable w normal affect  /69 (BP Location: Right arm, Patient Position: Sitting, Cuff Size: Adult Regular)   Pulse 96   Temp 98.4  F (36.9  C) (Oral)   Resp 16   Wt 87.4 kg (192 lb 9.6 oz)   SpO2 98%   BMI 27.64 kg/m    Constitutional: Well-appearing male in no acute distress.  Eyes: No icterus.  ENT: Without lesions or thrush.   NECK: No thyromegaly or mass  Respiratory: Decreased BS w diffuse crackles L.  Cardiovascular: RRR. No murmurs   ABD:  No HSMT.   MSK: No synovitis  Lymphatic: No lymphadenopathy.   EXT: 1+ edema R foot.  Neurologic: Normal Romberg and heel walk  Skin: OK  PSYCH: Mood good.      -  Hb 7.7, plts 515, ANC OK. GNE, LFT OK though alb 3.0.    -  Last imaging while fairly complicated showed a clear response of many nodules c/w July images.    I reviewed the new images and disucssed w the radiologist.  While many tumors show clear response, one left necrotic lesion is containing air and communicating with the pleural space and the pericardial space.  However the R lung looks better.  I reviewed the images w him at length.      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 He  started Keytruda 4/9/19. He started gemzar 7-24.    Recurrent right sided pneumothorax s/p 2 hospitalizations and TALC pleurodesis 4/3 and again 4/16.      He seems to be having a response in most lesions.  Its not possible to exclude a keytruda effect but gemzar seems most likely the active agent and it will continue.  However the L lung issue is a problem.  We will hold gemzar and have him see thoracic today for their opinion; discussed w thoracic nurse and will have him come back at 110 for possible 130 visit.    He underwent right femur intramedullary pinning 4/15/19 by Dr. Betts. Minimal pain concerns, weight bearing as tolerated.      We will tentatively reschedule gemzar for Monday after Ignacio.     He will call if other questions arise.     Gaurang Johnson M.D.  Professor  Hematology, Oncology and Transplantation

## 2019-09-23 NOTE — DISCHARGE INSTRUCTIONS

## 2019-09-24 PROBLEM — C78.02: Status: ACTIVE | Noted: 2019-01-01

## 2019-09-24 NOTE — PROGRESS NOTES
THORACIC SURGERY - NEW PATIENT OFFICE VISIT      Dear Dr. Fry,    I saw Mr. Tpaia at Dr. Johnson s request in consultation for the evaluation and treatment of a metastatic sarcoma.     WATSON Tapia is a 59 year old man with a growing left lung mass in the setting of metastatic undifferentiated pleomorphic sarcoma.  The primary in the right thigh was diagnosed in 2018. He underwent 4 cycles of Doxi/Ifos in March 2018 with positive response, complicated by pulmonary embolism. He then underwent resection 9/2018 with negative margins. He had surveillance imaging 10/2018 showing lung nodules, which were biopsy-proven  metastatic sarcoma foci.  He underwent right VATS wedge resections in December 2018 with Dr. Bowie. He had bilateral pneumothoraces and worsening metastatic disease on CT 4/2019, along with right femoral lytic lesions. He had a right pigtail placed, followed by bedside talc pleurodesis 4/3/19 but requiring additional talc pleurodesis 4/16/19. During those admissions, he underwent right IM nail placement.     He has been doing fair recently. He is currently receiving chemo (Keytruda, Gemzar) which was postponed today until next week. He states his shortness of breath has worsened over the last 3 weeks. He could previously walk 120 yards (limited by RLE pain) but can no longer walk that far due to shortness of breath. He sleeps on one pillow at home. He also complains of intermittent substernal/left sternal chest tightness that sometimes happens at rest or when he lays on his left side and resolves spontaneously.    Previsit Tests   CT chest 9/23/2019: LLL loculated hydropneumothorax communicating with pericardium and abutting the myocardium. Moderate pericardial effusion.     PFT 11/2018:  FEV 4.45 (124%)  DLCO 27.02 (95%)  PMH    Past Medical History:   Diagnosis Date     Pulmonary embolism (H)      Sarcoma (H)         ETOH 1 beer/day  TOB former 10 pack year smoker, quit 20 years ago    PSH    Past  Surgical History:   Procedure Laterality Date     EXCISE SOFT TISSUE TUMOR THIGH Right 3/8/2018    Procedure: EXCISE SOFT TISSUE TUMOR THIGH;  Biopsy Right Thigh Tumor;  Surgeon: Brad Betts MD;  [facility]     EXCISE SOFT TISSUE TUMOR THIGH Right 9/17/2018    Procedure: EXCISE SOFT TISSUE TUMOR THIGH;  Removal Right Thigh Tumor ;  Surgeon: Brad Betts MD;  [facility] OR     INSERT PORT VASCULAR ACCESS N/A 3/28/2018    Procedure: INSERT PORT VASCULAR ACCESS;  Vascular Access Port Insertion with C-arm;  Surgeon: Sarah Bowie MD;  [facility] OR     IR PORT CHECK RIGHT  6/11/2019     IR PORT REMOVAL RIGHT  7/17/2019     IRRIGATION AND DEBRIDEMENT LOWER EXTREMITY, COMBINED Right 4/6/2018    Procedure: COMBINED IRRIGATION AND DEBRIDEMENT LOWER EXTREMITY;  Irrigation And Debridement Right Thigh ;  Surgeon: Brad Betts MD;  [facility]     IRRIGATION AND DEBRIDEMENT LOWER EXTREMITY, COMBINED Right 4/9/2018    Procedure: COMBINED IRRIGATION AND DEBRIDEMENT LOWER EXTREMITY;  Irrigation And Debridement Right Thigh Wound. with Wound VAC Exchange;  Surgeon: Brad Betts MD;  [facility] OR     OPEN REDUCTION INTERNAL FIXATION RODDING INTRAMEDULLARY FEMUR Right 4/15/2019    Procedure: Intramedullary Nail of Right Femur;  Surgeon: Brad Betts MD;  [facility] OR     REMOVE PORT VASCULAR ACCESS Right 7/17/2019    Procedure: Port Removal;  Surgeon: Jacky Dumont PA-C;  [facility]     STRABISMUS SURGERY      as a child     THORACENTESIS Left 7/25/2019    Procedure: THORACENTESIS;  Surgeon: Ashish Gaines MD;  [facility] GI     THORACOSCOPIC WEDGE RESECTION LUNG Right 12/5/2018    Procedure: Right Video Assisted Thoracoscopic Wedge Resection;  Surgeon: Sarah Bowie MD;  [facility] OR       Physical examination  BMI 27.5  Pleasant, well nourished gentleman  No shortness of breath, normal work of breathing      From a personal perspective, he  is here alone. He lives with his wife and child. He works as an  at [employer] (in the terminal, not on the aircraft).       IMPRESSION No diagnosis found.   59 year old year-old male with metastatic sarcoma, enlarging left lower lobe fluid collection with possible pericardial fisutlization    PLAN  I spent a total of 45 minutes with Mr. Tapia, more than 50% of which were spent in counseling, coordination of care, and face-to-face time. I reviewed the plan as follows:  1) Procedure planned: Left thoracotomy, decortication, possible wedge resection, possible pericardial window, possible diaphragm plication. I reviewed the indications, risks, and benefits of the procedure with Mr. Tapia, including the typical postoperative course after a procedure like this. All of his questions were answered. We will tentatively aim for 10/3.     Necessary Preop Testing: PFT, VQ quantitative scan, TTE, PAC  Regional Anesthesia Plan: Epidural  Anticoagulation Plan: Lovenox    They had all their questions answered and were in agreement with the plan.  I appreciate the opportunity to participate in the care of your patient and will keep you updated.  Sincerely,    Yogesh Armas

## 2019-09-24 NOTE — NURSING NOTE
Chief Complaint   Patient presents with     Lab Only     PIV removed by rn in lab      PIV removed by rn in lab.  Lily Palacios RN

## 2019-09-24 NOTE — LETTER
RE: Hiram Tapia  [address]  [place] [address]     Dear Colleague,    Thank you for referring your patient, Hiram Tapia, to the [facility]. Please see a copy of my visit note below.    9-24-19     I saw Hiram Tapia for follow up of UPS of the right thigh     Background  He had no significant PMH but was found to have a UPS of the right thigh. He has a history of right leg pain with sciatica x 20 years. In October 2017 he had more pain and injured his leg on a truck which eventually led to imaging that revealed a mass. 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      He was started on Doxil/Ifos 3/23/18 and completed 4 cycles with positive response to treatment. Of not he did complete 6 months of Xarelto during this time for incidental PE. He then underwent preoperative XRT. He underwent resection 9/17/18 with <5% viable cells on path and negative margins with no LVI.     Then on surveillance imaging October 2018 he was noted to have lung nodules. He underwent biopsy December 2018 that revealed metastatic sarcoma.     Restaging on 4/2/19  revealed large right and trace left pneumothoraces along with worsening metastatic disease with increased lung nodules, increased lymphadenopathy, and new lytic lesion with associated soft tissue mass in posterior right femoral intertrochanteric region. He was admitted through the ED. Thoracic placed a pigtail but he had re-expansion. Thoracic surgery performed talc pleurodesis 4/3/19. He was discharged home 4/5/19.     He started Keytruda 4/9/19. He saw ortho for worsening right thigh pain thought 2/2 metastatic lesion in right femur and they discussed nailing. During his pre-op work-up CXR revealed persistent right sided pneumothorax for which he was admitted 4/12/19. Repeat talc pleurodesis performed 4/16/19. He underwent right intramedually pinning 4/15/19.  -        Interval History:     He  was started Keytruda 4/9/19     He started gemzar on 7-24-19 due to PD, and had a thoracentesis 7-25.    He has not been taking the kytril w the gemzar and not taking any antiemetic at home.     During the last 3 weeks he has been going down hill w more coughing, some hemoptysis, much more KONG.  He can only walk 70 yards before KONG limits him.     Cough seems to be waking him up and its hard to get to sleep w cough.  No F, C, S.    Hes working full time.    Gets out most days but its a challenge now.  He can go up a floor without stopping but has a lot of KONG.    No pain medication.    Some constipation.    Interestingly today his symptoms re KONG and cough are not so bad, but were bad last weekend.    He otherwise feels well and 10-point ROS negative.     10-point ROS o/w neg.     On exam he appeared comfortable w normal affect  /69 (BP Location: Right arm, Patient Position: Sitting, Cuff Size: Adult Regular)   Pulse 96   Temp 98.4  F (36.9  C) (Oral)   Resp 16   Wt 87.4 kg (192 lb 9.6 oz)   SpO2 98%   BMI 27.64 kg/m     Constitutional: Well-appearing male in no acute distress.  Eyes: No icterus.  ENT: Without lesions or thrush.   NECK: No thyromegaly or mass  Respiratory: Decreased BS w diffuse crackles L.  Cardiovascular: RRR. No murmurs   ABD:  No HSMT.   MSK: No synovitis  Lymphatic: No lymphadenopathy.   EXT: 1+ edema R foot.  Neurologic: Normal Romberg and heel walk  Skin: OK  PSYCH: Mood good.      -  Hb 7.7, plts 515, ANC OK. GNE, LFT OK though alb 3.0.    -  Last imaging while fairly complicated showed a clear response of many nodules c/w July images.    I reviewed the new images and disucssed w the radiologist.  While many tumors show clear response, one left necrotic lesion is containing air and communicating with the pleural space and the pericardial space.  However the R lung looks better.  I reviewed the images w him at length.      -     Assessment:    Metastatic undifferentiated  pleomorphic sarcoma of the right thigh s/p 4 cycles of Doxil and Ifosfamide, pre-operative XRT, and resection September 2018 with negative margins, but had recurrent lung mets with biopsy December 2018 confirming metastatic sarcoma. He started Keytruda 4/9/19. He started gemzar 7-24.    Recurrent right sided pneumothorax s/p 2 hospitalizations and TALC pleurodesis 4/3 and again 4/16.      He seems to be having a response in most lesions.  Its not possible to exclude a keytruda effect but gemzar seems most likely the active agent and it will continue.  However the L lung issue is a problem.  We will hold gemzar and have him see thoracic today for their opinion; discussed w thoracic nurse and will have him come back at 110 for possible 130 visit.    He underwent right femur intramedullary pinning 4/15/19 by Dr. Betts. Minimal pain concerns, weight bearing as tolerated.      We will tentatively reschedule gemzar for Monday after Ignacio.     He will call if other questions arise.     Gaurang Johnson M.D.  Professor  Hematology, Oncology and Transplantation

## 2019-09-24 NOTE — NURSING NOTE
"Oncology Rooming Note    September 24, 2019 9:43 AM   Hiram Tapia is a 59 year old male who presents for:    Chief Complaint   Patient presents with     Blood Draw     labs drawn via piv start by rn. vs taken      Oncology Clinic Visit     Return; High Grade Sarcoma     Initial Vitals: /69 (BP Location: Right arm, Patient Position: Sitting, Cuff Size: Adult Regular)   Pulse 96   Temp 98.4  F (36.9  C) (Oral)   Resp 16   Wt 87.4 kg (192 lb 9.6 oz)   SpO2 98%   BMI 27.64 kg/m   Estimated body mass index is 27.64 kg/m  as calculated from the following:    Height as of 8/13/19: 1.778 m (5' 10\").    Weight as of this encounter: 87.4 kg (192 lb 9.6 oz). Body surface area is 2.08 meters squared.  No Pain (0) Comment: Data Unavailable   No LMP for male patient.  Allergies reviewed: Yes  Medications reviewed: Yes    Medications: Medication refills not needed today.  Pharmacy name entered into [facility]:    [facility] [place], [place] - [address] [place] [address] [id number]  [id number] [place], [place] - [facility] [address] AT [facility] [address]    Clinical concerns: Pt states he coughing with blood; lump in chest which is starting to stay longer. shortness of breath all the time.       Mehnaz Hodgson CMA              "

## 2019-09-24 NOTE — LETTER
9/24/2019       RE: Hiram Tapia  [address]  [place] [address]     Dear Colleague,    Thank you for referring your patient, Hiram Tapia, to the [facility]. Please see a copy of my visit note below.    THORACIC SURGERY - NEW PATIENT OFFICE VISIT      Dear Dr. Fry,    I saw Mr. Tapia at Dr. Johnson s request in consultation for the evaluation and treatment of a metastatic sarcoma.     HPI  Hiram Tapia is a 59 year old man with a growing left lung mass in the setting of metastatic undifferentiated pleomorphic sarcoma.  The primary in the right thigh was diagnosed in 2018. He underwent 4 cycles of Doxi/Ifos in March 2018 with positive response, complicated by pulmonary embolism. He then underwent resection 9/2018 with negative margins. He had surveillance imaging 10/2018 showing lung nodules, which were biopsy-proven  metastatic sarcoma foci.  He underwent right VATS wedge resections in December 2018 with Dr. Bowie. He had bilateral pneumothoraces and worsening metastatic disease on CT 4/2019, along with right femoral lytic lesions. He had a right pigtail placed, followed by bedside talc pleurodesis 4/3/19 but requiring additional talc pleurodesis 4/16/19. During those admissions, he underwent right IM nail placement.   He has been doing fair recently. He is currently receiving chemo (Keytruda, Gemzar) which was postponed today until next week. He states his shortness of breath has worsened over the last 3 weeks. He could previously walk 120 yards (limited by RLE pain) but can no longer walk that far due to shortness of breath. He sleeps on one pillow at home. He also complains of intermittent substernal/left sternal chest tightness that sometimes happens at rest or when he lays on his left side and resolves spontaneously.  Previsit Tests   CT chest 9/23/2019: LLL loculated hydropneumothorax communicating with pericardium and abutting the myocardium. Moderate pericardial  effusion.     PFT 11/2018:  FEV 4.45 (124%)  DLCO 27.02 (95%)  PMH    Past Medical History:   Diagnosis Date     Pulmonary embolism (H)      Sarcoma (H)         ETOH 1 beer/day  TOB former 10 pack year smoker, quit 20 years ago    PSH    Past Surgical History:   Procedure Laterality Date     EXCISE SOFT TISSUE TUMOR THIGH Right 3/8/2018    Procedure: EXCISE SOFT TISSUE TUMOR THIGH;  Biopsy Right Thigh Tumor;  Surgeon: Brad Betts MD;  [facility]     EXCISE SOFT TISSUE TUMOR THIGH Right 9/17/2018    Procedure: EXCISE SOFT TISSUE TUMOR THIGH;  Removal Right Thigh Tumor ;  Surgeon: Brad Betts MD;  Location: [facility] OR     INSERT PORT VASCULAR ACCESS N/A 3/28/2018    Procedure: INSERT PORT VASCULAR ACCESS;  Vascular Access Port Insertion with C-arm;  Surgeon: Sarah Bowie MD;  Location: [facility] OR     IR PORT CHECK RIGHT  6/11/2019     IR PORT REMOVAL RIGHT  7/17/2019     IRRIGATION AND DEBRIDEMENT LOWER EXTREMITY, COMBINED Right 4/6/2018    Procedure: COMBINED IRRIGATION AND DEBRIDEMENT LOWER EXTREMITY;  Irrigation And Debridement Right Thigh ;  Surgeon: Brad Betts MD;  [facility]     IRRIGATION AND DEBRIDEMENT LOWER EXTREMITY, COMBINED Right 4/9/2018    Procedure: COMBINED IRRIGATION AND DEBRIDEMENT LOWER EXTREMITY;  Irrigation And Debridement Right Thigh Wound. with Wound VAC Exchange;  Surgeon: Brad Betts MD;  [facility] OR     OPEN REDUCTION INTERNAL FIXATION RODDING INTRAMEDULLARY FEMUR Right 4/15/2019    Procedure: Intramedullary Nail of Right Femur;  Surgeon: Brad Betts MD;  [facility]     REMOVE PORT VASCULAR ACCESS Right 7/17/2019    Procedure: Port Removal;  Surgeon: Jacky Dumont PA-C;  [facility]     STRABISMUS SURGERY      as a child     THORACENTESIS Left 7/25/2019    Procedure: THORACENTESIS;  Surgeon: Ashish Gaines MD;  [facility]: [facility] GI     THORACOSCOPIC WEDGE RESECTION LUNG Right 12/5/2018    Procedure: Right Video  Assisted Thoracoscopic Wedge Resection;  Surgeon: Sarah Bowie MD;  [facility]       Physical examination  BMI 27.5  Pleasant, well nourished gentleman  No shortness of breath, normal work of breathing    From a personal perspective, he is here alone. He lives with his wife and child. He works as an  at [employer] (in the terminal, not on the aircraft).     IMPRESSION No diagnosis found.   59 year old year-old male with metastatic sarcoma, enlarging left lower lobe fluid collection with possible pericardial fisutlization    PLAN  I spent a total of 45 minutes with Mr. Tapia, more than 50% of which were spent in counseling, coordination of care, and face-to-face time. I reviewed the plan as follows:  1) Procedure planned: Left thoracotomy, decortication, possible wedge resection, possible pericardial window, possible diaphragm plication. I reviewed the indications, risks, and benefits of the procedure with Mr. Tapia, including the typical postoperative course after a procedure like this. All of his questions were answered. We will tentatively aim for 10/3.   Necessary Preop Testing: PFT, VQ quantitative scan, TTE, PAC  Regional Anesthesia Plan: Epidural  Anticoagulation Plan: Lovenox  They had all their questions answered and were in agreement with the plan.  I appreciate the opportunity to participate in the care of your patient and will keep you updated.  Sincerely,    Yogesh Armas

## 2019-09-26 NOTE — TELEPHONE ENCOUNTER
Patient was scheduled on 10/03 at [facility] OR per in-basket message.   Patient was not called due to In-patient status

## 2019-09-27 NOTE — PROGRESS NOTES
Called and [facility] for both Remedios and David to let them know that his appointments for Monday 9/30 will be cancelled because of his upcoming surgery on Thursday 10/3.  Both Sara Armas and Elizabeth are in agreement with this plan.  A request has been sent to scheduling and asked for a callback with any questions.

## 2019-09-30 NOTE — PROGRESS NOTES
Spoke with David after receiving a voice mail that he wants to cancel his upcoming surgery this Thursday 10/3 and continue with chemotherapy.  He doesn't feel that the surgery is needed.  He believes that what is in his lung is fluid.  Let him know I would pass this information on to the thoracic surgery team.  A request has also been sent to the [facility] scheduling to get him scheduled for chemotherapy this week.  Let him know someone from scheduling will be giving him a call.  He verbalized understanding of the plan of care.

## 2019-09-30 NOTE — TELEPHONE ENCOUNTER
Called patient to schedule further testing before surgery.   Patient stated he would like to cancel his surgery.   Message sent to his care team to confirm.

## 2019-10-02 NOTE — PROGRESS NOTES
10-3-19     I saw Hiram Tapia for follow up of UPS of the right thigh     Background  He had no significant PMH but was found to have a UPS of the right thigh. He has a history of right leg pain with sciatica x 20 years. In October 2017 he had more pain and injured his leg on a truck which eventually led to imaging that revealed a mass. 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      He was started on Doxil/Ifos 3/23/18 and completed 4 cycles with positive response to treatment. Of not he did complete 6 months of Xarelto during this time for incidental PE. He then underwent preoperative XRT. He underwent resection 9/17/18 with <5% viable cells on path and negative margins with no LVI.     Then on surveillance imaging October 2018 he was noted to have lung nodules. He underwent biopsy December 2018 that revealed metastatic sarcoma.     Restaging on 4/2/19  revealed large right and trace left pneumothoraces along with worsening metastatic disease with increased lung nodules, increased lymphadenopathy, and new lytic lesion with associated soft tissue mass in posterior right femoral intertrochanteric region. He was admitted through the ED. Thoracic placed a pigtail but he had re-expansion. Thoracic surgery performed talc pleurodesis 4/3/19. He was discharged home 4/5/19.     He started Keytruda 4/9/19. He saw ortho for worsening right thigh pain thought 2/2 metastatic lesion in right femur and they discussed nailing. During his pre-op work-up CXR revealed persistent right sided pneumothorax for which he was admitted 4/12/19. Repeat talc pleurodesis performed 4/16/19. He underwent right intramedually pinning 4/15/19.  -           Interval History:     He was started Keytruda 4/9/19     He started gemzar on 7-24-19 due to PD, and had a thoracentesis 7-25.    He has not been taking the kytril w the gemzar and not taking any antiemetic at home.     He was going down hill w more  coughing, some hemoptysis, much more KONG.  He could only walk 70 yards before KONG limits him, now he can walk 120 and its leg fatigue not KONG that limits him.  This problem led to new imaging and Rx was delayed and surgery was planned.  However, he started to feel better and wanted to try more gemzar instead.    We got a new CT yesterday and he is here to review and consider more gemzar.    Cough is not waking him up now.  No F, C, mild night sweats.     Hes working full time and going up 1 floor w no problem.     Gets out daily.     No pain medication.    Some constipation using miralax prn.    He otherwise feels well and 10-point ROS negative.     10-point ROS o/w neg.        On exam he appeared comfortable w normal affect  /79 (BP Location: Right arm, Patient Position: Sitting, Cuff Size: Adult Regular)   Pulse 84   Temp 98.1  F (36.7  C) (Oral)   Resp 16   Wt 89.4 kg (197 lb)   SpO2 98%   BMI 28.27 kg/m    Constitutional: Well-appearing male in no acute distress.  Eyes: No icterus.  ENT: Without lesions or thrush.   NECK: No thyromegaly or mass  Respiratory: fairly good BS bilat w some diffuse crackles L midlung.  Cardiovascular: RRR. No murmur   Lymphatic: No lymphadenopathy.   ABD:  No HSMT.   MSK: No synovitis  EXT: tr edema R foot.  Neurologic: Normal Romberg and heel walk  Skin: OK  PSYCH: Mood good.      -  Hb 7.7, plt 650, alb 2.7, GNE/LFT OK.     -  Last imaging while fairly complicated showed a clear response of many nodules c/w July images.     I reviewed the new images.  last weeks imaging showed, while many tumors show clear response, one left necrotic lesion is containing air and communicating with the pleural space and the pericardial space.  However the R lung looks better.    The new CT images and there is some improvement in the L lung findings.    I reviewed the images w him at length.      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 He started Keytruda 4/9/19. He started gemzar 7-24.     Recurrent right sided pneumothorax s/p 2 hospitalizations and TALC pleurodesis 4/3 and again 4/16.      He seems to be having a response in most lesions.  Its not possible to exclude a keytruda effect but gemzar seems most likely the active agent and it will continue.  However the L lung issue is a problem.      We discussed a number of issues.  He does not want surgery now and would like to try some more gemzar.  This is not unreasonable and we decided to continue gemzar w close f/u.  We will restage 11-1 and see him 11-5.    We discussed rbc tx and he would like to wait as his exercise tolerance is good.  He kapil lfollow his sx and call if that changes.    He underwent right femur intramedullary pinning 4/15/19 by Dr. Betts. Minimal pain concerns, weight bearing as tolerated.     He will call if other questions arise.     Gaurang Johnson M.D.  Professor  Hematology, Oncology and Transplantation

## 2019-10-03 NOTE — NURSING NOTE
Chief Complaint   Patient presents with     Port Draw     Labs drawn via /PIV placed by RN in lab. VS taken.     Sarahi Trujillo RN

## 2019-10-03 NOTE — NURSING NOTE
"Oncology Rooming Note    October 3, 2019 2:38 PM   Hiram Tapia is a 59 year old male who presents for:    Chief Complaint   Patient presents with     Port Draw     Labs drawn via /PIV placed by RN in lab. VS taken.     Initial Vitals: /79 (BP Location: Right arm, Patient Position: Sitting, Cuff Size: Adult Regular)   Pulse 84   Temp 98.1  F (36.7  C) (Oral)   Resp 16   Wt 89.4 kg (197 lb)   SpO2 98%   BMI 28.27 kg/m   Estimated body mass index is 28.27 kg/m  as calculated from the following:    Height as of 8/13/19: 1.778 m (5' 10\").    Weight as of this encounter: 89.4 kg (197 lb). Body surface area is 2.1 meters squared.  No Pain (0) Comment: Data Unavailable   No LMP for male patient.  Allergies reviewed: Yes  Medications reviewed: Yes    Medications: Medication refills not needed today.  Pharmacy name entered into [employer]:    [facility] [place], [place] - [address] [place] [address] [phone]  [id number] [place], [place] - [facility] [address] AT [facility] [address]    Clinical concerns: No new concerns today. Dr. Johnson was notified.      PAUL SKY, [facility]            "

## 2019-10-03 NOTE — PROGRESS NOTES
Infusion Nursing Note:  Hiram Tapia presents today for cycle 4 day 1 Gemzar.    Patient seen by provider today: Yes: Dr. Johnson   present during visit today: Not Applicable.    Note: Patient's hgb is 7.7 today, per patient he does not want to receive a transfusion and had discussed this during the office visit today. Magnesium also elevated at 2.6  TORB: Dr. Johnson/Maryse Sheldon RN  -ok for patient to not receive a blood transfusion and to go ahead with the gemzar today  -aware of the magnesium level of 2.6, will watch, no additional orders.    Intravenous Access:  Peripheral IV placed.    Treatment Conditions:  Lab Results   Component Value Date    HGB 7.7 10/03/2019     Lab Results   Component Value Date    WBC 7.2 10/03/2019      Lab Results   Component Value Date    ANEU 4.4 10/03/2019     Lab Results   Component Value Date     10/03/2019      Lab Results   Component Value Date     10/03/2019                   Lab Results   Component Value Date    POTASSIUM 4.0 10/03/2019           Lab Results   Component Value Date    MAG 2.6 10/03/2019            Lab Results   Component Value Date    CR 0.79 10/03/2019                   Lab Results   Component Value Date    JAYESH 9.3 10/03/2019                Lab Results   Component Value Date    BILITOTAL 0.2 10/03/2019           Lab Results   Component Value Date    ALBUMIN 2.7 10/03/2019                    Lab Results   Component Value Date    ALT 24 10/03/2019           Lab Results   Component Value Date    AST 18 10/03/2019       Results reviewed, labs MET treatment parameters, ok to proceed with treatment.      Post Infusion Assessment:  Patient tolerated infusion without incident.  Blood return noted pre and post infusion.  Site patent and intact, free from redness, edema or discomfort.  No evidence of extravasations.  Access discontinued per protocol.       Discharge Plan:   Patient declined prescription refills.  Discharge instructions  reviewed with: Patient.  Patient and/or family verbalized understanding of discharge instructions and all questions answered.  Copy of AVS reviewed with patient and/or family.  Patient will return 10/15/19 for next appointment.  Patient discharged in stable condition accompanied by: self.  Departure Mode: Ambulatory.    Maryse Sheldon, RN, RN

## 2019-10-03 NOTE — LETTER
RE: Hiram Tapia  [address]  [place] [address]     Dear Colleague,    Thank you for referring your patient, Hiram Tapia, to the [facility]. Please see a copy of my visit note below.    10-3-19     I saw Hiram Tapia for follow up of UPS of the right thigh     Background  He had no significant PMH but was found to have a UPS of the right thigh. He has a history of right leg pain with sciatica x 20 years. In October 2017 he had more pain and injured his leg on a truck which eventually led to imaging that revealed a mass. 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      He was started on Doxil/Ifos 3/23/18 and completed 4 cycles with positive response to treatment. Of not he did complete 6 months of Xarelto during this time for incidental PE. He then underwent preoperative XRT. He underwent resection 9/17/18 with <5% viable cells on path and negative margins with no LVI.     Then on surveillance imaging October 2018 he was noted to have lung nodules. He underwent biopsy December 2018 that revealed metastatic sarcoma.     Restaging on 4/2/19  revealed large right and trace left pneumothoraces along with worsening metastatic disease with increased lung nodules, increased lymphadenopathy, and new lytic lesion with associated soft tissue mass in posterior right femoral intertrochanteric region. He was admitted through the ED. Thoracic placed a pigtail but he had re-expansion. Thoracic surgery performed talc pleurodesis 4/3/19. He was discharged home 4/5/19.     He started Keytruda 4/9/19. He saw ortho for worsening right thigh pain thought 2/2 metastatic lesion in right femur and they discussed nailing. During his pre-op work-up CXR revealed persistent right sided pneumothorax for which he was admitted 4/12/19. Repeat talc pleurodesis performed 4/16/19. He underwent right intramedually pinning 4/15/19.  -        Interval History:     He  was started Keytruda 4/9/19     He started gemzar on 7-24-19 due to PD, and had a thoracentesis 7-25.    He has not been taking the kytril w the gemzar and not taking any antiemetic at home.     He was going down hill w more coughing, some hemoptysis, much more KONG.  He could only walk 70 yards before KONG limits him, now he can walk 120 and its leg fatigue not KONG that limits him.  This problem led to new imaging and Rx was delayed and surgery was planned.  However, he started to feel better and wanted to try more gemzar instead.    We got a new CT yesterday and he is here to review and consider more gemzar.    Cough is not waking him up now.  No F, C, mild night sweats.     Hes working full time and going up 1 floor w no problem.     Gets out  daily.     No pain medication.    Some constipation using miralax prn.    He otherwise feels well and 10-point ROS negative.     10-point ROS o/w neg.        On exam he appeared comfortable w normal affect  /79 (BP Location: Right arm, Patient Position: Sitting, Cuff Size: Adult Regular)   Pulse 84   Temp 98.1  F (36.7  C) (Oral)   Resp 16   Wt 89.4 kg (197 lb)   SpO2 98%   BMI 28.27 kg/m     Constitutional: Well-appearing male in no acute distress.  Eyes: No icterus.  ENT: Without lesions or thrush.   NECK: No thyromegaly or mass  Respiratory: fairly good BS bilat w  some diffuse crackles L  midlung.  Cardiovascular: RRR. No murmur   Lymphatic: No lymphadenopathy.   ABD:  No HSMT.   MSK: No synovitis  EXT: tr edema R foot.  Neurologic: Normal Romberg and heel walk  Skin: OK  PSYCH: Mood good.      -  Hb 7.7, plt 650, alb 2.7, GNE/LFT OK.     -  Last imaging while fairly complicated showed a clear response of many nodules c/w July images.     I reviewed the new images.   last weeks imaging showed, while many tumors show clear response, one left necrotic lesion is containing air and communicating with the pleural space and the pericardial space.  However the R lung  looks better.    The new CT images and there is some improvement in the L lung findings.    I reviewed the images w him at length.      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 He started Keytruda 4/9/19. He started gemzar 7-24.     Recurrent right sided pneumothorax s/p 2 hospitalizations and TALC pleurodesis 4/3 and again 4/16.      He seems to be having a response in most lesions.  Its not possible to exclude a keytruda effect but gemzar seems most likely the active agent and it will continue.  However the L lung issue is a problem.      We discussed a number of issues.  He does not want surgery now and would like to try some more gemzar.  This is not unreasonable and we decided to continue gemzar w close f/u.  We will restage 11-1 and see him 11-5.    We discussed rbc tx and he would like to wait as his exercise tolerance is good.  He kapil lfollow his sx and call if that changes.    He underwent right femur intramedullary pinning 4/15/19 by Dr. Betts. Minimal pain concerns, weight bearing as tolerated.     He will call if other questions arise.     Gaurang Johnson M.D.  Professor  Hematology, Oncology and Transplantation

## 2019-10-03 NOTE — PATIENT INSTRUCTIONS
Contact Numbers:   [facility] Main Line: [phone]    Call triage to speak with triage if you are experiencing chills and/or temperature greater than or equal to 100.5, uncontrolled nausea/vomiting, diarrhea, constipation, dizziness, shortness of breath, chest pain, bleeding, unexplained bruising, or any new/concerning symptoms, questions/concerns.     If you are having any concerning symptoms or wish to speak to a provider before your next infusion visit, please call your care coordinator or triage to notify them so we can adequately serve you.     If you need a refill on a narcotic prescription, please call triage or your care coordinator before your infusion appointment.                 October 2019 Sunday Monday Tuesday Wednesday Thursday Friday Saturday             1    CT CHEST/ABDOMEN/PELVIS W   4:20 PM   (20 min.)   UCCT1   [facility] CT 2     3    [facility] DRAW   2:30 PM   (15 min.)    [facility]    [facility] RETURN   2:45 PM   (30 min.)   [facility]aurang Johnson [facility]   [facility]   3:30 PM   (120 min.)    ONCOLOGY INFUSION   [facility] 4     5       6     7     8     9     10     11     12       13     14     15    [facility] DRAW   9:00 AM   (15 min.)   [facility]   [facility] Draw    [facility] RETURN   9:45 AM   (50 min.)   Ignacio Ramirez PA   [facility]  11:00 AM   (120 min.)    ONCOLOGY INFUSION   [facility] 16     17     18     19       20     21     22     23     24     25     26       27     28     29     30     31 November 2019 Sunday Monday Tuesday Wednesday Thursday Friday Saturday                            1     2       3     4     5    [facility] DRAW   9:00 AM   (15 min.)   [facility] DRAW   [facility] Draw    [facility] RETURN   9:45 AM   (30  min.)   Gaurang Johnson MD   [facility] ONC INFUSION 120  10:30 AM   (120 min.)    ONCOLOGY INFUSION   [facility] 6     7     8     9       10     11     12     13     14     15     16       17     18     19     20     21     22     23       24     25     26    [facility] DRAW   8:30 AM   (15 min.)   [facility] LAB DRAW   [facility] Lab Draw    [facility] RETURN   9:15 AM   (30 min.)   Gaurang Johnson MD   [facility] ONC INFUSION 120  10:00 AM   (120 min.)    ONCOLOGY INFUSION   [facility] 27     28     29     30                     Lab Results:  Recent Results (from the past 12 hour(s))   Comprehensive metabolic panel    Collection Time: 10/03/19  2:23 PM   Result Value Ref Range    Sodium 136 133 - 144 mmol/L    Potassium 4.0 3.4 - 5.3 mmol/L    Chloride 104 94 - 109 mmol/L    Carbon Dioxide 26 20 - 32 mmol/L    Anion Gap 6 3 - 14 mmol/L    Glucose 94 70 - 99 mg/dL    Urea Nitrogen 14 7 - 30 mg/dL    Creatinine 0.79 0.66 - 1.25 mg/dL    GFR Estimate >90 >60 mL/min/[1.73_m2]    GFR Estimate If Black >90 >60 mL/min/[1.73_m2]    Calcium 9.3 8.5 - 10.1 mg/dL    Bilirubin Total 0.2 0.2 - 1.3 mg/dL    Albumin 2.7 (L) 3.4 - 5.0 g/dL    Protein Total 6.9 6.8 - 8.8 g/dL    Alkaline Phosphatase 146 40 - 150 U/L    ALT 24 0 - 70 U/L    AST 18 0 - 45 U/L   CBC with platelets differential    Collection Time: 10/03/19  2:23 PM   Result Value Ref Range    WBC 7.2 4.0 - 11.0 10e9/L    RBC Count 3.25 (L) 4.4 - 5.9 10e12/L    Hemoglobin 7.7 (L) 13.3 - 17.7 g/dL    Hematocrit 25.8 (L) 40.0 - 53.0 %    MCV 79 78 - 100 fl    MCH 23.7 (L) 26.5 - 33.0 pg    MCHC 29.8 (L) 31.5 - 36.5 g/dL    RDW 19.1 (H) 10.0 - 15.0 %    Platelet Count 650 (H) 150 - 450 10e9/L    Diff Method Automated Method     % Neutrophils 61.5 %    % Lymphocytes 24.8 %    % Monocytes 11.0 %    % Eosinophils 1.3 %    % Basophils 0.7 %    % Immature  Granulocytes 0.7 %    Nucleated RBCs 0 0 /100    Absolute Neutrophil 4.4 1.6 - 8.3 10e9/L    Absolute Lymphocytes 1.8 0.8 - 5.3 10e9/L    Absolute Monocytes 0.8 0.0 - 1.3 10e9/L    Absolute Eosinophils 0.1 0.0 - 0.7 10e9/L    Absolute Basophils 0.1 0.0 - 0.2 10e9/L    Abs Immature Granulocytes 0.1 0 - 0.4 10e9/L    Absolute Nucleated RBC 0.0    Phosphorus    Collection Time: 10/03/19  2:23 PM   Result Value Ref Range    Phosphorus 3.2 2.5 - 4.5 mg/dL   Magnesium    Collection Time: 10/03/19  2:23 PM   Result Value Ref Range    Magnesium 2.6 (H) 1.6 - 2.3 mg/dL

## 2019-10-14 NOTE — PROGRESS NOTES
Oncology/Hematology Visit Note  Oct 15, 2019    Reason for Visit: Follow up of UPS of the right thigh    History of Present Illness: Hiram Tapia is a 59 year old male with no significant PMH with UPS of the right thigh. He has a history of right leg pain with sciatica x 20 years. In October 2017 he had more pain and injured his leg on a truck which eventually led to imaging that revealed a mass. 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     He was started on Doxil/Ifos 3/23/18 and completed 4 cycles with positive response to treatment. Of not he did complete 6 months of Xarelto during this time for incidental PE. He then underwent preoperative XRT. He underwent resection 9/17/18 with <5% viable cells on path and negative margins with no LVI.    Then on surveillance imaging October 2018 he was noted to have lung nodules. He underwent biopsy December 2018 that revealed metastatic sarcoma. Dr. Johnson recommended treatment with Keytruda in February 2019 however it is unclear to me why this was never started.    He underwent restaging CT 4/2/19. This revealed large right and trace left pneumothoraces along with worsening metastatic disease with increased lung nodules, increased lymphadenopathy, and new lytic lesion with associated soft tissue mass in posterior right femoral intertrochanteric region. He was admitted through the ED. Thoracic placed a pigtail but he had re-expansion. Thoracic surgery performed talc pleurodesis 4/3/19. He was discharged home 4/5/19.    He was started on Keytruda 4/9/19. He saw ortho for worsening right thigh pain thought 2/2 metastatic lesion in right femur and they discussed nailing. During his pre-op work-up CXR revealed persistent right sided pneumothorax for which he was admitted 4/12/19. Repeat talc pleurodesis performed 4/16/19. He underwent right intramedually pinning 4/15/19.    He has continued on Keytrua after surgery. Restaging CT 6/28/19  with mostly an increase in pulmonary nodules and a right middle lobe cavitary lesion. Overall Dr. Johnson felt it was a mixed response so plan on continuing for 2 more cycles and then repeat CT. Of note port was removed 7/17/19.    He had worsening SOB and pleuritic pain 7/24/19 and CT imaging with concerns for progressive disease in addition to large right pleural effusion. He underwent thoracentesis for this 7/25/19. He was switched to Gemzar due to progression on Keytruda, cycle 1 7/24/19.      Repeat CT 8/12/19 with mixed response, possibly delayed immune response. He continued on Gemzar. CT 9/23/19 (after 3 cycles) with overall stable to improved disease, however did have new pericardial fluid and hydropneumothorax with necrotic left lung mass vs empyema. He met with surgery 9/24/19 and discussed left thoracotomy, decortication, possible wedge resection, possible pericardial window, possible diaphragm plication for the enlarging left lower lobe fluid collection with possible pericardial fistulization. The patient however declined surgery. CT 10/1/19 was stable to slight improvement in left lower lobe fluid collection and pericardial effusion.     As such he has continued on Gemzar. He returns today for oncology follow-up and consideration of cycle 4 day 8 Gemzar.     Interval History:  Mr. Tapia returns to clinic today unaccompanied. He is doing well. His main complaint is progressive KONG. He states he was more active this last weekend and noted he was getting winded with activity. He also had general fatigue and overall feeling worn out after he was working in the woods. He denies SOB at rest. No chest pain or chest tightness. He still has discomfort in his left chest in area of fluid collection but states this seems to be improving. He denies recurrent chest pressure like with his prior pleural effusion. He denies fevers or chills or cough.    He does get slight headaches after Gemzar that resolve on their  own. No mouth sores and he is eating and drinking OK. He has mild nausea after treatment but does not need antiemetics. No vomiting. No abdominal pain. Constipation well managed with Miralax PRN. No urinary issues. No bleeding, rashes, or neuropathy. He does have swelling in his right leg which is not new but seems to be slightly worse with being more active this last week.       No current outpatient medications on file.     Physical Examination:  /73 (BP Location: Right arm, Patient Position: Sitting, Cuff Size: Adult Large)   Pulse 106   Temp 99.4  F (37.4  C) (Oral)   Resp 16   Wt 88 kg (194 lb 1.6 oz)   SpO2 97%   BMI 27.85 kg/m    Wt Readings from Last 10 Encounters:   10/03/19 89.4 kg (197 lb)   09/24/19 87.1 kg (192 lb)   09/24/19 87.4 kg (192 lb 9.6 oz)   09/12/19 89.3 kg (196 lb 14.4 oz)   09/04/19 88.2 kg (194 lb 6.4 oz)   08/20/19 86.2 kg (190 lb 1.6 oz)   08/13/19 84.4 kg (186 lb)   07/31/19 86.1 kg (189 lb 12.8 oz)   07/24/19 89.7 kg (197 lb 11.2 oz)   07/17/19 87.5 kg (193 lb)     Constitutional: Well-appearing male in no acute distress.  Eyes: EOMI, PERRL. No scleral icterus.  ENT: Oral mucosa is moist without lesions or thrush.   Lymphatic: Neck is supple without cervical or supraclavicular lymphadenopathy.   Cardiovascular: Slightly tachycardic rate and regular rhythm. No murmurs, gallops, or rubs. Right leg 1+ peripheral edema, left leg trace edema.  Respiratory: Non-labored breathing. Walked in clinic, SpO2 97%, HR up to 120. Equal breath sounds. Crackles in mid left lung. No wheezing.  Gastrointestinal: Bowel sounds present. Abdomen soft, non-tender. No palpable hepatosplenomegaly or masses.   Neurologic: Cranial nerves II through XII are grossly intact.  Skin: No rashes, petechiae, or bruising noted on exposed skin.    Laboratory Data:  Results for JAYLENE CHAO (MRN [id number]) as of 10/15/2019 10:51   10/15/2019 09:07   Sodium 135   Potassium 4.2   Chloride 105   Carbon Dioxide  23   Urea Nitrogen 15   Creatinine 0.86   GFR Estimate >90   GFR Estimate If Black >90   Calcium 9.1   Anion Gap 7   Magnesium 2.4 (H)   Phosphorus 2.9   Albumin 2.7 (L)   Protein Total 6.6 (L)   Bilirubin Total 0.3   Alkaline Phosphatase 140   ALT 20   AST 15   Glucose 95   WBC 8.1   Hemoglobin 7.3 (L)   Hematocrit 24.8 (L)   Platelet Count 381   RBC Count 3.19 (L)   MCV 78   MCH 22.9 (L)   MCHC 29.4 (L)   RDW 20.4 (H)   Diff Method Automated Method   % Neutrophils 71.4   % Lymphocytes 16.7   % Monocytes 11.4   % Eosinophils 0.1   % Basophils 0.2   % Immature Granulocytes 0.2   Nucleated RBCs 0   Absolute Neutrophil 5.7   Absolute Lymphocytes 1.4   Absolute Monocytes 0.9   Absolute Eosinophils 0.0   Absolute Basophils 0.0   Abs Immature Granulocytes 0.0   Absolute Nucleated RBC 0.0       Assessment and Plan:  1. 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 Biopsy December 2018 confirmed metastatic sarcoma. Due to unclear reasons there was a delay in starting Keytruda, finally able to receive 4/9/19 and baseline CT did confirm progressive disease. He received 5 cycles of Keytruda but had ongoing disease progression.    Switched to Gemzar 7/24/19. Has overall been tolerating well. He has had a positive response to this. Returns today for cycle 4 day 8. Will continue with treatment as planned. Will see him back in two weeks for cycle 5 day 1, then he will get CT CAP and see Dr. Johnson.    2. Heme  Progressive anemia since starting Gemzar. No signs or symptoms of bleeding. He is symptomatic with KONG, fatigue. Today hgb low at 7.3. Will transfuse x 2 units pRBC. Asked him to call if his symptoms are not improved by the end of this week, in which case we may need to look for other etiologies for his symptoms. Remainder of CBC  stable.     3. Pulm  Recurrent right sided pneumothorax s/p 2 hospitalizations and TALC pleurodesis 4/3 and again 4/16. Underwent therapeutic thoracentesis 7/25/19 for left pleural effusion. Most recently has been dealing with new left lower lobe fluid collection with possible pericardial fistulization, he declined surgery for this and repeat CT imaging have shown improvement in this area.    Today he still has mild left lower lobe discomfort but states this is stable. He does have KONG but suspect this is more from anemia given no other chest symptoms. Walked in clinic and sating well 97% on RA. Has stable crackles on exam. Discussed with patient-will hold off on repeating CT until early November as planned however if he doesn't feel better after pRBC or he has new/worsening symptoms will do imaging sooner.    4. MSK  Restaging CT with lytic lesion with associated soft tissue mass arising from the posterior right femoral intertrochanteric region. He underwent right femur intramedullary pinning 4/15/19 by Dr. Betts. Does not need any pain meds.    Intermittent right lower extremity edema, slightly worse today. Will get lower extremity US to r/o DVT.    Addendum: Right lower extremity US positive for DVT. Will get CT PE to further assess SOB in setting of positive DVT study.    Addendum: CT PE negative for PE but the left lower lobe mass/fluid collection has enlarged with worsening pericardial effusion and invasion into the pericardium. Spoke with thoracic team who notes he needs surgery, though patient is still very resistant to this. With the worsening pericardial effusion he needs an echo and at least a discussion with thoracic about management, either surgery or at least biopsy to see what this area is. He also needs anticoagulation with DVT however given his history of hemoptysis, epistaxis, and current anemia this is not ideal. Also if he does need surgery this would need to be held.    Discussed with patient.  Given findings on CT, need for additional work-up/managmenet, and possible need for IVC filter will admit patient to the hospital.     Of note he did develop frequent PVC during blood transfusion so blood was stopped. He notes he has had this before though I cannot find record of this. Will request telemetry bed.    Due to time of day and concerns for needing cardiac monitoring and echo will go to ED first and then admit to oncology.      ED PLAN  -Cardiac monitoring for irregular HR/PVC  -Echocardiogram to assess pericardial effusion  -Consider starting heparin drip with DVT  -Admission for further management of DVT (IVC filter vs anticoagulation), cardiac monitoring, determine plan for biopsy/management of left lower lobe mass    Ignacio Ramirez PA-C  [facility]  [address], [place] [id number] [phone]

## 2019-10-15 PROBLEM — R91.8 LUNG MASS: Status: ACTIVE | Noted: 2018-12-05

## 2019-10-15 PROBLEM — I31.39 PERICARDIAL EFFUSION: Status: ACTIVE | Noted: 2019-01-01

## 2019-10-15 PROBLEM — I82.4Y1 DEEP VEIN THROMBOSIS (DVT) OF PROXIMAL VEIN OF RIGHT LOWER EXTREMITY, UNSPECIFIED CHRONICITY (H): Status: ACTIVE | Noted: 2019-01-01

## 2019-10-15 NOTE — NURSING NOTE
"Oncology Rooming Note    October 15, 2019 9:38 AM   Hiram Tapia is a 59 year old male who presents for:    Chief Complaint   Patient presents with     Blood Draw     labs drawn with piv start by rn.  vs taken     Initial Vitals: /73 (BP Location: Right arm, Patient Position: Sitting, Cuff Size: Adult Large)   Pulse 106   Temp 99.4  F (37.4  C) (Oral)   Resp 16   Wt 88 kg (194 lb 1.6 oz)   SpO2 97%   BMI 27.85 kg/m   Estimated body mass index is 27.85 kg/m  as calculated from the following:    Height as of 8/13/19: 1.778 m (5' 10\").    Weight as of this encounter: 88 kg (194 lb 1.6 oz). Body surface area is 2.08 meters squared.  No Pain (0) Comment: Data Unavailable   No LMP for male patient.  Allergies reviewed: Yes  Medications reviewed: Yes    Medications: Medication refills not needed today.  Pharmacy name entered into [facility]:    [facility] [place], [place] - [address] [place] [address] [phone]  [id number] [place], [place] - [facility] [address] [facility]  [address]    Clinical concerns: Lab results       Dave Rodriguez , EMT            "

## 2019-10-15 NOTE — LETTER
RE: Hiram Tapia  [address]  [place] [address]       Oncology/Hematology Visit Note  Oct 15, 2019    Reason for Visit: Follow up of UPS of the right thigh    History of Present Illness: Hiram Tapia is a 59 year old male with no significant PMH with UPS of the right thigh. He has a history of right leg pain with sciatica x 20 years. In October 2017 he had more pain and injured his leg on a truck which eventually led to imaging that revealed a mass. 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     He was started on Doxil/Ifos 3/23/18 and completed 4 cycles with positive response to treatment. Of not he did complete 6 months of Xarelto during this time for incidental PE. He then underwent preoperative XRT. He underwent resection 9/17/18 with <5% viable cells on path and negative margins with no LVI.    Then on surveillance imaging October 2018 he was noted to have lung nodules. He underwent biopsy December 2018 that revealed metastatic sarcoma. Dr. Johnson recommended treatment with Keytruda in February 2019 however it is unclear to me why this was never started.    He underwent restaging CT 4/2/19. This revealed large right and trace left pneumothoraces along with worsening metastatic disease with increased lung nodules, increased lymphadenopathy, and new lytic lesion with associated soft tissue mass in posterior right femoral intertrochanteric region. He was admitted through the ED. Thoracic placed a pigtail but he had re-expansion. Thoracic surgery performed talc pleurodesis 4/3/19. He was discharged home 4/5/19.    He was started on Keytruda 4/9/19. He saw ortho for worsening right thigh pain thought 2/2 metastatic lesion in right femur and they discussed nailing. During his pre-op work-up CXR revealed persistent right sided pneumothorax for which he was admitted 4/12/19. Repeat talc pleurodesis performed 4/16/19. He underwent right intramedually  armindaning 4/15/19.    He has continued on Keytrua after surgery. Restaging CT 6/28/19 with mostly an increase in pulmonary nodules and a right middle lobe cavitary lesion. Overall Dr. Johnson felt it was a mixed response so plan on continuing for 2 more cycles and then repeat CT. Of note port was removed 7/17/19.    He had worsening SOB and pleuritic pain 7/24/19 and CT imaging with concerns for progressive disease in addition to large right pleural effusion. He underwent thoracentesis for this 7/25/19. He was switched to Gemzar due to progression on Keytruda, cycle 1 7/24/19.      Repeat CT 8/12/19 with mixed response, possibly delayed immune response. He continued on Gemzar. CT 9/23/19 (after 3 cycles) with overall stable to improved disease, however did have new pericardial fluid and hydropneumothorax with necrotic left lung mass vs empyema. He met with surgery 9/24/19 and discussed left thoracotomy, decortication, possible wedge resection, possible pericardial window, possible diaphragm plication for the enlarging left lower lobe fluid collection with possible pericardial fistulization. The patient however declined surgery. CT 10/1/19 was stable to slight improvement in left lower lobe fluid collection and pericardial effusion.     As such he has continued on Gemzar. He returns today for oncology follow-up and consideration of cycle 4 day 8 Gemzar.     Interval History:  Mr. Tapia returns to clinic today unaccompanied. He is doing well. His main complaint is progressive KONG. He states he was more active this last weekend and noted he was getting winded with activity. He also had general fatigue and overall feeling worn out after he was working in the woods. He denies SOB at rest. No chest pain or chest tightness. He still has discomfort in his left chest in area of fluid collection but states this seems to be improving. He denies recurrent chest pressure like with his prior pleural effusion. He denies fevers or  chills or cough.    He does get slight headaches after Gemzar that resolve on their own. No mouth sores and he is eating and drinking OK. He has mild nausea after treatment but does not need antiemetics. No vomiting. No abdominal pain. Constipation well managed with Miralax PRN. No urinary issues. No bleeding, rashes, or neuropathy. He does have swelling in his right leg which is not new but seems to be slightly worse with being more active this last week.       No current outpatient medications on file.     Physical Examination:  /73 (BP Location: Right arm, Patient Position: Sitting, Cuff Size: Adult Large)   Pulse 106   Temp 99.4  F (37.4  C) (Oral)   Resp 16   Wt 88 kg (194 lb 1.6 oz)   SpO2 97%   BMI 27.85 kg/m     Wt Readings from Last 10 Encounters:   10/03/19 89.4 kg (197 lb)   09/24/19 87.1 kg (192 lb)   09/24/19 87.4 kg (192 lb 9.6 oz)   09/12/19 89.3 kg (196 lb 14.4 oz)   09/04/19 88.2 kg (194 lb 6.4 oz)   08/20/19 86.2 kg (190 lb 1.6 oz)   08/13/19 84.4 kg (186 lb)   07/31/19 86.1 kg (189 lb 12.8 oz)   07/24/19 89.7 kg (197 lb 11.2 oz)   07/17/19 87.5 kg (193 lb)     Constitutional: Well-appearing male in no acute distress.  Eyes: EOMI, PERRL. No scleral icterus.  ENT: Oral mucosa is moist without lesions or thrush.   Lymphatic: Neck is supple without cervical or supraclavicular lymphadenopathy.   Cardiovascular: Slightly tachycardic rate and regular rhythm. No murmurs, gallops, or rubs. Right leg 1+ peripheral edema, left leg trace edema.  Respiratory: Non-labored breathing. Walked in clinic, SpO2 97%, HR up to 120. Equal breath sounds. Crackles in mid left lung. No wheezing.  Gastrointestinal: Bowel sounds present. Abdomen soft, non-tender. No palpable hepatosplenomegaly or masses.   Neurologic: Cranial nerves II through XII are grossly intact.  Skin: No rashes, petechiae, or bruising noted on exposed skin.    Laboratory Data:  Results for JAYLENE CHAO (MRN [id number]) as of 10/15/2019  10:51   10/15/2019 09:07   Sodium 135   Potassium 4.2   Chloride 105   Carbon Dioxide 23   Urea Nitrogen 15   Creatinine 0.86   GFR Estimate >90   GFR Estimate If Black >90   Calcium 9.1   Anion Gap 7   Magnesium 2.4 (H)   Phosphorus 2.9   Albumin 2.7 (L)   Protein Total 6.6 (L)   Bilirubin Total 0.3   Alkaline Phosphatase 140   ALT 20   AST 15   Glucose 95   WBC 8.1   Hemoglobin 7.3 (L)   Hematocrit 24.8 (L)   Platelet Count 381   RBC Count 3.19 (L)   MCV 78   MCH 22.9 (L)   MCHC 29.4 (L)   RDW 20.4 (H)   Diff Method Automated Method   % Neutrophils 71.4   % Lymphocytes 16.7   % Monocytes 11.4   % Eosinophils 0.1   % Basophils 0.2   % Immature Granulocytes 0.2   Nucleated RBCs 0   Absolute Neutrophil 5.7   Absolute Lymphocytes 1.4   Absolute Monocytes 0.9   Absolute Eosinophils 0.0   Absolute Basophils 0.0   Abs Immature Granulocytes 0.0   Absolute Nucleated RBC 0.0       Assessment and Plan:  1. 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 Biopsy December 2018 confirmed metastatic sarcoma. Due to unclear reasons there was a delay in starting Keytruda, finally able to receive 4/9/19 and baseline CT did confirm progressive disease. He received 5 cycles of Keytruda but had ongoing disease progression.    Switched to Gemzar 7/24/19. Has overall been tolerating well. He has had a positive response to this. Returns today for cycle 4 day 8. Will continue with treatment as planned. Will see him back in two weeks for cycle 5 day 1, then he will get CT CAP and see Dr. oJhnson.    2. Heme  Progressive anemia since starting Gemzar. No signs or symptoms of bleeding. He is symptomatic with KONG, fatigue. Today hgb low at 7.3. Will transfuse x 2 units pRBC. Asked him to call if his symptoms are not improved by the end of this week, in which  case we may need to look for other etiologies for his symptoms. Remainder of CBC stable.     3. Pulm  Recurrent right sided pneumothorax s/p 2 hospitalizations and TALC pleurodesis 4/3 and again 4/16. Underwent therapeutic thoracentesis 7/25/19 for left pleural effusion. Most recently has been dealing with new left lower lobe fluid collection with possible pericardial fistulization, he declined surgery for this and repeat CT imaging have shown improvement in this area.    Today he still has mild left lower lobe discomfort but states this is stable. He does have KONG but suspect this is more from anemia given no other chest symptoms. Walked in clinic and sating well 97% on RA. Has stable crackles on exam. Discussed with patient-will hold off on repeating CT until early November as planned however if he doesn't feel better after pRBC or he has new/worsening symptoms will do imaging sooner.    4. MSK  Restaging CT with lytic lesion with associated soft tissue mass arising from the posterior right femoral intertrochanteric region. He underwent right femur intramedullary pinning 4/15/19 by Dr. Betts. Does not need any pain meds.    Intermittent right lower extremity edema, slightly worse today. Will get lower extremity US to r/o DVT.    Addendum: Right lower extremity US positive for DVT. Will get CT PE to further assess SOB in setting of positive DVT study.    Addendum: CT PE negative for PE but the left lower lobe mass/fluid collection has enlarged with worsening pericardial effusion and invasion into the pericardium. Spoke with thoracic team who notes he needs surgery, though patient is still very resistant to this. With the worsening pericardial effusion he needs an echo and at least a discussion with thoracic about management, either surgery or at least biopsy to see what this area is. He also needs anticoagulation with DVT however given his history of hemoptysis, epistaxis, and current anemia this is not ideal.  Also if he does need surgery this would need to be held.    Discussed with patient. Given findings on CT, need for additional work-up/managmenet, and possible need for IVC filter will admit patient to the hospital.     Of note he did develop frequent PVC during blood transfusion so blood was stopped. He notes he has had this before though I cannot find record of this. Will request telemetry bed.    Due to time of day and concerns for needing cardiac monitoring and echo will go to ED first and then admit to oncology.      ED PLAN  -Cardiac monitoring for irregular HR/PVC  -Echocardiogram to assess pericardial effusion  -Consider starting heparin drip with DVT  -Admission for further management of DVT (IVC filter vs anticoagulation), cardiac monitoring, determine plan for biopsy/management of left lower lobe mass    Ignacio Ramirez PA-C  [facility]  [address] [place] [address]  [phone]

## 2019-10-15 NOTE — NURSING NOTE
Chief Complaint   Patient presents with     Blood Draw     labs drawn with piv start by rn.  vs taken     Labs drawn with PIV start by rn.  Pt tolerated well.  VS taken and pt checked in for next appt.  Elise Ridley RN

## 2019-10-15 NOTE — PROGRESS NOTES
Rapid Response Epic Documentation     Situation: Called initially for assistants with EKG in [address]. Upon arrival pt lying in bed receiving blood transfusion.     Objective:Pt developed irregular HR while receiving infusion.  Pt denies CP or increased SOB. RN and MD at bedside. Pt also found to have DVT today and will be transferred to the ED for further care.     Assessment:    Vitals:   BP:  111/70    Pulse: 78  Respiration: 14  SPO2: 96 %  Glucose: N/A  Mental Status: Alert  CMS: Intact  Stroke Scale: Not Applicable  EKG: Performed, sinus with freguent PVC's      Plan:    Pt transferred to ED for further care.       Location:  Infusion [address].    Disposition:      Transfer to Emergency Room Via: 911    Protocol Used:

## 2019-10-15 NOTE — PATIENT INSTRUCTIONS
[facility] Triage and after hours / weekends / holidays:  [phone]    Please call the triage or after hours line if you experience a temperature greater than or equal to 100.5, shaking chills, have uncontrolled nausea, vomiting and/or diarrhea, dizziness, shortness of breath, chest pain, bleeding, unexplained bruising, or if you have any other new/concerning symptoms, questions or concerns.      If you are having any concerning symptoms or wish to speak to a provider before your next infusion visit, please call your care coordinator or triage to notify them so we can adequately serve you.     If you need a refill on a narcotic prescription or other medication, please call before your infusion appointment.             October 2019 Sunday Monday Tuesday Wednesday Thursday Friday Saturday             1    CT CHEST/ABDOMEN/PELVIS W   4:20 PM   (20 min.)   CT1   [facility] CT 2     3    [facility] DRAW   2:30 PM   (15 min.)   [facility]    [facility] RETURN   2:45 PM   (30 min.)   Gaurang Johnson MD   [facility] ONC INFUSION 120   3:30 PM   (120 min.)    ONCOLOGY INFUSION   [facility] 4     5       6     7     8     9     10     11     12       13     14     15    [facility] DRAW   9:00 AM   (15 min.)   [facility] D[facility]   [facility] Lab Draw    [facility] RETURN   9:45 AM   (50 min.)   Ignacio Ramirez PA   [facility] ONC INFUSION 120  11:00 AM   (120 min.)    ONCOLOGY INFUSION   [facility]    US LWR EXT VENOUS DUPLEX RIGHT   1:15 PM   (30 min.)   UCUSV1   [facility] US    CT CHEST PULMONARY EMBOLISM W   5:20 PM   (20 min.)   CT2   [facility] CT 16     17     18     19       20     21     22     23     24     25     26       27     28     29    [facility] DRAW   8:30 AM   (15 min.)   [facility] DRAW   [facility]    [facility] RETURN   8:55 AM   (50 min.)   Ignacio Ramirez PA   [facility] ONC INFUSION 120  11:30 AM   (120 min.)    ONCOLOGY INFUSION   [facility] 30 31 November 2019 Sunday Monday Tuesday Wednesday Thursday Friday Saturday                            1    CT CHEST/ABDOMEN/PELVIS W  10:30 AM   (20 min.)   UCCT2   [facility] CT 2       3     4     5    [facility] DRAW   9:00 AM   (15 min.)    [facility] DRAW   [facility] Lab Draw    [facility] RETURN   9:45 AM   (30 min.)   Gaurang Johnson MD   [facility] ONC INFUSION 120  10:30 AM   (120 min.)    ONCOLOGY INFUSION   [facility] 6     7     8     9       10     11     12     13     14     15     16       17     18     19     20     21     22     23       24     25     26    [facility] DRAW   8:30 AM   (15 min.)    [facility] DRAW   [facility] Lab Draw    [facility] RETURN   9:15 AM   (30 min.)   Gaurang Johnson MD   [facility] ONC INFUSION 120  10:00 AM   (120 min.)    ONCOLOGY INFUSION   [facility] 27     28     29     30                    Recent Results (from the past 24 hour(s))   CBC with platelets differential    Collection Time: 10/15/19  9:07 AM   Result Value Ref Range    WBC 8.1 4.0 - 11.0 10e9/L    RBC Count 3.19 (L) 4.4 - 5.9 10e12/L    Hemoglobin 7.3 (L) 13.3 - 17.7 g/dL    Hematocrit 24.8 (L) 40.0 - 53.0 %    MCV 78 78 - 100 fl    MCH 22.9 (L) 26.5 - 33.0 pg    MCHC 29.4 (L) 31.5 - 36.5 g/dL    RDW 20.4 (H) 10.0 - 15.0 %    Platelet Count 381 150 - 450 10e9/L    Diff Method Automated Method     % Neutrophils 71.4 %    % Lymphocytes 16.7 %    % Monocytes 11.4 %    % Eosinophils 0.1 %    % Basophils 0.2 %    % Immature Granulocytes 0.2 %    Nucleated RBCs 0 0 /100    Absolute Neutrophil 5.7 1.6 - 8.3 10e9/L     Absolute Lymphocytes 1.4 0.8 - 5.3 10e9/L    Absolute Monocytes 0.9 0.0 - 1.3 10e9/L    Absolute Eosinophils 0.0 0.0 - 0.7 10e9/L    Absolute Basophils 0.0 0.0 - 0.2 10e9/L    Abs Immature Granulocytes 0.0 0 - 0.4 10e9/L    Absolute Nucleated RBC 0.0    Phosphorus    Collection Time: 10/15/19  9:07 AM   Result Value Ref Range    Phosphorus 2.9 2.5 - 4.5 mg/dL   Magnesium    Collection Time: 10/15/19  9:07 AM   Result Value Ref Range    Magnesium 2.4 (H) 1.6 - 2.3 mg/dL   Comprehensive metabolic panel    Collection Time: 10/15/19  9:07 AM   Result Value Ref Range    Sodium 135 133 - 144 mmol/L    Potassium 4.2 3.4 - 5.3 mmol/L    Chloride 105 94 - 109 mmol/L    Carbon Dioxide 23 20 - 32 mmol/L    Anion Gap 7 3 - 14 mmol/L    Glucose 95 70 - 99 mg/dL    Urea Nitrogen 15 7 - 30 mg/dL    Creatinine 0.86 0.66 - 1.25 mg/dL    GFR Estimate >90 >60 mL/min/[1.73_m2]    GFR Estimate If Black >90 >60 mL/min/[1.73_m2]    Calcium 9.1 8.5 - 10.1 mg/dL    Bilirubin Total 0.3 0.2 - 1.3 mg/dL    Albumin 2.7 (L) 3.4 - 5.0 g/dL    Protein Total 6.6 (L) 6.8 - 8.8 g/dL    Alkaline Phosphatase 140 40 - 150 U/L    ALT 20 0 - 70 U/L    AST 15 0 - 45 U/L   ABO/Rh type and screen    Collection Time: 10/15/19  9:09 AM   Result Value Ref Range    Units Ordered 2     ABO A     RH(D) Neg     Antibody Screen Neg     Test Valid Only At          [facility]    Specimen Expires 10/18/2019     Crossmatch Red Blood Cells    Blood component    Collection Time: 10/15/19  9:09 AM   Result Value Ref Range    Unit Number [id number]     Blood Component Type Red Blood Cells Leukocyte Reduced     Division Number 00     Status of Unit Released to care unit 10/15/2019 1720     Blood Product Code [id number]     Unit Status ISS    Blood component    Collection Time: 10/15/19  9:09 AM   Result Value Ref Range    Unit Number [id number]     Blood Component Type Red Blood Cells Leukocyte Reduced     Division Number 00      Status of Unit Released to care unit 10/15/2019 1403     Blood Product Code [id number]     Unit Status ISS    US Lower Extremity Venous Duplex RT    Collection Time: 10/15/19  1:48 PM   Result Value Ref Range    Radiologist flags DVT (Urgent)    CT Chest Pulmonary Embolism w Contrast    Collection Time: 10/15/19  4:14 PM   Result Value Ref Range    Radiologist flags See impression (Urgent)

## 2019-10-15 NOTE — PROGRESS NOTES
Infusion Nursing Note:  Hiram Tapia presents today for cycle 4, day 8 gemzar, 2 units PRBC's.    Patient seen by provider today: Yes: SENAIT Carter   present during visit today: Not Applicable.    Note:   10/15/2019 1035 TORB SENAIT Carter/Cleopatra HarpRN  -ok to give gemzar today  -transfuse 2 units PRBC's if time allows today for Hgb 7.3  -send patient for an ultrasound of his right lower extremity at 1330 today      Intravenous Access:  Peripheral IV placed in lab  After gemzar was complete, PIV had positive blood return but was very painful to flush and slightly swollen and hard above the site.  PIV removed. Hot packs applied to the site. Patient instructed to continue heat to the site 4x a day and monitor for increasing pain, swelling, or redness. He verbalized understanding.    New PIV placed for PRBC transfusion.   Patient also had a PIV placed in CT that was removed by them after the imaging.     Treatment Conditions:  Lab Results   Component Value Date    HGB 7.3 10/15/2019     Lab Results   Component Value Date    WBC 8.1 10/15/2019      Lab Results   Component Value Date    ANEU 5.7 10/15/2019     Lab Results   Component Value Date     10/15/2019      Lab Results   Component Value Date     10/15/2019                   Lab Results   Component Value Date    POTASSIUM 4.2 10/15/2019           Lab Results   Component Value Date    MAG 2.4 10/15/2019            Lab Results   Component Value Date    CR 0.86 10/15/2019                   Lab Results   Component Value Date    JAYESH 9.1 10/15/2019                Lab Results   Component Value Date    BILITOTAL 0.3 10/15/2019           Lab Results   Component Value Date    ALBUMIN 2.7 10/15/2019                    Lab Results   Component Value Date    ALT 20 10/15/2019           Lab Results   Component Value Date    AST 15 10/15/2019       Results reviewed, labs MET treatment parameters, ok to proceed with treatment.  Blood  "transfusion consent signed 10/15/2019.    Patient needed an US of his right lower extremity.  He ambulated down to ultrasound after his gemzar was complete.  Patient returned to infusion following ultrasound.  A new PIV was placed for his blood transfusion because his original PIV was symptomatic and pulled after his gemzar.  As RN was going to hang patient's first unit of PRBC's, SENAIT Carter called and stated that patient had a DVT and needed a CT scan of his chest.    10/15/2019 1410 TORB SENAIT Carter/Cleopatra Harp RN  -transfuse 1 unit PRBC's now and then send patient down to CT scan    At the end of patient's first unit of PRBC\"s RN noted the patient to be diaphoretic. Vital signs were stable. Patient had beads of sweat on his forehead and the hair on the back of his head was wet. Patient stated that he was a little cold but otherwise felt fine.  Patient appeared to be very uncomfortable but just kept stating that he was fine.  He later reported that he \"just has oily skin.\"  He stated that the infusion chairs were very uncomfortable for him but denied any new symptoms. Patient ambulated down to CT scan and SENAIT Carter updated.  She advised holding the patient's second unit of PRBC's until she got the CT results. Patient returned from CT and stated that he felt nauseated.  RN offered to get an order for antiemetics, but patient declined and stated that \"his tank was just empty\" because he hadn't eaten much today.  RN offered to get patient food/drinks and he drank some apple juice.  Patient was given a bed to lay in for comfort. SENAIT Clarke came to infusion and discussed patient's CT results with him. Patient sated that he was feeling much more comfortable laying in a bed instead of the infusion chair. He was contemplating hospital admission as recommended by Ignacio.     10/15/2019 1715 TORB SENAIT Carter/Cleopatra Harp RN  -transfuse patient's 2nd unit of PRBC's " "now    At 1805 RN went to assess patient as SENAIT Carter was coming to the bedside to discuss the plan. RN placed the pulse oximeter on patient's finger and noted patient's heart rate to be jumping from 70s to 120s. RN palpated patient's pulse and it was irregular. Ignacio BELTRAN assessed and ordered an EKG.  Patient stated that he had palpitations at the time, he \"gets them all the time.\" Ignacio discussed the plan with the patient and decided on hospital admission.    10/15/2019 1810 TORB SENAIT Carter/Cleopatra Harp RN  -stop blood transfusion now, don't restart prior to admission    Patient received approximately 150cc of the 2nd unit PRBC's prior to stopping it.   EMS was called to transport patient to the hospital.    Ignacio called report to the ED prior to patient discharge.       Post Infusion Assessment:  Patient tolerated infusion poorly due to the above findings  Blood return noted pre and post infusion.  Site patent and intact, free from redness, edema or discomfort.  No evidence of extravasations.  PIV saline locked and left in place at the time of discharge.       Discharge Plan:   Patient declined prescription refills.  Discharge instructions reviewed with: Patient.  Patient and/or family verbalized understanding of discharge instructions and all questions answered.  Copy of AVS reviewed with patient and/or family.  Patient will return 10/29/19 for next infusion appointment.  Patient discharged in stable condition accompanied by: self, EMS at 1843  Departure Mode: Litter  Face to Face time: 20 minutes.    Cleopatra Harp RN, RN                        "

## 2019-10-15 NOTE — LETTER
Transition Communication Hand-off for Care Transitions to Next Level of Care Provider    Name: Hiram Tapia  : 1960  MRN #: [id number]  Primary Care Provider: Louisa Fry     Primary Clinic: [facility] [address]  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  Patient declined drainage of pericardial effusion.  IVC filter placed by IR today.  Plan to start Pazopanib outpatient given disease progression.          Discharge Plan: home with clinic followup    Follow-up plan:    Future Appointments   Date Time Provider Department Center   10/21/2019  5:15 PM  [facility] DRAW [facility]   10/21/2019  6:00 PM Margarette Hodgson PA-C [facility] [place]   10/29/2019  8:30 AM  [facility]RAW [facility]   10/29/2019  9:10 AM Ignacio Ramirez PA [facility] [place]   10/29/2019 11:30 AM [facility]Y INFUSION [facility] [place]   2019 11:00 AM UCCT2 [facility]   2019  9:00 AM  [facility]   2019 10:00 AM Gaurang Johnson MD [facility] [place]   2019 10:30 AM [facility] ONCOLOGY INFUSION [facility] [place]   2019  8:30 AM  [facility] DRAW [facility]   2019  9:30 AM Gaurang Johnson MD [facility] [place]   2019 10:00 AM [facility] ONCOLOGY INFUSION [facility] [place]           Key Recommendations:  See aVS    Sahra Alvarez RN    AVS/Discharge Summary is the source of truth; this is a helpful guide for improved communication of patient story

## 2019-10-16 NOTE — PROGRESS NOTES
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      # Dyspnea on exertion   # Pericardial effusion   # Preventricular complexes  Developed shortness of breath, especially with exertion over the last week/weekend with working out in the woods. In the clinic, this was initially attributed to low hemoglobin. Continues to have stable hemoptysis, as well as stable chest pressure on the left side associated with the left pleural effusion. With RLE edema (chronic but worse on evaluation in clinic), he had a RLE US which showed partially occlusive deep venous thrombus of the right common femoral vein. Because of this and his shortness of breath, he underwent a CTA chest which showed no PE, but did show more enlargement of the lingular mass and with more clear invasion into the pericardium abutting the left ventricular free wall (but without extension into the ventricular chamber), as well as increased pericardial effusion to now moderate in size. Left pleural effusion judged as small. Because of this finding and tachycardia with irregular heart beat during 2nd unit of blood, he was sent to the ED for further evaluation. EKG with sinus tachycardia and PVCs.   - Daily lytes and replete Mg > 2, phos >3, K > 4  - Telemetry monitoring. BNP elevated 1,200 on admission, Trop negative x2.   - Consulted cardiology who performed a bedside echo on admission. Per cardiology read, the effusion appears small, with no tamponade physiology. Complete TTE (10/16/2019) noted above with normal heart function EF  60-65%, but moderate pleural effusion, concern for possible invasion of pericardium causing PVCs. Thoracic surgery consulted for palliative window / biopsy, patient declines at this time. Cardiology signed off.      # SIRs  - Overnight 10/16 febrile Tmax 101, tachy 126. Vitals otherwise stable. BCx, UCx drawn. LA 1.0. Occasional cough productive of bloody sputum, chronic. No other signs or symptoms of infection.   - RVP, sputum culture, s pneumo, legionella pending   - IV Zosyn (10/16 - x)     # R leg DVT  # H/o PE  Found on RLE US 10/15 prior to admission, as discussed above. Of note he has had a history of PE 2018 s/p 6 months of rivaroxaban.  - He has a history of ongoing daily hemoptysis so will hold off on heparin gtt for now. No other signs or symptoms of bleeding.   - Coags stable, INR 1.26, PTT <20, Fibrinogen 607 on admission.   - He is open to a IVC filter, IR consulted. Plan to place tomorrow 10/17. NPO after MN.      # Anemia  # Hemoptysis, chronic x months   Hgb has been drifting down from 11-12 early summer to 7.3 in clinic on day of admission. Received PRBCs 2 units prior to admission. Continues to have hemoptysis likely related to lung metastases, which is stable. This is most likely cause for the anemia, given lack of other evidence of occult bleeding.  - Monitor hgb and transfuse for Hgb < 7  - CBC Daily.      # Metastastic undifferentiated pleomorphic sarcoma  # Lingular metastatic lung mass  # History of malignant pleural effusions and pneumothoraces  Diagnosed via biopsy 3/8/18. Started liposomal doxorubicin and ifosfamide 3/23/18 and completed 4 cycles with positive response. Underwent preoperative XRT, then resection 9/17/18 with <5% viable cells on path and negative margins with no LVI. Noted lung nodules on surveillance imaging 10/2018, biopsy + for metastatic sarcoma. Pembrolizumab recommended but never started. CT 4/2/19 showed worsening metastatic disease with increased lung nodules,  increased LAD, and new lytic lesion with associated soft tissue mass in posterior right femoral intertrochanteric region, as well as large right pneumothorax and trace left pneumothorax. S/p pigtail, then talc pleurodesis 4/3/19. Started pembrolizumab 4/9/19. Repeat talc pleurodesis done 4/16/19 for persistent R pneumothorax. Underwent right intramedullary pinning 4/15/19 for metastatic lesion in femur. Continued pembrolizumab, but then with progressive disease with large right pleural effusion switched to gemcitabine 7/24/19. CT 9/2319 with new pericardial fluid and hydropneumothorax with necrotic lung mass vs empyema. Met with surgery 9/24/19 and discussed left thoracotomy, decortication, possible wedge resection, possible pericardial window, possible diaphragm plication for the enlarging left lower lobe fluid collection with possible pericardial fistulization. Patient declined surgery. Follow up CT 10/1 stable with slight improvement in LLL fluid collection and pericardial effusion. Continues on gemcitabine. Was due for dose 10/15/19 but held because of need for admission. CTA chest showed no PE, but did show more enlargement of the lingular mass and with more clear invasion into the pericardium abutting the left ventricular free wall (but without extension into the ventricular chamber), as well as increased pericardial effusion to now moderate in size. Left pleural effusion judged as small.   - Consulted cardiothoracic surgery regarding possible biopsy/wedge resection lingular lesion, possible pericardial window. Patient declines surgery at this time.     # FEN  - IVFs: encourage PO, no MIVF   - Diet: regular as tolerated, NPO p MN.      # PPX  - DVT: holding pharmacological prophylaxis with history of hemoptysis, as well as possible procedures  - Bowel: senna-colace, miralax PRN     Lines/Drains: PIVs  Consults: Cardiology, Cardiothoracic surgery, IR, PT   CODE: Full (confirmed with patient)     DISPO:  "inpatient > 2 nights for management of pericardial effusion and DVT     Patient discussed with staff attending, Dr Sadler.     Anne-Marie Orr PA-C   Hematology/Oncology   Pager: [phone]     Interval History   David reports to be feeling okay this morning, fatigue and dyspnea much improved after 2 unit pRBCs yesterday given in clinic. Some night sweats overnight and fevers, no chills. Persistent \"maple syrup\" consistency sputum with associated blood, ongoing x months. Better when sitting up or elevating head of bed with sleep. Denies dysuria or changes in bowels. Struggles with constipation at home, no beds for bowels PTA. He spoke with thoracic surgery and declines palliative surgery / window at this time. Is interested thought in IVC filter. No chest pain this AM. Able to walk to bathroom without difficulty.     Physical Exam   Temp: 98.3  F (36.8  C) Temp src: Oral BP: 102/75 Pulse: 125 Heart Rate: 98 Resp: 16 SpO2: 98 % O2 Device: None (Room air)    Vitals:    10/15/19 1914 10/15/19 2028   Weight: 88 kg (194 lb) 86.2 kg (190 lb 0.6 oz)     Vital Signs with Ranges  Temp:  [98.1  F (36.7  C)-101  F (38.3  C)] 98.3  F (36.8  C)  Pulse:  [] 125  Heart Rate:  [] 98  Resp:  [16-26] 16  BP: ()/() 102/75  SpO2:  [92 %-98 %] 98 %  I/O last 3 completed shifts:  In: 240 [P.O.:240]  Out: 100 [Emesis/NG output:100]    Constitutional: Pleasant male seen laying in bed. No apparent distress, and appears stated age.   Eyes: Lids and lashes normal, sclera clear, conjunctiva normal.  ENT: Normocephalic, without obvious abnormality, atraumatic, oral pharynx with moist mucus membranes, tonsils without erythema or exudates, gums normal and good dentition. No thrush, no blood.  Respiratory: No increased work of breathing, good air exchange, reduced lung sounds / course left lower lobe.   Cardiovascular: Normal apical impulse, tachy rate and rhythm, normal S1 and S2, and no murmur noted.  GI: No masses " or scars. +BS. Soft. No tenderness on palpation.  Lymph/Hematologic: No cervical lymphadenopathy and no supraclavicular lymphadenopathy.  Skin: No bruising or bleeding, no rashes, no lesions, no jaundice.  Extremities: There is no redness, warmth, or swelling of the joints. R lower ext edema R>L.  No cyanosis.  Neurologic: Awake, alert, oriented to name, place and time.    Vascular access: PIV     Medications     - MEDICATION INSTRUCTIONS -         piperacillin-tazobactam  3.375 g Intravenous Q6H     senna-docusate  1 tablet Oral BID    Or     senna-docusate  2 tablet Oral BID     sodium chloride (PF)  3 mL Intracatheter Q8H       Data   Results for orders placed or performed during the hospital encounter of 10/15/19 (from the past 24 hour(s))   EKG 12-lead, tracing only   Result Value Ref Range    Interpretation ECG Click View Image link to view waveform and result    Troponin I   Result Value Ref Range    Troponin I ES <0.015 0.000 - 0.045 ug/L   Magnesium   Result Value Ref Range    Magnesium 2.1 1.6 - 2.3 mg/dL   Phosphorus   Result Value Ref Range    Phosphorus 2.7 2.5 - 4.5 mg/dL   Nt probnp inpatient   Result Value Ref Range    N-Terminal Pro BNP Inpatient 1,218 (H) 0 - 900 pg/mL   Lactic acid whole blood   Result Value Ref Range    Lactic Acid 1.0 0.7 - 2.0 mmol/L   CBC with platelets   Result Value Ref Range    WBC 8.3 4.0 - 11.0 10e9/L    RBC Count 3.62 (L) 4.4 - 5.9 10e12/L    Hemoglobin 8.7 (L) 13.3 - 17.7 g/dL    Hematocrit 28.8 (L) 40.0 - 53.0 %    MCV 80 78 - 100 fl    MCH 24.0 (L) 26.5 - 33.0 pg    MCHC 30.2 (L) 31.5 - 36.5 g/dL    RDW 20.5 (H) 10.0 - 15.0 %    Platelet Count 417 150 - 450 10e9/L   Basic metabolic panel   Result Value Ref Range    Sodium 136 133 - 144 mmol/L    Potassium 4.1 3.4 - 5.3 mmol/L    Chloride 105 94 - 109 mmol/L    Carbon Dioxide 20 20 - 32 mmol/L    Anion Gap 11 3 - 14 mmol/L    Glucose 102 (H) 70 - 99 mg/dL    Urea Nitrogen 13 7 - 30 mg/dL    Creatinine 0.81 0.66 - 1.25  mg/dL    GFR Estimate >90 >60 mL/min/[1.73_m2]    GFR Estimate If Black >90 >60 mL/min/[1.73_m2]    Calcium 8.8 8.5 - 10.1 mg/dL   Magnesium   Result Value Ref Range    Magnesium 2.1 1.6 - 2.3 mg/dL   Phosphorus   Result Value Ref Range    Phosphorus 2.8 2.5 - 4.5 mg/dL   UA with Microscopic reflex to Culture   Result Value Ref Range    Color Urine Yellow     Appearance Urine Clear     Glucose Urine Negative NEG^Negative mg/dL    Bilirubin Urine Negative NEG^Negative    Ketones Urine Negative NEG^Negative mg/dL    Specific Gravity Urine 1.018 1.003 - 1.035    Blood Urine Negative NEG^Negative    pH Urine 6.5 5.0 - 7.0 pH    Protein Albumin Urine 10 (A) NEG^Negative mg/dL    Urobilinogen mg/dL Normal 0.0 - 2.0 mg/dL    Nitrite Urine Negative NEG^Negative    Leukocyte Esterase Urine Negative NEG^Negative    Source Midstream Urine     WBC Urine 0 0 - 5 /HPF    RBC Urine 0 0 - 2 /HPF    Bacteria Urine Few (A) NEG^Negative /HPF    Mucous Urine Present (A) NEG^Negative /LPF   Troponin I   Result Value Ref Range    Troponin I ES <0.015 0.000 - 0.045 ug/L   INR   Result Value Ref Range    INR 1.26 (H) 0.86 - 1.14   Partial thromboplastin time   Result Value Ref Range    PTT <20 (L) 22 - 37 sec   Fibrinogen activity   Result Value Ref Range    Fibrinogen 607 (H) 200 - 420 mg/dL   Echo Complete    Narrative    [id number]  [id number]  [id number]  [id number]^YONY^GISELE^JUAN DANIEL SMITH           [facility],[place]  Echocardiography Laboratory  [address] [place] [id number]     Name: RUT CHAO  MRN: [id number]  : 1960  Study Date: 10/16/2019 07:55 AM  Age: 59 yrs  Gender: Male  Patient Location: [facility]  Reason For Study: Pericardial Effusion  Ordering Physician: GISELE BHAKTA  Performed By: Suzanne Bush RDCS     BSA: 2.0 m2  Height: 70 in  Weight: 190 lb  BP: 106/75 mmHg  _____________________________________________________________________________  __        Procedure  Complete Portable Echo  Adult.  _____________________________________________________________________________  __        Interpretation Summary  Global and regional biventricular function is normal with an LVEF of 60-65%.  No hemodynamically significant valve abnormality.  The inferior vena cava was normal in size with preserved respiratory  variability.  Small circumferential pericardial effusion is present without any hemodynamic  significance.  Hyperechoic material in the pericardial space may be fibrinous or organized  clot.  A left pleural effusion is present.     This study was compared with the study from 10/15/19 and 3/23/18. The small  pericardial effusion is new from 2018 and similar size compared with last  night.  _____________________________________________________________________________  __        Left Ventricle  Global and regional left ventricular function is normal with an EF of 60-65%.  Left ventricular diastolic function is normal. No regional wall motion  abnormalities are seen.     Right Ventricle  The right ventricle is normal size. Global right ventricular function is  normal. There is mild right ventricular hypertrophy. TAPSE is 1.5 cm. TV  annular systolic velocity is 11 cm/s.     Atria  Both atria appear normal.        Mitral Valve  Trace mitral insufficiency is present.     Aortic Valve  Aortic valve is normal in structure and function. The aortic valve is  tricuspid.     Tricuspid Valve  The tricuspid valve is normal. Trace tricuspid insufficiency is present. The  right ventricular systolic pressure is approximated at 12.6 mmHg plus the  right atrial pressure. Pulmonary artery systolic pressure is normal.     Pulmonic Valve  Pulmonic valve not well visualized. On Doppler interrogation, there is no  significant stenosis or regurgitation.     Vessels  The inferior vena cava was normal in size with preserved respiratory  variability. Sinuses of Valsalva 3.9 cm. Ascending aorta 3.7 cm.     Pericardium  Small  circumferential pericardial effusion is present without any hemodynamic  significance. Hyperechoic material in the pericardial space may be fibrinous  or organized clot. Chamber compression is not present; there is no evidence  for tamponade.        Miscellaneous  A left pleural effusion is present.     Compared to Previous Study  This study was compared with the study from 10/15/19 and 3/23/18 .  _____________________________________________________________________________  __     MMode/2D Measurements & Calculations  asc Aorta Diam: 3.7 cm  LVOT diam: 2.8 cm  LVOT area: 6.2 cm2  TAPSE: 1.5 cm        Doppler Measurements & Calculations  MV E max yohannes: 51.6 cm/sec  MV A max yohannes: 67.4 cm/sec  MV E/A: 0.76  MV dec slope: 255.8 cm/sec2  MV dec time: 0.20 sec  TV max P.6 mmHg  TR max yohannes: 177.2 cm/sec  TR max P.6 mmHg  E/E' av.1  Lateral E/e': 7.0  Medial E/e': 7.1        _____________________________________________________________________________  __           Report approved by: Rich Tierney 10/16/2019 10:11 AM

## 2019-10-16 NOTE — H&P
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     Dyspnea on exertion  Pericardial effusion   Preventricular complexes  Developed shortness of breath, especially with exertion over the last week/weekend with working out in the woods. In the clinic, this was initially attributed to low hemoglobin. Continues to have stable hemoptysis, as well as stable chest pressure on the left side associated with the left pleural effusion. With RLE edema (chronic but worse on evaluation in clinic), he had a RLE US which showed partially occlusive deep venous thrombus of the right common femoral vein. Because of this and his shortness of breath, he underwent a CTA chest which showed no PE, but did show more enlargement of the lingular mass and with more clear invasion into the pericardium abutting the left ventricular free wall (but without extension into the ventricular chamber), as well as increased pericardial effusion to now moderate in size. Left pleural effusion judged as small. Because of this finding and tachycardia with irregular heart beat during 2nd unit of blood, he was sent to the ED for further evaluation. EKG with sinus tachycardia and PVCs.  - Check lytes and replete Mg > 2, phos >3, K > 4  - Telemetry monitoring  - Check BNP, troponin  - Consulted cardiology who performed a bedside echo on admission. Per cardiology read, the effusion appears small, with no tamponade physiology  - Check formal echo in AM  - Thoracic surgery consulted as below    R leg  DVT  H/o PE  Found on RLE US 10/15 prior to admission, as discussed above. Of note he has had a history of PE 2018 s/p 6 months of rivaroxaban.  - He has a history of ongoing daily hemoptysis so will hold off on heparin gtt for now.   - Consider IVC filter    Anemia  Hemoptysis  Hgb has been drifting down from 11-12 early summer to 7.3 in clinic on day of admission. Received PRBCs 2 units prior to admission. Continues to have hemoptysis likely related to lung metastases, which is stable. This is most likely cause for the anemia, given lack of other evidence of occult bleeding.  - Monitor hgb and transfuse for Hgb < 7    Metastastic undifferentiated pleomorphic sarcoma  Lingular metastatic lung mass  History of malignant pleural effusions and pneumothoraces  Diagnosed via biopsy 3/8/18. Started liposomal doxorubicin and ifosfamide 3/23/18 and completed 4 cycles with positive response. Underwent preoperative XRT, then resection 9/17/18 with <5% viable cells on path and negative margins with no LVI. Noted lung nodules on surveillance imaging 10/2018, biopsy + for metastatic sarcoma. Pembrolizumab recommended but never started. CT 4/2/19 showed worsening metastatic disease with increased lung nodules, increased LAD, and new lytic lesion with associated soft tissue mass in posterior right femoral intertrochanteric region, as well as large right pneumothorax and trace left pneumothorax. S/p pigtail, then talc pleurodesis 4/3/19. Started pembrolizumab 4/9/19. Repeat talc pleurodesis done 4/16/19 for persistent R pneumothorax. Underwent right intramedullary pinning 4/15/19 for metastatic lesion in femur. Continued pembrolizumab, but then with progressive disease with large right pleural effusion switched to gemcitabine 7/24/19. CT 9/2319 with new pericardial fluid and hydropneumothorax with necrotic lung mass vs empyema. Met with surgery 9/24/19 and discussed left thoracotomy, decortication, possible wedge resection,  possible pericardial window, possible diaphragm plication for the enlarging left lower lobe fluid collection with possible pericardial fistulization. Patient declined surgery. Follow up CT 10/1 stable with slight improvement in LLL fluid collection and pericardial effusion. Continues on gemcitabine. Was due for dose 10/15/19 but held because of need for admission. CTA chest showed no PE, but did show more enlargement of the lingular mass and with more clear invasion into the pericardium abutting the left ventricular free wall (but without extension into the ventricular chamber), as well as increased pericardial effusion to now moderate in size. Left pleural effusion judged as small.   - Consult cardiothoracic surgery regarding possible biopsy/wedge resection lingular lesion, possible pericardial window.   - NPO p MN for now. Surgery to discuss with team tomorrow.    FEN  - IVFs: encourage PO  - Diet: regular as tolerated, NPO p MN    PPX  - DVT: holding pharmacological prophylaxis with history of hemoptysis, as well as possible procedures  - Bowel: senna-colace, miralax PRN    Lines/Drains: PIVs  Consults: Cardiology, Cardiothoracic surgery  CODE: Full  DISPO: inpatient > 2 nights for management of pericardial effusion and DVT    CHIEF COMPLAINT/REASON FOR ADMIT: dyspnea on exertion    HISTORY OF PRESENT ILLNESS  History obtained from chart review and discussion with the patient.     Developed shortness of breath, especially with exertion over the last week/weekend with working out in the woods. He hasn't been able to go down to the [place] of the [place] and back without becoming short of breath. In the clinic, this was initially attributed to low hemoglobin. Continues to have stable hemoptysis, as well as stable chest pressure on the left side associated with the left pleural effusion.     With RLE edema (chronic but worse on evaluation in clinic), he had a RLE US which showed partially occlusive deep venous thrombus of  the right common femoral vein. Because of this and his shortness of breath, he underwent a CTA chest which showed no PE, but did show more enlargement of the lingular mass and with more clear invasion into the pericardium abutting the left ventricular free wall (but without extension into the ventricular chamber), as well as increased pericardial effusion to now moderate in size. Left pleural effusion judged as small. Because of this finding and tachycardia with irregular heart beat during 2nd unit of blood, he was sent to the ED for further evaluation. EKG with sinus tachycardia and PVCs.    Currently, he feels well at rest. Continues to have the chest pressure just left of his sternum which is stable. Otherwise denies HA, nausea, vomiting, diarrhea, abdominal pain, vision changes, hearing changes. The RLE edema comes and goes with exertion. No leg pain.     PMH  Past Medical History:   Diagnosis Date     Pulmonary embolism (H)      Sarcoma (H)        PSH  Past Surgical History:   Procedure Laterality Date     EXCISE SOFT TISSUE TUMOR THIGH Right 3/8/2018    Procedure: EXCISE SOFT TISSUE TUMOR THIGH;  Biopsy Right Thigh Tumor;  Surgeon: Brad Betts MD;  [facility]     EXCISE SOFT TISSUE TUMOR THIGH Right 9/17/2018    Procedure: EXCISE SOFT TISSUE TUMOR THIGH;  Removal Right Thigh Tumor ;  Surgeon: Brad Betts MD;  Location: [facility]     INSERT PORT VASCULAR ACCESS N/A 3/28/2018    Procedure: INSERT PORT VASCULAR ACCESS;  Vascular Access Port Insertion with C-arm;  Surgeon: Sarah Bowie MD;  Location: [facility] OR     IR PORT CHECK RIGHT  6/11/2019     IR PORT REMOVAL RIGHT  7/17/2019     IRRIGATION AND DEBRIDEMENT LOWER EXTREMITY, COMBINED Right 4/6/2018    Procedure: COMBINED IRRIGATION AND DEBRIDEMENT LOWER EXTREMITY;  Irrigation And Debridement Right Thigh ;  Surgeon: Brad Betts MD;  [facility]     IRRIGATION AND DEBRIDEMENT LOWER EXTREMITY, COMBINED Right 4/9/2018    Procedure:  "COMBINED IRRIGATION AND DEBRIDEMENT LOWER EXTREMITY;  Irrigation And Debridement Right Thigh Wound. with Wound VAC Exchange;  Surgeon: Brad Betts MD;  Location[facility]     OPEN REDUCTION INTERNAL FIXATION RODDING INTRAMEDULLARY FEMUR Right 4/15/2019    Procedure: Intramedullary Nail of Right Femur;  Surgeon: Brad Betts MD;  Location: [facility]     REMOVE PORT VASCULAR ACCESS Right 2019    Procedure: Port Removal;  Surgeon: Jacky Duomnt PA-C;  [facility]     STRABISMUS SURGERY      as a child     THORACENTESIS Left 2019    Procedure: THORACENTESIS;  Surgeon: Ashish Gaines MD;  [facility]: [facility]     THORACOSCOPIC WEDGE RESECTION LUNG Right 2018    Procedure: Right Video Assisted Thoracoscopic Wedge Resection;  Surgeon: Sarah Bowie MD;  Location: [facility]       Prior to Admission MEDICATIONS  None     Allergies   Allergies   Allergen Reactions     Hydrofera Blue 4\"X4\" [Wound Dressings] Dermatitis and Blisters     Patient reports that Dressing causes skin irritation, blisters, and drainage.      Tegaderm Ag Mesh [Silver] Dermatitis and Blisters     Patient reports that Dressing causes Skin irritation, blisters, and drainage.       Social History  Social History     Socioeconomic History     Marital status:      Spouse name: Not on file     Number of children: Not on file     Years of education: Not on file     Highest education level: Not on file   Occupational History     Not on file   Social Needs     Financial resource strain: Not on file     Food insecurity:     Worry: Not on file     Inability: Not on file     Transportation needs:     Medical: Not on file     Non-medical: Not on file   Tobacco Use     Smoking status: Former Smoker     Packs/day: 1.00     Years: 10.00     Pack years: 10.00     Types: Cigarettes     Start date: 1975     Last attempt to quit: 1990     Years since quittin.8     Smokeless tobacco: Never Used   Substance and Sexual " Activity     Alcohol use: Yes     Alcohol/week: 14.0 standard drinks     Comment: 1 beer a day      Drug use: No     Sexual activity: Not Currently     Partners: Female   Lifestyle     Physical activity:     Days per week: Not on file     Minutes per session: Not on file     Stress: Not on file   Relationships     Social connections:     Talks on phone: Not on file     Gets together: Not on file     Attends Yazdanism service: Not on file     Active member of club or organization: Not on file     Attends meetings of clubs or organizations: Not on file     Relationship status: Not on file     Intimate partner violence:     Fear of current or ex partner: Not on file     Emotionally abused: Not on file     Physically abused: Not on file     Forced sexual activity: Not on file   Other Topics Concern     Parent/sibling w/ CABG, MI or angioplasty before 65F 55M? Not Asked   Social History Narrative     Not on file       Family History  Family History   Problem Relation Age of Onset     Leukemia Father      - Family history reviewed & no other pertinent oncologic/hematologic malignancy noted    ROS  Comprehensive ROS was performed and was negative unless otherwise noted in above HPI.    Physical Exam  Temp:  [98.8  F (37.1  C)-99.8  F (37.7  C)] 98.9  F (37.2  C)  Pulse:  [] 120  Heart Rate:  [109-124] 109  Resp:  [16-26] 18  BP: (106-126)/() 106/75  SpO2:  [95 %-98 %] 97 %  190 lbs .58 oz    Constitutional: Awake, alert, cooperative, in NAD.  Eyes: PERRL, EOMI, sclera clear, conjunctiva normal.  ENT: Normocephalic, without obvious abnormality, sinuses nontender on palpation, oral pharynx with moist mucus membranes  Respiratory: Non-labored breathing, clear lungs with good air exchange on right, decreased on the left with crackles and faint wheezing.  Cardiovascular: RRR, mildly tachycardic no murmur noted.  GI: + bowel sounds, soft, non-distended, non-tender, no masses palpated, no hepatosplenomegaly.  Skin: No  concerning lesions or rash on exposed areas.  Musculoskeletal: 1+ edema RLE  Neurologic: Awake, alert & oriented x3.  Cranial nerves II-XII are grossly intact.   Psych: appropriate affect    DATA  Results for orders placed or performed during the hospital encounter of 10/15/19 (from the past 24 hour(s))   EKG 12-lead, tracing only   Result Value Ref Range    Interpretation ECG Click View Image link to view waveform and result    Troponin I   Result Value Ref Range    Troponin I ES <0.015 0.000 - 0.045 ug/L   Magnesium   Result Value Ref Range    Magnesium 2.1 1.6 - 2.3 mg/dL   Phosphorus   Result Value Ref Range    Phosphorus 2.7 2.5 - 4.5 mg/dL   Nt probnp inpatient   Result Value Ref Range    N-Terminal Pro BNP Inpatient 1,218 (H) 0 - 900 pg/mL   Lactic acid whole blood   Result Value Ref Range    Lactic Acid 1.0 0.7 - 2.0 mmol/L       PCP & Hematologist/Oncologist  Louisa Clemons MD  Hematology/Oncology  October 15, 2019

## 2019-10-16 NOTE — ED NOTES
"General acute hospital   ED Nurse to Floor Handoff     Hiram Tapia is a 59 year old male who speaks English and lives with family members,  in a home  They arrived in the ED by ambulance from clinic    ED Chief Complaint: Irregular Heart Beat and Shortness of Breath    ED Dx;   Final diagnoses:   None         Needed?: No    Allergies:   Allergies   Allergen Reactions     Hydrofera Blue 4\"X4\" [Wound Dressings] Dermatitis and Blisters     Patient reports that Dressing causes skin irritation, blisters, and drainage.      Tegaderm Ag Mesh [Silver] Dermatitis and Blisters     Patient reports that Dressing causes Skin irritation, blisters, and drainage.   .  Past Medical Hx:   Past Medical History:   Diagnosis Date     Pulmonary embolism (H)      Sarcoma (H)       Baseline Mental status: WDL  Current Mental Status changes: at basesline    Infection present or suspected this encounter: no  Sepsis suspected: No  Isolation type: No active isolations     Activity level - Baseline/Home:  Independent  Activity Level - Current:   Independent - SBA first ambulation at least    Bariatric equipment needed?: No    In the ED these meds were given: Medications - No data to display    Drips running?  No    Home pump  No    Current LDAs  Peripheral IV 10/15/19 Left (Active)   Number of days: 0       Peripheral IV 09/04/19 Right Lower forearm (Active)   Number of days: 41       Peripheral IV 10/15/19 Left (Active)   Site Assessment WDL 10/15/2019  7:12 PM   Line Status Saline locked 10/15/2019  7:12 PM   Phlebitis Scale 0-->no symptoms 10/15/2019  7:12 PM   Infiltration Scale 0 10/15/2019  7:12 PM   Infiltration Site Treatment Method  None 10/15/2019  7:12 PM   Extravasation? No 10/15/2019  7:12 PM   Number of days: 0       Chest Tube Right Fourth intercostal space 14 [place] Flexima J tip [id number] lot:[id number] exp:09/2021 (Active)   Number of days:        Closed/Suction Drain 1 " Right;Anterior;Distal Thigh Accordion 10 French SUTURED IN PLACE (Active)   Number of days: 393       Negative Pressure Wound Therapy Leg Right;Upper (Active)   Number of days: 557       Wound Right Thigh (Active)   Number of days:        Wound 04/15/19 Anterior;Right;Outer Thigh (Active)   Number of days: 183       Wound Anterior;Distal;Right;Outer Thigh (Active)   Number of days:        Incision/Surgical Site 03/08/18 Right Leg (Active)   Number of days: 586       Incision/Surgical Site 03/28/18 Right Chest (Active)   Number of days: 566       Incision/Surgical Site 03/28/18 Right Neck (Active)   Number of days: 566       Incision/Surgical Site 04/06/18 Right;Lateral Thigh (Active)   Number of days: 557       Incision/Surgical Site 09/17/18 Right;Anterior;Lateral Thigh (Active)   Number of days: 393       Incision/Surgical Site 12/05/18 Right;Lateral Chest (Active)   Number of days: 314       Incision/Surgical Site 12/05/18 Right;Lower Chest (Active)   Number of days: 314       Incision/Surgical Site 04/15/19 Anterior;Right Hip (Active)   Number of days: 183       Labs results: Labs Ordered and Resulted from Time of ED Arrival Up to the Time of Departure from the ED - No data to display    Imaging Studies:   Recent Results (from the past 24 hour(s))   US Lower Extremity Venous Duplex RT   Result Value    Radiologist flags DVT (Urgent)    Narrative    Exam: Ultrasound of the deep venous system of leg dated 10/15/2019  1:48 PM    Clinical information: Leg swelling, rule out DVT    Comparison: 9/11/2018    Ordering provider: GENEVA JOAQUIN    Technique: Gray-scale evaluation with compression and Doppler  assessment of deep venous system for spontaneous and phasic flow, as  well as the presence of distal augmentation. Color flow images  obtained as needed. Gray-scale images with compression of the great  saphenous vein obtained as needed.    Findings:    Right leg:    CFV: Thrombus: Partially occlusive thrombus,  "Phasic: Yes,  Femoral vein, proximal: Thrombus: No, Phasic: Yes,  Femoral vein, mid: Thrombus: No, Phasic: Yes,   Femoral vein, distal: Thrombus: No, Phasic: Yes,  Popliteal vein: Thrombus: No, Phasic: Yes,  PTV: Thrombus: No,  Peroneal vein: Thrombus: No,       Impression    Impression:    Right leg: Partially occlusive deep venous thrombus of the right  common femoral vein.    [Urgent Result: DVT]    Finding was identified on 10/15/2019 1:47 PM.     Ignacio Ramirez was contacted by Dr. Lin at 10/15/2019 1:59  PM and verbalized understanding of the urgent finding.       Reference: \"Duplex Ultrasound in the Diagnosis of Lower-Extremity Deep  Venous Thrombosis\"- Janna Zhao MD, S; Leonardo Pride MD  (Circulation. 2014;129:917-921. http://circ.ahajournals.org )    I have personally reviewed the examination and initial interpretation  and I agree with the findings.    MEETA RAVI MD   CT Chest Pulmonary Embolism w Contrast   Result Value    Radiologist flags See impression (Urgent)    Narrative    EXAMINATION: CTA pulmonary angiogram, 10/15/2019 4:14 PM     COMPARISON: CT chest and pelvis 10/1/2019    HISTORY: DVT in shortness of breath. Rule out PE. Soft tissue sarcoma  of the thigh.    TECHNIQUE: Volumetric helical acquisition of CT images of the chest  from the lung apices to the kidneys were acquired after the  administration of 66 mL of Isovue-370 IV contrast. .   Three-dimensional (3D) post-processed angiographic images were  reconstructed, archived to PACS and used in interpretation of this  study.     FINDINGS:  Contrast bolus is: excellent.  Exam is negative for acute  pulmonary embolism.    Lungs: There is a large centrally necrotic mass within the lingula  measuring approximately 12.8 x 8.7 cm, previously measured 10.0 x 8.5  cm. This appears to invade the pericardium with slight increase in now  moderate pericardial effusion. The mass abuts the left ventricular  free wall with " questionable loss of some of the adjacent fat plane.  Scattered solid pulmonary nodules which are grossly unchanged.  Scattered calcified pulmonary granuloma. Stable calcification along  the mid and lower lobe posterior pleura consistent with pleurodesis.  Slight increase in atelectatic changes in the left lung base. There is  a small left sided pleural effusion. No pneumothorax.  Airways: Mild mucosal debris within the distal trachea.  Vessels: Main pulmonary artery and aorta are normal in caliber. Normal  three-vessel arch  Heart: Heart size is normal. Increase in size in now moderate  pericardial effusion. No bowing of the right ventricular wall.  Lymph nodes: No suspicious mediastinal or hilar lymphadenopathy.  Thyroid: Within normal limits.  Esophagus: Within normal limits    Stable soft tissue mass in the right pericardiac region measuring 3.0  x 1.8 cm, likely metastasis.    Upper abdomen: Within normal limits.    Bones and soft tissues: No suspicious axillary lymphadenopathy or soft  tissue mass. No suspicious osseous lesion.      Impression    Impression: In this patient with history of soft tissue sarcoma:  1. The exam is negative for pulmonary embolus.  2. Enlargement of the main metastatic lesion in the lingula, now with  invasion of the pericardium and abutting left ventricular free wall  but without clear invasion. There is no extension into the ventricular  chamber.  3. Increased likely malignant pericardial effusion to now moderate in  size. No evidence for cardiac tamponade.  4. Small left-sided pleural effusion.    [Urgent Result: See impression]    Finding was identified on 10/15/2019 4:24 PM.     Ignacio Clinemonalila was contacted by Dr. Rubin at 10/15/2019 4:42  PM and verbalized understanding of the urgent finding.     I have personally reviewed the examination and initial interpretation  and I agree with the findings.    ROBERTA LAMB MD       Recent vital signs:   BP (!) 121/100   Pulse  122   Temp 99.6  F (37.6  C) (Oral)   Resp 16   Wt 88 kg (194 lb)   SpO2 98%   BMI 27.84 kg/m      Taz Coma Scale Score: 15 (10/15/19 1927)       Cardiac Rhythm: Tachycardia  Pt needs tele? Yes  Skin/wound Issues: None    Code Status: Full Code    Pain control: pt had none    Nausea control: pt had none    Abnormal labs/tests/findings requiring intervention: See EPIC    Family present during ED course? No   Family Comments/Social Situation comments: NA    Tasks needing completion: None    Adrien Briscoe, RN  [phone] [facility]

## 2019-10-16 NOTE — PLAN OF CARE
Shift:   VS: Temp: 98.3  F (36.8  C) Temp src: Oral BP: 102/75 Pulse: 125 Heart Rate: 98 Resp: 16 SpO2: 98 % O2 Device: None (Room air)    Pain: denies  Neuro: A&Ox4, calls appropriately  Cardiac: Tele with intermittent sinus tachycardiac, denies chest pain  Respiratory: RA, denies SOB  GI/Diet/Appetite: Regular diet, denies N/V. NPO after midnight for IVC filter tomorrow.  :  Adequate UO  LDA's: PIV TKO  Skin: No new deficit noted  Activity: Up with SBA  Tests/Procedures:   Pertinent Labs/Lab Collection: UA/UC completed, please continue to collect sputum and respiratory viral panel     Plan: Continue with cares and update MD with any changes.

## 2019-10-16 NOTE — PROGRESS NOTES
STAFF ADDENDUM:  I saw and evaluated Mr. Tapia and agree with the resident s findings and plan of care as documented in the resident s note and edited by me, as applicable.      In summary, Mr. Tapia has large burden metastatic disease and surgical palliation does not interest him. I believe he has a very realistic view of his situation, and I agree that his decision is he wisest course of action.  I spent a total of 20 minutes with Mr. Tapia, 15 of which were spent in counseling and coordination of care. The patient had all questions answered and was in agreement with the plan.  Ryne Watson MD

## 2019-10-16 NOTE — ED PROVIDER NOTES
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 Patient reports he was at the [facility] for a follow up appointment. At his appointment, he was complaining of increased shortness of breath with exertion as well as fatigue. Per chart review, his hemoglobin was found to be 7.3, so he was given 2 units of PRBCs. During transfusion, he developed frequent PVC, so blood was stopped. Patient was sent to the ED to be admitted to the hospital. Here, he denies chest pain or shortness of breath. He states he has a blood clot in his right leg. He denies increased pain or swelling in his right leg. Patient complains of fatigue. No other symptoms noted.     PAST MEDICAL HISTORY  Past Medical History:   Diagnosis Date     Pulmonary embolism (H)      Sarcoma (H)      PAST SURGICAL HISTORY  Past Surgical History:   Procedure Laterality Date     EXCISE SOFT TISSUE TUMOR THIGH Right 3/8/2018    Procedure: EXCISE SOFT TISSUE TUMOR THIGH;  Biopsy Right Thigh Tumor;  Surgeon: Brad Betts MD;  [facility]     EXCISE SOFT TISSUE TUMOR THIGH Right 9/17/2018    Procedure: EXCISE SOFT TISSUE TUMOR THIGH;  Removal Right Thigh Tumor ;  Surgeon: Brad Betts MD;  [facility]     INSERT PORT VASCULAR ACCESS N/A 3/28/2018    Procedure: INSERT PORT VASCULAR ACCESS;  Vascular Access Port Insertion with C-arm;  Surgeon: Sarah Bowie MD;  [facility]     IR PORT CHECK RIGHT  6/11/2019     IR PORT REMOVAL RIGHT  7/17/2019     IRRIGATION AND DEBRIDEMENT LOWER EXTREMITY, COMBINED Right 4/6/2018    Procedure: COMBINED IRRIGATION AND DEBRIDEMENT LOWER EXTREMITY;  Irrigation And  "Debridement Right Thigh ;  Surgeon: Brad Betts MD;  [facility]     IRRIGATION AND DEBRIDEMENT LOWER EXTREMITY, COMBINED Right 2018    Procedure: COMBINED IRRIGATION AND DEBRIDEMENT LOWER EXTREMITY;  Irrigation And Debridement Right Thigh Wound. with Wound VAC Exchange;  Surgeon: Brad Betts MD;  [facility]     OPEN REDUCTION INTERNAL FIXATION RODDING INTRAMEDULLARY FEMUR Right 4/15/2019    Procedure: Intramedullary Nail of Right Femur;  Surgeon: Brad Betts MD;  [facility]     REMOVE PORT VASCULAR ACCESS Right 2019    Procedure: Port Removal;  Surgeon: Jacky Dumont PA-C;  [facility]     STRABISMUS SURGERY      as a child     THORACENTESIS Left 2019    Procedure: THORACENTESIS;  Surgeon: Ashish Gaines MD;  [facility]     THORACOSCOPIC WEDGE RESECTION LUNG Right 2018    Procedure: Right Video Assisted Thoracoscopic Wedge Resection;  Surgeon: Sarah Bowie MD;  [facility]     FAMILY HISTORY  Family History   Problem Relation Age of Onset     Leukemia Father      SOCIAL HISTORY  Social History     Tobacco Use     Smoking status: Former Smoker     Packs/day: 1.00     Years: 10.00     Pack years: 10.00     Types: Cigarettes     Start date: 1975     Last attempt to quit: 1990     Years since quittin.8     Smokeless tobacco: Never Used   Substance Use Topics     Alcohol use: Yes     Alcohol/week: 14.0 standard drinks     Comment: 1 beer a day      MEDICATIONS  No current facility-administered medications for this encounter.      No current outpatient medications on file.     ALLERGIES  Allergies   Allergen Reactions     Hydrofera Blue 4\"X4\" [Wound Dressings] Dermatitis and Blisters     Patient reports that Dressing causes skin irritation, blisters, and drainage.      Tegaderm Ag Mesh [Silver] Dermatitis and Blisters     Patient reports that Dressing causes Skin irritation, blisters, and drainage.       I have " reviewed the Medications, Allergies, Past Medical and Surgical History, and Social History in the Epic system.    Review of Systems   Constitutional: Positive for fatigue. Negative for fever.   HENT: Negative for congestion.    Eyes: Negative for redness.   Respiratory: Negative for shortness of breath.    Cardiovascular: Positive for palpitations. Negative for chest pain and leg swelling.   Gastrointestinal: Negative for abdominal pain.   Genitourinary: Negative for difficulty urinating.   Musculoskeletal: Negative for arthralgias and neck stiffness.   Skin: Negative for color change.   Neurological: Negative for headaches.   Psychiatric/Behavioral: Negative for confusion.   All other systems reviewed and are negative.      Physical Exam   BP: (!) 121/100  Pulse: 122  Temp: 99.6  F (37.6  C)  Resp: 16  Weight: 88 kg (194 lb)  SpO2: 98 %      Physical Exam  Constitutional:       General: He is not in acute distress.     Appearance: He is well-developed. He is not diaphoretic.   HENT:      Head: Normocephalic and atraumatic.      Mouth/Throat:      Pharynx: No oropharyngeal exudate.   Eyes:      General: No scleral icterus.        Right eye: No discharge.         Left eye: No discharge.      Pupils: Pupils are equal, round, and reactive to light.   Neck:      Musculoskeletal: Normal range of motion and neck supple.   Cardiovascular:      Rate and Rhythm: Regular rhythm. Tachycardia present.      Heart sounds: Normal heart sounds. No murmur. No friction rub. No gallop.    Pulmonary:      Effort: Pulmonary effort is normal. No respiratory distress.      Breath sounds: Normal breath sounds. No wheezing.   Chest:      Chest wall: No tenderness.   Abdominal:      General: Bowel sounds are normal. There is no distension.      Palpations: Abdomen is soft.      Tenderness: There is no tenderness.   Musculoskeletal: Normal range of motion.         General: No tenderness or deformity.      Right lower leg: Edema present.    Skin:     General: Skin is warm and dry.      Coloration: Skin is not pale.      Findings: No erythema or rash.   Neurological:      Mental Status: He is alert and oriented to person, place, and time.      Cranial Nerves: No cranial nerve deficit.         ED Course        Procedures             EKG Interpretation:      Interpreted by Maynor Rosalse DO  Time reviewed: 1956  Symptoms at time of EKG: palpitations   Rhythm: sinus bradycardia  Rate: 121  Axis: Normal  Ectopy: premature ventricular contractions (frequent)  Conduction: normal  ST Segments/ T Waves: No acute ischemic changes  Q Waves: none  Comparison to prior: 6/26/2018.  Previous was normal sinus rhythm with no PVCs.    Clinical Impression: sinus tachycardia                Critical Care time:  none             Labs Ordered and Resulted from Time of ED Arrival Up to the Time of Departure from the ED - No data to display         Assessments & Plan (with Medical Decision Making)   Is a 59-year-old male who was sent from clinic due to findings of DVT as well as palpitations and tachycardia.  He was also found to be anemic and developed the palpitations during a blood transfusion.  PE study was negative.  I discussed the case with oncology who plans to admit the patient.  I obtained an ECG which shows sinus tachycardia with frequent PVCs.  Troponin shows no acute abnormalities. We will admit for further monitoring work-up and treatment.    I have reviewed the nursing notes.    I have reviewed the findings, diagnosis, plan and need for follow up with the patient.    New Prescriptions    No medications on file       Final diagnoses:   Deep vein thrombosis (DVT) of proximal vein of right lower extremity, unspecified chronicity (H)   Pericardial effusion     IAshley, am serving as a trained medical scribe to document services personally performed by Maynor Rosales DO, based on the provider's statements to me.      Maynor WHITTAKER DO, was physically  present and have reviewed and verified the accuracy of this note documented by Ashley Rizvi.    10/15/2019   [facility], [place], EMERGENCY DEPARTMENT     Maynor Rosales DO  10/16/19 0142

## 2019-10-16 NOTE — PLAN OF CARE
"David seemed exceptionally quiet throughout shift. Had run a temp as stacey as 101 later in shift and reported it to the [facility] cross cover. Also had given tylenol, but had an emesis shortly afterwards. Over all David just seemed disconnected...When asking him how was he feeling stated \"the same as usual\". When asked what is usual continued to be very vague. I asked was he having any chest discomfort he stated that it felt the same. Stated has been having nausea. I offered an antiemetic, he stated no, nothing helps. Also stated he had his last chemo treatment yesterday. In talking with the crosscover, He did order an NS bolus of 500cc, which was given. Reinforced with David his NPO status and he stated that he understood. Will continue to monitor and report any significant changes.  "

## 2019-10-16 NOTE — CONSULTS
THORACIC & FOREGUT SURGERY    S:  No overnight events.  Pt seen at bedside with staff. All questions answered.        O:  Vitals:    10/16/19 0415 10/16/19 0500 10/16/19 0834 10/16/19 1121   BP:   96/69 102/75   BP Location:   Right arm Right arm   Pulse:  125     Resp:   16 16   Temp: 100  F (37.8  C) 101  F (38.3  C) 98.1  F (36.7  C) 98.3  F (36.8  C)   TempSrc: Oral Oral Oral Oral   SpO2:   97% 98%   Weight:       Height:           A&Ox3, NAD  Breathing non-labored  RRR  Soft, NDNT  Distal extremities warm.     A/P: Hiram Tapia is a 59 year old male with history of growing left lung mass in the setting of metastatic undifferentiated pleomorphic sarcoma here with invasion of the left lung mass into the pericardium, pericardial effusion (likely malignant) and ongoing pleural effusion. He underwent echocardiogram for possible tension physiology, which revealed normal cardiac function. R left DVT for which AC is on hold secondary to hemoptysis. Received 2 U RBC for anemia with improvement in fatigue/dyspnea.   -Cares per primary  -Plans for IVC filter placement tomorrow  -Pt declines any surgical interventions at this time.  -Please call with questions or reconsult if surgery need to be revisited    Discussed with staff    Igor Marshall PA-C  Thoracic Surgery

## 2019-10-16 NOTE — CONSULTS
"Surgery History and Physical  October 15, 2019    CC: Left lung lesion invading pericardium, tachycardia    HPI:  Hiram Tapia is a 59 year old man with a growing left lung mass in the setting of metastatic undifferentiated pleomorphic sarcoma.  The primary in the right thigh was diagnosed in 2018. He underwent 4 cycles of Doxi/Ifos in March 2018 with positive response, complicated by pulmonary embolism. He then underwent resection 9/2018 with negative margins. He had surveillance imaging 10/2018 showing lung nodules, which were biopsy-proven  metastatic sarcoma foci.  He underwent right VATS wedge resections in December 2018 with Dr. Bowie. He had bilateral pneumothoraces and worsening metastatic disease on CT 4/2019, along with right femoral lytic lesions. He had a right pigtail placed, followed by bedside talc pleurodesis 4/3/19 but requiring additional talc pleurodesis 4/16/19. During those admissions, he underwent right IM nail placement.      At his clinic visit with thoracic surgery visit in 09/2019 he was doing fairly well, though with decreased exercise tolerance.    He continues to receive ongoing chemotherapy (Keytruda, Gemzar) and at his appointment today was found to be quite short of breath and there was concern for a PE. He presented to the ED from clinic and was found to have no PE, but did have growth of the lingula mass with extension into the pericardium, and a small left sided pleural effusion with a pericardial effusion of moderate size.     He has been having worsening shortness of breath over the last couple weeks and ongoing decreased exercise tolerance. Denies chest pain, though states he can \"feel\" the mass as a marble in his chest. Denies new fevers, chills, nausea, or vomiting. Endorses hemoptysis.     Past Medical History:  Past Medical History:   Diagnosis Date     Pulmonary embolism (H)      Sarcoma (H)        Surgical History:  Past Surgical History:   Procedure Laterality Date     " EXCISE SOFT TISSUE TUMOR THIGH Right 3/8/2018    Procedure: EXCISE SOFT TISSUE TUMOR THIGH;  Biopsy Right Thigh Tumor;  Surgeon: Brad Betts MD;  [facility]     EXCISE SOFT TISSUE TUMOR THIGH Right 9/17/2018    Procedure: EXCISE SOFT TISSUE TUMOR THIGH;  Removal Right Thigh Tumor ;  Surgeon: Brad Betts MD;  [facility]     INSERT PORT VASCULAR ACCESS N/A 3/28/2018    Procedure: INSERT PORT VASCULAR ACCESS;  Vascular Access Port Insertion with C-arm;  Surgeon: Sarah Bowie MD;  [facility]     IR PORT CHECK RIGHT  6/11/2019     IR PORT REMOVAL RIGHT  7/17/2019     IRRIGATION AND DEBRIDEMENT LOWER EXTREMITY, COMBINED Right 4/6/2018    Procedure: COMBINED IRRIGATION AND DEBRIDEMENT LOWER EXTREMITY;  Irrigation And Debridement Right Thigh ;  Surgeon: Brad Betts MD;  [facility]     IRRIGATION AND DEBRIDEMENT LOWER EXTREMITY, COMBINED Right 4/9/2018    Procedure: COMBINED IRRIGATION AND DEBRIDEMENT LOWER EXTREMITY;  Irrigation And Debridement Right Thigh Wound. with Wound VAC Exchange;  Surgeon: Brad Betts MD;  [facility]     OPEN REDUCTION INTERNAL FIXATION RODDING INTRAMEDULLARY FEMUR Right 4/15/2019    Procedure: Intramedullary Nail of Right Femur;  Surgeon: Brad Betts MD;  [facility]     REMOVE PORT VASCULAR ACCESS Right 7/17/2019    Procedure: Port Removal;  Surgeon: Jacky Dumont PA-C;  Location:  OR     STRABISMUS SURGERY      as a child     THORACENTESIS Left 7/25/2019    Procedure: THORACENTESIS;  Surgeon: Ashish Gaines MD;  [facility]     THORACOSCOPIC WEDGE RESECTION LUNG Right 12/5/2018    Procedure: Right Video Assisted Thoracoscopic Wedge Resection;  Surgeon: Sarah Bowie MD;  [facility]       Social History:  Social History     Tobacco Use     Smoking status: Former Smoker     Packs/day: 1.00     Years: 10.00     Pack years: 10.00     Types: Cigarettes     Start date: 1/1/1975     Last attempt  "to quit: 1990     Years since quittin.8     Smokeless tobacco: Never Used   Substance Use Topics     Alcohol use: Yes     Alcohol/week: 14.0 standard drinks     Comment: 1 beer a day      Drug use: No       Family History:  Family History   Problem Relation Age of Onset     Leukemia Father        Medications:  Current Facility-Administered Medications   Medication     acetaminophen (TYLENOL) tablet 650 mg     lidocaine (LMX4) cream     lidocaine 1 % 0.1-1 mL     LORazepam (ATIVAN) tablet 0.5-1 mg    Or     LORazepam (ATIVAN) injection 0.5-1 mg     magnesium sulfate 2 g in NS intermittent infusion ([facility]arMEDium or [facility] Cmpd)     magnesium sulfate 4 g in 100 mL sterile water (premade)     Medication Instruction     ondansetron (ZOFRAN) injection 8 mg    Or     ondansetron (ZOFRAN-ODT) ODT tab 8 mg    Or     ondansetron (ZOFRAN) tablet 8 mg     polyethylene glycol (MIRALAX/GLYCOLAX) Packet 17 g     potassium chloride (KLOR-CON) Packet 20-40 mEq     potassium chloride 10 mEq in 100 mL intermittent infusion with 10 mg lidocaine     potassium chloride 10 mEq in 100 mL sterile water intermittent infusion (premix)     potassium chloride 20 mEq in 50 mL intermittent infusion     potassium chloride ER (K-DUR/KLOR-CON M) CR tablet 20-40 mEq     prochlorperazine (COMPAZINE) tablet 5 mg    Or     prochlorperazine (COMPAZINE) injection 5 mg     senna-docusate (SENOKOT-S/PERICOLACE) 8.6-50 MG per tablet 1 tablet    Or     senna-docusate (SENOKOT-S/PERICOLACE) 8.6-50 MG per tablet 2 tablet     sodium chloride (PF) 0.9% PF flush 3 mL     sodium chloride (PF) 0.9% PF flush 3 mL     sodium phosphate 10 mmol in D5W intermittent infusion     sodium phosphate 15 mmol in D5W intermittent infusion     sodium phosphate 20 mmol in D5W intermittent infusion     sodium phosphate 25 mmol in D5W intermittent infusion       Allergies:     Allergies   Allergen Reactions     Hydrofera Blue 4\"X4\" [Wound Dressings] Dermatitis and Blisters     " Patient reports that Dressing causes skin irritation, blisters, and drainage.      Tegaderm Ag Mesh [Silver] Dermatitis and Blisters     Patient reports that Dressing causes Skin irritation, blisters, and drainage.       Exam:  B/P: 106/75, T: 98.9, P: 120, R: 18  No intake or output data in the 24 hours ending 10/15/19 2131    Gen: alert and orient, nad, upright in bed, preparing to eat dinner  CV: rrr, no MRG,   Pulm: ctab, well healed port site  ABD:soft, ntnd  MSK: no edema    Last CBC:  Lab Results   Component Value Date    WBC 8.1 10/15/2019     Lab Results   Component Value Date    HGB 7.3 10/15/2019     Lab Results   Component Value Date    HCT 24.8 10/15/2019     Lab Results   Component Value Date     10/15/2019       Last Basic Metabolic Panel:  Lab Results   Component Value Date     10/15/2019      Lab Results   Component Value Date    POTASSIUM 4.2 10/15/2019     Lab Results   Component Value Date    CHLORIDE 105 10/15/2019     Lab Results   Component Value Date    JAYESH 9.1 10/15/2019     Lab Results   Component Value Date    CO2 23 10/15/2019     Lab Results   Component Value Date    BUN 15 10/15/2019     Lab Results   Component Value Date    CR 0.86 10/15/2019     Lab Results   Component Value Date    GLC 95 10/15/2019       Imaging:  CT PE  Impression: In this patient with history of soft tissue sarcoma:  1. The exam is negative for pulmonary embolus.  2. Enlargement of the main metastatic lesion in the lingula, now with  invasion of the pericardium and abutting left ventricular free wall  but without clear invasion. There is no extension into the ventricular  chamber.  3. Increased likely malignant pericardial effusion to now moderate in  size. No evidence for cardiac tamponade.  4. Small left-sided pleural effusion.    Assessment(Plan):  59 year old male with history of growing left lung mass in the setting of metastatic undifferentiated pleomorphic sarcoma here with invasion of the left  lung mass into the pericardium, pericardial effusion (likely malignant) and ongoing pleural effusion. He underwent echocardiogram for possible tension physiology, which revealed normal cardiac function.     Will make him NPO at midnight in anticipation of possible procedure tomorrow. Primary team holding heparin due to hemoptysis, though would be ok to continue from thoracic standpoint if needed.     Discussed with fellow, Dr. Ray.       Prakash Block MD  Surgery Resident PGY-2

## 2019-10-16 NOTE — CONSULTS
Patient is on IR schedule 10/17/2019 for a permanent IVC filter placement.   Labs WNL for procedure.  Orders for NPO have been entered.   Consent will be done prior to procedure.     Please contact the IR charge RN at [phone] for estimated time of procedure.     Case discussed with Dr. Coleman from IR and Anne-Marie Orr PA-C. 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 Additionally patient has reported hemoptysis daily related to lung mass and he has a history of PE in 2018 s/p 6 months of anticoagulation. Based on patient's underlying terminal malignancy and lung mass causing hemoptysis, it is unlikely that he will ever be a candidate for anticoagulation therefore oncology has consulted IR to place a permanent IVC filter.       Kimberley Colón DNP, APRN  Interventional Radiology  Pager: [phone]

## 2019-10-16 NOTE — CONSULTS
Cardiology Inpatient Consultation  October 16, 2019    Reason for Consult:  A cardiology consult was requested by Dr. Clemons from the Heme/Onc service to provide clinical guidance regarding malignant pericardial effusion.    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 He was initially treated in March 2018 with positive response initially, but later discovered to have metastaic lung nodules (discovered 10/2018, bx 12/2018) and new lytic lesion with associated soft tissue mass in posterior right femoral intertrochanteric region s/p intramedullary pinning. The situation was complicated by a large right and small left pneumothoraces requiring pigtail catheter then talc pleurodesis (4/2019).  Patient was restarted on chemotherapy in 4/2019 then required a second talc pleurodesis later in 4/2019. He was started on 2nd and 3rd course of chemotherapy medications with mild to poor response over the summer of 2019. He underwent a thoracentesis on 7/2019 with removal of 3.2 L of fluid for diagnostic and therapeutic results. Patient started to having increasing KONG and hemoptysis in September and repeat CT imaging showing an increase in size of LLL structure with new communication with the pericardium and myocardium and associated pericardial effusion. The patient's right pericardiophrenic necrotic mass had a decrease of size. Pt saw CT surgery on 9/24 regarding the LLL mass and consideration of a left thoracotomy, decortication and possible wedge resection and pericardial window. However, pt decided to cancel the surgery and wanted to continue with medical therapy at this time. Repeat imaging in  10/3/19 showed some improvement with right lung, but left lung mass continued to invade into pericardium.     Yesterday, patient presented for follow up appointment with Oncology, noted to be worsening dyspnea, fatigue, but tolerating his Gemzar chemotherapy well. His hemoglobin was at 7.3 and received two units of blood. His RLE was larger than baseline and a US confirmed a DVT. A CT Scan was negative for PE, but the LLL mass was enlarged and increasing pericardial effusion/ invasion. CT surgery was called and continued to recommend surgery, which the patient remains resistant to at this moemnt. Given need for an echo, possible AC or IVC filter with new DVT(concerns of hemoptysis/epistaxis/anemia) and continued discussion with CT surgery, patient was admitted to hospital. Cardiology consulted for evaluation of pericardial effusion.    Of note, patient had increased PVC during his blood transfusion of 2nd unit; transfusion was stopped. Pt notes having this before, but no records noted.     At the time of interview, the patient denies chest pain, dyspnea at rest,orthopnea, PND, palpitations, lightheadedness, or syncope. He is continuing to have persistent hemoptysis, shortness of breath with exertion, occasional nausea. He mentions having chronic swelling in RLE that is worse than baseline and how they discovered the blood clot.    He had long discussion with CT surgery back in late September regarding possible surgery for his LLE mass, but did not wish to proceed due to uncertainty of what the mass was and how much benefit he would receive from the major surgery. He noted that his cancer had improved from his chemotherapy and wanted to see if the improvement continued.    Review of Systems:    Complete review of systems was performed and negative except per HPI.    PMH:  Past Medical History:   Diagnosis Date     Pulmonary embolism (H)      Sarcoma (H)      Active Problems:  Patient Active Problem List    Diagnosis  Date Noted     Pericardial effusion 10/15/2019     Priority: Medium     Deep vein thrombosis (DVT) of proximal vein of right lower extremity, unspecified chronicity (H) 10/15/2019     Priority: Medium     Metastatic sarcoma to lung, left (H) 2019     Priority: Medium     Added automatically from request for surgery [id number]       Pneumothorax 2019     Priority: Medium     Lung mass 2018     Priority: Medium     Solitary lung nodule 2018     Priority: Medium     Status post surgery 2018     Priority: Medium     Anemia in neoplastic disease 2018     Priority: Medium     Hypokalemia 2018     Priority: Medium     Failure to thrive (0-17) 2018     Priority: Medium     Chemotherapy-induced nausea and vomiting 2018     Priority: Medium     Dehydration 2018     Priority: Medium     History of pulmonary embolism 2018     Priority: Medium     Idiopathic gout, unspecified chronicity, unspecified site 2018     Priority: Medium     Pulmonary nodules 2018     Priority: Medium     Sarcoma (H) 2018     Priority: Medium     Wound infection after surgery 2018     Priority: Medium     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     Priority: Medium     Disruption of tissue around surgical drain, subsequent encounter 2018     Priority: Medium     Personal history of tobacco use, presenting hazards to health 2018     Priority: Medium     Sarcoma of soft tissue (H) 2018     Priority: Medium     Benign neoplasm of soft tissues 2018     Priority: Medium     Social History:  Social History     Tobacco Use     Smoking status: Former Smoker     Packs/day: 1.00     Years: 10.00     Pack years: 10.00     Types: Cigarettes     Start date: 1975     Last attempt to quit: 1990     Years since quittin.8     Smokeless tobacco: Never Used   Substance Use Topics     Alcohol use: Yes     Alcohol/week: 14.0 standard drinks      Comment: 1 beer a day      Drug use: No     Family History:  Family History   Problem Relation Age of Onset     Leukemia Father        Medications:    sodium chloride 0.9%  500 mL Intravenous Once     piperacillin-tazobactam  3.375 g Intravenous Q6H     senna-docusate  1 tablet Oral BID    Or     senna-docusate  2 tablet Oral BID     sodium chloride (PF)  3 mL Intracatheter Q8H         - MEDICATION INSTRUCTIONS -         Physical Exam:  Temp:  [98.7  F (37.1  C)-101  F (38.3  C)] 101  F (38.3  C)  Pulse:  [] 125  Heart Rate:  [] 81  Resp:  [16-26] 16  BP: (100-126)/() 111/72  SpO2:  [92 %-98 %] 93 %    Intake/Output Summary (Last 24 hours) at 10/16/2019 0834  Last data filed at 10/16/2019 0800  Gross per 24 hour   Intake 740 ml   Output 100 ml   Net 640 ml     GEN: pleasant, no acute distress  HEENT: no icterus  CV: Regular rhythm, tachycardia, normal s1/s2, no murmurs/rubs/s3/s4, no heave. No JVP noted   CHEST: diminished breath sounds bilaterally, more on LLL than right side, no increased work of breathing noted  ABD: soft, non-tender, normal active bowel sounds  EXTR: pulses intact. No clubbing, cyanosis; trace edema in LLE, nonpitting edema along right thigh  NEURO: alert oriented, speech fluent/appropriate, motor grossly nonfocal    Diagnostics:  All labs and imaging were reviewed, of note:    Lab Results   Component Value Date    WBC 8.3 10/16/2019    HGB 8.7 (L) 10/16/2019    HCT 28.8 (L) 10/16/2019     10/16/2019     10/16/2019    POTASSIUM 4.1 10/16/2019    CHLORIDE 105 10/16/2019    CO2 20 10/16/2019    BUN 13 10/16/2019    CR 0.81 10/16/2019     (H) 10/16/2019    NTBNPI 1,218 (H) 10/15/2019    TROPI <0.015 10/16/2019    AST 15 10/15/2019    ALT 20 10/15/2019    ALKPHOS 140 10/15/2019    BILITOTAL 0.3 10/15/2019    INR 1.26 (H) 10/16/2019       Lab Results   Component Value Date    TROPI <0.015 10/15/2019    TROPI <0.015 07/24/2019    TROPI <0.015 06/26/2018        EKG:  Rhythm: Normal sinus  and Sinus tachycardia  Rate: 120-130  Axis: Normal  Ectopy: None and Premature ventricular contractions (frequent)  Conduction: Normal  ST Segments/ T Waves: No ST-T wave changes and No acute ischemic changes  Q Waves: None  Comparison to prior: increased PVC compared to previous EKG        Transthoracic echocardiogram:    10/16/2019:  Interpretation Summary  Global and regional biventricular function is normal with an LVEF of 60-65%.  No hemodynamically significant valve abnormality.  The inferior vena cava was normal in size with preserved respiratory  variability.  Small circumferential pericardial effusion is present without any hemodynamic  significance.  Hyperechoic material in the pericardial space may be fibrinous or organized  clot.  A left pleural effusion is present.     This study was compared with the study from 10/15/19 and 3/23/18. The small  pericardial effusion is new from 2018 and similar size compared with last  night.    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    1. Malignant Pericardial Effusion 2/2 metastatic LLL mass (suspect sarcoma) with necrotic center  - worsening shortness of breath with exertion, increased hemoptysis, persistent pressure over center of chest  - pericardial effusion first noted in late September 2019 on CT Chest  - OP CT Chest (10/15/2019) - mass enlarged in lingula with increased invasion of pericardium and abutting left ventricle free wall w/o invasion, increased malignant pericardial effusion to moderate size w/o  tamponade  - EKG during blood transfusion showing increased PVC compared to previous episodes  - TTE (10/16/2019) noted above with normal heart function, but moderate pleural effusion  - concern for possible invasion of pericardium causing PVCs  - Pt has seen CT surgery as OP, did not wish to proceed with thoracotomy at that time  - Ct surgery consulted appreciate recommendations    Recommendations:  - No additional Recommendations at this time from Cardiology viewpoint; asymptomatic PVC's, small effusion on TTE, no need for additional medications from Cardiologyu  - Discussion between CT Surgery, primary Heme/Onc, and patient are needed to determine future plan of care    RLE DVT  - previously on xarelto for 6 months for PE  - CTA PE negative for PE  - holding AC at this time for anemia, hempotysis and possible surgery  - consideration of IVF filter, will leave to primary Heme/Onc team    I have discussed the above with Dr. Tabor.    Thank you for consulting the cardiovascular services at the [facility]. Please do not hesitate to call us with any questions.     Maynor England  Internal Medicine Resident PGY-3  Pager [phone]

## 2019-10-16 NOTE — PHARMACY-ADMISSION MEDICATION HISTORY
Admission medication history interview status for the 10/15/2019 admission is complete. See Epic admission navigator for allergy information, pharmacy, prior to admission medications and immunization status.     Medication history interview sources:  Patient and Dispense History    Changes made to PTA medication list (reason)  Added: None  Deleted: None  Changed: None    Additional medication history information :   -Patient is a good historian and reports that he does not take any medications at home other than Miralax occasionally.      Prior to Admission medications    Not on File     Medication history completed by:   Merary BedoyaD Candidate  October 16, 2019    I have discussed, reviewed, and agree with medication history documentation.  Kathryn Dejesus, Pharm.D., [facility]  Pager [phone]

## 2019-10-16 NOTE — ED TRIAGE NOTES
Pt BIBA from [facility] for chemo. Noticed increased swelling in right leg and noticed blood clot. Did US of chest and groin and found pericardial effusion. During blood transfusion his heart rate became irregular during 2nd bag of blood.

## 2019-10-16 NOTE — PROGRESS NOTES
"/75 (BP Location: Left arm)   Pulse 120   Temp 98.9  F (37.2  C) (Oral)   Resp 18   Ht 1.791 m (5' 10.5\")   Wt 86.2 kg (190 lb 0.6 oz)   SpO2 97%   BMI 26.88 kg/m      Pt arrived from ED around 2050. Denies pain/nausea. No chest pain. C/o coughing blood up \"all day, every day\" so heparin drip not started as ordered. Dr. Clemons discussing with pt. Bedside echocardiogram done looking at fluid around his heart. Does c/o shortness of breath. HR tachy at 115-120s. Tele monitoring for chiara-cardial effusion. Sepsis triggered; lactic acid drawn. Skin assessment complete; no pressure injuries. R hip/thigh/leg scar from PMH of soft tissue sarcoma. Regular diet; courtesy meal provided. Will continue with initiation of POC.   "

## 2019-10-17 NOTE — PLAN OF CARE
7A PT: Cancel; PT orders acknowledged and appreciated. Attempted PT evaluation this AM though pt declines, states he is fatigued this AM and has no concerns with his current mobility. Declines check back later in day 2/2 planned IR procedure, willing for PT to reschedule to 10/18 to ensure safe mobility following procedure.

## 2019-10-17 NOTE — PLAN OF CARE
"  BP 95/61 (BP Location: Right arm)   Pulse 125   Temp 100.9  F (38.3  C) (Oral)   Resp 18   Ht 1.791 m (5' 10.5\")   Wt 86.2 kg (190 lb 0.6 oz)   SpO2 98%   BMI 26.88 kg/m      [id number]  Tmax 100.9, down to 100.1 without intervention, pt refusing tylenol at this time. HR 80s-130 this shift; for the last hour of shift, HR consistently between 120-130s; MD paged and made aware (see previous note); no new orders. Pt on tele, tachycardic with PVCs. OVSS on RA, no s/s of distress. A/Ox4, flat/withdrawn/quiet this shift; cooperative with most cares though refusing any medication interventions at this time. Very quiet and sleeping most of shift. PIV paten with TKO for zosyn. Denied pain. Denied n/v--no appetite; pt refused to order dinner; offered snacks and juice but pt declined. Intermittent cough; sputum collected and sent (bloody streaks in thick clear sputum). Up SBA in room; voiding not saving. No reported BMs this shift. Plan for pt to be NPO at midnight for IVC filter placement tomorrow for R leg DVT. Pt sleeping now, call light and belongings within reach. Will continue to monitor and continue POC/notify team as needed.   "

## 2019-10-17 NOTE — PLAN OF CARE
Status: Patient admitted for RLE DVT, enlarging LLL mass w/ pericardial infiltrate.  VS: VSS on RA. BPs soft. Tmax 101. Blood cultures ordered. Declined Tylenol.   Neuros: A&Ox4.  Cardiovascular: -120s w/ frequent PVCs.  GI/: NPO since midnight d/t procedure. Intermittent nausea, refusing intervention.   IV: L PIV infusing w/ TKO in between abx.  Activity: Up independently.  Pain: Denies.  Respiratory/Trach: No issues.  Skin: Intact.  Plan of Care: Plan for IVC placement today. Continue to monitor and follow POC.

## 2019-10-17 NOTE — PROGRESS NOTES
Patient Name: Hiram Tapia  Medical Record Number: [id number]  Today's Date: 10/17/2019    Procedure: IVC filter placement  Proceduralist: Dr. Coleman and Dr. Mccann    Sedation start time: 1035  Sedation end time: 1055  Sedation medications administered: 75 mcg fentanyl and 1.5 mg versed   Total sedation time: 20 minutes     Procedure start time: 1035  Puncture time: 1036  Procedure end time: 1055    Report given to: 7A RN    Other Notes: Pt arrived to IR room 1 from . Consent reviewed. Pt denies any questions or concerns regarding procedure. Pt positioned supine and monitored per protocol. Pt tolerated procedure without any noted complications. Pt transferred back to .

## 2019-10-17 NOTE — PROGRESS NOTES
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 Hospitalization complicated by fevers with thus far negative infectious work up, likely tumor related fevers.      # Dyspnea on exertion   # Pericardial effusion   # Preventricular complexes  Developed shortness of breath, especially with exertion over the last week/weekend with working out in the woods. In the clinic, this was initially attributed to low hemoglobin. Continues to have stable hemoptysis, as well as stable chest pressure on the left side associated with the left pleural effusion. With RLE edema (chronic but worse on evaluation in clinic), he had a RLE US which showed partially occlusive deep venous thrombus of the right common femoral vein. Because of this and his shortness of breath, he underwent a CTA chest which showed no PE, but did show more enlargement of the lingular mass and with more clear invasion into the pericardium abutting the left ventricular free wall (but without extension into the ventricular chamber), as well as increased pericardial effusion to now moderate in size. Left pleural effusion judged as small. Because of this finding and tachycardia with irregular heart beat during 2nd unit of blood, he was sent to the ED for further evaluation. EKG with sinus tachycardia and PVCs.   - Daily lytes and replete Mg > 2, phos >3, K > 4  - Telemetry monitoring. BNP elevated 1,200 on admission, Trop negative x2.   - Consulted cardiology who performed a bedside echo on admission. Per cardiology read, the effusion appears small, with no  tamponade physiology. Complete TTE (10/16/2019) noted above with normal heart function EF 60-65%, but moderate pleural effusion, concern for possible invasion of pericardium causing PVCs. Thoracic surgery consulted for palliative window / biopsy, patient declines at this time. Cardiology signed off. Educated David on concerns for pericardial effusion and how it could be fatal and likely progress with cancer.      # SIRs   # Febrile   - Overnight 10/16 febrile Tmax 101, tachy 126. Vitals otherwise stable. BCx, UCx drawn, NGTD. LA 1.0. Occasional cough productive of bloody sputum, chronic. No other signs or symptoms of infection.   - RVP (patient refused), sputum culture negative, s pneumo negative   - IV Zosyn (10/16 - 10/17)  - PO Levaquin (10/17-x)      # R leg DVT  # H/o PE  Found on RLE US 10/15 prior to admission, as discussed above. Of note he has had a history of PE 2018 s/p 6 months of rivaroxaban.  - He has a history of ongoing daily hemoptysis so will hold off on heparin gtt for now. No other signs or symptoms of bleeding.   - Coags stable, INR 1.26, PTT <20, Fibrinogen 607 on admission.   - Dr Mccann with IR placed permanent IVC filter 10/17. Continue to hold anticoagulation in the setting of ongoing hemoptysis.      # Anemia  # Hemoptysis, chronic x months   Hgb has been drifting down from 11-12 early summer to 7.3 in clinic on day of admission. Received PRBCs 2 units prior to admission. Continues to have hemoptysis likely related to lung metastases, which is stable. This is most likely cause for the anemia, given lack of other evidence of occult bleeding.  - Monitor hgb and transfuse for Hgb < 7  - CBC Daily.      # Metastastic undifferentiated pleomorphic sarcoma, rapidly progressive   # Lingular metastatic lung mass  # History of malignant pleural effusions and pneumothoraces  Diagnosed via biopsy 3/8/18. Started liposomal doxorubicin and ifosfamide 3/23/18 and completed 4 cycles with positive response.  Underwent preoperative XRT, then resection 9/17/18 with <5% viable cells on path and negative margins with no LVI. Noted lung nodules on surveillance imaging 10/2018, biopsy + for metastatic sarcoma. Pembrolizumab recommended but never started. CT 4/2/19 showed worsening metastatic disease with increased lung nodules, increased LAD, and new lytic lesion with associated soft tissue mass in posterior right femoral intertrochanteric region, as well as large right pneumothorax and trace left pneumothorax. S/p pigtail, then talc pleurodesis 4/3/19. Started pembrolizumab 4/9/19. Repeat talc pleurodesis done 4/16/19 for persistent R pneumothorax. Underwent right intramedullary pinning 4/15/19 for metastatic lesion in femur. Continued pembrolizumab, but then with progressive disease with large right pleural effusion switched to gemcitabine 7/24/19. CT 9/2319 with new pericardial fluid and hydropneumothorax with necrotic lung mass vs empyema. Met with surgery 9/24/19 and discussed left thoracotomy, decortication, possible wedge resection, possible pericardial window, possible diaphragm plication for the enlarging left lower lobe fluid collection with possible pericardial fistulization. Patient declined surgery. Follow up CT 10/1 stable with slight improvement in LLL fluid collection and pericardial effusion. Continues on gemcitabine. Was due for dose 10/15/19 but held because of need for admission. CTA chest showed no PE, but did show more enlargement of the lingular mass and with more clear invasion into the pericardium abutting the left ventricular free wall (but without extension into the ventricular chamber), as well as increased pericardial effusion to now moderate in size. Left pleural effusion judged as small.   - Consulted cardiothoracic surgery regarding possible biopsy/wedge resection lingular lesion, possible pericardial window. Patient declines surgery at this time.  - Discussed with Dr. Johnson whom recommended  starting Pazopanib given rapid progression of disease. Discussed with patient whom agrees, message sent to follow up with Dr. Johnson or KIKE early next week to start Pazopanib.      # FEN  - IVFs: encourage PO, MIVF NS @ 100/hr   - Diet: regular as tolerated     # PPX  - DVT: holding pharmacological prophylaxis with history of hemoptysis, as well as possible procedures  - Bowel: senna-colace, miralax PRN     Lines/Drains: PIVs  Consults: Cardiology, Cardiothoracic surgery, IR, PT   CODE: Full (confirmed with patient)     DISPO: inpatient > 2 nights for management of pericardial effusion and DVT. Likely discharge to home tomorrow if remains stable and afebrile.      Patient discussed with staff attending, Dr Sadler.     Anne-Marie Orr PA-C   Hematology/Oncology   Pager: [phone]     Interval History   David reports to be feeling okay this morning, overnight febrile that has been ongoing for him. No chills, URI symptoms. Low BP that he attributes to be 2/2 not eating overnight. Plans to now eat this AM after IVC filter was placed, tolerated procedure well. No changes in bowels or bladder. Able to walk to restroom indecently, hopeful to discharge to home tomorrow. Educated him on disease progression and Dr. Johnson reccs to start alternative therapy.     Physical Exam   Temp: 98.1  F (36.7  C) Temp src: Oral BP: 101/69 Pulse: 103 Heart Rate: 104 Resp: 18 SpO2: 96 % O2 Device: None (Room air) Oxygen Delivery: 2 LPM  Vitals:    10/15/19 1914 10/15/19 2028 10/17/19 0427   Weight: 88 kg (194 lb) 86.2 kg (190 lb 0.6 oz) 84.1 kg (185 lb 6.4 oz)     Vital Signs with Ranges  Temp:  [98.1  F (36.7  C)-101  F (38.3  C)] 98.1  F (36.7  C)  Pulse:  [103] 103  Heart Rate:  [] 104  Resp:  [16-30] 18  BP: ()/(61-79) 101/69  SpO2:  [94 %-98 %] 96 %  I/O last 3 completed shifts:  In: 1000 [P.O.:400; I.V.:100; IV Piggyback:500]  Out: 50 [Emesis/NG output:50]    Constitutional: Pleasant male seen laying in bed. No  apparent distress, and appears stated age.   Eyes: Lids and lashes normal, sclera clear, conjunctiva normal.  ENT: Normocephalic, without obvious abnormality, atraumatic, oral pharynx with moist mucus membranes, tonsils without erythema or exudates, gums normal and good dentition. No thrush, no blood.  Respiratory: No increased work of breathing, good air exchange, reduced lung sounds / course left lower lobe.   Cardiovascular: Normal apical impulse, tachy rate and rhythm, normal S1 and S2, and no murmur noted.  GI: No masses or scars. +BS. Soft. No tenderness on palpation.  Lymph/Hematologic: No cervical lymphadenopathy and no supraclavicular lymphadenopathy.  Skin: No bruising or bleeding, no rashes, no lesions, no jaundice.  Extremities: There is no redness, warmth, or swelling of the joints. R lower ext edema R>L.  No cyanosis.  Neurologic: Awake, alert, oriented to name, place and time.    Vascular access: PIV     Medications     - MEDICATION INSTRUCTIONS -       - MEDICATION INSTRUCTIONS -       sodium chloride 100 mL/hr at 10/17/19 1145       levofloxacin  750 mg Oral Daily     senna-docusate  1 tablet Oral BID    Or     senna-docusate  2 tablet Oral BID     sodium chloride (PF)  3 mL Intracatheter Q8H       Data   Results for orders placed or performed during the hospital encounter of 10/15/19 (from the past 24 hour(s))   Gram stain   Result Value Ref Range    Specimen Description Sputum     Gram Stain <10 Squamous epithelial cells/low power field     Gram Stain >25 PMNs/low power field     Gram Stain Few  Mixed gram negative and positive stanford      Basic metabolic panel   Result Value Ref Range    Sodium 134 133 - 144 mmol/L    Potassium 3.8 3.4 - 5.3 mmol/L    Chloride 105 94 - 109 mmol/L    Carbon Dioxide 22 20 - 32 mmol/L    Anion Gap 8 3 - 14 mmol/L    Glucose 101 (H) 70 - 99 mg/dL    Urea Nitrogen 12 7 - 30 mg/dL    Creatinine 0.84 0.66 - 1.25 mg/dL    GFR Estimate >90 >60 mL/min/[1.73_m2]    GFR  Estimate If Black >90 >60 mL/min/[1.73_m2]    Calcium 8.8 8.5 - 10.1 mg/dL   Magnesium   Result Value Ref Range    Magnesium 2.2 1.6 - 2.3 mg/dL   Phosphorus   Result Value Ref Range    Phosphorus 2.8 2.5 - 4.5 mg/dL   CBC with platelets differential   Result Value Ref Range    WBC 8.2 4.0 - 11.0 10e9/L    RBC Count 3.40 (L) 4.4 - 5.9 10e12/L    Hemoglobin 8.3 (L) 13.3 - 17.7 g/dL    Hematocrit 27.2 (L) 40.0 - 53.0 %    MCV 80 78 - 100 fl    MCH 24.4 (L) 26.5 - 33.0 pg    MCHC 30.5 (L) 31.5 - 36.5 g/dL    RDW 21.2 (H) 10.0 - 15.0 %    Platelet Count 447 150 - 450 10e9/L    Diff Method Automated Method     % Neutrophils 88.3 %    % Lymphocytes 9.8 %    % Monocytes 1.2 %    % Eosinophils 0.1 %    % Basophils 0.2 %    % Immature Granulocytes 0.4 %    Nucleated RBCs 0 0 /100    Absolute Neutrophil 7.2 1.6 - 8.3 10e9/L    Absolute Lymphocytes 0.8 0.8 - 5.3 10e9/L    Absolute Monocytes 0.1 0.0 - 1.3 10e9/L    Absolute Eosinophils 0.0 0.0 - 0.7 10e9/L    Absolute Basophils 0.0 0.0 - 0.2 10e9/L    Abs Immature Granulocytes 0.0 0 - 0.4 10e9/L    Absolute Nucleated RBC 0.0    IR IVC Filter Placement    Narrative    PROCEDURES 10/17/2019 10:51 AM:  1. Ultrasound-guided right internal jugular venotomy.  2. Inferior vena cava catheterization for inferior venacavogram.  3. Permanent inferior vena cava filter placement.    Clinical History: 60 yo M history of undifferentiated pleomorphic  sarcoma found to have right common femoral vein thrombus. Additionally  patient has reported hemoptysis daily related to lung mass based on  patient's underlying terminal malignancy and lung mass causing  hemoptysis, it is unlikely that he will ever be a candidate for  anticoagulation therefore oncology has consulted IR to place a  permanent IVC filter.     Comparisons: CT 10/1/2019    Staff: Reilly Coleman MD    Fellow/Resident: Jean-Paul Mccann MD    Monitoring: Patient was placed on continuous monitoring with  intravenous conscious sedation  administered by the IR nursing staff  and supervised by the IR attending. Patient remained stable throughout  the procedure.     Medications:  1. Versed IV: 1.5 mg  2. Fentanyl IV: 75 mcg    Sedation time, face-to-face: 20 minutes    Fluoroscopy time: 2.8 minutes     PROCEDURE: The patient understood the limitations, alternatives, and  risks of the procedure and requested the procedure be performed. Both  written and oral consent were obtained.    The right neck was prepped and draped in usual sterile fashion. 1%  lidocaine without epinephrine was used for local anesthesia.    Under ultrasound guidance, right internal jugular venotomy was made  with a micropuncture needle. Ultrasound image documenting right  internal jugular venous patency and needle venotomy was saved in  patient's record. Ultrasound fluoroscopy areas to direct a 0.018  nitinol wire into the IVC.    Micropuncture needle ultimately exchanged for the 9 Fr filter  introducer sheath using modified Seldinger technique. Introducer  sheath advanced over guidewire into the infrarenal inferior vena cava.  Inferior venacavogram performed through the sheath.    [employer] permanent inferior vena cava filter deployed in  the infrarenal inferior vena cava.    Completion inferior venacavogram performed through the sheath  demonstrated appropriate filter placement.     Sheath removed. Pressure held over the venotomy site and hemostasis  was achieved. Sterile dressing applied. No immediate complication.    FINDINGS: INFERIOR VENACAVOGRAM: Patent inferior vena cava without  filling defects. Renal vein inflow demonstrated. Level of iliac  confluence noted.    COMPLETION INFERIOR VENACAVOGRAM: Adequate placement of the filter in  the infrarenal inferior vena cava.     Estimate blood loss: 0 cc.    Specimens: None.       Impression    IMPRESSION:  1. B[employer] permanent IVC filter placed in the infrarenal  inferior vena cava.    2. Post procedure  care and observation on the patient's inpatient care  unit. Patient to rest with head of bed at least 30 degrees upright for  at least an hour. Dressing may be removed tomorrow.   IR Procedure Note    Narrative    Jean-Paul Mccann MD     10/17/2019 10:57 AM  [facility], [place]    Procedure: IR Procedure Note  Date/Time: 10/17/2019 10:56 AM  Performed by: Jean-Paul Mccann MD  Authorized by: Jean-Paul Mccann MD   IR Fellow Physician: Jean-Paul Mccann MD  Other(s) attending procedure: Reilly Coleman MD    UNIVERSAL PROTOCOL   Site Marked: Yes  Prior Images Obtained and Reviewed:  Yes  Required items: Required blood products, implants, devices and special   equipment available    Patient identity confirmed:  Verbally with patient  Patient was reevaluated immediately before administering moderate or deep   sedation or anesthesia  Confirmation Checklist:  Patient's identity using two indicators, relevant   allergies, procedure was appropriate and matched the consent or emergent   situation and correct equipment/implants were available  Time out: Immediately prior to the procedure a time out was called    Preparation: Patient was prepped and draped in usual sterile fashion       ANESTHESIA    Anesthesia: See MAR for details  Local Anesthetic:  Lidocaine 1% without epinephrine      SEDATION    Patient Sedated: Yes    Sedation Type:  Moderate (conscious) sedation  Sedation:  Fentanyl and midazolam  Vital signs: Vital signs monitored during sedation    Fluoroscopy Time: 3 minute(s)  See dictated procedure note for full details.  Findings: Patent IVC and bilateral single renal veins.    Specimens: none    Complications: None    Condition: Stable    Plan: - 1 hour of bedrest post sedation  - permanent IVC filter, no follow up required.    PROCEDURE   Patient Tolerance:  Patient tolerated the procedure well with no immediate   complications  Describe Procedure: B. K[employer] IVC filter placed  infrarenal inferior vena   cava  Time of Sedation in Minutes by Physician:  20

## 2019-10-17 NOTE — PLAN OF CARE
Shift:   VS: Temp: 98.5  F (36.9  C) Temp src: Oral BP: 100/71 Pulse: 107 Heart Rate: 104 Resp: 16 SpO2: 97 % O2 Device: None (Room air) Oxygen Delivery: 2 LPM  Pain: denies  Neuro: A&Ox4, calls appropriately  Cardiac:   Tele sinus tachycardiac with frequent PVCs. HR in 103-109s  Respiratory: RA, denies SOB  GI/Diet/Appetite: Good oral intake, no nausea/emesis reported  :  Adequate UO, not saving  LDA's: PIV SL, NS infusion discontinued  Skin: Right internal jugular access CDI, no swelling or hematoma noted. +ve pulse.  Activity: Up with SBA  Tests/Procedures:   Pertinent Labs/Lab Collection: Potassium replaced.     Plan: Pt had permanent IVC filter placed in IR this morning. Right internal jugular access CDI. Pt denies pain. Antibiotic switched to oral. Possible discharge tomorrow. Continue with cares and update MD with any changes.

## 2019-10-17 NOTE — PROCEDURES
[facility], [place]    Procedure: IR Procedure Note  Date/Time: 10/17/2019 10:56 AM  Performed by: Jean-Paul Mcacnn MD  Authorized by: Jean-Paul Mccann MD   IR Fellow Physician: Jean-Paul Mccann MD  Other(s) attending procedure: Reilly Coleman MD    UNIVERSAL PROTOCOL   Site Marked: Yes  Prior Images Obtained and Reviewed:  Yes  Required items: Required blood products, implants, devices and special equipment available    Patient identity confirmed:  Verbally with patient  Patient was reevaluated immediately before administering moderate or deep sedation or anesthesia  Confirmation Checklist:  Patient's identity using two indicators, relevant allergies, procedure was appropriate and matched the consent or emergent situation and correct equipment/implants were available  Time out: Immediately prior to the procedure a time out was called    Preparation: Patient was prepped and draped in usual sterile fashion       ANESTHESIA    Anesthesia: See MAR for details  Local Anesthetic:  Lidocaine 1% without epinephrine      SEDATION    Patient Sedated: Yes    Sedation Type:  Moderate (conscious) sedation  Sedation:  Fentanyl and midazolam  Vital signs: Vital signs monitored during sedation    Fluoroscopy Time: 3 minute(s)  See dictated procedure note for full details.  Findings: Patent IVC and bilateral single renal veins.    Specimens: none    Complications: None    Condition: Stable    Plan: - 1 hour of bedrest post sedation  - permanent IVC filter, no follow up required.    PROCEDURE   Patient Tolerance:  Patient tolerated the procedure well with no immediate complications  Describe Procedure: B. [employer] IVC filter placed infrarenal inferior vena cava  Time of Sedation in Minutes by Physician:  20

## 2019-10-17 NOTE — PRE-PROCEDURE
GENERAL PRE-PROCEDURE:   Procedure:  Permanent inferior vena cava filter placement.  Date/Time:  10/17/2019 10:12 AM    Written consent obtained?: Yes    Risks and benefits: Risks, benefits and alternatives were discussed    Consent given by:  Patient  Patient states understanding of procedure being performed: Yes    Patient's understanding of procedure matches consent: Yes    Procedure consent matches procedure scheduled: Yes    Expected level of sedation:  Moderate  Appropriately NPO:  Yes  ASA Class:  Class 1- healthy patient  Mallampati  :  Grade 1- soft palate, uvula, tonsillar pillars, and posterior pharyngeal wall visible  Lungs:  Lungs clear with good breath sounds bilaterally  Heart:  Normal heart sounds and rate  History & Physical reviewed:  History and physical reviewed and no updates needed  Statement of review:  I have reviewed the lab findings, diagnostic data, medications, and the plan for sedation

## 2019-10-17 NOTE — PROVIDER NOTIFICATION
"  @2200 paged MD to notify him that pt's HR this shift ranged from ; within the last 20 minutes, pt's HR sustaining 120-130s. Pt temp 100.9 earlier with recheck of 100.1; pt refusing tylenol at this time. Denies pain. Pt also refusing nasal swab for respiratory viral panel--pt agitated with first attempt at swab (pt rubbing nose and shaking his head; unable to get sterile culture); another RN attempted a second time to swab pt's nares but pt started dry heaving and had <100cc emesis of clear, thin output and refusing antiemetics at this time. Pt resting quietly in bed; withdrawn, only answering \"yes\" and \"no\" to questions. No new orders at this time. Will continue to monitor.   "

## 2019-10-18 NOTE — DISCHARGE SUMMARY
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 Thoracic surgery consulted, patient declined window placement or other surgical interventions inpatient. Hospitalization further complicated by fevers with thus far negative infectious work up, likely tumor related fevers. Afebrile >24 hrs prior to discharge and discharged on total 7 day course PO Levaquin. Due to rapidly progressive disease, Dr. Johnson recommending starting Pazopanib. Prior auth sent and appointment scheduled with Dr. Johnson early next week to start Pazopanib. Educated David to report to local ER if having persistent fevers, dyspnea, chest pain or worsening fatigue.     Hospital Course   Hiram Tapia was admitted on 10/15/2019.  The following problems were addressed during his hospitalization:    # Dyspnea on exertion   # Pericardial effusion   # Preventricular complexes  Developed shortness of breath, especially with exertion over the last week/weekend with working out in the woods. In the clinic, this was initially attributed to low hemoglobin. Continues to have stable hemoptysis, as well as stable chest pressure on the left side associated with the left pleural effusion. With RLE edema (chronic but worse on evaluation in clinic), he had a RLE US which showed partially occlusive deep venous thrombus of the  right common femoral vein. Because of this and his shortness of breath, he underwent a CTA chest which showed no PE, but did show more enlargement of the lingular mass and with more clear invasion into the pericardium abutting the left ventricular free wall (but without extension into the ventricular chamber), as well as increased pericardial effusion to now moderate in size. Left pleural effusion judged as small. Because of this finding and tachycardia with irregular heart beat during 2nd unit of blood, he was sent to the ED for further evaluation. EKG with sinus tachycardia and PVCs.   - Daily lytes and replete Mg > 2, phos >3, K > 4  - Telemetry monitoring. BNP elevated 1,200 on admission, Trop negative x2.   - Consulted cardiology who performed a bedside echo on admission. Per cardiology read, the effusion appears small, with no tamponade physiology. Complete TTE (10/16/2019) noted above with normal heart function EF 60-65%, but moderate pleural effusion, concern for possible invasion of pericardium causing PVCs. Thoracic surgery consulted for palliative window / biopsy, patient declined at this time. Cardiology signed off. Educated David on concerns for pericardial effusion and how it could be fatal and likely progress with cancer.      # SIRs   # Febrile   - Overnight 10/16 febrile Tmax 101, tachy 126. Vitals otherwise stable. BCx, UCx drawn, NGTD. LA 1.0. Occasional cough productive of bloody sputum, chronic. No other signs or symptoms of infection.   - RVP (patient refused), sputum culture negative, s pneumo negative   - IV Zosyn (10/16 - 10/17)  - PO Levaquin (10/17-x)  -- discharge on PO Levaquin to completed 10/23.      # R leg DVT  # H/o PE  Found on RLE US 10/15 prior to admission, as discussed above. Of note he has had a history of PE 2018 s/p 6 months of rivaroxaban.  - He has a history of ongoing daily hemoptysis so will hold off on heparin gtt for now. No other signs or symptoms of bleeding.   -  Coags stable, INR 1.26, PTT <20, Fibrinogen 607 on admission.   - Dr Mccann with IR placed permanent IVC filter 10/17. Continue to hold anticoagulation in the setting of ongoing hemoptysis.      # Anemia  # Hemoptysis, chronic x months   Hgb has been drifting down from 11-12 early summer to 7.3 in clinic on day of admission. Received PRBCs 2 units prior to admission. Continues to have hemoptysis likely related to lung metastases, which is stable. This is most likely cause for the anemia, given lack of other evidence of occult bleeding.  - Monitor hgb and transfuse for Hgb < 7  - CBC Daily.      # Metastastic undifferentiated pleomorphic sarcoma, rapidly progressive   # Lingular metastatic lung mass  # History of malignant pleural effusions and pneumothoraces  Diagnosed via biopsy 3/8/18. Started liposomal doxorubicin and ifosfamide 3/23/18 and completed 4 cycles with positive response. Underwent preoperative XRT, then resection 9/17/18 with <5% viable cells on path and negative margins with no LVI. Noted lung nodules on surveillance imaging 10/2018, biopsy + for metastatic sarcoma. Pembrolizumab recommended but never started. CT 4/2/19 showed worsening metastatic disease with increased lung nodules, increased LAD, and new lytic lesion with associated soft tissue mass in posterior right femoral intertrochanteric region, as well as large right pneumothorax and trace left pneumothorax. S/p pigtail, then talc pleurodesis 4/3/19. Started pembrolizumab 4/9/19. Repeat talc pleurodesis done 4/16/19 for persistent R pneumothorax. Underwent right intramedullary pinning 4/15/19 for metastatic lesion in femur. Continued pembrolizumab, but then with progressive disease with large right pleural effusion switched to gemcitabine 7/24/19. CT 9/2319 with new pericardial fluid and hydropneumothorax with necrotic lung mass vs empyema. Met with surgery 9/24/19 and discussed left thoracotomy, decortication, possible wedge resection,  possible pericardial window, possible diaphragm plication for the enlarging left lower lobe fluid collection with possible pericardial fistulization. Patient declined surgery. Follow up CT 10/1 stable with slight improvement in LLL fluid collection and pericardial effusion. Continues on gemcitabine. Was due for dose 10/15/19 but held because of need for admission. CTA chest showed no PE, but did show more enlargement of the lingular mass and with more clear invasion into the pericardium abutting the left ventricular free wall (but without extension into the ventricular chamber), as well as increased pericardial effusion to now moderate in size. Left pleural effusion judged as small.   - Consulted cardiothoracic surgery regarding possible biopsy/wedge resection lingular lesion, possible pericardial window. Patient declines surgery at this time.  - Discussed with Dr. Johnson whom recommended starting Pazopanib given rapid progression of disease. Discussed with patient whom agrees, message sent to follow up with Dr. Johnson or KIKE early next week to start Pazopanib.      # FEN  - IVFs: encourage PO, MIVF NS @ 100/hr   - Diet: regular as tolerated     # PPX  - DVT: holding pharmacological prophylaxis with history of hemoptysis, as well as possible procedures  - Bowel: senna-colace, miralax PRN     Lines/Drains: PIVs  Consults: Cardiology, Cardiothoracic surgery, IR, PT   CODE: Full (confirmed with patient)      DISPO: Discharge to home with close clinic follow up to start Pazopanib early next week with Dr. Johnson.       Patient discussed with staff attending, Dr Sadler.      Anne-Marie Orr PA-C   Hematology/Oncology   Pager: [phone]     Significant Results and Procedures   Results for orders placed or performed during the hospital encounter of 10/15/19   IR IVC Filter Placement    Narrative    PROCEDURES 10/17/2019 10:51 AM:  1. Ultrasound-guided right internal jugular venotomy.  2. Limited right internal  jugular venography.  3. Inferior vena cava catheterization for inferior venacavogram.  4. [employer] permanent inferior vena cava filter placement.    Clinical History: 60 yo M history of undifferentiated pleomorphic  sarcoma found to have right common femoral vein thrombus. Additionally  patient has reported hemoptysis daily related to lung mass based on  patient's underlying terminal malignancy and lung mass causing  hemoptysis, it is unlikely that he will ever be a candidate for  anticoagulation therefore oncology has consulted IR to place a  permanent IVC filter.     Comparisons: CT 10/1/2019    Staff: Meeta Coleman MD    Fellow/Resident: Jean-Paul Mccann MD    I, MEETA COLEMAN MD, attest that I was present in the procedure room for  the entire procedure.    Monitoring: Patient was placed on continuous monitoring with  intravenous conscious sedation administered by the IR nursing staff  and supervised by the IR attending. Patient remained stable throughout  the procedure.     Medications:  1. Versed IV: 1.5 mg  2. Fentanyl IV: 75 mcg    Attending face-to-face sedation time: 20 minutes    Fluoroscopy time: 2.8 minutes    CONTRAST: 35 mL Visipaque 320.     PROCEDURE: The patient understood the limitations, alternatives, and  risks of the procedure and requested the procedure be performed. Both  written and oral consent were obtained.    The right neck was prepped and draped in usual sterile fashion. 1%  lidocaine without epinephrine was used for local anesthesia.    Under ultrasound guidance, right internal jugular venotomy was made  with a micropuncture needle. Ultrasound image documenting right  internal jugular venous patency and needle venotomy was saved in  patient's record.     Wire would not easily advanced down the superior vena cava and instead  preferentially advanced into the right subclavian vein.    Micropuncture needle exchanged over guidewire for the inner  micropuncture sheath. Guidewire  removed. Sheath withdrawn to the right  internal jugular vein.    Limited right internal jugular venography performed through the sheath  demonstrated a patent central right internal jugular vein and a  somewhat more medial but patent right innominate vein and superior  vena cava.     Under fluoroscopic guidance, guidewire was able to be advanced into  the inferior vena cava. Micropuncture sheath removed over guidewire  and reassembled. Sheath replaced over the guidewire.    Micropuncture sheath removed over a 0.035 Bentson guidewire that was  advanced into the inferior vena cava. Tract dilated over guidewire to  10 Cuban size. 9 Cuban introducer sheath advanced over guidewire  into the inferior vena cava. Guidewire removed.    Inferior venacavogram performed through the sheath.    [employer] permanent inferior vena cava filter deployed in  the infrarenal inferior vena cava.    Completion inferior venacavogram performed through the sheath  demonstrated appropriate filter placement.     Sheath removed. Pressure held over the venotomy site and hemostasis  was achieved. Sterile dressing applied. No immediate complication.    FINDINGS: INFERIOR VENACAVOGRAM: Patent inferior vena cava without  filling defects. Renal vein inflow demonstrated. Level of iliac  confluence noted.    COMPLETION INFERIOR VENACAVOGRAM: Adequate placement of the filter in  the infrarenal inferior vena cava. No extravasation or filling defect.    Specimens: None.       Impression    IMPRESSION:  1. Initial difficulty advancing the guidewire through the superior  vena cava prompted right internal jugular venography. No abnormality  demonstrated.    2. [employer] permanent IVC filter placed in the infrarenal  inferior vena cava.    3. Post procedure care and observation on the patient's inpatient care  unit. Patient to rest with head of bed at least 30 degrees upright for  at least an hour. Dressing may be removed  tomorrow.    I have personally reviewed the examination and initial interpretation  and I agree with the findings.    MEETA RAVI MD         Pending Results   These results will be followed up by KIKE in clinic   Unresulted Labs Ordered in the Past 30 Days of this Admission     Date and Time Order Name Status Description    10/17/2019 0430 Blood culture Preliminary     10/16/2019 0935 Sputum Culture Aerobic Bacterial Preliminary     10/16/2019 0557 Blood culture Preliminary     10/16/2019 0557 Blood culture Preliminary           Code Status   Full Code    Primary Care Physician   Louisa Fry    Physical Exam   Temp: 98.4  F (36.9  C) Temp src: Oral BP: 108/73 Pulse: 114 Heart Rate: 105 Resp: 16 SpO2: 96 % O2 Device: None (Room air)    Vitals:    10/15/19 1914 10/15/19 2028 10/17/19 0427   Weight: 88 kg (194 lb) 86.2 kg (190 lb 0.6 oz) 84.1 kg (185 lb 6.4 oz)     Vital Signs with Ranges  Temp:  [98.1  F (36.7  C)-98.9  F (37.2  C)] 98.4  F (36.9  C)  Pulse:  [103-114] 114  Heart Rate:  [105-113] 105  Resp:  [16-18] 16  BP: (100-121)/(71-77) 108/73  SpO2:  [94 %-99 %] 96 %  I/O last 3 completed shifts:  In: 1040 [P.O.:400; I.V.:640]  Out: -     Constitutional: Pleasant male seen laying in bed. No apparent distress, and appears stated age.   Eyes: Lids and lashes normal, sclera clear, conjunctiva normal.  ENT: Normocephalic, without obvious abnormality, atraumatic, oral pharynx with moist mucus membranes, tonsils without erythema or exudates, gums normal and good dentition. No thrush, no blood.  Respiratory: No increased work of breathing, good air exchange, reduced lung sounds / course left lower lobe.   Cardiovascular: Normal apical impulse, tachy rate and rhythm, normal S1 and S2, and no murmur noted.  GI: No masses or scars. +BS. Soft. No tenderness on palpation.  Lymph/Hematologic: No cervical lymphadenopathy and no supraclavicular lymphadenopathy.  Skin: No bruising or bleeding, no rashes, no lesions, no  "jaundice.  Extremities: There is no redness, warmth, or swelling of the joints. R lower ext edema R>L.  No cyanosis.  Neurologic: Awake, alert, oriented to name, place and time.    Vascular access: PIV     Discharge Disposition   Discharged to home  Condition at discharge: Stable    Consultations This Hospital Stay   CARDIOLOGY GENERAL ADULT IP CONSULT  CARDIOTHORACIC SURGERY ADULT IP CONSULT  INTERVENTIONAL RADIOLOGY ADULT/PEDS IP CONSULT  PHYSICAL THERAPY ADULT IP CONSULT  MEDICATION HISTORY IP PHARMACY CONSULT    Discharge Orders   No discharge procedures on file.  Discharge Medications   There are no discharge medications for this patient.    Allergies   Allergies   Allergen Reactions     Hydrofera Blue 4\"X4\" [Wound Dressings] Dermatitis and Blisters     Patient reports that Dressing causes skin irritation, blisters, and drainage.      Tegaderm Ag Mesh [Silver] Dermatitis and Blisters     Patient reports that Dressing causes Skin irritation, blisters, and drainage.     Data   Most Recent 3 CBC's:  Recent Labs   Lab Test 10/18/19  0640 10/17/19  0631 10/16/19  0557   WBC 4.3 8.2 8.3   HGB 8.2* 8.3* 8.7*   MCV 80 80 80   * 447 417      Most Recent 3 BMP's:  Recent Labs   Lab Test 10/18/19  0640 10/17/19  0631 10/16/19  0557    134 136   POTASSIUM 4.0 3.8 4.1   CHLORIDE 107 105 105   CO2 22 22 20   BUN 11 12 13   CR 0.81 0.84 0.81   ANIONGAP 6 8 11   JAYESH 8.9 8.8 8.8   * 101* 102*     Most Recent 2 LFT's:  Recent Labs   Lab Test 10/18/19  0640 10/15/19  0907   AST 19 15   ALT 18 20   ALKPHOS 134 140   BILITOTAL 0.3 0.3     Most Recent INR's and Anticoagulation Dosing History:  Anticoagulation Dose History     Recent Dosing and Labs Latest Ref Rng & Units 6/27/2018 4/2/2019 4/12/2019 4/15/2019 7/17/2019 10/16/2019    INR 0.86 - 1.14 1.66(H) 1.02 1.11 1.10 1.06 1.26(H)        Most Recent 3 Troponin's:  Recent Labs   Lab Test 10/16/19  0828 10/15/19  1954 07/24/19  1308   TROPI <0.015 <0.015 <0.015 "     Most Recent Cholesterol Panel:No lab results found.  Most Recent 6 Bacteria Isolates From Any Culture (See EPIC Reports for Culture Details):  Recent Labs   Lab Test 10/17/19  0631 10/16/19  2002 10/16/19  0653 10/16/19  0648 07/25/19  1320 04/18/19 2047   CULT No growth after 21 hours Light growth  Normal [place] to date    Culture in progress No growth after 2 days No growth after 2 days Culture negative for acid fast bacilli  Assayed at [facility] [address] [place] [id number] [phone]  No growth after 4 weeks  Culture negative after 4 weeks  No Actinomyces species isolated  No growth No growth     Most Recent TSH, T4 and A1c Labs:  Recent Labs   Lab Test 09/23/19  0710   TSH 2.34

## 2019-10-18 NOTE — PLAN OF CARE
"/73 (BP Location: Right arm)   Pulse 114   Temp 98.4  F (36.9  C) (Oral)   Resp 16   Ht 1.791 m (5' 10.5\")   Wt 84.1 kg (185 lb 6.4 oz)   SpO2 96%   BMI 26.23 kg/m      AVSS on room air. Denies pain. Patient discharged home with self. PIV removed. Discharge paperwork reviewed with patient and all questions answered. Prescription picked up at [place] discharge pharmacy. All belongings accounted for.   "

## 2019-10-18 NOTE — PLAN OF CARE
7A PT Deferred  Discharge Planner PT   Patient plan for discharge: Home  Current status: PT orders acknowledged and appreciated. Pt reports no concerns with mobility or safe discharge home and per observation, pt ambulating in room independently with no balance deficits. Pt adamant on returning home today with no inpatient PT concerns, will complete orders.  Barriers to return to prior living situation: None per PT  Recommendations for discharge: Home  Rationale for recommendations: See above       Entered by: Joselin Ross 10/18/2019 9:24 AM

## 2019-10-18 NOTE — PLAN OF CARE
"1900 - 0730  /77 (BP Location: Right arm)   Pulse 114   Temp 98.1  F (36.7  C) (Oral)   Resp 18   Ht 1.791 m (5' 10.5\")   Wt 84.1 kg (185 lb 6.4 oz)   SpO2 94%   BMI 26.23 kg/m      VS: Tachycardic, OVSS on RA - telemetry sinus tach with PACs  Pain/Nausea: Denied  Diet: Regular  LDA: PIV saline locked  GI: BM x 1  : Voiding not saving  Skin: intact  Mobility: up ad camelia  Plan: discharge today, continue to monitor    "

## 2019-10-21 NOTE — TELEPHONE ENCOUNTER
Prior Authorization Approval    Authorization Effective Date: 10/18/2019  Authorization Expiration Date: 10/18/2020  Medication: Votrient PA Approval  Approved Dose/Quantity: 200mg 120/30 - first 6-split  Reference #:     Insurance Company: [employer] - Phone [phone] Fax [phone]  Expected CoPay: 20% up to 200 per fill     CoPay Card Available: Yes    Foundation Assistance Needed:    Which Pharmacy is filling the prescription (Not needed for infusion/clinic administered): [address]  Pharmacy Notified: Yes  Patient Notified: Yes               Mike Orellana  [facility]  Oncology Pharmacy Liaison   [email or web address]  [phone] (phone  [phone] (fax

## 2019-10-24 NOTE — PROGRESS NOTES
10-24-19     I saw Hiram Tapia for follow up of UPS of the right thigh     Background  He had no significant PMH but was found to have a UPS of the right thigh. He has a history of right leg pain with sciatica x 20 years. In October 2017 he had more pain and injured his leg on a truck which eventually led to imaging that revealed a mass. 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      He was started on Doxil/Ifos 3/23/18 and completed 4 cycles with positive response to treatment. Of not he did complete 6 months of Xarelto during this time for incidental PE. He then underwent preoperative XRT. He underwent resection 9/17/18 with <5% viable cells on path and negative margins with no LVI.     Then on surveillance imaging October 2018 he was noted to have lung nodules. He underwent biopsy December 2018 that revealed metastatic sarcoma.     Restaging on 4/2/19  revealed large right and trace left pneumothoraces along with worsening metastatic disease with increased lung nodules, increased lymphadenopathy, and new lytic lesion with associated soft tissue mass in posterior right femoral intertrochanteric region. He was admitted through the ED. Thoracic placed a pigtail but he had re-expansion. Thoracic surgery performed talc pleurodesis 4/3/19. He was discharged home 4/5/19.     He started Keytruda 4/9/19. He saw ortho for worsening right thigh pain thought 2/2 metastatic lesion in right femur and they discussed nailing. During his pre-op work-up CXR revealed persistent right sided pneumothorax for which he was admitted 4/12/19. Repeat talc pleurodesis performed 4/16/19. He underwent right intramedually pinning 4/15/19.  -           Interval History:     He was started Keytruda 4/9/19     He started gemzar on 7-24-19 due to PD, and had a thoracentesis 7-25.    He had an IVC filter put in and finished antibiotics. He is breathing better and feels better after the transfusion.  He felt  great last weekend and did a lot of yard chores. He is going up stairs OK and KONG, cough, GERD not an issue.  Sleeping OK. He can walk in a store as long as he wants.      Cough is not waking him up now.  No F, C, night sweats now.     Hes working full time and going up 1 floor w no problem.     No pain medication.    Some constipation using miralax prn.     He otherwise feels well and 10-point ROS negative.     10-point ROS o/w neg.        On exam he appeared comfortable w normal affect  /62   Pulse 91   Temp 98.1  F (36.7  C) (Oral)   Resp 16   Wt 88.5 kg (195 lb 3.2 oz)   SpO2 98%   BMI 27.61 kg/m    Constitutional: Well-appearing male in no acute distress.  Eyes: No icterus.  ENT: Without lesions or thrush.   NECK: No thyromegaly or mass  Respiratory: decreased BS L base w some diffuse crackles L.  Cardiovascular: RRR. No murmur   Lymphatic:   ABD:  No HSMT.   MSK:   EXT: tr edema R foot.  Neurologic: Normal Romberg   Skin:   PSYCH: Mood good.      -  Hb 8.8, GNE, LFT OK.     -  Last imaging while fairly complicated showed that while many tumors showed clear response, one left necrotic lesion was containing air and communicating with the pleural space and the pericardial space.       I reviewed the images.      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 He started Keytruda 4/9/19. He started gemzar 7-24.     Recurrent right sided pneumothorax s/p 2 hospitalizations and TALC pleurodesis 4/3 and again 4/16.      It seems gemzar is not working on the L tumor.  We discussed a number of issues.  He does not want surgery now.  we will change to pazopanib and went over the  Typical toxicites and have pharmacy see today. He will limit alcohol and  tylenol and we will check weekly cbc, cmp now and an EKG on d 8-9.     We discussed codes status but he is not yet ready to be DNR.    He will call if other questions arise.     Gaurang Johnson M.D.  Professor  Hematology, Oncology and Transplantation

## 2019-10-24 NOTE — NURSING NOTE
Chief Complaint   Patient presents with     Blood Draw     Labs drawn via  by RN in lab. VS taken.      Labs drawn via venipuncture. Vital signs taken. Checked into next appointment.   Sangeeta Beltran RN

## 2019-10-24 NOTE — LETTER
RE: Hiram Tapia  [address]  [place] [address]     Dear Colleague,    Thank you for referring your patient, Hiram Tapia, to the [facility]. Please see a copy of my visit note below.    10-24-19     I saw Hiram Tapia for follow up of UPS of the right thigh     Background  He had no significant PMH but was found to have a UPS of the right thigh. He has a history of right leg pain with sciatica x 20 years. In October 2017 he had more pain and injured his leg on a truck which eventually led to imaging that revealed a mass. 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      He was started on Doxil/Ifos 3/23/18 and completed 4 cycles with positive response to treatment. Of not he did complete 6 months of Xarelto during this time for incidental PE. He then underwent preoperative XRT. He underwent resection 9/17/18 with <5% viable cells on path and negative margins with no LVI.     Then on surveillance imaging October 2018 he was noted to have lung nodules. He underwent biopsy December 2018 that revealed metastatic sarcoma.     Restaging on 4/2/19  revealed large right and trace left pneumothoraces along with worsening metastatic disease with increased lung nodules, increased lymphadenopathy, and new lytic lesion with associated soft tissue mass in posterior right femoral intertrochanteric region. He was admitted through the ED. Thoracic placed a pigtail but he had re-expansion. Thoracic surgery performed talc pleurodesis 4/3/19. He was discharged home 4/5/19.     He started Keytruda 4/9/19. He saw ortho for worsening right thigh pain thought 2/2 metastatic lesion in right femur and they discussed nailing. During his pre-op work-up CXR revealed persistent right sided pneumothorax for which he was admitted 4/12/19. Repeat talc pleurodesis performed 4/16/19. He underwent right intramedually pinning 4/15/19.  -           Interval  History:     He was started Keytruda 4/9/19     He started gemzar on 7-24-19 due to PD, and had a thoracentesis 7-25.    He had an IVC filter put in and finished antibiotics. He is breathing better and feels better after the transfusion.  He felt great last weekend and did a lot of yard chores. He is going up stairs OK and KONG, cough, GERD not an issue.  Sleeping OK. He can walk in a store as long as he wants.      Cough is not waking him up now.  No F, C, night sweats now.     Hes working full time and going up 1 floor w no problem.     No pain medication.    Some constipation using miralax prn.     He otherwise feels well and 10-point ROS negative.     10-point ROS o/w neg.        On exam he appeared comfortable w normal affect  /62   Pulse 91   Temp 98.1  F (36.7  C) (Oral)   Resp 16   Wt 88.5 kg (195 lb 3.2 oz)   SpO2 98%   BMI 27.61 kg/m     Constitutional: Well-appearing male in no acute distress.  Eyes: No icterus.  ENT: Without lesions or thrush.   NECK: No thyromegaly or mass  Respiratory: decreased BS  L base w some diffuse crackles L.  Cardiovascular: RRR. No murmur   Lymphatic:   ABD:  No HSMT.   MSK:   EXT: tr edema R foot.  Neurologic: Normal Romberg   Skin:   PSYCH: Mood good.      -  Hb 8.8, GNE, LFT OK.     -  Last imaging while fairly complicated showed that while many tumors showed clear response, one left necrotic lesion was containing air and communicating with the pleural space and the pericardial space.       I reviewed the images.      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 He started Keytruda 4/9/19. He started gemzar 7-24.     Recurrent right sided pneumothorax s/p 2 hospitalizations and TALC pleurodesis 4/3 and  again 4/16.      It seems gemzar is not working on the L tumor.  We discussed a number of issues.  He does not want surgery now.   we will change to pazopanib and went over the  Typical toxicites and have pharmacy see today. He will limit alcohol and tylenol and we will check weekly cbc, cmp now and an EKG on d 8-9.     We discussed codes status but he is not yet ready to be DNR.    He will call if other questions arise.     Gaurang Johnson M.D.  Professor  Hematology, Oncology and Transplantation

## 2019-10-24 NOTE — ORAL ONC MGMT
Oral Chemotherapy Monitoring Program    Primary Oncologist: Dr. Johnson  Primary Oncology Clinic: [facility]  Cancer Diagnosis: Sarcoma of soft tissue     Drug: pazopanib 800mg (4 x 200mg tablets) once daily,   Start Date: as soon as available   Dose is appropriate for patients:  Renal and Hepatic Function  Expected duration of therapy: Until disease progression or unacceptable toxicity    Drug Interaction Assessment:   There were no significant drug interactions identified upon review of medication list with chemotherapy agents.  Medication list 10/24/19: -Pazopanib -Acetaminophen -Senna Lax. Patient stated he does not take any prescription medications and rarely takes OTC meds. We discussed to avoid concomitant use of PPI and H2 antagonists as they decrease serum concentration of pazopanib. He does not take PPI or H2 antagonists and is agreeable with plan.     Lab Monitoring Plan:   CBC and CMP qweek for first month then evaluate plan   Phos monthly   EKG baseline & day 8 or 9, then as clinically indicated (if patient is on other high risk qtc prolongating medications or has clinical risk factors (cardiovascular disease, arrhythmias, K+/Mg+ abnormalities) consider q 2 mos monitoring) Check BP Q2 weeks for first month, then monthly  T4/TSH baseline, then TSH Q2 Months (often not necessary to treat unless symptomatic)   Subjective/Objective:  Hiram Tapia is a 59 year old male seen in clinic for an initial visit for oral chemotherapy education.      ORAL CHEMOTHERAPY 10/24/2019   Drug Name Votrient (Pazopanib)   Current Dosage 800mg   Current Schedule Daily   Cycle Details Continuous   Any new drug interactions? No   Is the dose as ordered appropriate for the patient? Yes       Last PHQ-2 Score on record:   PHQ-2 ( 1999 [employer]) 7/17/2018   Q1: Little interest or pleasure in doing things 0   Q2: Feeling down, depressed or hopeless 0   PHQ-2 Score 0       Patient does not report depression  "symptoms.      Vitals:  BP:   BP Readings from Last 1 Encounters:   10/24/19 129/62     Wt Readings from Last 1 Encounters:   10/24/19 88.5 kg (195 lb 3.2 oz)     Estimated body surface area is 2.1 meters squared as calculated from the following:    Height as of 10/15/19: 1.791 m (5' 10.5\").    Weight as of an earlier encounter on 10/24/19: 88.5 kg (195 lb 3.2 oz).      Labs:  _  Result Component Current Result Ref Range   Sodium 137 (10/24/2019) 133 - 144 mmol/L     _  Result Component Current Result Ref Range   Potassium 4.1 (10/24/2019) 3.4 - 5.3 mmol/L     _  Result Component Current Result Ref Range   Calcium 9.0 (10/24/2019) 8.5 - 10.1 mg/dL     _  Result Component Current Result Ref Range   Magnesium 2.3 (10/24/2019) 1.6 - 2.3 mg/dL     _  Result Component Current Result Ref Range   Phosphorus 3.2 (10/24/2019) 2.5 - 4.5 mg/dL     _  Result Component Current Result Ref Range   Albumin 2.8 (L) (10/24/2019) 3.4 - 5.0 g/dL     _  Result Component Current Result Ref Range   Urea Nitrogen 16 (10/24/2019) 7 - 30 mg/dL     _  Result Component Current Result Ref Range   Creatinine 0.80 (10/24/2019) 0.66 - 1.25 mg/dL       _  Result Component Current Result Ref Range   AST 18 (10/24/2019) 0 - 45 U/L     _  Result Component Current Result Ref Range   ALT 30 (10/24/2019) 0 - 70 U/L     _  Result Component Current Result Ref Range   Bilirubin Total 0.3 (10/24/2019) 0.2 - 1.3 mg/dL       _  Result Component Current Result Ref Range   WBC 4.6 (10/24/2019) 4.0 - 11.0 10e9/L     _  Result Component Current Result Ref Range   Hemoglobin 8.8 (L) (10/24/2019) 13.3 - 17.7 g/dL     _  Result Component Current Result Ref Range   Platelet Count 420 (10/24/2019) 150 - 450 10e9/L     _  Result Component Current Result Ref Range   Absolute Neutrophil 2.3 (10/24/2019) 1.6 - 8.3 10e9/L     Assessment:  Patient is appropriate to start therapy.    OJL=455dt on 10/15/2019.     Plan:  Basic chemotherapy teaching was reviewed with the patient " including indication, start date of therapy, dose, administration, adverse effects, missed doses, food and drug interactions, monitoring, side effect management, office contact information, and safe handling. Written materials were provided and all questions answered.    Plan to take at night at least 2 hours after meal or snacks.     Gave patient Jessica's phone number [phone] to call tomorrow morning 10/25 and schedule delivery as soon as available (Saturday or Monday).     Informed patient he will have 3 months of split fill (15 day supply) per insurance restriction. Encouraged he call the pharmacy when he as about 1 week supply remaining.      Discussed access services is done and with copay card added will have $0 copay per liason notes.     Recommended he monitor BP a few times a week.     Follow-Up:  10/31: follow up assessment        Thank you for the opportunity to participate in the care of the above patient,  Tj Duque, PharmD  Hematology/Oncology Clinical Pharmacist  [facility]  [facility]

## 2019-10-24 NOTE — NURSING NOTE
"Oncology Rooming Note    October 24, 2019 3:17 PM   Hiram Tapia is a 59 year old male who presents for:    Chief Complaint   Patient presents with     Blood Draw     Labs drawn via  by RN in lab. VS taken.      Oncology Clinic Visit     Return - Soft tissue sarcoma of right thigh     Initial Vitals: /62   Pulse 91   Temp 98.1  F (36.7  C) (Oral)   Resp 16   Wt 88.5 kg (195 lb 3.2 oz)   SpO2 98%   BMI 27.61 kg/m   Estimated body mass index is 27.61 kg/m  as calculated from the following:    Height as of 10/15/19: 1.791 m (5' 10.5\").    Weight as of this encounter: 88.5 kg (195 lb 3.2 oz). Body surface area is 2.1 meters squared.  No Pain (0) Comment: Data Unavailable   No LMP for male patient.  Allergies reviewed: Yes  Medications reviewed: Yes    Medications: Medication refills not needed today.  Pharmacy name entered into [employer]:    [facility] [place], [place] - [address] [place] [address] [phone]  [id number] - [place], [place] - [address] [place] [facility] [place]  [address]  [address]    Clinical concerns: Lab Results     Pt was asked about getting the influenza vaccine and pt declined.    Dave Rodriguez, EMT    "

## 2019-10-29 NOTE — NURSING NOTE
Chief Complaint   Patient presents with     Blood Draw     labs drawn via vpt by rn. vs taken      Blood drawn via vpt by RN in lab. VS taken. Pt checked into next appointment.   Lily Palacios RN

## 2019-10-29 NOTE — Clinical Note
10/29/2019       RE: Hiram Tapia  [address]  [place] [address]     Dear Colleague,    Thank you for referring your patient, Hiram Tapia, to the [facility]. Please see a copy of my visit note below.    Oncology/Hematology Visit Note  Oct 29, 2019    Reason for Visit: Follow up of UPS of the right thigh    History of Present Illness: Hiram Tapia is a 59 year old male with no significant PMH with UPS of the right thigh. He has a history of right leg pain with sciatica x 20 years. In October 2017 he had more pain and injured his leg on a truck which eventually led to imaging that revealed a mass. 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     He was started on Doxil/Ifos 3/23/18 and completed 4 cycles with positive response to treatment. Of not he did complete 6 months of Xarelto during this time for incidental PE. He then underwent preoperative XRT. He underwent resection 9/17/18 with <5% viable cells on path and negative margins with no LVI.    Then on surveillance imaging October 2018 he was noted to have lung nodules. He underwent biopsy December 2018 that revealed metastatic sarcoma. Dr. Johnson recommended treatment with Keytruda in February 2019 however it is unclear to me why this was never started.    He underwent restaging CT 4/2/19. This revealed large right and trace left pneumothoraces along with worsening metastatic disease with increased lung nodules, increased lymphadenopathy, and new lytic lesion with associated soft tissue mass in posterior right femoral intertrochanteric region. He was admitted through the ED. Thoracic placed a pigtail but he had re-expansion. Thoracic surgery performed talc pleurodesis 4/3/19. He was discharged home 4/5/19.    He was started on Keytruda 4/9/19. He saw ortho for worsening right thigh pain thought 2/2 metastatic lesion in right femur and they discussed nailing. During his pre-op  work-up CXR revealed persistent right sided pneumothorax for which he was admitted 4/12/19. Repeat talc pleurodesis performed 4/16/19. He underwent right intramedually pinning 4/15/19.    He has continued on Keytrua after surgery. Restaging CT 6/28/19 with mostly an increase in pulmonary nodules and a right middle lobe cavitary lesion. Overall Dr. Johnson felt it was a mixed response so plan on continuing for 2 more cycles and then repeat CT. Of note port was removed 7/17/19.    He had worsening SOB and pleuritic pain 7/24/19 and CT imaging with concerns for progressive disease in addition to large right pleural effusion. He underwent thoracentesis for this 7/25/19. He was switched to Gemzar due to progression on Keytruda, cycle 1 7/24/19.      Repeat CT 8/12/19 with mixed response, possibly delayed immune response. He continued on Gemzar. CT 9/23/19 (after 3 cycles) with overall stable to improved disease, however did have new pericardial fluid and hydropneumothorax with necrotic left lung mass vs empyema. He met with surgery 9/24/19 and discussed left thoracotomy, decortication, possible wedge resection, possible pericardial window, possible diaphragm plication for the enlarging left lower lobe fluid collection with possible pericardial fistulization. The patient however declined surgery. CT 10/1/19 was stable to slight improvement in left lower lobe fluid collection and pericardial effusion. He continued on Gemzar.    He presented for chemo on 10/15 with recurrent SOB, hemoptysis, and LE edema. US positive for DVT and CT revealed enlarging left sided mass with invasion into pericardium with worsening pericardial effusion. He had recurrent PVCs in clinic. He was admitted. S/p IVC filter for DVT on 10/17 (no anticoagulation with hemoptysis). Patient declined surgical intervention for left lung mass/fluid collection or pericardial drainage. Echo inpatient with small effusion and no tamponade physiology, though  invasion into pericardium could be causing PVCs.     He was started on Pazopanib for progressive disease. He started this 10/26/19. He returns today for close oncology follow-up.     Interval History:         Current Outpatient Medications   Medication Sig Dispense Refill     acetaminophen (TYLENOL) 500 MG tablet Take 1 tablet (500 mg) by mouth every 6 hours as needed for mild pain       levofloxacin (LEVAQUIN) 750 MG tablet Take 1 tablet (750 mg) by mouth daily (Patient not taking: Reported on 10/24/2019) 5 tablet 0     pazopanib (VOTRIENT) 200 MG tablet Take 4 tablets (800 mg) by mouth daily Take on an empty stomach 1 hour before or 2 hours after a meal. 120 tablet 0     senna-docusate (SENOKOT-S/PERICOLACE) 8.6-50 MG tablet Take 1 tablet by mouth 2 times daily       Physical Examination:  There were no vitals taken for this visit.  Wt Readings from Last 10 Encounters:   10/24/19 88.5 kg (195 lb 3.2 oz)   10/17/19 84.1 kg (185 lb 6.4 oz)   10/15/19 88 kg (194 lb 1.6 oz)   10/03/19 89.4 kg (197 lb)   09/24/19 87.1 kg (192 lb)   09/24/19 87.4 kg (192 lb 9.6 oz)   09/12/19 89.3 kg (196 lb 14.4 oz)   09/04/19 88.2 kg (194 lb 6.4 oz)   08/20/19 86.2 kg (190 lb 1.6 oz)   08/13/19 84.4 kg (186 lb)     Constitutional: Well-appearing male in no acute distress.  Eyes: EOMI, PERRL. No scleral icterus.  ENT: Oral mucosa is moist without lesions or thrush.   Lymphatic: Neck is supple without cervical or supraclavicular lymphadenopathy.   Cardiovascular: Slightly tachycardic rate and regular rhythm. No murmurs, gallops, or rubs. Right leg 1+ peripheral edema, left leg trace edema.  Respiratory: Non-labored breathing. Walked in clinic, SpO2 97%, HR up to 120. Equal breath sounds. Crackles in mid left lung. No wheezing.  Gastrointestinal: Bowel sounds present. Abdomen soft, non-tender. No palpable hepatosplenomegaly or masses.   Neurologic: Cranial nerves II through XII are grossly intact.  Skin: No rashes, petechiae, or bruising  noted on exposed skin.    Laboratory Data:      Assessment and Plan:  IN PROGRESS    Ignacio Ramirez PA-C  [facility] [place] [id number] [place], [place] [id number] [phone]      Again, thank you for allowing me to participate in the care of your patient.      Sincerely,    SENAIT Carter

## 2019-10-29 NOTE — LETTER
RE: Hiram Tapia  [address]  [place] [address]       Oncology/Hematology Visit Note  Oct 29, 2019    Reason for Visit: Follow up of UPS of the right thigh    History of Present Illness: Hiram Tapia is a 59 year old male with no significant PMH with UPS of the right thigh. He has a history of right leg pain with sciatica x 20 years. In October 2017 he had more pain and injured his leg on a truck which eventually led to imaging that revealed a mass. 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     He was started on Doxil/Ifos 3/23/18 and completed 4 cycles with positive response to treatment. Of not he did complete 6 months of Xarelto during this time for incidental PE. He then underwent preoperative XRT. He underwent resection 9/17/18 with <5% viable cells on path and negative margins with no LVI.    Then on surveillance imaging October 2018 he was noted to have lung nodules. He underwent biopsy December 2018 that revealed metastatic sarcoma. Dr. Johnson recommended treatment with Keytruda in February 2019 however it is unclear to me why this was never started.    He underwent restaging CT 4/2/19. This revealed large right and trace left pneumothoraces along with worsening metastatic disease with increased lung nodules, increased lymphadenopathy, and new lytic lesion with associated soft tissue mass in posterior right femoral intertrochanteric region. He was admitted through the ED. Thoracic placed a pigtail but he had re-expansion. Thoracic surgery performed talc pleurodesis 4/3/19. He was discharged home 4/5/19.    He was started on Keytruda 4/9/19. He saw ortho for worsening right thigh pain thought 2/2 metastatic lesion in right femur and they discussed nailing. During his pre-op work-up CXR revealed persistent right sided pneumothorax for which he was admitted 4/12/19. Repeat talc pleurodesis performed 4/16/19. He underwent right intramedually  pinning 4/15/19.    He has continued on Keytrua after surgery. Restaging CT 6/28/19 with mostly an increase in pulmonary nodules and a right middle lobe cavitary lesion. Overall Dr. Johnson felt it was a mixed response so plan on continuing for 2 more cycles and then repeat CT. Of note port was removed 7/17/19.    He had worsening SOB and pleuritic pain 7/24/19 and CT imaging with concerns for progressive disease in addition to large right pleural effusion. He underwent thoracentesis for this 7/25/19. He was switched to Gemzar due to progression on Keytruda, cycle 1 7/24/19.      Repeat CT 8/12/19 with mixed response, possibly delayed immune response. He continued on Gemzar. CT 9/23/19 (after 3 cycles) with overall stable to improved disease, however did have new pericardial fluid and hydropneumothorax with necrotic left lung mass vs empyema. He met with surgery 9/24/19 and discussed left thoracotomy, decortication, possible wedge resection, possible pericardial window, possible diaphragm plication for the enlarging left lower lobe fluid collection with possible pericardial fistulization. The patient however declined surgery. CT 10/1/19 was stable to slight improvement in left lower lobe fluid collection and pericardial effusion. He continued on Gemzar.    He presented for chemo on 10/15 with recurrent SOB, hemoptysis, and LE edema. US positive for DVT and CT revealed enlarging left sided mass with invasion into pericardium with worsening pericardial effusion. He had recurrent PVCs in clinic. He was admitted. S/p IVC filter for DVT on 10/17 (no anticoagulation with hemoptysis). Patient declined surgical intervention for left lung mass/fluid collection or pericardial drainage. Echo inpatient with small effusion and no tamponade physiology, though invasion into pericardium could be causing PVCs.     He was started on Pazopanib for progressive disease. He started this 10/26/19. He returns today for close oncology  follow-up.     Interval History:  David returns to clinic today unaccompanied. He is feeling well. He states that since hospital discharge he has noted ongoing improvement in his left chest wall discomfort. He has noted great improvement in his dyspnea since receiving pRBC. He is still coughing with intermittent hemoptysis and states this is stable. He is tolerating Pazopanib well with no side effects, other than he notes his BP is higher today.    He denies fevers, chills, headaches, dizziness, mouth sores, nausea, vomiting, abdominal pain, bowel or bladder concerns. Edema in his right leg is stable. No new skin concerns. Eating and drinking well. He is staying active.    Current Outpatient Medications   Medication Sig Dispense Refill     acetaminophen (TYLENOL) 500 MG tablet Take 1 tablet (500 mg) by mouth every 6 hours as needed for mild pain       levofloxacin (LEVAQUIN) 750 MG tablet Take 1 tablet (750 mg) by mouth daily (Patient not taking: Reported on 10/24/2019) 5 tablet 0     pazopanib (VOTRIENT) 200 MG tablet Take 4 tablets (800 mg) by mouth daily Take on an empty stomach 1 hour before or 2 hours after a meal. 120 tablet 0     senna-docusate (SENOKOT-S/PERICOLACE) 8.6-50 MG tablet Take 1 tablet by mouth 2 times daily       Physical Examination:  BP (!) 141/89 (BP Location: Right arm, Patient Position: Sitting, Cuff Size: Adult Regular)   Pulse 82   Temp 98.2  F (36.8  C) (Oral)   Resp 16   Wt 86.4 kg (190 lb 6.4 oz)   SpO2 99%   BMI 26.93 kg/m     Wt Readings from Last 10 Encounters:   10/24/19 88.5 kg (195 lb 3.2 oz)   10/17/19 84.1 kg (185 lb 6.4 oz)   10/15/19 88 kg (194 lb 1.6 oz)   10/03/19 89.4 kg (197 lb)   09/24/19 87.1 kg (192 lb)   09/24/19 87.4 kg (192 lb 9.6 oz)   09/12/19 89.3 kg (196 lb 14.4 oz)   09/04/19 88.2 kg (194 lb 6.4 oz)   08/20/19 86.2 kg (190 lb 1.6 oz)   08/13/19 84.4 kg (186 lb)     Constitutional: Well-appearing male in no acute distress.  Eyes: EOMI, PERRL. No scleral  icterus.  ENT: Oral mucosa is moist without lesions or thrush.   Lymphatic: Neck is supple without cervical or supraclavicular lymphadenopathy.   Cardiovascular: Regular rate and regular rhythm. No murmurs, gallops, or rubs. Right leg 1+ peripheral edema, left leg trace edema.  Respiratory: Non-labored breathing. Equal breath sounds. Faint crackles in mid left lung. No wheezing.  Gastrointestinal: Bowel sounds present. Abdomen soft, non-tender. No palpable hepatosplenomegaly or masses.   Neurologic: Cranial nerves II through XII are grossly intact.  Skin: No rashes, petechiae, or bruising noted on exposed skin.    Laboratory Data:  Results for JAYLENE CHAO (MRN [id number]) as of 10/30/2019 08:28   10/29/2019 08:48   Sodium 138   Potassium 4.2   Chloride 107   Carbon Dioxide 25   Urea Nitrogen 16   Creatinine 0.93   GFR Estimate 88   GFR Estimate If Black >90   Calcium 9.4   Anion Gap 6   Magnesium 2.3   Phosphorus 3.7   Albumin 3.1 (L)   Protein Total 7.1   Bilirubin Total 0.6   Alkaline Phosphatase 136   ALT 30   AST 24   TSH 1.58   Glucose 94   WBC 4.9   Hemoglobin 9.5 (L)   Hematocrit 31.7 (L)   Platelet Count 480 (H)   RBC Count 4.03 (L)   MCV 79   MCH 23.6 (L)   MCHC 30.0 (L)   RDW 20.8 (H)   Diff Method Automated Method   % Neutrophils 54.6   % Lymphocytes 34.4   % Monocytes 9.2   % Eosinophils 0.6   % Basophils 0.8   % Immature Granulocytes 0.4   Nucleated RBCs 0   Absolute Neutrophil 2.7   Absolute Lymphocytes 1.7   Absolute Monocytes 0.5   Absolute Eosinophils 0.0   Absolute Basophils 0.0   Abs Immature Granulocytes 0.0   Absolute Nucleated RBC 0.0       Assessment and Plan:  1. 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 Biopsy December 2018 confirmed metastatic sarcoma. Due to unclear reasons there was a delay in  starting Keytruda, finally able to receive 4/9/19 and baseline CT did confirm progressive disease. He received 5 cycles of Keytruda but had ongoing disease progression.     Switched to Gemzar 7/24/19. Completed 4 cycles however CT on 10/15 concerning for disease progression.    He is now on Pazopanib, day 1 10/26/19. Tolerating well other than slight increase in BP which he will monitor daily at home. EKG with QTc 414. Will do weekly labs and every other week provider visit. Will monitor him clinically and plan on getting CT in 6-8 weeks pending how he is doing.     2. Heme  Progressive anemia since starting Gemzar. Did receive 2 units pRBC 10/15/19 for hgb 7.3. KONG improved, today hgb better at 9.5. Also concern that hemoptysis is contributing to anemia. Will continue weekly lab monitoring.     3. Pulm  Recurrent right sided pneumothorax s/p 2 hospitalizations and TALC pleurodesis 4/3 and again 4/16. Underwent therapeutic thoracentesis 7/25/19 for left pleural effusion. Most recently has been dealing with new left lower lobe fluid collection with possible pericardial fistulization, he declined surgery for this.     Repeat CT imaging 10/15/19 with ongoing progression of left lung fluid collection/mass that was invading pericardium. He was admitted and declined any interventions including thoracic surgery recommendation of surgical removal.       Today his pain and shortness of breath improved. Still having hemoptysis but this is stable and he understands needs to call or go in to ED with any elvis hemoptysis. Will continue to closely monitoring.    4. Cards  Large left lung mass/fluid collection does invade the pericardium and abuts left ventricular wall on 10/15 CT. He also has an associated pericardial effusion. Cardiology consulted during recent admission- echo with normal heart function, moderate effusion with no tamponade physiology. He was also having PVCs and effusion may be related to this. He declined  interventions including pericardial drainage.    Today he is in NSR on EKG and he denies cardiac symptoms. Will continue very close monitoring.    BP is slightly elevated 2/2 Pazopanib. He will monitor daily and call if BP > 150/90 consistently.    5. ID  Did have fevers inpatient of unclear etiology. Completed course of Levaquin. No recent fevers.     6. MSK  Restaging CT with lytic lesion with associated soft tissue mass arising from the posterior right femoral intertrochanteric region. He underwent right femur intramedullary pinning 4/15/19 by Dr. Betts. Does not need any pain meds.     Did have DVT on 10/15 RLE US. Anticoagulation contraindicated in setting of hemoptysis. S/p permanent IVC filter 10/17/19.    Ignacio Ramirez PA-C  [facility]  [address] [place] [address]  [phone]

## 2019-10-29 NOTE — NURSING NOTE
"Oncology Rooming Note    October 29, 2019 8:57 AM   Hiram Tapia is a 59 year old male who presents for:    Chief Complaint   Patient presents with     Blood Draw     labs drawn via vpt by rn. vs taken      Oncology Clinic Visit     Return Visit Soft tissue sarcoma of right thigh      Initial Vitals: BP (!) 141/89 (BP Location: Right arm, Patient Position: Sitting, Cuff Size: Adult Regular)   Pulse 82   Temp 98.2  F (36.8  C) (Oral)   Resp 16   Wt 86.4 kg (190 lb 6.4 oz)   SpO2 99%   BMI 26.93 kg/m   Estimated body mass index is 26.93 kg/m  as calculated from the following:    Height as of 10/15/19: 1.791 m (5' 10.5\").    Weight as of this encounter: 86.4 kg (190 lb 6.4 oz). Body surface area is 2.07 meters squared.  No Pain (0) Comment: Data Unavailable   No LMP for male patient.  Allergies reviewed: Yes  Medications reviewed: Yes    Medications: Medication refills not needed today.  Pharmacy name entered into [facility]:    [facility] [place], [place] - [address] [place] [address] [phone]  [id number] - [place], [place] - [address] AT [facility] [address]  [address]  [address]    Clinical concerns: No concerns or questions to escalate today.   Ignacio was notified.    Declines flu vaccine today.      Elma Escalera, RN, MSN              "

## 2019-10-29 NOTE — PROGRESS NOTES
Oncology/Hematology Visit Note  Oct 29, 2019    Reason for Visit: Follow up of UPS of the right thigh    History of Present Illness: Hiram Tapia is a 59 year old male with no significant PMH with UPS of the right thigh. He has a history of right leg pain with sciatica x 20 years. In October 2017 he had more pain and injured his leg on a truck which eventually led to imaging that revealed a mass. 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     He was started on Doxil/Ifos 3/23/18 and completed 4 cycles with positive response to treatment. Of not he did complete 6 months of Xarelto during this time for incidental PE. He then underwent preoperative XRT. He underwent resection 9/17/18 with <5% viable cells on path and negative margins with no LVI.    Then on surveillance imaging October 2018 he was noted to have lung nodules. He underwent biopsy December 2018 that revealed metastatic sarcoma. Dr. Johnson recommended treatment with Keytruda in February 2019 however it is unclear to me why this was never started.    He underwent restaging CT 4/2/19. This revealed large right and trace left pneumothoraces along with worsening metastatic disease with increased lung nodules, increased lymphadenopathy, and new lytic lesion with associated soft tissue mass in posterior right femoral intertrochanteric region. He was admitted through the ED. Thoracic placed a pigtail but he had re-expansion. Thoracic surgery performed talc pleurodesis 4/3/19. He was discharged home 4/5/19.    He was started on Keytruda 4/9/19. He saw ortho for worsening right thigh pain thought 2/2 metastatic lesion in right femur and they discussed nailing. During his pre-op work-up CXR revealed persistent right sided pneumothorax for which he was admitted 4/12/19. Repeat talc pleurodesis performed 4/16/19. He underwent right intramedually pinning 4/15/19.    He has continued on Keytrua after surgery. Restaging CT 6/28/19  with mostly an increase in pulmonary nodules and a right middle lobe cavitary lesion. Overall Dr. Johnson felt it was a mixed response so plan on continuing for 2 more cycles and then repeat CT. Of note port was removed 7/17/19.    He had worsening SOB and pleuritic pain 7/24/19 and CT imaging with concerns for progressive disease in addition to large right pleural effusion. He underwent thoracentesis for this 7/25/19. He was switched to Gemzar due to progression on Keytruda, cycle 1 7/24/19.      Repeat CT 8/12/19 with mixed response, possibly delayed immune response. He continued on Gemzar. CT 9/23/19 (after 3 cycles) with overall stable to improved disease, however did have new pericardial fluid and hydropneumothorax with necrotic left lung mass vs empyema. He met with surgery 9/24/19 and discussed left thoracotomy, decortication, possible wedge resection, possible pericardial window, possible diaphragm plication for the enlarging left lower lobe fluid collection with possible pericardial fistulization. The patient however declined surgery. CT 10/1/19 was stable to slight improvement in left lower lobe fluid collection and pericardial effusion. He continued on Gemzar.    He presented for chemo on 10/15 with recurrent SOB, hemoptysis, and LE edema. US positive for DVT and CT revealed enlarging left sided mass with invasion into pericardium with worsening pericardial effusion. He had recurrent PVCs in clinic. He was admitted. S/p IVC filter for DVT on 10/17 (no anticoagulation with hemoptysis). Patient declined surgical intervention for left lung mass/fluid collection or pericardial drainage. Echo inpatient with small effusion and no tamponade physiology, though invasion into pericardium could be causing PVCs.     He was started on Pazopanib for progressive disease. He started this 10/26/19. He returns today for close oncology follow-up.     Interval History:  David returns to clinic today unaccompanied. He is  feeling well. He states that since hospital discharge he has noted ongoing improvement in his left chest wall discomfort. He has noted great improvement in his dyspnea since receiving pRBC. He is still coughing with intermittent hemoptysis and states this is stable. He is tolerating Pazopanib well with no side effects, other than he notes his BP is higher today.    He denies fevers, chills, headaches, dizziness, mouth sores, nausea, vomiting, abdominal pain, bowel or bladder concerns. Edema in his right leg is stable. No new skin concerns. Eating and drinking well. He is staying active.    Current Outpatient Medications   Medication Sig Dispense Refill     acetaminophen (TYLENOL) 500 MG tablet Take 1 tablet (500 mg) by mouth every 6 hours as needed for mild pain       levofloxacin (LEVAQUIN) 750 MG tablet Take 1 tablet (750 mg) by mouth daily (Patient not taking: Reported on 10/24/2019) 5 tablet 0     pazopanib (VOTRIENT) 200 MG tablet Take 4 tablets (800 mg) by mouth daily Take on an empty stomach 1 hour before or 2 hours after a meal. 120 tablet 0     senna-docusate (SENOKOT-S/PERICOLACE) 8.6-50 MG tablet Take 1 tablet by mouth 2 times daily       Physical Examination:  BP (!) 141/89 (BP Location: Right arm, Patient Position: Sitting, Cuff Size: Adult Regular)   Pulse 82   Temp 98.2  F (36.8  C) (Oral)   Resp 16   Wt 86.4 kg (190 lb 6.4 oz)   SpO2 99%   BMI 26.93 kg/m    Wt Readings from Last 10 Encounters:   10/24/19 88.5 kg (195 lb 3.2 oz)   10/17/19 84.1 kg (185 lb 6.4 oz)   10/15/19 88 kg (194 lb 1.6 oz)   10/03/19 89.4 kg (197 lb)   09/24/19 87.1 kg (192 lb)   09/24/19 87.4 kg (192 lb 9.6 oz)   09/12/19 89.3 kg (196 lb 14.4 oz)   09/04/19 88.2 kg (194 lb 6.4 oz)   08/20/19 86.2 kg (190 lb 1.6 oz)   08/13/19 84.4 kg (186 lb)     Constitutional: Well-appearing male in no acute distress.  Eyes: EOMI, PERRL. No scleral icterus.  ENT: Oral mucosa is moist without lesions or thrush.   Lymphatic: Neck is  supple without cervical or supraclavicular lymphadenopathy.   Cardiovascular: Regular rate and regular rhythm. No murmurs, gallops, or rubs. Right leg 1+ peripheral edema, left leg trace edema.  Respiratory: Non-labored breathing. Equal breath sounds. Faint crackles in mid left lung. No wheezing.  Gastrointestinal: Bowel sounds present. Abdomen soft, non-tender. No palpable hepatosplenomegaly or masses.   Neurologic: Cranial nerves II through XII are grossly intact.  Skin: No rashes, petechiae, or bruising noted on exposed skin.    Laboratory Data:  Results for JAYLENE CHAO (MRN [id number]) as of 10/30/2019 08:28   10/29/2019 08:48   Sodium 138   Potassium 4.2   Chloride 107   Carbon Dioxide 25   Urea Nitrogen 16   Creatinine 0.93   GFR Estimate 88   GFR Estimate If Black >90   Calcium 9.4   Anion Gap 6   Magnesium 2.3   Phosphorus 3.7   Albumin 3.1 (L)   Protein Total 7.1   Bilirubin Total 0.6   Alkaline Phosphatase 136   ALT 30   AST 24   TSH 1.58   Glucose 94   WBC 4.9   Hemoglobin 9.5 (L)   Hematocrit 31.7 (L)   Platelet Count 480 (H)   RBC Count 4.03 (L)   MCV 79   MCH 23.6 (L)   MCHC 30.0 (L)   RDW 20.8 (H)   Diff Method Automated Method   % Neutrophils 54.6   % Lymphocytes 34.4   % Monocytes 9.2   % Eosinophils 0.6   % Basophils 0.8   % Immature Granulocytes 0.4   Nucleated RBCs 0   Absolute Neutrophil 2.7   Absolute Lymphocytes 1.7   Absolute Monocytes 0.5   Absolute Eosinophils 0.0   Absolute Basophils 0.0   Abs Immature Granulocytes 0.0   Absolute Nucleated RBC 0.0       Assessment and Plan:  1. 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 Biopsy December 2018 confirmed metastatic sarcoma. Due to unclear reasons there was a delay in starting Keytruda, finally able to receive 4/9/19 and baseline CT did confirm  progressive disease. He received 5 cycles of Keytruda but had ongoing disease progression.     Switched to Gemzar 7/24/19. Completed 4 cycles however CT on 10/15 concerning for disease progression.    He is now on Pazopanib, day 1 10/26/19. Tolerating well other than slight increase in BP which he will monitor daily at home. EKG with QTc 414. Will do weekly labs and every other week provider visit. Will monitor him clinically and plan on getting CT in 6-8 weeks pending how he is doing.     2. Heme  Progressive anemia since starting Gemzar. Did receive 2 units pRBC 10/15/19 for hgb 7.3. KONG improved, today hgb better at 9.5. Also concern that hemoptysis is contributing to anemia. Will continue weekly lab monitoring.     3. Pulm  Recurrent right sided pneumothorax s/p 2 hospitalizations and TALC pleurodesis 4/3 and again 4/16. Underwent therapeutic thoracentesis 7/25/19 for left pleural effusion. Most recently has been dealing with new left lower lobe fluid collection with possible pericardial fistulization, he declined surgery for this.     Repeat CT imaging 10/15/19 with ongoing progression of left lung fluid collection/mass that was invading pericardium. He was admitted and declined any interventions including thoracic surgery recommendation of surgical removal.       Today his pain and shortness of breath improved. Still having hemoptysis but this is stable and he understands needs to call or go in to ED with any elvis hemoptysis. Will continue to closely monitoring.    4. Cards  Large left lung mass/fluid collection does invade the pericardium and abuts left ventricular wall on 10/15 CT. He also has an associated pericardial effusion. Cardiology consulted during recent admission- echo with normal heart function, moderate effusion with no tamponade physiology. He was also having PVCs and effusion may be related to this. He declined interventions including pericardial drainage.    Today he is in NSR on EKG and he  denies cardiac symptoms. Will continue very close monitoring.    BP is slightly elevated 2/2 Pazopanib. He will monitor daily and call if BP > 150/90 consistently.    5. ID  Did have fevers inpatient of unclear etiology. Completed course of Levaquin. No recent fevers.     6. MSK  Restaging CT with lytic lesion with associated soft tissue mass arising from the posterior right femoral intertrochanteric region. He underwent right femur intramedullary pinning 4/15/19 by Dr. Betts. Does not need any pain meds.     Did have DVT on 10/15 RLE US. Anticoagulation contraindicated in setting of hemoptysis. S/p permanent IVC filter 10/17/19.    Ignacio Ramirez PA-C  [facility]  [address] [place] [address] [phone]

## 2019-11-01 NOTE — TELEPHONE ENCOUNTER
Oral Chemotherapy Monitoring Program    Primary Oncologist: Dr. Johnson  Primary Oncology Clinic: [facility]   Cancer Diagnosis: Sarcoma of soft tissue    Therapy History:  Votrient (pazopanib) 800 mg (9o091gb tabs) once daily  C1D1=10/26/2019    Drug Interaction Assessment: no significant drug interactions identified.     Lab Monitoring Plan  CBC and CMP qweekly for the first month, then evaluate plan.  Phos monthly  EKG baseline and day 8 or 9, then as clinically indicated (if patient is high rist QTc prolongating medications or has clinical risk factors [cardiovascular disease, arrhythmias, K+/Mg+ abnormalities] consider [id number] monitoring)  Check BP [id number] for first month, then monthly.  Subjective/Objective:  Hiram Tapia is a 59 year old male contacted by phone for a follow-up visit for oral chemotherapy.  David confirmed that he is taking the medication correctly and is adherent to therapy.  He indicates that he believes he is tolerating therapy well, but does endorse some mild nausea and fatigue.  He specifically denies episodes of diarrhea and has not checked his BP at home.  He indicates that he is going to purchase a BP cuff today.     ORAL CHEMOTHERAPY 10/24/2019 11/1/2019   Drug Name Votrient (Pazopanib) Votrient (Pazopanib)   Current Dosage 800mg 800mg   Current Schedule Daily Daily   Cycle Details Continuous Continuous   Start Date of Last Cycle - 10/26/2019   Doses missed in last 2 weeks - 0   Adherence Assessment - Adherent   Adverse Effects - Fatigue;Nausea   Nausea - Grade 1   Pharmacist Intervention(nausea) - Yes   Intervention(s) - Patient education   Fatigue - Grade 1   Pharmacist Intervention(fatigue) - Yes   Intervention(s) - Patient education   Home BPs - not done   Any new drug interactions? No No   Is the dose as ordered appropriate for the patient? Yes Yes       Last PHQ-2 Score on record:   PHQ-2 ( 1999 [employer]) 7/17/2018   Q1: Little interest or pleasure in doing things 0  "  Q2: Feeling down, depressed or hopeless 0   PHQ-2 Score 0       Vitals:  BP:   BP Readings from Last 1 Encounters:   10/29/19 (!) 141/89     Wt Readings from Last 1 Encounters:   10/29/19 86.4 kg (190 lb 6.4 oz)     Estimated body surface area is 2.07 meters squared as calculated from the following:    Height as of 10/15/19: 1.791 m (5' 10.5\").    Weight as of 10/29/19: 86.4 kg (190 lb 6.4 oz).    Labs:  _  Result Component Current Result Ref Range   Sodium 138 (10/29/2019) 133 - 144 mmol/L     _  Result Component Current Result Ref Range   Potassium 4.2 (10/29/2019) 3.4 - 5.3 mmol/L     _  Result Component Current Result Ref Range   Calcium 9.4 (10/29/2019) 8.5 - 10.1 mg/dL     _  Result Component Current Result Ref Range   Magnesium 2.3 (10/29/2019) 1.6 - 2.3 mg/dL     _  Result Component Current Result Ref Range   Phosphorus 3.7 (10/29/2019) 2.5 - 4.5 mg/dL     _  Result Component Current Result Ref Range   Albumin 3.1 (L) (10/29/2019) 3.4 - 5.0 g/dL     _  Result Component Current Result Ref Range   Urea Nitrogen 16 (10/29/2019) 7 - 30 mg/dL     _  Result Component Current Result Ref Range   Creatinine 0.93 (10/29/2019) 0.66 - 1.25 mg/dL       Result Component Current Result Ref Range   AST 24 (10/29/2019) 0 - 45 U/L     _  Result Component Current Result Ref Range   ALT 30 (10/29/2019) 0 - 70 U/L     _  Result Component Current Result Ref Range   Bilirubin Total 0.6 (10/29/2019) 0.2 - 1.3 mg/dL       Result Component Current Result Ref Range   WBC 4.9 (10/29/2019) 4.0 - 11.0 10e9/L     _  Result Component Current Result Ref Range   Hemoglobin 9.5 (L) (10/29/2019) 13.3 - 17.7 g/dL     _  Result Component Current Result Ref Range   Platelet Count 480 (H) (10/29/2019) 150 - 450 10e9/L     _  Result Component Current Result Ref Range   Absolute Neutrophil 2.7 (10/29/2019) 1.6 - 8.3 10e9/L       Assessment:  David is tolerating therapy well.     Plan:  Continue Votrient therapy as planned. Recommended purchasing " BP cuff to monitor BP at home.     Follow-Up:  11/5 for EKG and lab results    Refill Due:  11/18 for 11/25       Janelle Cameron PharmD  Oral Chemotherapy Monitoring Program  [facility]  [phone]

## 2019-11-05 NOTE — NURSING NOTE
Chief Complaint   Patient presents with     Blood Draw     Labs drawn via  by RN in lab. VS taken.      Sonia Payne RN

## 2019-11-06 NOTE — TELEPHONE ENCOUNTER
Oral Chemotherapy Monitoring Program.    Patient currently on Votrient therapy.    Called to discuss lab results from 11/5/19.  No concerning abnormalities.  Results of ECG not yet posted in Epic.    Patient reports that his stomach hurts: all the time, somewhat crampy, waxes and wanes somewhat, does not feel like reflux or nausea, bowels OK (a little constipated), daily BMs, no fever, no vomiting.  Will consult with Ignacio and Dr Johnson, and get back to patient.    Lisa Daigle, PharmD, BCPS, [facility]  Oncology Clinical Pharmacy Specialist  [facility]  [phone]

## 2019-11-07 NOTE — TELEPHONE ENCOUNTER
Oral Chemotherapy Monitoring Program     Placed call to patient in follow up of patient reported side effects of stomach upset and recommendations from Ignacio.     Per Ignacio, okay for patient to try use of Tums or Maalox to assist with side effects that sound similar to dyspepsia. Detailed to patient that there is a drug interaction between antacids and Votrient therapy, but that he is able to see if the use of antacids helps with these side effects as long as he is  the dosing of both therapies by several hours (>4 hours).    Patient verbalized understanding and stated he appreciated the call back. Reminded patient of his appt with Ignacio on 11/13.       Janelle Cameron, PharmD  Oral Chemotherapy Monitoring Program  [facility]  [phone]  November 7, 2019

## 2019-11-12 NOTE — PROGRESS NOTES
Oncology/Hematology Visit Note  Nov 13, 2019    Reason for Visit: Follow up of UPS of the right thigh    History of Present Illness: Hiram Tapia is a 59 year old male with no significant PMH with UPS of the right thigh. He has a history of right leg pain with sciatica x 20 years. In October 2017 he had more pain and injured his leg on a truck which eventually led to imaging that revealed a mass. 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     He was started on Doxil/Ifos 3/23/18 and completed 4 cycles with positive response to treatment. Of not he did complete 6 months of Xarelto during this time for incidental PE. He then underwent preoperative XRT. He underwent resection 9/17/18 with <5% viable cells on path and negative margins with no LVI.    Then on surveillance imaging October 2018 he was noted to have lung nodules. He underwent biopsy December 2018 that revealed metastatic sarcoma. Dr. Johnson recommended treatment with Keytruda in February 2019 however it is unclear to me why this was never started.    He underwent restaging CT 4/2/19. This revealed large right and trace left pneumothoraces along with worsening metastatic disease with increased lung nodules, increased lymphadenopathy, and new lytic lesion with associated soft tissue mass in posterior right femoral intertrochanteric region. He was admitted through the ED. Thoracic placed a pigtail but he had re-expansion. Thoracic surgery performed talc pleurodesis 4/3/19. He was discharged home 4/5/19.    He was started on Keytruda 4/9/19. He saw ortho for worsening right thigh pain thought 2/2 metastatic lesion in right femur and they discussed nailing. During his pre-op work-up CXR revealed persistent right sided pneumothorax for which he was admitted 4/12/19. Repeat talc pleurodesis performed 4/16/19. He underwent right intramedually pinning 4/15/19.    He has continued on Keytrua after surgery. Restaging CT 6/28/19  with mostly an increase in pulmonary nodules and a right middle lobe cavitary lesion. Overall Dr. Johnson felt it was a mixed response so plan on continuing for 2 more cycles and then repeat CT. Of note port was removed 7/17/19.    He had worsening SOB and pleuritic pain 7/24/19 and CT imaging with concerns for progressive disease in addition to large right pleural effusion. He underwent thoracentesis for this 7/25/19. He was switched to Gemzar due to progression on Keytruda, cycle 1 7/24/19.      Repeat CT 8/12/19 with mixed response, possibly delayed immune response. He continued on Gemzar. CT 9/23/19 (after 3 cycles) with overall stable to improved disease, however did have new pericardial fluid and hydropneumothorax with necrotic left lung mass vs empyema. He met with surgery 9/24/19 and discussed left thoracotomy, decortication, possible wedge resection, possible pericardial window, possible diaphragm plication for the enlarging left lower lobe fluid collection with possible pericardial fistulization. The patient however declined surgery. CT 10/1/19 was stable to slight improvement in left lower lobe fluid collection and pericardial effusion. He continued on Gemzar.    He presented for chemo on 10/15 with recurrent SOB, hemoptysis, and LE edema. US positive for DVT and CT revealed enlarging left sided mass with invasion into pericardium with worsening pericardial effusion. He had recurrent PVCs in clinic. He was admitted. S/p IVC filter for DVT on 10/17 (no anticoagulation with hemoptysis). Patient declined surgical intervention for left lung mass/fluid collection or pericardial drainage. Echo inpatient with small effusion and no tamponade physiology, though invasion into pericardium could be causing PVCs.     He was started on Pazopanib for progressive disease. He started this 10/26/19. He returns today for oncology follow-up.     Interval History:  David returns to clinic today unaccompanied. He is feeling  "really well and states that the last week he has felt the best he has in a long time. His cough has substantially improved and while he still has hemoptysis it is much less. He denies any breathing concerns and the prior mid chest discomfort has improved. He still feels like his chest is tender with deep breathing and stretching, but this is better than before. He is not needing any pain meds.     He denies fevers, chills, headaches, dizziness, mouth sores. He gets mild nausea in the AM but denies vomiting and isn't needing antiemetics. He had a \"sour stomach\" before and bought tums for this but then hasn't needed them. He denies abdominal pain, bowel or urinary concerns. His edema is slightly better. He still has stiffness and slight discomfort in right leg, but not needing pain meds and still being active. He was deer hunting this whole last weekend. He denies skin concerns other then being dry. His BP has been running OK at home. He is eating and drinking well.     Current Outpatient Medications   Medication Sig Dispense Refill     acetaminophen (TYLENOL) 500 MG tablet Take 1 tablet (500 mg) by mouth every 6 hours as needed for mild pain       pazopanib (VOTRIENT) 200 MG tablet Take 4 tablets (800 mg) by mouth daily Take on an empty stomach 1 hour before or 2 hours after a meal. 120 tablet 0     senna-docusate (SENOKOT-S/PERICOLACE) 8.6-50 MG tablet Take 1 tablet by mouth 2 times daily       Physical Examination:  BP (!) 140/81 (BP Location: Right arm, Patient Position: Sitting, Cuff Size: Adult Regular)   Pulse 55   Temp 97.7  F (36.5  C) (Oral)   Resp 18   Ht 1.791 m (5' 10.51\")   Wt 87.4 kg (192 lb 9.6 oz)   SpO2 98%   BMI 27.24 kg/m    Wt Readings from Last 10 Encounters:   11/05/19 85.3 kg (188 lb)   10/29/19 86.4 kg (190 lb 6.4 oz)   10/24/19 88.5 kg (195 lb 3.2 oz)   10/17/19 84.1 kg (185 lb 6.4 oz)   10/15/19 88 kg (194 lb 1.6 oz)   10/03/19 89.4 kg (197 lb)   09/24/19 87.1 kg (192 lb)   09/24/19 " 87.4 kg (192 lb 9.6 oz)   09/12/19 89.3 kg (196 lb 14.4 oz)   09/04/19 88.2 kg (194 lb 6.4 oz)     Constitutional: Well-appearing male in no acute distress.  Eyes: EOMI, PERRL. No scleral icterus.  ENT: Oral mucosa is moist without lesions or thrush.   Lymphatic: Neck is supple without cervical or supraclavicular lymphadenopathy.   Cardiovascular: Regular rate and regular rhythm, frequent extra beats heard on exam. No murmurs, gallops, or rubs. Right leg 1+ peripheral edema, left leg trace edema.  Respiratory: Non-labored breathing. Equal breath sounds. No crackles or wheezing.   Gastrointestinal: Bowel sounds present. Abdomen soft, non-tender. No palpable hepatosplenomegaly or masses.   Neurologic: Cranial nerves II through XII are grossly intact.  Skin: Dry skin. No rashes, petechiae, or bruising noted on exposed skin.    Laboratory Data:  Results for JAYLENE CHAO (MRN [id number]) as of 11/13/2019 09:01   11/13/2019 07:40   Sodium 139   Potassium 4.4   Chloride 108   Carbon Dioxide 26   Urea Nitrogen 12   Creatinine 0.80   GFR Estimate >90   GFR Estimate If Black >90   Calcium 9.4   Anion Gap 4   Magnesium 2.2   Phosphorus 2.5   Albumin 2.8 (L)   Protein Total 6.7 (L)   Bilirubin Total 0.3   Alkaline Phosphatase 143   ALT 36   AST 34   Glucose 104 (H)   WBC 4.2   Hemoglobin 10.2 (L)   Hematocrit 34.4 (L)   Platelet Count 318   RBC Count 4.44   MCV 78   MCH 23.0 (L)   MCHC 29.7 (L)   RDW 18.9 (H)   Diff Method Automated Method   % Neutrophils 60.9   % Lymphocytes 27.9   % Monocytes 8.1   % Eosinophils 1.9   % Basophils 1.0   % Immature Granulocytes 0.2   Nucleated RBCs 0   Absolute Neutrophil 2.6   Absolute Lymphocytes 1.2   Absolute Monocytes 0.3   Absolute Eosinophils 0.1   Absolute Basophils 0.0   Abs Immature Granulocytes 0.0   Absolute Nucleated RBC 0.0     EKG 11/13/19  NSR, non-specific T-wave abnormalities. QTc 393.    Assessment and Plan:  1. 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 Biopsy December 2018 confirmed metastatic sarcoma. Due to unclear reasons there was a delay in starting Keytruda, finally able to receive 4/9/19 and baseline CT did confirm progressive disease. He received 5 cycles of Keytruda but had ongoing disease progression.     Switched to Gemzar 7/24/19. Completed 4 cycles however CT on 10/15 concerning for disease progression.    He is now on Pazopanib, day 1 10/26/19. Tolerating well and clinically feels much improved. EKG with QTc 393. Labs stable. He will see Dr. Johnson in 2 weeks. Given clinical improvement will hold off on scheduling CT at this time and patient is in agreement with this.     2. Heme  Progressive anemia since starting Gemzar. Did receive 2 units pRBC 10/15/19 for hgb 7.3. KONG improved, today hgb better at 10.2. Also concern that hemoptysis is contributing to anemia, this is now improved. Will continue lab monitoring.     3. Pulm  Recurrent right sided pneumothorax s/p 2 hospitalizations and TALC pleurodesis 4/3 and again 4/16. Underwent therapeutic thoracentesis 7/25/19 for left pleural effusion. Most recently has been dealing with new left lower lobe fluid collection with possible pericardial fistulization, he declined surgery for this.     Repeat CT imaging 10/15/19 with ongoing progression of left lung fluid collection/mass that was invading pericardium. He was admitted and declined any interventions including thoracic surgery recommendation of surgical removal.       Today his pain, cough, SOB, hemoptysis all improved. Sating well on RA. Continue to monitor. He understands need to call with any changes in respiratory symptoms.    4. Cards  Large left lung mass/fluid collection does invade the pericardium and abuts left ventricular wall on 10/15 CT. He also has an associated  pericardial effusion. Cardiology consulted during recent admission- echo with normal heart function, moderate effusion with no tamponade physiology. He was also having PVCs and effusion may be related to this. He declined interventions including pericardial drainage.    Today he is in NSR on EKG however he does have frequent PVC on physical exam. Reviewed cardiology note who did not recommended any interventions for asymptomatic PVCs and since his recent echo was OK will hold off on additional work-up. Will continue very close monitoring.    BP is slightly elevated 2/2 Pazopanib. He will monitor daily and call if BP > 150/90 consistently. OK today.    5. ID  Did have fevers inpatient of unclear etiology. Completed course of Levaquin. No recent fevers.     6. MSK  Restaging CT with lytic lesion with associated soft tissue mass arising from the posterior right femoral intertrochanteric region. He underwent right femur intramedullary pinning 4/15/19 by Dr. Betts. Does not need any pain meds.     Did have DVT on 10/15 RLE US. Anticoagulation contraindicated in setting of hemoptysis. S/p permanent IVC filter 10/17/19.    Ignacio Ramirez PA-C  [facility]  [address], [place] [address] [phone]

## 2019-11-13 NOTE — LETTER
11/13/2019      RE: Hiram Tapia  [address]  [facility] [address]       Oncology/Hematology Visit Note  Nov 13, 2019    Reason for Visit: Follow up of UPS of the right thigh    History of Present Illness: Hiram Tapia is a 59 year old male with no significant PMH with UPS of the right thigh. He has a history of right leg pain with sciatica x 20 years. In October 2017 he had more pain and injured his leg on a truck which eventually led to imaging that revealed a mass. 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     He was started on Doxil/Ifos 3/23/18 and completed 4 cycles with positive response to treatment. Of not he did complete 6 months of Xarelto during this time for incidental PE. He then underwent preoperative XRT. He underwent resection 9/17/18 with <5% viable cells on path and negative margins with no LVI.    Then on surveillance imaging October 2018 he was noted to have lung nodules. He underwent biopsy December 2018 that revealed metastatic sarcoma. Dr. Johnson recommended treatment with Keytruda in February 2019 however it is unclear to me why this was never started.    He underwent restaging CT 4/2/19. This revealed large right and trace left pneumothoraces along with worsening metastatic disease with increased lung nodules, increased lymphadenopathy, and new lytic lesion with associated soft tissue mass in posterior right femoral intertrochanteric region. He was admitted through the ED. Thoracic placed a pigtail but he had re-expansion. Thoracic surgery performed talc pleurodesis 4/3/19. He was discharged home 4/5/19.    He was started on Keytruda 4/9/19. He saw ortho for worsening right thigh pain thought 2/2 metastatic lesion in right femur and they discussed nailing. During his pre-op work-up CXR revealed persistent right sided pneumothorax for which he was admitted 4/12/19. Repeat talc pleurodesis performed 4/16/19. He underwent right  intramedually pinning 4/15/19.    He has continued on Keytrua after surgery. Restaging CT 6/28/19 with mostly an increase in pulmonary nodules and a right middle lobe cavitary lesion. Overall Dr. Johnson felt it was a mixed response so plan on continuing for 2 more cycles and then repeat CT. Of note port was removed 7/17/19.    He had worsening SOB and pleuritic pain 7/24/19 and CT imaging with concerns for progressive disease in addition to large right pleural effusion. He underwent thoracentesis for this 7/25/19. He was switched to Gemzar due to progression on Keytruda, cycle 1 7/24/19.      Repeat CT 8/12/19 with mixed response, possibly delayed immune response. He continued on Gemzar. CT 9/23/19 (after 3 cycles) with overall stable to improved disease, however did have new pericardial fluid and hydropneumothorax with necrotic left lung mass vs empyema. He met with surgery 9/24/19 and discussed left thoracotomy, decortication, possible wedge resection, possible pericardial window, possible diaphragm plication for the enlarging left lower lobe fluid collection with possible pericardial fistulization. The patient however declined surgery. CT 10/1/19 was stable to slight improvement in left lower lobe fluid collection and pericardial effusion. He continued on Gemzar.    He presented for chemo on 10/15 with recurrent SOB, hemoptysis, and LE edema. US positive for DVT and CT revealed enlarging left sided mass with invasion into pericardium with worsening pericardial effusion. He had recurrent PVCs in clinic. He was admitted. S/p IVC filter for DVT on 10/17 (no anticoagulation with hemoptysis). Patient declined surgical intervention for left lung mass/fluid collection or pericardial drainage. Echo inpatient with small effusion and no tamponade physiology, though invasion into pericardium could be causing PVCs.     He was started on Pazopanib for progressive disease. He started this 10/26/19. He returns today for  "oncology follow-up.     Interval History:  David returns to clinic today unaccompanied. He is feeling really well and states that the last week he has felt the best he has in a long time. His cough has substantially improved and while he still has hemoptysis it is much less. He denies any breathing concerns and the prior mid chest discomfort has improved. He still feels like his chest is tender with deep breathing and stretching, but this is better than before. He is not needing any pain meds.     He denies fevers, chills, headaches, dizziness, mouth sores. He gets mild nausea in the AM but denies vomiting and isn't needing antiemetics. He had a \"sour stomach\" before and bought tums for this but then hasn't needed them. He denies abdominal pain, bowel or urinary concerns. His edema is slightly better. He still has stiffness and slight discomfort in right leg, but not needing pain meds and still being active. He was deer hunting this whole last weekend. He denies skin concerns other then being dry. His BP has been running OK at home. He is eating and drinking well.     Current Outpatient Medications   Medication Sig Dispense Refill     acetaminophen (TYLENOL) 500 MG tablet Take 1 tablet (500 mg) by mouth every 6 hours as needed for mild pain       pazopanib (VOTRIENT) 200 MG tablet Take 4 tablets (800 mg) by mouth daily Take on an empty stomach 1 hour before or 2 hours after a meal. 120 tablet 0     senna-docusate (SENOKOT-S/PERICOLACE) 8.6-50 MG tablet Take 1 tablet by mouth 2 times daily       Physical Examination:  BP (!) 140/81 (BP Location: Right arm, Patient Position: Sitting, Cuff Size: Adult Regular)   Pulse 55   Temp 97.7  F (36.5  C) (Oral)   Resp 18   Ht 1.791 m (5' 10.51\")   Wt 87.4 kg (192 lb 9.6 oz)   SpO2 98%   BMI 27.24 kg/m     Wt Readings from Last 10 Encounters:   11/05/19 85.3 kg (188 lb)   10/29/19 86.4 kg (190 lb 6.4 oz)   10/24/19 88.5 kg (195 lb 3.2 oz)   10/17/19 84.1 kg (185 lb 6.4 " oz)   10/15/19 88 kg (194 lb 1.6 oz)   10/03/19 89.4 kg (197 lb)   09/24/19 87.1 kg (192 lb)   09/24/19 87.4 kg (192 lb 9.6 oz)   09/12/19 89.3 kg (196 lb 14.4 oz)   09/04/19 88.2 kg (194 lb 6.4 oz)     Constitutional: Well-appearing male in no acute distress.  Eyes: EOMI, PERRL. No scleral icterus.  ENT: Oral mucosa is moist without lesions or thrush.   Lymphatic: Neck is supple without cervical or supraclavicular lymphadenopathy.   Cardiovascular: Regular rate and regular rhythm, frequent extra beats heard on exam. No murmurs, gallops, or rubs. Right leg 1+ peripheral edema, left leg trace edema.  Respiratory: Non-labored breathing. Equal breath sounds. No crackles or wheezing.   Gastrointestinal: Bowel sounds present. Abdomen soft, non-tender. No palpable hepatosplenomegaly or masses.   Neurologic: Cranial nerves II through XII are grossly intact.  Skin: Dry skin. No rashes, petechiae, or bruising noted on exposed skin.    Laboratory Data:  Results for JAYLENE CHAO (MRN [id number]) as of 11/13/2019 09:01   11/13/2019 07:40   Sodium 139   Potassium 4.4   Chloride 108   Carbon Dioxide 26   Urea Nitrogen 12   Creatinine 0.80   GFR Estimate >90   GFR Estimate If Black >90   Calcium 9.4   Anion Gap 4   Magnesium 2.2   Phosphorus 2.5   Albumin 2.8 (L)   Protein Total 6.7 (L)   Bilirubin Total 0.3   Alkaline Phosphatase 143   ALT 36   AST 34   Glucose 104 (H)   WBC 4.2   Hemoglobin 10.2 (L)   Hematocrit 34.4 (L)   Platelet Count 318   RBC Count 4.44   MCV 78   MCH 23.0 (L)   MCHC 29.7 (L)   RDW 18.9 (H)   Diff Method Automated Method   % Neutrophils 60.9   % Lymphocytes 27.9   % Monocytes 8.1   % Eosinophils 1.9   % Basophils 1.0   % Immature Granulocytes 0.2   Nucleated RBCs 0   Absolute Neutrophil 2.6   Absolute Lymphocytes 1.2   Absolute Monocytes 0.3   Absolute Eosinophils 0.1   Absolute Basophils 0.0   Abs Immature Granulocytes 0.0   Absolute Nucleated RBC 0.0     EKG 11/13/19  NSR, non-specific T-wave  abnormalities. QTc 393.    Assessment and Plan:  1. 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 Biopsy December 2018 confirmed metastatic sarcoma. Due to unclear reasons there was a delay in starting Keytruda, finally able to receive 4/9/19 and baseline CT did confirm progressive disease. He received 5 cycles of Keytruda but had ongoing disease progression.     Switched to Gemzar 7/24/19. Completed 4 cycles however CT on 10/15 concerning for disease progression.    He is now on Pazopanib, day 1 10/26/19. Tolerating well and clinically feels much improved. EKG with QTc 393. Labs stable. He will see Dr. Johnson in 2 weeks. Given clinical improvement will hold off on scheduling CT at this time and patient is in agreement with this.     2. Heme  Progressive anemia since starting Gemzar. Did receive 2 units pRBC 10/15/19 for hgb 7.3. KONG improved, today hgb better at 10.2. Also concern that hemoptysis is contributing to anemia, this is now improved. Will continue lab monitoring.     3. Pulm  Recurrent right sided pneumothorax s/p 2 hospitalizations and TALC pleurodesis 4/3 and again 4/16. Underwent therapeutic thoracentesis 7/25/19 for left pleural effusion. Most recently has been dealing with new left lower lobe fluid collection with possible pericardial fistulization, he declined surgery for this.     Repeat CT imaging 10/15/19 with ongoing progression of left lung fluid collection/mass that was invading pericardium. He was admitted and declined any interventions including thoracic surgery recommendation of surgical removal.       Today his pain, cough, SOB, hemoptysis all improved. Sating well on RA. Continue to monitor. He understands need to call with any changes in respiratory symptoms.    4. Cards  Large left lung mass/fluid  collection does invade the pericardium and abuts left ventricular wall on 10/15 CT. He also has an associated pericardial effusion. Cardiology consulted during recent admission- echo with normal heart function, moderate effusion with no tamponade physiology. He was also having PVCs and effusion may be related to this. He declined interventions including pericardial drainage.    Today he is in NSR on EKG however he does have frequent PVC on physical exam. Reviewed cardiology note who did not recommended any interventions for asymptomatic PVCs and since his recent echo was OK will hold off on additional work-up. Will continue very close monitoring.    BP is slightly elevated 2/2 Pazopanib. He will monitor daily and call if BP > 150/90 consistently. OK today.    5. ID  Did have fevers inpatient of unclear etiology. Completed course of Levaquin. No recent fevers.     6. MSK  Restaging CT with lytic lesion with associated soft tissue mass arising from the posterior right femoral intertrochanteric region. He underwent right femur intramedullary pinning 4/15/19 by Dr. Betts. Does not need any pain meds.     Did have DVT on 10/15 RLE US. Anticoagulation contraindicated in setting of hemoptysis. S/p permanent IVC filter 10/17/19.    Ignacio Ramirez PA-C  [facility]  [address] [place] [address] [phone]

## 2019-11-13 NOTE — NURSING NOTE
"Oncology Rooming Note    November 13, 2019 7:52 AM   Hiram Tapia is a 59 year old male who presents for:    Chief Complaint   Patient presents with     Blood Draw     labs drawn via vpt by rn. vs taken     Oncology Clinic Visit     Return Visit for Sarcoma of soft tissue      Initial Vitals: BP (!) 140/81 (BP Location: Right arm, Patient Position: Sitting, Cuff Size: Adult Regular)   Pulse 55   Temp 97.7  F (36.5  C) (Oral)   Resp 18   Ht 1.791 m (5' 10.51\")   Wt 87.4 kg (192 lb 9.6 oz)   SpO2 98%   BMI 27.24 kg/m   Estimated body mass index is 27.24 kg/m  as calculated from the following:    Height as of this encounter: 1.791 m (5' 10.51\").    Weight as of this encounter: 87.4 kg (192 lb 9.6 oz). Body surface area is 2.09 meters squared.  No Pain (0) Comment: Data Unavailable   No LMP for male patient.  Allergies reviewed: Yes  Medications reviewed: Yes    Medications: Medication refills not needed today.  Pharmacy name entered into EPIC:    [facility] [place], [place] - [phone]  [id number] [place], [place] - [address] [place] [facility] [place]  [address]  [address]    Clinical concerns: Patient doing well.  No concerns or questions to escalate.   Ignacio was notified.      Elma Escalera, RN, MSN            "

## 2019-11-25 NOTE — PROGRESS NOTES
11-26-19     I saw Hiram Tapia for follow up of UPS of the right thigh     Background  He had no significant PMH but was found to have a UPS of the right thigh. He has a history of right leg pain with sciatica x 20 years. In October 2017 he had more pain and injured his leg on a truck which eventually led to imaging that revealed a mass. 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      He was started on Doxil/Ifos 3/23/18 and completed 4 cycles with positive response to treatment. Of not he did complete 6 months of Xarelto during this time for incidental PE. He then underwent preoperative XRT. He underwent resection 9/17/18 with <5% viable cells on path and negative margins with no LVI.     Then on surveillance imaging October 2018 he was noted to have lung nodules. He underwent biopsy December 2018 that revealed metastatic sarcoma.     Restaging on 4/2/19  revealed large right and trace left pneumothoraces along with worsening metastatic disease with increased lung nodules, increased lymphadenopathy, and new lytic lesion with associated soft tissue mass in posterior right femoral intertrochanteric region. He was admitted through the ED. Thoracic placed a pigtail but he had re-expansion. Thoracic surgery performed talc pleurodesis 4/3/19. He was discharged home 4/5/19.     He started Keytruda 4/9/19. He saw ortho for worsening right thigh pain thought 2/2 metastatic lesion in right femur and they discussed nailing. During his pre-op work-up CXR revealed persistent right sided pneumothorax for which he was admitted 4/12/19. Repeat talc pleurodesis performed 4/16/19. He underwent right intramedually pinning 4/15/19.  -  He was started Keytruda 4/9/19     He started gemzar on 7-24-19 due to PD, and had a thoracentesis 7-25.  -         Interval History:    He had an IVC filter put in.    He started pazopanib 10-26-19.    He feels better w much less coughing.    He felt good last  weekend and was deer hunting the last 3 weekends.     He is going up stairs OK.  He does get fatigue pushing his ATV trailer empty.    Mild GERD; not an issue, takes occ tums.      Occ loose stools.    Sleeping OK.     He can walk in a store as long as he wants leaning on a cart.  He got leg fatigue but no KONG walking in the woods and going up the ladder hunting.      No F, C, night sweats now.     Hes working full time.     No pain medication.       He otherwise feels well and 10-point ROS negative.         On exam he appeared comfortable w normal affect  BP (!) 158/94 (BP Location: Right arm, Patient Position: Sitting, Cuff Size: Adult Regular)   Pulse 76   Temp 98.9  F (37.2  C) (Oral)   Resp 16   Wt 86.9 kg (191 lb 8 oz)   SpO2 98%   BMI 27.08 kg/m    Constitutional: Well-appearing male in no acute distress.  Eyes: No icterus.  ENT: Without lesions or thrush.   NECK: No thyromegaly or mass  Respiratory: decreased BS L base w some diffuse crackles L.  Cardiovascular: RRR. No murmur   Lymphatic:   ABD:  No HSMT.   MSK:   EXT: tr edema R foot.  Neurologic: Normal Romberg   Skin:   PSYCH: Mood good.      -  CBC OK w Hb 10.7, GNE, LFT OK     -  Last imaging while fairly complicated showed that while many tumors showed clear response, one left necrotic lesion was containing air and communicating with the pleural space and the pericardial space.       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 He started Keytruda 4/9/19. He started gemzar 7-24.     Recurrent right sided pneumothorax s/p 2 hospitalizations and TALC pleurodesis 4/3 and again 4/16.       He seems better and his Hb is actually increasing.    We discussed the situation.   He will  check his BP bid for a week and let us know on that but he feels it has been OK; takes pazopa at night.    He will limit alcohol and tylenol and we will check q 14 d now.   We will restage 1-17.     We discussed codes status in the past but he was not yet ready to be DNR.     He will call if other questions arise.     Gaurang Johnson M.D.  Professor  Hematology, Oncology and Transplantation

## 2019-11-26 NOTE — LETTER
RE: Hiram Tapia  [address]  [place] [address]     Dear Colleague,    Thank you for referring your patient, Hiram Tapia, to the [facility]. Please see a copy of my visit note below.    11-26-19     I saw Hiram Tapia for follow up of UPS of the right thigh     Background  He had no significant PMH but was found to have a UPS of the right thigh. He has a history of right leg pain with sciatica x 20 years. In October 2017 he had more pain and injured his leg on a truck which eventually led to imaging that revealed a mass. 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      He was started on Doxil/Ifos 3/23/18 and completed 4 cycles with positive response to treatment. Of not he did complete 6 months of Xarelto during this time for incidental PE. He then underwent preoperative XRT. He underwent resection 9/17/18 with <5% viable cells on path and negative margins with no LVI.     Then on surveillance imaging October 2018 he was noted to have lung nodules. He underwent biopsy December 2018 that revealed metastatic sarcoma.     Restaging on 4/2/19  revealed large right and trace left pneumothoraces along with worsening metastatic disease with increased lung nodules, increased lymphadenopathy, and new lytic lesion with associated soft tissue mass in posterior right femoral intertrochanteric region. He was admitted through the ED. Thoracic placed a pigtail but he had re-expansion. Thoracic surgery performed talc pleurodesis 4/3/19. He was discharged home 4/5/19.     He started Keytruda 4/9/19. He saw ortho for worsening right thigh pain thought 2/2 metastatic lesion in right femur and they discussed nailing. During his pre-op work-up CXR revealed persistent right sided pneumothorax for which he was admitted 4/12/19. Repeat talc pleurodesis performed 4/16/19. He underwent right intramedually pinning 4/15/19.  -  He was started Keytruda  4/9/19     He started gemzar on 7-24-19 due to PD, and had a thoracentesis 7-25.  -         Interval History:    He had an IVC filter put in.    He started pazopanib 10-26-19.    He feels better w much less coughing.    He felt good last weekend and was deer hunting the last 3 weekends.     He is going up stairs OK.  He does get fatigue pushing his ATV trailer empty.    Mild GERD; not an issue, takes occ tums.      Occ loose stools.    Sleeping OK.     He can walk in a store as long as he wants leaning on a cart.  He got leg fatigue but no KONG walking in the woods and going up the ladder hunting.      No F, C, night sweats now.     Hes working full time.     No pain medication.       He otherwise feels well and 10-point ROS negative.         On exam he appeared comfortable w normal affect  BP (!) 158/94 (BP Location: Right arm, Patient Position: Sitting, Cuff Size: Adult Regular)   Pulse 76   Temp 98.9  F (37.2  C) (Oral)   Resp 16   Wt 86.9 kg (191 lb 8 oz)   SpO2 98%   BMI 27.08 kg/m     Constitutional: Well-appearing male in no acute distress.  Eyes: No icterus.  ENT: Without lesions or thrush.   NECK: No thyromegaly or mass  Respiratory: decreased BS L base w some diffuse crackles L.  Cardiovascular: RRR. No murmur   Lymphatic:   ABD:  No HSMT.   MSK:   EXT: tr edema R foot.  Neurologic: Normal Romberg   Skin:   PSYCH: Mood good.      -  CBC OK w Hb  10.7, GNE, LFT OK     -  Last imaging while fairly complicated showed that while many tumors showed clear response, one left necrotic lesion was containing air and communicating with the pleural space and the pericardial space.       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 He  started Keytruda 4/9/19. He started gemzar 7-24.     Recurrent right sided pneumothorax s/p 2 hospitalizations and TALC pleurodesis 4/3 and again 4/16.       He seems better and his Hb is actually increasing.    We discussed the situation.   He will check his BP bid for a week and let us know on that but he feels it has been OK; takes pazopa at night.    He will limit alcohol and tylenol and we will check q 14 d now.   We will restage 1-17.     We discussed codes status in the past but he was not yet ready to be DNR.     He will call if other questions arise.     Gaurang Johnson M.D.  Professor Hematology, Oncology and Transplantation

## 2019-11-26 NOTE — NURSING NOTE
Chief Complaint   Patient presents with     Blood Draw     labs drawn via  by rn .vs taken     Blood drawn via vpt by RN in lab. VS taken. Pt checked into next appointment.   Lily Palacios RN

## 2019-11-26 NOTE — NURSING NOTE
"Oncology Rooming Note    November 26, 2019 8:54 AM   Hiram Tapia is a 59 year old male who presents for:    Chief Complaint   Patient presents with     Blood Draw     labs drawn via  by rn .vs taken     Oncology Clinic Visit     Return; High grade sarcoma     Initial Vitals: BP (!) 158/94 (BP Location: Right arm, Patient Position: Sitting, Cuff Size: Adult Regular)   Pulse 76   Temp 98.9  F (37.2  C) (Oral)   Resp 16   Wt 86.9 kg (191 lb 8 oz)   SpO2 98%   BMI 27.08 kg/m   Estimated body mass index is 27.08 kg/m  as calculated from the following:    Height as of 11/13/19: 1.791 m (5' 10.51\").    Weight as of this encounter: 86.9 kg (191 lb 8 oz). Body surface area is 2.08 meters squared.  No Pain (0) Comment: Data Unavailable   No LMP for male patient.  Allergies reviewed: Yes  Medications reviewed: Yes    Medications: Medication refills not needed today.  Pharmacy name entered into Psychiatric:    [facility] [place], [place] - [phone]  [id number] - [place], [place] - [address] [place] [facility] [place]  [address]  [address]    Clinical concerns:  No new concerns or questions.      Mehnaz Hodgson CMA              "

## 2019-12-20 NOTE — PROGRESS NOTES
Oncology/Hematology Visit Note  Dec 23, 2019    Reason for Visit: Follow up of UPS of the right thigh    History of Present Illness: Hiram Tapia is a 59 year old male with no significant PMH with UPS of the right thigh. He has a history of right leg pain with sciatica x 20 years. In October 2017 he had more pain and injured his leg on a truck which eventually led to imaging that revealed a mass. 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     He was started on Doxil/Ifos 3/23/18 and completed 4 cycles with positive response to treatment. Of not he did complete 6 months of Xarelto during this time for incidental PE. He then underwent preoperative XRT. He underwent resection 9/17/18 with <5% viable cells on path and negative margins with no LVI.    Then on surveillance imaging October 2018 he was noted to have lung nodules. He underwent biopsy December 2018 that revealed metastatic sarcoma. Dr. Johnson recommended treatment with Keytruda in February 2019 however it is unclear to me why this was never started.    He underwent restaging CT 4/2/19. This revealed large right and trace left pneumothoraces along with worsening metastatic disease with increased lung nodules, increased lymphadenopathy, and new lytic lesion with associated soft tissue mass in posterior right femoral intertrochanteric region. He was admitted through the ED. Thoracic placed a pigtail but he had re-expansion. Thoracic surgery performed talc pleurodesis 4/3/19. He was discharged home 4/5/19.    He was started on Keytruda 4/9/19. He saw ortho for worsening right thigh pain thought 2/2 metastatic lesion in right femur and they discussed nailing. During his pre-op work-up CXR revealed persistent right sided pneumothorax for which he was admitted 4/12/19. Repeat talc pleurodesis performed 4/16/19. He underwent right intramedually pinning 4/15/19.    He has continued on Keytrua after surgery. Restaging CT 6/28/19  with mostly an increase in pulmonary nodules and a right middle lobe cavitary lesion. Overall Dr. Johnson felt it was a mixed response so plan on continuing for 2 more cycles and then repeat CT. Of note port was removed 7/17/19.    He had worsening SOB and pleuritic pain 7/24/19 and CT imaging with concerns for progressive disease in addition to large right pleural effusion. He underwent thoracentesis for this 7/25/19. He was switched to Gemzar due to progression on Keytruda, cycle 1 7/24/19.      Repeat CT 8/12/19 with mixed response, possibly delayed immune response. He continued on Gemzar. CT 9/23/19 (after 3 cycles) with overall stable to improved disease, however did have new pericardial fluid and hydropneumothorax with necrotic left lung mass vs empyema. He met with surgery 9/24/19 and discussed left thoracotomy, decortication, possible wedge resection, possible pericardial window, possible diaphragm plication for the enlarging left lower lobe fluid collection with possible pericardial fistulization. The patient however declined surgery. CT 10/1/19 was stable to slight improvement in left lower lobe fluid collection and pericardial effusion. He continued on Gemzar.    He presented for chemo on 10/15 with recurrent SOB, hemoptysis, and LE edema. US positive for DVT and CT revealed enlarging left sided mass with invasion into pericardium with worsening pericardial effusion. He had recurrent PVCs in clinic. He was admitted. S/p IVC filter for DVT on 10/17 (no anticoagulation with hemoptysis). Patient declined surgical intervention for left lung mass/fluid collection or pericardial drainage. Echo inpatient with small effusion and no tamponade physiology, though invasion into pericardium could be causing PVCs.     He was started on Pazopanib for progressive disease. He started this 10/26/19. He returns today for oncology follow-up.     Interval History:  David returns to clinic today unaccompanied. He is feeling  well. He is getting over a mild URI with sore throat, cough, and congestion that is now improved. He was snow blowing last week and slightly hurt his right hip but isn't needing pain meds. He otherwise feels great. No fevers or chills. He gets mild headaches from his pillow. No dizziness. No chest pain, his breathing feels improved, and his cough/hemoptysis has continued to improve on Pazopanib. He has very mild nausea with the Pazopanib but it helps he takes it at night. He is eating and drinking well. No abdominal pain. Bowels fluctuate between constipation and loose. No urinary symptoms. Edema is improved, no rashes. He has been monitoring BP at home and it is usually 140s/80s.    Current Outpatient Medications   Medication Sig Dispense Refill     acetaminophen (TYLENOL) 500 MG tablet Take 1 tablet (500 mg) by mouth every 6 hours as needed for mild pain (Patient not taking: Reported on 11/26/2019)       pazopanib (VOTRIENT) 200 MG tablet Take 4 tablets (800 mg) by mouth daily Take on an empty stomach 1 hour before or 2 hours after a meal. 120 tablet 0     pazopanib (VOTRIENT) 200 MG tablet Take 4 tablets (800 mg) by mouth daily Take on an empty stomach 1 hour before or 2 hours after a meal. 120 tablet 0     pazopanib (VOTRIENT) 200 MG tablet Take 4 tablets (800 mg) by mouth daily Take on an empty stomach 1 hour before or 2 hours after a meal. 120 tablet 0     senna-docusate (SENOKOT-S/PERICOLACE) 8.6-50 MG tablet Take 1 tablet by mouth 2 times daily (Patient not taking: Reported on 11/26/2019)       Physical Examination:  BP (!) 128/99   Pulse 99   Temp 97.7  F (36.5  C) (Oral)   Resp 14   Wt 87.6 kg (193 lb 1.6 oz)   SpO2 99%   BMI 27.31 kg/m    Wt Readings from Last 10 Encounters:   11/26/19 86.9 kg (191 lb 8 oz)   11/13/19 87.4 kg (192 lb 9.6 oz)   11/05/19 85.3 kg (188 lb)   10/29/19 86.4 kg (190 lb 6.4 oz)   10/24/19 88.5 kg (195 lb 3.2 oz)   10/17/19 84.1 kg (185 lb 6.4 oz)   10/15/19 88 kg (194 lb  1.6 oz)   10/03/19 89.4 kg (197 lb)   09/24/19 87.1 kg (192 lb)   09/24/19 87.4 kg (192 lb 9.6 oz)     Constitutional: Well-appearing male in no acute distress.  Eyes: EOMI, PERRL. No scleral icterus.  ENT: Oral mucosa is moist without lesions or thrush.   Lymphatic: Neck is supple without cervical or supraclavicular lymphadenopathy.   Cardiovascular: Slightly tachycardic rate and regular regular rhythm. No murmurs, gallops, or rubs. Right leg trace peripheral edema, left leg no edema edema.  Respiratory: Non-labored breathing. Equal breath sounds. No crackles or wheezing.   Gastrointestinal: Bowel sounds present. Abdomen soft, non-tender. No palpable hepatosplenomegaly or masses.   Neurologic: Cranial nerves II through XII are grossly intact.  Skin: No rashes, petechiae, or bruising noted on exposed skin.    Laboratory Data:  Results for JAYLENE CHAO (MRN [id number]) as of 12/23/2019 08:56   12/23/2019 08:28   Sodium 138   Potassium 3.7   Chloride 107   Carbon Dioxide 27   Urea Nitrogen 12   Creatinine 0.89   GFR Estimate >90   GFR Estimate If Black >90   Calcium 9.8   Anion Gap 4   Magnesium 2.2   Phosphorus 2.4 (L)   Albumin 3.6   Protein Total 7.6   Bilirubin Total 0.5   Alkaline Phosphatase 113   ALT 56   AST 40   Glucose 126 (H)   WBC 3.4 (L)   Hemoglobin 13.5   Hematocrit 44.9   Platelet Count 213   RBC Count 5.66   MCV 79   MCH 23.9 (L)   MCHC 30.1 (L)   RDW 21.9 (H)   Diff Method Automated Method   % Neutrophils 43.1   % Lymphocytes 42.5   % Monocytes 8.5   % Eosinophils 5.0   % Basophils 0.6   % Immature Granulocytes 0.3   Nucleated RBCs 0   Absolute Neutrophil 1.5 (L)   Absolute Lymphocytes 1.5   Absolute Monocytes 0.3   Absolute Eosinophils 0.2   Absolute Basophils 0.0   Abs Immature Granulocytes 0.0   Absolute Nucleated RBC 0.0         Assessment and Plan:  1. 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 Biopsy December 2018 confirmed metastatic sarcoma. Due to unclear reasons there was a delay in starting Keytruda, finally able to receive 4/9/19 and baseline CT did confirm progressive disease. He received 5 cycles of Keytruda but had ongoing disease progression.     Switched to Gemzar 7/24/19. Completed 4 cycles however CT on 10/15 concerning for disease progression.    He is now on Pazopanib, day 1 10/26/19. Tolerating well and clinically has been improving. Labs are stable except very slightly low phos, will monitor. Recheck BP improved. Will continue on Pazopanib 800mg daily and has restaging with Dr. Johnson in January.     2. Heme  Progressive anemia since starting Gemzar. Did receive 2 units pRBC 10/15/19 for hgb 7.3. Hgb has continued to improve with improvement in hemoptysis, today up to 13.5. Continue CBC at each visit.       3. Pulm  Recurrent right sided pneumothorax s/p 2 hospitalizations and TALC pleurodesis 4/3 and again 4/16. Underwent therapeutic thoracentesis 7/25/19 for left pleural effusion. Most recently has been dealing with new left lower lobe fluid collection with possible pericardial fistulization, he declined surgery for this.     Repeat CT imaging 10/15/19 with ongoing progression of left lung fluid collection/mass that was invading pericardium. He was admitted and declined any interventions including thoracic surgery recommendation of surgical removal.       Today his pain, cough, SOB, hemoptysis all improved. Sating well on RA. Did have mild URI symptoms that are now improved.     4. Cards  Large left lung mass/fluid collection does invade the pericardium and abuts left ventricular wall on 10/15 CT. He also has an associated pericardial effusion. Cardiology consulted during recent admission- echo with normal heart function, moderate effusion with no tamponade physiology. He was also having PVCs  and effusion may be related to this. He declined interventions including pericardial drainage.    Today HR better and regular, continue monitoring intermittent PVCs. Prior EKG with stable QTc. No new meds so do not need to repeat.     BP is slightly elevated 2/2 Pazopanib. Better today. He will continue monitoring at home.     5. MSK  Restaging CT with lytic lesion with associated soft tissue mass arising from the posterior right femoral intertrochanteric region. He underwent right femur intramedullary pinning 4/15/19 by Dr. Betts. Does not need any pain meds.     Did have DVT on 10/15 RLE US. Anticoagulation contraindicated in setting of hemoptysis. S/p permanent IVC filter 10/17/19.    Ignacio Ramirez PA-C  [facility]  [address] [place] [address] [phone]

## 2019-12-23 NOTE — LETTER
12/23/2019      RE: Hiram Tapia  [address]  [facility] [address]       Oncology/Hematology Visit Note  Dec 23, 2019    Reason for Visit: Follow up of UPS of the right thigh    History of Present Illness: Hiram Tapia is a 59 year old male with no significant PMH with UPS of the right thigh. He has a history of right leg pain with sciatica x 20 years. In October 2017 he had more pain and injured his leg on a truck which eventually led to imaging that revealed a mass. 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     He was started on Doxil/Ifos 3/23/18 and completed 4 cycles with positive response to treatment. Of not he did complete 6 months of Xarelto during this time for incidental PE. He then underwent preoperative XRT. He underwent resection 9/17/18 with <5% viable cells on path and negative margins with no LVI.    Then on surveillance imaging October 2018 he was noted to have lung nodules. He underwent biopsy December 2018 that revealed metastatic sarcoma. Dr. Johnson recommended treatment with Keytruda in February 2019 however it is unclear to me why this was never started.    He underwent restaging CT 4/2/19. This revealed large right and trace left pneumothoraces along with worsening metastatic disease with increased lung nodules, increased lymphadenopathy, and new lytic lesion with associated soft tissue mass in posterior right femoral intertrochanteric region. He was admitted through the ED. Thoracic placed a pigtail but he had re-expansion. Thoracic surgery performed talc pleurodesis 4/3/19. He was discharged home 4/5/19.    He was started on Keytruda 4/9/19. He saw ortho for worsening right thigh pain thought 2/2 metastatic lesion in right femur and they discussed nailing. During his pre-op work-up CXR revealed persistent right sided pneumothorax for which he was admitted 4/12/19. Repeat talc pleurodesis performed 4/16/19. He underwent right  intramedually pinning 4/15/19.    He has continued on Keytrua after surgery. Restaging CT 6/28/19 with mostly an increase in pulmonary nodules and a right middle lobe cavitary lesion. Overall Dr. Johnson felt it was a mixed response so plan on continuing for 2 more cycles and then repeat CT. Of note port was removed 7/17/19.    He had worsening SOB and pleuritic pain 7/24/19 and CT imaging with concerns for progressive disease in addition to large right pleural effusion. He underwent thoracentesis for this 7/25/19. He was switched to Gemzar due to progression on Keytruda, cycle 1 7/24/19.      Repeat CT 8/12/19 with mixed response, possibly delayed immune response. He continued on Gemzar. CT 9/23/19 (after 3 cycles) with overall stable to improved disease, however did have new pericardial fluid and hydropneumothorax with necrotic left lung mass vs empyema. He met with surgery 9/24/19 and discussed left thoracotomy, decortication, possible wedge resection, possible pericardial window, possible diaphragm plication for the enlarging left lower lobe fluid collection with possible pericardial fistulization. The patient however declined surgery. CT 10/1/19 was stable to slight improvement in left lower lobe fluid collection and pericardial effusion. He continued on Gemzar.    He presented for chemo on 10/15 with recurrent SOB, hemoptysis, and LE edema. US positive for DVT and CT revealed enlarging left sided mass with invasion into pericardium with worsening pericardial effusion. He had recurrent PVCs in clinic. He was admitted. S/p IVC filter for DVT on 10/17 (no anticoagulation with hemoptysis). Patient declined surgical intervention for left lung mass/fluid collection or pericardial drainage. Echo inpatient with small effusion and no tamponade physiology, though invasion into pericardium could be causing PVCs.     He was started on Pazopanib for progressive disease. He started this 10/26/19. He returns today for  oncology follow-up.     Interval History:  David returns to clinic today unaccompanied. He is feeling well. He is getting over a mild URI with sore throat, cough, and congestion that is now improved. He was snow blowing last week and slightly hurt his right hip but isn't needing pain meds. He otherwise feels great. No fevers or chills. He gets mild headaches from his pillow. No dizziness. No chest pain, his breathing feels improved, and his cough/hemoptysis has continued to improve on Pazopanib. He has very mild nausea with the Pazopanib but it helps he takes it at night. He is eating and drinking well. No abdominal pain. Bowels fluctuate between constipation and loose. No urinary symptoms. Edema is improved, no rashes. He has been monitoring BP at home and it is usually 140s/80s.    Current Outpatient Medications   Medication Sig Dispense Refill     acetaminophen (TYLENOL) 500 MG tablet Take 1 tablet (500 mg) by mouth every 6 hours as needed for mild pain (Patient not taking: Reported on 11/26/2019)       pazopanib (VOTRIENT) 200 MG tablet Take 4 tablets (800 mg) by mouth daily Take on an empty stomach 1 hour before or 2 hours after a meal. 120 tablet 0     pazopanib (VOTRIENT) 200 MG tablet Take 4 tablets (800 mg) by mouth daily Take on an empty stomach 1 hour before or 2 hours after a meal. 120 tablet 0     pazopanib (VOTRIENT) 200 MG tablet Take 4 tablets (800 mg) by mouth daily Take on an empty stomach 1 hour before or 2 hours after a meal. 120 tablet 0     senna-docusate (SENOKOT-S/PERICOLACE) 8.6-50 MG tablet Take 1 tablet by mouth 2 times daily (Patient not taking: Reported on 11/26/2019)       Physical Examination:  BP (!) 128/99   Pulse 99   Temp 97.7  F (36.5  C) (Oral)   Resp 14   Wt 87.6 kg (193 lb 1.6 oz)   SpO2 99%   BMI 27.31 kg/m     Wt Readings from Last 10 Encounters:   11/26/19 86.9 kg (191 lb 8 oz)   11/13/19 87.4 kg (192 lb 9.6 oz)   11/05/19 85.3 kg (188 lb)   10/29/19 86.4 kg (190 lb  6.4 oz)   10/24/19 88.5 kg (195 lb 3.2 oz)   10/17/19 84.1 kg (185 lb 6.4 oz)   10/15/19 88 kg (194 lb 1.6 oz)   10/03/19 89.4 kg (197 lb)   09/24/19 87.1 kg (192 lb)   09/24/19 87.4 kg (192 lb 9.6 oz)     Constitutional: Well-appearing male in no acute distress.  Eyes: EOMI, PERRL. No scleral icterus.  ENT: Oral mucosa is moist without lesions or thrush.   Lymphatic: Neck is supple without cervical or supraclavicular lymphadenopathy.   Cardiovascular: Slightly tachycardic rate and regular regular rhythm. No murmurs, gallops, or rubs. Right leg trace peripheral edema, left leg no edema edema.  Respiratory: Non-labored breathing. Equal breath sounds. No crackles or wheezing.   Gastrointestinal: Bowel sounds present. Abdomen soft, non-tender. No palpable hepatosplenomegaly or masses.   Neurologic: Cranial nerves II through XII are grossly intact.  Skin: No rashes, petechiae, or bruising noted on exposed skin.    Laboratory Data:  Results for JAYLENE CHAO (MRN [id number]) as of 12/23/2019 08:56   12/23/2019 08:28   Sodium 138   Potassium 3.7   Chloride 107   Carbon Dioxide 27   Urea Nitrogen 12   Creatinine 0.89   GFR Estimate >90   GFR Estimate If Black >90   Calcium 9.8   Anion Gap 4   Magnesium 2.2   Phosphorus 2.4 (L)   Albumin 3.6   Protein Total 7.6   Bilirubin Total 0.5   Alkaline Phosphatase 113   ALT 56   AST 40   Glucose 126 (H)   WBC 3.4 (L)   Hemoglobin 13.5   Hematocrit 44.9   Platelet Count 213   RBC Count 5.66   MCV 79   MCH 23.9 (L)   MCHC 30.1 (L)   RDW 21.9 (H)   Diff Method Automated Method   % Neutrophils 43.1   % Lymphocytes 42.5   % Monocytes 8.5   % Eosinophils 5.0   % Basophils 0.6   % Immature Granulocytes 0.3   Nucleated RBCs 0   Absolute Neutrophil 1.5 (L)   Absolute Lymphocytes 1.5   Absolute Monocytes 0.3   Absolute Eosinophils 0.2   Absolute Basophils 0.0   Abs Immature Granulocytes 0.0   Absolute Nucleated RBC 0.0         Assessment and Plan:  1. Onc  Metastatic undifferentiated  pleomorphic sarcoma of the right thigh s/p 4 cycles of Doxil and Ifosfamide, pre-operative XRT, and resection September 2018 with negative margins, but unfortunately had recurrent lung mets on restaging. Biopsy December 2018 confirmed metastatic sarcoma. Due to unclear reasons there was a delay in starting Keytruda, finally able to receive 4/9/19 and baseline CT did confirm progressive disease. He received 5 cycles of Keytruda but had ongoing disease progression.     Switched to Gemzar 7/24/19. Completed 4 cycles however CT on 10/15 concerning for disease progression.    He is now on Pazopanib, day 1 10/26/19. Tolerating well and clinically has been improving. Labs are stable except very slightly low phos, will monitor. Recheck BP improved. Will continue on Pazopanib 800mg daily and has restaging with Dr. Johnson in January.     2. Heme  Progressive anemia since starting Gemzar. Did receive 2 units pRBC 10/15/19 for hgb 7.3. Hgb has continued to improve with improvement in hemoptysis, today up to 13.5. Continue CBC at each visit.       3. Pulm  Recurrent right sided pneumothorax s/p 2 hospitalizations and TALC pleurodesis 4/3 and again 4/16. Underwent therapeutic thoracentesis 7/25/19 for left pleural effusion. Most recently has been dealing with new left lower lobe fluid collection with possible pericardial fistulization, he declined surgery for this.     Repeat CT imaging 10/15/19 with ongoing progression of left lung fluid collection/mass that was invading pericardium. He was admitted and declined any interventions including thoracic surgery recommendation of surgical removal.       Today his pain, cough, SOB, hemoptysis all improved. Sating well on RA. Did have mild URI symptoms that are now improved.     4. Cards  Large left lung mass/fluid collection does invade the pericardium and abuts left ventricular wall on 10/15 CT. He also has an associated pericardial effusion. Cardiology consulted during recent  admission- echo with normal heart function, moderate effusion with no tamponade physiology. He was also having PVCs and effusion may be related to this. He declined interventions including pericardial drainage.    Today HR better and regular, continue monitoring intermittent PVCs. Prior EKG with stable QTc. No new meds so do not need to repeat.     BP is slightly elevated 2/2 Pazopanib. Better today. He will continue monitoring at home.     5. MSK  Restaging CT with lytic lesion with associated soft tissue mass arising from the posterior right femoral intertrochanteric region. He underwent right femur intramedullary pinning 4/15/19 by Dr. Betts. Does not need any pain meds.     Did have DVT on 10/15 RLE US. Anticoagulation contraindicated in setting of hemoptysis. S/p permanent IVC filter 10/17/19.    Ignacio Ramirez PA-C  [facility]  [address], [place] [id number] [phone]

## 2019-12-23 NOTE — NURSING NOTE
Chief Complaint   Patient presents with     Blood Draw     Labs drawn via VPT by RN in lab. VS taken. Pt checked in for next appt     Labs collected from venipuncture by RN. Vitals taken. Checked in for appointment(s).    Sulema AYON RN PHN BSN  BMT/Oncology Lab

## 2019-12-23 NOTE — NURSING NOTE
"Oncology Rooming Note    December 23, 2019 8:17 AM   Hiram Tapia is a 59 year old male who presents for:    Chief Complaint   Patient presents with     Blood Draw     Labs drawn via VPT by RN in lab. VS taken. Pt checked in for next appt     Oncology Clinic Visit     Return - Sarcoma of soft tissue      Initial Vitals: BP (!) 143/93 (BP Location: Right arm, Patient Position: Sitting, Cuff Size: Adult Regular)   Pulse 113   Temp 97.7  F (36.5  C) (Oral)   Resp 16   Wt 87.6 kg (193 lb 1.6 oz)   SpO2 96%   BMI 27.31 kg/m   Estimated body mass index is 27.31 kg/m  as calculated from the following:    Height as of 11/13/19: 1.791 m (5' 10.51\").    Weight as of this encounter: 87.6 kg (193 lb 1.6 oz). Body surface area is 2.09 meters squared.  Mild Pain (3) Comment: Data Unavailable   No LMP for male patient.  Allergies reviewed: Yes  Medications reviewed: Yes    Medications: Medication refills not needed today.  Pharmacy name entered into [facility]:    [facility] [place], [place] - [phone]  [id number] - [place], [place] - [address] AT [facility] [address]  [address]    Clinical concerns:  Lab Results      Dave Rodriguez, EMT              "

## 2020-01-01 ENCOUNTER — DOCUMENTATION ONLY (OUTPATIENT)
Dept: OTHER | Facility: CLINIC | Age: 60
End: 2020-01-01

## 2020-01-01 ENCOUNTER — ONCOLOGY VISIT (OUTPATIENT)
Dept: ONCOLOGY | Facility: CLINIC | Age: 60
End: 2020-01-01
Attending: INTERNAL MEDICINE
Payer: COMMERCIAL

## 2020-01-01 ENCOUNTER — TELEPHONE (OUTPATIENT)
Dept: ONCOLOGY | Facility: CLINIC | Age: 60
End: 2020-01-01

## 2020-01-01 ENCOUNTER — APPOINTMENT (OUTPATIENT)
Dept: LAB | Facility: CLINIC | Age: 60
End: 2020-01-01
Attending: INTERNAL MEDICINE
Payer: COMMERCIAL

## 2020-01-01 ENCOUNTER — PATIENT OUTREACH (OUTPATIENT)
Dept: ONCOLOGY | Facility: CLINIC | Age: 60
End: 2020-01-01

## 2020-01-01 ENCOUNTER — DOCUMENTATION ONLY (OUTPATIENT)
Dept: ONCOLOGY | Facility: CLINIC | Age: 60
End: 2020-01-01

## 2020-01-01 ENCOUNTER — HOSPITAL ENCOUNTER (OUTPATIENT)
Facility: CLINIC | Age: 60
Setting detail: SPECIMEN
Discharge: HOME | End: 2020-04-10
Admitting: INTERNAL MEDICINE
Payer: COMMERCIAL

## 2020-01-01 ENCOUNTER — DOCUMENTATION ONLY (OUTPATIENT)
Facility: CLINIC | Age: 60
End: 2020-01-01

## 2020-01-01 ENCOUNTER — ANCILLARY PROCEDURE (OUTPATIENT)
Dept: CT IMAGING | Facility: CLINIC | Age: 60
End: 2020-01-01
Attending: INTERNAL MEDICINE
Payer: COMMERCIAL

## 2020-01-01 ENCOUNTER — HOSPITAL ENCOUNTER (OUTPATIENT)
Dept: GENERAL RADIOLOGY | Facility: CLINIC | Age: 60
End: 2020-05-06
Attending: INTERNAL MEDICINE
Payer: COMMERCIAL

## 2020-01-01 ENCOUNTER — HOME INFUSION (PRE-WILLOW HOME INFUSION) (OUTPATIENT)
Dept: PHARMACY | Facility: CLINIC | Age: 60
End: 2020-01-01

## 2020-01-01 ENCOUNTER — HOSPITAL ENCOUNTER (OUTPATIENT)
Dept: CT IMAGING | Facility: CLINIC | Age: 60
End: 2020-05-06
Attending: INTERNAL MEDICINE
Payer: COMMERCIAL

## 2020-01-01 ENCOUNTER — TELEPHONE (OUTPATIENT)
Dept: CARE COORDINATION | Facility: CLINIC | Age: 60
End: 2020-01-01

## 2020-01-01 ENCOUNTER — DOCUMENTATION ONLY (OUTPATIENT)
Dept: PHARMACY | Facility: CLINIC | Age: 60
End: 2020-01-01

## 2020-01-01 ENCOUNTER — HOSPITAL ENCOUNTER (OUTPATIENT)
Dept: VASCULAR ULTRASOUND | Facility: CLINIC | Age: 60
End: 2020-05-06
Attending: INTERNAL MEDICINE
Payer: COMMERCIAL

## 2020-01-01 ENCOUNTER — ANCILLARY PROCEDURE (OUTPATIENT)
Dept: CT IMAGING | Facility: CLINIC | Age: 60
End: 2020-01-01
Attending: PHYSICIAN ASSISTANT
Payer: COMMERCIAL

## 2020-01-01 ENCOUNTER — ONCOLOGY VISIT (OUTPATIENT)
Dept: ONCOLOGY | Facility: CLINIC | Age: 60
End: 2020-01-01
Attending: PHYSICIAN ASSISTANT
Payer: COMMERCIAL

## 2020-01-01 ENCOUNTER — HOSPITAL ENCOUNTER (OUTPATIENT)
Dept: GENERAL RADIOLOGY | Facility: CLINIC | Age: 60
End: 2020-04-16
Attending: INTERNAL MEDICINE
Payer: COMMERCIAL

## 2020-01-01 ENCOUNTER — HOSPITAL ENCOUNTER (OUTPATIENT)
Dept: VASCULAR ULTRASOUND | Facility: CLINIC | Age: 60
End: 2020-04-16
Attending: INTERNAL MEDICINE
Payer: COMMERCIAL

## 2020-01-01 VITALS
RESPIRATION RATE: 16 BRPM | OXYGEN SATURATION: 98 % | BODY MASS INDEX: 26.02 KG/M2 | TEMPERATURE: 97.8 F | SYSTOLIC BLOOD PRESSURE: 140 MMHG | WEIGHT: 184 LBS | HEART RATE: 100 BPM | DIASTOLIC BLOOD PRESSURE: 89 MMHG

## 2020-01-01 VITALS
OXYGEN SATURATION: 98 % | WEIGHT: 193.7 LBS | DIASTOLIC BLOOD PRESSURE: 83 MMHG | HEART RATE: 82 BPM | SYSTOLIC BLOOD PRESSURE: 147 MMHG | RESPIRATION RATE: 16 BRPM | HEIGHT: 71 IN | BODY MASS INDEX: 27.12 KG/M2 | TEMPERATURE: 98 F

## 2020-01-01 VITALS
WEIGHT: 184 LBS | OXYGEN SATURATION: 94 % | HEART RATE: 112 BPM | SYSTOLIC BLOOD PRESSURE: 100 MMHG | TEMPERATURE: 98 F | BODY MASS INDEX: 26.02 KG/M2 | RESPIRATION RATE: 18 BRPM | DIASTOLIC BLOOD PRESSURE: 64 MMHG

## 2020-01-01 VITALS
DIASTOLIC BLOOD PRESSURE: 105 MMHG | HEIGHT: 71 IN | TEMPERATURE: 97.9 F | OXYGEN SATURATION: 99 % | RESPIRATION RATE: 16 BRPM | BODY MASS INDEX: 26.8 KG/M2 | WEIGHT: 191.4 LBS | SYSTOLIC BLOOD PRESSURE: 179 MMHG | HEART RATE: 69 BPM

## 2020-01-01 VITALS
DIASTOLIC BLOOD PRESSURE: 92 MMHG | HEART RATE: 81 BPM | RESPIRATION RATE: 16 BRPM | BODY MASS INDEX: 26.74 KG/M2 | OXYGEN SATURATION: 98 % | SYSTOLIC BLOOD PRESSURE: 131 MMHG | WEIGHT: 191 LBS | HEIGHT: 71 IN | TEMPERATURE: 98.5 F

## 2020-01-01 VITALS
DIASTOLIC BLOOD PRESSURE: 68 MMHG | HEART RATE: 109 BPM | OXYGEN SATURATION: 100 % | SYSTOLIC BLOOD PRESSURE: 110 MMHG | TEMPERATURE: 97.6 F | RESPIRATION RATE: 16 BRPM

## 2020-01-01 VITALS
OXYGEN SATURATION: 96 % | BODY MASS INDEX: 25.88 KG/M2 | WEIGHT: 183 LBS | RESPIRATION RATE: 14 BRPM | DIASTOLIC BLOOD PRESSURE: 62 MMHG | SYSTOLIC BLOOD PRESSURE: 102 MMHG | HEART RATE: 115 BPM | TEMPERATURE: 98 F

## 2020-01-01 VITALS
RESPIRATION RATE: 16 BRPM | HEART RATE: 120 BPM | TEMPERATURE: 98.5 F | OXYGEN SATURATION: 91 % | SYSTOLIC BLOOD PRESSURE: 122 MMHG | DIASTOLIC BLOOD PRESSURE: 68 MMHG

## 2020-01-01 VITALS
RESPIRATION RATE: 20 BRPM | HEART RATE: 98 BPM | OXYGEN SATURATION: 98 % | TEMPERATURE: 97.9 F | DIASTOLIC BLOOD PRESSURE: 68 MMHG | SYSTOLIC BLOOD PRESSURE: 105 MMHG

## 2020-01-01 DIAGNOSIS — R11.2 CHEMOTHERAPY-INDUCED NAUSEA AND VOMITING: ICD-10-CM

## 2020-01-01 DIAGNOSIS — C49.9 SARCOMA OF SOFT TISSUE (H): ICD-10-CM

## 2020-01-01 DIAGNOSIS — C49.9 SARCOMA OF SOFT TISSUE (H): Primary | ICD-10-CM

## 2020-01-01 DIAGNOSIS — C78.02 METASTATIC SARCOMA TO LUNG, LEFT (H): Primary | ICD-10-CM

## 2020-01-01 DIAGNOSIS — R68.89 COLD SENSITIVITY: ICD-10-CM

## 2020-01-01 DIAGNOSIS — R91.8 LUNG MASS: ICD-10-CM

## 2020-01-01 DIAGNOSIS — D63.0 ANEMIA IN NEOPLASTIC DISEASE: ICD-10-CM

## 2020-01-01 DIAGNOSIS — C79.51 BONE METASTASIS: ICD-10-CM

## 2020-01-01 DIAGNOSIS — D63.0 ANEMIA IN NEOPLASTIC DISEASE: Primary | ICD-10-CM

## 2020-01-01 DIAGNOSIS — G89.3 CANCER ASSOCIATED PAIN: ICD-10-CM

## 2020-01-01 DIAGNOSIS — E86.0 DEHYDRATION: ICD-10-CM

## 2020-01-01 DIAGNOSIS — C78.02 METASTATIC SARCOMA TO LUNG, LEFT (H): ICD-10-CM

## 2020-01-01 DIAGNOSIS — C49.21 SOFT TISSUE SARCOMA OF RIGHT THIGH (H): ICD-10-CM

## 2020-01-01 DIAGNOSIS — Z86.711 HISTORY OF PULMONARY EMBOLISM: ICD-10-CM

## 2020-01-01 DIAGNOSIS — J90 PLEURAL EFFUSION: ICD-10-CM

## 2020-01-01 DIAGNOSIS — Z79.899 ENCOUNTER FOR LONG-TERM (CURRENT) USE OF MEDICATIONS: ICD-10-CM

## 2020-01-01 DIAGNOSIS — T45.1X5A CHEMOTHERAPY-INDUCED NAUSEA AND VOMITING: ICD-10-CM

## 2020-01-01 DIAGNOSIS — R06.00 DYSPNEA, UNSPECIFIED TYPE: ICD-10-CM

## 2020-01-01 DIAGNOSIS — C49.21 SOFT TISSUE SARCOMA OF RIGHT THIGH (H): Primary | ICD-10-CM

## 2020-01-01 DIAGNOSIS — R91.8 PULMONARY NODULES: ICD-10-CM

## 2020-01-01 DIAGNOSIS — K76.9 LIVER LESION: ICD-10-CM

## 2020-01-01 DIAGNOSIS — I15.9 SECONDARY HYPERTENSION: ICD-10-CM

## 2020-01-01 DIAGNOSIS — R09.02 HYPOXIA: ICD-10-CM

## 2020-01-01 DIAGNOSIS — Z87.891 PERSONAL HISTORY OF TOBACCO USE, PRESENTING HAZARDS TO HEALTH: ICD-10-CM

## 2020-01-01 DIAGNOSIS — R91.8 PULMONARY NODULES: Primary | ICD-10-CM

## 2020-01-01 DIAGNOSIS — M10.00 IDIOPATHIC GOUT, UNSPECIFIED CHRONICITY, UNSPECIFIED SITE: ICD-10-CM

## 2020-01-01 LAB
ABO + RH BLD: NORMAL
ALBUMIN SERPL-MCNC: 2.1 G/DL (ref 3.4–5)
ALBUMIN SERPL-MCNC: 2.3 G/DL (ref 3.4–5)
ALBUMIN SERPL-MCNC: 3.2 G/DL (ref 3.4–5)
ALBUMIN SERPL-MCNC: 3.4 G/DL (ref 3.4–5)
ALBUMIN SERPL-MCNC: 3.5 G/DL (ref 3.4–5)
ALBUMIN SERPL-MCNC: 3.6 G/DL (ref 3.4–5)
ALP SERPL-CCNC: 100 U/L (ref 40–150)
ALP SERPL-CCNC: 107 U/L (ref 40–150)
ALP SERPL-CCNC: 146 U/L (ref 40–150)
ALP SERPL-CCNC: 164 U/L (ref 40–150)
ALP SERPL-CCNC: 85 U/L (ref 40–150)
ALP SERPL-CCNC: 93 U/L (ref 40–150)
ALT SERPL W P-5'-P-CCNC: 15 U/L (ref 0–70)
ALT SERPL W P-5'-P-CCNC: 19 U/L (ref 0–70)
ALT SERPL W P-5'-P-CCNC: 22 U/L (ref 0–70)
ALT SERPL W P-5'-P-CCNC: 31 U/L (ref 0–70)
ALT SERPL W P-5'-P-CCNC: 38 U/L (ref 0–70)
ALT SERPL W P-5'-P-CCNC: 50 U/L (ref 0–70)
ANION GAP SERPL CALCULATED.3IONS-SCNC: 4 MMOL/L (ref 3–14)
ANION GAP SERPL CALCULATED.3IONS-SCNC: 5 MMOL/L (ref 3–14)
ANION GAP SERPL CALCULATED.3IONS-SCNC: 6 MMOL/L (ref 3–14)
ANION GAP SERPL CALCULATED.3IONS-SCNC: 8 MMOL/L (ref 3–14)
ANISOCYTOSIS BLD QL SMEAR: SLIGHT
AST SERPL W P-5'-P-CCNC: 16 U/L (ref 0–45)
AST SERPL W P-5'-P-CCNC: 20 U/L (ref 0–45)
AST SERPL W P-5'-P-CCNC: 20 U/L (ref 0–45)
AST SERPL W P-5'-P-CCNC: 25 U/L (ref 0–45)
AST SERPL W P-5'-P-CCNC: 28 U/L (ref 0–45)
AST SERPL W P-5'-P-CCNC: 28 U/L (ref 0–45)
BASOPHILS # BLD AUTO: 0 10E9/L (ref 0–0.2)
BASOPHILS NFR BLD AUTO: 0.3 %
BASOPHILS NFR BLD AUTO: 0.4 %
BASOPHILS NFR BLD AUTO: 0.4 %
BASOPHILS NFR BLD AUTO: 0.5 %
BASOPHILS NFR BLD AUTO: 2 %
BILIRUB SERPL-MCNC: 0.4 MG/DL (ref 0.2–1.3)
BILIRUB SERPL-MCNC: 0.5 MG/DL (ref 0.2–1.3)
BILIRUB SERPL-MCNC: 0.6 MG/DL (ref 0.2–1.3)
BLD GP AB SCN SERPL QL: NORMAL
BLD GP AB SCN SERPL QL: NORMAL
BLD PROD TYP BPU: NORMAL
BLD UNIT ID BPU: 0
BLOOD BANK CMNT PATIENT-IMP: NORMAL
BLOOD BANK CMNT PATIENT-IMP: NORMAL
BLOOD PRODUCT CODE: NORMAL
BPU ID: NORMAL
BUN SERPL-MCNC: 12 MG/DL (ref 7–30)
BUN SERPL-MCNC: 13 MG/DL (ref 7–30)
BUN SERPL-MCNC: 14 MG/DL (ref 7–30)
BUN SERPL-MCNC: 14 MG/DL (ref 7–30)
BUN SERPL-MCNC: 16 MG/DL (ref 7–30)
BUN SERPL-MCNC: 17 MG/DL (ref 7–30)
CALCIUM SERPL-MCNC: 9 MG/DL (ref 8.5–10.1)
CALCIUM SERPL-MCNC: 9.2 MG/DL (ref 8.5–10.1)
CALCIUM SERPL-MCNC: 9.6 MG/DL (ref 8.5–10.1)
CALCIUM SERPL-MCNC: 9.9 MG/DL (ref 8.5–10.1)
CHLORIDE SERPL-SCNC: 103 MMOL/L (ref 94–109)
CHLORIDE SERPL-SCNC: 107 MMOL/L (ref 94–109)
CHLORIDE SERPL-SCNC: 108 MMOL/L (ref 94–109)
CHLORIDE SERPL-SCNC: 108 MMOL/L (ref 94–109)
CHLORIDE SERPL-SCNC: 109 MMOL/L (ref 94–109)
CHLORIDE SERPL-SCNC: 97 MMOL/L (ref 94–109)
CK SERPL-CCNC: 50 U/L (ref 30–300)
CO2 SERPL-SCNC: 25 MMOL/L (ref 20–32)
CO2 SERPL-SCNC: 26 MMOL/L (ref 20–32)
CO2 SERPL-SCNC: 28 MMOL/L (ref 20–32)
CO2 SERPL-SCNC: 28 MMOL/L (ref 20–32)
CREAT SERPL-MCNC: 0.8 MG/DL (ref 0.66–1.25)
CREAT SERPL-MCNC: 0.82 MG/DL (ref 0.66–1.25)
CREAT SERPL-MCNC: 0.83 MG/DL (ref 0.66–1.25)
CREAT SERPL-MCNC: 0.83 MG/DL (ref 0.66–1.25)
CREAT SERPL-MCNC: 0.86 MG/DL (ref 0.66–1.25)
CREAT SERPL-MCNC: 0.87 MG/DL (ref 0.66–1.25)
DACRYOCYTES BLD QL SMEAR: SLIGHT
DIFFERENTIAL METHOD BLD: ABNORMAL
EOSINOPHIL # BLD AUTO: 0 10E9/L (ref 0–0.7)
EOSINOPHIL # BLD AUTO: 0.1 10E9/L (ref 0–0.7)
EOSINOPHIL # BLD AUTO: 0.2 10E9/L (ref 0–0.7)
EOSINOPHIL NFR BLD AUTO: 0 %
EOSINOPHIL NFR BLD AUTO: 1.6 %
EOSINOPHIL NFR BLD AUTO: 2.1 %
EOSINOPHIL NFR BLD AUTO: 2.9 %
EOSINOPHIL NFR BLD AUTO: 4.9 %
ERYTHROCYTE [DISTWIDTH] IN BLOOD BY AUTOMATED COUNT: 16.3 % (ref 10–15)
ERYTHROCYTE [DISTWIDTH] IN BLOOD BY AUTOMATED COUNT: 16.7 % (ref 10–15)
ERYTHROCYTE [DISTWIDTH] IN BLOOD BY AUTOMATED COUNT: 17 % (ref 10–15)
ERYTHROCYTE [DISTWIDTH] IN BLOOD BY AUTOMATED COUNT: 18.6 % (ref 10–15)
ERYTHROCYTE [DISTWIDTH] IN BLOOD BY AUTOMATED COUNT: 20.4 % (ref 10–15)
ERYTHROCYTE [DISTWIDTH] IN BLOOD BY AUTOMATED COUNT: 21.2 % (ref 10–15)
ERYTHROCYTE [DISTWIDTH] IN BLOOD BY AUTOMATED COUNT: 21.3 % (ref 10–15)
GFR SERPL CREATININE-BSD FRML MDRD: >90 ML/MIN/{1.73_M2}
GLUCOSE SERPL-MCNC: 101 MG/DL (ref 70–99)
GLUCOSE SERPL-MCNC: 116 MG/DL (ref 70–99)
GLUCOSE SERPL-MCNC: 83 MG/DL (ref 70–99)
GLUCOSE SERPL-MCNC: 83 MG/DL (ref 70–99)
GLUCOSE SERPL-MCNC: 85 MG/DL (ref 70–99)
GLUCOSE SERPL-MCNC: 98 MG/DL (ref 70–99)
HCT VFR BLD AUTO: 25.2 % (ref 40–53)
HCT VFR BLD AUTO: 26.2 % (ref 40–53)
HCT VFR BLD AUTO: 29.8 % (ref 40–53)
HCT VFR BLD AUTO: 40.9 % (ref 40–53)
HCT VFR BLD AUTO: 42.1 % (ref 40–53)
HCT VFR BLD AUTO: 45.1 % (ref 40–53)
HCT VFR BLD AUTO: 45.7 % (ref 40–53)
HGB BLD-MCNC: 12.9 G/DL (ref 13.3–17.7)
HGB BLD-MCNC: 13.5 G/DL (ref 13.3–17.7)
HGB BLD-MCNC: 14 G/DL (ref 13.3–17.7)
HGB BLD-MCNC: 14.7 G/DL (ref 13.3–17.7)
HGB BLD-MCNC: 7.9 G/DL (ref 13.3–17.7)
HGB BLD-MCNC: 8.4 G/DL (ref 13.3–17.7)
HGB BLD-MCNC: 9.7 G/DL (ref 13.3–17.7)
IMM GRANULOCYTES # BLD: 0 10E9/L (ref 0–0.4)
IMM GRANULOCYTES # BLD: 0.1 10E9/L (ref 0–0.4)
IMM GRANULOCYTES NFR BLD: 0.2 %
IMM GRANULOCYTES NFR BLD: 0.3 %
IMM GRANULOCYTES NFR BLD: 0.8 %
IMM GRANULOCYTES NFR BLD: 1.3 %
INTERPRETATION ECG - MUSE: NORMAL
LYMPHOCYTES # BLD AUTO: 0.6 10E9/L (ref 0.8–5.3)
LYMPHOCYTES # BLD AUTO: 0.6 10E9/L (ref 0.8–5.3)
LYMPHOCYTES # BLD AUTO: 0.9 10E9/L (ref 0.8–5.3)
LYMPHOCYTES # BLD AUTO: 1.4 10E9/L (ref 0.8–5.3)
LYMPHOCYTES # BLD AUTO: 1.4 10E9/L (ref 0.8–5.3)
LYMPHOCYTES # BLD AUTO: 1.5 10E9/L (ref 0.8–5.3)
LYMPHOCYTES # BLD AUTO: 1.6 10E9/L (ref 0.8–5.3)
LYMPHOCYTES NFR BLD AUTO: 22.7 %
LYMPHOCYTES NFR BLD AUTO: 28 %
LYMPHOCYTES NFR BLD AUTO: 30.8 %
LYMPHOCYTES NFR BLD AUTO: 35.3 %
LYMPHOCYTES NFR BLD AUTO: 35.4 %
LYMPHOCYTES NFR BLD AUTO: 36.7 %
LYMPHOCYTES NFR BLD AUTO: 9.2 %
MAGNESIUM SERPL-MCNC: 1.9 MG/DL (ref 1.6–2.3)
MAGNESIUM SERPL-MCNC: 2.1 MG/DL (ref 1.6–2.3)
MCH RBC QN AUTO: 24.7 PG (ref 26.5–33)
MCH RBC QN AUTO: 26.1 PG (ref 26.5–33)
MCH RBC QN AUTO: 26.9 PG (ref 26.5–33)
MCH RBC QN AUTO: 27.7 PG (ref 26.5–33)
MCH RBC QN AUTO: 28.1 PG (ref 26.5–33)
MCH RBC QN AUTO: 28.4 PG (ref 26.5–33)
MCH RBC QN AUTO: 28.7 PG (ref 26.5–33)
MCHC RBC AUTO-ENTMCNC: 31 G/DL (ref 31.5–36.5)
MCHC RBC AUTO-ENTMCNC: 31.3 G/DL (ref 31.5–36.5)
MCHC RBC AUTO-ENTMCNC: 31.5 G/DL (ref 31.5–36.5)
MCHC RBC AUTO-ENTMCNC: 32.1 G/DL (ref 31.5–36.5)
MCHC RBC AUTO-ENTMCNC: 32.1 G/DL (ref 31.5–36.5)
MCHC RBC AUTO-ENTMCNC: 32.2 G/DL (ref 31.5–36.5)
MCHC RBC AUTO-ENTMCNC: 32.6 G/DL (ref 31.5–36.5)
MCV RBC AUTO: 80 FL (ref 78–100)
MCV RBC AUTO: 81 FL (ref 78–100)
MCV RBC AUTO: 84 FL (ref 78–100)
MCV RBC AUTO: 86 FL (ref 78–100)
MCV RBC AUTO: 88 FL (ref 78–100)
MCV RBC AUTO: 89 FL (ref 78–100)
MCV RBC AUTO: 91 FL (ref 78–100)
MONOCYTES # BLD AUTO: 0.1 10E9/L (ref 0–1.3)
MONOCYTES # BLD AUTO: 0.3 10E9/L (ref 0–1.3)
MONOCYTES # BLD AUTO: 0.4 10E9/L (ref 0–1.3)
MONOCYTES # BLD AUTO: 0.4 10E9/L (ref 0–1.3)
MONOCYTES # BLD AUTO: 0.5 10E9/L (ref 0–1.3)
MONOCYTES # BLD AUTO: 0.5 10E9/L (ref 0–1.3)
MONOCYTES # BLD AUTO: 1 10E9/L (ref 0–1.3)
MONOCYTES NFR BLD AUTO: 10.1 %
MONOCYTES NFR BLD AUTO: 25.3 %
MONOCYTES NFR BLD AUTO: 7 %
MONOCYTES NFR BLD AUTO: 7.2 %
MONOCYTES NFR BLD AUTO: 8.3 %
MONOCYTES NFR BLD AUTO: 8.7 %
MONOCYTES NFR BLD AUTO: 9 %
NEUTROPHILS # BLD AUTO: 1.3 10E9/L (ref 1.6–8.3)
NEUTROPHILS # BLD AUTO: 2 10E9/L (ref 1.6–8.3)
NEUTROPHILS # BLD AUTO: 2 10E9/L (ref 1.6–8.3)
NEUTROPHILS # BLD AUTO: 2.3 10E9/L (ref 1.6–8.3)
NEUTROPHILS # BLD AUTO: 2.3 10E9/L (ref 1.6–8.3)
NEUTROPHILS # BLD AUTO: 2.5 10E9/L (ref 1.6–8.3)
NEUTROPHILS # BLD AUTO: 5.1 10E9/L (ref 1.6–8.3)
NEUTROPHILS NFR BLD AUTO: 50.4 %
NEUTROPHILS NFR BLD AUTO: 50.7 %
NEUTROPHILS NFR BLD AUTO: 52.4 %
NEUTROPHILS NFR BLD AUTO: 53.1 %
NEUTROPHILS NFR BLD AUTO: 56.7 %
NEUTROPHILS NFR BLD AUTO: 63 %
NEUTROPHILS NFR BLD AUTO: 80.9 %
NRBC # BLD AUTO: 0 10*3/UL
NRBC BLD AUTO-RTO: 0 /100
NT-PROBNP SERPL-MCNC: 1158 PG/ML (ref 0–125)
NUM BPU REQUESTED: 2
NUM BPU REQUESTED: 2
PHOSPHATE SERPL-MCNC: 2.5 MG/DL (ref 2.5–4.5)
PHOSPHATE SERPL-MCNC: 2.7 MG/DL (ref 2.5–4.5)
PHOSPHATE SERPL-MCNC: 2.8 MG/DL (ref 2.5–4.5)
PLATELET # BLD AUTO: 193 10E9/L (ref 150–450)
PLATELET # BLD AUTO: 203 10E9/L (ref 150–450)
PLATELET # BLD AUTO: 208 10E9/L (ref 150–450)
PLATELET # BLD AUTO: 212 10E9/L (ref 150–450)
PLATELET # BLD AUTO: 405 10E9/L (ref 150–450)
PLATELET # BLD AUTO: 431 10E9/L (ref 150–450)
PLATELET # BLD AUTO: 457 10E9/L (ref 150–450)
PLATELET # BLD EST: ABNORMAL 10*3/UL
POIKILOCYTOSIS BLD QL SMEAR: SLIGHT
POTASSIUM SERPL-SCNC: 4 MMOL/L (ref 3.4–5.3)
POTASSIUM SERPL-SCNC: 4.3 MMOL/L (ref 3.4–5.3)
POTASSIUM SERPL-SCNC: 4.3 MMOL/L (ref 3.4–5.3)
POTASSIUM SERPL-SCNC: 4.4 MMOL/L (ref 3.4–5.3)
PROT SERPL-MCNC: 5.3 G/DL (ref 6.8–8.8)
PROT SERPL-MCNC: 5.7 G/DL (ref 6.8–8.8)
PROT SERPL-MCNC: 6.7 G/DL (ref 6.8–8.8)
PROT SERPL-MCNC: 6.9 G/DL (ref 6.8–8.8)
PROT SERPL-MCNC: 7.2 G/DL (ref 6.8–8.8)
PROT SERPL-MCNC: 7.4 G/DL (ref 6.8–8.8)
RBC # BLD AUTO: 2.85 10E12/L (ref 4.4–5.9)
RBC # BLD AUTO: 2.96 10E12/L (ref 4.4–5.9)
RBC # BLD AUTO: 3.45 10E12/L (ref 4.4–5.9)
RBC # BLD AUTO: 4.49 10E12/L (ref 4.4–5.9)
RBC # BLD AUTO: 5.02 10E12/L (ref 4.4–5.9)
RBC # BLD AUTO: 5.63 10E12/L (ref 4.4–5.9)
RBC # BLD AUTO: 5.67 10E12/L (ref 4.4–5.9)
SODIUM SERPL-SCNC: 129 MMOL/L (ref 133–144)
SODIUM SERPL-SCNC: 135 MMOL/L (ref 133–144)
SODIUM SERPL-SCNC: 139 MMOL/L (ref 133–144)
SODIUM SERPL-SCNC: 139 MMOL/L (ref 133–144)
SODIUM SERPL-SCNC: 140 MMOL/L (ref 133–144)
SODIUM SERPL-SCNC: 141 MMOL/L (ref 133–144)
SPECIMEN EXP DATE BLD: NORMAL
SPECIMEN EXP DATE BLD: NORMAL
T4 FREE SERPL-MCNC: 0.66 NG/DL (ref 0.76–1.46)
T4 FREE SERPL-MCNC: 0.74 NG/DL (ref 0.76–1.46)
T4 FREE SERPL-MCNC: 0.82 NG/DL (ref 0.76–1.46)
TRANSFUSION STATUS PATIENT QL: NORMAL
TSH SERPL DL<=0.005 MIU/L-ACNC: 3.26 MU/L (ref 0.4–4)
TSH SERPL DL<=0.005 MIU/L-ACNC: 3.82 MU/L (ref 0.4–4)
TSH SERPL DL<=0.005 MIU/L-ACNC: 4.59 MU/L (ref 0.4–4)
TSH SERPL DL<=0.005 MIU/L-ACNC: 5.63 MU/L (ref 0.4–4)
WBC # BLD AUTO: 2.1 10E9/L (ref 4–11)
WBC # BLD AUTO: 3.9 10E9/L (ref 4–11)
WBC # BLD AUTO: 3.9 10E9/L (ref 4–11)
WBC # BLD AUTO: 4.2 10E9/L (ref 4–11)
WBC # BLD AUTO: 4.5 10E9/L (ref 4–11)
WBC # BLD AUTO: 4.5 10E9/L (ref 4–11)
WBC # BLD AUTO: 6.3 10E9/L (ref 4–11)

## 2020-01-01 PROCEDURE — 86850 RBC ANTIBODY SCREEN: CPT | Performed by: PHYSICIAN ASSISTANT

## 2020-01-01 PROCEDURE — 96375 TX/PRO/DX INJ NEW DRUG ADDON: CPT

## 2020-01-01 PROCEDURE — 82550 ASSAY OF CK (CPK): CPT | Performed by: INTERNAL MEDICINE

## 2020-01-01 PROCEDURE — 80053 COMPREHEN METABOLIC PANEL: CPT | Performed by: INTERNAL MEDICINE

## 2020-01-01 PROCEDURE — 36415 COLL VENOUS BLD VENIPUNCTURE: CPT

## 2020-01-01 PROCEDURE — 99207: CPT | Mod: GC | Performed by: INTERNAL MEDICINE

## 2020-01-01 PROCEDURE — P9016 RBC LEUKOCYTES REDUCED: HCPCS | Performed by: PHYSICIAN ASSISTANT

## 2020-01-01 PROCEDURE — 74177 CT ABD & PELVIS W/CONTRAST: CPT

## 2020-01-01 PROCEDURE — 27210577 ZZ H INTRODUCER MICRO SET

## 2020-01-01 PROCEDURE — 99214 OFFICE O/P EST MOD 30 MIN: CPT | Mod: ZP | Performed by: INTERNAL MEDICINE

## 2020-01-01 PROCEDURE — 40000114 ZZH STATISTIC NO CHARGE CLINIC VISIT

## 2020-01-01 PROCEDURE — 36569 INSJ PICC 5 YR+ W/O IMAGING: CPT

## 2020-01-01 PROCEDURE — 40001009 ZZH VIDEO/TELEPHONE VISIT; NO CHARGE

## 2020-01-01 PROCEDURE — 25000125 ZZHC RX 250: Performed by: INTERNAL MEDICINE

## 2020-01-01 PROCEDURE — 99215 OFFICE O/P EST HI 40 MIN: CPT | Mod: ZP | Performed by: PHYSICIAN ASSISTANT

## 2020-01-01 PROCEDURE — 99214 OFFICE O/P EST MOD 30 MIN: CPT | Mod: 95 | Performed by: INTERNAL MEDICINE

## 2020-01-01 PROCEDURE — 93005 ELECTROCARDIOGRAM TRACING: CPT

## 2020-01-01 PROCEDURE — 96409 CHEMO IV PUSH SNGL DRUG: CPT

## 2020-01-01 PROCEDURE — 99213 OFFICE O/P EST LOW 20 MIN: CPT | Mod: TEL | Performed by: INTERNAL MEDICINE

## 2020-01-01 PROCEDURE — 27211417 ZZ H KIT, VPS RHYTHM STYLET

## 2020-01-01 PROCEDURE — 25000128 H RX IP 250 OP 636: Performed by: INTERNAL MEDICINE

## 2020-01-01 PROCEDURE — 86900 BLOOD TYPING SEROLOGIC ABO: CPT | Performed by: PHYSICIAN ASSISTANT

## 2020-01-01 PROCEDURE — 96374 THER/PROPH/DIAG INJ IV PUSH: CPT | Mod: 59

## 2020-01-01 PROCEDURE — 85025 COMPLETE CBC W/AUTO DIFF WBC: CPT | Performed by: INTERNAL MEDICINE

## 2020-01-01 PROCEDURE — G0463 HOSPITAL OUTPT CLINIC VISIT: HCPCS | Mod: ZF

## 2020-01-01 PROCEDURE — 84439 ASSAY OF FREE THYROXINE: CPT | Performed by: INTERNAL MEDICINE

## 2020-01-01 PROCEDURE — 40000986 XR CHEST 1 VW

## 2020-01-01 PROCEDURE — G0498 CHEMO EXTEND IV INFUS W/PUMP: HCPCS

## 2020-01-01 PROCEDURE — 84100 ASSAY OF PHOSPHORUS: CPT | Performed by: INTERNAL MEDICINE

## 2020-01-01 PROCEDURE — 86901 BLOOD TYPING SEROLOGIC RH(D): CPT | Performed by: PHYSICIAN ASSISTANT

## 2020-01-01 PROCEDURE — 99214 OFFICE O/P EST MOD 30 MIN: CPT | Mod: ZP | Performed by: PHYSICIAN ASSISTANT

## 2020-01-01 PROCEDURE — 25000128 H RX IP 250 OP 636: Mod: ZF | Performed by: INTERNAL MEDICINE

## 2020-01-01 PROCEDURE — C1751 CATH, INF, PER/CENT/MIDLINE: HCPCS

## 2020-01-01 PROCEDURE — 99443 ZZC PHYSICIAN TELEPHONE EVALUATION 21-30 MIN: CPT | Mod: 95 | Performed by: INTERNAL MEDICINE

## 2020-01-01 PROCEDURE — 84443 ASSAY THYROID STIM HORMONE: CPT | Performed by: INTERNAL MEDICINE

## 2020-01-01 PROCEDURE — 83735 ASSAY OF MAGNESIUM: CPT | Performed by: INTERNAL MEDICINE

## 2020-01-01 PROCEDURE — 96361 HYDRATE IV INFUSION ADD-ON: CPT

## 2020-01-01 PROCEDURE — 86923 COMPATIBILITY TEST ELECTRIC: CPT | Performed by: PHYSICIAN ASSISTANT

## 2020-01-01 PROCEDURE — 25800030 ZZH RX IP 258 OP 636: Mod: ZF | Performed by: PHYSICIAN ASSISTANT

## 2020-01-01 PROCEDURE — 93010 ELECTROCARDIOGRAM REPORT: CPT | Performed by: INTERNAL MEDICINE

## 2020-01-01 PROCEDURE — 99214 OFFICE O/P EST MOD 30 MIN: CPT | Mod: TEL | Performed by: INTERNAL MEDICINE

## 2020-01-01 PROCEDURE — 83880 ASSAY OF NATRIURETIC PEPTIDE: CPT | Performed by: INTERNAL MEDICINE

## 2020-01-01 PROCEDURE — 25800030 ZZH RX IP 258 OP 636: Mod: ZF | Performed by: INTERNAL MEDICINE

## 2020-01-01 PROCEDURE — 96360 HYDRATION IV INFUSION INIT: CPT

## 2020-01-01 PROCEDURE — 36430 TRANSFUSION BLD/BLD COMPNT: CPT

## 2020-01-01 RX ORDER — GRANISETRON HYDROCHLORIDE 1 MG/ML
1 INJECTION INTRAVENOUS ONCE
Status: COMPLETED | OUTPATIENT
Start: 2020-01-01 | End: 2020-01-01

## 2020-01-01 RX ORDER — EPINEPHRINE 1 MG/ML
0.3 INJECTION, SOLUTION INTRAMUSCULAR; SUBCUTANEOUS EVERY 5 MIN PRN
Status: CANCELLED | OUTPATIENT
Start: 2020-01-01

## 2020-01-01 RX ORDER — PAZOPANIB 200 MG/1
800 TABLET, FILM COATED ORAL DAILY
Qty: 120 TABLET | Refills: 0 | Status: SHIPPED | OUTPATIENT
Start: 2020-01-01 | End: 2020-01-01

## 2020-01-01 RX ORDER — HEPARIN SODIUM,PORCINE 10 UNIT/ML
5 VIAL (ML) INTRAVENOUS
Status: CANCELLED | OUTPATIENT
Start: 2020-01-01

## 2020-01-01 RX ORDER — METHYLPREDNISOLONE SODIUM SUCCINATE 125 MG/2ML
125 INJECTION, POWDER, LYOPHILIZED, FOR SOLUTION INTRAMUSCULAR; INTRAVENOUS
Status: CANCELLED
Start: 2020-01-01

## 2020-01-01 RX ORDER — IOPAMIDOL 755 MG/ML
118 INJECTION, SOLUTION INTRAVASCULAR ONCE
Status: COMPLETED | OUTPATIENT
Start: 2020-01-01 | End: 2020-01-01

## 2020-01-01 RX ORDER — ALBUTEROL SULFATE 0.83 MG/ML
2.5 SOLUTION RESPIRATORY (INHALATION)
Status: CANCELLED | OUTPATIENT
Start: 2020-01-01

## 2020-01-01 RX ORDER — HEPARIN SODIUM (PORCINE) LOCK FLUSH IV SOLN 100 UNIT/ML 100 UNIT/ML
5 SOLUTION INTRAVENOUS
Status: CANCELLED | OUTPATIENT
Start: 2020-01-01

## 2020-01-01 RX ORDER — NALOXONE HYDROCHLORIDE 0.4 MG/ML
.1-.4 INJECTION, SOLUTION INTRAMUSCULAR; INTRAVENOUS; SUBCUTANEOUS
Status: CANCELLED | OUTPATIENT
Start: 2020-01-01

## 2020-01-01 RX ORDER — MEPERIDINE HYDROCHLORIDE 25 MG/ML
25 INJECTION INTRAMUSCULAR; INTRAVENOUS; SUBCUTANEOUS EVERY 30 MIN PRN
Status: CANCELLED | OUTPATIENT
Start: 2020-01-01

## 2020-01-01 RX ORDER — AMLODIPINE BESYLATE 5 MG/1
10 TABLET ORAL DAILY
Qty: 60 TABLET | Refills: 3 | Status: SHIPPED | OUTPATIENT
Start: 2020-01-01 | End: 2020-01-01

## 2020-01-01 RX ORDER — EPINEPHRINE 0.3 MG/.3ML
0.3 INJECTION SUBCUTANEOUS EVERY 5 MIN PRN
Status: CANCELLED | OUTPATIENT
Start: 2020-01-01

## 2020-01-01 RX ORDER — OXYCODONE HCL 10 MG/1
20 TABLET, FILM COATED, EXTENDED RELEASE ORAL EVERY 12 HOURS
Qty: 120 TABLET | Refills: 0 | Status: SHIPPED | OUTPATIENT
Start: 2020-01-01 | End: 2020-01-01

## 2020-01-01 RX ORDER — DIPHENHYDRAMINE HYDROCHLORIDE 50 MG/ML
50 INJECTION INTRAMUSCULAR; INTRAVENOUS
Status: CANCELLED
Start: 2020-01-01

## 2020-01-01 RX ORDER — SODIUM CHLORIDE 9 MG/ML
1000 INJECTION, SOLUTION INTRAVENOUS CONTINUOUS PRN
Status: CANCELLED
Start: 2020-01-01

## 2020-01-01 RX ORDER — PROCHLORPERAZINE MALEATE 10 MG
10 TABLET ORAL EVERY 6 HOURS PRN
Qty: 30 TABLET | Refills: 2 | Status: SHIPPED | OUTPATIENT
Start: 2020-01-01 | End: 2020-01-01

## 2020-01-01 RX ORDER — OXYCODONE HYDROCHLORIDE 10 MG/1
10-20 TABLET ORAL EVERY 4 HOURS PRN
Qty: 100 TABLET | Refills: 0 | Status: SHIPPED | OUTPATIENT
Start: 2020-01-01

## 2020-01-01 RX ORDER — OXYCODONE HCL 20 MG/1
20 TABLET, FILM COATED, EXTENDED RELEASE ORAL EVERY 12 HOURS
Qty: 20 TABLET | Refills: 0 | Status: SHIPPED | OUTPATIENT
Start: 2020-01-01

## 2020-01-01 RX ORDER — AMLODIPINE BESYLATE 5 MG/1
5 TABLET ORAL DAILY
Qty: 60 TABLET | Refills: 3 | Status: SHIPPED | OUTPATIENT
Start: 2020-01-01 | End: 2020-01-01

## 2020-01-01 RX ORDER — LORAZEPAM 2 MG/ML
0.5 INJECTION INTRAMUSCULAR EVERY 4 HOURS PRN
Status: CANCELLED
Start: 2020-01-01

## 2020-01-01 RX ORDER — POLYETHYLENE GLYCOL 3350 17 G/17G
1 POWDER, FOR SOLUTION ORAL ONCE
COMMUNITY

## 2020-01-01 RX ORDER — HEPARIN SODIUM (PORCINE) LOCK FLUSH IV SOLN 100 UNIT/ML 100 UNIT/ML
5 SOLUTION INTRAVENOUS
Status: DISCONTINUED | OUTPATIENT
Start: 2020-01-01 | End: 2020-01-01 | Stop reason: HOSPADM

## 2020-01-01 RX ORDER — HEPARIN SODIUM,PORCINE 10 UNIT/ML
2-5 VIAL (ML) INTRAVENOUS
Status: DISCONTINUED | OUTPATIENT
Start: 2020-01-01 | End: 2020-01-01 | Stop reason: HOSPADM

## 2020-01-01 RX ORDER — ALBUTEROL SULFATE 90 UG/1
1-2 AEROSOL, METERED RESPIRATORY (INHALATION)
Status: CANCELLED
Start: 2020-01-01

## 2020-01-01 RX ORDER — PAZOPANIB HYDROCHLORIDE 200 MG/1
TABLET, FILM COATED ORAL
COMMUNITY
Start: 2020-01-01 | End: 2020-01-01

## 2020-01-01 RX ORDER — IOPAMIDOL 755 MG/ML
63 INJECTION, SOLUTION INTRAVASCULAR ONCE
Status: COMPLETED | OUTPATIENT
Start: 2020-01-01 | End: 2020-01-01

## 2020-01-01 RX ORDER — PROCHLORPERAZINE MALEATE 10 MG
10 TABLET ORAL EVERY 6 HOURS PRN
Qty: 30 TABLET | Refills: 2 | Status: SHIPPED | OUTPATIENT
Start: 2020-01-01

## 2020-01-01 RX ORDER — ONDANSETRON 8 MG/1
8 TABLET, FILM COATED ORAL EVERY 8 HOURS PRN
Qty: 10 TABLET | Refills: 2 | Status: SHIPPED | OUTPATIENT
Start: 2020-01-01 | End: 2020-01-01

## 2020-01-01 RX ORDER — AMOXICILLIN 250 MG
1-2 CAPSULE ORAL DAILY
Qty: 60 TABLET | Refills: 3 | Status: SHIPPED | OUTPATIENT
Start: 2020-01-01

## 2020-01-01 RX ORDER — IOPAMIDOL 755 MG/ML
119 INJECTION, SOLUTION INTRAVASCULAR ONCE
Status: COMPLETED | OUTPATIENT
Start: 2020-01-01 | End: 2020-01-01

## 2020-01-01 RX ORDER — OXYCODONE HCL 20 MG/1
20 TABLET, FILM COATED, EXTENDED RELEASE ORAL EVERY 12 HOURS
Qty: 60 TABLET | Refills: 0 | Status: SHIPPED | OUTPATIENT
Start: 2020-01-01 | End: 2020-01-01

## 2020-01-01 RX ORDER — LIDOCAINE 40 MG/G
CREAM TOPICAL
Status: DISCONTINUED | OUTPATIENT
Start: 2020-01-01 | End: 2020-01-01 | Stop reason: HOSPADM

## 2020-01-01 RX ORDER — IOPAMIDOL 755 MG/ML
100 INJECTION, SOLUTION INTRAVASCULAR ONCE
Status: COMPLETED | OUTPATIENT
Start: 2020-01-01 | End: 2020-01-01

## 2020-01-01 RX ORDER — OXYCODONE HYDROCHLORIDE 10 MG/1
10 TABLET ORAL EVERY 4 HOURS PRN
Qty: 60 TABLET | Refills: 0 | Status: SHIPPED | OUTPATIENT
Start: 2020-01-01 | End: 2020-01-01

## 2020-01-01 RX ADMIN — ERIBULIN MESYLATE 3 MG: 0.5 INJECTION INTRAVENOUS at 10:57

## 2020-01-01 RX ADMIN — IOPAMIDOL 90 ML: 755 INJECTION, SOLUTION INTRAVENOUS at 15:36

## 2020-01-01 RX ADMIN — LIDOCAINE HYDROCHLORIDE 3 ML: 10 INJECTION, SOLUTION EPIDURAL; INFILTRATION; INTRACAUDAL; PERINEURAL at 11:25

## 2020-01-01 RX ADMIN — SODIUM CHLORIDE 500 ML: 9 INJECTION, SOLUTION INTRAVENOUS at 10:19

## 2020-01-01 RX ADMIN — LIDOCAINE HYDROCHLORIDE 1 ML: 10 INJECTION, SOLUTION EPIDURAL; INFILTRATION; INTRACAUDAL; PERINEURAL at 10:11

## 2020-01-01 RX ADMIN — GRANISETRON HYDROCHLORIDE 1 MG: 1 INJECTION INTRAVENOUS at 14:54

## 2020-01-01 RX ADMIN — SODIUM CHLORIDE 250 ML: 9 INJECTION, SOLUTION INTRAVENOUS at 11:26

## 2020-01-01 RX ADMIN — DEXAMETHASONE SODIUM PHOSPHATE: 10 INJECTION, SOLUTION INTRAMUSCULAR; INTRAVENOUS at 11:26

## 2020-01-01 RX ADMIN — SODIUM CHLORIDE 250 ML: 9 INJECTION, SOLUTION INTRAVENOUS at 14:46

## 2020-01-01 RX ADMIN — ERIBULIN MESYLATE 3 MG: 0.5 INJECTION INTRAVENOUS at 15:07

## 2020-01-01 RX ADMIN — IOPAMIDOL 63 ML: 755 INJECTION, SOLUTION INTRAVASCULAR at 11:19

## 2020-01-01 RX ADMIN — GRANISETRON HYDROCHLORIDE 1 MG: 1 INJECTION INTRAVENOUS at 10:31

## 2020-01-01 RX ADMIN — SODIUM CHLORIDE 250 ML: 9 INJECTION, SOLUTION INTRAVENOUS at 10:26

## 2020-01-01 RX ADMIN — IOPAMIDOL 118 ML: 755 INJECTION, SOLUTION INTRAVASCULAR at 07:41

## 2020-01-01 RX ADMIN — IOPAMIDOL 119 ML: 755 INJECTION, SOLUTION INTRAVASCULAR at 07:52

## 2020-01-01 RX ADMIN — SODIUM CHLORIDE 62 ML: 9 INJECTION, SOLUTION INTRAVENOUS at 15:36

## 2020-01-01 ASSESSMENT — PAIN SCALES - GENERAL
PAINLEVEL: NO PAIN (0)

## 2020-01-01 ASSESSMENT — MIFFLIN-ST. JEOR
SCORE: 1698
SCORE: 1685.72
SCORE: 1687.53

## 2020-01-06 NOTE — ORAL ONC MGMT
"Oral Chemotherapy Monitoring Program    Primary Oncologist: Dr. Johnson  Primary Oncology Clinic: [facility]   Cancer Diagnosis: Metastatic Sarcoma    C1D1: 10/26/19  Therapy: Votrient 800 (4 x 200 mg) by mouth daily continuously    Subjective/Objective:  Hiram Tapia is a 59 year old male contacted by phone for a follow-up visit for oral chemotherapy. When asked how he is doing, he said \"okay\". His stomach continues to hurt despite taking Tums about once a day; he knows to separate from Votrient by 4 hours. The pain \"comes and goes\", but was constant last week. He also cycles between constipation and diarrhea- he will start to treat the constipation and then it will flip to diarrhea. David said he was about to go to the ER last Thursday because he hadn't passed a bowel movement for about three days, but then he used a saline laxative and did not end up going in after having a bowel movement. At that time he was taking Miralax as well to help things move, but it didn't work in time so he used the saline laxative instead leading to diarrhea. He did not take the Votrient dose last Thursday given his symptoms. David reports his blood pressure has remained stable at 140/90 mmHg. He drinks about 2 L of water a day. He is not open to starting additionally medications. Currently he only takes the votrient.     Assessment:  Metastatic Sarcoma: David is tolerating the Votrient therapy with moderate side effects. The gastrointestinal pain started when the Votrient started, so the pain is likely a symptom of the medication. According to the package insert, 23% of patients with a soft tissue sarcoma experience gastrointestinal pain and 56% of patients have nausea. The patient tried Tums over the counter after Janelle PharmD recommended this intervention, but the patient doesn't know if this helped much. I tried to emphasize that there are prescription anti-emetic options to help manage his side effects; David was " "resistant to starting a new medication. He should continue eating small, frequent meals instead of large meals. Some people have nausea relief from drinking marcell tea, but the patient does not like marcell. I mentioned a dose reduction may be beneficial, but he did not want to reduce the dose because it seems to be helping his disease related symptoms. For now he planned on monitoring and he may increase the Tums until seeing Dr. Johnson to address. He thinks the constipation episode might have been a \"one-off\" incident and he is going to treat it that way. Miralax can take about 3 days to start working, so I made sure he was aware of this. Could benefit from using Miralax for a of couple weeks in attempt to have regular bowel movements.     Plan:  -Continue Votrient 800 mg daily  -Monitor constipation / diarrhea cycle and gastrointestinal pain  -Educated that there are many antiemetic medications that we have     Follow-Up:  Review 1/21/20 Elizabeth appointment    Mckenzie BedoyaD Candidate & Oncology Intern   [facility]   [phone]        "

## 2020-01-15 NOTE — TELEPHONE ENCOUNTER
Mike Reyna  [facility]  Oncology Pharmacy Liaison   [email or web address]  [phone] (phone  [phone] (fax

## 2020-01-20 NOTE — PROGRESS NOTES
1-21-20     I saw Hiram Tapia for follow up of UPS of the right thigh     Background  He had no significant PMH but was found to have a UPS of the right thigh. He has a history of right leg pain with sciatica x 20 years. In October 2017 he had more pain and injured his leg on a truck which eventually led to imaging that revealed a mass. 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      He was started on Doxil/Ifos 3/23/18 and completed 4 cycles with positive response to treatment. Of not he did complete 6 months of Xarelto during this time for incidental PE. He then underwent preoperative XRT. He underwent resection 9/17/18 with <5% viable cells on path and negative margins with no LVI.     Then on surveillance imaging October 2018 he was noted to have lung nodules. He underwent biopsy December 2018 that revealed metastatic sarcoma.     Restaging on 4/2/19  revealed large right and trace left pneumothoraces along with worsening metastatic disease with increased lung nodules, increased lymphadenopathy, and new lytic lesion with associated soft tissue mass in posterior right femoral intertrochanteric region. He was admitted through the ED. Thoracic placed a pigtail but he had re-expansion. Thoracic surgery performed talc pleurodesis 4/3/19. He was discharged home 4/5/19.     He started Keytruda 4/9/19. He saw ortho for worsening right thigh pain thought 2/2 metastatic lesion in right femur and they discussed nailing. During his pre-op work-up CXR revealed persistent right sided pneumothorax for which he was admitted 4/12/19. Repeat talc pleurodesis performed 4/16/19. He underwent right intramedually pinning 4/15/19.  -  He was started Keytruda 4/9/19     He started gemzar on 7-24-19 due to PD, and had a thoracentesis 7-25.  He initially got a response after the gemzar was added.  -           Interval History:     He had an IVC filter put in.     He started pazopanib 10-26-19.    He  "feels much better w much less coughing.     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  He is going up stairs OK.    Mild GERD; not an issue, takes occ tums.  He times the tums around the votrient.    If he eats too much he feels bad for a couple hours and if he waits too long between meals gets GI upset.    Sleeping OK.   No F, C, night sweats now.  No pain medication.   Dry skin but could be just seasonal as usual.   No cold sensitivity.    He otherwise feels well and 10-point ROS negative.         On exam he appeared comfortable w normal affect  BP (!) 179/105   Pulse 69   Temp 97.9  F (36.6  C) (Oral)   Resp 16   Ht 1.791 m (5' 10.51\")   Wt 86.8 kg (191 lb 6.4 oz)   SpO2 99%   BMI 27.07 kg/m    Constitutional: Well-appearing male in no acute distress.  Eyes: No icterus.  ENT: Without lesions or thrush.   NECK: No thyromegaly or mass  Cardiovascular: RRR. No murmur   Respiratory: decreased BS L base , no crackles noted today  ABD:  No HSMT.   Lymphatic: No lymphadenopathy  MSK: no synotvitis  EXT: no edema   Neurologic: Normal Romberg, heel / toe walking   Skin: no rash  PSYCH: Mood good.      -  On 1-17 freeT4 0.66 w TSH5.6, CBC, LFT, GNE OK.     -  Last imaging while fairly complicated showed that while many tumors showed clear response, one left necrotic lesion was containing air and communicating with the pleural space and the pericardial space.     I reviewed the new images and there is a response in several lesions, though possible some slight PD in a couple of others.    I reviewed the images w him.     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 He started Keytruda 4/9/19. He started gemzar 7-24.     Recurrent right sided pneumothorax s/p 2 " hospitalizations and TALC pleurodesis 4/3 and again 4/16.       He got a good symptomatic response to the pazopanib and that's confirmed by imaging though there is a suggestion of some PD in a couple of spots.    He seems better and his Hb has come up dramatically to the normal range.     We discussed the situation.   Pazopanib will continue.  He will check his BP bid for a week and let us know on that.  We will start amlodipine 5 mg today.  He will call ARgy Friday re the BPs and we may need to go up to 10.  TFTs will be followed    He will limit alcohol and tylenol.   We will check cbc, cmp monthly     We discussed codes status in the past but he was not yet ready to be DNR.  He will call if other questions arise.     Gaurang Johnson M.D.  Professor  Hematology, Oncology and Transplantation

## 2020-01-21 NOTE — LETTER
RE: Hiram Tapia  [address]  [place] [address]     Dear Colleague,    Thank you for referring your patient, Hiram Tapia, to the [facility]. Please see a copy of my visit note below.    1-21-20     I saw Hiram Tapia for follow up of UPS of the right thigh     Background  He had no significant PMH but was found to have a UPS of the right thigh. He has a history of right leg pain with sciatica x 20 years. In October 2017 he had more pain and injured his leg on a truck which eventually led to imaging that revealed a mass. 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      He was started on Doxil/Ifos 3/23/18 and completed 4 cycles with positive response to treatment. Of not he did complete 6 months of Xarelto during this time for incidental PE. He then underwent preoperative XRT. He underwent resection 9/17/18 with <5% viable cells on path and negative margins with no LVI.     Then on surveillance imaging October 2018 he was noted to have lung nodules. He underwent biopsy December 2018 that revealed metastatic sarcoma.     Restaging on 4/2/19  revealed large right and trace left pneumothoraces along with worsening metastatic disease with increased lung nodules, increased lymphadenopathy, and new lytic lesion with associated soft tissue mass in posterior right femoral intertrochanteric region. He was admitted through the ED. Thoracic placed a pigtail but he had re-expansion. Thoracic surgery performed talc pleurodesis 4/3/19. He was discharged home 4/5/19.     He started Keytruda 4/9/19. He saw ortho for worsening right thigh pain thought 2/2 metastatic lesion in right femur and they discussed nailing. During his pre-op work-up CXR revealed persistent right sided pneumothorax for which he was admitted 4/12/19. Repeat talc pleurodesis performed 4/16/19. He underwent right intramedually pinning 4/15/19.  -  He was started Keytruda  "4/9/19     He started gemzar on 7-24-19 due to PD, and had a thoracentesis 7-25.  He initially got a response after the gemzar was added.  -           Interval History:     He had an IVC filter put in.     He started pazopanib 10-26-19.    He feels much better w much less coughing.     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  He is going up stairs OK.    Mild GERD; not an issue, takes occ tums.  He times the tums around the votrient.    If he eats too much he feels bad for a couple hours and if he waits too long between meals gets GI upset.    Sleeping OK.   No F, C, night sweats now.  No pain medication.   Dry skin but could be just seasonal as usual.   No cold sensitivity.    He otherwise feels well and 10-point ROS negative.      On exam he appeared comfortable w normal affect  BP (!) 179/105   Pulse 69   Temp 97.9  F (36.6  C) (Oral)   Resp 16   Ht 1.791 m (5' 10.51\")   Wt 86.8 kg (191 lb 6.4 oz)   SpO2 99%   BMI 27.07 kg/m     Constitutional: Well-appearing male in no acute distress.  Eyes: No icterus.  ENT: Without lesions or thrush.   NECK: No thyromegaly or mass  Cardiovascular: RRR. No murmur   Respiratory: decreased BS L base , no crackles noted today  ABD:  No HSMT.   Lymphatic: No lymphadenopathy  MSK: no synotvitis  EXT: no edema   Neurologic: Normal Romberg, heel / toe walking   Skin: no rash  PSYCH: Mood good.      -  On 1-17 freeT4 0.66 w TSH5.6, CBC, LFT, GNE OK.     -  Last imaging while fairly complicated showed that while many tumors showed clear response, one left necrotic lesion was containing air and communicating with the pleural space and the pericardial space.     I reviewed the new images and there is a response in several lesions, though possible some slight PD in a couple of others.    I reviewed the images w him.     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 He started Keytruda 4/9/19. He started gemzar 7-24.     Recurrent right sided pneumothorax s/p 2 hospitalizations and TALC pleurodesis 4/3 and again 4/16.       He got a good symptomatic response to the pazopanib and that's confirmed by imaging though there is a suggestion of some PD in a couple of spots.    He seems better and his Hb has come up dramatically to the normal range.     We discussed the situation.   Pazopanib will continue.  He will check his BP bid for a week and let us know on that.  We will start amlodipine 5 mg today.  He will call ARgy Friday re the BPs and we may need to go up to 10.  TFTs will be followed    He will limit alcohol and tylenol.   We will check cbc, cmp monthly     We discussed codes status in the past but he was not yet ready to be DNR.  He will call if other questions arise.     Gaurang Johnson M.D.  Professor  Hematology, Oncology and Transplantation

## 2020-01-21 NOTE — NURSING NOTE
"Oncology Rooming Note    January 21, 2020 9:25 AM   Hiram Tapia is a 59 year old male who presents for:    Chief Complaint   Patient presents with     Oncology Clinic Visit     Return; High Grade Sarcoma     Initial Vitals: BP (!) 179/105   Pulse 69   Temp 97.9  F (36.6  C) (Oral)   Resp 16   Ht 1.791 m (5' 10.51\")   Wt 86.8 kg (191 lb 6.4 oz)   SpO2 99%   BMI 27.07 kg/m   Estimated body mass index is 27.07 kg/m  as calculated from the following:    Height as of this encounter: 1.791 m (5' 10.51\").    Weight as of this encounter: 86.8 kg (191 lb 6.4 oz). Body surface area is 2.08 meters squared.  No Pain (0) Comment: Data Unavailable   No LMP for male patient.  Allergies reviewed: Yes  Medications reviewed: Yes    Medications: Medication refills not needed today.  Pharmacy name entered into EPIC:    [facility] [place], [place] - [address] [place] [address] [phone]  [id number] [place], [place] - [facility] [address] [facility] [place]  [address]    Clinical concerns: Pt has concerns about his blood pressure. No other concerns.       Mehnaz Hodgson CMA              "

## 2020-02-17 NOTE — PROGRESS NOTES
Oncology/Hematology Visit Note  Feb 18, 2020    Reason for Visit: Follow up of UPS of the right thigh    History of Present Illness: Hiram Tapia is a 59 year old male with no significant PMH with UPS of the right thigh. He has a history of right leg pain with sciatica x 20 years. In October 2017 he had more pain and injured his leg on a truck which eventually led to imaging that revealed a mass. 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     He was started on Doxil/Ifos 3/23/18 and completed 4 cycles with positive response to treatment. Of not he did complete 6 months of Xarelto during this time for incidental PE. He then underwent preoperative XRT. He underwent resection 9/17/18 with <5% viable cells on path and negative margins with no LVI.    Then on surveillance imaging October 2018 he was noted to have lung nodules. He underwent biopsy December 2018 that revealed metastatic sarcoma. Dr. Johnson recommended treatment with Keytruda in February 2019 however it is unclear to me why this was never started.    He underwent restaging CT 4/2/19. This revealed large right and trace left pneumothoraces along with worsening metastatic disease with increased lung nodules, increased lymphadenopathy, and new lytic lesion with associated soft tissue mass in posterior right femoral intertrochanteric region. He was admitted through the ED. Thoracic placed a pigtail but he had re-expansion. Thoracic surgery performed talc pleurodesis 4/3/19. He was discharged home 4/5/19.    He was started on Keytruda 4/9/19. He saw ortho for worsening right thigh pain thought 2/2 metastatic lesion in right femur and they discussed nailing. During his pre-op work-up CXR revealed persistent right sided pneumothorax for which he was admitted 4/12/19. Repeat talc pleurodesis performed 4/16/19. He underwent right intramedually pinning 4/15/19.    He has continued on Keytrua after surgery. Restaging CT 6/28/19  with mostly an increase in pulmonary nodules and a right middle lobe cavitary lesion. Overall Dr. Johnson felt it was a mixed response so plan on continuing for 2 more cycles and then repeat CT. Of note port was removed 7/17/19.    He had worsening SOB and pleuritic pain 7/24/19 and CT imaging with concerns for progressive disease in addition to large right pleural effusion. He underwent thoracentesis for this 7/25/19. He was switched to Gemzar due to progression on Keytruda, cycle 1 7/24/19.      Repeat CT 8/12/19 with mixed response, possibly delayed immune response. He continued on Gemzar. CT 9/23/19 (after 3 cycles) with overall stable to improved disease, however did have new pericardial fluid and hydropneumothorax with necrotic left lung mass vs empyema. He met with surgery 9/24/19 and discussed left thoracotomy, decortication, possible wedge resection, possible pericardial window, possible diaphragm plication for the enlarging left lower lobe fluid collection with possible pericardial fistulization. The patient however declined surgery. CT 10/1/19 was stable to slight improvement in left lower lobe fluid collection and pericardial effusion. He continued on Gemzar.    He presented for chemo on 10/15 with recurrent SOB, hemoptysis, and LE edema. US positive for DVT and CT revealed enlarging left sided mass with invasion into pericardium with worsening pericardial effusion. He had recurrent PVCs in clinic. He was admitted. S/p IVC filter for DVT on 10/17 (no anticoagulation with hemoptysis). Patient declined surgical intervention for left lung mass/fluid collection or pericardial drainage. Echo inpatient with small effusion and no tamponade physiology, though invasion into pericardium could be causing PVCs.     He was started on Pazopanib for progressive disease. He started this 10/26/19 and clinically felt improved however imaging on 1/17/20 revealed possibly progressive disease in thorax, though stable lymph  "node and pulmonary nodules. He met with Dr. Johnson 1/21/20 who recommended we continue Pazopanib.     [facility] returns [facility] for oncology follow-up on treatment.     Interval History:  Mr. Tapia returns to clinic today unaccompanied. He is feeling really well. He continues to tolerate the Votrient well. His BP has been better on Amlodipine 10mg daily, though still in the 130-140/90 range. He continues to feel his dyspnea and chest tightness is improved. He does cough intermittent with small amounts of blood, also improved. He is active. His right leg is still slightly tight but not too painful and he is not needing any pain meds.    He denies fevers, headaches, dizziness, mouth sores, nausea, vomiting, abdominal pain, bowel or bladder concerns (has constipation at times), edema, bleeding issues, rashes, neuropathy.       Current Outpatient Medications   Medication Sig Dispense Refill     acetaminophen (TYLENOL) 500 MG tablet Take 1 tablet (500 mg) by mouth every 6 hours as needed for mild pain       amLODIPine (NORVASC) 5 MG tablet Take 1 tablet (5 mg) by mouth daily 60 tablet 3     pazopanib (VOTRIENT) 200 MG tablet Take 4 tablets (800 mg) by mouth daily Take on an empty stomach 1 hour before or 2 hours after a meal. 120 tablet 0     pazopanib (VOTRIENT) 200 MG tablet Take 4 tablets (800 mg) by mouth daily Take on an empty stomach 1 hour before or 2 hours after a meal. 120 tablet 0     Physical Examination:  BP (!) 147/83   Pulse 82   Temp 98  F (36.7  C) (Oral)   Resp 16   Ht 1.791 m (5' 10.51\")   Wt 87.9 kg (193 lb 11.2 oz)   SpO2 98%   BMI 27.39 kg/m    Wt Readings from Last 10 Encounters:   01/21/20 86.8 kg (191 lb 6.4 oz)   12/23/19 87.6 kg (193 lb 1.6 oz)   11/26/19 86.9 kg (191 lb 8 oz)   11/13/19 87.4 kg (192 lb 9.6 oz)   11/05/19 85.3 kg (188 lb)   10/29/19 86.4 kg (190 lb 6.4 oz)   10/24/19 88.5 kg (195 lb 3.2 oz)   10/17/19 84.1 kg (185 lb 6.4 oz)   10/15/19 88 kg (194 lb 1.6 oz)   10/03/19 89.4 kg " (197 lb)     Constitutional: Well-appearing male in no acute distress.  Eyes: EOMI, PERRL. No scleral icterus.  ENT: Oral mucosa is moist without lesions or thrush.   Lymphatic: Neck is supple without cervical or supraclavicular lymphadenopathy.   Cardiovascular: Regular rate and rhythm. No murmurs, gallops, or rubs. Right leg trace peripheral edema, left leg no edema edema.  Respiratory: Non-labored breathing. Equal breath sounds. No crackles or wheezing.   Gastrointestinal: Bowel sounds present. Abdomen soft, non-tender. No palpable hepatosplenomegaly or masses.   Neurologic: Cranial nerves II through XII are grossly intact.  Skin: No rashes, petechiae, or bruising noted on exposed skin.    Laboratory Data:  Results for JAYLENE CHAO (MRN [id number]) as of 2/19/2020 13:36   2/18/2020 08:17   Sodium 139   Potassium 4.3   Chloride 108   Carbon Dioxide 26   Urea Nitrogen 14   Creatinine 0.86   GFR Estimate >90   GFR Estimate If Black >90   Calcium 9.9   Anion Gap 5   Phosphorus 2.8   Albumin 3.6   Protein Total 7.4   Bilirubin Total 0.5   Alkaline Phosphatase 100   ALT 38   AST 28   T4 Free 0.74 (L)   TSH 4.59 (H)   Glucose 85   WBC 4.5   Hemoglobin 14.7   Hematocrit 45.7   Platelet Count 193   RBC Count 5.63   MCV 81   MCH 26.1 (L)   MCHC 32.2   RDW 21.3 (H)   Diff Method Automated Method   % Neutrophils 56.7   % Lymphocytes 30.8   % Monocytes 9.0   % Eosinophils 2.9   % Basophils 0.4   % Immature Granulocytes 0.2   Nucleated RBCs 0   Absolute Neutrophil 2.5   Absolute Lymphocytes 1.4   Absolute Monocytes 0.4   Absolute Eosinophils 0.1   Absolute Basophils 0.0   Abs Immature Granulocytes 0.0   Absolute Nucleated RBC 0.0       Assessment and Plan:  1. 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 Biopsy December 2018 confirmed  metastatic sarcoma. Due to unclear reasons there was a delay in starting Keytruda, finally able to receive 4/9/19 and baseline CT did confirm progressive disease. He received 5 cycles of Keytruda but had ongoing disease progression.     Switched to Gemzar 7/24/19. Completed 4 cycles however CT on 10/15 concerning for disease progression.    He is now on Pazopanib, day 1 10/26/19. He is tolerating well with hypertension, better controlled now with amlodipine, otherwise no issues. Clinically he continues to feel improved, though his CT back in January was unclear on radiographic response.    He continues to feel and look well today. Will continue with Pazopanib and he will see Dr. Johnson back in one month.     TSH is slightly high and free T4 low however both are improving and he is asymptomatic so will not start Synthroid at this time. Recheck at next visit.     2. Heme  Prior anemia resolved. CBC WNL.      3. Pulm  Recurrent right sided pneumothorax s/p 2 hospitalizations and TALC pleurodesis 4/3 and again 4/16. Underwent therapeutic thoracentesis 7/25/19 for left pleural effusion. Had a left lower lobe fluid collection with possible pericardial fistulization fall 2019, he declined surgery for this. Repeat CT imaging 10/15/19 with ongoing progression of left lung fluid collection/mass that was invading pericardium. He was admitted and declined any interventions including thoracic surgery recommendation of surgical removal.        Today his pain, cough, SOB, hemoptysis all improved. Sating well on RA. Will continue to monitor for any recurrent respiratory symptoms.     4. Cards  Large left lung mass/fluid collection does invade the pericardium and abuts left ventricular wall on 10/15 CT. He also has an associated pericardial effusion. Cardiology consulted during recent admission- echo with normal heart function, moderate effusion with no tamponade physiology. He was also having PVCs and effusion may be related to  this. He declined interventions including pericardial drainage.     Today HR better and regular, continue monitoring intermittent PVCs. Prior EKG with stable QTc. No new meds so do not need to repeat.     BP has been elevated 2/2 Pazopanib. On Amlodipine 10mg daily, BP improved though still slightly high. Asked him to continue monitor and will recheck next visit.     5. MSK  Restaging CT with lytic lesion with associated soft tissue mass arising from the posterior right femoral intertrochanteric region. He underwent right femur intramedullary pinning 4/15/19 by Dr. Betts. Does not need any pain meds.     Did have DVT on 10/15 RLE US. Anticoagulation contraindicated in setting of hemoptysis. S/p permanent IVC filter 10/17/19.    Greater than 15 min was spent with the patient with >50% of the time spent in counseling and coordinating care.     Ignacio Ramirez PA-C  [facility]  [address], [place] [address] [phone]

## 2020-02-18 NOTE — LETTER
2/18/2020      RE: Hiram Tapia  [address]  [place] [address]       Oncology/Hematology Visit Note  Feb 18, 2020    Reason for Visit: Follow up of UPS of the right thigh    History of Present Illness: Hiram Tapia is a 59 year old male with no significant PMH with UPS of the right thigh. He has a history of right leg pain with sciatica x 20 years. In October 2017 he had more pain and injured his leg on a truck which eventually led to imaging that revealed a mass. 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     He was started on Doxil/Ifos 3/23/18 and completed 4 cycles with positive response to treatment. Of not he did complete 6 months of Xarelto during this time for incidental PE. He then underwent preoperative XRT. He underwent resection 9/17/18 with <5% viable cells on path and negative margins with no LVI.    Then on surveillance imaging October 2018 he was noted to have lung nodules. He underwent biopsy December 2018 that revealed metastatic sarcoma. Dr. Johnson recommended treatment with Keytruda in February 2019 however it is unclear to me why this was never started.    He underwent restaging CT 4/2/19. This revealed large right and trace left pneumothoraces along with worsening metastatic disease with increased lung nodules, increased lymphadenopathy, and new lytic lesion with associated soft tissue mass in posterior right femoral intertrochanteric region. He was admitted through the ED. Thoracic placed a pigtail but he had re-expansion. Thoracic surgery performed talc pleurodesis 4/3/19. He was discharged home 4/5/19.    He was started on Keytruda 4/9/19. He saw ortho for worsening right thigh pain thought 2/2 metastatic lesion in right femur and they discussed nailing. During his pre-op work-up CXR revealed persistent right sided pneumothorax for which he was admitted 4/12/19. Repeat talc pleurodesis performed 4/16/19. He underwent right  intramedually pinning 4/15/19.    He has continued on Keytrua after surgery. Restaging CT 6/28/19 with mostly an increase in pulmonary nodules and a right middle lobe cavitary lesion. Overall Dr. Johnson felt it was a mixed response so plan on continuing for 2 more cycles and then repeat CT. Of note port was removed 7/17/19.    He had worsening SOB and pleuritic pain 7/24/19 and CT imaging with concerns for progressive disease in addition to large right pleural effusion. He underwent thoracentesis for this 7/25/19. He was switched to Gemzar due to progression on Keytruda, cycle 1 7/24/19.      Repeat CT 8/12/19 with mixed response, possibly delayed immune response. He continued on Gemzar. CT 9/23/19 (after 3 cycles) with overall stable to improved disease, however did have new pericardial fluid and hydropneumothorax with necrotic left lung mass vs empyema. He met with surgery 9/24/19 and discussed left thoracotomy, decortication, possible wedge resection, possible pericardial window, possible diaphragm plication for the enlarging left lower lobe fluid collection with possible pericardial fistulization. The patient however declined surgery. CT 10/1/19 was stable to slight improvement in left lower lobe fluid collection and pericardial effusion. He continued on Gemzar.    He presented for chemo on 10/15 with recurrent SOB, hemoptysis, and LE edema. US positive for DVT and CT revealed enlarging left sided mass with invasion into pericardium with worsening pericardial effusion. He had recurrent PVCs in clinic. He was admitted. S/p IVC filter for DVT on 10/17 (no anticoagulation with hemoptysis). Patient declined surgical intervention for left lung mass/fluid collection or pericardial drainage. Echo inpatient with small effusion and no tamponade physiology, though invasion into pericardium could be causing PVCs.     He was started on Pazopanib for progressive disease. He started this 10/26/19 and clinically felt improved  "however imaging on 1/17/20 revealed possibly progressive disease in thorax, though stable lymph node and pulmonary nodules. He met with Dr. Johnson 1/21/20 who recommended we continue Pazopanib.     He returns to[facility] for oncology follow-up on treatment.     Interval History:  Mr. Tapia returns to clinic today unaccompanied. He is feeling really well. He continues to tolerate the Votrient well. His BP has been better on Amlodipine 10mg daily, though still in the 130-140/90 range. He continues to feel his dyspnea and chest tightness is improved. He does cough intermittent with small amounts of blood, also improved. He is active. His right leg is still slightly tight but not too painful and he is not needing any pain meds.    He denies fevers, headaches, dizziness, mouth sores, nausea, vomiting, abdominal pain, bowel or bladder concerns (has constipation at times), edema, bleeding issues, rashes, neuropathy.       Current Outpatient Medications   Medication Sig Dispense Refill     acetaminophen (TYLENOL) 500 MG tablet Take 1 tablet (500 mg) by mouth every 6 hours as needed for mild pain       amLODIPine (NORVASC) 5 MG tablet Take 1 tablet (5 mg) by mouth daily 60 tablet 3     pazopanib (VOTRIENT) 200 MG tablet Take 4 tablets (800 mg) by mouth daily Take on an empty stomach 1 hour before or 2 hours after a meal. 120 tablet 0     pazopanib (VOTRIENT) 200 MG tablet Take 4 tablets (800 mg) by mouth daily Take on an empty stomach 1 hour before or 2 hours after a meal. 120 tablet 0     Physical Examination:  BP (!) 147/83   Pulse 82   Temp 98  F (36.7  C) (Oral)   Resp 16   Ht 1.791 m (5' 10.51\")   Wt 87.9 kg (193 lb 11.2 oz)   SpO2 98%   BMI 27.39 kg/m     Wt Readings from Last 10 Encounters:   01/21/20 86.8 kg (191 lb 6.4 oz)   12/23/19 87.6 kg (193 lb 1.6 oz)   11/26/19 86.9 kg (191 lb 8 oz)   11/13/19 87.4 kg (192 lb 9.6 oz)   11/05/19 85.3 kg (188 lb)   10/29/19 86.4 kg (190 lb 6.4 oz)   10/24/19 88.5 kg (195 lb " 3.2 oz)   10/17/19 84.1 kg (185 lb 6.4 oz)   10/15/19 88 kg (194 lb 1.6 oz)   10/03/19 89.4 kg (197 lb)     Constitutional: Well-appearing male in no acute distress.  Eyes: EOMI, PERRL. No scleral icterus.  ENT: Oral mucosa is moist without lesions or thrush.   Lymphatic: Neck is supple without cervical or supraclavicular lymphadenopathy.   Cardiovascular: Regular rate and rhythm. No murmurs, gallops, or rubs. Right leg trace peripheral edema, left leg no edema edema.  Respiratory: Non-labored breathing. Equal breath sounds. No crackles or wheezing.   Gastrointestinal: Bowel sounds present. Abdomen soft, non-tender. No palpable hepatosplenomegaly or masses.   Neurologic: Cranial nerves II through XII are grossly intact.  Skin: No rashes, petechiae, or bruising noted on exposed skin.    Laboratory Data:  Results for JAYLENE CHAO (MRN [id number]) as of 2/19/2020 13:36   2/18/2020 08:17   Sodium 139   Potassium 4.3   Chloride 108   Carbon Dioxide 26   Urea Nitrogen 14   Creatinine 0.86   GFR Estimate >90   GFR Estimate If Black >90   Calcium 9.9   Anion Gap 5   Phosphorus 2.8   Albumin 3.6   Protein Total 7.4   Bilirubin Total 0.5   Alkaline Phosphatase 100   ALT 38   AST 28   T4 Free 0.74 (L)   TSH 4.59 (H)   Glucose 85   WBC 4.5   Hemoglobin 14.7   Hematocrit 45.7   Platelet Count 193   RBC Count 5.63   MCV 81   MCH 26.1 (L)   MCHC 32.2   RDW 21.3 (H)   Diff Method Automated Method   % Neutrophils 56.7   % Lymphocytes 30.8   % Monocytes 9.0   % Eosinophils 2.9   % Basophils 0.4   % Immature Granulocytes 0.2   Nucleated RBCs 0   Absolute Neutrophil 2.5   Absolute Lymphocytes 1.4   Absolute Monocytes 0.4   Absolute Eosinophils 0.1   Absolute Basophils 0.0   Abs Immature Granulocytes 0.0   Absolute Nucleated RBC 0.0       Assessment and Plan:  1. 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 Biopsy December 2018 confirmed metastatic sarcoma. Due to unclear reasons there was a delay in starting Keytruda, finally able to receive 4/9/19 and baseline CT did confirm progressive disease. He received 5 cycles of Keytruda but had ongoing disease progression.     Switched to Gemzar 7/24/19. Completed 4 cycles however CT on 10/15 concerning for disease progression.    He is now on Pazopanib, day 1 10/26/19. He is tolerating well with hypertension, better controlled now with amlodipine, otherwise no issues. Clinically he continues to feel improved, though his CT back in January was unclear on radiographic response.    He continues to feel and look well today. Will continue with Pazopanib and he will see Dr. Johnson back in one month.     TSH is slightly high and free T4 low however both are improving and he is asymptomatic so will not start Synthroid at this time. Recheck at next visit.     2. Heme  Prior anemia resolved. CBC WNL.      3. Pulm  Recurrent right sided pneumothorax s/p 2 hospitalizations and TALC pleurodesis 4/3 and again 4/16. Underwent therapeutic thoracentesis 7/25/19 for left pleural effusion. Had a left lower lobe fluid collection with possible pericardial fistulization fall 2019, he declined surgery for this. Repeat CT imaging 10/15/19 with ongoing progression of left lung fluid collection/mass that was invading pericardium. He was admitted and declined any interventions including thoracic surgery recommendation of surgical removal.        Today his pain, cough, SOB, hemoptysis all improved. Sating well on RA. Will continue to monitor for any recurrent respiratory symptoms.     4. Cards  Large left lung mass/fluid collection does invade the pericardium and abuts left ventricular wall on 10/15 CT. He also has an associated pericardial effusion. Cardiology consulted during recent admission- echo with normal heart function, moderate  effusion with no tamponade physiology. He was also having PVCs and effusion may be related to this. He declined interventions including pericardial drainage.     Today HR better and regular, continue monitoring intermittent PVCs. Prior EKG with stable QTc. No new meds so do not need to repeat.     BP has been elevated 2/2 Pazopanib. On Amlodipine 10mg daily, BP improved though still slightly high. Asked him to continue monitor and will recheck next visit.     5. MSK  Restaging CT with lytic lesion with associated soft tissue mass arising from the posterior right femoral intertrochanteric region. He underwent right femur intramedullary pinning 4/15/19 by Dr. Betts. Does not need any pain meds.     Did have DVT on 10/15 RLE US. Anticoagulation contraindicated in setting of hemoptysis. S/p permanent IVC filter 10/17/19.    Greater than 15 min was spent with the patient with >50% of the time spent in counseling and coordinating care.     Ignacio Ramirez PA-C  [facility]  [address], [place] [id number] [phone]

## 2020-02-18 NOTE — Clinical Note
2/18/2020       RE: Hiram Tapia  [address]  [place] [address]     Dear Colleague,    Thank you for referring your patient, Hiram Tapia, to the [facility]. Please see a copy of my visit note below.    Oncology/Hematology Visit Note  Feb 18, 2020    Reason for Visit: Follow up of UPS of the right thigh    History of Present Illness: Hiram Tapia is a 59 year old male with no significant PMH with UPS of the right thigh. He has a history of right leg pain with sciatica x 20 years. In October 2017 he had more pain and injured his leg on a truck which eventually led to imaging that revealed a mass. 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     He was started on Doxil/Ifos 3/23/18 and completed 4 cycles with positive response to treatment. Of not he did complete 6 months of Xarelto during this time for incidental PE. He then underwent preoperative XRT. He underwent resection 9/17/18 with <5% viable cells on path and negative margins with no LVI.    Then on surveillance imaging October 2018 he was noted to have lung nodules. He underwent biopsy December 2018 that revealed metastatic sarcoma. Dr. Johnson recommended treatment with Keytruda in February 2019 however it is unclear to me why this was never started.    He underwent restaging CT 4/2/19. This revealed large right and trace left pneumothoraces along with worsening metastatic disease with increased lung nodules, increased lymphadenopathy, and new lytic lesion with associated soft tissue mass in posterior right femoral intertrochanteric region. He was admitted through the ED. Thoracic placed a pigtail but he had re-expansion. Thoracic surgery performed talc pleurodesis 4/3/19. He was discharged home 4/5/19.    He was started on Keytruda 4/9/19. He saw ortho for worsening right thigh pain thought 2/2 metastatic lesion in right femur and they discussed nailing. During his pre-op  work-up CXR revealed persistent right sided pneumothorax for which he was admitted 4/12/19. Repeat talc pleurodesis performed 4/16/19. He underwent right intramedually pinning 4/15/19.    He has continued on Keytrua after surgery. Restaging CT 6/28/19 with mostly an increase in pulmonary nodules and a right middle lobe cavitary lesion. Overall Dr. Johnson felt it was a mixed response so plan on continuing for 2 more cycles and then repeat CT. Of note port was removed 7/17/19.    He had worsening SOB and pleuritic pain 7/24/19 and CT imaging with concerns for progressive disease in addition to large right pleural effusion. He underwent thoracentesis for this 7/25/19. He was switched to Gemzar due to progression on Keytruda, cycle 1 7/24/19.      Repeat CT 8/12/19 with mixed response, possibly delayed immune response. He continued on Gemzar. CT 9/23/19 (after 3 cycles) with overall stable to improved disease, however did have new pericardial fluid and hydropneumothorax with necrotic left lung mass vs empyema. He met with surgery 9/24/19 and discussed left thoracotomy, decortication, possible wedge resection, possible pericardial window, possible diaphragm plication for the enlarging left lower lobe fluid collection with possible pericardial fistulization. The patient however declined surgery. CT 10/1/19 was stable to slight improvement in left lower lobe fluid collection and pericardial effusion. He continued on Gemzar.    He presented for chemo on 10/15 with recurrent SOB, hemoptysis, and LE edema. US positive for DVT and CT revealed enlarging left sided mass with invasion into pericardium with worsening pericardial effusion. He had recurrent PVCs in clinic. He was admitted. S/p IVC filter for DVT on 10/17 (no anticoagulation with hemoptysis). Patient declined surgical intervention for left lung mass/fluid collection or pericardial drainage. Echo inpatient with small effusion and no tamponade physiology, though  invasion into pericardium could be causing PVCs.     He was started on Pazopanib for progressive disease. He started this 10/26/19 and clinically felt improved however imaging on 1/17/20 revealed possibly progressive disease in thorax, though stable lymph node and pulmonary nodules. He met with Dr. Johnson 1/21/20 who recommended we continue Pazopanib.     [facility]     Interval History:      Current Outpatient Medications   Medication Sig Dispense Refill     acetaminophen (TYLENOL) 500 MG tablet Take 1 tablet (500 mg) by mouth every 6 hours as needed for mild pain       amLODIPine (NORVASC) 5 MG tablet Take 1 tablet (5 mg) by mouth daily 60 tablet 3     pazopanib (VOTRIENT) 200 MG tablet Take 4 tablets (800 mg) by mouth daily Take on an empty stomach 1 hour before or 2 hours after a meal. 120 tablet 0     pazopanib (VOTRIENT) 200 MG tablet Take 4 tablets (800 mg) by mouth daily Take on an empty stomach 1 hour before or 2 hours after a meal. 120 tablet 0     Physical Examination:  There were no vitals taken for this visit.  Wt Readings from Last 10 Encounters:   01/21/20 86.8 kg (191 lb 6.4 oz)   12/23/19 87.6 kg (193 lb 1.6 oz)   11/26/19 86.9 kg (191 lb 8 oz)   11/13/19 87.4 kg (192 lb 9.6 oz)   11/05/19 85.3 kg (188 lb)   10/29/19 86.4 kg (190 lb 6.4 oz)   10/24/19 88.5 kg (195 lb 3.2 oz)   10/17/19 84.1 kg (185 lb 6.4 oz)   10/15/19 88 kg (194 lb 1.6 oz)   10/03/19 89.4 kg (197 lb)     Constitutional: Well-appearing male in no acute distress.  Eyes: EOMI, PERRL. No scleral icterus.  ENT: Oral mucosa is moist without lesions or thrush.   Lymphatic: Neck is supple without cervical or supraclavicular lymphadenopathy.   Cardiovascular: Slightly tachycardic rate and regular regular rhythm. No murmurs, gallops, or rubs. Right leg trace peripheral edema, left leg no edema edema.  Respiratory: Non-labored breathing. Equal breath sounds. No crackles or wheezing.    Gastrointestinal: Bowel sounds present. Abdomen soft, non-tender. No palpable hepatosplenomegaly or masses.   Neurologic: Cranial nerves II through XII are grossly intact.  Skin: No rashes, petechiae, or bruising noted on exposed skin.    Laboratory Data:      Assessment and Plan:  1. 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 Biopsy December 2018 confirmed metastatic sarcoma. Due to unclear reasons there was a delay in starting Keytruda, finally able to receive 4/9/19 and baseline CT did confirm progressive disease. He received 5 cycles of Keytruda but had ongoing disease progression.     Switched to Gemzar 7/24/19. Completed 4 cycles however CT on 10/15 concerning for disease progression.    He is now on Pazopanib, day 1 10/26/19. Tolerating well and clinically has been improving. Labs are stable except very slightly low phos, will monitor. Recheck BP improved. Will continue on Pazopanib 800mg daily and has restaging with Dr. Johnson in January.     2. Heme  Progressive anemia since starting Gemzar. Did receive 2 units pRBC 10/15/19 for hgb 7.3. Hgb has continued to improve with improvement in hemoptysis, today up to 13.5. Continue CBC at each visit.       3. Pulm  Recurrent right sided pneumothorax s/p 2 hospitalizations and TALC pleurodesis 4/3 and again 4/16. Underwent therapeutic thoracentesis 7/25/19 for left pleural effusion. Most recently has been dealing with new left lower lobe fluid collection with possible pericardial fistulization, he declined surgery for this.     Repeat CT imaging 10/15/19 with ongoing progression of left lung fluid collection/mass that was invading pericardium. He was admitted and declined any interventions including thoracic surgery recommendation of surgical removal.       Today his pain,  cough, SOB, hemoptysis all improved. Sating well on RA. Did have mild URI symptoms that are now improved.     4. Cards  Large left lung mass/fluid collection does invade the pericardium and abuts left ventricular wall on 10/15 CT. He also has an associated pericardial effusion. Cardiology consulted during recent admission- echo with normal heart function, moderate effusion with no tamponade physiology. He was also having PVCs and effusion may be related to this. He declined interventions including pericardial drainage.    Today HR better and regular, continue monitoring intermittent PVCs. Prior EKG with stable QTc. No new meds so do not need to repeat.     BP is slightly elevated 2/2 Pazopanib. Better today. He will continue monitoring at home.     5. MSK  Restaging CT with lytic lesion with associated soft tissue mass arising from the posterior right femoral intertrochanteric region. He underwent right femur intramedullary pinning 4/15/19 by Dr. Betts. Does not need any pain meds.     Did have DVT on 10/15 RLE US. Anticoagulation contraindicated in setting of hemoptysis. S/p permanent IVC filter 10/17/19.    Ignacio Ramirez PA-C  [facility]  [address], [place] [id number] [phone]      Again, thank you for allowing me to participate in the care of your patient.      Sincerely,    SENAIT Carter

## 2020-02-18 NOTE — NURSING NOTE
Chief Complaint   Patient presents with     Blood Draw     Labs drawn via VPT by RN in lab.      Labs collected from venipuncture by RN.     Sulema AYON RN PHN BSN  BMT/Oncology Lab

## 2020-03-09 NOTE — PROGRESS NOTES
3-10-20     I saw Hiram Tapia for follow up of UPS of the right thigh     Background  He had no significant PMH but was found to have a UPS of the right thigh. He has a history of right leg pain with sciatica x 20 years. In October 2017 he had more pain and injured his leg on a truck which eventually led to imaging that revealed a mass. 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      He was started on Doxil/Ifos 3/23/18 and completed 4 cycles with positive response to treatment. Of not he did complete 6 months of Xarelto during this time for incidental PE. He then underwent preoperative XRT. He underwent resection 9/17/18 with <5% viable cells on path and negative margins with no LVI.     Then on surveillance imaging October 2018 he was noted to have lung nodules. He underwent biopsy December 2018 that revealed metastatic sarcoma.     Restaging on 4/2/19  revealed large right and trace left pneumothoraces along with worsening metastatic disease with increased lung nodules, increased lymphadenopathy, and new lytic lesion with associated soft tissue mass in posterior right femoral intertrochanteric region. He was admitted through the ED. Thoracic placed a pigtail but he had re-expansion. Thoracic surgery performed talc pleurodesis 4/3/19. He was discharged home 4/5/19.     He started Keytruda 4/9/19. He saw ortho for worsening right thigh pain thought 2/2 metastatic lesion in right femur and they discussed nailing. During his pre-op work-up CXR revealed persistent right sided pneumothorax for which he was admitted 4/12/19. Repeat talc pleurodesis performed 4/16/19. He underwent right intramedually pinning 4/15/19.  -  He was started Keytruda 4/9/19     He started gemzar on 7-24-19 due to PD, and had a thoracentesis 7-25.  He initially got a response after the gemzar was added.  -        Interval History:     He had an IVC filter put in.     Still working full time; put new shocks  "on the truck last weekend.  Goes up 2 floors OK.    He started pazopanib 10-26-19.     Some increase in coughing since last time but then it seemed to go back to OK.    Sleeping OK.    Mild GERD; not an issue, takes occ tums.  He times the tums around the votrient.     Seems to alternate diarrhea/constipation and uses some miralax w that about q 2 weeks.    Eating well.    We reviewed his BP log and that looked good.    He does notice AM fatigue until he gets moving.    No F, C, night sweats now.  No pain medication.   Dry skin but could be just seasonal as usual.   No obvious cold sensitivity.    He otherwise feels well and 10-point ROS negative.         On exam he appeared comfortable w normal affect  BP (!) 131/92 (BP Location: Right arm, Patient Position: Sitting, Cuff Size: Adult Regular)   Pulse 81   Temp 98.5  F (36.9  C) (Oral)   Resp 16   Ht 1.791 m (5' 10.51\")   Wt 86.6 kg (191 lb)   SpO2 98%   BMI 27.01 kg/m    Constitutional: Well-appearing male in no acute distress.  Eyes: No icterus.  ENT: Without lesions or thrush.   NECK: No thyromegaly or mass  Cardiovascular: RRR. No murmur   Lymphatic: No lymphadenopathy  Respiratory: decreased BS L base , no crackles noted   MSK: no synotvitis  EXT: no edema   Neurologic: Normal Romberg, heel / toe walking   Skin: no rash  PSYCH: Mood good.      -  CBC, LFT, GNE OK; TSH OK but we dont have a free T4.     -  Last imaging 1-17 while fairly complicated showed a response in several lesions, though possible some slight PD in a couple of others.     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 He started Keytruda 4/9/19. He started gemzar 7-24.     Recurrent right sided pneumothorax s/p 2 hospitalizations and TALC pleurodesis " 4/3 and again 4/16.       He got a good symptomatic response to the pazopanib and that's confirmed by imaging though there was a suggestion of some PD in a couple of spots.     He seems better and his Hb has come up dramatically to the normal range.    We discussed the situation.   Pazopanib will continue.    We will restage 4-10 and get labs that day w free T4.  TFTs will be followed     He will limit alcohol and tylenol.   We will check cbc, cmp monthly     We discussed codes status in the past but he was not yet ready to be DNR.  He will call if other questions arise.     Gaurang Johnson M.D.  Professor  Hematology, Oncology and Transplantation

## 2020-03-10 NOTE — NURSING NOTE
Chief Complaint   Patient presents with     Blood Draw     Labs drawn via  by RN in lab. VS taken. Patient checked in for next appt.     Labs collected from venipuncture by RN. Vitals taken. Checked in for appointment.    Jaclyn Alexander RN

## 2020-03-10 NOTE — LETTER
RE: Hiram Tapia  [address]  [place] [address]     Dear Colleague,    Thank you for referring your patient, Hiram Tapia, to the [facility]. Please see a copy of my visit note below.    3-10-20     I saw Hiram Tapia for follow up of UPS of the right thigh     Background  He had no significant PMH but was found to have a UPS of the right thigh. He has a history of right leg pain with sciatica x 20 years. In October 2017 he had more pain and injured his leg on a truck which eventually led to imaging that revealed a mass. 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      He was started on Doxil/Ifos 3/23/18 and completed 4 cycles with positive response to treatment. Of not he did complete 6 months of Xarelto during this time for incidental PE. He then underwent preoperative XRT. He underwent resection 9/17/18 with <5% viable cells on path and negative margins with no LVI.     Then on surveillance imaging October 2018 he was noted to have lung nodules. He underwent biopsy December 2018 that revealed metastatic sarcoma.     Restaging on 4/2/19  revealed large right and trace left pneumothoraces along with worsening metastatic disease with increased lung nodules, increased lymphadenopathy, and new lytic lesion with associated soft tissue mass in posterior right femoral intertrochanteric region. He was admitted through the ED. Thoracic placed a pigtail but he had re-expansion. Thoracic surgery performed talc pleurodesis 4/3/19. He was discharged home 4/5/19.     He started Keytruda 4/9/19. He saw ortho for worsening right thigh pain thought 2/2 metastatic lesion in right femur and they discussed nailing. During his pre-op work-up CXR revealed persistent right sided pneumothorax for which he was admitted 4/12/19. Repeat talc pleurodesis performed 4/16/19. He underwent right intramedually pinning 4/15/19.  -  He was started Keytruda  "4/9/19     He started gemzar on 7-24-19 due to PD, and had a thoracentesis 7-25.  He initially got a response after the gemzar was added.  -        Interval History:     He had an IVC filter put in.     Still working full time; put new shocks on the truck last weekend.  Goes up 2 floors OK.    He started pazopanib 10-26-19.     Some increase in coughing since last time but then it seemed to go back to OK.    Sleeping OK.    Mild GERD; not an issue, takes occ tums.  He times the tums around the votrient.     Seems to alternate diarrhea/constipation and uses some miralax w that about q 2 weeks.    Eating well.    We reviewed his BP log and that looked good.    He does notice AM fatigue until he gets moving.    No F, C, night sweats now.  No pain medication.   Dry skin but could be just seasonal as usual.   No obvious cold sensitivity.    He otherwise feels well and 10-point ROS negative.         On exam he appeared comfortable w normal affect  BP (!) 131/92 (BP Location: Right arm, Patient Position: Sitting, Cuff Size: Adult Regular)   Pulse 81   Temp 98.5  F (36.9  C) (Oral)   Resp 16   Ht 1.791 m (5' 10.51\")   Wt 86.6 kg (191 lb)   SpO2 98%   BMI 27.01 kg/m    Constitutional: Well-appearing male in no acute distress.  Eyes: No icterus.  ENT: Without lesions or thrush.   NECK: No thyromegaly or mass  Cardiovascular: RRR. No murmur   Lymphatic: No lymphadenopathy  Respiratory: decreased BS L base , no crackles noted   MSK: no synotvitis  EXT: no edema   Neurologic: Normal Romberg, heel / toe walking   Skin: no rash  PSYCH: Mood good.      -  CBC, LFT, GNE OK; TSH OK but we dont have a free T4.     -  Last imaging 1-17 while fairly complicated showed a response in several lesions, though possible some slight PD in a couple of others.     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 He started Keytruda 4/9/19. He started gemzar 7-24.     Recurrent right sided pneumothorax s/p 2 hospitalizations and TALC pleurodesis 4/3 and again 4/16.       He got a good symptomatic response to the pazopanib and that's confirmed by imaging though there was a suggestion of some PD in a couple of spots.     He seems better and his Hb has come up dramatically to the normal range.    We discussed the situation.   Pazopanib will continue.    We will restage 4-10 and get labs that day w free T4.  TFTs will be followed     He will limit alcohol and tylenol.   We will check cbc, cmp monthly     We discussed codes status in the past but he was not yet ready to be DNR.  He will call if other questions arise.     Gaurang Johnson M.D.  Professor  Hematology, Oncology and Transplantation

## 2020-03-10 NOTE — NURSING NOTE
"Oncology Rooming Note    March 10, 2020 7:43 AM   Hiram Tapia is a 59 year old male who presents for:    Chief Complaint   Patient presents with     Blood Draw     Labs drawn via  by RN in lab. VS taken. Patient checked in for next appt.     Oncology Clinic Visit     RETURN VISIT; HIGH GRADE SARCOMA FOLLOW UP      Initial Vitals: BP (!) 131/92 (BP Location: Right arm, Patient Position: Sitting, Cuff Size: Adult Regular)   Pulse 81   Temp 98.5  F (36.9  C) (Oral)   Resp 16   Ht 1.791 m (5' 10.51\")   Wt 86.6 kg (191 lb)   SpO2 98%   BMI 27.01 kg/m   Estimated body mass index is 27.01 kg/m  as calculated from the following:    Height as of this encounter: 1.791 m (5' 10.51\").    Weight as of this encounter: 86.6 kg (191 lb). Body surface area is 2.08 meters squared.  No Pain (0) Comment: Data Unavailable   No LMP for male patient.  Allergies reviewed: Yes  Medications reviewed: Yes    Medications: Medication refills not needed today.  Pharmacy name entered into EPIC:    [facility] [place], [place] - [phone]  [id number] - [place], [place] - [address] AT [facility] [address]  [place], [place] - [address] [place] [address]    Clinical concerns: No new concerns today  Dr Johnson was notified.      Maryse Victoria              "

## 2020-03-27 NOTE — ORAL ONC MGMT
"Oral Chemotherapy Monitoring Program     Primary Oncologist: Dr. Johnson  Primary Oncology Clinic: [facility]  Cancer Diagnosis: Sarcoma    Therapy: Votrient (pazopanib) 800mg ([id number]) daily, continuously  C1D1: 10/26/19     Drug Interaction Assessment: No drug interactions identified.      Lab Monitoring Plan  As directed by care team    Subjective/Objective:  Hiram Tapia is a 59 year old male contacted by phone for a follow-up visit for oral chemotherapy. David feels that he is doing alright on the Votrient but is clearly struggling with gastrointestinal side effects. He endorsed nausea, diarrhea, and constipation that is not severe but bothers him pretty much daily. We reviewed that there are prescription medication options for nausea, but he was not interested in pursuing any at this time. He indicated that he \"swings back and forth\" between diarrhea and constipation throughout the week, and characterized it as \"unpredictable.\" Nevertheless, he feels fine with this and was not interested in pursuing additional management. He endorsed some shortness of breath that \"comes and goes,\" especially with activity. On a positive note, his blood pressures have been \"around 130/80\" most days and he was happy about that. He received a refill of Votrient last Friday (3/20).     ORAL CHEMOTHERAPY 10/24/2019 11/1/2019 1/6/2020 3/27/2020   Drug Name Votrient (Pazopanib) Votrient (Pazopanib) Votrient (Pazopanib) Votrient (Pazopanib)   Current Dosage 800mg 800mg 800mg 800mg   Current Schedule Daily Daily Daily Daily   Cycle Details Continuous Continuous Continuous Continuous   Start Date of Last Cycle - 10/26/2019 - -   Doses missed in last 2 weeks - 0 1 0   Adherence Assessment - Adherent Adherent Adherent   Adverse Effects - Fatigue;Nausea Nausea Nausea;Constipation;Diarrhea   Nausea - Grade 1 Grade 1 Grade 1   Pharmacist Intervention(nausea) - Yes Yes Yes   Intervention(s) - Patient education Patient education " Patient education   Constipation - - - Grade 1   Pharmacist Intervention(constipation) - - - Yes   Intervention(s) - - - Patient education   Diarrhea - - - Grade 1   Pharmacist Intervention(diarrhea) - - - Yes   Intervention(s) - - - Patient education   Fatigue - Grade 1 - -   Pharmacist Intervention(fatigue) - Yes - -   Intervention(s) - Patient education - -   Home BPs - not done all BPs<140/90 all BPs<140/90   Any new drug interactions? No No No No   Is the dose as ordered appropriate for the patient? Yes Yes Yes -        Assessment/Plan:  David is having some difficulty tolerating Votrient. However, he is not interested in more intensive management of his side effects at this time. He prefers to limit his medications. He feels that he is managing alright for now, but I did encourage him to consider addressing these side effects at his next clinic visit. In the meantime, he'll continue at the current dose and schedule of Votrient.      Follow-Up:  Labs 4/10, Elizabeth visit 4/14     Refill Due:  4/3 for 4/10, Jessica Ureña  Oncology Pharmacy Intern   [facility]   [phone]

## 2020-04-14 NOTE — PROGRESS NOTES
"Hiram Tapia is a 59 year old male who is being evaluated via a billable telephone visit.      Please call patient on Mobile phone.     The patient has been notified of following:     \"This telephone visit will be conducted via a call between you and your physician/provider. We have found that certain health care needs can be provided without the need for a physical exam.  This service lets us provide the care you need with a short phone conversation.  If a prescription is necessary we can send it directly to your pharmacy.  If lab work is needed we can place an order for that and you can then stop by our lab to have the test done at a later time.    Telephone visits are billed at different rates depending on your insurance coverage. During this emergency period, for some insurers they may be billed the same as an in-person visit.  Please reach out to your insurance provider with any questions.    If during the course of the call the physician/provider feels a telephone visit is not appropriate, you will not be charged for this service.\"     Physician has received verbal consent for a Telephone Visit from the patient? Yes    How would you like to obtain your AVS? Mail a copy    Hiram Tapia complains of    Chief Complaint   Patient presents with     Telephone     Telephone Visit; High Grade Sarcoma        Allergies reviewed: Yes  Medications reviewed: Yes    Medications: Medication refills not needed today.  Pharmacy name entered into Promosome:    [facility] [place], [place] - [address] [place] [address] [phone]  [id number] [place] [address] [place] [facility] [place]  [address]    Patient complains of more pain and coughing up blood.     Maryse Victoria, SHREYA April 14, 2020  7:51 AM     ALLERGIES  Hydrofera blue 4\"x4\" [wound dressings] and Tegaderm ag mesh [silver]        Phone call duration: >21 minutes with and additional " >10 min for additional scan review and C&C      4-14-20     I saw Hiram Tapia for follow up of UPS of the right thigh     Background  He had no significant PMH but was found to have a UPS of the right thigh. He has a history of right leg pain with sciatica x 20 years. In October 2017 he had more pain and injured his leg on a truck which eventually led to imaging that revealed a mass. 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      He was started on Doxil/Ifos 3/23/18 and completed 4 cycles with positive response to treatment. Of not he did complete 6 months of Xarelto during this time for incidental PE. He then underwent preoperative XRT. He underwent resection 9/17/18 with <5% viable cells on path and negative margins with no LVI.     Then on surveillance imaging October 2018 he was noted to have lung nodules. He underwent biopsy December 2018 that revealed metastatic sarcoma.     Restaging on 4/2/19  revealed large right and trace left pneumothoraces along with worsening metastatic disease with increased lung nodules, increased lymphadenopathy, and new lytic lesion with associated soft tissue mass in posterior right femoral intertrochanteric region. He was admitted through the ED. Thoracic placed a pigtail but he had re-expansion. Thoracic surgery performed talc pleurodesis 4/3/19. He was discharged home 4/5/19.     He started Keytruda 4/9/19. He saw ortho for worsening right thigh pain thought 2/2 metastatic lesion in right femur and they discussed nailing. During his pre-op work-up CXR revealed persistent right sided pneumothorax for which he was admitted 4/12/19. Repeat talc pleurodesis performed 4/16/19. He underwent right intramedually pinning 4/15/19.  -  He was started Keytruda 4/9/19     He started gemzar on 7-24-19 due to PD, and had a thoracentesis 7-25.  He initially got a response after the gemzar was added.  -         Interval History:     He had an IVC filter put  in Fall 2019.    He started pazopanib 10-26-19.    Since his last visit he has gotten a bit worse re sx.  He got significantly worse w vomiting, diarrhea, and malaise.  He changed to taking the pazopanib to taking it 1-2 hours before eating and the N&V seemed improved    He feels better than 2 weeks ago but not as good as last month.  He remains active but tires easily.  Still working full time though now 4 days a week.  He can go up 1 floor OK but gets some KONG if he does 2 floors.    Some increase in coughing since last time w occ blood.     Sleeping OK now but was bad 2 weeks ago.     Mild GERD; not an issue, takes occ tums.  He times the tums around the votrient.    Eating well. Taste is off.     We reviewed his BP log and its generally about 115/80 in AM, that looked good.    No F, C, night sweats now.  No pain medication.   No obvious cold sensitivity.  Mood good.     He otherwise feels well and 10-point ROS negative.         On exam he sounded comfortable w normal affect      -  CBC, LFT, GNE OK; TSH OK including free T4.     -  Last imaging 1-17 while fairly complicated showed a response in several lesions, though possible some slight PD in a couple of others.    I reviewed the new images and there is clear PD.     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 He started Keytruda 4/9/19. He started gemzar 7-24.     Recurrent right sided pneumothorax s/p 2 hospitalizations and TALC pleurodesis 4/3 and again 4/16.       He got a good symptomatic response to the pazopanib but now had PD.     His Hb has come up dramatically to the normal range.     We discussed the situation. I suggested trabectedin and went over the typical toxicites of that.  We will need to put in a  pic.     TFTs will be followed    We discussed codes status in the past but he was not yet ready to be DNR.  He will call if other questions arise.     Gaurang Johnson M.D.  Professor  Hematology, Oncology and Transplantation

## 2020-04-15 NOTE — PROGRESS NOTES
Spoke with David to let him know that he will need a picc placement prior to starting trabectedin treatment.  Let him know that this will be a 24 hour pump, and home infusion will be disconnecting the pump at home.  He stated that he would prefer to have the picc placed on the same day and then get treatment started.  He would like Friday as he is still working and doesn't want to take 2 days off of work, but is willing to start any other day this week just to get going.  A request has been sent to scheduling.    spoke with David to discuss chemotherapy and resources available at the [facility].  Talked about trabectedin from [facility] oncology handouts.  Reviewed administration, side effects (including myelosuppression, nausea/vomiting, diarrhea/constipation, hair loss, mouth sores) and ongoing symptom management by KIKE's in clinic.  Discussed that chemo treatment may be delayed due to blood counts, infusion schedule, or patient's need to modify.  Discussed port options-he had his port removed due to multiple issues.  Would prefer to not get another one if not needed.  Will entertain the idea if the picc doesn't seem to work.  Discussed appropriate use of MyChart, specifically not sending symptom messages or urgent concerns.

## 2020-04-16 NOTE — TELEPHONE ENCOUNTER
Prior Authorization Approval    Authorization Effective Date: 3/17/2020  Authorization Expiration Date: 10/13/2020  Medication: Ondansetron PA Approved  Approved Dose/Quantity: 8mg 10/4  Reference #:     Insurance Company: [employer] - Phone [phone] Fax [phone]  Expected CoPay:       CoPay Card Available:      Foundation Assistance Needed:    Which Pharmacy is filling the prescription (Not needed for infusion/clinic administered): [facility] [address] [phone]  Pharmacy Notified: Yes  Patient Notified:      Mike Orellana  [facility]  Oncology Pharmacy Liaison   [email or web address]  [phone] (phone  [phone] (fax

## 2020-04-16 NOTE — PROCEDURES
[facility], [place]    Single Lumen PICC Placement    Date/Time: 4/16/2020 10:07 AM  Performed by: Darion Miranda RN  Authorized by: Gaurang Johnson MD   Indications: Chemotherapy.    UNIVERSAL PROTOCOL   Site Marked: Yes  Prior Images Obtained and Reviewed:  Yes  Required items: Required blood products, implants, devices and special equipment available    Patient identity confirmed:  Verbally with patient, arm band, hospital-assigned identification number and provided demographic data  NA - No sedation, light sedation, or local anesthesia  Confirmation Checklist:  Patient's identity using two indicators, relevant allergies, procedure was appropriate and matched the consent or emergent situation and correct equipment/implants were available  Time out: Immediately prior to the procedure a time out was called    Universal Protocol: the Joint Commission Universal Protocol was followed    Preparation: Patient was prepped and draped in usual sterile fashion           ANESTHESIA    Anesthesia: Local infiltration  Local Anesthetic:  Lidocaine 1% without epinephrine      SEDATION    Patient Sedated: No        Preparation: skin prepped with ChloraPrep  Skin prep agent: skin prep agent completely dried prior to procedure  Sterile barriers: maximum sterile barriers were used: cap, mask, sterile gown, sterile gloves, and large sterile sheet  Hand hygiene: hand hygiene performed prior to central venous catheter insertion  Type of line used: Power PICC  Catheter type: single lumen  Lumen type: valved  Catheter size: 4 Fr  Brand: other (see comment) (MyTinks)  Lot number: [id number]  Placement method: venipuncture, MST, ultrasound and tip confirmation system  Number of attempts: 1  Successful placement: yes  Orientation: right  Location: basilic vein (vein diameter - 0.36 cm)  Arm circumference: adults 10 cm  Extremity circumference: 27  Visible catheter length: 2  Total catheter length:  45  Dressing and securement: chlorhexidine disc applied, securement device, site cleaned, statlock and sterile dressing applied  Post procedure assessment: blood return through all ports, free fluid flow and placement verified by x-ray  PROCEDURE   Patient Tolerance:  Patient tolerated the procedure well with no immediate complications  Describe Procedure: PICC is OK to use.

## 2020-04-16 NOTE — NURSING NOTE
Chief Complaint   Patient presents with     Blood Draw     Labs drawn via PICC by RN in lab. VS taken.      Sonia Payne RN     PHYSICAL THERAPY NOTE          Patient Name: Mortimer Sheer  UVYUW'R Date: 6/13/2018  Time in: 1035  Time out: 1115  Total Time: 40 minutes     06/13/18 1115   Pain Assessment   Pain Assessment 0-10   Pain Score 7  (7 5)   Pain Type Acute pain   Pain Location Scrotum  (testicles)   Pain Orientation Bilateral   Pain Descriptors Aching   Pain Frequency Constant/continuous   Pain Onset Ongoing   Clinical Progression Gradually improving   Effect of Pain on Daily Activities increases w/ activity   Patient's Stated Pain Goal No pain   Hospital Pain Intervention(s) Repositioned; Ambulation/increased activity; Rest   Response to Interventions tolerated   Restrictions/Precautions   Weight Bearing Precautions Per Order No   Other Precautions Fall Risk;Pain;Bed Alarm; Chair Alarm; Impulsive   General   Chart Reviewed Yes   Additional Pertinent History Pt is high fall risk  Pt is impulsive and decreased safety awareness  Response to Previous Treatment Patient reporting fatigue but able to participate  Family/Caregiver Present No   Cognition   Overall Cognitive Status Impaired   Arousal/Participation Alert   Attention Attends with cues to redirect   Orientation Level Oriented X4   Memory Decreased recall of precautions   Following Commands Follows one step commands with increased time or repetition   Subjective   Subjective Pt agreeable to participate  "I just can't move"   Bed Mobility   Supine to Sit 5  Supervision   Additional items Assist x 1;Bedrails; Increased time required;Verbal cues   Sit to Supine Unable to assess   Additional Comments Pt received supine in bed  Transfers   Sit to Stand 4  Minimal assistance   Additional items Assist x 1;Bedrails;Armrests; Increased time required;Verbal cues   Stand to Sit 4  Minimal assistance   Additional items Assist x 1;Bedrails; Increased time required;Verbal cues   Additional Comments See Assessment below for full details  After fall patient needing minAx2 for transfers for safety  Pt is impulsive w/ all transfers and needing increased cueing  Ambulation/Elevation   Gait pattern Ataxia; Decreased foot clearance; Improper Weight shift; Wide MARY; Short stride;L Knee Marky;R Knee Marky   Gait Assistance 4  Minimal assist   Additional items Assist x 1;Verbal cues; Tactile cues   Assistive Device Rolling walker   Distance 48 ftx1   Balance   Static Sitting Fair   Dynamic Sitting Fair -   Static Standing Poor +   Dynamic Standing Poor   Ambulatory Poor   Endurance Deficit   Endurance Deficit Yes   Endurance Deficit Description pain, fatigue, weakness, impulsve/decreased safety   Activity Tolerance   Activity Tolerance Patient limited by fatigue;Patient limited by pain   Nurse [address], RN   Exercises   Knee AROM Long Arc Quad Sitting;10 reps;AROM; Bilateral  (needing increased cueing and encouragement)   Ankle Pumps Sitting;10 reps;AROM; Bilateral  (needing increased cueing and encouragement)   Marching Sitting;10 reps;AROM; Bilateral  (needing increased cueing and encouragement)   Assessment   Prognosis Fair   Problem List Decreased strength;Decreased endurance; Impaired balance;Decreased mobility; Decreased coordination;Decreased cognition;Decreased safety awareness; Obesity; Decreased skin integrity;Pain   Assessment Pt demonstrated improvements in gait, strength, and functional mobility  Pt agreeable to participate and per nursing has been an assistx1 OOB and to the bathroom  Pt has had multiple recent falls and can be impulsive  Pt demonstrated sit<>stand from bedside chair w/ minAx1 w/ RW and gait training 48 ft minAx1 w/ RW  Pt needing increased VCs for safety d/t impulsitivity  Pt provided with 5 minute seated rest break post gait  Pt performed seated therex x10 reps BLE (long arc quads, ankle pumps, and seated marching  Once therex completed  OT arrived for evaluation   Pt performed additional sit>stand minAx1 w/ RW from chair  Pt needing increased cueing d/t impulsitivity  OT instructed patient to take 2 steps forward  PT stayed as aide just to ensure safety  Pt impulsively took 4 quick steps forward  On 4th step pt lost balance to the R  PT and OT braced towards bed surface located on the R and instructed pt to push using BUE and BLE towards bed  Pt impulsively let go of walker and bottom slid of EOB  PT and OT slowly lowered patient to floor to prevent injury to patient and themselves  Pt denied any new onset of pain just continuing to report pain in scrotum/testicles  Juan Antonio Gooden RN and Dustin Jaquez Patient Care Manager E2 notified  Attempted to find hover janie and hover mat  Matt Jeannine and Mimi Warner having difficulty finding appropriate hover hack d/t only one working set up in hospital  Pt able to be assist to his knees and pull himself up by pulling on bed railings and sit his bottom in chair  PT (Raphael Vergara), OT (Justo Kemp), Dr Marco Quiroz CNA, and nursing student all present in room to assist patient back into chair  Pt needing step by step sequencing and encouragement from PT/OT  Once seated in chair pt expressing desired to remain seated in chair  Pt left in bedside chair with all needs in reach, call bell and phone in hand, RAMIREZ Gooden aware of patient status, and chair alarm armed  Barriers to Discharge Inaccessible home environment   Goals   Patient Goals "to sit in chair"   [facility] Expiration Date 06/20/18   Short Term Goal #1 Goals remain applicable   bed mobility S for OOB mobility, sit<>stand and functional transfers minAx1 for OOB mobility, gait training [id number] minAx1 w/ RW for negotiation around room, improve BLE by 1/2 grade to improve strength to optimize functional mobility, improve balance by 1 grade to decrease fall risk, improve activity tolerance to >45 minutes activity to improve functional endurance   Treatment Day 1   Plan   Treatment/Interventions Functional transfer training;LE strengthening/ROM; Therapeutic exercise; Endurance training;Patient/family training;Equipment eval/education; Bed mobility;Gait training;Spoke to nursing;OT   Progress Progressing toward goals   PT Frequency (4-5x/wk)   Recommendation   Recommendation Short-term skilled PT   Equipment Recommended Walker  (chair follow, chair alarm, bed alarm, 2 person assist)   PT - OK to Discharge Yes  (to rehab once medically stable)   PT completed patient fall report    Katie Posada, PT,DPT

## 2020-04-16 NOTE — PROGRESS NOTES
Infusion Nursing Note:  Hiram Tapia presents today for Cycle 1 Day 1 Yondelis.    Patient had a virtual visit with Dr Johnson on 4/14/2020.      Note: Yondelis continuous infusion pump connected at 1315.  Positive blood return from PICC.  Yondelis to infuse over 24 hours at 20.8 cc/hour.  Patient will be disconnected on 4/17/2020 at 1315 by [facility].  Thao at [facility] aware of date and time of disconnect.  [facility] is aware that the PICC line can be removed after infusion.  Rubens verified that she will fax an order to [facility] to remove PICC line.  Prior to discharge. Cleopatra Harp verified that connections were secured with tape, clamps were taped open, and pump display indicated RUNNING.      Intravenous Access:  PICC.    Treatment Conditions:  Lab Results   Component Value Date    HGB 12.9 04/10/2020     Lab Results   Component Value Date    WBC 4.5 04/10/2020      Lab Results   Component Value Date    ANEU 2.3 04/10/2020     Lab Results   Component Value Date     04/10/2020      Lab Results   Component Value Date     04/10/2020                   Lab Results   Component Value Date    POTASSIUM 4.3 04/10/2020           Lab Results   Component Value Date    MAG 1.9 01/17/2020            Lab Results   Component Value Date    CR 0.87 04/10/2020                   Lab Results   Component Value Date    JAYESH 9.2 04/10/2020                Lab Results   Component Value Date    BILITOTAL 0.5 04/10/2020           Lab Results   Component Value Date    ALBUMIN 3.2 04/10/2020                    Lab Results   Component Value Date    ALT 22 04/10/2020           Lab Results   Component Value Date    AST 16 04/10/2020   Dr Johnson verified that it was ok to use labs from 4/10/2020 to treat pt today.      Results reviewed, labs MET treatment parameters, ok to proceed with treatment.      Post Infusion Assessment:  Patient tolerated infusion without incident.       Discharge Plan:   Patient declined prescription  refills  Pt stated he has compazine and zofran at home.  AVS to patient via [employer].  Patient will return for cycle 2 in three weeks.  Loracandace Linda notified that this appointment is not scheduled.  She will have this scheduled and contact pt with date and time.Face to Face time: 0.    Tana Sun RN

## 2020-04-16 NOTE — PROVIDER NOTIFICATION
04/16/20 1007   PICC Single Lumen 04/16/20 Right Basilic   Placement Date/Time: 04/16/20 (c) 1007   Catheter Brand: (c) other (see comment)  Size (Fr): 4 Fr  Lot #: [id number]  Full barrier precautions done: Yes, hand hygiene, sterile gown, sterile gloves, mask, cap, full body drape, chlorhexidine scrub  Consent...   Site Assessment WDL   Line Status Blood return noted;Saline locked   External Cath Length (cm) 2 cm   Extremity Circumference (cm) 27 cm   Extravasation? No   Dressing Intervention Chlorhexidine patch;Transparent;Securing device;New dressing   Dressing Change Due 04/23/20   Lumen A - Cap Change Due 04/20/20   PICC Comment PICC inserted   Line Necessity Yes, meets criteria

## 2020-04-17 NOTE — PROGRESS NOTES
This is a recent snapshot of the patient's [facility] medical record.  For current drug dose and complete information and questions, call [phone] or In B[facility] pool, fv home infusion ([id number])  CSN Number:  [id number]

## 2020-04-21 NOTE — PROGRESS NOTES
This is a recent snapshot of the patient's [facility] medical record.  For current drug dose and complete information and questions, call [phone] or In [facility] pool, fv home infusion ([id number])  CSN Number:  [id number]

## 2020-05-06 NOTE — PROGRESS NOTES
"Hiram Tapia is a 59 year old male who is being evaluated via a billable telephone visit.      The patient has been notified of following:     \"This telephone visit will be conducted via a call between you and your physician/provider. We have found that certain health care needs can be provided without the need for a physical exam.  This service lets us provide the care you need with a short phone conversation.  If a prescription is necessary we can send it directly to your pharmacy.  If lab work is needed we can place an order for that and you can then stop by our lab to have the test done at a later time.    Telephone visits are billed at different rates depending on your insurance coverage. During this emergency period, for some insurers they may be billed the same as an in-person visit.  Please reach out to your insurance provider with any questions.    If during the course of the call the physician/provider feels a telephone visit is not appropriate, you will not be charged for this service.\"    Patient has given verbal consent for Telephone visit?  Yes    What phone number would you like to be contacted at? [phone]    How would you like to obtain your AVS? Inocencio    I have reviewed and updated the patient's allergies and medication list.     Concerns: lots of pain and lots of fluid,         Lynda Bradford LPN        Phone call duration: >25 minutes    "

## 2020-05-06 NOTE — PROCEDURES
[facility], [place]    Single Lumen PICC Placement    Date/Time: 5/6/2020 11:22 AM  Performed by: Brooke Price RN  Authorized by: Gaurang Johnson MD   Indications: chemotherapy.    UNIVERSAL PROTOCOL   Site Marked: Yes  Prior Images Obtained and Reviewed:  Yes  Required items: Required blood products, implants, devices and special equipment available    Patient identity confirmed:  Verbally with patient, arm band and hospital-assigned identification number  NA - No sedation, light sedation, or local anesthesia  Confirmation Checklist:  Patient's identity using two indicators, relevant allergies, procedure was appropriate and matched the consent or emergent situation and correct equipment/implants were available  Time out: Immediately prior to the procedure a time out was called    Universal Protocol: the Joint Commission Universal Protocol was followed    Preparation: Patient was prepped and draped in usual sterile fashion           ANESTHESIA    Anesthesia: Local infiltration  Local Anesthetic:  Lidocaine 1% without epinephrine  Anesthetic Total (mL):  3      SEDATION    Patient Sedated: No        Preparation: skin prepped with ChloraPrep  Skin prep agent: skin prep agent completely dried prior to procedure  Sterile barriers: maximum sterile barriers were used: cap, mask, sterile gown, sterile gloves, and large sterile sheet  Hand hygiene: hand hygiene performed prior to central venous catheter insertion  Type of line used: Power PICC  Catheter type: single lumen  Lumen type: valved  Catheter size: 4 Fr  : Bio[employer].  Lot number: [id number]  Placement method: venipuncture, MST, ultrasound and tip confirmation system  Number of attempts: 1  Successful placement: yes  Orientation: right  Location: basilic vein (vein diameter- 0.66cm)  Arm circumference: adults 10 cm  Extremity circumference: 27  Visible catheter length: 1  Total catheter length: 42  Dressing and securement: blood  cleaned with CHG, chlorhexidine disc applied, site cleaned, statlock and sterile dressing applied  Post procedure assessment: blood return through all ports and placement verified by x-ray  PROCEDURE   Patient Tolerance:  Patient tolerated the procedure well with no immediate complications

## 2020-05-06 NOTE — PROGRESS NOTES
[facility] Telephone Visit  5/6/20    Phone visit due to covide concerns  t>25 min phone time    We spoke with Hiram Tapia for follow up of UPS of the right thigh     Background  He had no significant PMH but was found to have a UPS of the right thigh. He has a history of right leg pain with sciatica x 20 years. In October 2017 he had more pain and injured his leg on a truck which eventually led to imaging that revealed a mass. 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      He was started on Doxil/Ifos 3/23/18 and completed 4 cycles with positive response to treatment. Of not he did complete 6 months of Xarelto during this time for incidental PE. He then underwent preoperative XRT. He underwent resection 9/17/18 with <5% viable cells on path and negative margins with no LVI.     Then on surveillance imaging October 2018 he was noted to have lung nodules. He underwent biopsy December 2018 that revealed metastatic sarcoma.     Restaging on 4/2/19  revealed large right and trace left pneumothoraces along with worsening metastatic disease with increased lung nodules, increased lymphadenopathy, and new lytic lesion with associated soft tissue mass in posterior right femoral intertrochanteric region. He was admitted through the ED. Thoracic placed a pigtail but he had re-expansion. Thoracic surgery performed talc pleurodesis 4/3/19. He was discharged home 4/5/19.     Started Keytruda 4/9/19. He saw ortho for worsening right thigh pain thought 2/2 metastatic lesion in right femur and they discussed nailing. During his pre-op work-up CXR revealed persistent right sided pneumothorax for which he was admitted 4/12/19. Repeat talc pleurodesis performed 4/16/19. He underwent right intramedually pinning 4/15/19.    Switched to gemzar on 7/24/19 due to PD, and had a thoracentesis 7-25.  He initially got a response after the gemzar was added.    Switched to pazopanib  10/26/19 due to PD    Switched to trabectedin 4/14/20 due to PD        Interval History:     David has had clear and significant deterioration in his clinical status. He is now in constant pain despite 10 mg oxycodone 6x/day and is having difficulty with ADLs. He has choking and feeling of suffocation when lying flat suggesting worsening of his pleural effusions. He is having regular hemoptysis with large clots of blood. He feels like the pain is migratory, in his lungs and in his bones.  He has also been experiencing constipation from appropriate opiate use. He is scheduled to get his next dose of trabectedin today and he had the PICC placed, but based on these progressive symptoms we will hold this in favor of additional diagnostic imaging (CT CAP).        Physical Exam  Mood and affect appropriate, some distress due to pain and overall situation  Appropriate volume and articulation    Imaging  Repeat imaging pending.        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 He has a recurrent right sided pneumothorax s/p 2 hospitalizations and TALC pleurodesis 4/3 and again 4/16.  He has unfortunately failed doxil/ifosfamide, keytruda, gemzar, pazopanib all due to progression of disease, and based on dramatic worsening of symptoms may have also had progression on trabectedin.   -Hold next dose of trabectedin   -Repeat CT C/A/P today  -Will make decisions on further treatment based on that scan  -Oxycontin 20 mg BID scheduled, oxycodone 10 mg Q4H PRN  -Miralax BID and senna/docusate BID for opiate-related constipation  -Discussed palliative care, patient declined for now       We discussed codes status in the past but he was not yet ready to be DNR.  He will call if other questions arise. We  will be in touch with him about the results of his study.    Staffed with Dr. Johnson.    Celine Pitts MD  Medicine/Dermatology PGY-5  p [phone]      I independently examined this patient and my assessment is in agreement with the above note.  I reviewed all tests and past medical history and my evaluation agrees with the findings in the note.   I am concerned about progression so we will gea a CT later today and see him tomorrow.  eribulin would be possible. He does not want palliative f/u yet.      Gaurang Johnson M.D.  Professor  Hematology, Oncology and Transplantation

## 2020-05-07 NOTE — PROGRESS NOTES
"Hiram Tapia is a 59 year old male who is being evaluated via a billable telephone visit.      The patient has been notified of following:     \"This telephone visit will be conducted via a call between you and your physician/provider. We have found that certain health care needs can be provided without the need for a physical exam.  This service lets us provide the care you need with a short phone conversation.  If a prescription is necessary we can send it directly to your pharmacy.  If lab work is needed we can place an order for that and you can then stop by our lab to have the test done at a later time.    Telephone visits are billed at different rates depending on your insurance coverage. During this emergency period, for some insurers they may be billed the same as an in-person visit.  Please reach out to your insurance provider with any questions.    If during the course of the call the physician/provider feels a telephone visit is not appropriate, you will not be charged for this service.\"    Patient has given verbal consent for Telephone visit?  Yes    What phone number would you like to be contacted at? [phone]    How would you like to obtain your AVS? Mail a copy    I have reviewed and updated the patient's allergies and medication list.     Concerns: No  Refills: No      Lynda Bradford LPN        Phone call duration: >11  minutes    5-7-20    See my note of yesterday.    Due to covid we are doing a phone visit.    He feels slightly better than yesterday overall and did not  the pain meds yet but will today.    He has been laid off and uses his wifes insurance.    I reviewed the images and there is prominent PD since the last CT which is a rather short time    We discussed the situation.  I suggested eribulin as a possible Rx with a low RR and reviewed the typical toxicites; its usually well tolerated.  We will start later today and see him in 3 weeks.    We also discussed code status at " length and he would like to be DNR/DNR.    He still is not interested in palliative f/u yet.    He will call If other questions or issues arise in the interim.    Gaurang Johnson M.D.  Professor  Hematology, Oncology and Transplantation

## 2020-05-07 NOTE — PATIENT INSTRUCTIONS
Contact Numbers:   [facility] Main Line: [phone]    Call triage to speak with triage if you are experiencing chills and/or temperature greater than or equal to 100.5, uncontrolled nausea/vomiting, diarrhea, constipation, dizziness, shortness of breath, chest pain, bleeding, unexplained bruising, or any new/concerning symptoms, questions/concerns.     If you are having any concerning symptoms or wish to speak to a provider before your next infusion visit, please call your care coordinator or triage to notify them so we can adequately serve you.     If you need a refill on a narcotic prescription, please call triage or your care coordinator before your infusion appointment.                 May 2020      Tyler Monday Tuesday Wednesday Thursday Friday Saturday                            1     2       3     4     5     6    IR PICC VASCULAR  10:30 AM   (60 min.)   [id number]   [facility], Vascular Access    XR CHEST 1 VIEW  11:40 AM   (15 min.)   UUXR1   [facility],  Radiology    [facility] MASONIC LAB DRAW   1:00 PM   (15 min.)    MASONIC LAB DRAW   [facility] Lab Draw    TELEPHONE VISIT RETURN   1:30 PM   (30 min.)   Gaurang Johnson MD   [facility] ONC INFUSION 60   2:30 PM   (60 min.)    ONCOLOGY INFUSION   [facility]    CT CHEST/ABDOMEN/PELVIS W   3:30 PM   (30 min.)   URCT1   [facility], Radiology 7    TELEPHONE VISIT RETURN  10:00 AM   (30 min.)   Gaurang Johnson MD   [facility] ONC INFUSION 120   2:30 PM   (120 min.)    ONCOLOGY INFUSION   [facility] 8     9       10     11     12     13     14     15     16       17     18     19     20     21     22     23       24     25     26    [facility] MASONIC LAB DRAW   7:30 AM   (15 min.)    MASONIC LAB DRAW   [facility] Lab Draw    [facility] RETURN   8:15 AM   (30 min.)   Gaurang Johnson MD   [facility] ONC INFUSION  60   3:00 PM   (60 min.)    ONCOLOGY [facility] 27     28     29     30       31                                               June 2020 Sunday Monday Tuesday Wednesday Thursday Friday Saturday        1     2     3     4     5     6       7     8     9     10     11     12     13       14     15     16     17     18     19     20       21     22     23     24     25     26     27       28     29     30                                         Lab Results:  No results found for this or any previous visit (from the past 12 hour(s)).

## 2020-05-07 NOTE — PROGRESS NOTES
Infusion Nursing Note:  Hiram Tapia presents today for cycle 1 day 1 [place].    Patient seen by provider today: Yes: Telephone visit with Dr. Johnson   present during visit today: Not Applicable.    Note: Pt switching regimens today. Reviewed potential side effects and schedule with patient. Pt given handouts and stated all questions were answered.  TORB: Dr. Johnson/Maryse Sheldon, RN  -no EKG needed today, can get prior to next infusion.  -Pull PICC line after infusion today    Intravenous Access:  PICC.  PICC Line pulled, bacitracin and sterile gauze applied to site with pressure dressing. Pt aware to keep site clean and dry for 24 hours.     Treatment Conditions:  Lab Results   Component Value Date    HGB 10.1 05/06/2020     Lab Results   Component Value Date    WBC 3.9 05/06/2020      Lab Results   Component Value Date    ANEU 2.4 05/06/2020     Lab Results   Component Value Date     05/06/2020      Lab Results   Component Value Date     05/06/2020                   Lab Results   Component Value Date    POTASSIUM 4.0 05/06/2020           Lab Results   Component Value Date    MAG 1.9 01/17/2020            Lab Results   Component Value Date    CR 0.79 05/06/2020                   Lab Results   Component Value Date    JAYESH 9.3 05/06/2020                Lab Results   Component Value Date    BILITOTAL 0.5 05/06/2020           Lab Results   Component Value Date    ALBUMIN 2.7 05/06/2020                    Lab Results   Component Value Date    ALT 24 05/06/2020           Lab Results   Component Value Date    AST 17 05/06/2020       Results reviewed, labs MET treatment parameters, ok to proceed with treatment.      Post Infusion Assessment:  Patient tolerated infusion without incident.  Blood return noted pre and post infusion.  Blood return noted during administration every 2 cc.  No evidence of extravasations.  Access discontinued per protocol.       Discharge Plan:   Prescription refills  given for compazine.  Discharge instructions reviewed with: Patient.  Patient and/or family verbalized understanding of discharge instructions and all questions answered.  Copy of AVS reviewed with patient and/or family.  Patient will return 5/14/20 for next appointment-not yet scheduled.  Patient discharged in stable condition accompanied by: self.  Departure Mode: Ambulatory.    Maryse Sheldon RN

## 2020-05-08 NOTE — TELEPHONE ENCOUNTER
5/8/20    Called to speak to patient about pain meds. He is currently doing Oxycodone 5mg tablets, 4 tablets at a time (20mg) every 4-8 hours. He states he is averaging 20 tablets (100mg) per day yet still having quite a bit of pain.    As such agree with starting OxyContin 20mg BID (<50% of his daily oxycodone dose) and change his PRN dosing to 10-20mg every 4 hours PRN. Discussed monitoring for sedation with starting the OxyContin and to hold off on taking his PRN Oxycodone until he sees how his pain is doing with starting the long acting. Also asked him to call if despite these adjustments he is still having pain. He was agreeable with this plan.    Ignacio Ramirez PA-C

## 2020-05-12 NOTE — PROGRESS NOTES
Per faxed message from [employer], pt only received 40 of the 60 prescribed oxycontin 20mg tabs . Will need new rx for remaining 20#    Routed to Ignacio Ramirez PA-C

## 2020-05-14 NOTE — PROGRESS NOTES
Called and spoke with David and he stated that he would like to have his labs done at the [facility] on Monday based on what happened last time at the [facility].  He would also like to be set up for a blood transfusion if needed.  Let him know that this will be sent to scheduling and they will call him with times.  He agreed with and verbalized understanding of the plan of care.

## 2020-05-14 NOTE — PATIENT INSTRUCTIONS
[facility] Triage and after hours / weekends / holidays:  [phone]    Please call the triage or after hours line if you experience a temperature greater than or equal to 100.5, shaking chills, have uncontrolled nausea, vomiting and/or diarrhea, dizziness, shortness of breath, chest pain, bleeding, unexplained bruising, or if you have any other new/concerning symptoms, questions or concerns.      If you are having any concerning symptoms or wish to speak to a provider before your next infusion visit, please call your care coordinator or triage to notify them so we can adequately serve you.     If you need a refill on a narcotic prescription or other medication, please call before your infusion appointment.           May 2020      Tyler Monday Tuesday Wednesday Thursday Friday Saturday                            1     2       3     4     5     6    IR PICC VASCULAR  10:30 AM   (60 min.)   [id number]   [facility], Vascular Access    XR CHEST 1 VIEW  11:40 AM   (15 min.)   UUXR1   [facility]  Radiology    [facility]   1[facility]00 [facility]   (15 min.)   [facility]   [facility] Lab Draw    TELEPHONE VISIT RETURN   1:30 PM   (30 min.)   Gaurang Johnson MD   [facility]    CT CHEST/ABDOMEN/PELVIS W   3:30 PM   (30 min.)   URCT1   [facility], Radiology 7    TELEPHONE VISIT RETURN  10:00 AM   (30 min.)   Gaurang Johnson MD   [facility] ONC INFUSION 120   2:30 PM   (120 min.)    ONCOLOGY INFUSION   [facility] 8     9       10     11    LAB   7:00 AM   (15 min.)    LAB HOME INFUSION   [facility] Lab 12     13     14    [facility] MASONIC LAB DRAW   9:30 AM   (15 min.)    MASONIC LAB DRAW   [facility] Lab Draw    [facility] ONC INFUSION 120  10:00 AM   (120 min.)    ONCOLOGY INFUSION   [facility] 15     16       17     18     19     20     21     22     23       24     25     26    TELEPHONE  VISIT RETURN   8:30 AM   (30 min.)   Gaurang Johnson MD   [facility] MASONIC LAB DRAW   1:00 PM   (15 min.)    MASONIC LAB DRAW   [facility] Lab Draw    [facility] ONC INFUSION 120   1:30 PM   (120 min.)    ONCOLOGY INFUSION   [facility] 27     28     29     30       31 June 2020 Sunday Monday Tuesday Wednesday Thursday Friday Saturday        1     2    [facility] MASONIC LAB DRAW   8:30 AM   (15 min.)    MASONIC LAB DRAW   [facility] Lab Draw    [facility] ONC INFUSION 120   9:00 AM   (120 min.)    ONCOLOGY INFUSION   [facility] 3     4     5     6       7     8     9     10     11     12     13       14     15     16     17     18     19     20       21     22     23     24     25     26     27       28     29     30                                        Recent Results (from the past 24 hour(s))   EKG 12-lead complete w/read - Clinics    Collection Time: 05/14/20  9:06 AM   Result Value Ref Range    Interpretation ECG Click View Image link to view waveform and result    CBC with platelets differential    Collection Time: 05/14/20  9:54 AM   Result Value Ref Range    WBC 6.3 4.0 - 11.0 10e9/L    RBC Count 2.96 (L) 4.4 - 5.9 10e12/L    Hemoglobin 8.4 (L) 13.3 - 17.7 g/dL    Hematocrit 26.2 (L) 40.0 - 53.0 %    MCV 89 78 - 100 fl    MCH 28.4 26.5 - 33.0 pg    MCHC 32.1 31.5 - 36.5 g/dL    RDW 16.3 (H) 10.0 - 15.0 %    Platelet Count 405 150 - 450 10e9/L    Diff Method Automated Method     % Neutrophils 80.9 %    % Lymphocytes 9.2 %    % Monocytes 8.3 %    % Eosinophils 0.0 %    % Basophils 0.3 %    % Immature Granulocytes 1.3 %    Nucleated RBCs 0 0 /100    Absolute Neutrophil 5.1 1.6 - 8.3 10e9/L    Absolute Lymphocytes 0.6 (L) 0.8 - 5.3 10e9/L    Absolute Monocytes 0.5 0.0 - 1.3 10e9/L    Absolute Eosinophils 0.0 0.0 - 0.7 10e9/L    Absolute Basophils 0.0 0.0 - 0.2 10e9/L    Abs Immature  Granulocytes 0.1 0 - 0.4 10e9/L    Absolute Nucleated RBC 0.0    Comprehensive metabolic panel    Collection Time: 05/14/20  9:54 AM   Result Value Ref Range    Sodium 135 133 - 144 mmol/L    Potassium 4.4 3.4 - 5.3 mmol/L    Chloride 103 94 - 109 mmol/L    Carbon Dioxide 26 20 - 32 mmol/L    Anion Gap 6 3 - 14 mmol/L    Glucose 116 (H) 70 - 99 mg/dL    Urea Nitrogen 16 7 - 30 mg/dL    Creatinine 0.80 0.66 - 1.25 mg/dL    GFR Estimate >90 >60 mL/min/[1.73_m2]    GFR Estimate If Black >90 >60 mL/min/[1.73_m2]    Calcium 9.2 8.5 - 10.1 mg/dL    Bilirubin Total 0.5 0.2 - 1.3 mg/dL    Albumin 2.3 (L) 3.4 - 5.0 g/dL    Protein Total 5.7 (L) 6.8 - 8.8 g/dL    Alkaline Phosphatase 146 40 - 150 U/L    ALT 19 0 - 70 U/L    AST 20 0 - 45 U/L   Magnesium    Collection Time: 05/14/20  9:54 AM   Result Value Ref Range    Magnesium 2.1 1.6 - 2.3 mg/dL

## 2020-05-14 NOTE — PROGRESS NOTES
EKG requested by Dr. Johnson for treatment care. EKG performed and given to RN for evaluation.     -Carrie SKELTON CMA

## 2020-05-14 NOTE — PROGRESS NOTES
Infusion Nursing Note:  Hiram Tapia presents today for Cycle 1 day 8 [place].    Patient seen by provider today: No    Treatment Conditions:  Lab Results   Component Value Date    HGB 8.4 05/14/2020     Lab Results   Component Value Date    WBC 6.3 05/14/2020      Lab Results   Component Value Date    ANEU 5.1 05/14/2020     Lab Results   Component Value Date     05/14/2020      Lab Results   Component Value Date     05/14/2020                   Lab Results   Component Value Date    POTASSIUM 4.4 05/14/2020           Lab Results   Component Value Date    MAG 2.1 05/14/2020            Lab Results   Component Value Date    CR 0.80 05/14/2020                   Lab Results   Component Value Date    JAYESH 9.2 05/14/2020                Lab Results   Component Value Date    BILITOTAL 0.5 05/14/2020           Lab Results   Component Value Date    ALBUMIN 2.3 05/14/2020                    Lab Results   Component Value Date    ALT 19 05/14/2020           Lab Results   Component Value Date    AST 20 05/14/2020       Results reviewed, labs MET treatment parameters, ok to proceed with treatment.    Pain: denies today    Note: Pt reports cough with blood tinged sputum, which is not new and a chronic condition, per pt. Denies fever/chills/SOB.    EKG done today: QTc 436 ms    Hgb 8.4 today, and was 10.1 last week.  Not scheduled for lab recheck until 5/26/20.  Dr. Johnson notified.    5/14/20 1030 TORB Dr. Johnson/ Loida Hope RN:  OK for chemo today.  CBC lab check on Monday 5/18/20 at local lab.  (Message sent to [facility] to arrange and call pt with details).    Intravenous Access:  Peripheral IV placed.    Post Infusion Assessment:  Patient tolerated infusion without incident.  Blood return noted pre and post infusion.  Blood return noted during administration every 2 cc.  Site patent and intact, free from redness, edema or discomfort.  No evidence of extravasations.  Access discontinued per  protocol.    Discharge Plan:   Patient declined prescription refills.  Discharge instructions reviewed with: Patient.  Patient and/or family verbalized understanding of discharge instructions and all questions answered.  Copy of AVS reviewed with patient and/or family.  Patient will return 5/26/20 for next appointment.  Patient discharged in stable condition accompanied by: self.  Departure Mode: Ambulatory.  Face to Face time: 5 min.    Loida Hope RN

## 2020-05-14 NOTE — PROGRESS NOTES
Lab orders printed and faxed to [facility], fax # [phone] per request of [facility].  Successful fax transmission was verified via rightfax.    Gloria Whitlock CMA

## 2020-05-18 NOTE — NURSING NOTE
Chief Complaint   Patient presents with     Blood Draw     piv placed and labs collecterd. VS taken     Labs drawn with PIV start by rn.  Pt tolerated well.  VS taken and pt checked in for next appt.

## 2020-05-18 NOTE — PROGRESS NOTES
Infusion Nursing Note:  Hiram Tapia presents today for 2 Unit PRBC.    Patient seen by provider today: No   present during visit today: Not Applicable.    Note: Pt arrives to clinic with SOB on exertion. He states he always has SOB on exertion but it has gotten worse. His cough is improving. He is more fatigued with less energy and tires easily. No fever/chills. States he does get dizzy when he stands too quickly. Denies headaches. His swelling in his bilateral ankles have increased but he also has not been moving as much as he normally does. Encouraged him to elevate and wear compression stockings. He states the above symptoms seem to be related to his low hemoglobin. Instructed him to call if his SOB does not improve or cough worsens within the next couple of days. Pt verbalized an understanding.    Intravenous Access:  Peripheral IV placed.    Treatment Conditions:  Lab Results   Component Value Date    HGB 7.9 05/18/2020     Lab Results   Component Value Date    WBC 2.1 05/18/2020      Lab Results   Component Value Date    ANEU 1.3 05/18/2020     Lab Results   Component Value Date     05/18/2020      Results reviewed, labs MET treatment parameters, ok to proceed with treatment.  Blood transfusion consent signed 10/15/19.    Post Infusion Assessment:  Patient tolerated infusion without incident.  Blood return noted pre and post infusion.  Site patent and intact, free from redness, edema or discomfort.  No evidence of extravasations.  Access discontinued per protocol.     Discharge Plan:   Patient declined prescription refills.  Copy of AVS reviewed with patient and/or family.  Patient will return 5/26 for next appointment.  AVS to patient via hopscout.  Patient will return 5/26 for next appointment.   Patient discharged in stable condition accompanied by: self.  Departure Mode: Ambulatory.  Face to Face time: 0.    Laureen Merino RN

## 2020-05-18 NOTE — PATIENT INSTRUCTIONS
[facility] Triage and after hours / weekends / holidays:  [phone]    Please call the triage or after hours line if you experience a temperature greater than or equal to 100.5, shaking chills, have uncontrolled nausea, vomiting and/or diarrhea, dizziness, shortness of breath, chest pain, bleeding, unexplained bruising, or if you have any other new/concerning symptoms, questions or concerns.      If you are having any concerning symptoms or wish to speak to a provider before your next infusion visit, please call your care coordinator or triage to notify them so we can adequately serve you.     If you need a refill on a narcotic prescription or other medication, please call before your infusion appointment.                 May 2020      Tyler Monday Tuesday Wednesday Thursday Friday Saturday                            1     2       3     4     5     6    IR PICC VASCULAR  10:30 AM   (60 min.)   [email or web address]   [facility], Vascular Access    XR CHEST 1 VIEW  11:40 AM   (15 min.)   UUXR1   [facility],  Radiology    [facility]   1[facility]00 [facility]   (15 min.)   [facility]   [facility] Lab Draw    TELEPHONE VISIT RETURN   1:30 PM   (30 min.)   Gaurang Johnson MD   [facility]    CT CHEST/ABDOMEN/PELVIS W   3:30 PM   (30 min.)   URCT1   [facility], Radiology 7    TELEPHONE VISIT RETURN  10:00 AM   (30 min.)   Gaurang Johnson MD   [facility] ONC INFUSION 120   2:30 PM   (120 min.)    ONCOLOGY INFUSION   [facility] 8     9       10     11    LAB   7:00 AM   (15 min.)    LAB HOME INFUSION   [facility] Lab 12     13     14    [facility] DRAW   9:30 AM   (15 min.)    [facility]    [facility] ONC INFUSION 120  10:00 AM   (120 min.)    ONCOLOGY INFUSION   [facility] 15     16       17     18    [facility] DRAW   8:15 AM   (15 min.)   [facility]    [facility] ONC INFUSION 180  11:00 AM   (180 min.)    ONCOLOGY INFUSION   [facility] 19     20     21     22     23       24     25     26    TELEPHONE VISIT RETURN   8:30 AM   (30 min.)   Gaurang Johnson MD   [facility]    [facility] DRAW   1:00 PM   (15 min.)    [facility] LAB DRAW   [facility] Lab Draw    [facility] ONC INFUSION 120   1:30 PM   (120 min.)    ONCOLOGY INFUSION   [facility] 27     28     29     30       31 June 2020 Sunday Monday Tuesday Wednesday Thursday Friday Saturday        1     2    [facility] DRAW   8:30 AM   (15 min.)    [facility]   [facility]    [facility] ONC INFUSION 120   9:00 AM   (120 min.)    ONCOLOGY INFUSION   [facility] 3     4     5     6       7     8     9     10     11     12     13       14     15     16     17     18     19     20       21     22     23     24     25     26     27       28     29     30                                        Recent Results (from the past 24 hour(s))   *CBC with platelets differential    Collection Time: 05/18/20  9:23 AM   Result Value Ref Range    WBC 2.1 (L) 4.0 - 11.0 10e9/L    RBC Count 2.85 (L) 4.4 - 5.9 10e12/L    Hemoglobin 7.9 (L) 13.3 - 17.7 g/dL    Hematocrit 25.2 (L) 40.0 - 53.0 %    MCV 88 78 - 100 fl    MCH 27.7 26.5 - 33.0 pg    MCHC 31.3 (L) 31.5 - 36.5 g/dL    RDW 16.7 (H) 10.0 - 15.0 %    Platelet Count 431 150 - 450 10e9/L    Diff Method Manual Differential     % Neutrophils 63.0 %    % Lymphocytes 28.0 %    % Monocytes 7.0 %    % Eosinophils 0.0 %    % Basophils 2.0 %    Absolute Neutrophil 1.3 (L) 1.6 - 8.3 10e9/L    Absolute Lymphocytes 0.6 (L) 0.8 - 5.3 10e9/L    Absolute Monocytes 0.1 0.0 - 1.3 10e9/L    Absolute Eosinophils 0.0 0.0 - 0.7 10e9/L    Absolute Basophils 0.0 0.0 - 0.2 10e9/L    Anisocytosis Slight      Poikilocytosis Slight     Teardrop Cells Slight     Platelet Estimate Confirming automated cell count    ABO/Rh type and screen    Collection Time: 05/18/20  9:23 AM   Result Value Ref Range    Units Ordered 2     ABO A     RH(D) Neg     Antibody Screen Neg     Test Valid Only At          [facility]    Specimen Expires 05/21/2020     Crossmatch Red Blood Cells    Blood component    Collection Time: 05/18/20  9:23 AM   Result Value Ref Range    Unit Number [id number]     Blood Component Type Red Blood Cells LeukoReduced (Part 2)     Division Number 00     Status of Unit Ready for patient 05/18/2020 1153     Blood Product Code [id number]     Unit Status EVELYN    Blood component    Collection Time: 05/18/20  9:23 AM   Result Value Ref Range    Unit Number [id number]     Blood Component Type Red Blood Cells LeukoReduced (Part 2)     Division Number 00     Status of Unit Ready for patient 05/18/2020 1153     Blood Product Code [id number]     Unit Status EVELYN

## 2020-05-19 NOTE — PROGRESS NOTES
Spoke with David to see how he is feeling after stating he was quite sob yesterday.  He states that he is overall feeling much better both breathing wise and energy wise.  He did receive 2 units of blood yesterday.  He does confirm that mornings are a little tough, but overall his breathing gets better as the day goes on.  He feels that all of this sob was due to his low hgb.  He is scheduled for labs and infusion next Tuesday.  Will notify  to see if he would like labs drawn sometimes this week to check his hgb.  Let him know that he will be notified once  responds.  He agreed with and verbalized understanding of the plan of care.      Update: 5/20  Per , he should have his labs rechecked on Friday again in case of a hgb drop, then it can be addressed before the weekend.   Called and updated David of the plan of care and he agreed with and verbalized understanding of the plan of care.  A request has been sent to scheduling for labs and potential transfusion.

## 2020-05-19 NOTE — TELEPHONE ENCOUNTER
Social Work Note: Telephone Call  Oncology Clinic    Data/Intervention:  Patient Name:  Hiram Tapia  /Age:  1960 (59 year old)    Call From:  SW received phone call from patient.  Reason for Call:  Questions about SSDI application    Assessment:  SW spoke with patient over the phone.  He intends to apply for disability and called about application process. Patient having some problems navigating SSA website and online application.  SW attempted to assist in walking patient through process.  Patient appeared to indicated understanding and ability to continue with online application independently.  SW provided patient with clinic contact information for application.  SW also gave patient social security administration phone number to complete application with assistance if needed as well as Cancer Legal Line for any questions related to how unemployment may be impacted by disability.    Plan:  Patient has SW contact information.  SW continues to be available to assist with needs.     Soo Yeon Han, MSW, [facility]  Pager: [phone]  Phone: [phone]

## 2020-05-22 NOTE — PROGRESS NOTES
Infusion Nursing Note:  Hiram Tapia presents today for possible RBC transfusion - HELD.    Patient seen by provider today: Yes: SENAIT Marroquin   present during visit today: Not Applicable.    Note: Pt assessed upon arrival to infusion suite. Pt states he feels very fatigued today and wondering what his Hgb will be. Pt's Hgb 9.7 today. Pt also states his SOB comes and goes, but he experiences this with activity and it takes a few minutes after resting to catch his breath. He has been having difficulty sleeping because he can't lie down flat. Increased swelling in BLE. Pt states he at times has swelling in his right lower extremity that comes and goes, but the left is new and both are worse.  in lab today. Rechecked HR after patient had been resting and HR remained elevated at 120. SpO2 91% on RA, desats to 88-89% while ambulating. Pt does not meet parameters for blood transfusion. SENAIT Marroquin notified of patients new/worsening symptoms.     TORB: 5/22/20 @ 1007 SENAIT Marroquin/Ivy Arrieta RN - Will see patient today. Please start NS at 250ml/hr for Na 129 today. Orders placed for patient to have CT scan as soon as possible.     Pt returned after CT scan and Ignacio reviewed results with patient. Plan for patient to return home with hospice.     Intravenous Access:  Peripheral IV placed.    Treatment Conditions:  Lab Results   Component Value Date    HGB 9.7 05/22/2020     Lab Results   Component Value Date    WBC 3.9 05/22/2020      Lab Results   Component Value Date    ANEU 2.0 05/22/2020     Lab Results   Component Value Date     05/22/2020      Lab Results   Component Value Date     05/22/2020                   Lab Results   Component Value Date    POTASSIUM 4.4 05/22/2020           Lab Results   Component Value Date    MAG 2.1 05/14/2020            Lab Results   Component Value Date    CR 0.82 05/22/2020                   Lab Results   Component Value  Date    JAYESH 9.0 05/22/2020                Lab Results   Component Value Date    BILITOTAL 0.5 05/22/2020           Lab Results   Component Value Date    ALBUMIN 2.1 05/22/2020                    Lab Results   Component Value Date    ALT 15 05/22/2020           Lab Results   Component Value Date    AST 20 05/22/2020     Results reviewed, labs did NOT meet treatment parameters:     Post Infusion Assessment:  Patient tolerated infusion without incident.  Blood return noted pre and post infusion.  Site patent and intact, free from redness, edema or discomfort.  No evidence of extravasations.  Access discontinued per protocol.     Discharge Plan:   Patient declined prescription refills.  Patient discharged in stable condition accompanied by: self.  Departure Mode: Ambulatory.    Rashmi Arrieta RN

## 2020-05-22 NOTE — PROGRESS NOTES
Oncology/Hematology Visit Note  May 22, 2020    Reason for Visit: Follow up of metastatic sarcoma, add on dyspnae    History of Present Illness: Hiram Tapia is a 59 year old male with no significant PMH with UPS of the right thigh. He has a history of right leg pain with sciatica x 20 years. In October 2017 he had more pain and injured his leg on a truck which eventually led to imaging that revealed a mass. 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      He was started on Doxil/Ifos 3/23/18 and completed 4 cycles with positive response to treatment. Of not he did complete 6 months of Xarelto during this time for incidental PE. He then underwent preoperative XRT. He underwent resection 9/17/18 with <5% viable cells on path and negative margins with no LVI.     Then on surveillance imaging October 2018 he was noted to have lung nodules. He underwent biopsy December 2018 that revealed metastatic sarcoma. Dr. Johnson recommended treatment with Keytruda in February 2019 however it is unclear to me why this was never started.     He underwent restaging CT 4/2/19. This revealed large right and trace left pneumothoraces along with worsening metastatic disease with increased lung nodules, increased lymphadenopathy, and new lytic lesion with associated soft tissue mass in posterior right femoral intertrochanteric region. He was admitted through the ED. Thoracic placed a pigtail but he had re-expansion. Thoracic surgery performed talc pleurodesis 4/3/19. He was discharged home 4/5/19.     He was started on Keytruda 4/9/19. He saw ortho for worsening right thigh pain thought 2/2 metastatic lesion in right femur and they discussed nailing. During his pre-op work-up CXR revealed persistent right sided pneumothorax for which he was admitted 4/12/19. Repeat talc pleurodesis performed 4/16/19. He underwent right intramedually pinning 4/15/19.     He has continued on Keytrua after surgery.  Restaging CT 6/28/19 with mostly an increase in pulmonary nodules and a right middle lobe cavitary lesion. Overall Dr. Johnson felt it was a mixed response so plan on continuing for 2 more cycles and then repeat CT. Of note port was removed 7/17/19.     He had worsening SOB and pleuritic pain 7/24/19 and CT imaging with concerns for progressive disease in addition to large right pleural effusion. He underwent thoracentesis for this 7/25/19. He was switched to Gemzar due to progression on Keytruda, cycle 1 7/24/19.       Repeat CT 8/12/19 with mixed response, possibly delayed immune response. He continued on Gemzar. CT 9/23/19 (after 3 cycles) with overall stable to improved disease, however did have new pericardial fluid and hydropneumothorax with necrotic left lung mass vs empyema. He met with surgery 9/24/19 and discussed left thoracotomy, decortication, possible wedge resection, possible pericardial window, possible diaphragm plication for the enlarging left lower lobe fluid collection with possible pericardial fistulization. The patient however declined surgery. CT 10/1/19 was stable to slight improvement in left lower lobe fluid collection and pericardial effusion. He continued on Gemzar.     He presented for chemo on 10/15 with recurrent SOB, hemoptysis, and LE edema. US positive for DVT and CT revealed enlarging left sided mass with invasion into pericardium with worsening pericardial effusion. He had recurrent PVCs in clinic. He was admitted. S/p IVC filter for DVT on 10/17 (no anticoagulation with hemoptysis). Patient declined surgical intervention for left lung mass/fluid collection or pericardial drainage. Echo inpatient with small effusion and no tamponade physiology, though invasion into pericardium could be causing PVCs.      He was started on Pazopanib for progressive disease. He started this 10/26/19 and clinically felt improved however imaging on 1/17/20 revealed possibly progressive disease in  thorax, though stable lymph node and pulmonary nodules. He met with Dr. Johnson 1/21/20 who recommended we continue Pazopanib.     He continued on pazopanib until April 2020 when he was found to have progressive disease. He received Trabectedin 4/16/20. Repeat imaging 5/6/20 with ongoing disease progression. He was started on Erubilin 5/7/20.    I was asked to see him today for worsening dyspnea and fatigue.    Interval History:  Mr. Tapia was seen today in infusion. He has been having more issues with dyspnea and fatigue for the last couple weeks. He has good days and bad days, and today is a bad day. He notes dyspnea on exertion and with lying flat, his breathing feels better when he is sitting up. He also generally feels fatigued. He continues to have chest wall pain which is notably improved since being on OxyContin BID and Oxycodone PRN (2-4 x per day). He is coughing more and continues to have hemoptysis. No fevers. He also has worsening swelling in his legs which has been going on for a couple weeks.    He denies any GI upset, dizziness, or troubles eating or drinking.     Current Outpatient Medications   Medication Sig Dispense Refill     order for DME Equipment being ordered: Oxygen    2 LPM via NC as needed for shortness of breath 1 Units 0     acetaminophen (TYLENOL) 500 MG tablet Take 1 tablet (500 mg) by mouth every 6 hours as needed for mild pain       oxyCODONE (OXYCONTIN) 20 MG 12 hr tablet Take 1 tablet (20 mg) by mouth every 12 hours maximum 2 tablet(s) per day 20 tablet 0     oxyCODONE IR (ROXICODONE) 10 MG tablet Take 1-2 tablets (10-20 mg) by mouth every 4 hours as needed for severe pain 100 tablet 0     polyethylene glycol (MIRALAX) 17 g packet Take 1 packet by mouth once PRN       prochlorperazine (COMPAZINE) 10 MG tablet Take 1 tablet (10 mg) by mouth every 6 hours as needed (Nausea/Vomiting) 30 tablet 2     senna-docusate (SENOKOT-S/PERICOLACE) 8.6-50 MG tablet Take 1-2 tablets by mouth  daily 60 tablet 3     Physical Examination:  /68  Temp 98.5 RR 16 SpO2 93% Pain 0/10  Wt Readings from Last 10 Encounters:   05/18/20 83.5 kg (184 lb)   05/14/20 83 kg (183 lb)   05/06/20 82.9 kg (182 lb 11.2 oz)   04/16/20 83.5 kg (184 lb)   03/10/20 86.6 kg (191 lb)   02/18/20 87.9 kg (193 lb 11.2 oz)   01/21/20 86.8 kg (191 lb 6.4 oz)   12/23/19 87.6 kg (193 lb 1.6 oz)   11/26/19 86.9 kg (191 lb 8 oz)   11/13/19 87.4 kg (192 lb 9.6 oz)     Constitutional: Well-appearing male in no acute distress.  Eyes: EOMI, PERRL. No scleral icterus.  ENT: Oral mucosa is moist without lesions or thrush.   Lymphatic: Neck is supple without cervical or supraclavicular lymphadenopathy.   Cardiovascular: Tachycardic rate and regular rhythm. No murmurs, gallops, or rubs. 1-2+ bilateral peripheral edema.  Respiratory: Diffuse crackles and rales throughout left lung, right lung clear except in bases. Non-labored breathing at rest, does get dyspneic with walking.   Gastrointestinal: Bowel sounds present. Abdomen soft, non-tender.   Neurologic: Cranial nerves II through XII are grossly intact.  Skin: No rashes, petechiae, or bruising noted on exposed skin.    Walked in clinic:  SpO2 92% at rest on RA  SpO2 88% with walking on RA    Laboratory Data:  Results for JAYLENE CHAO (MRN [id number]) as of 5/22/2020 13:59   5/22/2020 09:06   Sodium 129 (L)   Potassium 4.4   Chloride 97   Carbon Dioxide 28   Urea Nitrogen 17   Creatinine 0.82   GFR Estimate >90   GFR Estimate If Black >90   Calcium 9.0   Anion Gap 4   Albumin 2.1 (L)   Protein Total 5.3 (L)   Bilirubin Total 0.5   Alkaline Phosphatase 164 (H)   ALT 15   AST 20   N-Terminal Pro Bnp 1,158 (H)   Glucose 101 (H)   WBC 3.9 (L)   Hemoglobin 9.7 (L)   Hematocrit 29.8 (L)   Platelet Count 457 (H)   RBC Count 3.45 (L)   MCV 86   MCH 28.1   MCHC 32.6   RDW 17.0 (H)   Diff Method Automated Method   % Neutrophils 50.7   % Lymphocytes 22.7   % Monocytes 25.3   % Eosinophils 0.0    % Basophils 0.5   % Immature Granulocytes 0.8   Nucleated RBCs 0   Absolute Neutrophil 2.0   Absolute Lymphocytes 0.9   Absolute Monocytes 1.0   Absolute Eosinophils 0.0   Absolute Basophils 0.0   Abs Immature Granulocytes 0.0   Absolute Nucleated RBC 0.0     CT PE 5/22/20  Rapid increase in the size of the numerous neoplastic lesions within  the chest.      Of note the mass within the left basilar chest measures approximately  10.1 x 15.9 x 11.6 cm, this measured 8.3 x 13.2 x 8.5 cm on the prior  exam 5/6/2020. Necrotic and heterogenous appearing mass is causing  mass effect on the left ventricle as well as the left atrium. The left  inferior pulmonary vein is significantly stenosed as is the left  superior pulmonary vein. This this mass extends from the left  hemidiaphragm into the superior and anterior mediastinum and  posteriorly into the inferior hilum. This mass also appears to  partially encase the left main pulmonary artery with a slight amount  of stenosis. Large feeding vessel is present in this lesion coming  from the celiac trunk and left gastric artery.     Mass within the posterior superior mediastinum that measures 8.8 x 5.6  x 7.3 cm, previously 7.7 x 4.9 x 6.8 cm is causing increased mass  effect on the trachea, and esophagus and is abutting the aortic arch.  Major arterial vessels are patent although some mass effect is present  on the left subclavian artery.      Numerous other enlarging heterogenous and centrally necrotic masses  are present and slightly increased in size when compared to prior.  Several of these within the right pleura appear to be invading the  chest wall, intercostal spaces and right hemidiaphragm. Changes from  talc pleurodesis with high density throughout the right pleural space.     Areas of interstitial and interlobular septal thickening throughout  the left lung, possibly due to pulmonary vein obstruction. Several  areas of consolidation within the left lower lung are  also present and  unable to rule out infectious etiology. Suture in the anterior right  middle lobe.     No pulmonary emboli are visualized.     Upper Abdomen: Partially visualized hemangioma within the inferior  right hepatic lobe. Remainder of the upper abdomen is largely  unremarkable. Mass within the pancreatic tail similar to prior exams.     Degenerative changes of the spine.     Bones/Soft tissues: No suspicious bony lesions. Posterior osteophyte  at C6-7 causing mild spinal canal stenosis. Degenerative changes of  the spine.                                                                      Impression: In this patient with a history of undifferentiated  pleomorphic sarcoma:  1. Rapid increase in size of numerous metastatic lesions within the  chest. The mass associated with the left basilar chest is causing mass  effect on the left ventricle and left atrium as well as mild stenosis  of the left main pulmonary artery and significant stenosis of the left  superior and inferior pulmonary veins. This mass is also invading the  pericardium.  2. Increase in size of the mass also associated with the superior  mediastinum and increased mass effect on the trachea with rightward  deviation. There is also mass effect on the esophagus. Mass abuts the  aortic arch. Major arterial vasculature appears patent although some  mass effect is present on the left subclavian artery.  3. There is increased intralobular septal thickening of the inferior  left lung possibly due to pulmonary vein obstruction. Some areas of  consolidation are also present within the left lower lung, unable to  rule out infection.  4. Numerous metastatic lesions throughout the chest continue to invade  the chest wall, intercostal spaces and diaphragm.  5. Mass within the pancreatic tail, unchanged  6. No visualized pulmonary embolism     I have personally reviewed the examination and initial interpretation  and I agree with the findings.     ROBERTA  MD ADONIS      Assessment and Plan:  1. Metastatic Sarcoma  Presents today with worsening dyspnea-CT scan done and noted rapidly progressive disease in the past two weeks. The masses are invading his heart and pulmonary vessels, mass effect on trachea. Additional lesions are increasing, as well, invading the chest wall.     Discussed the situation with Dr. Johnson. He has rapidly progressive disease with no good treatment options left. The rate of growth along with the invasion of this mass into his heart/mass effect on trachea and pulmonary vessels is very concerning and he likely has at most weeks to live. There is no meaningful role for surgery or radiation at this time.    Discussed this all with patient. He asked that I reach out to the surgery team to confirm no options-though we discussed that even if we could debulk the largest mass this would be only be temporizing and he could die from surgery in the hospital, as well as the rest of his cancer would continue to rapidly grow. Also with the invasion into his heart he could die suddenly from cardiac arrest which was discussed with him.    Both Dr. Johnson and I would recommend home hospice as this time so he can be with his family. After long discussion patient agreed that this was the best next step and will go home now to discuss with his family.     I contacted hospice in hopes they can get out there as soon as possible. His pain is well controlled with current measures however he needs home O2 for comfort. Care coordinator working to expedite this process as well.    Greater than 40 min was spent with the patient with >50% of the time spent in counseling and coordinating care.     Ignacio Ramirez PA-C  [facility]  [address] [place] [address]  [phone]

## 2020-05-22 NOTE — LETTER
5/22/2020     RE: Hiram Tapia  [address]  [place] [address]    Oncology/Hematology Visit Note  May 22, 2020    Reason for Visit: Follow up of metastatic sarcoma, add on dyspnae    History of Present Illness: Hiram Tapia is a 59 year old male with no significant PMH with UPS of the right thigh. He has a history of right leg pain with sciatica x 20 years. In October 2017 he had more pain and injured his leg on a truck which eventually led to imaging that revealed a mass. 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      He was started on Doxil/Ifos 3/23/18 and completed 4 cycles with positive response to treatment. Of not he did complete 6 months of Xarelto during this time for incidental PE. He then underwent preoperative XRT. He underwent resection 9/17/18 with <5% viable cells on path and negative margins with no LVI.     Then on surveillance imaging October 2018 he was noted to have lung nodules. He underwent biopsy December 2018 that revealed metastatic sarcoma. Dr. Johnson recommended treatment with Keytruda in February 2019 however it is unclear to me why this was never started.     He underwent restaging CT 4/2/19. This revealed large right and trace left pneumothoraces along with worsening metastatic disease with increased lung nodules, increased lymphadenopathy, and new lytic lesion with associated soft tissue mass in posterior right femoral intertrochanteric region. He was admitted through the ED. Thoracic placed a pigtail but he had re-expansion. Thoracic surgery performed talc pleurodesis 4/3/19. He was discharged home 4/5/19.     He was started on Keytruda 4/9/19. He saw ortho for worsening right thigh pain thought 2/2 metastatic lesion in right femur and they discussed nailing. During his pre-op work-up CXR revealed persistent right sided pneumothorax for which he was admitted 4/12/19. Repeat talc pleurodesis performed 4/16/19. He underwent  right intramedually pinning 4/15/19.     He has continued on Keytrua after surgery. Restaging CT 6/28/19 with mostly an increase in pulmonary nodules and a right middle lobe cavitary lesion. Overall Dr. Johnson felt it was a mixed response so plan on continuing for 2 more cycles and then repeat CT. Of note port was removed 7/17/19.     He had worsening SOB and pleuritic pain 7/24/19 and CT imaging with concerns for progressive disease in addition to large right pleural effusion. He underwent thoracentesis for this 7/25/19. He was switched to Gemzar due to progression on Keytruda, cycle 1 7/24/19.       Repeat CT 8/12/19 with mixed response, possibly delayed immune response. He continued on Gemzar. CT 9/23/19 (after 3 cycles) with overall stable to improved disease, however did have new pericardial fluid and hydropneumothorax with necrotic left lung mass vs empyema. He met with surgery 9/24/19 and discussed left thoracotomy, decortication, possible wedge resection, possible pericardial window, possible diaphragm plication for the enlarging left lower lobe fluid collection with possible pericardial fistulization. The patient however declined surgery. CT 10/1/19 was stable to slight improvement in left lower lobe fluid collection and pericardial effusion. He continued on Gemzar.     He presented for chemo on 10/15 with recurrent SOB, hemoptysis, and LE edema. US positive for DVT and CT revealed enlarging left sided mass with invasion into pericardium with worsening pericardial effusion. He had recurrent PVCs in clinic. He was admitted. S/p IVC filter for DVT on 10/17 (no anticoagulation with hemoptysis). Patient declined surgical intervention for left lung mass/fluid collection or pericardial drainage. Echo inpatient with small effusion and no tamponade physiology, though invasion into pericardium could be causing PVCs.      He was started on Pazopanib for progressive disease. He started this 10/26/19 and clinically  felt improved however imaging on 1/17/20 revealed possibly progressive disease in thorax, though stable lymph node and pulmonary nodules. He met with Dr. Johnson 1/21/20 who recommended we continue Pazopanib.     He continued on pazopanib until April 2020 when he was found to have progressive disease. He received Trabectedin 4/16/20. Repeat imaging 5/6/20 with ongoing disease progression. He was started on Erubilin 5/7/20.    I was asked to see him today for worsening dyspnea and fatigue.    Interval History:  Mr. Tapia was seen today in infusion. He has been having more issues with dyspnea and fatigue for the last couple weeks. He has good days and bad days, and today is a bad day. He notes dyspnea on exertion and with lying flat, his breathing feels better when he is sitting up. He also generally feels fatigued. He continues to have chest wall pain which is notably improved since being on OxyContin BID and Oxycodone PRN (2-4 x per day). He is coughing more and continues to have hemoptysis. No fevers. He also has worsening swelling in his legs which has been going on for a couple weeks.    He denies any GI upset, dizziness, or troubles eating or drinking.     Current Outpatient Medications   Medication Sig Dispense Refill     order for DME Equipment being ordered: Oxygen    2 LPM via NC as needed for shortness of breath 1 Units 0     acetaminophen (TYLENOL) 500 MG tablet Take 1 tablet (500 mg) by mouth every 6 hours as needed for mild pain       oxyCODONE (OXYCONTIN) 20 MG 12 hr tablet Take 1 tablet (20 mg) by mouth every 12 hours maximum 2 tablet(s) per day 20 tablet 0     oxyCODONE IR (ROXICODONE) 10 MG tablet Take 1-2 tablets (10-20 mg) by mouth every 4 hours as needed for severe pain 100 tablet 0     polyethylene glycol (MIRALAX) 17 g packet Take 1 packet by mouth once PRN       prochlorperazine (COMPAZINE) 10 MG tablet Take 1 tablet (10 mg) by mouth every 6 hours as needed (Nausea/Vomiting) 30 tablet 2      senna-docusate (SENOKOT-S/PERICOLACE) 8.6-50 MG tablet Take 1-2 tablets by mouth daily 60 tablet 3     Physical Examination:  /68  Temp 98.5 RR 16 SpO2 93% Pain 0/10  Wt Readings from Last 10 Encounters:   05/18/20 83.5 kg (184 lb)   05/14/20 83 kg (183 lb)   05/06/20 82.9 kg (182 lb 11.2 oz)   04/16/20 83.5 kg (184 lb)   03/10/20 86.6 kg (191 lb)   02/18/20 87.9 kg (193 lb 11.2 oz)   01/21/20 86.8 kg (191 lb 6.4 oz)   12/23/19 87.6 kg (193 lb 1.6 oz)   11/26/19 86.9 kg (191 lb 8 oz)   11/13/19 87.4 kg (192 lb 9.6 oz)     Constitutional: Well-appearing male in no acute distress.  Eyes: EOMI, PERRL. No scleral icterus.  ENT: Oral mucosa is moist without lesions or thrush.   Lymphatic: Neck is supple without cervical or supraclavicular lymphadenopathy.   Cardiovascular: Tachycardic rate and regular rhythm. No murmurs, gallops, or rubs. 1-2+ bilateral peripheral edema.  Respiratory: Diffuse crackles and rales throughout left lung, right lung clear except in bases. Non-labored breathing at rest, does get dyspneic with walking.   Gastrointestinal: Bowel sounds present. Abdomen soft, non-tender.   Neurologic: Cranial nerves II through XII are grossly intact.  Skin: No rashes, petechiae, or bruising noted on exposed skin.    Walked in clinic:  SpO2 92% at rest on RA  SpO2 88% with walking on RA    Laboratory Data:  Results for JAYLENE CHAO (MRN [id number]) as of 5/22/2020 13:59   5/22/2020 09:06   Sodium 129 (L)   Potassium 4.4   Chloride 97   Carbon Dioxide 28   Urea Nitrogen 17   Creatinine 0.82   GFR Estimate >90   GFR Estimate If Black >90   Calcium 9.0   Anion Gap 4   Albumin 2.1 (L)   Protein Total 5.3 (L)   Bilirubin Total 0.5   Alkaline Phosphatase 164 (H)   ALT 15   AST 20   N-Terminal Pro Bnp 1,158 (H)   Glucose 101 (H)   WBC 3.9 (L)   Hemoglobin 9.7 (L)   Hematocrit 29.8 (L)   Platelet Count 457 (H)   RBC Count 3.45 (L)   MCV 86   MCH 28.1   MCHC 32.6   RDW 17.0 (H)   Diff Method Automated Method    % Neutrophils 50.7   % Lymphocytes 22.7   % Monocytes 25.3   % Eosinophils 0.0   % Basophils 0.5   % Immature Granulocytes 0.8   Nucleated RBCs 0   Absolute Neutrophil 2.0   Absolute Lymphocytes 0.9   Absolute Monocytes 1.0   Absolute Eosinophils 0.0   Absolute Basophils 0.0   Abs Immature Granulocytes 0.0   Absolute Nucleated RBC 0.0     CT PE 5/22/20  Rapid increase in the size of the numerous neoplastic lesions within  the chest.      Of note the mass within the left basilar chest measures approximately  10.1 x 15.9 x 11.6 cm, this measured 8.3 x 13.2 x 8.5 cm on the prior  exam 5/6/2020. Necrotic and heterogenous appearing mass is causing  mass effect on the left ventricle as well as the left atrium. The left  inferior pulmonary vein is significantly stenosed as is the left  superior pulmonary vein. This this mass extends from the left  hemidiaphragm into the superior and anterior mediastinum and  posteriorly into the inferior hilum. This mass also appears to  partially encase the left main pulmonary artery with a slight amount  of stenosis. Large feeding vessel is present in this lesion coming  from the celiac trunk and left gastric artery.     Mass within the posterior superior mediastinum that measures 8.8 x 5.6  x 7.3 cm, previously 7.7 x 4.9 x 6.8 cm is causing increased mass  effect on the trachea, and esophagus and is abutting the aortic arch.  Major arterial vessels are patent although some mass effect is present  on the left subclavian artery.      Numerous other enlarging heterogenous and centrally necrotic masses  are present and slightly increased in size when compared to prior.  Several of these within the right pleura appear to be invading the  chest wall, intercostal spaces and right hemidiaphragm. Changes from  talc pleurodesis with high density throughout the right pleural space.     Areas of interstitial and interlobular septal thickening throughout  the left lung, possibly due to pulmonary  vein obstruction. Several  areas of consolidation within the left lower lung are also present and  unable to rule out infectious etiology. Suture in the anterior right  middle lobe.     No pulmonary emboli are visualized.     Upper Abdomen: Partially visualized hemangioma within the inferior  right hepatic lobe. Remainder of the upper abdomen is largely  unremarkable. Mass within the pancreatic tail similar to prior exams.     Degenerative changes of the spine.     Bones/Soft tissues: No suspicious bony lesions. Posterior osteophyte  at C6-7 causing mild spinal canal stenosis. Degenerative changes of  the spine.                                                                      Impression: In this patient with a history of undifferentiated  pleomorphic sarcoma:  1. Rapid increase in size of numerous metastatic lesions within the  chest. The mass associated with the left basilar chest is causing mass  effect on the left ventricle and left atrium as well as mild stenosis  of the left main pulmonary artery and significant stenosis of the left  superior and inferior pulmonary veins. This mass is also invading the  pericardium.  2. Increase in size of the mass also associated with the superior  mediastinum and increased mass effect on the trachea with rightward  deviation. There is also mass effect on the esophagus. Mass abuts the  aortic arch. Major arterial vasculature appears patent although some  mass effect is present on the left subclavian artery.  3. There is increased intralobular septal thickening of the inferior  left lung possibly due to pulmonary vein obstruction. Some areas of  consolidation are also present within the left lower lung, unable to  rule out infection.  4. Numerous metastatic lesions throughout the chest continue to invade  the chest wall, intercostal spaces and diaphragm.  5. Mass within the pancreatic tail, unchanged  6. No visualized pulmonary embolism     I have personally reviewed the  examination and initial interpretation  and I agree with the findings.     ROBERTA LAMB MD      Assessment and Plan:  1. Metastatic Sarcoma  Presents today with worsening dyspnea-CT scan done and noted rapidly progressive disease in the past two weeks. The masses are invading his heart and pulmonary vessels, mass effect on trachea. Additional lesions are increasing, as well, invading the chest wall.     Discussed the situation with Dr. Johnson. He has rapidly progressive disease with no good treatment options left. The rate of growth along with the invasion of this mass into his heart/mass effect on trachea and pulmonary vessels is very concerning and he likely has at most weeks to live. There is no meaningful role for surgery or radiation at this time.    Discussed this all with patient. He asked that I reach out to the surgery team to confirm no options-though we discussed that even if we could debulk the largest mass this would be only be temporizing and he could die from surgery in the hospital, as well as the rest of his cancer would continue to rapidly grow. Also with the invasion into his heart he could die suddenly from cardiac arrest which was discussed with him.    Both Dr. Johnson and I would recommend home hospice as this time so he can be with his family. After long discussion patient agreed that this was the best next step and will go home now to discuss with his family.     I contacted hospice in hopes they can get out there as soon as possible. His pain is well controlled with current measures however he needs home O2 for comfort. Care coordinator working to expedite this process as well.    Greater than 40 min was spent with the patient with >50% of the time spent in counseling and coordinating care.     Ignacio Ramirez PA-C  [facility]  [address] [place] [id number]  [phone]

## 2020-05-22 NOTE — PROGRESS NOTES
Chief Complaint   Patient presents with     Blood Draw     labs drawn via vpt. vitals taken.     Labs drawn via venipuncture by RN, per patient preference. Vitals taken. -125; page sent to Dr. Johnson. Received call back from MD, no changes to plan. Possible RBC today. Patient checked in to infusion appointment.    Ashley Wilson RN

## 2020-05-22 NOTE — PROGRESS NOTES
Was notified by Ignacio DRAKE PA-C that she is enrolling David in hospice today.  He had another scan and his disease is much worse and rapidly progressing.  Hospice might not be able to get him enrolled right away and without oxygen, he will die.  Called [facility] to see if there is a possibility to get him O2 until hospice can take over.  They stated that once a patient is referred to hospice, they are not allowed to provide services for the patient.  Notified Ignacio of the findings.  Placing a call to  [facility] to see if they can urgently get him enrolled vs having him go to the hospital.  They state that they will contact their mgr and get back to me.

## 2020-05-25 NOTE — PROGRESS NOTES
ORAL CHEMOTHERAPY DISCONTINUATION       Primary Oncologist:  Dr. Johnson  Primary Oncology Clinic: [facility]   Cancer Diagnosis:  Metastatic Sarcoma  Therapy History:  Votrient (pazopanib) 800mg ([id number]) daily,   C1D1: 10/26/19  Therapy Ended On:  4/14/2020  Reason For Discontinuation: unacceptable toxicity    Additional Notes:  Thanks you for the opportunity to be a part of this patient's oral chemotherapy. The oncology pharmacy will no longer be following this patient for oral chemotherapy. If there are any questions or the plan changes, feel free to contact us.    Dawson Miles Pharmacy Intern  Oral Chemotherapy Monitoring Program  [phone]

## 2020-05-26 NOTE — PROGRESS NOTES
"Hiram Tapia is a 59 year old male who is being evaluated via a billable telephone visit.      The patient has been notified of following:     \"This telephone visit will be conducted via a call between you and your physician/provider. We have found that certain health care needs can be provided without the need for a physical exam.  This service lets us provide the care you need with a short phone conversation.  If a prescription is necessary we can send it directly to your pharmacy.  If lab work is needed we can place an order for that and you can then stop by our lab to have the test done at a later time.    Telephone visits are billed at different rates depending on your insurance coverage. During this emergency period, for some insurers they may be billed the same as an in-person visit.  Please reach out to your insurance provider with any questions.    If during the course of the call the physician/provider feels a telephone visit is not appropriate, you will not be charged for this service.\"    Patient has given verbal consent for Telephone visit?  Yes    What phone number would you like to be contacted at? [phone]    How would you like to obtain your AVS? Mail a copy    I have reviewed and updated the patient's allergies and medication list.     Concerns: what is this appointment about, he sent me home on hospice on Friday    Refills: No    Lynda Bradford LPN        Phone call duration: 6 minutes        5-26-20    I talked with David in f/u of his metastatic sarcoma.    We are doing this by phone due to the covid pandemic    He is in hospice now.  The main problem is SOB an he is sleeping 3 hours at a time usually sitting up.    The pain is managed but its not consisitent. Hes taking oxycontin bid and 20 mg oxycodone as needed which is about 5x/d.    Consitpation is under control.    He is at home w O2 concentrator, he goes up a floor but needs a rest a the top.  Legs are swelling some.    His wife " and son are at home also.    He will follow w hospice and let us know if we can be helpful.  Phone time >6 min    Gaurang Johnson M.D.  Professor  Hematology, Oncology and Transplantation

## 2020-05-26 NOTE — LETTER
"    5/26/2020         RE: Hiram Tapia  [address]  [place] [address]        Dear Colleague,    Thank you for referring your patient, Hiram Tapia, to the [facility]. Please see a copy of my visit note below.    Hiram Tapia is a 59 year old male who is being evaluated via a billable telephone visit.      The patient has been notified of following:     \"This telephone visit will be conducted via a call between you and your physician/provider. We have found that certain health care needs can be provided without the need for a physical exam.  This service lets us provide the care you need with a short phone conversation.  If a prescription is necessary we can send it directly to your pharmacy.  If lab work is needed we can place an order for that and you can then stop by our lab to have the test done at a later time.    Telephone visits are billed at different rates depending on your insurance coverage. During this emergency period, for some insurers they may be billed the same as an in-person visit.  Please reach out to your insurance provider with any questions.    If during the course of the call the physician/provider feels a telephone visit is not appropriate, you will not be charged for this service.\"    Patient has given verbal consent for Telephone visit?  Yes    What phone number would you like to be contacted at? [phone]    How would you like to obtain your AVS? Mail a copy    I have reviewed and updated the patient's allergies and medication list.     Concerns: what is this appointment about, he sent me home on hospice on Friday    Refills: No    Lynda Bradford LPN        Phone call duration: 6 minutes        5-26-20    I talked with David in f/u of his metastatic sarcoma.    We are doing this by phone due to the covid pandemic    He is in hospice now.  The main problem is SOB an he is sleeping 3 hours at a time usually sitting up.    The pain is managed " but its not consisitent. Hes taking oxycontin bid and 20 mg oxycodone as needed which is about 5x/d.    Consitpation is under control.    He is at home w O2 concentrator, he goes up a floor but needs a rest a the top.  Legs are swelling some.    His wife and son are at home also.    He will follow w hospice and let us know if we can be helpful.  Phone time >6 min    Gaurang Johnson M.D.  Professor  Hematology, Oncology and Transplantation

## 2020-05-27 NOTE — PROGRESS NOTES
Patient admitted to [facility] 4/2/2019-4/5/2019 for treatment of secondary right spontaneous pneumothorax. [facility] contacted patient post-discharge, patient had follow-up visit with Dr Gaurang Johnson on 4/8/2019. No further action required at this time, closing encounter

## 2020-05-29 NOTE — PROGRESS NOTES
Patient had orders placed for medication and follow-up call from Ignacio Ramirez PA-C. No further action required at this time, closing encounter.

## 2020-05-29 NOTE — PROGRESS NOTES
Hospice physician visit  Location: Video visit done via face time due to COVID-19 outbreak with wife and hospice RN Evelin and Hospice SWFrannie.  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  He was treated with chemotherapy, radiation and surgery.  He had eventual recurrence in the lung in October 2018.  Unfortunately he had disease progression despite further treatment.  Recent chest imaging shows numerous metastatic lesions in the chest with mass-effect on the left ventricle and left atrium, mild pulmonary stenosis on the left main pulmonary artery and significant stenosis of the left superior and inferior pulmonary veins.  The mass is invading the pericardium.  He has mass-effect on the trachea with rightward deviation, mass-effect on the esophagus, mass abuts the aortic arch.  Many of the masses invade the chest wall.  Given his tumor burden and location, admission physician was concerned he is at high risk of acute airway compromise or hemorrhagic event at the end of life.  I was asked to see him to discuss a plan for management of severe symptoms at end-of-life.  He is very bothered by cough productive of bloody sputum, worse with lying down, has to sleep sitting up.  He is short of breath with walking.  He is wearing oxygen 2 L/min.  He currently takes OxyContin 20 mg twice daily and oxycodone 20 mg every 2 hours as needed, typically 4-5 doses in a 24-hour period.  He thinks this is effective for his pain but has not been taking the PRN medication for breathing or cough.  He is more fatigued.  He is short of breath with exertion, walking across the house on a flat surface, difficulty with stairs.  His appetite has been fair, eat small frequent meals, early satiety.  He had weight loss of 70 pounds during chemotherapy but feels that his weight is recently stabilized.  He has  a history of PE with an IVC filter in place.  He has had longstanding right leg edema since his tumor but now has bilateral edema.  No history of heart failure or kidney disease.    PMH: Right leg DVT, gout.  PSH strabismus surgery, tumor excision to the right thigh x2, ORIF terrence right thigh, IVC filter placement, back surgery.  Family history: Father with leukemia.  12 point review systems negative except as per HPI.    Objective  Patient examined with help of hospice RN, Evelin Mix  /68 P 112 R 22 O2 sat 96% on 2L  Wearing nasal oxygen, appears dyspneic at rest.  Upper bilateral lobes clear/diminished.  Bilateral Lower lobes breath sounds absent.  Bowel sounds x4, no tenderness or bloating noted.  Edema to bilateral LE below the knee.  LLE has +2 pitting edema to calf and pedal.  RLE has +3 pitting edema below knee and pedal.      Assessment: Patient is a 59-year-old man with metastatic sarcoma invading the chest cavity including stenosis of pulmonary veins, invasion into the pericardium, abutting major blood vessels.  He is at risk of sudden respiratory collapse and terminal hemorrhage.  His prognosis is poor, estimated weeks to a month given his advanced cancer with evidence of rapid disease progression on scans, functional decline.  He has unmanaged symptoms of pain, cough, secretions due to tumor.  He has worsening leg edema, was told that his IVC filter is clogged and is no longer on anticoagulation due to hemoptysis.    Plan:  1. Pain: will increase his oxycontin to 60 mg bid (based on use of 120 mg oxycodone + oxycontin in the past 24 hours), with continued use of 20 mg oxycodone q 2 hours prn pain, sob, cough. Will add dexamethasone 4 mg q am. Plan to transition to methadone next week due to high doses of oxycodone being use (less neurotoxicity) and cost.  2. Early satiety, GI protection: add omeprazole now that he is on steroids, may also help his GI symptoms.   3. Plan for acute airway compromise  or terminal hemorrhage. Possible, not necessarily probable, given his tumor location. RN to instruct on dark towels, positioning for comfort. Will have 2 plans in place; one for his wife using subcutaneous dilaudid and lorazeapm ; one for son and other caregivers using sublingual oxycodone and lorazepam. He is quite far from the TC; if he has severe symptoms not responding to medications, family will need to call 911 and transport him to nearest  ER for rapid sedation and symptom management    Subcutaneous management of terminal hemorrhage or sudden acute respiratory failure: apply oxygen 6 LPM, give dilaudid 2.5  mg subcutaneous (comes as a 10mg/1 mL) with lorazepam 1 mg (comes 2 mg/mL) subcutaneous and call hospice triage. Medication can be given q 30 minutes prn symptoms. Patient may require ER/hospitalization for severe symptoms. Will have 6 doses of each medication in the home. Medications will be reviewed at each visit to determine if dosing is still adequate.    Sublingual management of terminal hemorrhage or sudden acute respiratory failure: apply oxygen 6LPM, given oxycodone 40 mg SL and lorazepam 2 mg SL and call hospice triage. Medication may be given q 30 minutes prn symptoms. Patient may require ER/Hospitalization for severe symptoms. Will have a minimum of 15 mL bottle of each medication in the home, at least 6 doses drawn up for immediate use if needed. Medications will be reviewed at each visit to determine if dosing is still adequate.    Total visit time: 60 minutes, more than 1/2 of the visit time spent discussing symptoms, progression of symptoms, prognosis and plan for terminal hemorrhage/acute respiratory failure due to tumor, determining appropriate dosing of subcutaneous medications    Rashmi Quezada MD  Associate Medical Director  [facility]

## 2020-06-12 ENCOUNTER — HOME INFUSION (PRE-WILLOW HOME INFUSION) (OUTPATIENT)
Dept: PHARMACY | Facility: CLINIC | Age: 60
End: 2020-06-12

## 2020-06-12 NOTE — PROGRESS NOTES
Dear Dr. Johnson,    We are notifying you of a Missed Visit or Hospice Death.  Hiram Tapia; MRN [id number]   peacefully On date 20  Time 6:30 PM  Sincerely [facility]  Belkis Catalan  [phone]

## 2020-06-16 ENCOUNTER — HOME INFUSION (PRE-WILLOW HOME INFUSION) (OUTPATIENT)
Dept: PHARMACY | Facility: CLINIC | Age: 60
End: 2020-06-16

## 2020-06-16 NOTE — PROGRESS NOTES
This is a recent snapshot of the patient's [facility] medical record.  For current drug dose and complete information and questions, call [phone] or In Basket pool, [facility] home infusion ([id number])  CSN Number:  [id number]

## 2020-06-17 NOTE — PROGRESS NOTES
This is a recent snapshot of the patient's [facility] medical record.  For current drug dose and complete information and questions, call [phone] or In Basket pool, fv home infusion ([id number])  CSN Number:  [id number]

## 2022-02-17 PROBLEM — R62.51 FAILURE TO THRIVE IN PEDIATRIC PATIENT: Status: ACTIVE | Noted: 2018-06-26

## 2022-05-25 NOTE — TELEPHONE ENCOUNTER
Social Work Note: Telephone Call  Oncology Clinic    Data/Intervention:  Patient Name:  Hiram Tapia  /Age:  1960 (59 year old)    Call From:  SW contacted patient by phone.  Reason for Call:  Referral from MD as patient was laid off from his job recently and has metastatic sarcoma.    Assessment:  SW spoke with patient over the phone.  He indicated he had been laid off.  Patient applied for unemployment earlier this week.  SW also mentioned eligibility for disability.  SW explained social security disability benefits and eligibility criteria, reviewed application process and ways to apply. SW provided social security web site for application and phone number for telephone assistance. Patient stated he would look at the information and decide whether to apply.  SW also discussed availability of financial grants to assist cancer patients, including grants related to COVID crisis.  Patient did not want to apply at this time.  SW encouraged him to look into [employer] rishi information and let writer know if he would like to apply for further assistance.    Plan:  SW contact information provided. SW remains available to assist with any other needs.     Soo Yeon Han, MSW, [facility]  Pager: [phone]  Phone: [phone]            
on unit

## 2023-10-13 NOTE — TELEPHONE ENCOUNTER
Follow Up Note     Date: 10/13/2023   Patient Name: Shameka Masters  MRN: [id number]  : 1983     Primary Care Provider: Carmen Elkins APRN     Chief Complaint   Patient presents with    Hepatitis C     Pt here for 4 wk f/up     History of present illness:   10/13/2023  Shameka Masters is a 39 y.o. female who is here today for follow up regarding Hepatitis C.    She has been taking Mavyret 3 tabs PO once daily now for the past 4 weeks. She started treatment on 9/15/2023. She has not missed any doses. She has not noticed any side effects. She continues to abstain from alcohol and illicit drug use.     Interval History:  2023  She found out about having Hepatitis C infection approx 5 months ago. She has not had prior treatment for hepatitis. Reports no known personal history of liver disease including other viral hepatitis. There is no known family history of liver disease or cirrhosis. She admits to previous intranasal drug use. No IVDU. She does not have nonprofessional tattoos. Admits to having previous alcoholism, drank 6+ alcoholic beverages per day for approx 2 years. Has been clean now since a few months ago. 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 She does not currently drink alcohol. She denies  current illicit drug use including marijuana. No recent liver imaging. She has not had recent labs. She has had previous vaccinations for Hepatitis A and B. She is currently working full time as a nurse. No current GI complaints today, has known history of anxiety.     Subjective     Past Medical History:   Diagnosis Date    Allergic rhinitis     Generalized anxiety disorder      History reviewed. No pertinent surgical history.    Family History   Problem Relation Age of Onset    Breast cancer Maternal Aunt      Social History     Socioeconomic History    Marital status:    Tobacco Use    Smoking status: Every Day     Packs/day: .5     Types: Cigarettes    Smokeless tobacco: Never   Vaping Use  Scheduled as stated per Brittany SAPP.       Vaping Use: Never used   Substance and Sexual Activity    Alcohol use: Not Currently    Drug use: Not Currently    Sexual activity: Defer     Current Outpatient Medications:     buPROPion SR (WELLBUTRIN SR) 150 MG 12 hr tablet, Take 1 tablet by mouth 2 (Two) Times a Day., Disp: , Rfl:     escitalopram (LEXAPRO) 20 MG tablet, Take 1 tablet by mouth Daily., Disp: , Rfl:     fluticasone (FLONASE) 50 MCG/ACT nasal spray, 2 sprays into the nostril(s) as directed by provider Daily As Needed., Disp: , Rfl:     Glecaprevir-Pibrentasvir (Mavyret) 100-40 MG tablet, Take 3 tablets by mouth Daily. Take all 3 tablets by mouth once daily with food, Disp: 84 tablet, Rfl: 1    loratadine (CLARITIN) 10 MG tablet, Take 1 tablet by mouth Daily., Disp: , Rfl:     No Known Allergies    The following portions of the patient's history were reviewed and updated as appropriate: allergies, current medications, past family history, past medical history, past social history, past surgical history and problem list.    Objective     Physical Exam  Constitutional:       General: She is not in acute distress.     Appearance: Normal appearance. She is well-developed. She is not diaphoretic.   HENT:      Head: Normocephalic and atraumatic.      Right Ear: External ear normal.      Left Ear: External ear normal.      Nose: Nose normal.   Eyes:      General: No scleral icterus.        Right eye: No discharge.         Left eye: No discharge.      Conjunctiva/sclera: Conjunctivae normal.   Neck:      Trachea: No tracheal deviation.   Pulmonary:      Effort: Pulmonary effort is normal. No respiratory distress.   Musculoskeletal:         General: Normal range of motion.      Cervical back: Normal range of motion.   Skin:     Coloration: Skin is not pale.      Findings: No erythema or rash.   Neurological:      Mental Status: She is alert and oriented to person, place, and time.      Coordination: Coordination normal.   Psychiatric:         Mood and  Affect: Mood normal.         Behavior: Behavior normal.         Thought Content: Thought content normal.         Judgment: Judgment normal.       Vitals:    10/13/23 0925   BP: 111/68   Pulse: 98   SpO2: 99%   Weight: 65.3 kg (144 lb)     Results Review:   I reviewed the patient's new clinical results.    Lab on 09/08/2023   Component Date Value Ref Range Status    Protime 09/08/2023 13.4  12.3 - 15.1 Seconds Final    INR 09/08/2023 0.97  0.90 - 1.10 Final    WBC 09/08/2023 10.62  3.40 - 10.80 10*3/mm3 Final    RBC 09/08/2023 4.39  3.77 - 5.28 10*6/mm3 Final    Hemoglobin 09/08/2023 13.0  12.0 - 15.9 g/dL Final    Hematocrit 09/08/2023 38.2  34.0 - 46.6 % Final    MCV 09/08/2023 87.0  79.0 - 97.0 fL Final    MCH 09/08/2023 29.6  26.6 - 33.0 pg Final    MCHC 09/08/2023 34.0  31.5 - 35.7 g/dL Final    RDW 09/08/2023 12.7  12.3 - 15.4 % Final    RDW-SD 09/08/2023 39.9  37.0 - 54.0 fl Final    MPV 09/08/2023 11.9  6.0 - 12.0 fL Final    Platelets 09/08/2023 340  140 - 450 10*3/mm3 Final    Glucose 09/08/2023 88  65 - 99 mg/dL Final    BUN 09/08/2023 9  6 - 20 mg/dL Final    Creatinine 09/08/2023 0.61  0.57 - 1.00 mg/dL Final    Sodium 09/08/2023 135 (L)  136 - 145 mmol/L Final    Potassium 09/08/2023 3.9  3.5 - 5.2 mmol/L Final    Chloride 09/08/2023 98  98 - 107 mmol/L Final    CO2 09/08/2023 22.1  22.0 - 29.0 mmol/L Final    Calcium 09/08/2023 9.1  8.6 - 10.5 mg/dL Final    Total Protein 09/08/2023 7.3  6.0 - 8.5 g/dL Final    Albumin 09/08/2023 4.3  3.5 - 5.2 g/dL Final    ALT (SGPT) 09/08/2023 44 (H)  1 - 33 U/L Final    AST (SGOT) 09/08/2023 28  1 - 32 U/L Final    Alkaline Phosphatase 09/08/2023 55  39 - 117 U/L Final    Total Bilirubin 09/08/2023 0.4  0.0 - 1.2 mg/dL Final    Globulin 09/08/2023 3.0  gm/dL Final    A/G Ratio 09/08/2023 1.4  g/dL Final    BUN/Creatinine Ratio 09/08/2023 14.8  7.0 - 25.0 Final    Anion Gap 09/08/2023 14.9  5.0 - 15.0 mmol/L Final    eGFR 09/08/2023 115.4  >60.0 mL/min/1.73 Final     HCG Qualitative 09/08/2023 Negative  Negative Final    Fibrosis Score 09/08/2023 0.11  0.00 - 0.21 Final    Fibrosis Stage 09/08/2023 Comment   Final                       F0 - No fibrosis    Necroinflammat Activity Score 09/08/2023 0.23 (H)  0.00 - 0.17 Final    Necroinflammat Activity Grade 09/08/2023 A0-A1   Final    Methodology: 09/08/2023 Comment   Final    The analytes tested are performed by [employer]-Specific methods.  Not intended for use with other diagnostic considerations.    Alpha 2-Macroglobulins, Qn 09/08/2023 215  110 - 276 mg/dL Final    Haptoglobin 09/08/2023 122  42 - 296 mg/dL Final    Apolipoprotein A-1 09/08/2023 127  116 - 209 mg/dL Final    Total Bilirubin 09/08/2023 0.3  0.0 - 1.2 mg/dL Final    GGT 09/08/2023 13  0 - 60 IU/L Final    ALT (SGPT) 09/08/2023 49 (H)  0 - 40 IU/L Final    Hepatitis C Quantitation 09/08/2023 45326  IU/mL Final    HCV log10 09/08/2023 4.310  log10 IU/mL Final    Test Information 09/08/2023 Comment   Final    The quantitative range of this assay is 15 IU/mL to 100 million IU/mL.    Hep A Total Ab 09/08/2023 Positive (A)  Negative Final    Hep B Core Total Ab 09/08/2023 Negative  Negative Final    Hepatitis B Surface Ag 09/08/2023 Non-Reactive  Non-Reactive Final    THC, Screen, Urine 09/08/2023 Negative  Negative Final    Phencyclidine (PCP), Urine 09/08/2023 Negative  Negative Final    Cocaine Screen, Urine 09/08/2023 Negative  Negative Final    Methamphetamine, Ur 09/08/2023 Negative  Negative Final    Opiate Screen 09/08/2023 Negative  Negative Final    Amphetamine Screen, Urine 09/08/2023 Negative  Negative Final    Benzodiazepine Screen, Urine 09/08/2023 Negative  Negative Final    Tricyclic Antidepressants Screen 09/08/2023 Negative  Negative Final    Methadone Screen, Urine 09/08/2023 Negative  Negative Final    Barbiturates Screen, Urine 09/08/2023 Negative  Negative Final    Oxycodone Screen, Urine 09/08/2023 Negative  Negative Final    Propoxyphene Screen  09/08/2023 Negative  Negative Final    Buprenorphine, Screen, Urine 09/08/2023 Negative  Negative Final      Labs 4/17/2023 HCVRNA quant 7040, hep C genotype Ia, TSH 1.220, hepatitis C antibody positive, triglycerides 91, total cholesterol 189, LDL cholesterol 128, alkaline phosphatase 69, ALT 18, AST 15, total bilirubin 0.5, creatinine 0.65, glucose 99, sodium 141, HIV negative, hemoglobin 14.5, hematocrit 42.3, MCV 88, platelets 415,000.     Assessment / Plan      1. Chronic hepatitis C without hepatic coma  2. History of alcoholism  3. History of illicit drug use  4. Elevated ALT measurement  She has chronic hepatitis C infection, genotype 1a, treatment na‹ve, without cirrhosis (F0). She has been taking Mavyret 3 tabs PO once daily for the past 4 weeks for Hepatitis C therapy. She will continue to take this medication at the same time every day without missing any doses for total duration of 8 weeks. Hep C viral load lab (HCV RNA quant) and CMP will be checked today which is approx 4 weeks after start of therapy. Labs will be completed again at end of therapy and final labs 3 months s/p therapy to determine response to treatment and cure. She will continue to abstain from alcohol use at this time and agrees not to use illegal drugs. She understands to avoid any high risk behavior to prevent any reinfection during treatment and after treatment. She is immune to hepatitis A and reports vaccines to Hepatitis B in the past. Source of infection is her previous illicit drug use but she is currently not using. Liver imaging not completed. HIV test is negative. No history of liver transplant.     Labs today    - Hepatitis C RNA, Quantitative, PCR (graph); Future  - Comprehensive Metabolic Panel; Future    Follow Up:   Return in about 4 months (around 2/13/2024) for recheck Hepatitis C (after final labs completed).    Yvette Greene PA-C  [facility]  10/13/2023  09:47 EDT    Dictated Utilizing Dragon  Dictation: Part of this note may be an electronic transcription/translation of spoken language to printed text using the Dragon Dictation System.

## 2023-12-12 NOTE — Clinical Note
Persistent over past 3 weeks.  Patient is currently not taking Trulance.  Undergoing workup for biliary dyskinesia, HIDA scan scheduled in February.  In meantime, recommended patient continue PPI and try a trial of cholestyramine as needed.  Patient given handout with information about gallbladder friendly diet.  Due to lack of fevers or abdominal pains, infectious cause of diarrhea is less likely at this time.  Advised patient if she does develop any of the symptoms to reach out for further testing.   waiting

## 2023-12-21 NOTE — LETTER
Subjective   Delbert is a 55 year old, presenting for the following health issues:  MH Follow Up and Hypertension        2023    10:07 AM   Additional Questions   Roomed by Carmel DICKERSON CMA   Accompanied by self         2023    10:07 AM   Patient Reported Additional Medications   Patient reports taking the following new medications n/a       History of Present Illness       Reason for visit:  Annual checkup    He eats 0-1 servings of fruits and vegetables daily.He consumes 1 sweetened beverage(s) daily.He exercises with enough effort to increase his heart rate 10 to 19 minutes per day.  He exercises with enough effort to increase his heart rate 5 days per week.   He is not taking prescribed medications regularly due to Not applicable.  Healthy Habits:     Getting at least 3 servings of Calcium per day:  Yes    Bi-annual eye exam:  Yes    Dental care twice a year:  Yes    Sleep apnea or symptoms of sleep apnea:  Sleep apnea    Diet:  Regular (no restrictions)    Frequency of exercise:  1 day/week    Duration of exercise:  15-30 minutes    Taking medications regularly:  Yes    Barriers to taking medications:  Not applicable    Medication side effects:  None    Additional concerns today:  No    SUBJECTIVE:   Delbert is a 55 year old, presenting for the following:   Follow Up and Hypertension        2023    10:07 AM   Additional Questions   Roomed by Carmel DICKERSON CMA   Accompanied by self         2023    10:07 AM   Patient Reported Additional Medications   Patient reports taking the following new medications n/a           Today's PHQ-9 Score:       2023    10:04 AM   PHQ-9 SCORE   PHQ-9 Total Score MyChart 0   PHQ-9 Total Score 0           Social History     Tobacco Use    Smoking status: Former     Packs/day: 1.00     Years: 20.00     Additional pack years: 0.00     Total pack years: 20.00     Types: Cigarettes     Start date: 7/15/1986     Quit date: 7/15/2006     Years since quittin.4  7/10/2018    RE: Hiram Tapia  [address]  [facility] [address]     Oncology/Hematology Visit Note  Jul 10, 2018    Reason for Visit: Follow up of UPS of right thigh     History of Present Illness: Hiram Tapia is a 57 year old male with UPS of the right thigh. He has had a lot of problems with his right leg for many years including sciatica for 20 years.  He developed more discomfort in the right thigh and in October 2017 hit his lateral thigh against a truck tailgate causing a lot of pain.  Subsequently there was a fair amount of swelling and stiffness.  This eventually led to some imaging showing a cystic mass.  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 He began doxil/ifos 3-23-18.  He has had 4 cycles with suggestion of a response after 1 cycle. A new PE asymptomatic was noted and he started rivaroxaban in April. His course has been complicated by post-op wound infection and poor tolerance to chemotherapy, most recently requiring hospitalization for nausea, vomiting, dehydration, and electrolyte abnormalities as well as concern for possible ifosfamide toxicity following cycle 4. Plan now to do XRT and surgery as he has completed neoadjuvant chemotherapy. He will be meeting with Dr. Johnson next week to discuss this. Please see Dr. Johnson note for more oncologic history details.    I am seeing him today for close follow-up of dehydration, nausea, and electrolyte abnormalities.     Interval History:  Mr. Tapia returns to clinic today with his wife. Overall he is feeling much better. He is no longer having any nausea or vomiting and has been eating and drinking well. He admits to poor taste and that he probably doesn't drink as much water as he should but overall his oral intake is improving. He denies dizziness or lightheadedness and no headaches or confusion. His word slurry has also improved. He has not had any falls since the one prior to his hospital stay.    He continues to have a sore  "   Smokeless tobacco: Never    Tobacco comments:     quit 13 years    Substance Use Topics    Alcohol use: Yes     Comment: once every 3 months              12/14/2022    11:19 AM   Alcohol Use   Prescreen: >3 drinks/day or >7 drinks/week? No          No data to display                Last PSA:   PSA   Date Value Ref Range Status   10/30/2020 0.13 0 - 4 ug/L Final     Comment:     Assay Method:  Chemiluminescence using Siemens Vista analyzer     Prostate Specific Antigen Screen   Date Value Ref Range Status   05/01/2023 0.13 0.00 - 4.00 ug/L Final       Reviewed orders with patient. Reviewed health maintenance and updated orders accordingly - Yes  Lab work is in process    Reviewed and updated as needed this visit by clinical staff   Tobacco  Allergies  Meds              Reviewed and updated as needed this visit by Provider                       CONSTITUTIONAL: NEGATIVE for fever, chills, change in weight  INTEGUMENTARY/SKIN: NEGATIVE for worrisome rashes, moles or lesions  EYES: NEGATIVE for vision changes or irritation  ENT: NEGATIVE for ear, mouth and throat problems  RESP: NEGATIVE for significant cough or SOB  CV: NEGATIVE for chest pain, palpitations or peripheral edema  GI: NEGATIVE for nausea, abdominal pain, heartburn, or change in bowel habits   male: negative for dysuria, hematuria, decreased urinary stream, erectile dysfunction, urethral discharge  MUSCULOSKELETAL: NEGATIVE for significant arthralgias or myalgia  NEURO: NEGATIVE for weakness, dizziness or paresthesias  PSYCHIATRIC: NEGATIVE for changes in mood or affect    OBJECTIVE:   /84   Pulse 60   Temp 98.4  F (36.9  C) (Temporal)   Resp 16   Ht 1.812 m (5' 11.34\")   Wt (!) 171.5 kg (378 lb)   SpO2 96%   BMI 52.22 kg/m        GENERAL: healthy, alert and no distress  EYES: Eyes grossly normal to inspection, PERRL and conjunctivae and sclerae normal  HENT: ear canals and TM's normal, nose and mouth without ulcers or lesions  NECK: no " mouth but it is improving. His mouth pain resolved with magic mouth wash. His right leg is also sore but is managed with Tylenol and Oxycodone PRN. He continues to have leg swelling, right more than left, though admits now that he is spending more time up out of bed he isn't elevating his legs as much. The swelling at times causes mild pain in his right knee as well. He has compression stockings but doesn't wear them. The wound itself is healing and he still has twice weekly nursing visits.    He denies fevers or chills. No chest pain, SOB, or cough. He continues to have rhinorrhea but no sinus congestion. He has not tried anything for the rhinorrhea. No abdominal pain. Bowels are managed with Miralax and Senna and he has a BM approximately every other day. No urinary concerns. No rashes but he does note dry skin on his hands and feet with mild erythema and soreness. He hasn't been applying anything for this. No neuropathy. No bleeding issues.     He is spending most of his day up out of bed though admits he doesn't do a ton of walking 2/2 pain in his leg. Overall though he feels things are continuing to improve the farther he gets from chemo.     Current Outpatient Prescriptions   Medication Sig Dispense Refill     lidocaine, viscous, (XYLOCAINE) 2 % solution        polyethylene glycol (MIRALAX/GLYCOLAX) Packet Take 17 g by mouth daily (Patient not taking: Reported on 7/10/2018) 7 packet 1     rivaroxaban ANTICOAGULANT (XARELTO) 20 MG TABS tablet Take 1 tablet (20 mg) by mouth daily (with dinner) 30 tablet 3     magic mouthwash suspension (diphenhydrAMINE, lidocaine, aluminum-magnesium & simethicone) Swish and swallow 10 mLs in mouth every 6 hours as needed for mouth sores (Patient not taking: Reported on 7/10/2018) 240 mL 1     OLANZapine (ZYPREXA) 5 MG tablet Take 1 tablet (5 mg) by mouth At Bedtime (Patient not taking: Reported on 7/10/2018) 30 tablet 0     oxyCODONE IR (ROXICODONE) 5 MG tablet Take 1 tablet (5  "adenopathy, no asymmetry, masses, or scars and thyroid normal to palpation  RESP: lungs clear to auscultation - no rales, rhonchi or wheezes  CV: regular rate and rhythm, normal S1 S2, no S3 or S4, no murmur, click or rub, no peripheral edema and peripheral pulses strong  ABDOMEN: soft, nontender, obese, no hepatosplenomegaly, no masses and bowel sounds normal  MS: no gross musculoskeletal defects noted, no edema  SKIN: no suspicious lesions or rashes  NEURO: Normal strength and tone, mentation intact and speech normal  PSYCH: mentation appears normal, affect normal/bright    Diagnostic Test Results:  Labs reviewed in Epic    ASSESSMENT/PLAN:       ICD-10-CM    1. Routine medical exam  Z00.00       2. Essential hypertension  I10 hydrochlorothiazide (HYDRODIURIL) 25 MG tablet     atenolol (TENORMIN) 100 MG tablet      3. Episode of recurrent major depressive disorder, unspecified depression episode severity (H24)  F33.9 buPROPion (WELLBUTRIN XL) 150 MG 24 hr tablet     citalopram (CELEXA) 40 MG tablet      4. Screen for colon cancer  Z12.11           Patient has been advised of split billing requirements and indicates understanding: Yes      COUNSELING:   Reviewed preventive health counseling, as reflected in patient instructions       Regular exercise       Healthy diet/nutrition       Immunizations  Pt. Declined.            Colorectal cancer screening       Prostate cancer screening       HTN- mood stable. Meds refilled. Pt. Looking for options with work/hobbies with early assisted. Pt. Feels certain portion of mood is his personality- doing well otherwise.     Re-check in 6 months, virtual is OK    Labs reviewed, meds refilled.       BMI:   Estimated body mass index is 52.22 kg/m  as calculated from the following:    Height as of this encounter: 1.812 m (5' 11.34\").    Weight as of this encounter: 171.5 kg (378 lb).   Weight management plan: Discussed healthy diet and exercise guidelines pt. Declined " "mg) by mouth every 4 hours as needed for moderate to severe pain 20 tablet 0     [DISCONTINUED] XARELTO STARTER PACK 15 & 20 MG TBPK          Physical Examination:  BP 99/56 (BP Location: Right arm, Patient Position: Sitting, Cuff Size: Adult Regular)  Pulse 114  Temp 98.7  F (37.1  C) (Oral)  Resp 16  Ht 1.778 m (5' 10\")  Wt 79.4 kg (175 lb 1.6 oz)  SpO2 98%  BMI 25.12 kg/m2  Wt Readings from Last 10 Encounters:   07/03/18 77.2 kg (170 lb 1.6 oz)   06/27/18 81.4 kg (179 lb 8 oz)   06/20/18 81.2 kg (179 lb)   05/22/18 86.4 kg (190 lb 8 oz)   05/01/18 91.9 kg (202 lb 8 oz)   04/24/18 89.8 kg (198 lb)   04/21/18 94.3 kg (208 lb)   03/29/18 97.3 kg (214 lb 9.6 oz)   03/28/18 95.6 kg (210 lb 12.2 oz)   03/27/18 96.8 kg (213 lb 4.8 oz)     Constitutional: Well-appearing male in no acute distress.  Eyes: EOMI, PERRL. No scleral icterus.  ENT: Oral mucosa is moist without thrush. One small lesion on posterior right cheek.  Lymphatic: Neck is supple without cervical or supraclavicular lymphadenopathy.   Cardiovascular: Regular rate and rhythm. No murmurs, gallops, or rubs. Bilateral 1+ peripheral edema, right greater than left.  Respiratory: Clear to auscultation bilaterally. No wheezes or crackles.  Gastrointestinal: Bowel sounds present. Abdomen soft, non-tender. No palpable hepatosplenomegaly or masses.   Neurologic: Cranial nerves II through XII are grossly intact. Prior slurred speech and trouble with word finding much improved compared to last visit.  Skin: Mild erythema and skin peeling on hands that is mildly tender and slightly edenamtous. Mild erythema on feet without skin peeling. Right leg wound covered but without surrounding erythema, warmth, or tenderness. No other rashes, petechiae, or bruising noted on exposed skin.  Musculoskeletal: Large right thigh mass, non-tender.  Laboratory Data:  Results for CHAO, RUT W (MRN [id number]) as of 7/10/2018 10:09   7/10/2018 07:27   Sodium 140   Potassium 4.0 "   Chloride 106   Carbon Dioxide 23   Urea Nitrogen 8   Creatinine 0.70   GFR Estimate >90   GFR Estimate If Black >90   Calcium 9.7   Anion Gap 10   Magnesium 2.1   Phosphorus 2.9   Albumin 2.9 (L)   Protein Total 6.5 (L)   Bilirubin Total 0.2   Alkaline Phosphatase 101   ALT 20   AST 14   Glucose 104 (H)   WBC 4.7   Hemoglobin 8.8 (L)   Hematocrit 28.3 (L)   Platelet Count 283   RBC Count 3.20 (L)   MCV 88   MCH 27.5   MCHC 31.1 (L)   RDW 18.6 (H)   Diff Method Automated Method   % Neutrophils 79.4   % Lymphocytes 8.3   % Monocytes 10.4   % Eosinophils 0.0   % Basophils 0.6   % Immature Granulocytes 1.3   Nucleated RBCs 0   Absolute Neutrophil 3.7   Absolute Lymphocytes 0.4 (L)   Absolute Monocytes 0.5   Absolute Eosinophils 0.0   Absolute Basophils 0.0   Abs Immature Granulocytes 0.1   Absolute Nucleated RBC 0.0       Assessment and Plan:  1. 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 Now s/p 4 cycles of neoadjuvant Doxil/Ifos, last cycle given 6/20/18. Course complicated by nausea, vomiting, dehydration, and electrolyte abnormalities along with likely ifosfamide neurotoxicity so if this treatment is needed adjuvantly would recommend it is done inpatient.    He is meeting with Dr. Betts, rad onc, and Dr. Johnson to discuss timing of radiation and surgery. He will also need a colonoscopy around this time due to concern for FDG avid activity on recent PETCT.     2. FEN  Continues to improve is oral intake after hospital discharge. Continue to struggle with dysgeusia and discussed Miracle Berry and Zinc for this. Weight is improved today. Continue to push fluids. No need for IVF today and he is asymptomatic from slightly lower BP.    Previously with multiple electrolyte abnormalities including potassium to 2.2 with EKG changes. Was on both potassium and phos supplement, though admits today he hasn't been taking it consistently. Creat and all  meds/referrals.       He reports that he quit smoking about 17 years ago. His smoking use included cigarettes. He started smoking about 37 years ago. He has a 20 pack-year smoking history. He has never used smokeless tobacco.            NANDO Ellison CNP  [facility]          electrolytes WNL today. Can stop supplementation as long as he continues to eat well. Recheck next week.    3. GI  Previously with significant nausea and vomiting along with constipation. Now all resolved and not needing any antiemetics. Still using Miralax and Senna PRN. Discussed aiming for one soft bowel movement daily. Can use Zyprexa still at bedtime if struggling with sleep or anorexia.    Continue MMW for mouth soreness and one posterior lesion.    4. Ortho  Right leg mass with open wound that per ortho is stable and healing as well as to be expected. He is getting an MRI later today for radiation planning. No bleed into tumor on prior CT.     For pain control, continue Tylenol PRN and discussed using Oxycodone more often to provider better comfort, especially before bed. Continue leg elevation and start compression stockings for peripheral edema.    5. Vascular/Heme  Previously diagnosed with incidental PE. Continue Xarelto 20mg daily-no current bleeding concerns. Was a concern for bleeding inpatient with decrease in hgb to 6.9 but CT head and CT femur negative. Hgb improved today-most likely bone marrow recovery from chemotherapy.    WBC and plt also improving. No longer neutropenic (did receive Neulasta late-given 6/30).    6. Neuro  Previously with aphasia and slurred speech. Head CT negative. Thought most likely 2/2 ifosfamide toxicity. All symptoms improved today and he is much clearer during the visit. No further falls. Continue to monitor if repeat treatment in the future.    7. Derm  Does have grade 1-2 hand/foot syndrome with mild swelling, erythema, dry skin, and tenderness. This is improving and should continue to improve of Doxil. Start Udder Cream daily.      Ignacio Ramirez PA-C  [facility]  [address] [place] [address] [phone]

## 2024-04-14 NOTE — PROGRESS NOTES
Lab orders were refaxed to Paty at [facility] at [phone].  Called Lea~wife to let her know.   Ambulatory

## 2025-04-20 NOTE — BRIEF OP NOTE
[facility], [place]    Brief Operative Note    Pre-operative diagnosis: Soft Tissue Sarcoma Right Thigh   Post-operative diagnosis Same    Procedure: Procedure(s):  Vascular Access Port Insertion with C-arm - Wound Class: I-Clean     Surgeon: Surgeon(s) and Role:     * Sarah Bowie MD - Primary    Anesthesia: Combined MAC with Local   Estimated blood loss:     Minimal  Drains:   None  Specimens: None  Findings:   None. Fluoroscopy demonstrates catheter tip in appropriate position  Complications: None.  Implants: MRI compatible power point in R chest         Alert-The patient is alert, awake and responds to voice. The patient is oriented to time, place, and person. The triage nurse is able to obtain subjective information.

## (undated) DEVICE — GLOVE PROTEXIS MICRO 6.0  2D73PM60

## (undated) DEVICE — SU PROLENE 2-0 SHDA 36" 8523H

## (undated) DEVICE — SOL NACL 0.9% IRRIG 1000ML BOTTLE 2F7124

## (undated) DEVICE — SUCTION IRR SYSTEM W/O TIP INTERPULSE HANDPIECE 0210-100-000

## (undated) DEVICE — CANISTER WOUND VAC W/GEL 500ML M8275063/5

## (undated) DEVICE — SU SILK 2-0 TIE 12X30" A305H

## (undated) DEVICE — DRAPE C-ARMOR 5 SIDED 5523

## (undated) DEVICE — SOL NACL 0.9% INJ 250ML BAG 2B1322Q

## (undated) DEVICE — TIES BANDING T50R

## (undated) DEVICE — LINEN ORTHO PACK 5446

## (undated) DEVICE — PREP POVIDONE IODINE SOLUTION 10% 120ML

## (undated) DEVICE — IMP SCR STRK LOCK 5.0X50MM FT 1896-5050S
Type: IMPLANTABLE DEVICE | Site: FEMUR | Status: NON-FUNCTIONAL
Removed: 2019-04-15

## (undated) DEVICE — GLOVE PROTEXIS W/NEU-THERA 7.5  2D73TE75

## (undated) DEVICE — SU VICRYL 0 CT-1 27" UND J260H

## (undated) DEVICE — DRAIN HEMOVAC RESERVOIR KIT 10FR 1/8" MED 00-2550-002-10

## (undated) DEVICE — LINEN GOWN X4 5410

## (undated) DEVICE — STPL RELOAD REG TISSUE ECHELON 45 X 3.6MM BLUE GST45B

## (undated) DEVICE — ESU ELEC BLADE 2.75" COATED/INSULATED E1455

## (undated) DEVICE — GOWN XLG DISP 9545

## (undated) DEVICE — SUCTION MANIFOLD DORNOCH ULTRA CART UL-CL500

## (undated) DEVICE — DECANTER BAG 2002S

## (undated) DEVICE — DRAPE C-ARM W/STRAPS 42X72" 07-CA104

## (undated) DEVICE — ESU GROUND PAD ADULT W/CORD E7507

## (undated) DEVICE — SU MONOCRYL 4-0 P-3 18" UND Y494G

## (undated) DEVICE — DRAPE STOCKINETTE IMPERVIOUS 12" 1587

## (undated) DEVICE — DRAPE IOBAN INCISE 23X17" 6650EZ

## (undated) DEVICE — DRAPE SHEET MED 44X70" 9355

## (undated) DEVICE — SOL WATER IRRIG 1000ML BOTTLE 2F7114

## (undated) DEVICE — DRAIN JACKSON PRATT RESERVOIR 100ML SU130-1305

## (undated) DEVICE — Device

## (undated) DEVICE — LIGHT HANDLE X2

## (undated) DEVICE — SYR 30ML LL W/O NDL 302832

## (undated) DEVICE — DRSG ABDOMINAL 07 1/2X8" 7197D

## (undated) DEVICE — LINEN TOWEL PACK X30 5481

## (undated) DEVICE — SU PDS II 3-0 PS-2 18" Z497G

## (undated) DEVICE — SPONGE LAP 18X18" X8435

## (undated) DEVICE — SUCTION TIP YANKAUER W/O VENT K86

## (undated) DEVICE — DRSG PRIMAPORE 02X3" 7133

## (undated) DEVICE — BNDG ELASTIC 6" DBL LENGTH UNSTERILE 6611-16

## (undated) DEVICE — PACK LOWER EXTREMITY RIVERSIDE SOP32LEFSX

## (undated) DEVICE — DRAPE SPLIT SHEET 77X108 REINFORCED 29436

## (undated) DEVICE — SU SILK 2-0 SH 30" K833H

## (undated) DEVICE — SUCTION TIP YANKAUER STR K87

## (undated) DEVICE — SU MONOCRYL 4-0 PS-2 27" UND Y426H

## (undated) DEVICE — DRAPE U-DRAPE 1015NSD NON-STERILE

## (undated) DEVICE — ENDO VALVE SUCTION BRONCH EVIS MAJ-209

## (undated) DEVICE — PREP SKIN SCRUB TRAY 4461A

## (undated) DEVICE — KIT PATIENT CARE HANA PROFX W/BOOT LINER SUPINE 6851

## (undated) DEVICE — STPL ENDO ARTICULATING 45MM EC45A

## (undated) DEVICE — APPLICATOR COTTON TIP 6"X2 STERILE LF 6012

## (undated) DEVICE — GLOVE PROTEXIS BLUE W/NEU-THERA 8.0  2D73EB80

## (undated) DEVICE — DRSG AQUACEL AG 3.5X6.0" HYDROFIBER 412010

## (undated) DEVICE — LINEN BACK PACK 5440

## (undated) DEVICE — DRAPE SLEEVE 599

## (undated) DEVICE — DRAPE CONVERTORS U-DRAPE 60X72" 8476

## (undated) DEVICE — DRAPE U SPLIT 74X120" 29440

## (undated) DEVICE — SU VICRYL 0 UR-6 27" J603H

## (undated) DEVICE — SYR 05ML LL W/O NDL

## (undated) DEVICE — DECANTER TRANSFER DEVICE 2008S

## (undated) DEVICE — SU VICRYL 2-0 CT-1 27" UND J259H

## (undated) DEVICE — SYR 10ML LL W/O NDL 302995

## (undated) DEVICE — SU PDS II 3-0 FS-1 27" CLR  Z442H

## (undated) DEVICE — SOL WATER IRRIG 1000ML BOTTLE 07139-09

## (undated) DEVICE — LINEN TOWEL PACK X6 WHITE 5487

## (undated) DEVICE — STRAP KNEE/BODY 31143004

## (undated) DEVICE — SU PROLENE 0 CT-1 30" 8424H

## (undated) DEVICE — GLOVE PROTEXIS POWDER FREE SMT 7.0  2D72PT70X

## (undated) DEVICE — PACK CENTRAL LINE INSERTION SAN32CLFCG

## (undated) DEVICE — ESU ELEC BLADE 6" COATED/INSULATED E1455-6

## (undated) DEVICE — DRSG ADAPTIC 3X16"  6114

## (undated) DEVICE — SU VICRYL 2-0 SH 27" UND J417H

## (undated) DEVICE — COVER CAMERA IN-LIGHT DISP LT-C02

## (undated) DEVICE — KNIFE HANDLE W/15 BLADE 371615

## (undated) DEVICE — PACK UNIVERSAL SPLIT 29131

## (undated) DEVICE — CANISTER WOUND VAC W/GEL 1000ML M8275093/5

## (undated) DEVICE — ENDO VALVE BX EVIS MAJ-210

## (undated) DEVICE — SU VICRYL 0 CT-1 3X27" J430T

## (undated) DEVICE — ENDO TROCAR FIRST ENTRY KII FIOS Z-THRD 12X100MM CTF73

## (undated) DEVICE — DRSG WOUND VAC GRANUFOAM LG SILVER M8275099/5

## (undated) DEVICE — REAMER SHAFT

## (undated) DEVICE — PREP POVIDONE IODINE SCRUB 7.5% 120ML

## (undated) DEVICE — TAPE CLOTH 3" CARDINAL 3TRCL03

## (undated) DEVICE — TUBE CULTURE ANAEROBIC A.C.T.I. 12401

## (undated) DEVICE — SU DERMABOND ADVANCED .7ML DNX12

## (undated) DEVICE — SPONGE TONSIL W/STRING MED 23275-680

## (undated) DEVICE — SU SILK 0 TIE 6X30" A306H

## (undated) DEVICE — DRAIN JACKSON PRATT 10MM FLAT 3/4 PERF

## (undated) DEVICE — DRAPE POUCH IRR 1016

## (undated) DEVICE — SU ETHILON 3-0 PS-1 18" 1663H

## (undated) DEVICE — ESU PENCIL W/COATED BLADE E2450H

## (undated) DEVICE — CLIP HORIZON SM YELLOW 001200

## (undated) DEVICE — LINEN TOWEL PACK X5 5464

## (undated) DEVICE — SU VICRYL 2-0 CT 36" J957H

## (undated) DEVICE — DRAPE SHEET REV FOLD 3/4 9349

## (undated) DEVICE — DRAIN CHEST TUBE 24FR STR 8024

## (undated) DEVICE — SOL NACL 0.9% IRRIG 3000ML BAG 2B7477

## (undated) DEVICE — BNDG ELASTIC 6"X5YDS STERILE 6611-6S

## (undated) DEVICE — CLIP HORIZON MED BLUE 002200

## (undated) DEVICE — PITCHER STERILE 1000ML  SSK9004A

## (undated) DEVICE — TUBING SUCTION 10'X3/16" N510

## (undated) DEVICE — BONE CLEANING TIP INTERPULSE FEMORAL CANAL 0210-008-000

## (undated) DEVICE — ESU LIGASURE IMPACT OPEN SEALER/DVDR CVD LG JAW LF4418

## (undated) DEVICE — CUP AND LID 2PK 2OZ STERILE  SSK9006A

## (undated) DEVICE — BLADE KNIFE SURG 10 371110

## (undated) DEVICE — CATH TRAY FOLEY SURESTEP 16FR WDRAIN BAG STLK LATEX A300316A

## (undated) DEVICE — SUCTION DRY CHEST DRAIN OASIS 3600-100

## (undated) DEVICE — DRAPE MAYO STAND 23X54 8337

## (undated) DEVICE — PREP DURAPREP 26ML APL 8630

## (undated) DEVICE — SU ETHILON 2-0 PS 18" 585H

## (undated) DEVICE — PREP CHLORAPREP 26ML TINTED ORANGE  260815

## (undated) DEVICE — BLADE CLIPPER SGL USE 9680

## (undated) DEVICE — KIT CULTURE ESWAB AEROBE/ANAEROBE WHITE TOP R723480

## (undated) DEVICE — SU SILK 3-0 SH 30" K832H

## (undated) DEVICE — DRSG PRIMAPORE 03 1/8X6" 66000318

## (undated) DEVICE — DRILL BIT STRK 4.2X103MM FULL THRD 1806-4280S

## (undated) DEVICE — DRAPE SHEET HALF 40X60" 9358

## (undated) DEVICE — SU VICRYL 4-0 P-3 18" UND  J494H

## (undated) DEVICE — DRSG WOUND VAC GRANUFOAM MED SILVER M8275096/5

## (undated) DEVICE — SPONGE RAY-TEC 4X8" 7318

## (undated) DEVICE — ADH SKIN CLOSURE PREMIERPRO EXOFIN 1.0ML 3470

## (undated) DEVICE — SU SILK 0 SH 30" K834H

## (undated) DEVICE — DRSG STERI STRIP 1/2X4" R1547

## (undated) DEVICE — SU SILK 3-0 TIE 12X30" A304H

## (undated) DEVICE — SUCTION MANIFOLD NEPTUNE 2 SYS 1 PORT 702-025-000

## (undated) DEVICE — NDL 25GA 1.5" 305127

## (undated) DEVICE — K-WIRE 3 X 285MM

## (undated) DEVICE — GLOVE PROTEXIS BLUE W/NEU-THERA 7.5  2D73EB75

## (undated) DEVICE — DRSG DRAIN 4X4" 7086

## (undated) DEVICE — BONE CLEANING TIP INTERPULSE  0210-010-000

## (undated) DEVICE — GAMMA SYSTEM K-WIRE

## (undated) DEVICE — SU VICRYL 3-0 SH 27" J316H

## (undated) DEVICE — DRSG AQUACEL AG 3.5X13.75" HYDROFIBER 412012

## (undated) RX ORDER — FENTANYL CITRATE 50 UG/ML
INJECTION, SOLUTION INTRAMUSCULAR; INTRAVENOUS
Status: DISPENSED
Start: 2018-04-06

## (undated) RX ORDER — CEFAZOLIN SODIUM 2 G/100ML
INJECTION, SOLUTION INTRAVENOUS
Status: DISPENSED
Start: 2018-12-05

## (undated) RX ORDER — GABAPENTIN 300 MG/1
CAPSULE ORAL
Status: DISPENSED
Start: 2018-09-17

## (undated) RX ORDER — BUPIVACAINE HYDROCHLORIDE 2.5 MG/ML
INJECTION, SOLUTION EPIDURAL; INFILTRATION; INTRACAUDAL
Status: DISPENSED
Start: 2018-12-05

## (undated) RX ORDER — LIDOCAINE HYDROCHLORIDE 20 MG/ML
INJECTION, SOLUTION EPIDURAL; INFILTRATION; INTRACAUDAL; PERINEURAL
Status: DISPENSED
Start: 2018-09-17

## (undated) RX ORDER — FENTANYL CITRATE 50 UG/ML
INJECTION, SOLUTION INTRAMUSCULAR; INTRAVENOUS
Status: DISPENSED
Start: 2018-03-08

## (undated) RX ORDER — FENTANYL CITRATE 50 UG/ML
INJECTION, SOLUTION INTRAMUSCULAR; INTRAVENOUS
Status: DISPENSED
Start: 2019-04-12

## (undated) RX ORDER — LIDOCAINE HYDROCHLORIDE 10 MG/ML
INJECTION, SOLUTION EPIDURAL; INFILTRATION; INTRACAUDAL; PERINEURAL
Status: DISPENSED
Start: 2018-03-28

## (undated) RX ORDER — PROPOFOL 10 MG/ML
INJECTION, EMULSION INTRAVENOUS
Status: DISPENSED
Start: 2018-09-17

## (undated) RX ORDER — LIDOCAINE HYDROCHLORIDE 10 MG/ML
INJECTION, SOLUTION EPIDURAL; INFILTRATION; INTRACAUDAL; PERINEURAL
Status: DISPENSED
Start: 2019-01-01

## (undated) RX ORDER — LIDOCAINE HYDROCHLORIDE 10 MG/ML
INJECTION, SOLUTION EPIDURAL; INFILTRATION; INTRACAUDAL; PERINEURAL
Status: DISPENSED
Start: 2020-01-01

## (undated) RX ORDER — HYDROMORPHONE HYDROCHLORIDE 1 MG/ML
INJECTION, SOLUTION INTRAMUSCULAR; INTRAVENOUS; SUBCUTANEOUS
Status: DISPENSED
Start: 2018-09-17

## (undated) RX ORDER — GLYCOPYRROLATE 0.2 MG/ML
INJECTION, SOLUTION INTRAMUSCULAR; INTRAVENOUS
Status: DISPENSED
Start: 2019-04-15

## (undated) RX ORDER — ACETAMINOPHEN 325 MG/1
TABLET ORAL
Status: DISPENSED
Start: 2018-12-05

## (undated) RX ORDER — FENTANYL CITRATE 50 UG/ML
INJECTION, SOLUTION INTRAMUSCULAR; INTRAVENOUS
Status: DISPENSED
Start: 2019-04-15

## (undated) RX ORDER — CEFAZOLIN SODIUM 1 G/3ML
INJECTION, POWDER, FOR SOLUTION INTRAMUSCULAR; INTRAVENOUS
Status: DISPENSED
Start: 2018-03-28

## (undated) RX ORDER — BUPIVACAINE HYDROCHLORIDE 2.5 MG/ML
INJECTION, SOLUTION EPIDURAL; INFILTRATION; INTRACAUDAL
Status: DISPENSED
Start: 2018-03-08

## (undated) RX ORDER — LIDOCAINE HYDROCHLORIDE 20 MG/ML
INJECTION, SOLUTION EPIDURAL; INFILTRATION; INTRACAUDAL; PERINEURAL
Status: DISPENSED
Start: 2018-12-05

## (undated) RX ORDER — PROPOFOL 10 MG/ML
INJECTION, EMULSION INTRAVENOUS
Status: DISPENSED
Start: 2018-12-05

## (undated) RX ORDER — HYDROMORPHONE HYDROCHLORIDE 1 MG/ML
INJECTION, SOLUTION INTRAMUSCULAR; INTRAVENOUS; SUBCUTANEOUS
Status: DISPENSED
Start: 2019-04-15

## (undated) RX ORDER — FENTANYL CITRATE 50 UG/ML
INJECTION, SOLUTION INTRAMUSCULAR; INTRAVENOUS
Status: DISPENSED
Start: 2018-03-28

## (undated) RX ORDER — FENTANYL CITRATE 50 UG/ML
INJECTION, SOLUTION INTRAMUSCULAR; INTRAVENOUS
Status: DISPENSED
Start: 2018-12-05

## (undated) RX ORDER — KETOROLAC TROMETHAMINE 30 MG/ML
INJECTION, SOLUTION INTRAMUSCULAR; INTRAVENOUS
Status: DISPENSED
Start: 2018-09-17

## (undated) RX ORDER — OXYCODONE HYDROCHLORIDE 5 MG/1
TABLET ORAL
Status: DISPENSED
Start: 2018-03-08

## (undated) RX ORDER — CEFAZOLIN SODIUM 2 G/100ML
INJECTION, SOLUTION INTRAVENOUS
Status: DISPENSED
Start: 2018-03-28

## (undated) RX ORDER — FENTANYL CITRATE 50 UG/ML
INJECTION, SOLUTION INTRAMUSCULAR; INTRAVENOUS
Status: DISPENSED
Start: 2018-09-17

## (undated) RX ORDER — HYDROMORPHONE HYDROCHLORIDE 1 MG/ML
INJECTION, SOLUTION INTRAMUSCULAR; INTRAVENOUS; SUBCUTANEOUS
Status: DISPENSED
Start: 2018-12-05

## (undated) RX ORDER — PROPOFOL 10 MG/ML
INJECTION, EMULSION INTRAVENOUS
Status: DISPENSED
Start: 2018-03-08

## (undated) RX ORDER — ONDANSETRON 2 MG/ML
INJECTION INTRAMUSCULAR; INTRAVENOUS
Status: DISPENSED
Start: 2018-03-08

## (undated) RX ORDER — DEXAMETHASONE SODIUM PHOSPHATE 4 MG/ML
INJECTION, SOLUTION INTRA-ARTICULAR; INTRALESIONAL; INTRAMUSCULAR; INTRAVENOUS; SOFT TISSUE
Status: DISPENSED
Start: 2018-03-08

## (undated) RX ORDER — LIDOCAINE HYDROCHLORIDE 10 MG/ML
INJECTION, SOLUTION EPIDURAL; INFILTRATION; INTRACAUDAL; PERINEURAL
Status: DISPENSED
Start: 2019-04-12

## (undated) RX ORDER — FENTANYL CITRATE 50 UG/ML
INJECTION, SOLUTION INTRAMUSCULAR; INTRAVENOUS
Status: DISPENSED
Start: 2018-04-09

## (undated) RX ORDER — EPINEPHRINE 1 MG/ML
INJECTION, SOLUTION, CONCENTRATE INTRAVENOUS
Status: DISPENSED
Start: 2018-03-08

## (undated) RX ORDER — PROPOFOL 10 MG/ML
INJECTION, EMULSION INTRAVENOUS
Status: DISPENSED
Start: 2018-04-09

## (undated) RX ORDER — OXYCODONE HYDROCHLORIDE 5 MG/1
TABLET ORAL
Status: DISPENSED
Start: 2018-09-17

## (undated) RX ORDER — CEFAZOLIN SODIUM 2 G/100ML
INJECTION, SOLUTION INTRAVENOUS
Status: DISPENSED
Start: 2018-09-17

## (undated) RX ORDER — PHENYLEPHRINE HCL IN 0.9% NACL 1 MG/10 ML
SYRINGE (ML) INTRAVENOUS
Status: DISPENSED
Start: 2019-04-15

## (undated) RX ORDER — BUPIVACAINE HYDROCHLORIDE AND EPINEPHRINE 2.5; 5 MG/ML; UG/ML
INJECTION, SOLUTION EPIDURAL; INFILTRATION; INTRACAUDAL; PERINEURAL
Status: DISPENSED
Start: 2018-09-17

## (undated) RX ORDER — ESMOLOL HYDROCHLORIDE 10 MG/ML
INJECTION INTRAVENOUS
Status: DISPENSED
Start: 2019-04-15

## (undated) RX ORDER — FENTANYL CITRATE 50 UG/ML
INJECTION, SOLUTION INTRAMUSCULAR; INTRAVENOUS
Status: DISPENSED
Start: 2019-01-01

## (undated) RX ORDER — HYDROMORPHONE HYDROCHLORIDE 1 MG/ML
INJECTION, SOLUTION INTRAMUSCULAR; INTRAVENOUS; SUBCUTANEOUS
Status: DISPENSED
Start: 2018-03-08

## (undated) RX ORDER — KETOROLAC TROMETHAMINE 30 MG/ML
INJECTION, SOLUTION INTRAMUSCULAR; INTRAVENOUS
Status: DISPENSED
Start: 2018-03-08

## (undated) RX ORDER — BUPIVACAINE HYDROCHLORIDE AND EPINEPHRINE 5; 5 MG/ML; UG/ML
INJECTION, SOLUTION EPIDURAL; INTRACAUDAL; PERINEURAL
Status: DISPENSED
Start: 2018-03-28

## (undated) RX ORDER — DEXTROSE MONOHYDRATE, SODIUM CHLORIDE, AND POTASSIUM CHLORIDE 50; 1.49; 4.5 G/1000ML; G/1000ML; G/1000ML
INJECTION, SOLUTION INTRAVENOUS
Status: DISPENSED
Start: 2018-12-05

## (undated) RX ORDER — PROPOFOL 10 MG/ML
INJECTION, EMULSION INTRAVENOUS
Status: DISPENSED
Start: 2019-04-15

## (undated) RX ORDER — ALBUTEROL SULFATE 90 UG/1
AEROSOL, METERED RESPIRATORY (INHALATION)
Status: DISPENSED
Start: 2019-04-15

## (undated) RX ORDER — ONDANSETRON 2 MG/ML
INJECTION INTRAMUSCULAR; INTRAVENOUS
Status: DISPENSED
Start: 2018-03-28

## (undated) RX ORDER — PHENYLEPHRINE HCL IN 0.9% NACL 1 MG/10 ML
SYRINGE (ML) INTRAVENOUS
Status: DISPENSED
Start: 2018-04-06

## (undated) RX ORDER — HYDROCODONE BITARTRATE AND ACETAMINOPHEN 5; 325 MG/1; MG/1
TABLET ORAL
Status: DISPENSED
Start: 2018-12-05

## (undated) RX ORDER — ONDANSETRON 2 MG/ML
INJECTION INTRAMUSCULAR; INTRAVENOUS
Status: DISPENSED
Start: 2018-04-09

## (undated) RX ORDER — LIDOCAINE HYDROCHLORIDE 20 MG/ML
INJECTION, SOLUTION EPIDURAL; INFILTRATION; INTRACAUDAL; PERINEURAL
Status: DISPENSED
Start: 2018-04-09

## (undated) RX ORDER — GABAPENTIN 300 MG/1
CAPSULE ORAL
Status: DISPENSED
Start: 2018-04-06

## (undated) RX ORDER — PROPOFOL 10 MG/ML
INJECTION, EMULSION INTRAVENOUS
Status: DISPENSED
Start: 2018-04-06

## (undated) RX ORDER — ONDANSETRON 2 MG/ML
INJECTION INTRAMUSCULAR; INTRAVENOUS
Status: DISPENSED
Start: 2018-04-06

## (undated) RX ORDER — ACETAMINOPHEN 325 MG/1
TABLET ORAL
Status: DISPENSED
Start: 2018-04-09

## (undated) RX ORDER — CHLORHEXIDINE GLUCONATE ORAL RINSE 1.2 MG/ML
SOLUTION DENTAL
Status: DISPENSED
Start: 2018-12-05

## (undated) RX ORDER — BUPIVACAINE HYDROCHLORIDE AND EPINEPHRINE 5; 5 MG/ML; UG/ML
INJECTION, SOLUTION EPIDURAL; INTRACAUDAL; PERINEURAL
Status: DISPENSED
Start: 2019-04-15

## (undated) RX ORDER — ACETAMINOPHEN 325 MG/1
TABLET ORAL
Status: DISPENSED
Start: 2018-03-08

## (undated) RX ORDER — HEPARIN SODIUM 1000 [USP'U]/ML
INJECTION, SOLUTION INTRAVENOUS; SUBCUTANEOUS
Status: DISPENSED
Start: 2018-03-28

## (undated) RX ORDER — EPHEDRINE SULFATE 50 MG/ML
INJECTION, SOLUTION INTRAMUSCULAR; INTRAVENOUS; SUBCUTANEOUS
Status: DISPENSED
Start: 2019-04-15

## (undated) RX ORDER — HYDROMORPHONE HYDROCHLORIDE 2 MG/ML
INJECTION, SOLUTION INTRAMUSCULAR; INTRAVENOUS; SUBCUTANEOUS
Status: DISPENSED
Start: 2018-09-17

## (undated) RX ORDER — SODIUM CHLORIDE, SODIUM LACTATE, POTASSIUM CHLORIDE, CALCIUM CHLORIDE 600; 310; 30; 20 MG/100ML; MG/100ML; MG/100ML; MG/100ML
INJECTION, SOLUTION INTRAVENOUS
Status: DISPENSED
Start: 2019-04-15

## (undated) RX ORDER — GABAPENTIN 300 MG/1
CAPSULE ORAL
Status: DISPENSED
Start: 2018-03-08

## (undated) RX ORDER — HEPARIN SODIUM (PORCINE) LOCK FLUSH IV SOLN 100 UNIT/ML 100 UNIT/ML
SOLUTION INTRAVENOUS
Status: DISPENSED
Start: 2019-01-01

## (undated) RX ORDER — CELECOXIB 200 MG/1
CAPSULE ORAL
Status: DISPENSED
Start: 2018-12-05

## (undated) RX ORDER — LIDOCAINE HYDROCHLORIDE 20 MG/ML
INJECTION, SOLUTION EPIDURAL; INFILTRATION; INTRACAUDAL; PERINEURAL
Status: DISPENSED
Start: 2018-04-06

## (undated) RX ORDER — ACETAMINOPHEN 325 MG/1
TABLET ORAL
Status: DISPENSED
Start: 2018-09-17